# Patient Record
Sex: MALE | Race: WHITE | NOT HISPANIC OR LATINO | Employment: OTHER | RURAL
[De-identification: names, ages, dates, MRNs, and addresses within clinical notes are randomized per-mention and may not be internally consistent; named-entity substitution may affect disease eponyms.]

---

## 2017-01-04 ENCOUNTER — OFFICE VISIT (OUTPATIENT)
Dept: FAMILY MEDICINE CLINIC | Facility: CLINIC | Age: 72
End: 2017-01-04

## 2017-01-04 VITALS
HEART RATE: 70 BPM | TEMPERATURE: 97.9 F | SYSTOLIC BLOOD PRESSURE: 139 MMHG | BODY MASS INDEX: 37.13 KG/M2 | DIASTOLIC BLOOD PRESSURE: 80 MMHG | WEIGHT: 244.2 LBS

## 2017-01-04 DIAGNOSIS — M25.561 PAIN AND SWELLING OF KNEE, RIGHT: Primary | ICD-10-CM

## 2017-01-04 DIAGNOSIS — M25.461 PAIN AND SWELLING OF KNEE, RIGHT: Primary | ICD-10-CM

## 2017-01-04 PROCEDURE — 96372 THER/PROPH/DIAG INJ SC/IM: CPT | Performed by: FAMILY MEDICINE

## 2017-01-04 PROCEDURE — 99213 OFFICE O/P EST LOW 20 MIN: CPT | Performed by: FAMILY MEDICINE

## 2017-01-04 RX ORDER — METHYLPREDNISOLONE ACETATE 40 MG/ML
40 INJECTION, SUSPENSION INTRA-ARTICULAR; INTRALESIONAL; INTRAMUSCULAR; SOFT TISSUE ONCE
Status: COMPLETED | OUTPATIENT
Start: 2017-01-04 | End: 2017-01-04

## 2017-01-04 RX ORDER — PREDNISONE 20 MG/1
TABLET ORAL
Qty: 7 TABLET | Refills: 0 | Status: SHIPPED | OUTPATIENT
Start: 2017-01-04 | End: 2017-05-12

## 2017-01-04 RX ADMIN — METHYLPREDNISOLONE ACETATE 40 MG: 40 INJECTION, SUSPENSION INTRA-ARTICULAR; INTRALESIONAL; INTRAMUSCULAR; SOFT TISSUE at 09:46

## 2017-01-04 NOTE — PROGRESS NOTES
Samy Velazquez    1945    Chief Complaint   Patient presents with   • Leg Pain     right       Vitals:    01/04/17 0907   BP: 139/80   Pulse: 70   Temp: 97.9 °F (36.6 °C)   Weight: 244 lb 3.2 oz (111 kg)       Leg Pain    The incident occurred more than 1 week ago. The incident occurred at home. There was no injury mechanism. The pain is present in the right thigh and right knee. The quality of the pain is described as aching. The pain is mild. The pain has been fluctuating since onset. Associated symptoms include muscle weakness. The symptoms are aggravated by movement. He has tried ice for the symptoms. The treatment provided mild relief.       Review of Systems   Musculoskeletal: Negative for back pain.        Right posterior thigh pain into the upper calf with positive hamstring test       Past Medical History   Diagnosis Date   • Hyperlipidemia          Current Outpatient Prescriptions:   •  allopurinol (ZYLOPRIM) 300 MG tablet, Take 300 mg by mouth Daily., Disp: , Rfl:   •  aspirin 81 MG EC tablet, Take 81 mg by mouth Daily., Disp: , Rfl:   •  atorvastatin (LIPITOR) 10 MG tablet, Take 10 mg by mouth Daily., Disp: , Rfl:   •  cephalexin (KEFLEX) 500 MG capsule, Take 500 mg by mouth 2 (Two) Times a Day., Disp: , Rfl:   •  cholecalciferol (VITAMIN D3) 1000 UNITS tablet, Take 1,000 Units by mouth Daily., Disp: , Rfl:   •  clopidogrel (PLAVIX) 75 MG tablet, Take 75 mg by mouth Daily., Disp: , Rfl:   •  famotidine (PEPCID) 20 MG tablet, Take 20 mg by mouth 2 (Two) Times a Day., Disp: , Rfl:   •  FLUoxetine (PROZAC) 40 MG capsule, Take 40 mg by mouth Daily., Disp: , Rfl:   •  furosemide (LASIX) 20 MG tablet, Take 20 mg by mouth 2 (Two) Times a Day., Disp: , Rfl:   •  isosorbide mononitrate (IMDUR) 60 MG 24 hr tablet, TAKE 1 TABLET EVERY MORNING AND TAKE 1/2 TABLET AT 2PM, Disp: , Rfl: 1  •  mexiletine (MEXITIL) 150 MG capsule, TAKE 2 CAPSULES BY MOUTH 3 TIMES A DAY, Disp: , Rfl: 3  •  predniSONE (DELTASONE) 20 MG  tablet, Starting Thursday 1 each morning for 7 days, Disp: 7 tablet, Rfl: 0  No current facility-administered medications for this visit.     Allergies   Allergen Reactions   • Clindamycin/Lincomycin Diarrhea   • Iodine Hives   • Vancomycin Nausea And Vomiting   • Penicillins Rash       Patient Active Problem List   Diagnosis   • Ischemic heart disease due to coronary artery obstruction   • Osteoarthritis of multiple joints   • Cardiac pacemaker   • Gout of multiple sites   • Nephrolithiasis   • Mixed hyperlipidemia       Social History     Social History   • Marital status:      Spouse name: N/A   • Number of children: N/A   • Years of education: N/A     Social History Main Topics   • Smoking status: Never Smoker   • Smokeless tobacco: None   • Alcohol use No   • Drug use: No   • Sexual activity: Not Asked     Other Topics Concern   • None     Social History Narrative       Past Surgical History   Procedure Laterality Date   • Cardiac surgery     • Total shoulder replacement     • Knee surgery         Physical Exam   Constitutional:   Morbidly obese   Musculoskeletal:   Pain to resistance right posterior thigh no discoloration   Neurological: He is alert.   Psychiatric: He has a normal mood and affect.   Vitals reviewed.      Vitals:    01/04/17 0907   BP: 139/80   Pulse: 70   Temp: 97.9 °F (36.6 °C)   Weight: 244 lb 3.2 oz (111 kg)       Samy was seen today for leg pain.    Diagnoses and all orders for this visit:    Pain and swelling of knee, right  -     methylPREDNISolone acetate (DEPO-medrol) injection 40 mg; Inject 1 mL into the shoulder, thigh, or buttocks 1 (One) Time.    Other orders  -     predniSONE (DELTASONE) 20 MG tablet; Starting Thursday 1 each morning for 7 days        Problem List Items Addressed This Visit     None      Visit Diagnoses     Pain and swelling of knee, right    -  Primary    Relevant Medications    methylPREDNISolone acetate (DEPO-medrol) injection 40 mg (Completed) (Start  on 1/4/2017 10:15 AM)          RAH lumbar radiculopathy versus hamstring tear on right-when it started was up and down tractors a lot                            Garret Salinas MD  1/4/2017

## 2017-01-04 NOTE — MR AVS SNAPSHOT
Samy Velazquez   1/4/2017 9:00 AM   Office Visit    Dept Phone:  [phone]   Encounter #:  [id number]    Provider:  Garret Salinas MD   Department:  [facility]                Your Full Care Plan              Today's Medication Changes          These changes are accurate as of: 1/4/17  9:48 AM.  If you have any questions, ask your nurse or doctor.               New Medication(s)Ordered:     predniSONE 20 MG tablet   Commonly known as:  DELTASONE   Starting Thursday 1 each morning for 7 days   Started by:  Garret Salinas MD            Where to Get Your Medications      These medications were sent to [facility]/pharmacy #[id number] - [place], [place] - [address] [place] [address] - [phone]  - [phone] [address] [address]     Phone:  [phone]     predniSONE 20 MG tablet                  Your Updated Medication List          This list is accurate as of: 1/4/17  9:48 AM.  Always use your most recent med list.                allopurinol 300 MG tablet   Commonly known as:  ZYLOPRIM       aspirin 81 MG EC tablet       atorvastatin 10 MG tablet   Commonly known as:  LIPITOR       cephalexin 500 MG capsule   Commonly known as:  KEFLEX       cholecalciferol 1000 UNITS tablet   Commonly known as:  VITAMIN D3       clopidogrel 75 MG tablet   Commonly known as:  PLAVIX       famotidine 20 MG tablet   Commonly known as:  PEPCID       furosemide 20 MG tablet   Commonly known as:  LASIX       isosorbide mononitrate 60 MG 24 hr tablet   Commonly known as:  IMDUR       mexiletine 150 MG capsule   Commonly known as:  MEXITIL       predniSONE 20 MG tablet   Commonly known as:  DELTASONE   Starting Thursday 1 each morning for 7 days       PROZAC 40 MG capsule   Generic drug:  FLUoxetine               You Were Diagnosed With        Codes Comments    Pain and swelling of knee, right    -  Primary ICD-10-CM: M25.561, M25.461  ICD-9-CM: 719.46          Medications to be Given to You by a Medical Professional     Due       Frequency    (none) methylPREDNISolone acetate (DEPO-medrol) injection 40 mg  Once      Instructions     None    Patient Instructions History      Upcoming Appointments     Visit Type Date Time Department    OFFICE VISIT 1/4/2017  9:00 AM [facility]      [employer] Signup     Our records indicate that you have declined [facility] sign[facility]. If you would like to sign up for [employer], please email [email or web address] or call [phone] to obtain an activation code.             Other Info from Your Visit           Allergies     Clindamycin/lincomycin  Diarrhea    Iodine  Hives    Vancomycin  Nausea And Vomiting    Penicillins  Rash      Reason for Visit     Leg Pain right      Vital Signs     Blood Pressure Pulse Temperature Weight Body Mass Index Smoking Status    139/80 70 97.9 °F (36.6 °C) 244 lb 3.2 oz (111 kg) 37.13 kg/m2 Never Smoker      Problems and Diagnoses Noted     Pain and swelling of knee, right    -  Primary      Medications Administered     methylPREDNISolone acetate (DEPO-medrol) injection 40 mg

## 2017-01-13 ENCOUNTER — OFFICE VISIT (OUTPATIENT)
Dept: FAMILY MEDICINE CLINIC | Facility: CLINIC | Age: 72
End: 2017-01-13

## 2017-01-13 VITALS
WEIGHT: 246.4 LBS | DIASTOLIC BLOOD PRESSURE: 77 MMHG | TEMPERATURE: 98.9 F | OXYGEN SATURATION: 98 % | BODY MASS INDEX: 37.35 KG/M2 | HEIGHT: 68 IN | HEART RATE: 60 BPM | SYSTOLIC BLOOD PRESSURE: 117 MMHG

## 2017-01-13 DIAGNOSIS — R32 INCONTINENCE: ICD-10-CM

## 2017-01-13 DIAGNOSIS — M54.16 LUMBAR RADICULOPATHY: Primary | ICD-10-CM

## 2017-01-13 LAB
BILIRUB BLD-MCNC: NEGATIVE MG/DL
CLARITY, POC: CLEAR
COLOR UR: YELLOW
GLUCOSE UR STRIP-MCNC: NEGATIVE MG/DL
KETONES UR QL: NEGATIVE
LEUKOCYTE EST, POC: ABNORMAL
NITRITE UR-MCNC: NEGATIVE MG/ML
PH UR: 5.5 [PH] (ref 5–8)
PROT UR STRIP-MCNC: NEGATIVE MG/DL
RBC # UR STRIP: ABNORMAL /UL
SP GR UR: 1.02 (ref 1–1.03)
UROBILINOGEN UR QL: NORMAL

## 2017-01-13 PROCEDURE — 81003 URINALYSIS AUTO W/O SCOPE: CPT | Performed by: FAMILY MEDICINE

## 2017-01-13 PROCEDURE — 99213 OFFICE O/P EST LOW 20 MIN: CPT | Performed by: FAMILY MEDICINE

## 2017-01-13 RX ORDER — HYDROCODONE BITARTRATE AND ACETAMINOPHEN 7.5; 325 MG/1; MG/1
1 TABLET ORAL EVERY 6 HOURS PRN
Qty: 90 TABLET | Refills: 0 | Status: SHIPPED | OUTPATIENT
Start: 2017-01-13 | End: 2017-10-17 | Stop reason: ALTCHOICE

## 2017-01-13 NOTE — PROGRESS NOTES
"Samy Velazquez    1945    Chief Complaint   Patient presents with   • Leg Pain     Right leg       Vitals:    01/13/17 1322   BP: 117/77   Pulse: 60   Temp: 98.9 °F (37.2 °C)   SpO2: 98%   Weight: 246 lb 6.4 oz (112 kg)   Height: 68\" (172.7 cm)       Leg Pain    The incident occurred more than 1 week ago. The incident occurred at home. There was no injury mechanism. The pain is present in the right hip, right thigh, right knee and right ankle. The quality of the pain is described as shooting. The pain is at a severity of 7/10. The pain is moderate. The pain has been fluctuating since onset. Associated symptoms include an inability to bear weight and muscle weakness. He reports no foreign bodies present. The symptoms are aggravated by movement. He has tried NSAIDs for the symptoms. The treatment provided no relief.       Review of Systems   Gastrointestinal:        Fecal incontinence since sciatica started   Genitourinary:        Urinary incontinence and sciatica started   Neurological:        Lumbar radiculopathy on the right       Past Medical History   Diagnosis Date   • Coronary artery disease    • Hyperlipidemia    • Kidney stone          Current Outpatient Prescriptions:   •  allopurinol (ZYLOPRIM) 300 MG tablet, Take 300 mg by mouth Daily., Disp: , Rfl:   •  aspirin 81 MG EC tablet, Take 81 mg by mouth Daily., Disp: , Rfl:   •  atorvastatin (LIPITOR) 10 MG tablet, Take 10 mg by mouth Daily., Disp: , Rfl:   •  cephalexin (KEFLEX) 500 MG capsule, Take 500 mg by mouth 2 (Two) Times a Day., Disp: , Rfl:   •  cholecalciferol (VITAMIN D3) 1000 UNITS tablet, Take 1,000 Units by mouth Daily., Disp: , Rfl:   •  clopidogrel (PLAVIX) 75 MG tablet, Take 75 mg by mouth Daily., Disp: , Rfl:   •  famotidine (PEPCID) 20 MG tablet, Take 20 mg by mouth 2 (Two) Times a Day., Disp: , Rfl:   •  FLUoxetine (PROZAC) 40 MG capsule, Take 40 mg by mouth Daily., Disp: , Rfl:   •  furosemide (LASIX) 20 MG tablet, Take 20 mg by mouth 2 " "(Two) Times a Day., Disp: , Rfl:   •  isosorbide mononitrate (IMDUR) 60 MG 24 hr tablet, TAKE 1 TABLET EVERY MORNING AND TAKE 1/2 TABLET AT 2PM, Disp: , Rfl: 1  •  mexiletine (MEXITIL) 150 MG capsule, TAKE 2 CAPSULES BY MOUTH 3 TIMES A DAY, Disp: , Rfl: 3  •  predniSONE (DELTASONE) 20 MG tablet, Starting Thursday 1 each morning for 7 days, Disp: 7 tablet, Rfl: 0  •  HYDROcodone-acetaminophen (NORCO) 7.5-325 MG per tablet, Take 1 tablet by mouth Every 6 (Six) Hours As Needed for moderate pain (4-6)., Disp: 90 tablet, Rfl: 0    Allergies   Allergen Reactions   • Clindamycin/Lincomycin Diarrhea   • Iodine Hives   • Vancomycin Nausea And Vomiting   • Penicillins Rash       Patient Active Problem List   Diagnosis   • Ischemic heart disease due to coronary artery obstruction   • Osteoarthritis of multiple joints   • Cardiac pacemaker   • Gout of multiple sites   • Nephrolithiasis   • Mixed hyperlipidemia       Social History     Social History   • Marital status:      Spouse name: N/A   • Number of children: N/A   • Years of education: N/A     Social History Main Topics   • Smoking status: Never Smoker   • Smokeless tobacco: None   • Alcohol use No   • Drug use: No   • Sexual activity: Not Asked     Other Topics Concern   • None     Social History Narrative       Past Surgical History   Procedure Laterality Date   • Cardiac surgery     • Total shoulder replacement     • Knee surgery         Physical Exam   Constitutional:    obese   Cardiovascular: Normal rate and regular rhythm.    Pulmonary/Chest: Effort normal and breath sounds normal.   Neurological: He is alert.   Psychiatric: He has a normal mood and affect. His behavior is normal.   Vitals reviewed.      Vitals:    01/13/17 1322   BP: 117/77   Pulse: 60   Temp: 98.9 °F (37.2 °C)   SpO2: 98%   Weight: 246 lb 6.4 oz (112 kg)   Height: 68\" (172.7 cm)       Samy was seen today for leg pain.    Diagnoses and all orders for this visit:    Lumbar radiculopathy  -  "    CT Lumbar Spine Without Contrast    Incontinence  -     POC Urinalysis Dipstick, Automated  -     Urine Culture (Clean Catch)    Other orders  -     HYDROcodone-acetaminophen (NORCO) 7.5-325 MG per tablet; Take 1 tablet by mouth Every 6 (Six) Hours As Needed for moderate pain (4-6).        Problem List Items Addressed This Visit     None      Visit Diagnoses     Lumbar radiculopathy    -  Primary    Relevant Orders    CT Lumbar Spine Without Contrast    Incontinence        Relevant Orders    POC Urinalysis Dipstick, Automated (Completed)    Urine Culture (Clean Catch)              Plan CT of spine since patient has a pacemaker.  Probable spinal stenosis and worried about fecal and urinary incontinence with these radiculopathy.-Urgent neurosurgical evaluation-narco 7.5--90 given, warned about fall risk                        Garret Salinas MD  1/13/2017

## 2017-01-13 NOTE — MR AVS SNAPSHOT
Samy Velazquez   1/13/2017 1:30 PM   Office Visit    Dept Phone:  [phone]   Encounter #:  [id number]    Provider:  Garret Salinas MD   Department:  [facility]                Your Full Care Plan              Today's Medication Changes          These changes are accurate as of: 1/13/17  1:51 PM.  If you have any questions, ask your nurse or doctor.               New Medication(s)Ordered:     HYDROcodone-acetaminophen 7.5-325 MG per tablet   Commonly known as:  NORCO   Take 1 tablet by mouth Every 6 (Six) Hours As Needed for moderate pain (4-6).   Started by:  Garret Salinas MD            Where to Get Your Medications      You can get these medications from any pharmacy     Bring a paper prescription for each of these medications     HYDROcodone-acetaminophen 7.5-325 MG per tablet                  Your Updated Medication List          This list is accurate as of: 1/13/17  1:51 PM.  Always use your most recent med list.                allopurinol 300 MG tablet   Commonly known as:  ZYLOPRIM       aspirin 81 MG EC tablet       atorvastatin 10 MG tablet   Commonly known as:  LIPITOR       cephalexin 500 MG capsule   Commonly known as:  KEFLEX       cholecalciferol 1000 UNITS tablet   Commonly known as:  VITAMIN D3       clopidogrel 75 MG tablet   Commonly known as:  PLAVIX       famotidine 20 MG tablet   Commonly known as:  PEPCID       furosemide 20 MG tablet   Commonly known as:  LASIX       HYDROcodone-acetaminophen 7.5-325 MG per tablet   Commonly known as:  NORCO   Take 1 tablet by mouth Every 6 (Six) Hours As Needed for moderate pain (4-6).       isosorbide mononitrate 60 MG 24 hr tablet   Commonly known as:  IMDUR       mexiletine 150 MG capsule   Commonly known as:  MEXITIL       predniSONE 20 MG tablet   Commonly known as:  DELTASONE   Starting Thursday 1 each morning for 7 days       PROZAC 40 MG capsule   Generic drug:  FLUoxetine    "           We Performed the Following     CT Lumbar Spine Without Contrast     POC Urinalysis Dipstick, Automated     Urine Culture (Clean Catch)       You Were Diagnosed With        Codes Comments    Lumbar radiculopathy    -  Primary ICD-10-CM: M54.16  ICD-9-CM: 724.4     Incontinence     ICD-10-CM: R32  ICD-9-CM: 788.30       Instructions     None    Patient Instructions History      Upcoming Appointments     Visit Type Date Time Department    OFFICE VISIT 1/13/2017  1:30 PM [facility] [place]      [employer] Signup     Our records indicate that you have declined [employer] signup. If you would like to sign up for [employer], please email [email or web address] or call [phone] to obtain an activation code.             Other Info from Your Visit           Allergies     Clindamycin/lincomycin  Diarrhea    Iodine  Hives    Vancomycin  Nausea And Vomiting    Penicillins  Rash      Reason for Visit     Leg Pain Right leg      Vital Signs     Blood Pressure Pulse Temperature Height Weight Oxygen Saturation    117/77 60 98.9 °F (37.2 °C) 68\" (172.7 cm) 246 lb 6.4 oz (112 kg) 98%    Body Mass Index Smoking Status                37.46 kg/m2 Never Smoker          Problems and Diagnoses Noted     Lumbar nerve root disorder    -  Primary    Incontinence          Results     POC Urinalysis Dipstick, Automated      Component Value Standard Range & Units    Color Yellow Yellow, Straw, Dark Yellow, Candis    Clarity, UA Clear Clear    Glucose, UA Negative Negative, 1000 mg/dL (3+) mg/dL    Bilirubin Negative Negative    Ketones, UA Negative Negative    Specific Gravity  1.020 1.005 - 1.030    Blood, UA Trace Negative    pH, Urine 5.5 5.0 - 8.0    Protein, POC Negative Negative mg/dL    Urobilinogen, UA Normal Normal    Leukocytes Trace Negative    Nitrite, UA Negative Negative                    "

## 2017-01-15 LAB
BACTERIA UR CULT: NO GROWTH
BACTERIA UR CULT: NORMAL

## 2017-01-18 DIAGNOSIS — M48.061 SPINAL STENOSIS OF LUMBAR REGION: Primary | ICD-10-CM

## 2017-01-30 ENCOUNTER — OFFICE VISIT (OUTPATIENT)
Dept: FAMILY MEDICINE CLINIC | Facility: CLINIC | Age: 72
End: 2017-01-30

## 2017-01-30 VITALS
DIASTOLIC BLOOD PRESSURE: 73 MMHG | HEART RATE: 64 BPM | SYSTOLIC BLOOD PRESSURE: 112 MMHG | TEMPERATURE: 97.9 F | HEIGHT: 68 IN | OXYGEN SATURATION: 98 %

## 2017-01-30 DIAGNOSIS — Z00.00 ANNUAL PHYSICAL EXAM: Primary | ICD-10-CM

## 2017-01-30 DIAGNOSIS — S70.02XA CONTUSION OF LEFT HIP, INITIAL ENCOUNTER: Primary | ICD-10-CM

## 2017-01-30 LAB
EXPIRATION DATE 2: NORMAL
EXPIRATION DATE 3: NORMAL
EXPIRATION DATE: NORMAL
GASTROCULT GAST QL: NEGATIVE
HEMOCCULT SP2 STL QL: NEGATIVE
HEMOCCULT SP3 STL QL: NEGATIVE
Lab: NORMAL

## 2017-01-30 PROCEDURE — 99213 OFFICE O/P EST LOW 20 MIN: CPT | Performed by: FAMILY MEDICINE

## 2017-01-30 PROCEDURE — 82270 OCCULT BLOOD FECES: CPT | Performed by: FAMILY MEDICINE

## 2017-01-30 NOTE — PROGRESS NOTES
"Samy Velazquez    1945    Chief Complaint   Patient presents with   • Fall     Left leg bruised/ hit head/ follow up from ER on 1/27/17       Vitals:    01/30/17 1335   BP: 112/73   Pulse: 64   Temp: 97.9 °F (36.6 °C)   SpO2: 98%   Height: 68\" (172.7 cm)       Fall   The accident occurred 3 to 5 days ago. The fall occurred while standing. He fell from a height of 1 to 2 ft. He landed on carpet. There was no blood loss. The point of impact was the left hip and head. The pain is present in the head and left hip. The pain is mild. The symptoms are aggravated by pressure on injury. He has tried ice for the symptoms. The treatment provided moderate relief.       Review of Systems   Musculoskeletal:        • Subcutaneous contusion left hip-x-rays normal   Neurological:        Left calvarial contusion-CT normal       Past Medical History   Diagnosis Date   • Arthritis    • Coronary artery disease    • Hyperlipidemia    • Kidney stone          Current Outpatient Prescriptions:   •  allopurinol (ZYLOPRIM) 300 MG tablet, Take 300 mg by mouth Daily., Disp: , Rfl:   •  aspirin 81 MG EC tablet, Take 81 mg by mouth Daily., Disp: , Rfl:   •  atorvastatin (LIPITOR) 10 MG tablet, Take 10 mg by mouth Daily., Disp: , Rfl:   •  cephalexin (KEFLEX) 500 MG capsule, Take 500 mg by mouth 2 (Two) Times a Day., Disp: , Rfl:   •  cholecalciferol (VITAMIN D3) 1000 UNITS tablet, Take 1,000 Units by mouth Daily., Disp: , Rfl:   •  clopidogrel (PLAVIX) 75 MG tablet, Take 75 mg by mouth Daily., Disp: , Rfl:   •  famotidine (PEPCID) 20 MG tablet, Take 20 mg by mouth 2 (Two) Times a Day., Disp: , Rfl:   •  FLUoxetine (PROZAC) 40 MG capsule, Take 40 mg by mouth Daily., Disp: , Rfl:   •  furosemide (LASIX) 20 MG tablet, Take 20 mg by mouth 2 (Two) Times a Day., Disp: , Rfl:   •  HYDROcodone-acetaminophen (NORCO) 7.5-325 MG per tablet, Take 1 tablet by mouth Every 6 (Six) Hours As Needed for moderate pain (4-6)., Disp: 90 tablet, Rfl: 0  •  " "isosorbide mononitrate (IMDUR) 60 MG 24 hr tablet, TAKE 1 TABLET EVERY MORNING AND TAKE 1/2 TABLET AT 2PM, Disp: , Rfl: 1  •  mexiletine (MEXITIL) 150 MG capsule, TAKE 2 CAPSULES BY MOUTH 3 TIMES A DAY, Disp: , Rfl: 3  •  predniSONE (DELTASONE) 20 MG tablet, Starting Thursday 1 each morning for 7 days, Disp: 7 tablet, Rfl: 0    Allergies   Allergen Reactions   • Clindamycin/Lincomycin Diarrhea   • Iodine Hives   • Vancomycin Nausea And Vomiting   • Penicillins Rash       Patient Active Problem List   Diagnosis   • Ischemic heart disease due to coronary artery obstruction   • Osteoarthritis of multiple joints   • Cardiac pacemaker   • Gout of multiple sites   • Nephrolithiasis   • Mixed hyperlipidemia       Social History     Social History   • Marital status:      Spouse name: N/A   • Number of children: N/A   • Years of education: N/A     Social History Main Topics   • Smoking status: Never Smoker   • Smokeless tobacco: None   • Alcohol use No   • Drug use: No   • Sexual activity: Not Asked     Other Topics Concern   • None     Social History Narrative       Past Surgical History   Procedure Laterality Date   • Cardiac surgery     • Total shoulder replacement     • Knee surgery         Physical Exam   Constitutional: He is oriented to person, place, and time.   Cardiovascular: Normal rate and regular rhythm.    Pulmonary/Chest: Effort normal and breath sounds normal.   Musculoskeletal:   Large left hip subcutaneous contusion   Neurological: He is alert and oriented to person, place, and time.   Psychiatric: He has a normal mood and affect. His behavior is normal.       Vitals:    01/30/17 1335   BP: 112/73   Pulse: 64   Temp: 97.9 °F (36.6 °C)   SpO2: 98%   Height: 68\" (172.7 cm)       Samy was seen today for fall.    Diagnoses and all orders for this visit:    Contusion of left hip, initial encounter        Problem List Items Addressed This Visit     None      Visit Diagnoses     Contusion of left hip, " initial encounter    -  Primary                Plan epidurals for spine-continue appointment                        Garret Salinas MD  1/30/2017

## 2017-01-30 NOTE — MR AVS SNAPSHOT
Samy Velazquez   1/30/2017 1:45 PM   Office Visit    Dept Phone:  [phone]   Encounter #:  [id number]    Provider:  Garret Salinas MD   Department:  [facility]                Your Full Care Plan              Your Updated Medication List          This list is accurate as of: 1/30/17  1:50 PM.  Always use your most recent med list.                allopurinol 300 MG tablet   Commonly known as:  ZYLOPRIM       aspirin 81 MG EC tablet       atorvastatin 10 MG tablet   Commonly known as:  LIPITOR       cephalexin 500 MG capsule   Commonly known as:  KEFLEX       cholecalciferol 1000 UNITS tablet   Commonly known as:  VITAMIN D3       clopidogrel 75 MG tablet   Commonly known as:  PLAVIX       famotidine 20 MG tablet   Commonly known as:  PEPCID       furosemide 20 MG tablet   Commonly known as:  LASIX       HYDROcodone-acetaminophen 7.5-325 MG per tablet   Commonly known as:  NORCO   Take 1 tablet by mouth Every 6 (Six) Hours As Needed for moderate pain (4-6).       isosorbide mononitrate 60 MG 24 hr tablet   Commonly known as:  IMDUR       mexiletine 150 MG capsule   Commonly known as:  MEXITIL       predniSONE 20 MG tablet   Commonly known as:  DELTASONE   Starting Thursday 1 each morning for 7 days       PROZAC 40 MG capsule   Generic drug:  FLUoxetine               You Were Diagnosed With        Codes Comments    Contusion of left hip, initial encounter    -  Primary ICD-10-CM: S70.02XA  ICD-9-CM: 924.01       Instructions     None    Patient Instructions History      Upcoming Appointments     Visit Type Date Time Department    OFFICE VISIT 1/30/2017  1:45 PM [facility] Signup     Our records indicate that you have declined [facility] [employer] signup. If you would like to sign up for [employer], please email [email or web address] or call [phone] to obtain an activation code.             Other Info from Your Visit         "       Allergies     Clindamycin/lincomycin  Diarrhea    Iodine  Hives    Vancomycin  Nausea And Vomiting    Penicillins  Rash      Reason for Visit     Fall Left leg bruised/ hit head/ follow up from ER on 1/27/17      Vital Signs     Blood Pressure Pulse Temperature Height Oxygen Saturation Smoking Status    112/73 64 97.9 °F (36.6 °C) 68\" (172.7 cm) 98% Never Smoker      Problems and Diagnoses Noted     Contusion of hip    -  Primary        "

## 2017-01-31 ENCOUNTER — TRANSCRIBE ORDERS (OUTPATIENT)
Dept: PHYSICAL THERAPY | Facility: HOSPITAL | Age: 72
End: 2017-01-31

## 2017-01-31 DIAGNOSIS — M47.896 OTHER SPONDYLOSIS, LUMBAR REGION: Primary | ICD-10-CM

## 2017-02-09 ENCOUNTER — HOSPITAL ENCOUNTER (OUTPATIENT)
Dept: PHYSICAL THERAPY | Facility: HOSPITAL | Age: 72
Setting detail: THERAPIES SERIES
Discharge: HOME | End: 2017-02-09

## 2017-02-09 DIAGNOSIS — M47.896 OTHER SPONDYLOSIS, LUMBAR REGION: ICD-10-CM

## 2017-02-09 PROCEDURE — G8979 MOBILITY GOAL STATUS: HCPCS | Performed by: PHYSICAL THERAPIST

## 2017-02-09 PROCEDURE — 97110 THERAPEUTIC EXERCISES: CPT | Performed by: PHYSICAL THERAPIST

## 2017-02-09 PROCEDURE — G8978 MOBILITY CURRENT STATUS: HCPCS | Performed by: PHYSICAL THERAPIST

## 2017-02-09 PROCEDURE — 97162 PT EVAL MOD COMPLEX 30 MIN: CPT | Performed by: PHYSICAL THERAPIST

## 2017-02-10 NOTE — PROGRESS NOTES
Outpatient Physical Therapy Ortho Initial Evaluation  [facility]  Sharon Montesinos, PT  17       Patient Name: Samy Velazquez  : 1945  MRN: [id number]  Today's Date: 2017      Visit Date: 2017     Subjective improvement: N/A    Attendance:         MD follow up: Ask next visit         RC date: 3/2/17           Patient Active Problem List   Diagnosis   • Ischemic heart disease due to coronary artery obstruction   • Osteoarthritis of multiple joints   • Cardiac pacemaker   • Gout of multiple sites   • Nephrolithiasis   • Mixed hyperlipidemia        Past Medical History   Diagnosis Date   • Arthritis    • Coronary artery disease    • Hyperlipidemia    • Kidney stone         Past Surgical History   Procedure Laterality Date   • Cardiac surgery     • Total shoulder replacement     • Knee surgery     • Cardiac defibrillator placement     • Cardiac defibrillator placement N/A      Current Outpatient Prescriptions on File Prior to Encounter   Medication Sig Dispense Refill   • allopurinol (ZYLOPRIM) 300 MG tablet Take 300 mg by mouth Daily.     • aspirin 81 MG EC tablet Take 81 mg by mouth Daily.     • atorvastatin (LIPITOR) 10 MG tablet Take 10 mg by mouth Daily.     • cephalexin (KEFLEX) 500 MG capsule Take 500 mg by mouth 2 (Two) Times a Day.     • cholecalciferol (VITAMIN D3) 1000 UNITS tablet Take 1,000 Units by mouth Daily.     • clopidogrel (PLAVIX) 75 MG tablet Take 75 mg by mouth Daily.     • famotidine (PEPCID) 20 MG tablet Take 20 mg by mouth 2 (Two) Times a Day.     • FLUoxetine (PROZAC) 40 MG capsule Take 40 mg by mouth Daily.     • furosemide (LASIX) 20 MG tablet Take 20 mg by mouth 2 (Two) Times a Day.     • HYDROcodone-acetaminophen (NORCO) 7.5-325 MG per tablet Take 1 tablet by mouth Every 6 (Six) Hours As Needed for moderate pain (4-6). 90 tablet 0   • isosorbide mononitrate (IMDUR) 60 MG 24 hr tablet TAKE 1 TABLET EVERY MORNING AND TAKE 1/2 TABLET AT 2PM  1  "  • mexiletine (MEXITIL) 150 MG capsule TAKE 2 CAPSULES BY MOUTH 3 TIMES A DAY  3   • predniSONE (DELTASONE) 20 MG tablet Starting Thursday 1 each morning for 7 days 7 tablet 0     No current facility-administered medications on file prior to encounter.        Visit Dx:     ICD-10-CM ICD-9-CM   1. Other spondylosis, lumbar region M47.896 721.3             Patient History       02/09/17 1100          History    Chief Complaint Pain;Numbness;Tingling;Difficulty Walking  -KG      Type of Pain Back pain;Lower Extremity / Leg  -KG      Date Current Problem(s) Began --   Chronic  -KG      Brief Description of Current Complaint Pt states he has low back pain and R leg pain. Pt reports that the majority of the time he doesn't have any pain in his back, only in his R leg. Sometimes in the left. States his pain is mostly a shooting pain and numbness. States his R leg sometimes \"goes out\" on him or feels like it is going to when walking. Reports he fell about a week ago due to his leg weakness. Pt reports he uses his RW or walking stick to walk around the house. States he uses his electric scooter or transport w/c outside of the home. Reports he can't walk long distances due to increased leg pain. Reports the numbness is worse in the afternoon/evenings. Pt states that on 2/14/17 he will be having the first of 3 injections in his back.  -KG      Onset Date- PT 2013  -KG      What clinical tests have you had for this problem? X-ray  -KG      Results of Clinical Tests Per patient, arthritis and bone spur in his low back.  -KG      Pain     Pain Location Leg  -KG      Pain at Present 0  -KG      Pain at Best 0  -KG      Pain at Worst 7  -KG      Pain Frequency Intermittent  -KG      Pain Description Aching;Numbness;Shooting;Tingling  -KG      What Performance Factors Make the Current Problem(s) WORSE? Walking short distances  -KG      What Performance Factors Make the Current Problem(s) BETTER? Sitting improves his pain.  -KG      " Tolerance Time- Standing Pt can only stand/walk for very short distances without increased pain. Can walk further with walker vs. walking stick  -KG      Fall Risk Assessment    Any falls in the past year: Yes   Pt fell 2 weeks ago; reports at least 20 falls since 2013.  -KG      Factors that contributed to the fall: Other (comment)   Weakness, pain, R leg instability  -KG      Does patient have a fear of falling Yes (comment)   When ambulating short distances & when leg feels weak  -KG        User Key  (r) = Recorded By, (t) = Taken By, (c) = Cosigned By    Initials Name Provider Type    KG Sharon Montesinos, PT Physical Therapist                PT Ortho       02/09/17 1100    Precautions and Contraindications    Precautions/Limitations fall precautions  -KG    Subjective Pain    Able to rate subjective pain? yes  -KG    Pre-Treatment Pain Level 0  -KG    Post-Treatment Pain Level 0  -KG    Subjective Pain Comment Pt reported increased R leg pain/numbness when ambulating in hallway; improved after sitting.  -KG    Posture/Observations    Posture/Observations Comments Pt presented to clinic in a transport w/c, which he used to travel from waiting area to the treatment room. Pt demonstrates a forward flexed, rounded shoulders posture.  -KG    Sensation    Sensation WNL? WNL  -KG    Light Touch No apparent deficits  -KG    Sensory Screen for Light Touch- Lower Quarter Clearing    L1 (inguinal area) Intact  -KG    L2 (anterior mid thigh) Intact  -KG    L3 (distal anterior thigh) Intact  -KG    L4 (medial lower leg/foot) Intact  -KG    L5 (lateral lower leg/great toe) Intact  -KG    S1 (bottom of foot) Intact  -KG    Myotomal Screen- Lower Quarter Clearing    Hip flexion (L2) WNL  -KG    Knee extension (L3) WNL  -KG    Ankle DF (L4) WNL  -KG    Great toe extension (L5) WNL  -KG    Ankle PF (S1) WNL  -KG    Knee flexion (S2) WNL  -KG    ROM (Range of Motion)    General ROM Detail Lumbar AROM not tested at this time.  -KG     MMT (Manual Muscle Testing)    General MMT Assessment lower extremity strength deficits identified  -KG    Left Hip    Hip Flexion Gross Movement (4+/5) good plus  -KG    Hip ABduction Gross Movement (4+/5) good plus  -KG    Hip ADduction Gross Movement (4+/5) good plus  -KG    Right Hip    Hip Flexion Gross Movement (4-/5) good minus  -KG    Hip ABduction Gross Movement (4/5) good  -KG    Hip ADduction Gross Movement (4/5) good  -KG    Lower Extremity    Lower Ext Manual Muscle Testing right hip strength deficit;right knee strength deficit;left hip strength deficit;left knee strength deficit  -KG    Left Knee    Knee Extension Gross Movement (4+/5) good plus  -KG    Knee Flexion Gross Movement (4+/5) good plus  -KG    Right Knee    Knee Extension Gross Movement (4/5) good  -KG    Knee Flexion Gross Movement (4-/5) good minus  -KG    Lower Extremity Flexibility    Hamstrings Bilateral:;Moderately limited  -KG    Gastrocnemius Bilateral:;Moderately limited  -KG    Gait Assessment/Treatment    Gait, Sobieski Level supervision required  -KG    Gait, Assistive Device rolling walker  -KG    Gait, Distance (Feet) 30  -KG    Gait, Gait Pattern Analysis swing-through gait  -KG    Gait, Gait Deviations sepideh decreased;decreased heel strike;step length decreased;swing-to-stance ratio decreased  -KG    Gait, Impairments pain;strength decreased  -KG    Gait, Comment Pt able to walk 30 feet before having increased pain/numbness/weakness in RLE.  -KG      User Key  (r) = Recorded By, (t) = Taken By, (c) = Cosigned By    Initials Name Provider Type    KG Sharon Montesinos, PT Physical Therapist                            Therapy Education       02/09/17 1100    Therapy Education    Given HEP;Pain management;Symptoms/condition management;Fall prevention and home safety   HEP: SKTC stretch, LTR, seated TR/CR, LAQ  -KG    Program New  -KG    How Provided Verbal;Demonstration;Written  -KG    Provided to Patient  -KG    Level  of Understanding Teach back education performed;Verbalized;Demonstrated  -KG      User Key  (r) = Recorded By, (t) = Taken By, (c) = Cosigned By    Initials Name Provider Type    KG Sharon Montesinos, PT Physical Therapist                PT OP Goals       02/09/17 1100          PT Short Term Goals    STG Date to Achieve 02/23/17  -KG      STG 1 Indep in HEP  -KG      STG 1 Progress New  -KG      STG 2 Tolerate 45 min treatment session without increased pain.  -KG      STG 2 Progress New  -KG      STG 3 Independent with supine <> sit transfers demonstrating proper spine protection.  -KG      STG 3 Progress New  -KG      STG 4 Independent with transverse abs isometrics.  -KG      STG 4 Progress New  -KG      Long Term Goals    LTG Date to Achieve 03/02/17  -KG      LTG 1 Subjectively report greater than 60% improvement.  -KG      LTG 1 Progress New  -KG      LTG 2 Pt will have modified oswestry score less than 30%.  -KG      LTG 2 Progress New  -KG      LTG 3 Demonstrate R hip flex/abd MMT greater than 4/5  -KG      LTG 3 Progress New  -KG      LTG 4 Demonstrate R knee flex/ext strength greater than 4/5  -KG      LTG 4 Progress New  -KG      LTG 5 Ambulate 60 feet with RW with minimal increase in RLE pain.  -KG      LTG 5 Progress New  -KG      Time Calculation    PT Goal Re-Cert Due Date 03/02/17  -KG        User Key  (r) = Recorded By, (t) = Taken By, (c) = Cosigned By    Initials Name Provider Type    KG Sharon Montesinos PT Physical Therapist                PT Assessment/Plan       02/09/17 1100          PT Assessment    Functional Limitations Decreased safety during functional activities;Impaired gait;Limitation in home management;Limitations in functional capacity and performance  -KG      Impairments Balance;Endurance;Gait;Muscle strength;Pain;Posture  -KG      Assessment Comments Pt presents with low back/right lower extremity pain secondary to spondylosis. Pt demonstrates decreased LE strength/stability,  decreased LE flexibility, & impaired functional mobility. Pt will benefit from skilled PT to address these limitations for improved safety with mobility & acheive highest level of function.  -KG      Rehab Potential Good  -KG      Patient/caregiver participated in establishment of treatment plan and goals Yes  -KG      Patient would benefit from skilled therapy intervention Yes  -KG      PT Plan    PT Frequency 2x/week  -KG      Predicted Duration of Therapy Intervention (days/wks) 6 weeks  -KG      Physical Therapy Interventions (Optional Details) balance training;bed mobility training;gait training;home exercise program;lumbar stabilization;modalities;neuromuscular re-education;strengthening;stretching;transfer training  -KG      PT Plan Comments HEP, hamstring/gastroc stretching, hip/knee strengthening & stability, core stabilization, gait training, MHP/ice prn for pain  -KG        User Key  (r) = Recorded By, (t) = Taken By, (c) = Cosigned By    Initials Name Provider Type    ANANT Montesinos PT Physical Therapist                  Exercises       02/09/17 1100          Subjective Pain    Able to rate subjective pain? yes  -KG      Pre-Treatment Pain Level 0  -KG      Post-Treatment Pain Level 0  -KG      Subjective Pain Comment Pt reported increased R leg pain/numbness when ambulating in hallway; improved after sitting.  -KG      Exercise 1    Exercise Name 1 LTR  -KG      Reps 1 15  -KG      Exercise 2    Exercise Name 2 SKTC stretch  -KG      Reps 2 2  -KG      Time (Seconds) 2 30  -KG      Exercise 3    Exercise Name 3 Seated CR/TR  -KG      Sets 3 2  -KG      Reps 3 10  -KG      Exercise 4    Exercise Name 4 LAQ  -KG      Sets 4 2  -KG      Reps 4 10  -KG        User Key  (r) = Recorded By, (t) = Taken By, (c) = Cosigned By    Initials Name Provider Type    ANANT Montesinos PT Physical Therapist                              Outcome Measures       02/09/17 1100          Modified Oswestry    Modified  Oswestry Score/Comments 48  -KG      Functional Assessment    Outcome Measure Options Modifed Owestry  -KG        User Key  (r) = Recorded By, (t) = Taken By, (c) = Cosigned By    Initials Name Provider Type    ANANT Motnesinos PT Physical Therapist            Time Calculation:   Start Time: 1100  Stop Time: 1141  Time Calculation (min): 41 min     Therapy Charges for Today     Code Description Service Date Service Provider Modifiers Qty    96038347456 HC PT MOBILITY CURRENT 2/9/2017 Sharon Montesinos, PT GP, CK 1    72290237484 HC PT MOBILITY PROJECTED 2/9/2017 Sharon Montesinos, PT GP, CJ 1    62625830373 HC PT EVAL MOD COMPLEXITY 1 2/9/2017 Sharon Montesinos, PT GP 1    [id number] HC PT THER PROC EA 15 MIN 2/9/2017 Sharon Montesinos, PT GP 1          PT G-Codes  PT Professional Judgement Used?: Yes  Outcome Measure Options: Modifed Owestry  Score: 48  Functional Limitation: Mobility: Walking and moving around  Mobility: Walking and Moving Around Current Status (): At least 40 percent but less than 60 percent impaired, limited or restricted  Mobility: Walking and Moving Around Goal Status (): At least 20 percent but less than 40 percent impaired, limited or restricted         Sharon Montesinos, PT  2/9/2017

## 2017-02-13 ENCOUNTER — HOSPITAL ENCOUNTER (OUTPATIENT)
Dept: PHYSICAL THERAPY | Facility: HOSPITAL | Age: 72
Setting detail: THERAPIES SERIES
Discharge: HOME | End: 2017-02-13

## 2017-02-13 DIAGNOSIS — M47.896 OTHER SPONDYLOSIS, LUMBAR REGION: Primary | ICD-10-CM

## 2017-02-13 PROCEDURE — 97110 THERAPEUTIC EXERCISES: CPT

## 2017-02-13 NOTE — PROGRESS NOTES
Outpatient Physical Therapy Ortho Treatment Note  [facility], operated [facility]  Jose Arroyo PTA  17  11:00 AM       Patient Name: Samy Velazquez  : 1945  MRN: [id number]  Today's Date: 2017      Visit Date: 2017     Subjective improvement: 0%     Attendance: /        MD follow up: 3/6/17         RC date: 3/2/17           Visit Dx:    ICD-10-CM ICD-9-CM   1. Other spondylosis, lumbar region M47.896 721.3       Patient Active Problem List   Diagnosis   • Ischemic heart disease due to coronary artery obstruction   • Osteoarthritis of multiple joints   • Cardiac pacemaker   • Gout of multiple sites   • Nephrolithiasis   • Mixed hyperlipidemia        Past Medical History   Diagnosis Date   • Arthritis    • Coronary artery disease    • Hyperlipidemia    • Kidney stone         Past Surgical History   Procedure Laterality Date   • Cardiac surgery     • Total shoulder replacement     • Knee surgery     • Cardiac defibrillator placement     • Cardiac defibrillator placement N/A              PT Ortho       17 1000    Subjective Comments    Subjective Comments Pt states that he had a pretty good weekend. Denies pain initially, but does report that it gets worse throughout the day.   -MB    Subjective Pain    Able to rate subjective pain? yes  -MB    Pre-Treatment Pain Level 0  -MB    Post-Treatment Pain Level 0  -MB    Posture/Observations    Posture/Observations Comments Presents in Transport W/C. Forward shoulder rounded posture.  -MB      User Key  (r) = Recorded By, (t) = Taken By, (c) = Cosigned By    Initials Name Provider Type    JUANY Arroyo PTA Physical Therapy Assistant                            PT Assessment/Plan       17 1000 17 1100       PT Assessment    Functional Limitations Decreased safety during functional activities;Impaired gait;Limitation in home management;Limitations in functional capacity and performance  -MB Decreased safety during  functional activities;Impaired gait;Limitation in home management;Limitations in functional capacity and performance  -KG     Impairments Balance;Endurance;Gait;Muscle strength;Pain;Posture  -MB Balance;Endurance;Gait;Muscle strength;Pain;Posture  -KG     Assessment Comments Pt has increase in fatigue/pain with brief standing therex. Pt needs verbal and tactile cues for trans ab iso today and has a poor contraction overall. Pt demos proper supine <-> sit.  -MB Pt presents with low back/right lower extremity pain secondary to spondylosis. Pt demonstrates decreased LE strength/stability, decreased LE flexibility, & impaired functional mobility. Pt will benefit from skilled PT to address these limitations for improved safety with mobility & acheive highest level of function.  -KG     Rehab Potential Good  -MB Good  -KG     Patient/caregiver participated in establishment of treatment plan and goals Yes  -MB Yes  -KG     Patient would benefit from skilled therapy intervention Yes  -MB Yes  -KG     PT Plan    PT Frequency 2x/week  -MB 2x/week  -KG     Predicted Duration of Therapy Intervention (days/wks) 6 weeks  -MB 6 weeks  -KG     Physical Therapy Interventions (Optional Details)  balance training;bed mobility training;gait training;home exercise program;lumbar stabilization;modalities;neuromuscular re-education;strengthening;stretching;transfer training  -KG     PT Plan Comments Standing activities per pt tolerance.  Core stability.   -MB HEP, hamstring/gastroc stretching, hip/knee strengthening & stability, core stabilization, gait training, MHP/ice prn for pain  -KG       User Key  (r) = Recorded By, (t) = Taken By, (c) = Cosigned By    Initials Name Provider Type    MB Jose Arroyo, PTA Physical Therapy Assistant    KG Sharon Montesinos, PT Physical Therapist                Modalities       02/13/17 1000          Ice    Ice Applied Yes  -MB      Location R knee  -MB      Rx Minutes 10 mins  -MB        User Key   (r) = Recorded By, (t) = Taken By, (c) = Cosigned By    Initials Name Provider Type    JUANY Arroyo PTA Physical Therapy Assistant                Exercises       02/13/17 1000          Subjective Comments    Subjective Comments Pt states that he had a pretty good weekend. Denies pain initially, but does report that it gets worse throughout the day.   -MB      Subjective Pain    Able to rate subjective pain? yes  -MB      Pre-Treatment Pain Level 0  -MB      Post-Treatment Pain Level 0  -MB      Aquatics    Aquatics performed? No  -MB      Exercise 1    Exercise Name 1 Seated Hamstring S  -MB      Sets 1 3  -MB      Time (Seconds) 1 30  -MB      Exercise 2    Exercise Name 2 Seated Hip add squeeze  -MB      Sets 2 2  -MB      Reps 2 10  -MB      Exercise 3    Exercise Name 3 Seated HamCurls  -MB      Resistance 3 Red  -MB      Sets 3 2  -MB      Reps 3 10  -MB      Exercise 4    Exercise Name 4 Seated CR/TR  -MB      Sets 4 2  -MB      Reps 4 10  -MB      Exercise 5    Exercise Name 5 Standing March  -MB      Sets 5 2  -MB      Reps 5 5  -MB      Exercise 6    Exercise Name 6 LTR  -MB      Reps 6 15  -MB      Time (Seconds) 6 5  -MB      Exercise 7    Exercise Name 7 SKTC S  -MB      Sets 7 3  -MB      Time (Seconds) 7 30  -MB      Exercise 8    Exercise Name 8 Trans Ab iso  -MB      Cueing 8 Tactile  -MB      Reps 8 10  -MB        User Key  (r) = Recorded By, (t) = Taken By, (c) = Cosigned By    Initials Name Provider Type    JUANY Arroyo PTA Physical Therapy Assistant                               PT OP Goals       02/13/17 1000 02/09/17 1100       PT Short Term Goals    STG Date to Achieve 02/23/17  -MB 02/23/17  -KG     STG 1 Indep in HEP  -MB Indep in HEP  -KG     STG 1 Progress Progressing  -MB New  -KG     STG 2 Tolerate 45 min treatment session without increased pain.  -MB Tolerate 45 min treatment session without increased pain.  -KG     STG 2 Progress Progressing  -MB New  -KG     STG 3  Independent with supine <> sit transfers demonstrating proper spine protection.  -MB Independent with supine <> sit transfers demonstrating proper spine protection.  -KG     STG 3 Progress Met  -MB New  -KG     STG 4 Independent with transverse abs isometrics.  -MB Independent with transverse abs isometrics.  -KG     STG 4 Progress Progressing;Ongoing  -MB New  -KG     Long Term Goals    LTG Date to Achieve 03/02/17  -MB 03/02/17  -KG     LTG 1 Subjectively report greater than 60% improvement.  -MB Subjectively report greater than 60% improvement.  -KG     LTG 1 Progress Progressing  -MB New  -KG     LTG 2 Pt will have modified oswestry score less than 30%.  -MB Pt will have modified oswestry score less than 30%.  -KG     LTG 2 Progress Progressing  -MB New  -KG     LTG 3 Demonstrate R hip flex/abd MMT greater than 4/5  -MB Demonstrate R hip flex/abd MMT greater than 4/5  -KG     LTG 3 Progress Progressing  -MB New  -KG     LTG 4 Demonstrate R knee flex/ext strength greater than 4/5  -MB Demonstrate R knee flex/ext strength greater than 4/5  -KG     LTG 4 Progress Progressing  -MB New  -KG     LTG 5 Ambulate 60 feet with RW with minimal increase in RLE pain.  -MB Ambulate 60 feet with RW with minimal increase in RLE pain.  -KG     LTG 5 Progress Progressing  -MB New  -KG     Time Calculation    PT Goal Re-Cert Due Date 03/02/17   Visit: 2/2, Improvement: 0%, MD date: 3/6/17  -MB 03/02/17  -KG       User Key  (r) = Recorded By, (t) = Taken By, (c) = Cosigned By    Initials Name Provider Type    JUANY Arroyo, PTA Physical Therapy Assistant    KG Sharon Montesinos, PT Physical Therapist                Therapy Education       02/13/17 1000    Therapy Education    Given HEP;Pain management;Symptoms/condition management;Fall prevention and home safety  -MB    Program New   Trans ab iso  -MB    How Provided Verbal;Demonstration;Written  -MB    Provided to Patient  -MB    Level of Understanding Teach back education  performed;Verbalized;Demonstrated  -MB      02/09/17 1100    Therapy Education    Given HEP;Pain management;Symptoms/condition management;Fall prevention and home safety   HEP: SKTC stretch, LTR, seated TR/CR, LAQ  -KG    Program New  -KG    How Provided Verbal;Demonstration;Written  -KG    Provided to Patient  -KG    Level of Understanding Teach back education performed;Verbalized;Demonstrated  -KG      User Key  (r) = Recorded By, (t) = Taken By, (c) = Cosigned By    Initials Name Provider Type    JUANY Arroyo PTA Physical Therapy Assistant    ANANT Montesinos, PT Physical Therapist                Time Calculation:   Start Time: 1015  Stop Time: 1115  Time Calculation (min): 60 min    Therapy Charges for Today     Code Description Service Date Service Provider Modifiers Qty    86564446282  PT THER PROC EA 15 MIN 2/13/2017 Jose Arroyo PTA GP 3    [id number]  PT THER SUPP EA 15 MIN 2/13/2017 Jose Arroyo PTA GP 1                    Jose Arroyo PTA  2/13/2017

## 2017-02-17 ENCOUNTER — HOSPITAL ENCOUNTER (OUTPATIENT)
Dept: PHYSICAL THERAPY | Facility: HOSPITAL | Age: 72
Setting detail: THERAPIES SERIES
Discharge: HOME | End: 2017-02-17

## 2017-02-17 DIAGNOSIS — M47.896 OTHER SPONDYLOSIS, LUMBAR REGION: Primary | ICD-10-CM

## 2017-02-17 PROCEDURE — 97110 THERAPEUTIC EXERCISES: CPT

## 2017-02-17 NOTE — PROGRESS NOTES
Outpatient Physical Therapy Ortho Treatment Note  [facility] [place]  Jose Arroyo PTA  17  9:04 AM       Patient Name: Samy Velazquez  : 1945  MRN: [id number]  Today's Date: 2017      Visit Date: 2017     Subjective improvement: 50%     Attendance: 3/3        MD follow up: 3/6/17         RC date: 3/2/17            Visit Dx:    ICD-10-CM ICD-9-CM   1. Other spondylosis, lumbar region M47.896 721.3       Patient Active Problem List   Diagnosis   • Ischemic heart disease due to coronary artery obstruction   • Osteoarthritis of multiple joints   • Cardiac pacemaker   • Gout of multiple sites   • Nephrolithiasis   • Mixed hyperlipidemia        Past Medical History   Diagnosis Date   • Arthritis    • Coronary artery disease    • Hyperlipidemia    • Kidney stone         Past Surgical History   Procedure Laterality Date   • Cardiac surgery     • Total shoulder replacement     • Knee surgery     • Cardiac defibrillator placement     • Cardiac defibrillator placement N/A              PT Ortho       17 0800    Subjective Comments    Subjective Comments Pt states that he is already a little tired this morning. Denies pain.  -MB    Subjective Pain    Able to rate subjective pain? yes  -MB    Pre-Treatment Pain Level 0  -MB    Post-Treatment Pain Level 0  -MB    Posture/Observations    Posture/Observations Comments Transport W/C  -MB      User Key  (r) = Recorded By, (t) = Taken By, (c) = Cosigned By    Initials Name Provider Type    JUANY Arroyo PTA Physical Therapy Assistant                            PT Assessment/Plan       17 0800 17 1000       PT Assessment    Functional Limitations Decreased safety during functional activities;Impaired gait;Limitation in home management;Limitations in functional capacity and performance  -MB Decreased safety during functional activities;Impaired gait;Limitation in home management;Limitations in functional capacity and  performance  -MB     Impairments Balance;Endurance;Gait;Muscle strength;Pain;Posture  -MB Balance;Endurance;Gait;Muscle strength;Pain;Posture  -MB     Assessment Comments Pt able to complete increase in standing activities today with treatment. Pt give good effort but does require rest breaks inbetween standing therex.   -MB Pt has increase in fatigue/pain with brief standing therex. Pt needs verbal and tactile cues for trans ab iso today and has a poor contraction overall. Pt demos proper supine <-> sit.  -MB     Rehab Potential Good  -MB Good  -MB     Patient/caregiver participated in establishment of treatment plan and goals Yes  -MB Yes  -MB     Patient would benefit from skilled therapy intervention Yes  -MB Yes  -MB     PT Plan    PT Frequency 2x/week  -MB 2x/week  -MB     Predicted Duration of Therapy Intervention (days/wks) 6 weeks  -MB 6 weeks  -MB     PT Plan Comments Standing CR/TR, Hip abd  -MB Standing activities per pt tolerance.  Core stability.   -MB       User Key  (r) = Recorded By, (t) = Taken By, (c) = Cosigned By    Initials Name Provider Type    JUANY Arroyo PTA Physical Therapy Assistant                Modalities       02/17/17 0800          Ice    Ice Applied Yes  -MB      Location B knees  -MB      Rx Minutes Other:   20'  -MB        User Key  (r) = Recorded By, (t) = Taken By, (c) = Cosigned By    Initials Name Provider Type    JUANY Arroyo PTA Physical Therapy Assistant                Exercises       02/17/17 0800          Subjective Comments    Subjective Comments Pt states that he is already a little tired this morning. Denies pain.  -MB      Subjective Pain    Able to rate subjective pain? yes  -MB      Pre-Treatment Pain Level 0  -MB      Post-Treatment Pain Level 0  -MB      Aquatics    Aquatics performed? No  -MB      Exercise 1    Exercise Name 1 Seated Hamstring S  -MB      Sets 1 3  -MB      Time (Seconds) 1 30  -MB      Exercise 2    Exercise Name 2 Seated Hip add  squeeze  -MB      Sets 2 2  -MB      Reps 2 10  -MB      Exercise 3    Exercise Name 3 Seated HamCurls  -MB      Resistance 3 Green  -MB      Sets 3 2  -MB      Reps 3 10  -MB      Exercise 4    Exercise Name 4 Seated CR/TR  -MB      Sets 4 2  -MB      Reps 4 10  -MB      Exercise 5    Exercise Name 5 Standing Toe Taps  -MB      Equipment 5 --   Step stool  -MB      Reps 5 10  -MB      Exercise 6    Exercise Name 6 Lat stepping at handrail  -MB      Reps 6 3   3 laps  -MB      Exercise 7    Exercise Name 7 Sit <-> stand  -MB      Reps 7 10  -MB        User Key  (r) = Recorded By, (t) = Taken By, (c) = Cosigned By    Initials Name Provider Type    JUANY Arroyo, PTA Physical Therapy Assistant                               PT OP Goals       02/17/17 0800 02/13/17 1000 02/09/17 1100    PT Short Term Goals    STG Date to Achieve 02/23/17  -MB 02/23/17  -MB 02/23/17  -KG    STG 1 Indep in HEP  -MB Indep in HEP  -MB Indep in HEP  -KG    STG 1 Progress Progressing  -MB Progressing  -MB New  -KG    STG 2 Tolerate 45 min treatment session without increased pain.  -MB Tolerate 45 min treatment session without increased pain.  -MB Tolerate 45 min treatment session without increased pain.  -KG    STG 2 Progress Progressing  -MB Progressing  -MB New  -KG    STG 3 Independent with supine <> sit transfers demonstrating proper spine protection.  -MB Independent with supine <> sit transfers demonstrating proper spine protection.  -MB Independent with supine <> sit transfers demonstrating proper spine protection.  -KG    STG 3 Progress Met  -MB Met  -MB New  -KG    STG 4 Independent with transverse abs isometrics.  -MB Independent with transverse abs isometrics.  -MB Independent with transverse abs isometrics.  -KG    STG 4 Progress Progressing;Ongoing  -MB Progressing;Ongoing  -MB New  -KG    Long Term Goals    LTG Date to Achieve 03/02/17  -MB 03/02/17  -MB 03/02/17  -KG    LTG 1 Subjectively report greater than 60%  improvement.  -MB Subjectively report greater than 60% improvement.  -MB Subjectively report greater than 60% improvement.  -KG    LTG 1 Progress Progressing  -MB Progressing  -MB New  -KG    LTG 2 Pt will have modified oswestry score less than 30%.  -MB Pt will have modified oswestry score less than 30%.  -MB Pt will have modified oswestry score less than 30%.  -KG    LTG 2 Progress Progressing  -MB Progressing  -MB New  -KG    LTG 3 Demonstrate R hip flex/abd MMT greater than 4/5  -MB Demonstrate R hip flex/abd MMT greater than 4/5  -MB Demonstrate R hip flex/abd MMT greater than 4/5  -KG    LTG 3 Progress Progressing  -MB Progressing  -MB New  -KG    LTG 4 Demonstrate R knee flex/ext strength greater than 4/5  -MB Demonstrate R knee flex/ext strength greater than 4/5  -MB Demonstrate R knee flex/ext strength greater than 4/5  -KG    LTG 4 Progress Progressing  -MB Progressing  -MB New  -KG    LTG 5 Ambulate 60 feet with RW with minimal increase in RLE pain.  -MB Ambulate 60 feet with RW with minimal increase in RLE pain.  -MB Ambulate 60 feet with RW with minimal increase in RLE pain.  -KG    LTG 5 Progress Progressing  -MB Progressing  -MB New  -KG    Time Calculation    PT Goal Re-Cert Due Date 03/02/17   3/3, MD 3/6/17, Improvement 50%  -MB 03/02/17   Visit: 2/2, Improvement: 0%, MD date: 3/6/17  -MB 03/02/17  -KG      User Key  (r) = Recorded By, (t) = Taken By, (c) = Cosigned By    Initials Name Provider Type    JUANY Arroyo, PTA Physical Therapy Assistant    KG Sharon Montesinos, PT Physical Therapist                Therapy Education       02/17/17 0800    Therapy Education    Given HEP;Pain management;Symptoms/condition management;Fall prevention and home safety  -MB    Program New   Trans ab iso  -MB    How Provided Verbal;Demonstration;Written  -MB    Provided to Patient  -MB    Level of Understanding Teach back education performed;Verbalized;Demonstrated  -MB      02/13/17 1000    Therapy  Education    Given HEP;Pain management;Symptoms/condition management;Fall prevention and home safety  -MB    Program New   Trans ab iso  -MB    How Provided Verbal;Demonstration;Written  -MB    Provided to Patient  -MB    Level of Understanding Teach back education performed;Verbalized;Demonstrated  -MB      User Key  (r) = Recorded By, (t) = Taken By, (c) = Cosigned By    Initials Name Provider Type    JUANY Arroyo PTA Physical Therapy Assistant                Time Calculation:   Start Time: 0805  Stop Time: 0905  Time Calculation (min): 60 min    Therapy Charges for Today     Code Description Service Date Service Provider Modifiers Qty    36180918614 HC PT THER PROC EA 15 MIN 2/17/2017 Jose Arroyo PTA GP 3    90444172395 HC PT THER SUPP EA 15 MIN 2/17/2017 Jose Arroyo PTA GP 1                    Jose Arroyo PTA  2/17/2017

## 2017-02-20 ENCOUNTER — APPOINTMENT (OUTPATIENT)
Dept: PHYSICAL THERAPY | Facility: HOSPITAL | Age: 72
End: 2017-02-20

## 2017-02-21 ENCOUNTER — HOSPITAL ENCOUNTER (OUTPATIENT)
Dept: PHYSICAL THERAPY | Facility: HOSPITAL | Age: 72
Setting detail: THERAPIES SERIES
Discharge: HOME | End: 2017-02-21

## 2017-02-21 DIAGNOSIS — M47.896 OTHER SPONDYLOSIS, LUMBAR REGION: Primary | ICD-10-CM

## 2017-02-21 PROCEDURE — 97110 THERAPEUTIC EXERCISES: CPT

## 2017-02-21 NOTE — PROGRESS NOTES
Outpatient Physical Therapy Ortho Treatment Note  [facility]  Jose Arroyo PTA  17  4:01 PM       Patient Name: Samy Velazquez  : 1945  MRN: [id number]  Today's Date: 2017      Visit Date: 2017     Subjective improvement: 50%     Attendance:         MD follow up: 3/6/17         RC date: 3/2/17           Visit Dx:    ICD-10-CM ICD-9-CM   1. Other spondylosis, lumbar region M47.896 721.3       Patient Active Problem List   Diagnosis   • Ischemic heart disease due to coronary artery obstruction   • Osteoarthritis of multiple joints   • Cardiac pacemaker   • Gout of multiple sites   • Nephrolithiasis   • Mixed hyperlipidemia        Past Medical History   Diagnosis Date   • Arthritis    • Coronary artery disease    • Hyperlipidemia    • Kidney stone         Past Surgical History   Procedure Laterality Date   • Cardiac surgery     • Total shoulder replacement     • Knee surgery     • Cardiac defibrillator placement     • Cardiac defibrillator placement N/A              PT Ortho       17 1500    Subjective Comments    Subjective Comments Pt states that he is really tired today. Just get tired in the afternoon's.  -MB    Subjective Pain    Able to rate subjective pain? yes  -MB    Pre-Treatment Pain Level 0  -MB    Post-Treatment Pain Level 0  -MB    Posture/Observations    Posture/Observations Comments Transport W/C  -MB      User Key  (r) = Recorded By, (t) = Taken By, (c) = Cosigned By    Initials Name Provider Type    JUANY Arroyo PTA Physical Therapy Assistant                            PT Assessment/Plan       17 1500 17 0800       PT Assessment    Functional Limitations Decreased safety during functional activities;Impaired gait;Limitation in home management;Limitations in functional capacity and performance  -MB Decreased safety during functional activities;Impaired gait;Limitation in home management;Limitations in functional capacity  and performance  -MB     Impairments Balance;Endurance;Gait;Muscle strength;Pain;Posture  -MB Balance;Endurance;Gait;Muscle strength;Pain;Posture  -MB     Assessment Comments Pt fatigues very quickly this afternoon. Pt does well with new therex this date.   -MB Pt able to complete increase in standing activities today with treatment. Pt give good effort but does require rest breaks inbetween standing therex.   -MB     Rehab Potential Good  -MB Good  -MB     Patient/caregiver participated in establishment of treatment plan and goals Yes  -MB Yes  -MB     Patient would benefit from skilled therapy intervention Yes  -MB Yes  -MB     PT Plan    PT Frequency 2x/week  -MB 2x/week  -MB     Predicted Duration of Therapy Intervention (days/wks) 6 weeks  -MB 6 weeks  -MB     PT Plan Comments Gait training per pt tolerance.  -MB Standing CR/TR, Hip abd  -MB       User Key  (r) = Recorded By, (t) = Taken By, (c) = Cosigned By    Initials Name Provider Type    JUANY Arroyo, ZANA Physical Therapy Assistant                    Exercises       02/21/17 1500          Subjective Comments    Subjective Comments Pt states that he is really tired today. Just get tired in the afternoon's.  -MB      Subjective Pain    Able to rate subjective pain? yes  -MB      Pre-Treatment Pain Level 0  -MB      Post-Treatment Pain Level 0  -MB      Aquatics    Aquatics performed? No  -MB      Exercise 1    Exercise Name 1 PRO II ROM/End  -MB      Resistance 1 --   3.0  -MB      Time (Seconds) 1 4  -MB      Exercise 2    Exercise Name 2 Seated Hip add squeeze  -MB      Sets 2 2  -MB      Reps 2 10  -MB      Exercise 3    Exercise Name 3 Seated Hip abd  -MB      Resistance 3 Green  -MB      Sets 3 2  -MB      Reps 3 10  -MB      Exercise 4    Exercise Name 4 Standing Step taps  -MB      Sets 4 2  -MB      Reps 4 10  -MB      Exercise 5    Exercise Name 5 Lat stepping at HR  -MB      Reps 5 3   3 laps  -MB      Exercise 6    Exercise Name 6 Seated  March  -MB      Equipment 6 Cuff Weight  -MB      Weights/Plates 6 2  -MB      Sets 6 2  -MB      Reps 6 10  -MB        User Key  (r) = Recorded By, (t) = Taken By, (c) = Cosigned By    Initials Name Provider Type    JUANY Arroyo PTA Physical Therapy Assistant                               PT OP Goals       02/21/17 1500 02/17/17 0800 02/13/17 1000    PT Short Term Goals    STG Date to Achieve 02/23/17  -MB 02/23/17  -MB 02/23/17  -MB    STG 1 Indep in HEP  -MB Indep in HEP  -MB Indep in HEP  -MB    STG 1 Progress Progressing  -MB Progressing  -MB Progressing  -MB    STG 2 Tolerate 45 min treatment session without increased pain.  -MB Tolerate 45 min treatment session without increased pain.  -MB Tolerate 45 min treatment session without increased pain.  -MB    STG 2 Progress Met  -MB Progressing  -MB Progressing  -MB    STG 3 Independent with supine <> sit transfers demonstrating proper spine protection.  -MB Independent with supine <> sit transfers demonstrating proper spine protection.  -MB Independent with supine <> sit transfers demonstrating proper spine protection.  -MB    STG 3 Progress Met  -MB Met  -MB Met  -MB    STG 4 Independent with transverse abs isometrics.  -MB Independent with transverse abs isometrics.  -MB Independent with transverse abs isometrics.  -MB    STG 4 Progress Progressing;Ongoing  -MB Progressing;Ongoing  -MB Progressing;Ongoing  -MB    Long Term Goals    LTG Date to Achieve 03/02/17  -MB 03/02/17  -MB 03/02/17  -MB    LTG 1 Subjectively report greater than 60% improvement.  -MB Subjectively report greater than 60% improvement.  -MB Subjectively report greater than 60% improvement.  -MB    LTG 1 Progress Progressing  -MB Progressing  -MB Progressing  -MB    LTG 2 Pt will have modified oswestry score less than 30%.  -MB Pt will have modified oswestry score less than 30%.  -MB Pt will have modified oswestry score less than 30%.  -MB    LTG 2 Progress Progressing  -MB Progressing   -MB Progressing  -MB    LTG 3 Demonstrate R hip flex/abd MMT greater than 4/5  -MB Demonstrate R hip flex/abd MMT greater than 4/5  -MB Demonstrate R hip flex/abd MMT greater than 4/5  -MB    LTG 3 Progress Progressing  -MB Progressing  -MB Progressing  -MB    LTG 4 Demonstrate R knee flex/ext strength greater than 4/5  -MB Demonstrate R knee flex/ext strength greater than 4/5  -MB Demonstrate R knee flex/ext strength greater than 4/5  -MB    LTG 4 Progress Progressing  -MB Progressing  -MB Progressing  -MB    LTG 5 Ambulate 60 feet with RW with minimal increase in RLE pain.  -MB Ambulate 60 feet with RW with minimal increase in RLE pain.  -MB Ambulate 60 feet with RW with minimal increase in RLE pain.  -MB    LTG 5 Progress Progressing  -MB Progressing  -MB Progressing  -MB    Time Calculation    PT Goal Re-Cert Due Date 03/02/17 4/4, MD 3/6/17, Improvement 50%  -MB 03/02/17   3/3, MD 3/6/17, Improvement 50%  -MB 03/02/17   Visit: 2/2, Improvement: 0%, MD date: 3/6/17  -MB      02/09/17 1100          PT Short Term Goals    STG Date to Achieve 02/23/17  -KG      STG 1 Indep in HEP  -KG      STG 1 Progress New  -KG      STG 2 Tolerate 45 min treatment session without increased pain.  -KG      STG 2 Progress New  -KG      STG 3 Independent with supine <> sit transfers demonstrating proper spine protection.  -KG      STG 3 Progress New  -KG      STG 4 Independent with transverse abs isometrics.  -KG      STG 4 Progress New  -KG      Long Term Goals    LTG Date to Achieve 03/02/17  -KG      LTG 1 Subjectively report greater than 60% improvement.  -KG      LTG 1 Progress New  -KG      LTG 2 Pt will have modified oswestry score less than 30%.  -KG      LTG 2 Progress New  -KG      LTG 3 Demonstrate R hip flex/abd MMT greater than 4/5  -KG      LTG 3 Progress New  -KG      LTG 4 Demonstrate R knee flex/ext strength greater than 4/5  -KG      LTG 4 Progress New  -KG      LTG 5 Ambulate 60 feet with RW with minimal  increase in RLE pain.  -KG      LTG 5 Progress New  -KG      Time Calculation    PT Goal Re-Cert Due Date 03/02/17  -KG        User Key  (r) = Recorded By, (t) = Taken By, (c) = Cosigned By    Initials Name Provider Type    JUANY Arroyo PTA Physical Therapy Assistant    KG Sharon Montesinos, PT Physical Therapist                Therapy Education       02/21/17 1500    Therapy Education    Given HEP;Pain management;Symptoms/condition management;Fall prevention and home safety  -MB    Program Reinforced  -MB    How Provided Verbal;Demonstration;Written  -MB    Provided to Patient  -MB    Level of Understanding Teach back education performed;Verbalized;Demonstrated  -MB      02/17/17 0800    Therapy Education    Given HEP;Pain management;Symptoms/condition management;Fall prevention and home safety  -MB    Program New   Trans ab iso  -MB    How Provided Verbal;Demonstration;Written  -MB    Provided to Patient  -MB    Level of Understanding Teach back education performed;Verbalized;Demonstrated  -MB      User Key  (r) = Recorded By, (t) = Taken By, (c) = Cosigned By    Initials Name Provider Type    JUANY Arroyo PTA Physical Therapy Assistant                Time Calculation:   Start Time: 1511  Stop Time: 1550  Time Calculation (min): 39 min    Therapy Charges for Today     Code Description Service Date Service Provider Modifiers Qty    30074691058 HC PT THER PROC EA 15 MIN 2/21/2017 Jose Arroyo PTA GP 3                    Jose Arroyo PTA  2/21/2017

## 2017-02-23 ENCOUNTER — HOSPITAL ENCOUNTER (OUTPATIENT)
Dept: PHYSICAL THERAPY | Facility: HOSPITAL | Age: 72
Setting detail: THERAPIES SERIES
Discharge: HOME | End: 2017-02-23

## 2017-02-23 DIAGNOSIS — M47.896 OTHER SPONDYLOSIS, LUMBAR REGION: Primary | ICD-10-CM

## 2017-02-23 PROCEDURE — 97110 THERAPEUTIC EXERCISES: CPT

## 2017-02-23 NOTE — PROGRESS NOTES
Outpatient Physical Therapy Ortho Treatment Note  [facility]  Jose Arroyo PTA   17  1:53 PM       Patient Name: Samy Velazquez  : 1945  MRN: [id number]  Today's Date: 2017      Visit Date: 2017     Subjective improvement: 50%     Attendance:          MD follow up: 3/6/17         RC date: 3/2/17          Visit Dx:    ICD-10-CM ICD-9-CM   1. Other spondylosis, lumbar region M47.896 721.3       Patient Active Problem List   Diagnosis   • Ischemic heart disease due to coronary artery obstruction   • Osteoarthritis of multiple joints   • Cardiac pacemaker   • Gout of multiple sites   • Nephrolithiasis   • Mixed hyperlipidemia        Past Medical History   Diagnosis Date   • Arthritis    • Coronary artery disease    • Hyperlipidemia    • Kidney stone         Past Surgical History   Procedure Laterality Date   • Cardiac surgery     • Total shoulder replacement     • Knee surgery     • Cardiac defibrillator placement     • Cardiac defibrillator placement N/A              PT Ortho       17 1300    Subjective Comments    Subjective Comments Pt states that he is feeling much better today.  -MB    Subjective Pain    Able to rate subjective pain? yes  -MB    Pre-Treatment Pain Level 0  -MB    Post-Treatment Pain Level 0  -MB    Posture/Observations    Posture/Observations Comments Transport W/C  -MB      17 1500    Subjective Comments    Subjective Comments Pt states that he is really tired today. Just get tired in the afternoon's.  -MB    Subjective Pain    Able to rate subjective pain? yes  -MB    Pre-Treatment Pain Level 0  -MB    Post-Treatment Pain Level 0  -MB    Posture/Observations    Posture/Observations Comments Transport W/C  -MB      User Key  (r) = Recorded By, (t) = Taken By, (c) = Cosigned By    Initials Name Provider Type    JUANY Arroyo PTA Physical Therapy Assistant                            PT Assessment/Plan       17 1300     "   PT Assessment    Functional Limitations Decreased safety during functional activities;Impaired gait;Limitation in home management;Limitations in functional capacity and performance  -MB     Impairments Balance;Endurance;Gait;Muscle strength;Pain;Posture  -MB     Assessment Comments Pt able to complete 2 laps of ambulation ~120ft total with CGA -> close SBA. Pt able to increase standing tolerance today, but does need several rest breaks intermitten.   -MB     Rehab Potential Good  -MB     Patient/caregiver participated in establishment of treatment plan and goals Yes  -MB     Patient would benefit from skilled therapy intervention Yes  -MB     PT Plan    PT Frequency 2x/week  -MB     Predicted Duration of Therapy Intervention (days/wks) 6 weeks  -MB     PT Plan Comments Standing ABD next  -MB       User Key  (r) = Recorded By, (t) = Taken By, (c) = Cosigned By    Initials Name Provider Type    JUANY Arroyo PTA Physical Therapy Assistant                    Exercises       02/23/17 1300          Subjective Comments    Subjective Comments Pt states that he is feeling much better today.  -MB      Subjective Pain    Able to rate subjective pain? yes  -MB      Pre-Treatment Pain Level 0  -MB      Post-Treatment Pain Level 0  -MB      Aquatics    Aquatics performed? No  -MB      Exercise 1    Exercise Name 1 Gait with RW (CGA -> Close SBA)   -MB      Equipment 1 --   Hallway  -MB      Reps 1 2  -MB      Exercise 2    Exercise Name 2 Seated Ham curls  -MB      Resistance 2 Green  -MB      Sets 2 2  -MB      Reps 2 10  -MB      Exercise 3    Exercise Name 3 Seated Hip abd  -MB      Resistance 3 Green  -MB      Sets 3 2  -MB      Reps 3 10  -MB      Exercise 4    Exercise Name 4 Step ups fwd  -MB      Equipment 4 --   4\" step  -MB      Reps 4 10   10x B  -MB      Exercise 5    Exercise Name 5 Lat stepping at HR  -MB      Reps 5 5  -MB      Exercise 6    Exercise Name 6 Seated March  -MB      Equipment 6 Cuff Weight  " -MB      Weights/Plates 6 2  -MB      Sets 6 2  -MB      Reps 6 10  -MB      Exercise 7    Exercise Name 7 Seated LAQs  -MB      Equipment 7 Cuff Weight  -MB      Weights/Plates 7 2  -MB      Sets 7 2  -MB      Reps 7 10  -MB      Exercise 8    Exercise Name 8 Standing CR  -MB      Sets 8 2  -MB      Reps 8 10  -MB        User Key  (r) = Recorded By, (t) = Taken By, (c) = Cosigned By    Initials Name Provider Type    JUANY Arroyo PTA Physical Therapy Assistant                               PT OP Goals       02/23/17 1300       PT Short Term Goals    STG Date to Achieve 02/23/17  -MB     STG 1 Indep in HEP  -MB     STG 1 Progress Progressing  -MB     STG 2 Tolerate 45 min treatment session without increased pain.  -MB     STG 2 Progress Progressing  -MB     STG 3 Independent with supine <> sit transfers demonstrating proper spine protection.  -MB     STG 3 Progress Met  -MB     STG 4 Independent with transverse abs isometrics.  -MB     STG 4 Progress Progressing;Ongoing  -MB     Long Term Goals    LTG Date to Achieve 03/02/17  -MB     LTG 1 Subjectively report greater than 60% improvement.  -MB     LTG 1 Progress Progressing  -MB     LTG 2 Pt will have modified oswestry score less than 30%.  -MB     LTG 2 Progress Progressing  -MB     LTG 3 Demonstrate R hip flex/abd MMT greater than 4/5  -MB     LTG 3 Progress Progressing  -MB     LTG 4 Demonstrate R knee flex/ext strength greater than 4/5  -MB     LTG 4 Progress Progressing  -MB     LTG 5 Ambulate 60 feet with RW with minimal increase in RLE pain.  -MB     LTG 5 Progress Progressing  -MB     Time Calculation    PT Goal Re-Cert Due Date 03/02/17   5/5, MD 3/6/17, Improvement 50%  -MB       User Key  (r) = Recorded By, (t) = Taken By, (c) = Cosigned By    Initials Name Provider Type    JUANY Arroyo PTA Physical Therapy Assistant                Therapy Education       02/23/17 1300          Therapy Education    Given HEP;Pain  management;Symptoms/condition management;Fall prevention and home safety  -MB      Program Reinforced  -MB      How Provided Verbal;Demonstration;Written  -MB      Provided to Patient  -MB      Level of Understanding Teach back education performed;Verbalized;Demonstrated  -MB        User Key  (r) = Recorded By, (t) = Taken By, (c) = Cosigned By    Initials Name Provider Type    JUANY Arroyo PTA Physical Therapy Assistant                Time Calculation:   Start Time: 1300  Stop Time: 1342  Time Calculation (min): 42 min    Therapy Charges for Today     Code Description Service Date Service Provider Modifiers Qty    96543991701 HC PT THER PROC EA 15 MIN 2/23/2017 Jose Arroyo PTA GP 3                    Jose Arroyo PTA  2/23/2017

## 2017-02-28 ENCOUNTER — HOSPITAL ENCOUNTER (OUTPATIENT)
Dept: PHYSICAL THERAPY | Facility: HOSPITAL | Age: 72
Setting detail: THERAPIES SERIES
Discharge: HOME | End: 2017-02-28

## 2017-02-28 DIAGNOSIS — M47.896 OTHER SPONDYLOSIS, LUMBAR REGION: Primary | ICD-10-CM

## 2017-02-28 PROCEDURE — 97110 THERAPEUTIC EXERCISES: CPT

## 2017-03-02 ENCOUNTER — HOSPITAL ENCOUNTER (OUTPATIENT)
Dept: PHYSICAL THERAPY | Facility: HOSPITAL | Age: 72
Setting detail: THERAPIES SERIES
Discharge: HOME | End: 2017-03-02

## 2017-03-02 DIAGNOSIS — M47.896 OTHER SPONDYLOSIS, LUMBAR REGION: Primary | ICD-10-CM

## 2017-03-02 PROCEDURE — 97110 THERAPEUTIC EXERCISES: CPT

## 2017-03-02 NOTE — PROGRESS NOTES
"    Outpatient Physical Therapy Ortho Treatment Note  [facility]  Jose Arroyo, PTA  17  11:58 AM       Patient Name: Samy Velazquez  : 1945  MRN: [id number]  Today's Date: 3/2/2017      Visit Date: 2017     Subjective Improvement: 50%      Attendance:    Approved: Follow Medicare Guidelines           MD follow up: 3/6/17           RC date: 3/2/17           Visit Dx:    ICD-10-CM ICD-9-CM   1. Other spondylosis, lumbar region M47.896 721.3       Patient Active Problem List   Diagnosis   • Ischemic heart disease due to coronary artery obstruction   • Osteoarthritis of multiple joints   • Cardiac pacemaker   • Gout of multiple sites   • Nephrolithiasis   • Mixed hyperlipidemia        Past Medical History   Diagnosis Date   • Arthritis    • Coronary artery disease    • Hyperlipidemia    • Kidney stone         Past Surgical History   Procedure Laterality Date   • Cardiac surgery     • Total shoulder replacement     • Knee surgery     • Cardiac defibrillator placement     • Cardiac defibrillator placement N/A              PT Ortho       17 1100    Subjective Comments    Subjective Comments Pt states that he is doing okay today. Pt does have mild chest pain today, but \"nothing too bad.\" BP check.  -MB    Subjective Pain    Able to rate subjective pain? yes  -MB    Pre-Treatment Pain Level 0  -MB    Post-Treatment Pain Level 0  -MB    Posture/Observations    Posture/Observations Comments /72 initially  -MB      17 1100    Subjective Comments    Subjective Comments Pt reports that he is getting up every hour and walking just a little bit. Pt states he is getting a little stronger.  -MB    Subjective Pain    Able to rate subjective pain? yes  -MB    Pre-Treatment Pain Level 0  -MB    Post-Treatment Pain Level 0  -MB    Posture/Observations    Posture/Observations Comments Transport W/C  -MB      User Key  (r) = Recorded By, (t) = Taken By, (c) = Cosigned By    " "Initials Name Provider Type    JUANY Arroyo PTA Physical Therapy Assistant                            PT Assessment/Plan       03/02/17 1100       PT Assessment    Functional Limitations Decreased safety during functional activities;Impaired gait;Limitation in home management;Limitations in functional capacity and performance  -MB     Impairments Balance;Endurance;Gait;Muscle strength;Pain;Posture  -MB     Assessment Comments Slight decrease in standing activities today secondary to pt not feeling as good. BP within normal limits with c/o chest pain, with no other symptoms noted. Pt needs cues for proper seated posture to achieve and maintain.   -MB     Rehab Potential Good  -MB     Patient/caregiver participated in establishment of treatment plan and goals Yes  -MB     Patient would benefit from skilled therapy intervention Yes  -MB     PT Plan    PT Frequency 2x/week  -MB     Predicted Duration of Therapy Intervention (days/wks) 6 weeks  -MB     PT Plan Comments Resume more standing therex next week.  -MB       User Key  (r) = Recorded By, (t) = Taken By, (c) = Cosigned By    Initials Name Provider Type    JUANY Arroyo PTA Physical Therapy Assistant                    Exercises       03/02/17 1100          Subjective Comments    Subjective Comments Pt states that he is doing okay today. Pt does have mild chest pain today, but \"nothing too bad.\" BP check.  -MB      Subjective Pain    Able to rate subjective pain? yes  -MB      Pre-Treatment Pain Level 0  -MB      Post-Treatment Pain Level 0  -MB      Aquatics    Aquatics performed? No  -MB      Exercise 1    Exercise Name 1 Pro II ROM/End  -MB      Resistance 1 --   3.0  -MB      Time (Minutes) 1 5  -MB      Exercise 2    Exercise Name 2 Seated Ham curls  -MB      Equipment 2 Theraband  -MB      Resistance 2 Blue  -MB      Sets 2 2  -MB      Reps 2 15  -MB      Exercise 3    Exercise Name 3 Seated Hip abd  -MB      Resistance 3 Blue  -MB      Sets 3 " 2  -MB      Reps 3 15  -MB      Exercise 4    Exercise Name 4 Gait training (hallway)  -MB      Reps 4 2   2 laps  -MB      Exercise 5    Exercise Name 5 Seated PN + TA education  -MB      Time (Minutes) 5 2  -MB      Exercise 6    Exercise Name 6 Seated PN + ball lift to 90° (flexion)  -MB      Sets 6 2  -MB      Reps 6 10  -MB      Exercise 7    Exercise Name 7 Seated PN + TA marching/Laqs  -MB      Sets 7 2  -MB      Reps 7 10   each  -MB        User Key  (r) = Recorded By, (t) = Taken By, (c) = Cosigned By    Initials Name Provider Type    JUANY Arroyo PTA Physical Therapy Assistant                               PT OP Goals       03/02/17 1100       PT Short Term Goals    STG Date to Achieve 02/23/17  -MB     STG 1 Indep in HEP  -MB     STG 1 Progress Progressing  -MB     STG 2 Tolerate 45 min treatment session without increased pain.  -MB     STG 2 Progress Met  -MB     STG 3 Independent with supine <> sit transfers demonstrating proper spine protection.  -MB     STG 3 Progress Met  -MB     STG 4 Independent with transverse abs isometrics.  -MB     STG 4 Progress Progressing  -MB     Long Term Goals    LTG Date to Achieve 03/02/17  -MB     LTG 1 Subjectively report greater than 60% improvement.  -MB     LTG 1 Progress Progressing  -MB     LTG 2 Pt will have modified oswestry score less than 30%.  -MB     LTG 2 Progress Progressing  -MB     LTG 3 Demonstrate R hip flex/abd MMT greater than 4/5  -MB     LTG 3 Progress Progressing  -MB     LTG 4 Demonstrate R knee flex/ext strength greater than 4/5  -MB     LTG 4 Progress Progressing  -MB     LTG 5 Ambulate 60 feet with RW with minimal increase in RLE pain.  -MB     LTG 5 Progress Met  -MB     Time Calculation    PT Goal Re-Cert Due Date 03/02/17  -MB       User Key  (r) = Recorded By, (t) = Taken By, (c) = Cosigned By    Initials Name Provider Type    JUANY Arroyo PTA Physical Therapy Assistant                Therapy Education       03/02/17 1100           Therapy Education    Given HEP;Pain management;Symptoms/condition management;Fall prevention and home safety  -MB      Program Reinforced  -MB      How Provided Verbal;Demonstration;Written  -MB      Provided to Patient  -MB      Level of Understanding Teach back education performed;Verbalized;Demonstrated  -MB        User Key  (r) = Recorded By, (t) = Taken By, (c) = Cosigned By    Initials Name Provider Type    JUANY Arroyo PTA Physical Therapy Assistant                Time Calculation:   Start Time: 1102  Stop Time: 1147  Time Calculation (min): 45 min  Total Timed Code Minutes- PT: 45 minute(s)    Therapy Charges for Today     Code Description Service Date Service Provider Modifiers Qty    93664654627 HC PT THER PROC EA 15 MIN 3/2/2017 Jose Arroyo PTA GP 3                    Jose Arroyo PTA  3/2/2017

## 2017-03-07 ENCOUNTER — HOSPITAL ENCOUNTER (OUTPATIENT)
Dept: PHYSICAL THERAPY | Facility: HOSPITAL | Age: 72
Setting detail: THERAPIES SERIES
Discharge: HOME | End: 2017-03-07

## 2017-03-07 DIAGNOSIS — M47.896 OTHER SPONDYLOSIS, LUMBAR REGION: Primary | ICD-10-CM

## 2017-03-07 PROCEDURE — G8978 MOBILITY CURRENT STATUS: HCPCS

## 2017-03-07 PROCEDURE — G8979 MOBILITY GOAL STATUS: HCPCS

## 2017-03-07 PROCEDURE — 97110 THERAPEUTIC EXERCISES: CPT

## 2017-03-07 NOTE — PROGRESS NOTES
Outpatient Physical Therapy Ortho Re-Evaluation  [facility]  Jose SIMSReyes Myles, PT  17  2:47 PM       Patient Name: Samy Velazquez  : 1945  MRN: [id number]  Today's Date: 3/7/2017      Visit Date: 2017     Subjective Improvement: 50%      Attendance:    Approved: Medicare Cap           MD follow up: 2 weeks           RC date: 3/28/17           Patient Active Problem List   Diagnosis   • Ischemic heart disease due to coronary artery obstruction   • Osteoarthritis of multiple joints   • Cardiac pacemaker   • Gout of multiple sites   • Nephrolithiasis   • Mixed hyperlipidemia        Past Medical History   Diagnosis Date   • Arthritis    • Coronary artery disease    • Hyperlipidemia    • Kidney stone         Past Surgical History   Procedure Laterality Date   • Cardiac surgery     • Total shoulder replacement     • Knee surgery     • Cardiac defibrillator placement     • Cardiac defibrillator placement N/A        Visit Dx:     ICD-10-CM ICD-9-CM   1. Other spondylosis, lumbar region M47.896 721.3                 PT Ortho       17 1400    Subjective Comments    Subjective Comments Reports that he had a shot yesterday and feels like it is helping, feels like the injections are helping more than therapy is, but therapy is helping some. Reports he fell about 3-4 weeks ago on L side, whole side was black and blue for a week. Getting last injection in two weeks.   -MD    Precautions and Contraindications    Precautions/Limitations no known precautions/limitations  -MD    Subjective Pain    Able to rate subjective pain? yes  -MD    Pre-Treatment Pain Level 0  -MD    Post-Treatment Pain Level 0  -MD    Posture/Observations    Posture- WNL Posture is WNL  -MD    Posture/Observations Comments Transport WC  -MD    Sensation    Sensation WNL? WNL  -MD    Additional Comments No Numbness or Tingling reported.   -MD    Sensory Screen for Light Touch- Lower Quarter Clearing    L1  (inguinal area) Bilateral:;Intact  -MD    L2 (anterior mid thigh) Bilateral:;Intact  -MD    L3 (distal anterior thigh) Bilateral:;Intact  -MD    L4 (medial lower leg/foot) Bilateral:;Intact  -MD    L5 (lateral lower leg/great toe) Bilateral:;Intact  -MD    S1 (bottom of foot) Bilateral:;Intact  -MD    Myotomal Screen- Lower Quarter Clearing    Hip flexion (L2) WNL;Bilateral:  -MD    Knee extension (L3) Bilateral:;WNL  -MD    Ankle DF (L4) Bilateral:;WNL  -MD    Great toe extension (L5) Bilateral:;WNL  -MD    Ankle PF (S1) Bilateral:;WNL  -MD    Knee flexion (S2) Bilateral:;WNL  -MD    Left Hip    Hip Flexion Gross Movement (4/5) good  -MD    Hip ABduction Gross Movement (4+/5) good plus  -MD    Hip ADduction Gross Movement (4+/5) good plus  -MD    Right Hip    Hip Flexion Gross Movement (4/5) good  -MD    Hip ABduction Gross Movement (4+/5) good plus  -MD    Hip ADduction Gross Movement (4+/5) good plus  -MD    Left Knee    Knee Extension Gross Movement (4+/5) good plus  -MD    Knee Flexion Gross Movement (4+/5) good plus  -MD    Right Knee    Knee Extension Gross Movement (4+/5) good plus  -MD    Knee Flexion Gross Movement (4+/5) good plus  -MD      User Key  (r) = Recorded By, (t) = Taken By, (c) = Cosigned By    Initials Name Provider Type    MD Jose Myles, PT Physical Therapist                            Therapy Education       03/07/17 1400          Therapy Education    Given HEP;Symptoms/condition management;Pain management  -MD      Program Reinforced  -MD      How Provided Verbal;Demonstration  -MD      Provided to Patient  -MD      Level of Understanding Teach back education performed;Verbalized;Demonstrated  -MD        User Key  (r) = Recorded By, (t) = Taken By, (c) = Cosigned By    Initials Name Provider Type    MD Jose Myles, PT Physical Therapist                PT OP Goals       03/07/17 1400       PT Short Term Goals    STG Date to Achieve 02/23/17  -MD     STG 1 Indep in HEP  -MD     STG  1 Progress Progressing  -MD     STG 2 Tolerate 45 min treatment session without increased pain.  -MD     STG 2 Progress Met  -MD     STG 3 Independent with supine <> sit transfers demonstrating proper spine protection.  -MD     STG 3 Progress Met  -MD     STG 4 Independent with transverse abs isometrics.  -MD     STG 4 Progress Progressing  -MD     STG 5 Ambulate 150 ft with RW or greater.   -MD     STG 5 Progress New  -MD     STG 6 Ascend/descend curb step 3x, no increase in pain.   -MD     STG 6 Progress New  -MD     STG 7 Able to stand 5 min, no seated rest break while performing ther ex.   -MD     STG 7 Progress New  -MD     Long Term Goals    LTG Date to Achieve 03/02/17  -MD     LTG 1 Subjectively report greater than 60% improvement.  -MD     LTG 1 Progress Progressing  -MD     LTG 2 Pt will have modified oswestry score less than 30%.  -MD     LTG 2 Progress Progressing  -MD     LTG 3 Demonstrate R hip flex/abd MMT greater than 4/5  -MD     LTG 3 Progress Met  -MD     LTG 4 Demonstrate R knee flex/ext strength greater than 4/5  -MD     LTG 4 Progress Met  -MD     LTG 5 Ambulate 60 feet with RW with minimal increase in RLE pain.  -MD     LTG 5 Progress Met  -MD     Time Calculation    PT Goal Re-Cert Due Date 03/28/17   Visits 8/8, MD 2 weeks, 50%  -MD       User Key  (r) = Recorded By, (t) = Taken By, (c) = Cosigned By    Initials Name Provider Type    MD Jose Myles, PT Physical Therapist                PT Assessment/Plan       03/07/17 1400       PT Assessment    Functional Limitations Impaired gait;Decreased safety during functional activities;Limitation in home management;Limitations in community activities;Limitations in functional capacity and performance;Performance in leisure activities;Performance in self-care ADL  -MD     Impairments Balance;Gait;Endurance;Range of motion;Posture;Pain;Muscle strength  -MD     Assessment Comments Pt improves gt distance today, able tolerate more standing exercise  today, needs frequent rest breaks.   -MD     Rehab Potential Good  -MD     Patient/caregiver participated in establishment of treatment plan and goals Yes  -MD     Patient would benefit from skilled therapy intervention Yes  -MD     PT Plan    PT Frequency 2x/week  -MD     Predicted Duration of Therapy Intervention (days/wks) 3 more weeks  -MD     PT Plan Comments Continue to progress standing activity, continue seated core as well.   -MD       User Key  (r) = Recorded By, (t) = Taken By, (c) = Cosigned By    Initials Name Provider Type    MD Jose Myles, PT Physical Therapist                  Exercises       03/07/17 1400          Subjective Comments    Subjective Comments Reports that he had a shot yesterday and feels like it is helping, feels like the injections are helping more than therapy is, but therapy is helping some. Reports he fell about 3-4 weeks ago on L side, whole side was black and blue for a week. Getting last injection in two weeks.   -MD      Subjective Pain    Able to rate subjective pain? yes  -MD      Pre-Treatment Pain Level 0  -MD      Post-Treatment Pain Level 0  -MD      Aquatics    Aquatics performed? No  -MD      Exercise 1    Exercise Name 1 PRO II for ROM/Endurance  -MD      Resistance 1 --   L3.0  -MD      Time (Minutes) 1 6  -MD      Exercise 2    Exercise Name 2 Gt Training/RW  -MD      Time (Minutes) 2 --   2 laps in hallway, last lap finished at Low Blue Mat Table  -MD      Exercise 3    Exercise Name 3 TA + Rows/Dynadisc  -MD      Resistance 3 Red  -MD      Sets 3 2  -MD      Reps 3 10  -MD      Exercise 4    Exercise Name 4 TA + LAQ/Dynadisc  -MD      Sets 4 2  -MD      Reps 4 10  -MD      Exercise 5    Exercise Name 5 TA + Marching  -MD      Sets 5 2  -MD      Reps 5 10  -MD      Exercise 6    Exercise Name 6 Lateral Stepping  -MD      Reps 6 --   3 laps  -MD      Exercise 7    Exercise Name 7 Standing CR/TR  -MD      Sets 7 2  -MD      Reps 7 15  -MD        User Key  (r) =  Recorded By, (t) = Taken By, (c) = Cosigned By    Initials Name Provider Type    MD Jose Myles, PT Physical Therapist                              Time Calculation:   Start Time: 1400  Stop Time: 1443  Time Calculation (min): 43 min  Total Timed Code Minutes- PT: 43 minute(s)     Therapy Charges for Today     Code Description Service Date Service Provider Modifiers Qty    94517862804 HC PT MOBILITY CURRENT 3/7/2017 Jose Myles, PT GP, CK 1    [id number] HC PT MOBILITY PROJECTED 3/7/2017 Jose Myles, PT GP, CJ 1    [id number]  PT THER PROC EA 15 MIN 3/7/2017 Jose Myles, PT GP 2          PT G-Codes  Functional Limitation: Mobility: Walking and moving around  Mobility: Walking and Moving Around Current Status (): At least 40 percent but less than 60 percent impaired, limited or restricted  Mobility: Walking and Moving Around Goal Status (): At least 20 percent but less than 40 percent impaired, limited or restricted         Jose Myles, PT  3/7/2017

## 2017-03-10 ENCOUNTER — APPOINTMENT (OUTPATIENT)
Dept: PHYSICAL THERAPY | Facility: HOSPITAL | Age: 72
End: 2017-03-10

## 2017-03-14 ENCOUNTER — HOSPITAL ENCOUNTER (OUTPATIENT)
Dept: PHYSICAL THERAPY | Facility: HOSPITAL | Age: 72
Setting detail: THERAPIES SERIES
Discharge: HOME | End: 2017-03-14

## 2017-03-14 DIAGNOSIS — M47.896 OTHER SPONDYLOSIS, LUMBAR REGION: Primary | ICD-10-CM

## 2017-03-14 PROCEDURE — 97110 THERAPEUTIC EXERCISES: CPT

## 2017-03-14 NOTE — PROGRESS NOTES
Outpatient Physical Therapy Ortho Treatment Note  [facility]     Patient Name: Samy Velazquez  : 1945  MRN: [id number]  Today's Date: 3/14/2017    Patient reports 4/10 pain and 50 % improvement since initiating therapy.  Attendance  9/10 appointments scheduled, Medicare guidelines insurance. Next MD follow up is 3/20/2017.  Visit Date: 2017    Visit Dx:    ICD-10-CM ICD-9-CM   1. Other spondylosis, lumbar region M47.896 721.3       Patient Active Problem List   Diagnosis   • Ischemic heart disease due to coronary artery obstruction   • Osteoarthritis of multiple joints   • Cardiac pacemaker   • Gout of multiple sites   • Nephrolithiasis   • Mixed hyperlipidemia        Past Medical History   Diagnosis Date   • Arthritis    • Coronary artery disease    • Hyperlipidemia    • Kidney stone         Past Surgical History   Procedure Laterality Date   • Cardiac surgery     • Total shoulder replacement     • Knee surgery     • Cardiac defibrillator placement     • Cardiac defibrillator placement N/A              Exercises       17 1353          Exercise 1    Exercise Name 1 PRO II for ROM/Endurance  -DD      Resistance 1 --   L3.0  -DD      Time (Minutes) 1 6  -DD      Exercise 2    Exercise Name 2 Gt Training/RW  -DD      Time (Minutes) 2 --   2 laps in hallway, last lap finished at [place] Table  -DD      Exercise 3    Exercise Name 3 TA + Rows/Dynadisc  -DD      Resistance 3 Red  -DD      Sets 3 2  -DD      Reps 3 10  -DD      Exercise 4    Exercise Name 4 TA + LAQ/Dynadisc  -DD      Sets 4 2  -DD      Reps 4 10  -DD      Exercise 5    Exercise Name 5 TA + Marching  -DD      Sets 5 2  -DD      Reps 5 10  -DD      Exercise 6    Exercise Name 6 Lateral Stepping  -DD      Reps 6 --   3 laps  -DD      Exercise 7    Exercise Name 7 Standing CR/TR  -DD      Sets 7 2  -DD      Reps 7 15  -DD        User Key  (r) = Recorded By, (t) = Taken By, (c) = Cosigned By    Initials Name Provider Type     DD Mariana Funez, PT Physical Therapist                PT OP Goals       03/14/17 1425 03/14/17 1358    Time Calculation    PT Goal Re-Cert Due Date 03/28/17  -DD 03/28/17  -DD      03/14/17 1353       PT Short Term Goals    STG Date to Achieve 03/28/17  -DD     STG 2 Tolerate 45 min treatment session without increased pain.  -DD     STG 2 Progress Met  -DD     STG 3 Independent with supine <> sit transfers demonstrating proper spine protection.  -DD     STG 3 Progress Met  -DD     STG 4 Independent with transverse abs isometrics.  -DD     STG 4 Progress Progressing  -DD     STG 5 Ambulate 150 ft with RW or greater.   -DD     STG 5 Progress New  -DD     STG 6 Ascend/descend curb step 3x, no increase in pain.   -DD     STG 6 Progress New  -DD     STG 7 Able to stand 5 min, no seated rest break while performing ther ex.   -DD     STG 7 Progress New  -DD     Long Term Goals    LTG Date to Achieve 03/02/17  -DD     LTG 1 Subjectively report greater than 60% improvement.  -DD     LTG 1 Progress Progressing  -DD     LTG 2 Pt will have modified oswestry score less than 30%.  -DD     LTG 2 Progress Progressing  -DD     LTG 3 Demonstrate R hip flex/abd MMT greater than 4/5  -DD     LTG 3 Progress Met  -DD     LTG 4 Demonstrate R knee flex/ext strength greater than 4/5  -DD     LTG 4 Progress Met  -DD     LTG 5 Ambulate 60 feet with RW with minimal increase in RLE pain.  -DD     LTG 5 Progress Met  -DD     Time Calculation    PT Goal Re-Cert Due Date 03/28/17  -DD       User Key  (r) = Recorded By, (t) = Taken By, (c) = Cosigned By    Initials Name Provider Type    DD Mariana Funez, PT Physical Therapist        Time Calculation:   Start Time: 1353  Stop Time: 1422  Total Timed Code Minutes- PT: 29 minute(s)    Therapy Charges for Today     Code Description Service Date Service Provider Modifiers Qty    95496 OR THERAPEUTIC EXERCISES 3/14/2017 Mariana Funez, PT GP 2        Mariana Fnuez, PT,  JUNITO, ATC  3/14/2017

## 2017-03-16 ENCOUNTER — HOSPITAL ENCOUNTER (OUTPATIENT)
Dept: PHYSICAL THERAPY | Facility: HOSPITAL | Age: 72
Setting detail: THERAPIES SERIES
Discharge: HOME | End: 2017-03-16

## 2017-03-16 DIAGNOSIS — M47.896 OTHER SPONDYLOSIS, LUMBAR REGION: Primary | ICD-10-CM

## 2017-03-16 PROCEDURE — 97110 THERAPEUTIC EXERCISES: CPT | Performed by: PHYSICAL THERAPIST

## 2017-03-16 NOTE — PROGRESS NOTES
Outpatient Physical Therapy Ortho Treatment Note  [facility]  Sharon Montesinos, PT  17       Patient Name: Samy Velazquez  : 1945  MRN: [id number]  Today's Date: 3/16/2017      Visit Date: 2017     Subjective Improvement: 50%       Attendance: 10/11   Approved:  Medicare guidelines MD follow up: 3/20/17           date: 3/28/17          Visit Dx:    ICD-10-CM ICD-9-CM   1. Other spondylosis, lumbar region M47.896 721.3       Patient Active Problem List   Diagnosis   • Ischemic heart disease due to coronary artery obstruction   • Osteoarthritis of multiple joints   • Cardiac pacemaker   • Gout of multiple sites   • Nephrolithiasis   • Mixed hyperlipidemia        Past Medical History   Diagnosis Date   • Arthritis    • Coronary artery disease    • Hyperlipidemia    • Kidney stone         Past Surgical History   Procedure Laterality Date   • Cardiac surgery     • Total shoulder replacement     • Knee surgery     • Cardiac defibrillator placement     • Cardiac defibrillator placement N/A              PT Ortho       17 1400    Precautions and Contraindications    Precautions/Limitations no known precautions/limitations  -KG    Subjective Pain    Able to rate subjective pain? yes  -KG    Pre-Treatment Pain Level 0  -KG    Post-Treatment Pain Level 0  -KG    Posture/Observations    Posture/Observations Comments Transport w/c  -KG      User Key  (r) = Recorded By, (t) = Taken By, (c) = Cosigned By    Initials Name Provider Type    ANANT Montesinos PT Physical Therapist                            PT Assessment/Plan       17 1400       PT Assessment    Functional Limitations Impaired gait;Decreased safety during functional activities;Limitation in home management;Limitations in community activities;Limitations in functional capacity and performance;Performance in leisure activities;Performance in self-care ADL  -KG     Impairments Balance;Gait;Endurance;Range  "of motion;Posture;Pain;Muscle strength  -KG     Assessment Comments Pt did well with standing exercise but continues to need frequent rest breaks. Demonstrated improved gait distance.  -KG     Rehab Potential Good  -KG     Patient/caregiver participated in establishment of treatment plan and goals Yes  -KG     Patient would benefit from skilled therapy intervention Yes  -KG     PT Plan    PT Frequency 2x/week  -KG     Predicted Duration of Therapy Intervention (days/wks) 2-3 weeks  -KG     PT Plan Comments Continue to increase standing activities; core stability  -KG       User Key  (r) = Recorded By, (t) = Taken By, (c) = Cosigned By    Initials Name Provider Type    KG Sharon Montesinos, PT Physical Therapist                    Exercises       03/16/17 1400          Subjective Pain    Able to rate subjective pain? yes  -KG      Pre-Treatment Pain Level 0  -KG      Post-Treatment Pain Level 0  -KG      Aquatics    Aquatics performed? No  -KG      Exercise 1    Exercise Name 1 PRO II for ROM/Endurance  -KG      Resistance 1 --   L3.0  -KG      Time (Minutes) 1 8  -KG      Exercise 2    Exercise Name 2 Gt Training/RW  -KG      Time (Minutes) 2 --   3 laps in hallway  -KG      Exercise 3    Exercise Name 3 Lateral sidestepping  -KG      Sets 3 1  -KG      Reps 3 5  -KG      Exercise 4    Exercise Name 4 Fwd step ups 4\"  -KG      Sets 4 1  -KG      Reps 4 10  -KG      Exercise 5    Exercise Name 5 Standing CR/TR  -KG      Sets 5 2  -KG      Reps 5 15  -KG      Exercise 6    Exercise Name 6 Standing hip abd  -KG      Sets 6 1  -KG      Reps 6 10  -KG      Exercise 7    Exercise Name 7 TA + LAQ/Dynadisc  -KG      Sets 7 2  -KG      Reps 7 10  -KG      Exercise 8    Exercise Name 8 TA + Marching  -KG      Sets 8 2  -KG      Reps 8 10  -KG      Exercise 9    Exercise Name 9 TA + Rows/Dynadisc  -KG      Equipment 9 Theraband  -KG      Resistance 9 Red  -KG      Sets 9 2  -KG      Reps 9 10  -KG        User Key  (r) = " Recorded By, (t) = Taken By, (c) = Cosigned By    Initials Name Provider Type    KG Sharon Montesinos, PT Physical Therapist                               PT OP Goals       03/16/17 1400       PT Short Term Goals    STG Date to Achieve 03/28/17  -KG     STG 2 Tolerate 45 min treatment session without increased pain.  -KG     STG 2 Progress Met  -KG     STG 3 Independent with supine <> sit transfers demonstrating proper spine protection.  -KG     STG 3 Progress Met  -KG     STG 4 Independent with transverse abs isometrics.  -KG     STG 4 Progress Progressing  -KG     STG 5 Ambulate 150 ft with RW or greater.   -KG     STG 5 Progress New  -KG     STG 6 Ascend/descend curb step 3x, no increase in pain.   -KG     STG 6 Progress New  -KG     STG 7 Able to stand 5 min, no seated rest break while performing ther ex.   -KG     STG 7 Progress New  -KG     Long Term Goals    LTG Date to Achieve 03/02/17  -KG     LTG 1 Subjectively report greater than 60% improvement.  -KG     LTG 1 Progress Progressing  -KG     LTG 2 Pt will have modified oswestry score less than 30%.  -KG     LTG 2 Progress Progressing  -KG     LTG 3 Demonstrate R hip flex/abd MMT greater than 4/5  -KG     LTG 3 Progress Met  -KG     LTG 4 Demonstrate R knee flex/ext strength greater than 4/5  -KG     LTG 4 Progress Met  -KG     LTG 5 Ambulate 60 feet with RW with minimal increase in RLE pain.  -KG     LTG 5 Progress Met  -KG     Time Calculation    PT Goal Re-Cert Due Date 03/28/17   Visit 10/11, MD 3/20/17, 50% improvement  -KG       User Key  (r) = Recorded By, (t) = Taken By, (c) = Cosigned By    Initials Name Provider Type    ANANT Montesinos, PT Physical Therapist                Therapy Education       03/16/17 1400          Therapy Education    Given HEP  -KG      Program Reinforced  -KG      How Provided Verbal;Demonstration  -KG      Provided to Patient  -KG      Level of Understanding Teach back education performed;Verbalized;Demonstrated  -KG         User Key  (r) = Recorded By, (t) = Taken By, (c) = Cosigned By    Initials Name Provider Type    KG Sharon Motnesinos, PT Physical Therapist                Time Calculation:   Start Time: 1446  Stop Time: 1525  Time Calculation (min): 39 min  Total Timed Code Minutes- PT: 39 minute(s)    Therapy Charges for Today     Code Description Service Date Service Provider Modifiers Qty    [id number]  PT THER PROC EA 15 MIN 3/16/2017 Sharon Montesinos, PT GP 3                    Sharon Montesinos, PT  3/16/2017

## 2017-03-21 ENCOUNTER — HOSPITAL ENCOUNTER (OUTPATIENT)
Dept: PHYSICAL THERAPY | Facility: HOSPITAL | Age: 72
Setting detail: THERAPIES SERIES
Discharge: HOME | End: 2017-03-21

## 2017-03-21 DIAGNOSIS — M47.896 OTHER SPONDYLOSIS, LUMBAR REGION: Primary | ICD-10-CM

## 2017-03-21 PROCEDURE — 97110 THERAPEUTIC EXERCISES: CPT

## 2017-03-21 NOTE — PROGRESS NOTES
"    Outpatient Physical Therapy Ortho Treatment Note  [facility]  Jose Arroyo PTA  17  3:55 PM       Patient Name: Samy Velazquez  : 1945  MRN: [id number]  Today's Date: 3/21/2017      Visit Date: 2017     Subjective Improvement: 50%       Attendance:    Approved: Medicare MD follow up: PRN           RC date: 3/28/17           Visit Dx:    ICD-10-CM ICD-9-CM   1. Other spondylosis, lumbar region M47.896 721.3       Patient Active Problem List   Diagnosis   • Ischemic heart disease due to coronary artery obstruction   • Osteoarthritis of multiple joints   • Cardiac pacemaker   • Gout of multiple sites   • Nephrolithiasis   • Mixed hyperlipidemia        Past Medical History   Diagnosis Date   • Arthritis    • Coronary artery disease    • Hyperlipidemia    • Kidney stone         Past Surgical History   Procedure Laterality Date   • Cardiac surgery     • Total shoulder replacement     • Knee surgery     • Cardiac defibrillator placement     • Cardiac defibrillator placement N/A              PT Ortho       17 1500    Subjective Comments    Subjective Comments Pt reports that he got a shot yesterday and \"it has really helped.\"  -MB    Precautions and Contraindications    Precautions/Limitations no known precautions/limitations  -MB    Subjective Pain    Able to rate subjective pain? yes  -MB    Pre-Treatment Pain Level 0  -MB    Post-Treatment Pain Level 0  -MB    Posture/Observations    Posture/Observations Comments Transport w/c  -MB      User Key  (r) = Recorded By, (t) = Taken By, (c) = Cosigned By    Initials Name Provider Type    JUANY Arroyo PTA Physical Therapy Assistant                            PT Assessment/Plan       17 1500       PT Assessment    Functional Limitations Impaired gait;Decreased safety during functional activities;Limitation in home management;Limitations in community activities;Limitations in functional capacity and " "performance;Performance in leisure activities;Performance in self-care ADL  -MB     Impairments Balance;Gait;Endurance;Range of motion;Posture;Pain;Muscle strength  -MB     Assessment Comments Pt able to increase overall gait distance today, as well as speed up sepideh.   -MB     Rehab Potential Good  -MB     Patient/caregiver participated in establishment of treatment plan and goals Yes  -MB     Patient would benefit from skilled therapy intervention Yes  -MB     PT Plan    PT Frequency 2x/week  -MB     Predicted Duration of Therapy Intervention (days/wks) 2-3 weeks  -MB     PT Plan Comments Possible D/C next visit. Finalize HEP then.  -MB       User Key  (r) = Recorded By, (t) = Taken By, (c) = Cosigned By    Initials Name Provider Type    JUANY Arroyo, PTA Physical Therapy Assistant                    Exercises       03/21/17 1500          Subjective Comments    Subjective Comments Pt reports that he got a shot yesterday and \"it has really helped.\"  -MB      Subjective Pain    Able to rate subjective pain? yes  -MB      Pre-Treatment Pain Level 0  -MB      Post-Treatment Pain Level 0  -MB      Aquatics    Aquatics performed? No  -MB      Exercise 1    Exercise Name 1 PRO II for ROM/Endurance  -MB      Resistance 1 --   4.0  -MB      Time (Minutes) 1 8  -MB      Exercise 2    Exercise Name 2 Lat stepping at HR  -MB      Reps 2 5  -MB      Exercise 3    Exercise Name 3 Gait training with RW in waiting room ~250ft  -MB      Time (Minutes) 3 3-5  -MB      Exercise 4    Exercise Name 4 Step ups Fwd  -MB      Sets 4 1  -MB      Reps 4 10  -MB      Exercise 5    Exercise Name 5 Lat step up/over   -MB      Sets 5 1  -MB      Reps 5 5  -MB      Exercise 6    Exercise Name 6 Standing CR/TR  -MB      Sets 6 2  -MB      Reps 6 10  -MB      Exercise 7    Exercise Name 7 Sara Disc: LAQs/march  -MB      Sets 7 2  -MB      Reps 7 10  -MB      Exercise 8    Exercise Name 8 Sara Disc: Tband Rows  -MB      Equipment 8 " Theraband  -MB      Resistance 8 Red  -MB      Sets 8 2  -MB      Reps 8 10  -MB      Exercise 9    Exercise Name 9 Sara Disc: Shoulder FF to 90°  -MB      Weights/Plates 9 3  -MB      Sets 9 2  -MB      Reps 9 10  -MB        User Key  (r) = Recorded By, (t) = Taken By, (c) = Cosigned By    Initials Name Provider Type    JUANY Arroyo PTA Physical Therapy Assistant                               PT OP Goals       03/21/17 1500       PT Short Term Goals    STG Date to Achieve 03/28/17  -MB     STG 2 Tolerate 45 min treatment session without increased pain.  -MB     STG 2 Progress Met  -MB     STG 3 Independent with supine <> sit transfers demonstrating proper spine protection.  -MB     STG 3 Progress Met  -MB     STG 4 Independent with transverse abs isometrics.  -MB     STG 4 Progress Progressing  -MB     STG 5 Ambulate 150 ft with RW or greater.   -MB     STG 5 Progress Met  -MB     STG 6 Ascend/descend curb step 3x, no increase in pain.   -MB     STG 6 Progress New  -MB     STG 7 Able to stand 5 min, no seated rest break while performing ther ex.   -MB     STG 7 Progress New  -MB     Long Term Goals    LTG Date to Achieve 03/02/17  -MB     LTG 1 Subjectively report greater than 60% improvement.  -MB     LTG 1 Progress Progressing  -MB     LTG 2 Pt will have modified oswestry score less than 30%.  -MB     LTG 2 Progress Progressing  -MB     LTG 3 Demonstrate R hip flex/abd MMT greater than 4/5  -MB     LTG 3 Progress Met  -MB     LTG 4 Demonstrate R knee flex/ext strength greater than 4/5  -MB     LTG 4 Progress Met  -MB     LTG 5 Ambulate 60 feet with RW with minimal increase in RLE pain.  -MB     LTG 5 Progress Met  -MB     Time Calculation    PT Goal Re-Cert Due Date 03/28/17 11/12, MD PRN, Improvement 50%  -MB       User Key  (r) = Recorded By, (t) = Taken By, (c) = Cosigned By    Initials Name Provider Type    JUANY Arroyo PTA Physical Therapy Assistant                Therapy Education        03/21/17 1500          Therapy Education    Given HEP  -MB      Program Reinforced  -MB      How Provided Verbal;Demonstration  -MB      Provided to Patient  -MB      Level of Understanding Teach back education performed;Verbalized;Demonstrated  -MB        User Key  (r) = Recorded By, (t) = Taken By, (c) = Cosigned By    Initials Name Provider Type    JUANY Arroyo PTA Physical Therapy Assistant                Time Calculation:   Start Time: 1430  Stop Time: 1514  Time Calculation (min): 44 min  Total Timed Code Minutes- PT: 44 minute(s)    Therapy Charges for Today     Code Description Service Date Service Provider Modifiers Qty    19629762850 HC PT THER PROC EA 15 MIN 3/21/2017 Jose Arroyo PTA GP 3                    Jose Arroyo PTA  3/21/2017

## 2017-03-23 ENCOUNTER — APPOINTMENT (OUTPATIENT)
Dept: PHYSICAL THERAPY | Facility: HOSPITAL | Age: 72
End: 2017-03-23

## 2017-04-10 RX ORDER — CLOPIDOGREL BISULFATE 75 MG/1
75 TABLET ORAL DAILY
Qty: 90 TABLET | Refills: 1 | Status: SHIPPED | OUTPATIENT
Start: 2017-04-10 | End: 2017-10-01 | Stop reason: SDUPTHER

## 2017-04-17 RX ORDER — FLUOXETINE HYDROCHLORIDE 40 MG/1
40 CAPSULE ORAL DAILY
Qty: 90 CAPSULE | Refills: 1 | Status: SHIPPED | OUTPATIENT
Start: 2017-04-17 | End: 2017-08-02 | Stop reason: SDUPTHER

## 2017-04-17 RX ORDER — FAMOTIDINE 20 MG/1
20 TABLET, FILM COATED ORAL DAILY
Qty: 90 TABLET | Refills: 1 | Status: SHIPPED | OUTPATIENT
Start: 2017-04-17 | End: 2017-10-31 | Stop reason: SDUPTHER

## 2017-04-18 ENCOUNTER — OFFICE VISIT (OUTPATIENT)
Dept: FAMILY MEDICINE CLINIC | Facility: CLINIC | Age: 72
End: 2017-04-18

## 2017-04-18 VITALS
HEART RATE: 68 BPM | WEIGHT: 236.2 LBS | DIASTOLIC BLOOD PRESSURE: 72 MMHG | HEIGHT: 68 IN | OXYGEN SATURATION: 99 % | SYSTOLIC BLOOD PRESSURE: 122 MMHG | BODY MASS INDEX: 35.8 KG/M2 | TEMPERATURE: 98.1 F

## 2017-04-18 DIAGNOSIS — M54.2 NECK PAIN: Primary | ICD-10-CM

## 2017-04-18 PROCEDURE — 99213 OFFICE O/P EST LOW 20 MIN: CPT | Performed by: FAMILY MEDICINE

## 2017-04-18 NOTE — PROGRESS NOTES
"Samy Velazquez    1945    Chief Complaint   Patient presents with   • Fall       Vitals:    04/18/17 1351   BP: 122/72   BP Location: Right arm   Patient Position: Sitting   Cuff Size: Adult   Pulse: 68   Temp: 98.1 °F (36.7 °C)   TempSrc: Oral   SpO2: 99%   Weight: 236 lb 3.2 oz (107 kg)   Height: 68\" (172.7 cm)       Fall   The accident occurred 3 to 5 days ago. The fall occurred while standing. He fell from a height of 1 to 2 ft. He landed on carpet. There was no blood loss. The point of impact was the head and neck. The pain is present in the neck. The pain is at a severity of 2/10. The pain is mild. The symptoms are aggravated by pressure on injury. He has tried acetaminophen and heat for the symptoms. The treatment provided mild relief.       Review of Systems   HENT:        Right-sided neck pain occipital area after fall from chair   Eyes: Negative.    Respiratory: Negative.    Cardiovascular: Negative.    Neurological:        No radicular component   Psychiatric/Behavioral: Negative.        Past Medical History:   Diagnosis Date   • Arthritis    • Coronary artery disease    • Hyperlipidemia    • Kidney stone          Current Outpatient Prescriptions:   •  allopurinol (ZYLOPRIM) 300 MG tablet, Take 300 mg by mouth Daily., Disp: , Rfl:   •  aspirin 81 MG EC tablet, Take 81 mg by mouth Daily., Disp: , Rfl:   •  atorvastatin (LIPITOR) 10 MG tablet, Take 10 mg by mouth Daily., Disp: , Rfl:   •  cephalexin (KEFLEX) 500 MG capsule, Take 500 mg by mouth 2 (Two) Times a Day., Disp: , Rfl:   •  cholecalciferol (VITAMIN D3) 1000 UNITS tablet, Take 1,000 Units by mouth Daily., Disp: , Rfl:   •  clopidogrel (PLAVIX) 75 MG tablet, Take 1 tablet by mouth Daily., Disp: 90 tablet, Rfl: 1  •  famotidine (PEPCID) 20 MG tablet, Take 1 tablet by mouth Daily., Disp: 90 tablet, Rfl: 1  •  FLUoxetine (PROzac) 40 MG capsule, Take 1 capsule by mouth Daily., Disp: 90 capsule, Rfl: 1  •  furosemide (LASIX) 20 MG tablet, Take 20 mg " by mouth 2 (Two) Times a Day., Disp: , Rfl:   •  HYDROcodone-acetaminophen (NORCO) 7.5-325 MG per tablet, Take 1 tablet by mouth Every 6 (Six) Hours As Needed for moderate pain (4-6)., Disp: 90 tablet, Rfl: 0  •  isosorbide mononitrate (IMDUR) 60 MG 24 hr tablet, TAKE 1 TABLET EVERY MORNING AND TAKE 1/2 TABLET AT 2PM, Disp: , Rfl: 1  •  mexiletine (MEXITIL) 150 MG capsule, TAKE 2 CAPSULES BY MOUTH 3 TIMES A DAY, Disp: , Rfl: 3  •  predniSONE (DELTASONE) 20 MG tablet, Starting Thursday 1 each morning for 7 days, Disp: 7 tablet, Rfl: 0    Allergies   Allergen Reactions   • Clindamycin/Lincomycin Diarrhea   • Iodine Hives   • Vancomycin Nausea And Vomiting   • Penicillins Rash       Patient Active Problem List   Diagnosis   • Ischemic heart disease due to coronary artery obstruction   • Osteoarthritis of multiple joints   • Cardiac pacemaker   • Gout of multiple sites   • Nephrolithiasis   • Mixed hyperlipidemia       Social History     Social History   • Marital status:      Spouse name: N/A   • Number of children: N/A   • Years of education: N/A     Social History Main Topics   • Smoking status: Never Smoker   • Smokeless tobacco: None   • Alcohol use No   • Drug use: No   • Sexual activity: Not Asked     Other Topics Concern   • None     Social History Narrative       Past Surgical History:   Procedure Laterality Date   • CARDIAC DEFIBRILLATOR PLACEMENT     • CARDIAC DEFIBRILLATOR PLACEMENT N/A 2008   • CARDIAC SURGERY     • KNEE SURGERY     • TOTAL SHOULDER REPLACEMENT         Physical Exam   Constitutional: He is oriented to person, place, and time. He appears well-developed and well-nourished.   HENT:   Head: Normocephalic.   Neck:   Point tenderness right occipital area   Cardiovascular: Normal rate and regular rhythm.    Pulmonary/Chest: Effort normal and breath sounds normal.   Musculoskeletal:   Reflexes right arm intact   Neurological: He is alert and oriented to person, place, and time.  "  Psychiatric: He has a normal mood and affect. His behavior is normal. Thought content normal.   Vitals reviewed.      Vitals:    04/18/17 1351   BP: 122/72   BP Location: Right arm   Patient Position: Sitting   Cuff Size: Adult   Pulse: 68   Temp: 98.1 °F (36.7 °C)   TempSrc: Oral   SpO2: 99%   Weight: 236 lb 3.2 oz (107 kg)   Height: 68\" (172.7 cm)       Samy was seen today for fall.    Diagnoses and all orders for this visit:    Neck pain        Problem List Items Addressed This Visit     None      Visit Diagnoses     Neck pain    -  Primary          Plan cervical strain after fall right-sided no radicular component-give it 3 weeks                            Garret Salinas MD  4/18/2017  "

## 2017-05-08 ENCOUNTER — TELEPHONE (OUTPATIENT)
Dept: FAMILY MEDICINE CLINIC | Facility: CLINIC | Age: 72
End: 2017-05-08

## 2017-05-12 ENCOUNTER — OFFICE VISIT (OUTPATIENT)
Dept: FAMILY MEDICINE CLINIC | Facility: CLINIC | Age: 72
End: 2017-05-12

## 2017-05-12 VITALS
WEIGHT: 236 LBS | TEMPERATURE: 97.9 F | HEART RATE: 64 BPM | OXYGEN SATURATION: 97 % | DIASTOLIC BLOOD PRESSURE: 70 MMHG | SYSTOLIC BLOOD PRESSURE: 112 MMHG | BODY MASS INDEX: 35.77 KG/M2 | HEIGHT: 68 IN

## 2017-05-12 DIAGNOSIS — M48.061 SPINAL STENOSIS OF LUMBAR REGION: Primary | ICD-10-CM

## 2017-05-12 PROCEDURE — 99213 OFFICE O/P EST LOW 20 MIN: CPT | Performed by: FAMILY MEDICINE

## 2017-05-18 RX ORDER — MEXILETINE HYDROCHLORIDE 150 MG/1
CAPSULE ORAL
Qty: 540 CAPSULE | Refills: 2 | Status: SHIPPED | OUTPATIENT
Start: 2017-05-18 | End: 2018-02-18 | Stop reason: SDUPTHER

## 2017-05-18 RX ORDER — FUROSEMIDE 20 MG/1
20 TABLET ORAL EVERY MORNING
Qty: 90 TABLET | Refills: 2 | Status: SHIPPED | OUTPATIENT
Start: 2017-05-18 | End: 2018-01-31 | Stop reason: SDUPTHER

## 2017-05-22 RX ORDER — ATORVASTATIN CALCIUM 40 MG/1
40 TABLET, FILM COATED ORAL NIGHTLY
Qty: 30 TABLET | Refills: 5 | Status: SHIPPED | OUTPATIENT
Start: 2017-05-22 | End: 2017-11-09 | Stop reason: SDUPTHER

## 2017-06-05 RX ORDER — ALLOPURINOL 300 MG/1
300 TABLET ORAL DAILY
Qty: 90 TABLET | Refills: 1 | Status: SHIPPED | OUTPATIENT
Start: 2017-06-05 | End: 2017-11-19 | Stop reason: SDUPTHER

## 2017-06-12 ENCOUNTER — DOCUMENTATION (OUTPATIENT)
Dept: PHYSICAL THERAPY | Facility: HOSPITAL | Age: 72
End: 2017-06-12

## 2017-06-12 DIAGNOSIS — M47.896 OTHER SPONDYLOSIS, LUMBAR REGION: Primary | ICD-10-CM

## 2017-06-12 RX ORDER — ISOSORBIDE MONONITRATE 60 MG/1
TABLET, EXTENDED RELEASE ORAL
Qty: 90 TABLET | Refills: 1 | Status: SHIPPED | OUTPATIENT
Start: 2017-06-12 | End: 2017-10-05 | Stop reason: SDUPTHER

## 2017-06-12 NOTE — THERAPY DISCHARGE NOTE
Outpatient Physical Therapy Discharge Summary         Patient Name: Samy Velazquez  : 1945  MRN: [id number]    Today's Date: 2017    Visit Dx:    ICD-10-CM ICD-9-CM   1. Other spondylosis, lumbar region M47.896 721.3             PT OP Goals       17 1000       PT Short Term Goals    STG Date to Achieve 17  -KG     STG 2 Tolerate 45 min treatment session without increased pain.  -KG     STG 2 Progress Met  -KG     STG 3 Independent with supine <> sit transfers demonstrating proper spine protection.  -KG     STG 3 Progress Met  -KG     STG 4 Independent with transverse abs isometrics.  -KG     STG 4 Progress Not Met  -KG     STG 5 Ambulate 150 ft with RW or greater.   -KG     STG 5 Progress Met  -KG     STG 6 Ascend/descend curb step 3x, no increase in pain.   -KG     STG 6 Progress Not Met  -KG     STG 7 Able to stand 5 min, no seated rest break while performing ther ex.   -KG     STG 7 Progress Not Met  -KG     Long Term Goals    LTG Date to Achieve 17  -KG     LTG 1 Subjectively report greater than 60% improvement.  -KG     LTG 1 Progress Not Met  -KG     LTG 2 Pt will have modified oswestry score less than 30%.  -KG     LTG 2 Progress Not Met  -KG     LTG 3 Demonstrate R hip flex/abd MMT greater than 4/5  -KG     LTG 3 Progress Met  -KG     LTG 4 Demonstrate R knee flex/ext strength greater than 4/5  -KG     LTG 4 Progress Met  -KG     LTG 5 Ambulate 60 feet with RW with minimal increase in RLE pain.  -KG     LTG 5 Progress Met  -KG       User Key  (r) = Recorded By, (t) = Taken By, (c) = Cosigned By    Initials Name Provider Type    ANANT Montesinos, PT Physical Therapist          OP PT Discharge Summary  Date of Discharge: 17  Reason for Discharge: other (comment) (Pt did not return to therapy)  Outcomes Achieved: Patient able to partially acheive established goals, Refer to plan of care for updates on goals achieved  Discharge Destination: Home with home  program      Time Calculation:                    Sharon Montesinos, PT  6/12/2017

## 2017-08-02 RX ORDER — FLUOXETINE HYDROCHLORIDE 40 MG/1
40 CAPSULE ORAL DAILY
Qty: 90 CAPSULE | Refills: 1 | Status: SHIPPED | OUTPATIENT
Start: 2017-08-02 | End: 2018-02-09 | Stop reason: SDUPTHER

## 2017-08-24 ENCOUNTER — OFFICE VISIT (OUTPATIENT)
Dept: FAMILY MEDICINE CLINIC | Facility: CLINIC | Age: 72
End: 2017-08-24

## 2017-08-24 VITALS
TEMPERATURE: 98 F | DIASTOLIC BLOOD PRESSURE: 82 MMHG | OXYGEN SATURATION: 98 % | HEART RATE: 72 BPM | SYSTOLIC BLOOD PRESSURE: 140 MMHG

## 2017-08-24 DIAGNOSIS — E55.9 UNSPECIFIED VITAMIN D DEFICIENCY: ICD-10-CM

## 2017-08-24 DIAGNOSIS — R53.83 FATIGUE, UNSPECIFIED TYPE: Primary | ICD-10-CM

## 2017-08-24 DIAGNOSIS — M10.9 GOUT, UNSPECIFIED CAUSE, UNSPECIFIED CHRONICITY, UNSPECIFIED SITE: ICD-10-CM

## 2017-08-24 DIAGNOSIS — N40.1 BENIGN NON-NODULAR PROSTATIC HYPERPLASIA WITH LOWER URINARY TRACT SYMPTOMS: ICD-10-CM

## 2017-08-24 DIAGNOSIS — I10 ESSENTIAL HYPERTENSION: ICD-10-CM

## 2017-08-24 PROCEDURE — 99213 OFFICE O/P EST LOW 20 MIN: CPT | Performed by: FAMILY MEDICINE

## 2017-08-24 NOTE — PROGRESS NOTES
Subjective   Samy Velazquez is a 72 y.o. male.     Hypertension   This is a chronic problem. The current episode started more than 1 year ago. The problem has been gradually worsening since onset. The problem is resistant. Pertinent negatives include no chest pain or shortness of breath. There are no associated agents to hypertension. Risk factors for coronary artery disease include dyslipidemia, obesity, male gender and sedentary lifestyle. Past treatments include diuretics. The current treatment provides mild improvement. There are no compliance problems.  Hypertensive end-organ damage includes CAD/MI.       The following portions of the patient's history were reviewed and updated as appropriate: allergies, current medications, past family history, past medical history, past social history, past surgical history and problem list.    Review of Systems   Respiratory: Negative for shortness of breath.    Cardiovascular: Positive for leg swelling. Negative for chest pain.       Objective   Physical Exam   Constitutional: He is oriented to person, place, and time. He appears well-developed and well-nourished.   Cardiovascular: Normal rate and regular rhythm.    Pulmonary/Chest: Effort normal and breath sounds normal.   Musculoskeletal: He exhibits edema.   Neurological: He is alert and oriented to person, place, and time.   Psychiatric: He has a normal mood and affect. His behavior is normal. Judgment and thought content normal.   Nursing note and vitals reviewed.      Assessment/Plan   Samy was seen today for hypertension.    Diagnoses and all orders for this visit:    Fatigue, unspecified type  -     TSH  -     T4, free  -     CBC & Differential    Gout, unspecified cause, unspecified chronicity, unspecified site  -     Comprehensive Metabolic Panel  -     Uric Acid    Unspecified vitamin D deficiency  -     Vitamin D 25 Hydroxy  -     Uric Acid    Benign non-nodular prostatic hyperplasia with lower urinary tract  symptoms  -     PSA    Essential hypertension       Increase Lasix 20 mg to 2 a day return 2 weeks may need low-dose ACE inhibitor-BMP on return

## 2017-08-25 ENCOUNTER — TELEPHONE (OUTPATIENT)
Dept: FAMILY MEDICINE CLINIC | Facility: CLINIC | Age: 72
End: 2017-08-25

## 2017-08-25 LAB
25(OH)D3+25(OH)D2 SERPL-MCNC: 26.6 NG/ML (ref 30–100)
ALBUMIN SERPL-MCNC: 3.5 G/DL (ref 3.5–5)
ALBUMIN/GLOB SERPL: 1.3 G/DL (ref 1.1–2.5)
ALP SERPL-CCNC: 110 U/L (ref 24–120)
ALT SERPL-CCNC: 35 U/L (ref 0–54)
AST SERPL-CCNC: 22 U/L (ref 7–45)
BASOPHILS # BLD AUTO: 0.01 10*3/MM3 (ref 0–0.2)
BASOPHILS NFR BLD AUTO: 0.1 % (ref 0–2)
BILIRUB SERPL-MCNC: 0.8 MG/DL (ref 0.1–1)
BUN SERPL-MCNC: 20 MG/DL (ref 5–21)
BUN/CREAT SERPL: 15.6 (ref 7–25)
CALCIUM SERPL-MCNC: 10.2 MG/DL (ref 8.4–10.4)
CHLORIDE SERPL-SCNC: 106 MMOL/L (ref 98–110)
CO2 SERPL-SCNC: 27 MMOL/L (ref 24–31)
CREAT SERPL-MCNC: 1.28 MG/DL (ref 0.5–1.4)
EOSINOPHIL # BLD AUTO: 0 10*3/MM3 (ref 0–0.7)
EOSINOPHIL NFR BLD AUTO: 0 % (ref 0–4)
ERYTHROCYTE [DISTWIDTH] IN BLOOD BY AUTOMATED COUNT: 14.6 % (ref 12–15)
GLOBULIN SER CALC-MCNC: 2.7 GM/DL
GLUCOSE SERPL-MCNC: 123 MG/DL (ref 70–100)
HCT VFR BLD AUTO: 49.1 % (ref 40–52)
HGB BLD-MCNC: 15.9 G/DL (ref 14–18)
IMM GRANULOCYTES # BLD: 0.03 10*3/MM3 (ref 0–0.03)
IMM GRANULOCYTES NFR BLD: 0.3 % (ref 0–5)
LYMPHOCYTES # BLD AUTO: 0.75 10*3/MM3 (ref 0.72–4.86)
LYMPHOCYTES NFR BLD AUTO: 7.9 % (ref 15–45)
MCH RBC QN AUTO: 32 PG (ref 28–32)
MCHC RBC AUTO-ENTMCNC: 32.4 G/DL (ref 33–36)
MCV RBC AUTO: 98.8 FL (ref 82–95)
MONOCYTES # BLD AUTO: 0.7 10*3/MM3 (ref 0.19–1.3)
MONOCYTES NFR BLD AUTO: 7.4 % (ref 4–12)
NEUTROPHILS # BLD AUTO: 7.96 10*3/MM3 (ref 1.87–8.4)
NEUTROPHILS NFR BLD AUTO: 84.3 % (ref 39–78)
PLATELET # BLD AUTO: 186 10*3/MM3 (ref 130–400)
POTASSIUM SERPL-SCNC: 4.5 MMOL/L (ref 3.5–5.3)
PROT SERPL-MCNC: 6.2 G/DL (ref 6.3–8.7)
PSA SERPL-MCNC: 1.84 NG/ML (ref 0–4)
RBC # BLD AUTO: 4.97 10*6/MM3 (ref 4.8–5.9)
SODIUM SERPL-SCNC: 141 MMOL/L (ref 135–145)
T4 FREE SERPL-MCNC: 1.08 NG/DL (ref 0.78–2.19)
TSH SERPL DL<=0.005 MIU/L-ACNC: 1.02 MIU/ML (ref 0.47–4.68)
URATE SERPL-MCNC: 3.5 MG/DL (ref 3.5–8.5)
WBC # BLD AUTO: 9.45 10*3/MM3 (ref 4.8–10.8)

## 2017-08-25 RX ORDER — ACETAMINOPHEN 160 MG
2000 TABLET,DISINTEGRATING ORAL DAILY
COMMUNITY

## 2017-08-25 NOTE — TELEPHONE ENCOUNTER
----- Message from Garret Salinas MD sent at 8/25/2017  7:14 AM CDT -----  Lab test good but needs to start vitamin D at thousand units a day indefinitely

## 2017-08-29 ENCOUNTER — TELEPHONE (OUTPATIENT)
Dept: FAMILY MEDICINE CLINIC | Facility: CLINIC | Age: 72
End: 2017-08-29

## 2017-08-29 RX ORDER — LISINOPRIL 5 MG/1
5 TABLET ORAL NIGHTLY
Qty: 90 TABLET | Refills: 1 | Status: SHIPPED | OUTPATIENT
Start: 2017-08-29 | End: 2017-10-17 | Stop reason: ALTCHOICE

## 2017-08-29 NOTE — TELEPHONE ENCOUNTER
PT'S WIFE STATES BP IS STILL RUNNING HIGH BUT HE FEELS OK-NO PAIN.    BP READINGS    157/113 LAST PM  131/103 THIS AM      PLEASE ADVISE WHAT TO DO.    [facility]  Add lisinopril 5 mg daily at bedtime call blood pressures in 2 weeks

## 2017-08-29 NOTE — TELEPHONE ENCOUNTER
PT WAS ADVISED ADDING LISINOPRIL-RX SENT TO [facility]. RECORD BP AM & PM-BRING RESULTS 2 WKS. PT VOICED UNDERSTANDING.

## 2017-09-05 ENCOUNTER — OFFICE VISIT (OUTPATIENT)
Dept: FAMILY MEDICINE CLINIC | Facility: CLINIC | Age: 72
End: 2017-09-05

## 2017-09-05 VITALS
TEMPERATURE: 98 F | SYSTOLIC BLOOD PRESSURE: 118 MMHG | OXYGEN SATURATION: 98 % | DIASTOLIC BLOOD PRESSURE: 74 MMHG | HEART RATE: 85 BPM

## 2017-09-05 DIAGNOSIS — L08.9 WOUND INFECTION: Primary | ICD-10-CM

## 2017-09-05 DIAGNOSIS — T14.8XXA WOUND INFECTION: Primary | ICD-10-CM

## 2017-09-05 PROCEDURE — 99213 OFFICE O/P EST LOW 20 MIN: CPT | Performed by: FAMILY MEDICINE

## 2017-09-05 RX ORDER — SULFAMETHOXAZOLE AND TRIMETHOPRIM 800; 160 MG/1; MG/1
1 TABLET ORAL 2 TIMES DAILY
Qty: 20 TABLET | Refills: 0 | Status: SHIPPED | OUTPATIENT
Start: 2017-09-05 | End: 2017-09-15

## 2017-09-05 NOTE — PROGRESS NOTES
Subjective   Samy Velazquez is a 72 y.o. male.     Wound Infection   This is a new problem. The current episode started in the past 7 days. The problem occurs constantly. The problem has been unchanged. Associated symptoms comments: Draining wound left elbow-probably olecranon bursa. The symptoms are aggravated by bending. He has tried nothing for the symptoms. The treatment provided no relief.       The following portions of the patient's history were reviewed and updated as appropriate: allergies, current medications, past family history, past medical history, past social history, past surgical history and problem list.    Review of Systems   Genitourinary:        Renal function normal   Skin:        History of MRSA       Objective   Physical Exam   Constitutional: He is oriented to person, place, and time.   Neurological: He is alert and oriented to person, place, and time.   Skin:   Draining fluid left elbow probable communication with bursa plan   Psychiatric: He has a normal mood and affect. His behavior is normal. Judgment and thought content normal.   Nursing note and vitals reviewed.      Assessment/Plan   There are no diagnoses linked to this encounter.          plan since history of MRSA Bactrim for 10 days return

## 2017-09-11 ENCOUNTER — TELEPHONE (OUTPATIENT)
Dept: FAMILY MEDICINE CLINIC | Facility: CLINIC | Age: 72
End: 2017-09-11

## 2017-09-11 ENCOUNTER — OFFICE VISIT (OUTPATIENT)
Dept: FAMILY MEDICINE CLINIC | Facility: CLINIC | Age: 72
End: 2017-09-11

## 2017-09-11 VITALS
DIASTOLIC BLOOD PRESSURE: 62 MMHG | SYSTOLIC BLOOD PRESSURE: 102 MMHG | TEMPERATURE: 99 F | OXYGEN SATURATION: 95 % | HEART RATE: 92 BPM

## 2017-09-11 DIAGNOSIS — L03.114 CELLULITIS OF LEFT ELBOW: Primary | ICD-10-CM

## 2017-09-11 DIAGNOSIS — Z23 NEED FOR INFLUENZA VACCINATION: ICD-10-CM

## 2017-09-11 PROCEDURE — 90471 IMMUNIZATION ADMIN: CPT | Performed by: FAMILY MEDICINE

## 2017-09-11 PROCEDURE — 90662 IIV NO PRSV INCREASED AG IM: CPT | Performed by: FAMILY MEDICINE

## 2017-09-11 PROCEDURE — 99213 OFFICE O/P EST LOW 20 MIN: CPT | Performed by: FAMILY MEDICINE

## 2017-09-11 NOTE — TELEPHONE ENCOUNTER
Infectious disease doctor from [place] has contacted patient and wants to see him 9/12/17 @ 3:00.  Beth is contacting Dr. Hardin's office to cancel/reschedule that appointment.OK

## 2017-09-11 NOTE — PROGRESS NOTES
Subjective   Samy Velazquez is a 72 y.o. male.     Arm Pain    The incident occurred more than 1 week ago. The incident occurred at home. The injury mechanism was a direct blow. The pain is present in the left elbow. The quality of the pain is described as aching. The pain does not radiate. The pain is at a severity of 2/10. The pain is mild. The pain has been constant since the incident. Associated symptoms comments: History of MRSA and draining left olecranon bursa. Treatments tried: On Bactrim. The treatment provided mild relief.       The following portions of the patient's history were reviewed and updated as appropriate: allergies, current medications, past family history, past medical history, past social history, past surgical history and problem list.    Review of Systems   Musculoskeletal:        Draining left elbow-olecranon bursa       Objective   Physical Exam   Constitutional: He is oriented to person, place, and time.   Musculoskeletal:   Draining left olecranon bursa   Neurological: He is alert and oriented to person, place, and time.   Skin: Rash noted. There is erythema.   Psychiatric: He has a normal mood and affect. His behavior is normal. Thought content normal.   Nursing note and vitals reviewed.      Assessment/Plan   Samy was seen today for follow-up.    Diagnoses and all orders for this visit:    Cellulitis of left elbow  -     Ambulatory Referral to General Surgery         Plan Gen. surgery consult for possible more aggressive drainage procedure

## 2017-09-22 ENCOUNTER — TELEPHONE (OUTPATIENT)
Dept: FAMILY MEDICINE CLINIC | Facility: CLINIC | Age: 72
End: 2017-09-22

## 2017-10-02 RX ORDER — CLOPIDOGREL BISULFATE 75 MG/1
TABLET ORAL
Qty: 90 TABLET | Refills: 3 | Status: SHIPPED | OUTPATIENT
Start: 2017-10-02 | End: 2018-11-20 | Stop reason: SDUPTHER

## 2017-10-05 RX ORDER — ISOSORBIDE MONONITRATE 60 MG/1
TABLET, EXTENDED RELEASE ORAL
Qty: 90 TABLET | Refills: 1 | Status: SHIPPED | OUTPATIENT
Start: 2017-10-05 | End: 2017-12-21 | Stop reason: SDUPTHER

## 2017-10-09 ENCOUNTER — TELEPHONE (OUTPATIENT)
Dept: FAMILY MEDICINE CLINIC | Facility: CLINIC | Age: 72
End: 2017-10-09

## 2017-10-09 NOTE — TELEPHONE ENCOUNTER
Beth called.  Samy fell.  She has taken him to the ER.  She said you wanted to know what his vitals were the next time he fell.  BP85/69 pulse 59           blood pressure was too low-need to call cardiologist to see if they can reduce any of his meds

## 2017-10-17 ENCOUNTER — OFFICE VISIT (OUTPATIENT)
Dept: FAMILY MEDICINE CLINIC | Facility: CLINIC | Age: 72
End: 2017-10-17

## 2017-10-17 VITALS
HEART RATE: 61 BPM | OXYGEN SATURATION: 95 % | TEMPERATURE: 97.7 F | DIASTOLIC BLOOD PRESSURE: 68 MMHG | SYSTOLIC BLOOD PRESSURE: 120 MMHG

## 2017-10-17 DIAGNOSIS — R07.81 RIB PAIN ON RIGHT SIDE: Primary | ICD-10-CM

## 2017-10-17 PROCEDURE — 99213 OFFICE O/P EST LOW 20 MIN: CPT | Performed by: FAMILY MEDICINE

## 2017-10-17 NOTE — PROGRESS NOTES
Subjective   Samy Velazquez is a 72 y.o. male.     Fall   The accident occurred 5 to 7 days ago. The fall occurred while walking. He fell from a height of 3 to 5 ft. He landed on hard floor. There was no blood loss. The point of impact was the head (Chest on right). The pain is at a severity of 3/10. He has tried nothing for the symptoms. The treatment provided mild relief.       The following portions of the patient's history were reviewed and updated as appropriate: allergies, current medications, past family history, past medical history, past social history, past surgical history and problem list.    Review of Systems   Respiratory:        His right rib cage at the costal chondral junction   Neurological:        No loss of consciousness       Objective   Physical Exam   Constitutional: He is oriented to person, place, and time.   Cardiovascular: Normal rate and regular rhythm.    Pulmonary/Chest: Effort normal and breath sounds normal.   Point tenderness right rib cage-costochondral junction   Neurological: He is alert and oriented to person, place, and time.   Skin: Skin is warm and dry.   Psychiatric: He has a normal mood and affect. His behavior is normal. Thought content normal.   Nursing note and vitals reviewed.      Assessment/Plan   Samy was seen today for fall.    Diagnoses and all orders for this visit:    Rib pain on right side       Get rib belt-rib separation

## 2017-10-31 RX ORDER — FAMOTIDINE 20 MG/1
TABLET, FILM COATED ORAL
Qty: 90 TABLET | Refills: 3 | Status: SHIPPED | OUTPATIENT
Start: 2017-10-31 | End: 2018-02-15

## 2017-11-09 RX ORDER — ATORVASTATIN CALCIUM 40 MG/1
TABLET, FILM COATED ORAL
Qty: 30 TABLET | Refills: 5 | Status: SHIPPED | OUTPATIENT
Start: 2017-11-09 | End: 2018-05-06 | Stop reason: SDUPTHER

## 2017-11-20 RX ORDER — ALLOPURINOL 300 MG/1
TABLET ORAL
Qty: 90 TABLET | Refills: 1 | Status: SHIPPED | OUTPATIENT
Start: 2017-11-20 | End: 2018-05-10 | Stop reason: SDUPTHER

## 2017-11-22 PROCEDURE — G0180 MD CERTIFICATION HHA PATIENT: HCPCS | Performed by: FAMILY MEDICINE

## 2017-11-27 ENCOUNTER — OFFICE VISIT (OUTPATIENT)
Dept: FAMILY MEDICINE CLINIC | Facility: CLINIC | Age: 72
End: 2017-11-27

## 2017-11-27 ENCOUNTER — OUTSIDE FACILITY SERVICE (OUTPATIENT)
Dept: FAMILY MEDICINE CLINIC | Facility: CLINIC | Age: 72
End: 2017-11-27

## 2017-11-27 VITALS
TEMPERATURE: 97.6 F | DIASTOLIC BLOOD PRESSURE: 62 MMHG | SYSTOLIC BLOOD PRESSURE: 110 MMHG | HEART RATE: 65 BPM | OXYGEN SATURATION: 97 %

## 2017-11-27 DIAGNOSIS — R52 PAIN: Primary | ICD-10-CM

## 2017-11-27 DIAGNOSIS — M54.9 BACK PAIN, UNSPECIFIED BACK LOCATION, UNSPECIFIED BACK PAIN LATERALITY, UNSPECIFIED CHRONICITY: ICD-10-CM

## 2017-11-27 PROCEDURE — 99213 OFFICE O/P EST LOW 20 MIN: CPT | Performed by: FAMILY MEDICINE

## 2017-11-27 PROCEDURE — 81003 URINALYSIS AUTO W/O SCOPE: CPT | Performed by: FAMILY MEDICINE

## 2017-11-27 RX ORDER — OXYCODONE AND ACETAMINOPHEN 7.5; 325 MG/1; MG/1
TABLET ORAL
Refills: 0 | COMMUNITY
Start: 2017-10-17 | End: 2018-02-14 | Stop reason: DRUGHIGH

## 2017-11-27 RX ORDER — DOXYCYCLINE HYCLATE 100 MG/1
CAPSULE ORAL
Refills: 0 | COMMUNITY
Start: 2017-11-21 | End: 2017-12-15

## 2017-11-27 NOTE — PROGRESS NOTES
Subjective   Samy Velazquez is a 72 y.o. male.     Back Pain   This is a recurrent problem. The current episode started yesterday. The problem occurs constantly. The problem is unchanged. The pain is present in the lumbar spine. The quality of the pain is described as stabbing. The pain does not radiate. The pain is moderate. The symptoms are aggravated by position. Stiffness is present all day. Associated symptoms include bladder incontinence and bowel incontinence. Risk factors include obesity, lack of exercise, poor posture and sedentary lifestyle.       The following portions of the patient's history were reviewed and updated as appropriate: allergies, current medications, past family history, past medical history, past social history, past surgical history and problem list.    Review of Systems   Gastrointestinal: Positive for bowel incontinence.   Genitourinary: Positive for bladder incontinence and flank pain.        History of kidney stones   Musculoskeletal: Positive for back pain.       Objective   Physical Exam   Constitutional: He is oriented to person, place, and time.   Cardiovascular: Normal rate and regular rhythm.    Pulmonary/Chest: Effort normal and breath sounds normal.   Abdominal: Soft.   Musculoskeletal:   CVA tenderness on the left   Neurological: He is alert and oriented to person, place, and time.   Psychiatric: He has a normal mood and affect. His behavior is normal.   Nursing note and vitals reviewed.      Assessment/Plan   Samy was seen today for back pain and leg pain.    Diagnoses and all orders for this visit:    Pain       Plan could not void for UA culture and sensitivity-per year in the morning-take analgesics

## 2017-11-28 LAB
BILIRUB BLD-MCNC: NEGATIVE MG/DL
CLARITY, POC: CLEAR
COLOR UR: YELLOW
GLUCOSE UR STRIP-MCNC: NEGATIVE MG/DL
KETONES UR QL: NEGATIVE
LEUKOCYTE EST, POC: NEGATIVE
NITRITE UR-MCNC: NEGATIVE MG/ML
PH UR: 5.5 [PH] (ref 5–8)
PROT UR STRIP-MCNC: NEGATIVE MG/DL
RBC # UR STRIP: NEGATIVE /UL
SP GR UR: 1.01 (ref 1–1.03)
UROBILINOGEN UR QL: NORMAL

## 2017-12-15 ENCOUNTER — OFFICE VISIT (OUTPATIENT)
Dept: FAMILY MEDICINE CLINIC | Facility: CLINIC | Age: 72
End: 2017-12-15

## 2017-12-15 VITALS
WEIGHT: 223 LBS | OXYGEN SATURATION: 98 % | HEART RATE: 72 BPM | DIASTOLIC BLOOD PRESSURE: 80 MMHG | TEMPERATURE: 97.8 F | BODY MASS INDEX: 33.91 KG/M2 | SYSTOLIC BLOOD PRESSURE: 120 MMHG

## 2017-12-15 DIAGNOSIS — R73.9 HYPERGLYCEMIA: ICD-10-CM

## 2017-12-15 DIAGNOSIS — R53.83 FATIGUE, UNSPECIFIED TYPE: ICD-10-CM

## 2017-12-15 DIAGNOSIS — R10.13 EPIGASTRIC PAIN: Primary | ICD-10-CM

## 2017-12-15 PROCEDURE — 99213 OFFICE O/P EST LOW 20 MIN: CPT | Performed by: FAMILY MEDICINE

## 2017-12-15 RX ORDER — CEPHALEXIN 500 MG/1
500 CAPSULE ORAL 2 TIMES DAILY
COMMUNITY
End: 2019-09-23 | Stop reason: SDUPTHER

## 2017-12-15 NOTE — PROGRESS NOTES
Subjective   Samy Velazquez is a 72 y.o. male.     Abdominal Pain   This is a new problem. The current episode started in the past 7 days. The onset quality is gradual. The problem occurs daily. The pain is located in the epigastric region. The quality of the pain is burning. The abdominal pain does not radiate. Associated symptoms include anorexia, nausea, vomiting and weight loss. The pain is aggravated by eating. The pain is relieved by nothing. He has tried nothing for the symptoms.       The following portions of the patient's history were reviewed and updated as appropriate: allergies, current medications, past family history, past medical history, past social history, past surgical history and problem list.    Review of Systems   Constitutional: Positive for weight loss.   Gastrointestinal: Positive for abdominal pain, anorexia, nausea and vomiting.       Objective   Physical Exam   Constitutional: He is oriented to person, place, and time.   Morbidly obese   Cardiovascular: Normal rate and regular rhythm.    Pulmonary/Chest: Effort normal and breath sounds normal.   Abdominal: Soft. Bowel sounds are normal. There is no tenderness.   Musculoskeletal: He exhibits no edema.   Neurological: He is alert and oriented to person, place, and time.   Psychiatric: He has a normal mood and affect. His behavior is normal.   Nursing note and vitals reviewed.      Assessment/Plan   Samy was seen today for back pain.    Diagnoses and all orders for this visit:    Epigastric pain  -     Cancel: CBC & Differential  -     Cancel: Comprehensive Metabolic Panel  -     Cancel: Cortisol  -     Cancel: Amylase  -     Cancel: Lipase    Fatigue, unspecified type  -     Cancel: CBC & Differential  -     Cancel: TSH  -     Cancel: T4, free    Hyperglycemia  -     Cancel: Hemoglobin A1c       Plan-above-local ER for further evaluation

## 2017-12-21 RX ORDER — ISOSORBIDE MONONITRATE 60 MG/1
TABLET, EXTENDED RELEASE ORAL
Qty: 90 TABLET | Refills: 1 | Status: SHIPPED | OUTPATIENT
Start: 2017-12-21 | End: 2018-09-06 | Stop reason: SDUPTHER

## 2018-01-31 RX ORDER — FUROSEMIDE 20 MG/1
TABLET ORAL
Qty: 90 TABLET | Refills: 2 | Status: SHIPPED | OUTPATIENT
Start: 2018-01-31 | End: 2018-04-16 | Stop reason: SDUPTHER

## 2018-02-12 RX ORDER — FLUOXETINE HYDROCHLORIDE 40 MG/1
CAPSULE ORAL
Qty: 90 CAPSULE | Refills: 1 | Status: SHIPPED | OUTPATIENT
Start: 2018-02-12 | End: 2019-07-16 | Stop reason: SDUPTHER

## 2018-02-14 ENCOUNTER — OFFICE VISIT (OUTPATIENT)
Dept: FAMILY MEDICINE CLINIC | Facility: CLINIC | Age: 73
End: 2018-02-14

## 2018-02-14 VITALS
HEART RATE: 66 BPM | TEMPERATURE: 98.1 F | SYSTOLIC BLOOD PRESSURE: 110 MMHG | OXYGEN SATURATION: 97 % | DIASTOLIC BLOOD PRESSURE: 64 MMHG

## 2018-02-14 DIAGNOSIS — R10.13 EPIGASTRIC PAIN: Primary | ICD-10-CM

## 2018-02-14 PROCEDURE — 99213 OFFICE O/P EST LOW 20 MIN: CPT | Performed by: FAMILY MEDICINE

## 2018-02-14 RX ORDER — OXYCODONE AND ACETAMINOPHEN 10; 325 MG/1; MG/1
1 TABLET ORAL EVERY 6 HOURS PRN
COMMUNITY
End: 2022-01-17

## 2018-02-14 NOTE — PROGRESS NOTES
Subjective   Samy Velazquez is a 72 y.o. male.     Vomiting    This is a recurrent problem. The current episode started more than 1 month ago. The problem occurs 2 to 4 times per day. The problem has been unchanged. The emesis has an appearance of stomach contents. There has been no fever. Associated symptoms include abdominal pain. Pertinent negatives include no chest pain or diarrhea. He has tried nothing for the symptoms.       The following portions of the patient's history were reviewed and updated as appropriate: allergies, current medications, past family history, past medical history, past social history, past surgical history and problem list.    Review of Systems   Cardiovascular: Negative for chest pain.   Gastrointestinal: Positive for abdominal pain and vomiting. Negative for diarrhea.   Musculoskeletal:        On narcotics for intractable back pain       Objective   Physical Exam   Constitutional: He is oriented to person, place, and time.   Cardiovascular: Normal rate and regular rhythm.    Pulmonary/Chest: Effort normal and breath sounds normal.   Abdominal: Soft. He exhibits distension. He exhibits no mass. There is no tenderness.   Musculoskeletal: He exhibits no edema.   Neurological: He is alert and oriented to person, place, and time.   Psychiatric: He has a normal mood and affect. His behavior is normal. Thought content normal.   Nursing note and vitals reviewed.      Assessment/Plan   Samy was seen today for vomiting.    Diagnoses and all orders for this visit:    Epigastric pain  -     CBC & Differential  -     Comprehensive Metabolic Panel  -     Amylase  -     Lipase         Plan CT chest lab upper GI-?  Side effect from narcotics

## 2018-02-15 DIAGNOSIS — R10.13 EPIGASTRIC PAIN: ICD-10-CM

## 2018-02-15 LAB
ALBUMIN SERPL-MCNC: 3.4 G/DL (ref 3.5–5)
ALBUMIN/GLOB SERPL: 1.1 G/DL (ref 1.1–2.5)
ALP SERPL-CCNC: 129 U/L (ref 24–120)
ALT SERPL-CCNC: 41 U/L (ref 0–54)
AMYLASE SERPL-CCNC: 96 U/L (ref 30–110)
AST SERPL-CCNC: 29 U/L (ref 7–45)
BASOPHILS # BLD AUTO: 0.04 10*3/MM3 (ref 0–0.2)
BASOPHILS NFR BLD AUTO: 0.4 % (ref 0–2)
BILIRUB SERPL-MCNC: 1.1 MG/DL (ref 0.1–1)
BUN SERPL-MCNC: 23 MG/DL (ref 5–21)
BUN/CREAT SERPL: 17.7 (ref 7–25)
CALCIUM SERPL-MCNC: 10.4 MG/DL (ref 8.4–10.4)
CHLORIDE SERPL-SCNC: 103 MMOL/L (ref 98–110)
CO2 SERPL-SCNC: 30 MMOL/L (ref 24–31)
CREAT SERPL-MCNC: 1.3 MG/DL (ref 0.5–1.4)
EOSINOPHIL # BLD AUTO: 0.2 10*3/MM3 (ref 0–0.7)
EOSINOPHIL NFR BLD AUTO: 2.2 % (ref 0–4)
ERYTHROCYTE [DISTWIDTH] IN BLOOD BY AUTOMATED COUNT: 14.1 % (ref 12–15)
GFR SERPLBLD CREATININE-BSD FMLA CKD-EPI: 55 ML/MIN/1.73
GFR SERPLBLD CREATININE-BSD FMLA CKD-EPI: 66 ML/MIN/1.73
GLOBULIN SER CALC-MCNC: 3 GM/DL
GLUCOSE SERPL-MCNC: 108 MG/DL (ref 70–100)
HCT VFR BLD AUTO: 50.8 % (ref 40–52)
HGB BLD-MCNC: 16.7 G/DL (ref 14–18)
IMM GRANULOCYTES # BLD: 0.03 10*3/MM3 (ref 0–0.03)
IMM GRANULOCYTES NFR BLD: 0.3 % (ref 0–5)
LIPASE SERPL-CCNC: 222 U/L (ref 23–203)
LYMPHOCYTES # BLD AUTO: 1.64 10*3/MM3 (ref 0.72–4.86)
LYMPHOCYTES NFR BLD AUTO: 18.3 % (ref 15–45)
MCH RBC QN AUTO: 30.8 PG (ref 28–32)
MCHC RBC AUTO-ENTMCNC: 32.9 G/DL (ref 33–36)
MCV RBC AUTO: 93.7 FL (ref 82–95)
MONOCYTES # BLD AUTO: 0.95 10*3/MM3 (ref 0.19–1.3)
MONOCYTES NFR BLD AUTO: 10.6 % (ref 4–12)
NEUTROPHILS # BLD AUTO: 6.12 10*3/MM3 (ref 1.87–8.4)
NEUTROPHILS NFR BLD AUTO: 68.2 % (ref 39–78)
NRBC BLD AUTO-RTO: 0 /100 WBC (ref 0–0)
PLATELET # BLD AUTO: 240 10*3/MM3 (ref 130–400)
POTASSIUM SERPL-SCNC: 4.4 MMOL/L (ref 3.5–5.3)
PROT SERPL-MCNC: 6.4 G/DL (ref 6.3–8.7)
RBC # BLD AUTO: 5.42 10*6/MM3 (ref 4.8–5.9)
SODIUM SERPL-SCNC: 145 MMOL/L (ref 135–145)
WBC # BLD AUTO: 8.98 10*3/MM3 (ref 4.8–10.8)

## 2018-02-15 RX ORDER — PANTOPRAZOLE SODIUM 40 MG/1
40 TABLET, DELAYED RELEASE ORAL DAILY
Qty: 90 TABLET | Refills: 1 | Status: SHIPPED | OUTPATIENT
Start: 2018-02-15 | End: 2018-03-06 | Stop reason: SDUPTHER

## 2018-02-19 RX ORDER — MEXILETINE HYDROCHLORIDE 150 MG/1
CAPSULE ORAL
Qty: 540 CAPSULE | Refills: 2 | Status: SHIPPED | OUTPATIENT
Start: 2018-02-19 | End: 2018-11-12 | Stop reason: SDUPTHER

## 2018-03-06 ENCOUNTER — OFFICE VISIT (OUTPATIENT)
Dept: FAMILY MEDICINE CLINIC | Facility: CLINIC | Age: 73
End: 2018-03-06

## 2018-03-06 VITALS
SYSTOLIC BLOOD PRESSURE: 110 MMHG | OXYGEN SATURATION: 97 % | DIASTOLIC BLOOD PRESSURE: 60 MMHG | TEMPERATURE: 98 F | HEART RATE: 57 BPM

## 2018-03-06 DIAGNOSIS — K21.9 GASTROESOPHAGEAL REFLUX DISEASE WITHOUT ESOPHAGITIS: Primary | ICD-10-CM

## 2018-03-06 PROCEDURE — 99213 OFFICE O/P EST LOW 20 MIN: CPT | Performed by: FAMILY MEDICINE

## 2018-03-06 RX ORDER — PANTOPRAZOLE SODIUM 40 MG/1
40 TABLET, DELAYED RELEASE ORAL DAILY
Qty: 90 TABLET | Refills: 1 | Status: SHIPPED | OUTPATIENT
Start: 2018-03-06 | End: 2018-08-30 | Stop reason: SDUPTHER

## 2018-03-06 NOTE — PROGRESS NOTES
Subjective   Samy Velazquez is a 72 y.o. male.     Heartburn   He complains of abdominal pain, belching, dysphagia, heartburn and nausea. This is a chronic problem. The current episode started more than 1 year ago. The problem occurs frequently. The problem has been gradually improving. The heartburn is located in the substernum. The heartburn is of moderate intensity. The heartburn does not wake him from sleep. The heartburn does not limit his activity. The heartburn changes with position. The symptoms are aggravated by bending and certain foods. Risk factors include obesity. He has tried a PPI for the symptoms. The treatment provided mild relief. CT and upper GI.       The following portions of the patient's history were reviewed and updated as appropriate: allergies, current medications, past family history, past medical history, past social history, past surgical history and problem list.    Review of Systems   Gastrointestinal: Positive for abdominal pain, dysphagia, heartburn, nausea and vomiting.   Musculoskeletal:        Kyphotic spine and morbid obesity       Objective   Physical Exam   Constitutional: He is oriented to person, place, and time.   Kyphotic spine and morbid obesity   Cardiovascular: Normal rate and regular rhythm.    Pulmonary/Chest: Effort normal and breath sounds normal.   Abdominal: Soft.   Neurological: He is alert and oriented to person, place, and time.   Psychiatric: He has a normal mood and affect. His behavior is normal. Thought content normal.   Vitals reviewed.      Assessment/Plan   Samy was seen today for follow-up.    Diagnoses and all orders for this visit:    Gastroesophageal reflux disease without esophagitis    Other orders  -     pantoprazole (PROTONIX) 40 MG EC tablet; Take 1 tablet by mouth Daily.     Much improved since the patient is eating upright- straight-backed chair stronger proton pump inhibitor etc.

## 2018-04-12 ENCOUNTER — OFFICE VISIT (OUTPATIENT)
Dept: FAMILY MEDICINE CLINIC | Facility: CLINIC | Age: 73
End: 2018-04-12

## 2018-04-12 VITALS
TEMPERATURE: 97.7 F | DIASTOLIC BLOOD PRESSURE: 70 MMHG | OXYGEN SATURATION: 98 % | SYSTOLIC BLOOD PRESSURE: 118 MMHG | HEART RATE: 62 BPM

## 2018-04-12 DIAGNOSIS — M1A.9XX1 CHRONIC GOUT WITH TOPHUS, UNSPECIFIED CAUSE, UNSPECIFIED SITE: Primary | ICD-10-CM

## 2018-04-12 LAB
ALBUMIN SERPL-MCNC: 3.3 G/DL (ref 3.5–5)
ALBUMIN/GLOB SERPL: 1.2 G/DL (ref 1.1–2.5)
ALP SERPL-CCNC: 118 U/L (ref 24–120)
ALT SERPL-CCNC: 40 U/L (ref 0–54)
AST SERPL-CCNC: 41 U/L (ref 7–45)
BASOPHILS # BLD AUTO: 0.07 10*3/MM3 (ref 0–0.2)
BASOPHILS NFR BLD AUTO: 1.1 % (ref 0–2)
BILIRUB SERPL-MCNC: 0.6 MG/DL (ref 0.1–1)
BUN SERPL-MCNC: 17 MG/DL (ref 5–21)
BUN/CREAT SERPL: 14.3 (ref 7–25)
CALCIUM SERPL-MCNC: 10.2 MG/DL (ref 8.4–10.4)
CHLORIDE SERPL-SCNC: 102 MMOL/L (ref 98–110)
CO2 SERPL-SCNC: 30 MMOL/L (ref 24–31)
CREAT SERPL-MCNC: 1.19 MG/DL (ref 0.5–1.4)
EOSINOPHIL # BLD AUTO: 0.47 10*3/MM3 (ref 0–0.7)
EOSINOPHIL NFR BLD AUTO: 7.6 % (ref 0–4)
ERYTHROCYTE [DISTWIDTH] IN BLOOD BY AUTOMATED COUNT: 15.4 % (ref 12–15)
GFR SERPLBLD CREATININE-BSD FMLA CKD-EPI: 60 ML/MIN/1.73
GFR SERPLBLD CREATININE-BSD FMLA CKD-EPI: 73 ML/MIN/1.73
GLOBULIN SER CALC-MCNC: 2.8 GM/DL
GLUCOSE SERPL-MCNC: 116 MG/DL (ref 70–100)
HCT VFR BLD AUTO: 49.5 % (ref 40–52)
HGB BLD-MCNC: 15.8 G/DL (ref 14–18)
IMM GRANULOCYTES # BLD: 0.04 10*3/MM3 (ref 0–0.03)
IMM GRANULOCYTES NFR BLD: 0.7 % (ref 0–5)
LYMPHOCYTES # BLD AUTO: 1.96 10*3/MM3 (ref 0.72–4.86)
LYMPHOCYTES NFR BLD AUTO: 31.9 % (ref 15–45)
MCH RBC QN AUTO: 30.9 PG (ref 28–32)
MCHC RBC AUTO-ENTMCNC: 31.9 G/DL (ref 33–36)
MCV RBC AUTO: 96.9 FL (ref 82–95)
MONOCYTES # BLD AUTO: 0.67 10*3/MM3 (ref 0.19–1.3)
MONOCYTES NFR BLD AUTO: 10.9 % (ref 4–12)
NEUTROPHILS # BLD AUTO: 2.94 10*3/MM3 (ref 1.87–8.4)
NEUTROPHILS NFR BLD AUTO: 47.8 % (ref 39–78)
NRBC BLD AUTO-RTO: 0 /100 WBC (ref 0–0)
PLATELET # BLD AUTO: 248 10*3/MM3 (ref 130–400)
POTASSIUM SERPL-SCNC: 4.4 MMOL/L (ref 3.5–5.3)
PROT SERPL-MCNC: 6.1 G/DL (ref 6.3–8.7)
RBC # BLD AUTO: 5.11 10*6/MM3 (ref 4.8–5.9)
SODIUM SERPL-SCNC: 142 MMOL/L (ref 135–145)
URATE SERPL-MCNC: 4.3 MG/DL (ref 3.5–8.5)
WBC # BLD AUTO: 6.15 10*3/MM3 (ref 4.8–10.8)

## 2018-04-12 PROCEDURE — 99213 OFFICE O/P EST LOW 20 MIN: CPT | Performed by: FAMILY MEDICINE

## 2018-04-12 RX ORDER — SOTALOL HYDROCHLORIDE 80 MG/1
80 TABLET ORAL 2 TIMES DAILY
Refills: 3 | COMMUNITY
Start: 2018-03-24 | End: 2019-11-12 | Stop reason: SDUPTHER

## 2018-04-12 NOTE — PROGRESS NOTES
Subjective   Samy Velazquez is a 72 y.o. male.     Foot Problem   This is a chronic problem. The current episode started more than 1 year ago. The problem occurs daily. The problem has been gradually worsening. Associated symptoms include joint swelling. The symptoms are aggravated by walking (Gout). Treatments tried: Allopurinol. The treatment provided mild relief.       The following portions of the patient's history were reviewed and updated as appropriate: allergies, current medications, past family history, past medical history, past social history, past surgical history and problem list.    Review of Systems   Musculoskeletal: Positive for joint swelling.        Right foot first MTP PE joint valgus deformity-looks like is getting ready to start spitting crystals       Objective   Physical Exam   Constitutional: He is oriented to person, place, and time.   Obesity   Cardiovascular: Normal rate and regular rhythm.    Pulmonary/Chest: Effort normal and breath sounds normal.   Musculoskeletal:   Right foot first MTP joint skin intact but suspicious for early crystals   Neurological: He is alert and oriented to person, place, and time.   Skin: Skin is warm. There is erythema.   Psychiatric: He has a normal mood and affect. His behavior is normal. Judgment and thought content normal.   Nursing note and vitals reviewed.      Assessment/Plan   Samy was seen today for foot problem.    Diagnoses and all orders for this visit:    Chronic gout with tophus, unspecified cause, unspecified site  -     CBC & Differential  -     Comprehensive Metabolic Panel  -     Uric acid         Plan-above plus continue allopurinol 300 mg

## 2018-04-16 RX ORDER — FUROSEMIDE 20 MG/1
20 TABLET ORAL 2 TIMES DAILY
Qty: 180 TABLET | Refills: 2 | Status: SHIPPED | OUTPATIENT
Start: 2018-04-16 | End: 2018-10-17 | Stop reason: SDUPTHER

## 2018-05-07 ENCOUNTER — RESULTS ENCOUNTER (OUTPATIENT)
Dept: FAMILY MEDICINE CLINIC | Facility: CLINIC | Age: 73
End: 2018-05-07

## 2018-05-07 ENCOUNTER — OFFICE VISIT (OUTPATIENT)
Dept: FAMILY MEDICINE CLINIC | Facility: CLINIC | Age: 73
End: 2018-05-07

## 2018-05-07 VITALS
OXYGEN SATURATION: 98 % | DIASTOLIC BLOOD PRESSURE: 68 MMHG | HEART RATE: 56 BPM | TEMPERATURE: 97.8 F | SYSTOLIC BLOOD PRESSURE: 114 MMHG

## 2018-05-07 DIAGNOSIS — M25.512 LEFT SHOULDER PAIN, UNSPECIFIED CHRONICITY: ICD-10-CM

## 2018-05-07 DIAGNOSIS — R07.9 CHEST PAIN, UNSPECIFIED TYPE: Primary | ICD-10-CM

## 2018-05-07 DIAGNOSIS — R07.9 CHEST PAIN, UNSPECIFIED TYPE: ICD-10-CM

## 2018-05-07 PROCEDURE — 93000 ELECTROCARDIOGRAM COMPLETE: CPT | Performed by: FAMILY MEDICINE

## 2018-05-07 PROCEDURE — 99214 OFFICE O/P EST MOD 30 MIN: CPT | Performed by: FAMILY MEDICINE

## 2018-05-07 RX ORDER — ATORVASTATIN CALCIUM 40 MG/1
TABLET, FILM COATED ORAL
Qty: 30 TABLET | Refills: 5 | Status: SHIPPED | OUTPATIENT
Start: 2018-05-07 | End: 2018-08-29 | Stop reason: SDUPTHER

## 2018-05-07 NOTE — PROGRESS NOTES
Subjective   Samy Velazquez is a 72 y.o. male.     Fall   The accident occurred 3 to 5 days ago. The fall occurred while walking. He fell from a height of 3 to 5 ft. He landed on grass. There was no blood loss. The point of impact was the left shoulder. The pain is present in the left shoulder. The pain is moderate. The symptoms are aggravated by movement. He has tried nothing for the symptoms.       The following portions of the patient's history were reviewed and updated as appropriate: allergies, current medications, past family history, past medical history, past social history, past surgical history and problem list.    Review of Systems   Cardiovascular:        Mowing-walked probable syncope complaints of chest with shoulder pain-happened May 3   Musculoskeletal: Positive for arthralgias and back pain.       Objective   Physical Exam   Constitutional: He is oriented to person, place, and time.   Cardiovascular: Normal rate and regular rhythm.    Pulmonary/Chest: Effort normal and breath sounds normal.   Musculoskeletal: He exhibits tenderness.   Tenderness left shoulder   Neurological: He is alert and oriented to person, place, and time.   Skin: Skin is warm and dry.   Psychiatric: He has a normal mood and affect. His behavior is normal. Judgment and thought content normal.   Nursing note and vitals reviewed.      Assessment/Plan   Samy was seen today for fall.    Diagnoses and all orders for this visit:    Chest pain, unspecified type  -     CBC & Differential  -     Basic Metabolic Panel  -     ECG 12 Lead  -     Troponin I           Plan-EKG chest x-ray left shoulder x-ray and lab was canceled because of the abnormality of the EKG-will get immediate lab

## 2018-05-08 DIAGNOSIS — R07.9 CHEST PAIN, UNSPECIFIED TYPE: ICD-10-CM

## 2018-05-08 DIAGNOSIS — M25.512 LEFT SHOULDER PAIN, UNSPECIFIED CHRONICITY: ICD-10-CM

## 2018-05-10 RX ORDER — ALLOPURINOL 300 MG/1
TABLET ORAL
Qty: 90 TABLET | Refills: 1 | Status: SHIPPED | OUTPATIENT
Start: 2018-05-10 | End: 2018-09-16 | Stop reason: SDUPTHER

## 2018-07-02 ENCOUNTER — OFFICE VISIT (OUTPATIENT)
Dept: FAMILY MEDICINE CLINIC | Facility: CLINIC | Age: 73
End: 2018-07-02

## 2018-07-02 VITALS
OXYGEN SATURATION: 98 % | SYSTOLIC BLOOD PRESSURE: 108 MMHG | HEART RATE: 62 BPM | TEMPERATURE: 97.8 F | DIASTOLIC BLOOD PRESSURE: 64 MMHG

## 2018-07-02 DIAGNOSIS — T82.898D ARTERIOVENOUS FISTULA OCCLUSION, SUBSEQUENT ENCOUNTER: ICD-10-CM

## 2018-07-02 DIAGNOSIS — M79.602 LEFT ARM PAIN: ICD-10-CM

## 2018-07-02 DIAGNOSIS — M79.602 PAIN OF LEFT UPPER EXTREMITY: ICD-10-CM

## 2018-07-02 DIAGNOSIS — S55.912A: Primary | ICD-10-CM

## 2018-07-02 DIAGNOSIS — S41.112A LACERATION OF LEFT UPPER EXTREMITY, INITIAL ENCOUNTER: ICD-10-CM

## 2018-07-02 DIAGNOSIS — L98.8 FISTULA: ICD-10-CM

## 2018-07-02 DIAGNOSIS — S40.812A ABRASION OF LEFT UPPER EXTREMITY, INITIAL ENCOUNTER: ICD-10-CM

## 2018-07-02 PROCEDURE — 12032 INTMD RPR S/A/T/EXT 2.6-7.5: CPT | Performed by: FAMILY MEDICINE

## 2018-07-02 RX ORDER — NITROGLYCERIN 40 MG/1
1 PATCH TRANSDERMAL DAILY
COMMUNITY
End: 2021-11-04 | Stop reason: ALTCHOICE

## 2018-07-02 RX ORDER — NITROGLYCERIN 120 MG/1
1 PATCH TRANSDERMAL DAILY
COMMUNITY
End: 2018-07-02

## 2018-07-02 NOTE — PROGRESS NOTES
Procedure   Laceration Repair  Date/Time: 7/2/2018 3:49 PM  Performed by: REKHA DOS SANTOS  Authorized by: REKHA DOS SANTOS   Body area: upper extremity  Location details: left lower arm  Laceration length: 4 cm  Foreign bodies: no foreign bodies  Tendon involvement: none  Nerve involvement: none  Vascular damage: no  Anesthesia method: none required.    Anesthesia:  Anesthetic total: 0 mL  Preparation: Patient was prepped and draped in the usual sterile fashion.  Amount of cleaning: standard  Debridement: minimal  Dressing: antibiotic ointment  Patient tolerance: Patient tolerated the procedure well with no immediate complications  Comments: The patient had 3-4 cm skin flap abrasion of his left forearm.  Minimal debridement was required and a sterile dressing was applied after it was washed with soap and water.-Neosporin was impregnated into the sterile dressing

## 2018-07-03 ENCOUNTER — TELEPHONE (OUTPATIENT)
Dept: FAMILY MEDICINE CLINIC | Facility: CLINIC | Age: 73
End: 2018-07-03

## 2018-07-03 DIAGNOSIS — S55.912A: ICD-10-CM

## 2018-07-03 NOTE — TELEPHONE ENCOUNTER
MD OUT OF OFFICE THIS DATE-WAS INFORMED OF XRAY RESULTS FROM ANTECUBITAL FOSSA ULTRASOUND       PER MD ORDERS-CONTACT PT & ADVISE FISTULA IS WORKING FINE & NO CLOT FOUND.

## 2018-07-05 ENCOUNTER — OFFICE VISIT (OUTPATIENT)
Dept: FAMILY MEDICINE CLINIC | Facility: CLINIC | Age: 73
End: 2018-07-05

## 2018-07-05 VITALS
OXYGEN SATURATION: 96 % | TEMPERATURE: 97.9 F | SYSTOLIC BLOOD PRESSURE: 110 MMHG | HEART RATE: 65 BPM | DIASTOLIC BLOOD PRESSURE: 62 MMHG

## 2018-07-05 DIAGNOSIS — S41.112D LACERATION OF LEFT UPPER EXTREMITY, SUBSEQUENT ENCOUNTER: Primary | ICD-10-CM

## 2018-07-05 PROCEDURE — 99024 POSTOP FOLLOW-UP VISIT: CPT | Performed by: FAMILY MEDICINE

## 2018-07-05 NOTE — PROGRESS NOTES
Patient came to office today to have a wound check and bandage change.  MD had previously discussed how he wanted the wound cleaned and dressed.  MA followed MD's instructions.  MA cleaned and re-dressed wound and advised patient of care instructions.

## 2018-08-29 RX ORDER — ATORVASTATIN CALCIUM 40 MG/1
TABLET, FILM COATED ORAL
Qty: 30 TABLET | Refills: 0 | Status: SHIPPED | OUTPATIENT
Start: 2018-08-29 | End: 2018-10-01 | Stop reason: SDUPTHER

## 2018-08-30 RX ORDER — PANTOPRAZOLE SODIUM 40 MG/1
40 TABLET, DELAYED RELEASE ORAL DAILY
Qty: 90 TABLET | Refills: 0 | Status: SHIPPED | OUTPATIENT
Start: 2018-08-30 | End: 2018-11-20

## 2018-09-06 RX ORDER — ISOSORBIDE MONONITRATE 60 MG/1
TABLET, EXTENDED RELEASE ORAL
Qty: 90 TABLET | Refills: 1 | Status: SHIPPED | OUTPATIENT
Start: 2018-09-06 | End: 2018-12-03 | Stop reason: SDUPTHER

## 2018-09-17 RX ORDER — ALLOPURINOL 300 MG/1
TABLET ORAL
Qty: 90 TABLET | Refills: 0 | Status: SHIPPED | OUTPATIENT
Start: 2018-09-17 | End: 2018-10-30 | Stop reason: SDUPTHER

## 2018-10-01 RX ORDER — ATORVASTATIN CALCIUM 40 MG/1
40 TABLET, FILM COATED ORAL NIGHTLY
Qty: 30 TABLET | Refills: 0 | Status: SHIPPED | OUTPATIENT
Start: 2018-10-01 | End: 2018-10-11 | Stop reason: SDUPTHER

## 2018-10-03 ENCOUNTER — CLINICAL SUPPORT (OUTPATIENT)
Dept: FAMILY MEDICINE CLINIC | Facility: CLINIC | Age: 73
End: 2018-10-03

## 2018-10-03 DIAGNOSIS — Z12.5 SCREENING PSA (PROSTATE SPECIFIC ANTIGEN): ICD-10-CM

## 2018-10-03 DIAGNOSIS — Z72.89 OTHER PROBLEMS RELATED TO LIFESTYLE: ICD-10-CM

## 2018-10-03 DIAGNOSIS — E55.9 VITAMIN D DEFICIENCY: ICD-10-CM

## 2018-10-03 DIAGNOSIS — E78.2 MIXED HYPERLIPIDEMIA: Primary | ICD-10-CM

## 2018-10-03 DIAGNOSIS — M10.9 GOUT OF MULTIPLE SITES, UNSPECIFIED CAUSE, UNSPECIFIED CHRONICITY: ICD-10-CM

## 2018-10-03 DIAGNOSIS — R53.83 FATIGUE, UNSPECIFIED TYPE: ICD-10-CM

## 2018-10-03 DIAGNOSIS — R73.9 ELEVATED BLOOD SUGAR: ICD-10-CM

## 2018-10-03 PROCEDURE — 90662 IIV NO PRSV INCREASED AG IM: CPT | Performed by: FAMILY MEDICINE

## 2018-10-03 PROCEDURE — G0008 ADMIN INFLUENZA VIRUS VAC: HCPCS | Performed by: FAMILY MEDICINE

## 2018-10-04 LAB
25(OH)D3+25(OH)D2 SERPL-MCNC: 43.8 NG/ML (ref 30–100)
ALBUMIN SERPL-MCNC: 3.8 G/DL (ref 3.5–4.8)
ALBUMIN/GLOB SERPL: 1.3 {RATIO} (ref 1.2–2.2)
ALP SERPL-CCNC: 141 IU/L (ref 39–117)
ALT SERPL-CCNC: 27 IU/L (ref 0–44)
AST SERPL-CCNC: 28 IU/L (ref 0–40)
BASOPHILS # BLD AUTO: 0 X10E3/UL (ref 0–0.2)
BASOPHILS NFR BLD AUTO: 0 %
BILIRUB SERPL-MCNC: 0.6 MG/DL (ref 0–1.2)
BUN SERPL-MCNC: 30 MG/DL (ref 8–27)
BUN/CREAT SERPL: 21 (ref 10–24)
CALCIUM SERPL-MCNC: 10.4 MG/DL (ref 8.6–10.2)
CHLORIDE SERPL-SCNC: 102 MMOL/L (ref 96–106)
CHOLEST SERPL-MCNC: 135 MG/DL (ref 100–199)
CO2 SERPL-SCNC: 18 MMOL/L (ref 20–29)
CREAT SERPL-MCNC: 1.42 MG/DL (ref 0.76–1.27)
EOSINOPHIL # BLD AUTO: 0 X10E3/UL (ref 0–0.4)
EOSINOPHIL NFR BLD AUTO: 0 %
ERYTHROCYTE [DISTWIDTH] IN BLOOD BY AUTOMATED COUNT: 15.8 % (ref 12.3–15.4)
GLOBULIN SER CALC-MCNC: 3 G/DL (ref 1.5–4.5)
GLUCOSE SERPL-MCNC: 231 MG/DL (ref 65–99)
HBA1C MFR BLD: 6.4 % (ref 4.8–5.6)
HCT VFR BLD AUTO: 47.4 % (ref 37.5–51)
HCV AB S/CO SERPL IA: <0.1 S/CO RATIO (ref 0–0.9)
HDLC SERPL-MCNC: 44 MG/DL
HGB BLD-MCNC: 16.1 G/DL (ref 13–17.7)
IMM GRANULOCYTES # BLD: 0 X10E3/UL (ref 0–0.1)
IMM GRANULOCYTES NFR BLD: 0 %
LDLC SERPL CALC-MCNC: 76 MG/DL (ref 0–99)
LYMPHOCYTES # BLD AUTO: 0.7 X10E3/UL (ref 0.7–3.1)
LYMPHOCYTES NFR BLD AUTO: 5 %
MCH RBC QN AUTO: 31 PG (ref 26.6–33)
MCHC RBC AUTO-ENTMCNC: 34 G/DL (ref 31.5–35.7)
MCV RBC AUTO: 91 FL (ref 79–97)
MONOCYTES # BLD AUTO: 0.1 X10E3/UL (ref 0.1–0.9)
MONOCYTES NFR BLD AUTO: 1 %
NEUTROPHILS # BLD AUTO: 14.2 X10E3/UL (ref 1.4–7)
NEUTROPHILS NFR BLD AUTO: 94 %
PLATELET # BLD AUTO: 282 X10E3/UL (ref 150–379)
POTASSIUM SERPL-SCNC: 4.5 MMOL/L (ref 3.5–5.2)
PROT SERPL-MCNC: 6.8 G/DL (ref 6–8.5)
PSA SERPL-MCNC: 1.5 NG/ML (ref 0–4)
RBC # BLD AUTO: 5.19 X10E6/UL (ref 4.14–5.8)
SODIUM SERPL-SCNC: 141 MMOL/L (ref 134–144)
T4 FREE SERPL-MCNC: 1.08 NG/DL (ref 0.82–1.77)
TRIGL SERPL-MCNC: 74 MG/DL (ref 0–149)
TSH SERPL DL<=0.005 MIU/L-ACNC: 1.27 UIU/ML (ref 0.45–4.5)
URATE SERPL-MCNC: 4.6 MG/DL (ref 3.7–8.6)
VLDLC SERPL CALC-MCNC: 15 MG/DL (ref 5–40)
WBC # BLD AUTO: 15.1 X10E3/UL (ref 3.4–10.8)

## 2018-10-08 ENCOUNTER — OFFICE VISIT (OUTPATIENT)
Dept: FAMILY MEDICINE CLINIC | Facility: CLINIC | Age: 73
End: 2018-10-08

## 2018-10-08 VITALS
WEIGHT: 208.8 LBS | OXYGEN SATURATION: 97 % | DIASTOLIC BLOOD PRESSURE: 75 MMHG | SYSTOLIC BLOOD PRESSURE: 123 MMHG | BODY MASS INDEX: 31.75 KG/M2 | TEMPERATURE: 97.7 F | HEART RATE: 54 BPM

## 2018-10-08 DIAGNOSIS — Z23 NEED FOR SHINGLES VACCINE: ICD-10-CM

## 2018-10-08 DIAGNOSIS — Z23 NEED FOR PROPHYLACTIC VACCINATION AGAINST STREPTOCOCCUS PNEUMONIAE (PNEUMOCOCCUS): ICD-10-CM

## 2018-10-08 DIAGNOSIS — I25.9 ISCHEMIC HEART DISEASE: ICD-10-CM

## 2018-10-08 DIAGNOSIS — Z12.12 SCREENING FOR COLORECTAL CANCER: ICD-10-CM

## 2018-10-08 DIAGNOSIS — Z00.00 ANNUAL PHYSICAL EXAM: Primary | ICD-10-CM

## 2018-10-08 DIAGNOSIS — Z12.11 SCREENING FOR COLORECTAL CANCER: ICD-10-CM

## 2018-10-08 DIAGNOSIS — Z12.5 SPECIAL SCREENING FOR MALIGNANT NEOPLASM OF PROSTATE: ICD-10-CM

## 2018-10-08 LAB
BILIRUB BLD-MCNC: NEGATIVE MG/DL
CLARITY, POC: CLEAR
COLOR UR: YELLOW
GLUCOSE UR STRIP-MCNC: NEGATIVE MG/DL
KETONES UR QL: NEGATIVE
LEUKOCYTE EST, POC: NEGATIVE
NITRITE UR-MCNC: NEGATIVE MG/ML
PH UR: 5.5 [PH] (ref 5–8)
PROT UR STRIP-MCNC: NEGATIVE MG/DL
RBC # UR STRIP: NEGATIVE /UL
SP GR UR: 1.02 (ref 1–1.03)
UROBILINOGEN UR QL: NORMAL

## 2018-10-08 PROCEDURE — G0102 PROSTATE CA SCREENING; DRE: HCPCS | Performed by: FAMILY MEDICINE

## 2018-10-08 PROCEDURE — G0439 PPPS, SUBSEQ VISIT: HCPCS | Performed by: FAMILY MEDICINE

## 2018-10-08 PROCEDURE — 90670 PCV13 VACCINE IM: CPT | Performed by: FAMILY MEDICINE

## 2018-10-08 PROCEDURE — 81003 URINALYSIS AUTO W/O SCOPE: CPT | Performed by: FAMILY MEDICINE

## 2018-10-08 PROCEDURE — 90471 IMMUNIZATION ADMIN: CPT | Performed by: FAMILY MEDICINE

## 2018-10-08 NOTE — PROGRESS NOTES
QUICK REFERENCE INFORMATION:  The ABCs of the Annual Wellness Visit    Subsequent Medicare Wellness Visit    HEALTH RISK ASSESSMENT    1945    Recent Hospitalizations:  No hospitalization(s) within the last year..        Current Medical Providers:  Patient Care Team:  Garret Salinas MD as PCP - General  Garret Salinas MD as PCP - Claims Attributed        Smoking Status:  History   Smoking Status   • Never Smoker   Smokeless Tobacco   • Never Used       Alcohol Consumption:  History   Alcohol Use No       Depression Screen:   PHQ-2/PHQ-9 Depression Screening 10/8/2018   Little interest or pleasure in doing things 0   Feeling down, depressed, or hopeless 0   Trouble falling or staying asleep, or sleeping too much -   Feeling tired or having little energy -   Poor appetite or overeating -   Feeling bad about yourself - or that you are a failure or have let yourself or your family down -   Trouble concentrating on things, such as reading the newspaper or watching television -   Moving or speaking so slowly that other people could have noticed. Or the opposite - being so fidgety or restless that you have been moving around a lot more than usual -   Thoughts that you would be better off dead, or of hurting yourself in some way -   Total Score 0   If you checked off any problems, how difficult have these problems made it for you to do your work, take care of things at home, or get along with other people? -       Health Habits and Functional and Cognitive Screening:  Functional & Cognitive Status 10/8/2018   Do you have difficulty preparing food and eating? No   Do you have difficulty bathing yourself, getting dressed or grooming yourself? No   Do you have difficulty using the toilet? No   Do you have difficulty moving around from place to place? Yes   Do you have trouble with steps or getting out of a bed or a chair? Yes   In the past year have you fallen or experienced a near fall? Yes   Current  Diet Well Balanced Diet   Dental Exam Up to date   Eye Exam Up to date   Exercise (times per week) 0 times per week   Current Exercise Activities Include None   Do you need help using the phone?  No   Are you deaf or do you have serious difficulty hearing?  No   Do you need help with transportation? Yes   Do you need help shopping? Yes   Do you need help preparing meals?  Yes   Do you need help with housework?  Yes   Do you need help with laundry? Yes   Do you need help taking your medications? No   Do you need help managing money? No   Do you ever drive or ride in a car without wearing a seat belt? No   Have you felt unusual stress, anger or loneliness in the last month? No   Who do you live with? Spouse   If you need help, do you have trouble finding someone available to you? No   Have you been bothered in the last four weeks by sexual problems? No   Do you have difficulty concentrating, remembering or making decisions? No           Does the patient have evidence of cognitive impairment? Yes    Aspirin use counseling: Taking ASA appropriately as indicated      Recent Lab Results:  CMP:  Lab Results   Component Value Date     (H) 10/03/2018    BUN 30 (H) 10/03/2018    CREATININE 1.42 (H) 10/03/2018    EGFRIFNONA 49 (L) 10/03/2018    EGFRIFAFRI 57 (L) 10/03/2018    BCR 21 10/03/2018     10/03/2018    K 4.5 10/03/2018    CO2 18 (L) 10/03/2018    CALCIUM 10.4 (H) 10/03/2018    PROTENTOTREF 6.8 10/03/2018    ALBUMIN 3.8 10/03/2018    LABGLOBREF 3.0 10/03/2018    LABIL2 1.3 10/03/2018    BILITOT 0.6 10/03/2018    ALKPHOS 141 (H) 10/03/2018    AST 28 10/03/2018    ALT 27 10/03/2018     Lipid Panel:  Lab Results   Component Value Date    TRIG 74 10/03/2018    HDL 44 10/03/2018    VLDL 15 10/03/2018     HbA1c:  Lab Results   Component Value Date    HGBA1C 6.4 (H) 10/03/2018       Visual Acuity:  No exam data present    Age-appropriate Screening Schedule:  Refer to the list below for future screening  recommendations based on patient's age, sex and/or medical conditions. Orders for these recommended tests are listed in the plan section. The patient has been provided with a written plan.    Health Maintenance   Topic Date Due   • PNEUMOCOCCAL VACCINES (65+ LOW/MEDIUM RISK) (2 of 2 - PCV13) 07/03/2014   • ZOSTER VACCINE (1 of 2) 04/01/2019 (Originally 5/22/1997)   • LIPID PANEL  10/03/2019   • INFLUENZA VACCINE  Completed   • COLONOSCOPY  Excluded   • TDAP/TD VACCINES  Excluded        Subjective   History of Present Illness    Samy Velazquez is a 73 y.o. male who presents for an Subsequent Wellness Visit.    The following portions of the patient's history were reviewed and updated as appropriate: allergies, current medications, past family history, past medical history, past social history, past surgical history and problem list.    Outpatient Medications Prior to Visit   Medication Sig Dispense Refill   • allopurinol (ZYLOPRIM) 300 MG tablet TAKE 1 TABLET BY MOUTH DAILY. 90 tablet 0   • aspirin 81 MG EC tablet Take 81 mg by mouth Daily.     • atorvastatin (LIPITOR) 40 MG tablet Take 1 tablet by mouth Every Night. 30 tablet 0   • cephalexin (KEFLEX) 500 MG capsule Take 500 mg by mouth 2 (Two) Times a Day.     • Cholecalciferol (VITAMIN D3) 2000 units capsule Take 2,000 Units by mouth Daily.     • clopidogrel (PLAVIX) 75 MG tablet TAKE 1 TABLET BY MOUTH DAILY. 90 tablet 3   • FLUoxetine (PROzac) 40 MG capsule TAKE 1 CAPSULE BY MOUTH DAILY. 90 capsule 1   • furosemide (LASIX) 20 MG tablet Take 1 tablet by mouth 2 (Two) Times a Day. 180 tablet 2   • isosorbide mononitrate (IMDUR) 60 MG 24 hr tablet TAKE 1 TABLET EVERY MORNING AND TAKE 1/2 TABLET AT 2PM 90 tablet 1   • mexiletine (MEXITIL) 150 MG capsule TAKE 2 CAPSULES BY MOUTH 3 TIMES A  capsule 2   • nitroglycerin (NITRODUR) 0.2 MG/HR patch Place 1 patch on the skin Daily. Patch is 0.24 twelve hour patch     • oxyCODONE-acetaminophen (PERCOCET)  MG per  tablet Take 1 tablet by mouth Every 6 (Six) Hours As Needed for Moderate Pain .     • pantoprazole (PROTONIX) 40 MG EC tablet TAKE 1 TABLET BY MOUTH DAILY. 90 tablet 0   • sotalol (BETAPACE) 80 MG tablet Take 80 mg by mouth 2 (Two) Times a Day.  3     No facility-administered medications prior to visit.        Patient Active Problem List   Diagnosis   • Ischemic heart disease due to coronary artery obstruction (CMS/HCC)   • Osteoarthritis of multiple joints   • Cardiac pacemaker   • Gout of multiple sites   • Nephrolithiasis   • Mixed hyperlipidemia       Advance Care Planning:  has NO advance directive - information provided to the patient today    Identification of Risk Factors:  Risk factors include: weight , cardiovascular risk, inactivity and increased fall risk.    Review of Systems   Cardiovascular: Negative for chest pain and leg swelling.   Gastrointestinal: Negative for abdominal distention.   Musculoskeletal: Positive for arthralgias and back pain.   All other systems reviewed and are negative.      Compared to one year ago, the patient feels his physical health is the same.  Compared to one year ago, the patient feels his mental health is the same.    Objective     Physical Exam   Constitutional: He is oriented to person, place, and time.   Overweight   HENT:   Right Ear: External ear normal.   Left Ear: External ear normal.   Mouth/Throat: Oropharynx is clear and moist.   Eyes: Pupils are equal, round, and reactive to light. EOM are normal.   Neck: No thyromegaly present.   Carotid pulses present   Cardiovascular: Normal rate and regular rhythm.    Pulmonary/Chest: Effort normal and breath sounds normal.   Abdominal: Soft. Bowel sounds are normal.   Genitourinary: Rectum normal, prostate normal and penis normal. Rectal exam shows guaiac negative stool.   Genitourinary Comments: Testicles normal-no inguinal hernias or adenopathy-prostate normal sized raphae maintained   Musculoskeletal: He exhibits no  edema.   Lymphadenopathy:     He has no cervical adenopathy.   Neurological: He is alert and oriented to person, place, and time.   Skin: Skin is warm and dry. Capillary refill takes less than 2 seconds.   Psychiatric: He has a normal mood and affect. His behavior is normal. Judgment and thought content normal.   Nursing note and vitals reviewed.      Vitals:    10/08/18 1302   BP: 123/75   BP Location: Right arm   Patient Position: Sitting   Cuff Size: Adult   Pulse: 54   Temp: 97.7 °F (36.5 °C)   TempSrc: Oral   SpO2: 97%   Weight: 94.7 kg (208 lb 12.8 oz)   Height: Comment: Patient in wheel chair   PainSc: 0-No pain       Patient's Body mass index is 31.75 kg/m². BMI is above normal parameters. Recommendations include: educational material and exercise counseling.      Assessment/Plan   Patient Self-Management and Personalized Health Advice  The patient has been provided with information about: diet, exercise, weight management and fall prevention and preventive services including:   · Advance directive, Colorectal cancer screening, cologuard test ordered, Fall Risk plan of care done, Pneumococcal vaccine , Prostate cancer screening discussed.    Visit Diagnoses:    ICD-10-CM ICD-9-CM   1. Annual physical exam Z00.00 V70.0   2. Screening for colorectal cancer Z12.11 V76.51    Z12.12    3. Need for prophylactic vaccination against Streptococcus pneumoniae (pneumococcus) Z23 V03.82   4. Need for shingles vaccine Z23 V04.89   5. Special screening for malignant neoplasm of prostate Z12.5 V76.44       Orders Placed This Encounter   Procedures   • Pneumococcal Conjugate Vaccine 13-Valent All   • Cologuard - Stool, Per Rectum     Standing Status:   Future     Standing Expiration Date:   10/8/2019   • POCT urinalysis dipstick, automated       Outpatient Encounter Prescriptions as of 10/8/2018   Medication Sig Dispense Refill   • allopurinol (ZYLOPRIM) 300 MG tablet TAKE 1 TABLET BY MOUTH DAILY. 90 tablet 0   • aspirin 81  MG EC tablet Take 81 mg by mouth Daily.     • atorvastatin (LIPITOR) 40 MG tablet Take 1 tablet by mouth Every Night. 30 tablet 0   • cephalexin (KEFLEX) 500 MG capsule Take 500 mg by mouth 2 (Two) Times a Day.     • Cholecalciferol (VITAMIN D3) 2000 units capsule Take 2,000 Units by mouth Daily.     • clopidogrel (PLAVIX) 75 MG tablet TAKE 1 TABLET BY MOUTH DAILY. 90 tablet 3   • FLUoxetine (PROzac) 40 MG capsule TAKE 1 CAPSULE BY MOUTH DAILY. 90 capsule 1   • furosemide (LASIX) 20 MG tablet Take 1 tablet by mouth 2 (Two) Times a Day. 180 tablet 2   • isosorbide mononitrate (IMDUR) 60 MG 24 hr tablet TAKE 1 TABLET EVERY MORNING AND TAKE 1/2 TABLET AT 2PM 90 tablet 1   • mexiletine (MEXITIL) 150 MG capsule TAKE 2 CAPSULES BY MOUTH 3 TIMES A  capsule 2   • nitroglycerin (NITRODUR) 0.2 MG/HR patch Place 1 patch on the skin Daily. Patch is 0.24 twelve hour patch     • oxyCODONE-acetaminophen (PERCOCET)  MG per tablet Take 1 tablet by mouth Every 6 (Six) Hours As Needed for Moderate Pain .     • pantoprazole (PROTONIX) 40 MG EC tablet TAKE 1 TABLET BY MOUTH DAILY. 90 tablet 0   • sotalol (BETAPACE) 80 MG tablet Take 80 mg by mouth 2 (Two) Times a Day.  3   • Zoster Vac Recomb Adjuvanted 50 MCG reconstituted suspension Inject 1 each into the appropriate muscle as directed by prescriber 1 (One) Time for 1 dose. 1 each 1     No facility-administered encounter medications on file as of 10/8/2018.        Reviewed use of high risk medication in the elderly: yes  Reviewed for potential of harmful drug interactions in the elderly: yes    Follow Up:  Return in 6 months (on 4/8/2019).     An After Visit Summary and PPPS with all of these plans were given to the patient.       plan-above exercise more

## 2018-10-08 NOTE — PATIENT INSTRUCTIONS
Fall Prevention in the Home  Falls can cause injuries and can affect people from all age groups. There are many simple things that you can do to make your home safe and to help prevent falls.  What can I do on the outside of my home?  · Regularly repair the edges of walkways and driveways and fix any cracks.  · Remove high doorway thresholds.  · Trim any shrubbery on the main path into your home.  · Use bright outdoor lighting.  · Clear walkways of debris and clutter, including tools and rocks.  · Regularly check that handrails are securely fastened and in good repair. Both sides of any steps should have handrails.  · Install guardrails along the edges of any raised decks or porches.  · Have leaves, snow, and ice cleared regularly.  · Use sand or salt on walkways during winter months.  · In the garage, clean up any spills right away, including grease or oil spills.  What can I do in the bathroom?  · Use night lights.  · Install grab bars by the toilet and in the tub and shower. Do not use towel bars as grab bars.  · Use non-skid mats or decals on the floor of the tub or shower.  · If you need to sit down while you are in the shower, use a plastic, non-slip stool.  · Keep the floor dry. Immediately clean up any water that spills on the floor.  · Remove soap buildup in the tub or shower on a regular basis.  · Attach bath mats securely with double-sided non-slip rug tape.  · Remove throw rugs and other tripping hazards from the floor.  What can I do in the bedroom?  · Use night lights.  · Make sure that a bedside light is easy to reach.  · Do not use oversized bedding that drapes onto the floor.  · Have a firm chair that has side arms to use for getting dressed.  · Remove throw rugs and other tripping hazards from the floor.  What can I do in the kitchen?  · Clean up any spills right away.  · Avoid walking on wet floors.  · Place frequently used items in easy-to-reach places.  · If you need to reach for something  above you, use a sturdy step stool that has a grab bar.  · Keep electrical cables out of the way.  · Do not use floor polish or wax that makes floors slippery. If you have to use wax, make sure that it is non-skid floor wax.  · Remove throw rugs and other tripping hazards from the floor.  What can I do in the stairways?  · Do not leave any items on the stairs.  · Make sure that there are handrails on both sides of the stairs. Fix handrails that are broken or loose. Make sure that handrails are as long as the stairways.  · Check any carpeting to make sure that it is firmly attached to the stairs. Fix any carpet that is loose or worn.  · Avoid having throw rugs at the top or bottom of stairways, or secure the rugs with carpet tape to prevent them from moving.  · Make sure that you have a light switch at the top of the stairs and the bottom of the stairs. If you do not have them, have them installed.  What are some other fall prevention tips?  · Wear closed-toe shoes that fit well and support your feet. Wear shoes that have rubber soles or low heels.  · When you use a stepladder, make sure that it is completely opened and that the sides are firmly locked. Have someone hold the ladder while you are using it. Do not climb a closed stepladder.  · Add color or contrast paint or tape to grab bars and handrails in your home. Place contrasting color strips on the first and last steps.  · Use mobility aids as needed, such as canes, walkers, scooters, and crutches.  · Turn on lights if it is dark. Replace any light bulbs that burn out.  · Set up furniture so that there are clear paths. Keep the furniture in the same spot.  · Fix any uneven floor surfaces.  · Choose a carpet design that does not hide the edge of steps of a stairway.  · Be aware of any and all pets.  · Review your medicines with your healthcare provider. Some medicines can cause dizziness or changes in blood pressure, which increase your risk of falling.  Talk  with your health care provider about other ways that you can decrease your risk of falls. This may include working with a physical therapist or  to improve your strength, balance, and endurance.  This information is not intended to replace advice given to you by your health care provider. Make sure you discuss any questions you have with your health care provider.  Document Released: 2003 Document Revised: 2017 Document Reviewed: 2016  [employer] Interactive Patient Education © 2018 [employer]    Medicare Wellness  Personal Prevention Plan of Service     Date of Office Visit:  10/08/2018  Encounter Provider:  Garret Salinas MD  Place of Service:  [facility]  Patient Name: Samy Velazquez  :  1945    As part of the Medicare Wellness portion of your visit today, we are providing you with this personalized preventive plan of services (PPPS). This plan is based upon recommendations of the [place] Preventive Services Task Force (USPSTF) and the Advisory Committee on Immunization Practices (ACIP).    This lists the preventive care services that should be considered, and provides dates of when you are due. Items listed as completed are up-to-date and do not require any further intervention.    Health Maintenance   Topic Date Due   • TDAP/TD VACCINES (1 - Tdap) 1966   • ZOSTER VACCINE (1 of 2) 1997   • PNEUMOCOCCAL VACCINES (65+ LOW/MEDIUM RISK) (1 of 2 - PCV13) 2012   • COLONOSCOPY  10/26/2016   • LIPID PANEL  10/03/2019   • MEDICARE ANNUAL WELLNESS  10/08/2019   • HEPATITIS C SCREENING  Completed   • INFLUENZA VACCINE  Completed       No orders of the defined types were placed in this encounter.      Return in 6 months (on 2019).        Fat and Cholesterol Restricted Diet  Getting too much fat and cholesterol in your diet may cause health problems. Following this diet helps keep your fat and cholesterol at normal levels.  "This can keep you from getting sick.  What types of fat should I choose?  · Choose monosaturated and polyunsaturated fats. These are found in foods such as olive oil, canola oil, flaxseeds, walnuts, almonds, and seeds.  · Eat more omega-3 fats. Good choices include salmon, mackerel, sardines, tuna, flaxseed oil, and ground flaxseeds.  · Limit saturated fats. These are in animal products such as meats, butter, and cream. They can also be in plant products such as palm oil, palm kernel oil, and coconut oil.  · Avoid foods with partially hydrogenated oils in them. These contain trans fats. Examples of foods that have trans fats are stick margarine, some tub margarines, cookies, crackers, and other baked goods.  What general guidelines do I need to follow?  · Check food labels. Look for the words \"trans fat\" and \"saturated fat.\"  · When preparing a meal:  ? Fill half of your plate with vegetables and green salads.  ? Fill one fourth of your plate with whole grains. Look for the word \"whole\" as the first word in the ingredient list.  ? Fill one fourth of your plate with lean protein foods.  · Eat more foods that have fiber, like apples, carrots, beans, peas, and barley.  · Eat more home-cooked foods. Eat less at restaurants and buffets.  · Limit or avoid alcohol.  · Limit foods high in starch and sugar.  · Limit fried foods.  · Cook foods without frying them. Baking, boiling, grilling, and broiling are all great options.  · Lose weight if you are overweight. Losing even a small amount of weight can help your overall health. It can also help prevent diseases such as diabetes and heart disease.  What foods can I eat?  Grains  Whole grains, such as whole wheat or whole grain breads, crackers, cereals, and pasta. Unsweetened oatmeal, bulgur, barley, quinoa, or brown rice. Corn or whole wheat flour tortillas.  Vegetables  Fresh or frozen vegetables (raw, steamed, roasted, or grilled). Green salads.  Fruits  All fresh, canned " (in natural juice), or frozen fruits.  Meat and Other Protein Products  Ground beef (85% or leaner), grass-fed beef, or beef trimmed of fat. Skinless chicken or turkey. Ground chicken or turkey. Pork trimmed of fat. All fish and seafood. Eggs. Dried beans, peas, or lentils. Unsalted nuts or seeds. Unsalted canned or dry beans.  Dairy  Low-fat dairy products, such as skim or 1% milk, 2% or reduced-fat cheeses, low-fat ricotta or cottage cheese, or plain low-fat yogurt.  Fats and Oils  Tub margarines without trans fats. Light or reduced-fat mayonnaise and salad dressings. Avocado. Olive, canola, sesame, or safflower oils. Natural peanut or almond butter (choose ones without added sugar and oil).  The items listed above may not be a complete list of recommended foods or beverages. Contact your dietitian for more options.  What foods are not recommended?  Grains  White bread. White pasta. White rice. Cornbread. Bagels, pastries, and croissants. Crackers that contain trans fat.  Vegetables  White potatoes. Corn. Creamed or fried vegetables. Vegetables in a cheese sauce.  Fruits  Dried fruits. Canned fruit in light or heavy syrup. Fruit juice.  Meat and Other Protein Products  Fatty cuts of meat. Ribs, chicken wings, lombardo, sausage, bologna, salami, chitterlings, fatback, hot dogs, bratwurst, and packaged luncheon meats. Liver and organ meats.  Dairy  Whole or 2% milk, cream, half-and-half, and cream cheese. Whole milk cheeses. Whole-fat or sweetened yogurt. Full-fat cheeses. Nondairy creamers and whipped toppings. Processed cheese, cheese spreads, or cheese curds.  Sweets and Desserts  Corn syrup, sugars, honey, and molasses. Candy. Jam and jelly. Syrup. Sweetened cereals. Cookies, pies, cakes, donuts, muffins, and ice cream.  Fats and Oils  Butter, stick margarine, lard, shortening, ghee, or lombardo fat. Coconut, palm kernel, or palm oils.  Beverages  Alcohol. Sweetened drinks (such as sodas, lemonade, and fruit drinks or  punches).  The items listed above may not be a complete list of foods and beverages to avoid. Contact your dietitian for more information.  This information is not intended to replace advice given to you by your health care provider. Make sure you discuss any questions you have with your health care provider.  Document Released: 06/18/2013 Document Revised: 08/24/2017 Document Reviewed: 03/19/2015  [employer] Interactive Patient Education © 2018 [employer]

## 2018-10-11 RX ORDER — ATORVASTATIN CALCIUM 40 MG/1
40 TABLET, FILM COATED ORAL NIGHTLY
Qty: 90 TABLET | Refills: 1 | Status: SHIPPED | OUTPATIENT
Start: 2018-10-11 | End: 2019-04-08 | Stop reason: SDUPTHER

## 2018-10-17 RX ORDER — FUROSEMIDE 20 MG/1
20 TABLET ORAL 2 TIMES DAILY
Qty: 180 TABLET | Refills: 3 | Status: SHIPPED | OUTPATIENT
Start: 2018-10-17 | End: 2020-01-06

## 2018-10-26 DIAGNOSIS — Z12.11 SCREENING FOR COLORECTAL CANCER: ICD-10-CM

## 2018-10-26 DIAGNOSIS — Z12.12 SCREENING FOR COLORECTAL CANCER: ICD-10-CM

## 2018-10-30 RX ORDER — ALLOPURINOL 300 MG/1
300 TABLET ORAL DAILY
Qty: 90 TABLET | Refills: 3 | Status: SHIPPED | OUTPATIENT
Start: 2018-10-30 | End: 2019-12-15 | Stop reason: SDUPTHER

## 2018-11-12 RX ORDER — MEXILETINE HYDROCHLORIDE 150 MG/1
300 CAPSULE ORAL 3 TIMES DAILY
Qty: 540 CAPSULE | Refills: 3 | Status: SHIPPED | OUTPATIENT
Start: 2018-11-12 | End: 2019-10-31 | Stop reason: SDUPTHER

## 2018-11-19 ENCOUNTER — TELEPHONE (OUTPATIENT)
Dept: FAMILY MEDICINE CLINIC | Facility: CLINIC | Age: 73
End: 2018-11-19

## 2018-11-19 NOTE — TELEPHONE ENCOUNTER
SPOKE WITH PT-HE WILL CONTACT INSURANCE TO SEE WHICH MEDICATION IS COVERED & WILL CONTACT OFFICE BACK.

## 2018-11-19 NOTE — TELEPHONE ENCOUNTER
PT'S SPOUSE STATES PT IS ON PANTOPRAZOLE 40MG (1) QD-MEDICATION IS COSTING $1500 EVERY 90 DAYS. WANTS SOMETHING CHEAPER-INSURANCE IS NOT COVERING. SHE STATES HE IS STILL BELCHING A LOT.     PLEASE ADVISE.      [facility]            Nexium generic significance on his insurance for a milligrams or Prevacid 30 mg    MED CLARIFICATION PER MD-NEXIUM 40MG    (1) DAILY

## 2018-11-20 RX ORDER — CLOPIDOGREL BISULFATE 75 MG/1
75 TABLET ORAL DAILY
Qty: 90 TABLET | Refills: 3 | Status: SHIPPED | OUTPATIENT
Start: 2018-11-20 | End: 2019-11-01 | Stop reason: SDUPTHER

## 2018-11-20 RX ORDER — ESOMEPRAZOLE MAGNESIUM 40 MG/1
40 CAPSULE, DELAYED RELEASE ORAL
Qty: 90 CAPSULE | Refills: 3 | Status: SHIPPED | OUTPATIENT
Start: 2018-11-20 | End: 2019-11-29 | Stop reason: SDUPTHER

## 2018-11-20 NOTE — TELEPHONE ENCOUNTER
Beth spoke with insurance.  Generic nexium is covered with insurance         40 mg daily-90-3 refills

## 2018-12-03 RX ORDER — ISOSORBIDE MONONITRATE 60 MG/1
TABLET, EXTENDED RELEASE ORAL
Qty: 90 TABLET | Refills: 3 | Status: SHIPPED | OUTPATIENT
Start: 2018-12-03 | End: 2019-01-09 | Stop reason: SDUPTHER

## 2019-01-10 RX ORDER — ISOSORBIDE MONONITRATE 60 MG/1
60 TABLET, EXTENDED RELEASE ORAL 2 TIMES DAILY
Qty: 270 TABLET | Refills: 2 | Status: SHIPPED | OUTPATIENT
Start: 2019-01-10 | End: 2020-01-31

## 2019-02-22 ENCOUNTER — TELEPHONE (OUTPATIENT)
Dept: FAMILY MEDICINE CLINIC | Facility: CLINIC | Age: 74
End: 2019-02-22

## 2019-02-22 NOTE — TELEPHONE ENCOUNTER
PT STATES HAD A SHORT SUPPLY OF METHOCARBAMOL 500MG (1) BID FROM FORMER PAIN MGMT MD-AWAITING SCHEDULED APPT IN April WITH NEW PROVIDER.  INSURANCE WILL NOT COVER MEDICATION-IS REQUESTING SOMETHING EQUIVALENT TO ABOVE MEDICATION.  PLEASE ADVISE        That is a very weak muscle relaxant- probably did not help that much- use heat and pain patches --solanpos          [facility]

## 2019-04-09 RX ORDER — ATORVASTATIN CALCIUM 40 MG/1
TABLET, FILM COATED ORAL
Qty: 90 TABLET | Refills: 1 | Status: SHIPPED | OUTPATIENT
Start: 2019-04-09 | End: 2020-01-03

## 2019-05-16 ENCOUNTER — TELEPHONE (OUTPATIENT)
Dept: FAMILY MEDICINE CLINIC | Facility: CLINIC | Age: 74
End: 2019-05-16

## 2019-07-16 RX ORDER — FLUOXETINE HYDROCHLORIDE 40 MG/1
40 CAPSULE ORAL DAILY
Qty: 90 CAPSULE | Refills: 1 | Status: SHIPPED | OUTPATIENT
Start: 2019-07-16 | End: 2020-01-06

## 2019-09-23 ENCOUNTER — OFFICE VISIT (OUTPATIENT)
Dept: FAMILY MEDICINE CLINIC | Facility: CLINIC | Age: 74
End: 2019-09-23

## 2019-09-23 VITALS
OXYGEN SATURATION: 98 % | HEART RATE: 61 BPM | SYSTOLIC BLOOD PRESSURE: 101 MMHG | BODY MASS INDEX: 30.77 KG/M2 | DIASTOLIC BLOOD PRESSURE: 69 MMHG | WEIGHT: 202.4 LBS | TEMPERATURE: 97.7 F

## 2019-09-23 DIAGNOSIS — J06.9 UPPER RESPIRATORY TRACT INFECTION, UNSPECIFIED TYPE: Primary | ICD-10-CM

## 2019-09-23 PROCEDURE — 96372 THER/PROPH/DIAG INJ SC/IM: CPT | Performed by: FAMILY MEDICINE

## 2019-09-23 PROCEDURE — 99213 OFFICE O/P EST LOW 20 MIN: CPT | Performed by: FAMILY MEDICINE

## 2019-09-23 RX ORDER — METHYLPREDNISOLONE ACETATE 40 MG/ML
40 INJECTION, SUSPENSION INTRA-ARTICULAR; INTRALESIONAL; INTRAMUSCULAR; SOFT TISSUE ONCE
Status: COMPLETED | OUTPATIENT
Start: 2019-09-23 | End: 2019-09-23

## 2019-09-23 RX ORDER — CEPHALEXIN 500 MG/1
500 CAPSULE ORAL 2 TIMES DAILY
Qty: 14 CAPSULE | Refills: 0 | Status: SHIPPED | OUTPATIENT
Start: 2019-09-23 | End: 2019-10-22 | Stop reason: SDUPTHER

## 2019-09-23 RX ADMIN — METHYLPREDNISOLONE ACETATE 40 MG: 40 INJECTION, SUSPENSION INTRA-ARTICULAR; INTRALESIONAL; INTRAMUSCULAR; SOFT TISSUE at 14:18

## 2019-09-23 NOTE — PROGRESS NOTES
Subjective   Samy Velazquez is a 74 y.o. male.     74-year-old male with ischemic heart disease with sinusitis      URI    This is a new problem. The current episode started in the past 7 days. The problem has been unchanged. There has been no fever. Associated symptoms include congestion, rhinorrhea and sinus pain. Pertinent negatives include no coughing. He has tried nothing for the symptoms.       The following portions of the patient's history were reviewed and updated as appropriate: allergies, current medications, past family history, past medical history, past social history, past surgical history and problem list.    Review of Systems   HENT: Positive for congestion, rhinorrhea and sinus pain.    Respiratory: Negative for cough and shortness of breath.        Objective   Physical Exam   Constitutional: He is oriented to person, place, and time.   Mildly obese   HENT:   Right Ear: External ear normal.   Left Ear: External ear normal.   Mouth/Throat: Oropharyngeal exudate present.   Cardiovascular: Normal rate and regular rhythm.   Pulmonary/Chest: Effort normal and breath sounds normal.   Musculoskeletal: He exhibits no edema.   Neurological: He is alert and oriented to person, place, and time.   Psychiatric: He has a normal mood and affect. His behavior is normal. Thought content normal.   Nursing note and vitals reviewed.      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    Diagnoses and all orders for this visit:    Upper respiratory tract infection, unspecified type  -     methylPREDNISolone acetate (DEPO-medrol) injection 40 mg    Other orders  -     cephalexin (KEFLEX) 500 MG capsule; Take 1 capsule by mouth 2 (Two) Times a Day.       Return if symptoms  worsen or fail to improve, for Next scheduled follow up.         Plan-above plus Benadryl at night    Electronically signed by Garret Salinas MD 09/23/2019

## 2019-09-23 NOTE — PROGRESS NOTES
Administered 40 mg of Methylprednisolone into left dorsogluteal. Patient tolerated well, observed no reactions.     Exp 11/20

## 2019-10-14 RX ORDER — AZITHROMYCIN 250 MG/1
TABLET, FILM COATED ORAL
Qty: 6 TABLET | Refills: 0 | Status: SHIPPED | OUTPATIENT
Start: 2019-10-14 | End: 2019-11-14

## 2019-10-22 RX ORDER — CEPHALEXIN 500 MG/1
500 CAPSULE ORAL 2 TIMES DAILY
Qty: 14 CAPSULE | Refills: 0 | Status: SHIPPED | OUTPATIENT
Start: 2019-10-22 | End: 2020-05-15

## 2019-10-22 RX ORDER — GUAIFENESIN 600 MG/1
1200 TABLET, EXTENDED RELEASE ORAL 2 TIMES DAILY
Qty: 60 TABLET | Refills: 1 | Status: SHIPPED | OUTPATIENT
Start: 2019-10-22 | End: 2019-11-14

## 2019-10-22 NOTE — TELEPHONE ENCOUNTER
[facility]OKE WITH [facility]

## 2019-10-22 NOTE — TELEPHONE ENCOUNTER
Repeat Keflex 500 twice daily for 7 days, Mucinex 600 plain twice daily-Benadryl at bedtime as needed runny nose

## 2019-11-01 RX ORDER — CLOPIDOGREL BISULFATE 75 MG/1
TABLET ORAL
Qty: 90 TABLET | Refills: 0 | Status: SHIPPED | OUTPATIENT
Start: 2019-11-01 | End: 2019-11-25 | Stop reason: SDUPTHER

## 2019-11-01 RX ORDER — MEXILETINE HYDROCHLORIDE 150 MG/1
300 CAPSULE ORAL 3 TIMES DAILY
Qty: 540 CAPSULE | Refills: 0 | Status: SHIPPED | OUTPATIENT
Start: 2019-11-01 | End: 2020-01-28

## 2019-11-05 ENCOUNTER — FLU SHOT (OUTPATIENT)
Dept: FAMILY MEDICINE CLINIC | Facility: CLINIC | Age: 74
End: 2019-11-05

## 2019-11-05 DIAGNOSIS — Z23 NEED FOR INFLUENZA VACCINATION: Primary | ICD-10-CM

## 2019-11-05 PROCEDURE — 90653 IIV ADJUVANT VACCINE IM: CPT | Performed by: FAMILY MEDICINE

## 2019-11-05 PROCEDURE — G0008 ADMIN INFLUENZA VIRUS VAC: HCPCS | Performed by: FAMILY MEDICINE

## 2019-11-12 RX ORDER — SOTALOL HYDROCHLORIDE 80 MG/1
TABLET ORAL
Qty: 90 TABLET | Refills: 3 | Status: SHIPPED | OUTPATIENT
Start: 2019-11-12 | End: 2020-08-12

## 2019-11-14 ENCOUNTER — OFFICE VISIT (OUTPATIENT)
Dept: FAMILY MEDICINE CLINIC | Facility: CLINIC | Age: 74
End: 2019-11-14

## 2019-11-14 VITALS
BODY MASS INDEX: 30.84 KG/M2 | WEIGHT: 202.8 LBS | OXYGEN SATURATION: 97 % | SYSTOLIC BLOOD PRESSURE: 110 MMHG | DIASTOLIC BLOOD PRESSURE: 76 MMHG | HEART RATE: 69 BPM | TEMPERATURE: 97.7 F

## 2019-11-14 DIAGNOSIS — R53.83 FATIGUE, UNSPECIFIED TYPE: ICD-10-CM

## 2019-11-14 DIAGNOSIS — E55.9 VITAMIN D DEFICIENCY: Primary | ICD-10-CM

## 2019-11-14 DIAGNOSIS — Z23 NEED FOR SHINGLES VACCINE: ICD-10-CM

## 2019-11-14 DIAGNOSIS — R73.9 HYPERGLYCEMIA: ICD-10-CM

## 2019-11-14 DIAGNOSIS — E78.2 MIXED HYPERLIPIDEMIA: ICD-10-CM

## 2019-11-14 DIAGNOSIS — Z12.5 SPECIAL SCREENING FOR MALIGNANT NEOPLASM OF PROSTATE: ICD-10-CM

## 2019-11-14 DIAGNOSIS — M1A.9XX1 CHRONIC GOUT WITH TOPHUS, UNSPECIFIED CAUSE, UNSPECIFIED SITE: ICD-10-CM

## 2019-11-14 DIAGNOSIS — Z00.00 ANNUAL PHYSICAL EXAM: ICD-10-CM

## 2019-11-14 LAB
BILIRUB BLD-MCNC: ABNORMAL MG/DL
CLARITY, POC: ABNORMAL
COLOR UR: ABNORMAL
GLUCOSE UR STRIP-MCNC: NEGATIVE MG/DL
KETONES UR QL: NEGATIVE
LEUKOCYTE EST, POC: NEGATIVE
NITRITE UR-MCNC: NEGATIVE MG/ML
PH UR: 5.5 [PH] (ref 5–8)
PROT UR STRIP-MCNC: NEGATIVE MG/DL
RBC # UR STRIP: ABNORMAL /UL
SP GR UR: 1.03 (ref 1–1.03)
UROBILINOGEN UR QL: NORMAL

## 2019-11-14 PROCEDURE — G0439 PPPS, SUBSEQ VISIT: HCPCS | Performed by: FAMILY MEDICINE

## 2019-11-14 PROCEDURE — 81003 URINALYSIS AUTO W/O SCOPE: CPT | Performed by: FAMILY MEDICINE

## 2019-11-14 RX ORDER — ZOSTER VACCINE RECOMBINANT, ADJUVANTED 50 MCG/0.5
0.5 KIT INTRAMUSCULAR ONCE
Qty: 1 EACH | Refills: 1 | Status: SHIPPED | OUTPATIENT
Start: 2019-11-14 | End: 2019-11-14

## 2019-11-14 NOTE — PROGRESS NOTES
The ABCs of the Annual Wellness Visit  Subsequent Medicare Wellness Visit    Chief Complaint   Patient presents with   • Medicare Wellness-subsequent     fasting       Subjective   History of Present Illness:  Samy Velazquez is a 74 y.o. male who presents for a Subsequent Medicare Wellness Visit.    HEALTH RISK ASSESSMENT    Recent Hospitalizations:  No hospitalization(s) within the last year.    Current Medical Providers:  Patient Care Team:  Garret Salinas MD as PCP - General  Garret Salinas MD as PCP - Claims Attributed    Smoking Status:  Social History     Tobacco Use   Smoking Status Never Smoker   Smokeless Tobacco Never Used       Alcohol Consumption:  Social History     Substance and Sexual Activity   Alcohol Use No       Depression Screen:   PHQ-2/PHQ-9 Depression Screening 11/14/2019   Little interest or pleasure in doing things 0   Feeling down, depressed, or hopeless 0   Trouble falling or staying asleep, or sleeping too much -   Feeling tired or having little energy -   Poor appetite or overeating -   Feeling bad about yourself - or that you are a failure or have let yourself or your family down -   Trouble concentrating on things, such as reading the newspaper or watching television -   Moving or speaking so slowly that other people could have noticed. Or the opposite - being so fidgety or restless that you have been moving around a lot more than usual -   Thoughts that you would be better off dead, or of hurting yourself in some way -   Total Score 0   If you checked off any problems, how difficult have these problems made it for you to do your work, take care of things at home, or get along with other people? -       Fall Risk Screen:  STEADI Fall Risk Assessment was completed, and patient is at HIGH risk for falls. Assessment completed on:11/14/2019    Health Habits and Functional and Cognitive Screening:  Functional & Cognitive Status 11/14/2019   Do you have difficulty preparing  food and eating? No   Do you have difficulty bathing yourself, getting dressed or grooming yourself? No   Do you have difficulty using the toilet? No   Do you have difficulty moving around from place to place? Yes   Do you have trouble with steps or getting out of a bed or a chair? No   Current Diet Well Balanced Diet   Dental Exam Up to date   Eye Exam Up to date   Exercise (times per week) 0 times per week   Current Exercise Activities Include None   Do you need help using the phone?  No   Are you deaf or do you have serious difficulty hearing?  No   Do you need help with transportation? No   Do you need help shopping? No   Do you need help preparing meals?  No   Do you need help with housework?  No   Do you need help with laundry? No   Do you need help taking your medications? No   Do you need help managing money? No   Do you ever drive or ride in a car without wearing a seat belt? No   Have you felt unusual stress, anger or loneliness in the last month? No   Who do you live with? Spouse   If you need help, do you have trouble finding someone available to you? No   Have you been bothered in the last four weeks by sexual problems? No   Do you have difficulty concentrating, remembering or making decisions? Yes         Does the patient have evidence of cognitive impairment? Yes    Asprin use counseling:Taking ASA appropriately as indicated    Age-appropriate Screening Schedule:  Refer to the list below for future screening recommendations based on patient's age, sex and/or medical conditions. Orders for these recommended tests are listed in the plan section. The patient has been provided with a written plan.    Health Maintenance   Topic Date Due   • ZOSTER VACCINE (1 of 2) 11/14/2020 (Originally 5/22/1997)   • LIPID PANEL  11/14/2020   • INFLUENZA VACCINE  Completed   • PNEUMOCOCCAL VACCINES (65+ LOW/MEDIUM RISK)  Completed   • COLONOSCOPY  Discontinued   • TDAP/TD VACCINES  Discontinued          The following  portions of the patient's history were reviewed and updated as appropriate: allergies, current medications, past family history, past medical history, past social history, past surgical history and problem list.    Outpatient Medications Prior to Visit   Medication Sig Dispense Refill   • allopurinol (ZYLOPRIM) 300 MG tablet Take 1 tablet by mouth Daily. 90 tablet 3   • aspirin 81 MG EC tablet Take 81 mg by mouth Daily.     • atorvastatin (LIPITOR) 40 MG tablet TAKE 1 TABLET BY MOUTH EVERY DAY AT NIGHT 90 tablet 1   • cephalexin (KEFLEX) 500 MG capsule Take 1 capsule by mouth 2 (Two) Times a Day. 14 capsule 0   • Cholecalciferol (VITAMIN D3) 2000 units capsule Take 2,000 Units by mouth Daily.     • clopidogrel (PLAVIX) 75 MG tablet TAKE 1 TABLET BY MOUTH EVERY DAY 90 tablet 0   • esomeprazole (NEXIUM) 40 MG capsule Take 1 capsule by mouth Every Morning Before Breakfast. 90 capsule 3   • FLUoxetine (PROzac) 40 MG capsule Take 1 capsule by mouth Daily. 90 capsule 1   • furosemide (LASIX) 20 MG tablet Take 1 tablet by mouth 2 (Two) Times a Day. 180 tablet 3   • isosorbide mononitrate (IMDUR) 60 MG 24 hr tablet Take 1 tablet by mouth 2 (Two) Times a Day. 270 tablet 2   • mexiletine (MEXITIL) 150 MG capsule TAKE 2 CAPSULES BY MOUTH 3 (THREE) TIMES A DAY. 540 capsule 0   • nitroglycerin (NITRODUR) 0.2 MG/HR patch Place 1 patch on the skin Daily. Patch is 0.24 twelve hour patch     • oxyCODONE-acetaminophen (PERCOCET)  MG per tablet Take 1 tablet by mouth Every 6 (Six) Hours As Needed for Moderate Pain .     • sotalol (BETAPACE) 80 MG tablet TAKE 1/2 TABLET BY MOUTH TWICE A DAY 90 tablet 3   • azithromycin (ZITHROMAX Z-ANA LAURA) 250 MG tablet Take 2 tablets the first day, then 1 tablet daily for 4 days. 6 tablet 0   • guaiFENesin (MUCINEX) 600 MG 12 hr tablet Take 2 tablets by mouth 2 (Two) Times a Day. 60 tablet 1     No facility-administered medications prior to visit.        Patient Active Problem List   Diagnosis   •  Ischemic heart disease due to coronary artery obstruction (CMS/HCC)   • Osteoarthritis of multiple joints   • Cardiac pacemaker   • Gout of multiple sites   • Nephrolithiasis   • Mixed hyperlipidemia       Advanced Care Planning:  Patient does not have an advance directive - information provided to the patient today    Review of Systems   Respiratory: Negative for shortness of breath.    Cardiovascular: Negative for chest pain and leg swelling.        Sees cardiologist regularly   Gastrointestinal:        Cologuard -2018   Musculoskeletal: Positive for arthralgias and back pain.        Increasing pain left knee-severe DJD-puts at risk for falls-we will talk to cardiologist about replacement       Compared to one year ago, the patient feels his physical health is the same.  Compared to one year ago, the patient feels his mental health is the same.    Reviewed chart for potential of high risk medication in the elderly: yes  Reviewed chart for potential of harmful drug interactions in the elderly:yes    Objective         Vitals:    11/14/19 0853   BP: 110/76   BP Location: Right arm   Patient Position: Sitting   Cuff Size: Large Adult   Pulse: 69   Temp: 97.7 °F (36.5 °C)   TempSrc: Temporal   SpO2: 97%   Weight: 92 kg (202 lb 12.8 oz)   PainSc: 0-No pain       Body mass index is 30.84 kg/m².  Discussed the patient's BMI with him. The BMI is above average; no BMI management plan is appropriate..    Physical Exam   Constitutional: He is oriented to person, place, and time.   Mildly obese   HENT:   Right Ear: External ear normal.   Left Ear: External ear normal.   Mouth/Throat: Oropharynx is clear and moist.   Eyes: EOM are normal. Pupils are equal, round, and reactive to light.   Neck: No thyromegaly present.   Good carotid pulses no bruits   Cardiovascular: Normal rate and regular rhythm.   Pulmonary/Chest: Effort normal and breath sounds normal.   Abdominal: Soft. Bowel sounds are normal. He exhibits no mass.    Genitourinary:   Genitourinary Comments: Refuses   Musculoskeletal: He exhibits no edema.   Lymphadenopathy:     He has no cervical adenopathy.   Neurological: He is alert and oriented to person, place, and time.   Skin: Skin is warm and dry. Capillary refill takes less than 2 seconds.   Psychiatric: He has a normal mood and affect. His behavior is normal. Judgment and thought content normal.   Nursing note and vitals reviewed.            Assessment/Plan   Medicare Risks and Personalized Health Plan  CMS Preventative Services Quick Reference  Fall Risk  Prostate Cancer Screening     The above risks/problems have been discussed with the patient.  Pertinent information has been shared with the patient in the After Visit Summary.  Follow up plans and orders are seen below in the Assessment/Plan Section.    Diagnoses and all orders for this visit:    1. Vitamin D deficiency (Primary)  -     Vitamin D 25 Hydroxy    2. Mixed hyperlipidemia  -     Comprehensive Metabolic Panel  -     Lipid Panel    3. Fatigue, unspecified type  -     TSH  -     T4, free  -     CBC & Differential    4. Chronic gout with tophus, unspecified cause, unspecified site  -     Uric Acid    5. Hyperglycemia  -     Comprehensive Metabolic Panel  -     Hemoglobin A1c  -     POC Urinalysis Dipstick, Multipro    6. Special screening for malignant neoplasm of prostate  -     PSA Screen    7. Need for shingles vaccine  -     SHINGRIX 50 MCG/0.5ML reconstituted suspension; Inject 0.5 mL into the appropriate muscle as directed by prescriber 1 (One) Time for 1 dose.  Dispense: 1 each; Refill: 1    8. Annual physical exam      Follow Up:  Return in 6 months (on 5/14/2020).     An After Visit Summary and PPPS were given to the patient.         Plan above follow-up with cardiologist

## 2019-11-14 NOTE — PATIENT INSTRUCTIONS
Fall Prevention in the Home, Adult  Falls can cause injuries and can affect people from all age groups. There are many simple things that you can do to make your home safe and to help prevent falls. Ask for help when making these changes, if needed.  What actions can I take to prevent falls?  General instructions  · Use good lighting in all rooms. Replace any light bulbs that burn out.  · Turn on lights if it is dark. Use night-lights.  · Place frequently used items in easy-to-reach places. Lower the shelves around your home if necessary.  · Set up furniture so that there are clear paths around it. Avoid moving your furniture around.  · Remove throw rugs and other tripping hazards from the floor.  · Avoid walking on wet floors.  · Fix any uneven floor surfaces.  · Add color or contrast paint or tape to grab bars and handrails in your home. Place contrasting color strips on the first and last steps of stairways.  · When you use a stepladder, make sure that it is completely opened and that the sides are firmly locked. Have someone hold the ladder while you are using it. Do not climb a closed stepladder.  · Be aware of any and all pets.  What can I do in the bathroom?         · Keep the floor dry. Immediately clean up any water that spills onto the floor.  · Remove soap buildup in the tub or shower on a regular basis.  · Use non-skid mats or decals on the floor of the tub or shower.  · Attach bath mats securely with double-sided, non-slip rug tape.  · If you need to sit down while you are in the shower, use a plastic, non-slip stool.  · Install grab bars by the toilet and in the tub and shower. Do not use towel bars as grab bars.  What can I do in the bedroom?  · Make sure that a bedside light is easy to reach.  · Do not use oversized bedding that drapes onto the floor.  · Have a firm chair that has side arms to use for getting dressed.  What can I do in the kitchen?  · Clean up any spills right away.  · If you  need to reach for something above you, use a sturdy step stool that has a grab bar.  · Keep electrical cables out of the way.  · Do not use floor polish or wax that makes floors slippery. If you must use wax, make sure that it is non-skid floor wax.  What can I do in the stairways?  · Do not leave any items on the stairs.  · Make sure that you have a light switch at the top of the stairs and the bottom of the stairs. Have them installed if you do not have them.  · Make sure that there are handrails on both sides of the stairs. Fix handrails that are broken or loose. Make sure that handrails are as long as the stairways.  · Install non-slip stair treads on all stairs in your home.  · Avoid having throw rugs at the top or bottom of stairways, or secure the rugs with carpet tape to prevent them from moving.  · Choose a carpet design that does not hide the edge of steps on the stairway.  · Check any carpeting to make sure that it is firmly attached to the stairs. Fix any carpet that is loose or worn.  What can I do on the outside of my home?  · Use bright outdoor lighting.  · Regularly repair the edges of walkways and driveways and fix any cracks.  · Remove high doorway thresholds.  · Trim any shrubbery on the main path into your home.  · Regularly check that handrails are securely fastened and in good repair. Both sides of any steps should have handrails.  · Install guardrails along the edges of any raised decks or porches.  · Clear walkways of debris and clutter, including tools and rocks.  · Have leaves, snow, and ice cleared regularly.  · Use sand or salt on walkways during winter months.  · In the garage, clean up any spills right away, including grease or oil spills.  What other actions can I take?  · Wear closed-toe shoes that fit well and support your feet. Wear shoes that have rubber soles or low heels.  · Use mobility aids as needed, such as canes, walkers, scooters, and crutches.  · Review your medicines with  your health care provider. Some medicines can cause dizziness or changes in blood pressure, which increase your risk of falling.  Talk with your health care provider about other ways that you can decrease your risk of falls. This may include working with a physical therapist or  to improve your strength, balance, and endurance.  Where to find more information  · Centers for Disease Control and Prevention, STEADI: https://www.cdc.gov  · [place] on Aging: https://mt0bajl.wes.nih.gov  Contact a health care provider if:  · You are afraid of falling at home.  · You feel weak, drowsy, or dizzy at home.  · You fall at home.  Summary  · There are many simple things that you can do to make your home safe and to help prevent falls.  · Ways to make your home safe include removing tripping hazards and installing grab bars in the bathroom.  · Ask for help when making these changes in your home.  This information is not intended to replace advice given to you by your health care provider. Make sure you discuss any questions you have with your health care provider.  Document Released: 2003 Document Revised: 2018 Document Reviewed: 2018  [employer] Interactive Patient Education © 2019 [employer]    Medicare Wellness  Personal Prevention Plan of Service     Date of Office Visit:  2019  Encounter Provider:  Garret Salinas MD  Place of Service:  [facility]  Patient Name: Samy Velazquez  :  1945    As part of the Medicare Wellness portion of your visit today, we are providing you with this personalized preventive plan of services (PPPS). This plan is based upon recommendations of the [place] Preventive Services Task Force (USPSTF) and the Advisory Committee on Immunization Practices (ACIP).    This lists the preventive care services that should be considered, and provides dates of when you are due. Items listed as completed are up-to-date  and do not require any further intervention.    Health Maintenance   Topic Date Due   • ZOSTER VACCINE (1 of 2) 11/14/2020 (Originally 5/22/1997)   • MEDICARE ANNUAL WELLNESS  11/14/2020   • LIPID PANEL  11/14/2020   • HEPATITIS C SCREENING  Completed   • INFLUENZA VACCINE  Completed   • PNEUMOCOCCAL VACCINES (65+ LOW/MEDIUM RISK)  Completed   • COLONOSCOPY  Discontinued   • TDAP/TD VACCINES  Discontinued       No orders of the defined types were placed in this encounter.      Return in 6 months (on 5/14/2020).

## 2019-11-15 DIAGNOSIS — E03.9 ACQUIRED HYPOTHYROIDISM: Primary | ICD-10-CM

## 2019-11-15 LAB
25(OH)D3+25(OH)D2 SERPL-MCNC: 41.9 NG/ML (ref 30–100)
ALBUMIN SERPL-MCNC: 3.8 G/DL (ref 3.5–5.2)
ALBUMIN/GLOB SERPL: 1.2 G/DL
ALP SERPL-CCNC: 139 U/L (ref 39–117)
ALT SERPL-CCNC: 16 U/L (ref 1–41)
AST SERPL-CCNC: 20 U/L (ref 1–40)
BASOPHILS # BLD AUTO: 0.09 10*3/MM3 (ref 0–0.2)
BASOPHILS NFR BLD AUTO: 1.2 % (ref 0–1.5)
BILIRUB SERPL-MCNC: 0.8 MG/DL (ref 0.2–1.2)
BUN SERPL-MCNC: 19 MG/DL (ref 8–23)
BUN/CREAT SERPL: 12.7 (ref 7–25)
CALCIUM SERPL-MCNC: 10.1 MG/DL (ref 8.6–10.5)
CHLORIDE SERPL-SCNC: 102 MMOL/L (ref 98–107)
CHOLEST SERPL-MCNC: 128 MG/DL (ref 0–200)
CO2 SERPL-SCNC: 27.9 MMOL/L (ref 22–29)
CREAT SERPL-MCNC: 1.5 MG/DL (ref 0.76–1.27)
EOSINOPHIL # BLD AUTO: 0.54 10*3/MM3 (ref 0–0.4)
EOSINOPHIL NFR BLD AUTO: 7 % (ref 0.3–6.2)
ERYTHROCYTE [DISTWIDTH] IN BLOOD BY AUTOMATED COUNT: 13.8 % (ref 12.3–15.4)
GLOBULIN SER CALC-MCNC: 3.1 GM/DL
GLUCOSE SERPL-MCNC: 114 MG/DL (ref 65–99)
HBA1C MFR BLD: 6.3 % (ref 4.8–5.6)
HCT VFR BLD AUTO: 49.1 % (ref 37.5–51)
HDLC SERPL-MCNC: 35 MG/DL (ref 40–60)
HGB BLD-MCNC: 16.8 G/DL (ref 13–17.7)
IMM GRANULOCYTES # BLD AUTO: 0.03 10*3/MM3 (ref 0–0.05)
IMM GRANULOCYTES NFR BLD AUTO: 0.4 % (ref 0–0.5)
LDLC SERPL CALC-MCNC: 54 MG/DL (ref 0–100)
LYMPHOCYTES # BLD AUTO: 2.18 10*3/MM3 (ref 0.7–3.1)
LYMPHOCYTES NFR BLD AUTO: 28.1 % (ref 19.6–45.3)
MCH RBC QN AUTO: 32.1 PG (ref 26.6–33)
MCHC RBC AUTO-ENTMCNC: 34.2 G/DL (ref 31.5–35.7)
MCV RBC AUTO: 93.7 FL (ref 79–97)
MONOCYTES # BLD AUTO: 0.91 10*3/MM3 (ref 0.1–0.9)
MONOCYTES NFR BLD AUTO: 11.7 % (ref 5–12)
NEUTROPHILS # BLD AUTO: 4.01 10*3/MM3 (ref 1.7–7)
NEUTROPHILS NFR BLD AUTO: 51.6 % (ref 42.7–76)
NRBC BLD AUTO-RTO: 0.1 /100 WBC (ref 0–0.2)
PLATELET # BLD AUTO: 195 10*3/MM3 (ref 140–450)
POTASSIUM SERPL-SCNC: 4.8 MMOL/L (ref 3.5–5.2)
PROT SERPL-MCNC: 6.9 G/DL (ref 6–8.5)
PSA SERPL-MCNC: 1.86 NG/ML (ref 0–4)
RBC # BLD AUTO: 5.24 10*6/MM3 (ref 4.14–5.8)
SODIUM SERPL-SCNC: 142 MMOL/L (ref 136–145)
T4 FREE SERPL-MCNC: 0.92 NG/DL (ref 0.93–1.7)
TRIGL SERPL-MCNC: 196 MG/DL (ref 0–150)
TSH SERPL DL<=0.005 MIU/L-ACNC: 5.11 UIU/ML (ref 0.27–4.2)
URATE SERPL-MCNC: 4.3 MG/DL (ref 3.4–7)
VLDLC SERPL CALC-MCNC: 39.2 MG/DL
WBC # BLD AUTO: 7.76 10*3/MM3 (ref 3.4–10.8)

## 2019-11-15 RX ORDER — LEVOTHYROXINE SODIUM 0.05 MG/1
50 TABLET ORAL DAILY
Qty: 90 TABLET | Refills: 0 | Status: SHIPPED | OUTPATIENT
Start: 2019-11-15 | End: 2020-02-13

## 2019-11-20 ENCOUNTER — RESULTS ENCOUNTER (OUTPATIENT)
Dept: FAMILY MEDICINE CLINIC | Facility: CLINIC | Age: 74
End: 2019-11-20

## 2019-11-20 DIAGNOSIS — E03.9 ACQUIRED HYPOTHYROIDISM: ICD-10-CM

## 2019-11-25 RX ORDER — CLOPIDOGREL BISULFATE 75 MG/1
75 TABLET ORAL DAILY
Qty: 90 TABLET | Refills: 3 | Status: SHIPPED | OUTPATIENT
Start: 2019-11-25 | End: 2021-03-11

## 2019-12-02 RX ORDER — ESOMEPRAZOLE MAGNESIUM 40 MG/1
CAPSULE, DELAYED RELEASE ORAL
Qty: 90 CAPSULE | Refills: 3 | Status: SHIPPED | OUTPATIENT
Start: 2019-12-02 | End: 2020-05-15

## 2019-12-16 RX ORDER — ALLOPURINOL 300 MG/1
TABLET ORAL
Qty: 90 TABLET | Refills: 3 | Status: SHIPPED | OUTPATIENT
Start: 2019-12-16 | End: 2020-12-17 | Stop reason: SDUPTHER

## 2020-01-03 RX ORDER — NITROGLYCERIN 0.4 MG/1
0.4 TABLET SUBLINGUAL
Qty: 30 TABLET | Refills: 0 | Status: SHIPPED | OUTPATIENT
Start: 2020-01-03 | End: 2020-01-21

## 2020-01-03 RX ORDER — ATORVASTATIN CALCIUM 40 MG/1
TABLET, FILM COATED ORAL
Qty: 90 TABLET | Refills: 1 | Status: SHIPPED | OUTPATIENT
Start: 2020-01-03 | End: 2020-06-26

## 2020-01-06 RX ORDER — FLUOXETINE HYDROCHLORIDE 40 MG/1
CAPSULE ORAL
Qty: 90 CAPSULE | Refills: 3 | Status: SHIPPED | OUTPATIENT
Start: 2020-01-06 | End: 2020-08-18 | Stop reason: DRUGHIGH

## 2020-01-06 RX ORDER — FUROSEMIDE 20 MG/1
TABLET ORAL
Qty: 180 TABLET | Refills: 3 | Status: SHIPPED | OUTPATIENT
Start: 2020-01-06 | End: 2020-12-17

## 2020-01-15 LAB
T3FREE SERPL-MCNC: 3.5 PG/ML (ref 2–4.4)
T4 FREE SERPL-MCNC: 1.24 NG/DL (ref 0.93–1.7)
TSH SERPL DL<=0.005 MIU/L-ACNC: 3.87 UIU/ML (ref 0.27–4.2)

## 2020-01-21 RX ORDER — NITROGLYCERIN 0.4 MG/1
TABLET SUBLINGUAL
Qty: 25 TABLET | Refills: 2 | Status: ON HOLD | OUTPATIENT
Start: 2020-01-21 | End: 2022-07-19

## 2020-01-28 RX ORDER — MEXILETINE HYDROCHLORIDE 150 MG/1
300 CAPSULE ORAL 3 TIMES DAILY
Qty: 540 CAPSULE | Refills: 2 | Status: SHIPPED | OUTPATIENT
Start: 2020-01-28 | End: 2020-08-12

## 2020-01-31 RX ORDER — ISOSORBIDE MONONITRATE 60 MG/1
TABLET, EXTENDED RELEASE ORAL
Qty: 180 TABLET | Refills: 3 | Status: SHIPPED | OUTPATIENT
Start: 2020-01-31 | End: 2021-04-20

## 2020-02-13 DIAGNOSIS — E03.9 ACQUIRED HYPOTHYROIDISM: ICD-10-CM

## 2020-02-13 RX ORDER — LEVOTHYROXINE SODIUM 0.05 MG/1
TABLET ORAL
Qty: 90 TABLET | Refills: 3 | Status: SHIPPED | OUTPATIENT
Start: 2020-02-13 | End: 2021-02-08

## 2020-05-15 ENCOUNTER — OFFICE VISIT (OUTPATIENT)
Dept: FAMILY MEDICINE CLINIC | Facility: CLINIC | Age: 75
End: 2020-05-15

## 2020-05-15 VITALS — DIASTOLIC BLOOD PRESSURE: 90 MMHG | TEMPERATURE: 97.3 F | SYSTOLIC BLOOD PRESSURE: 130 MMHG

## 2020-05-15 DIAGNOSIS — R52 PAIN: Primary | ICD-10-CM

## 2020-05-15 PROCEDURE — 99213 OFFICE O/P EST LOW 20 MIN: CPT | Performed by: FAMILY MEDICINE

## 2020-05-15 RX ORDER — OMEPRAZOLE 40 MG/1
40 CAPSULE, DELAYED RELEASE ORAL DAILY
Qty: 90 CAPSULE | Refills: 1 | Status: SHIPPED | OUTPATIENT
Start: 2020-05-15 | End: 2020-11-19 | Stop reason: ALTCHOICE

## 2020-05-15 NOTE — TELEPHONE ENCOUNTER
[facility] CALLING FOR MEDICATION CHANGE D/T [facility].    CURRENTLY NEXIUM 40MG...OK TO CHANGE OMEPRAZOLE?

## 2020-05-15 NOTE — PROGRESS NOTES
Subjective   Samy Velazquez is a 74 y.o. male.     Patient fell 3 days ago and has hematoma right lateral thigh    Pain   This is a new problem. The current episode started in the past 7 days. The problem occurs constantly. The problem has been waxing and waning. Associated symptoms include myalgias. Pertinent negatives include no chest pain. Associated symptoms comments: Swelling hematoma right lateral thigh after fall. The symptoms are aggravated by walking. He has tried ice for the symptoms. The treatment provided mild relief.       The following portions of the patient's history were reviewed and updated as appropriate: allergies, current medications, past family history, past medical history, past social history, past surgical history and problem list.    Review of Systems   Cardiovascular: Negative for chest pain.   Musculoskeletal: Positive for gait problem and myalgias.       Objective   Physical Exam   Constitutional: He is oriented to person, place, and time.   Cognitively intact   Neurological: He is alert and oriented to person, place, and time.   Psychiatric: He has a normal mood and affect. His behavior is normal. Judgment and thought content normal.       Assessment/Plan   Samy was seen today for hip pain and knee pain.    Diagnoses and all orders for this visit:    Pain       Plan-x-rays to rule out fracture but sounds like lateral thigh hematoma-continue ice therapy--This visit has been rescheduled as a phone visit to comply with patient safety concerns in accordance with CDC recommendations. Total time of discussion was 10 minutes.

## 2020-06-25 ENCOUNTER — TRANSCRIBE ORDERS (OUTPATIENT)
Dept: ADMINISTRATIVE | Facility: HOSPITAL | Age: 75
End: 2020-06-25

## 2020-06-25 DIAGNOSIS — Z01.818 PREOP TESTING: Primary | ICD-10-CM

## 2020-06-26 RX ORDER — ATORVASTATIN CALCIUM 40 MG/1
TABLET, FILM COATED ORAL
Qty: 90 TABLET | Refills: 1 | Status: SHIPPED | OUTPATIENT
Start: 2020-06-26 | End: 2020-10-26

## 2020-06-27 ENCOUNTER — LAB (OUTPATIENT)
Dept: LAB | Facility: HOSPITAL | Age: 75
End: 2020-06-27

## 2020-06-27 PROCEDURE — C9803 HOPD COVID-19 SPEC COLLECT: HCPCS | Performed by: PAIN MEDICINE

## 2020-06-27 PROCEDURE — U0003 INFECTIOUS AGENT DETECTION BY NUCLEIC ACID (DNA OR RNA); SEVERE ACUTE RESPIRATORY SYNDROME CORONAVIRUS 2 (SARS-COV-2) (CORONAVIRUS DISEASE [COVID-19]), AMPLIFIED PROBE TECHNIQUE, MAKING USE OF HIGH THROUGHPUT TECHNOLOGIES AS DESCRIBED BY CMS-2020-01-R: HCPCS | Performed by: PAIN MEDICINE

## 2020-06-28 LAB
COVID LABCORP PRIORITY: NORMAL
SARS-COV-2 RNA RESP QL NAA+PROBE: NOT DETECTED

## 2020-07-31 RX ORDER — ONDANSETRON 4 MG/1
4 TABLET, FILM COATED ORAL EVERY 6 HOURS PRN
Qty: 10 TABLET | Refills: 0 | Status: SHIPPED | OUTPATIENT
Start: 2020-07-31 | End: 2021-09-08 | Stop reason: SDUPTHER

## 2020-07-31 NOTE — TELEPHONE ENCOUNTER
N/V/D STARTED TUES LATE AFTERNOON--HAS LOST 8 LBS SINCE Tuesday.    COULD NOT KEEP ANYTHING DOWN UNTIL TODAY--GRITS AND CUP OF COFFEE THIS AM-HAS KEPT DOWN THUS FAR.    C/O WEAKNESS TODAY      VITAL SIGNS ARE OK-NO FEVER, BP GOOD.       [facility]

## 2020-08-12 RX ORDER — MEXILETINE HYDROCHLORIDE 150 MG/1
300 CAPSULE ORAL 3 TIMES DAILY
Qty: 540 CAPSULE | Refills: 0 | Status: SHIPPED | OUTPATIENT
Start: 2020-08-12 | End: 2020-12-22

## 2020-08-12 RX ORDER — SOTALOL HYDROCHLORIDE 80 MG/1
TABLET ORAL
Qty: 90 TABLET | Refills: 0 | Status: SHIPPED | OUTPATIENT
Start: 2020-08-12 | End: 2021-01-18

## 2020-08-18 ENCOUNTER — OFFICE VISIT (OUTPATIENT)
Dept: FAMILY MEDICINE CLINIC | Facility: CLINIC | Age: 75
End: 2020-08-18

## 2020-08-18 ENCOUNTER — TELEPHONE (OUTPATIENT)
Dept: FAMILY MEDICINE CLINIC | Facility: CLINIC | Age: 75
End: 2020-08-18

## 2020-08-18 VITALS
OXYGEN SATURATION: 94 % | HEIGHT: 65 IN | HEART RATE: 64 BPM | WEIGHT: 214 LBS | SYSTOLIC BLOOD PRESSURE: 116 MMHG | TEMPERATURE: 97.4 F | RESPIRATION RATE: 18 BRPM | BODY MASS INDEX: 35.65 KG/M2 | DIASTOLIC BLOOD PRESSURE: 62 MMHG

## 2020-08-18 DIAGNOSIS — E78.2 MIXED HYPERLIPIDEMIA: ICD-10-CM

## 2020-08-18 DIAGNOSIS — R53.81 PHYSICAL DECONDITIONING: ICD-10-CM

## 2020-08-18 DIAGNOSIS — R27.0 ATAXIA: ICD-10-CM

## 2020-08-18 DIAGNOSIS — R53.83 FATIGUE, UNSPECIFIED TYPE: Primary | ICD-10-CM

## 2020-08-18 DIAGNOSIS — E11.43 TYPE 2 DIABETES MELLITUS WITH DIABETIC AUTONOMIC NEUROPATHY, WITHOUT LONG-TERM CURRENT USE OF INSULIN (HCC): ICD-10-CM

## 2020-08-18 DIAGNOSIS — M10.9 GOUT OF MULTIPLE SITES, UNSPECIFIED CAUSE, UNSPECIFIED CHRONICITY: ICD-10-CM

## 2020-08-18 PROCEDURE — 99214 OFFICE O/P EST MOD 30 MIN: CPT | Performed by: FAMILY MEDICINE

## 2020-08-18 RX ORDER — FLUOXETINE HYDROCHLORIDE 20 MG/1
20 CAPSULE ORAL DAILY
Qty: 90 CAPSULE | Refills: 3 | Status: SHIPPED | OUTPATIENT
Start: 2020-08-18 | End: 2021-08-31 | Stop reason: DRUGHIGH

## 2020-08-18 RX ORDER — DONEPEZIL HYDROCHLORIDE 5 MG/1
5 TABLET, FILM COATED ORAL NIGHTLY
Qty: 30 TABLET | Refills: 0 | Status: SHIPPED | OUTPATIENT
Start: 2020-08-18 | End: 2020-09-09

## 2020-08-18 NOTE — PROGRESS NOTES
Subjective   Samy Velazquez is a 75 y.o. male.     75-year-old male with fatigue ataxia decreased memory    Fatigue   This is a recurrent problem. The current episode started more than 1 month ago. The problem occurs daily. The problem has been unchanged. Associated symptoms include fatigue. Pertinent negatives include no abdominal pain, arthralgias, chest pain or headaches. The symptoms are aggravated by walking. He has tried nothing for the symptoms.     Vitals:    08/18/20 0929   BP: 116/62   Pulse: 64   Resp: 18   Temp: 97.4 °F (36.3 °C)   SpO2: 94%       The following portions of the patient's history were reviewed and updated as appropriate: allergies, current medications, past family history, past medical history, past social history, past surgical history and problem list.    Review of Systems   Constitutional: Positive for fatigue.   Respiratory: Negative for shortness of breath.    Cardiovascular: Negative for chest pain and leg swelling.   Gastrointestinal: Negative for abdominal pain.   Genitourinary: Negative for difficulty urinating.   Musculoskeletal: Positive for gait problem. Negative for arthralgias and back pain.   Neurological: Negative for headaches.        Abnormal dreams, memory loss, ataxia       Objective   Physical Exam   Constitutional: He is oriented to person, place, and time.   Obesity   Eyes: Pupils are equal, round, and reactive to light. EOM are normal.   Cardiovascular: Normal rate.   Pulmonary/Chest: Effort normal and breath sounds normal.   Abdominal: Soft.   Musculoskeletal: He exhibits no edema.   Neurological: He is alert and oriented to person, place, and time.   No neurologic focality   Skin: Skin is warm and dry.   Psychiatric: He has a normal mood and affect. His behavior is normal. Judgment and thought content normal.   Nursing note and vitals reviewed.      Patient's Body mass index is 35.61 kg/m². BMI is above normal parameters. Recommendations include: none (medical  contraindication).      Assessment/Plan   Patient Active Problem List   Diagnosis   • Ischemic heart disease due to coronary artery obstruction (CMS/HCC)   • Osteoarthritis of multiple joints   • Cardiac pacemaker   • Gout of multiple sites   • Nephrolithiasis   • Mixed hyperlipidemia     Samy was seen today for follow-up.    Diagnoses and all orders for this visit:    Fatigue, unspecified type  -     TSH  -     T4, free  -     CBC & Differential    Mixed hyperlipidemia  -     Comprehensive Metabolic Panel  -     Lipid Panel    Type 2 diabetes mellitus with diabetic autonomic neuropathy, without long-term current use of insulin (CMS/[facility])  -     Vitamin B12  -     Folate  -     Hemoglobin A1c    Gout of multiple sites, unspecified cause, unspecified chronicity  -     Uric Acid    Ataxia  -     CT Head Without Contrast; Future    Physical deconditioning  -     Ambulatory Referral to Physical Therapy    Other orders  -     donepezil (ARICEPT) 5 MG tablet; Take 1 tablet by mouth Every Night.       Return in about 4 weeks (around 9/15/2020).       Plan above plus PT CT of head reduce Prozac-may need Aricept      Electronically signed by Garret Salinas MD 08/18/2020

## 2020-08-18 NOTE — TELEPHONE ENCOUNTER
Beth called.  Patient has not been taking Prozac.  She is not sure when or why patient stopped taking medication.    Spoke with [facility].  Last filled on 6.29.2020 #90

## 2020-08-19 LAB
ALBUMIN SERPL-MCNC: 3.7 G/DL (ref 3.5–5.2)
ALBUMIN/GLOB SERPL: 1.6 G/DL
ALP SERPL-CCNC: 116 U/L (ref 39–117)
ALT SERPL-CCNC: 18 U/L (ref 1–41)
AST SERPL-CCNC: 20 U/L (ref 1–40)
BASOPHILS # BLD AUTO: 0.07 10*3/MM3 (ref 0–0.2)
BASOPHILS NFR BLD AUTO: 1.1 % (ref 0–1.5)
BILIRUB SERPL-MCNC: 0.4 MG/DL (ref 0–1.2)
BUN SERPL-MCNC: 16 MG/DL (ref 8–23)
BUN/CREAT SERPL: 11.3 (ref 7–25)
CALCIUM SERPL-MCNC: 9.4 MG/DL (ref 8.6–10.5)
CHLORIDE SERPL-SCNC: 105 MMOL/L (ref 98–107)
CHOLEST SERPL-MCNC: 131 MG/DL (ref 0–200)
CO2 SERPL-SCNC: 27.5 MMOL/L (ref 22–29)
CREAT SERPL-MCNC: 1.41 MG/DL (ref 0.76–1.27)
EOSINOPHIL # BLD AUTO: 0.6 10*3/MM3 (ref 0–0.4)
EOSINOPHIL NFR BLD AUTO: 9.6 % (ref 0.3–6.2)
ERYTHROCYTE [DISTWIDTH] IN BLOOD BY AUTOMATED COUNT: 13.8 % (ref 12.3–15.4)
FOLATE SERPL-MCNC: 10.8 NG/ML (ref 4.78–24.2)
GLOBULIN SER CALC-MCNC: 2.3 GM/DL
GLUCOSE SERPL-MCNC: 118 MG/DL (ref 65–99)
HBA1C MFR BLD: 6.4 % (ref 4.8–5.6)
HCT VFR BLD AUTO: 43.6 % (ref 37.5–51)
HDLC SERPL-MCNC: 32 MG/DL (ref 40–60)
HGB BLD-MCNC: 14.3 G/DL (ref 13–17.7)
IMM GRANULOCYTES # BLD AUTO: 0.01 10*3/MM3 (ref 0–0.05)
IMM GRANULOCYTES NFR BLD AUTO: 0.2 % (ref 0–0.5)
LDLC SERPL CALC-MCNC: 67 MG/DL (ref 0–100)
LYMPHOCYTES # BLD AUTO: 1.74 10*3/MM3 (ref 0.7–3.1)
LYMPHOCYTES NFR BLD AUTO: 27.9 % (ref 19.6–45.3)
MCH RBC QN AUTO: 29.2 PG (ref 26.6–33)
MCHC RBC AUTO-ENTMCNC: 32.8 G/DL (ref 31.5–35.7)
MCV RBC AUTO: 89 FL (ref 79–97)
MONOCYTES # BLD AUTO: 0.65 10*3/MM3 (ref 0.1–0.9)
MONOCYTES NFR BLD AUTO: 10.4 % (ref 5–12)
NEUTROPHILS # BLD AUTO: 3.17 10*3/MM3 (ref 1.7–7)
NEUTROPHILS NFR BLD AUTO: 50.8 % (ref 42.7–76)
NRBC BLD AUTO-RTO: 0 /100 WBC (ref 0–0.2)
PLATELET # BLD AUTO: 221 10*3/MM3 (ref 140–450)
POTASSIUM SERPL-SCNC: 4.6 MMOL/L (ref 3.5–5.2)
PROT SERPL-MCNC: 6 G/DL (ref 6–8.5)
RBC # BLD AUTO: 4.9 10*6/MM3 (ref 4.14–5.8)
SODIUM SERPL-SCNC: 141 MMOL/L (ref 136–145)
T4 FREE SERPL-MCNC: 1.2 NG/DL (ref 0.93–1.7)
TRIGL SERPL-MCNC: 161 MG/DL (ref 0–150)
TSH SERPL DL<=0.005 MIU/L-ACNC: 1.79 UIU/ML (ref 0.27–4.2)
URATE SERPL-MCNC: 4 MG/DL (ref 3.4–7)
VIT B12 SERPL-MCNC: 507 PG/ML (ref 211–946)
VLDLC SERPL CALC-MCNC: 32.2 MG/DL
WBC # BLD AUTO: 6.24 10*3/MM3 (ref 3.4–10.8)

## 2020-08-24 ENCOUNTER — TREATMENT (OUTPATIENT)
Dept: PHYSICAL THERAPY | Facility: CLINIC | Age: 75
End: 2020-08-24

## 2020-08-24 DIAGNOSIS — R26.89 BALANCE PROBLEM: ICD-10-CM

## 2020-08-24 DIAGNOSIS — R53.81 PHYSICAL DECONDITIONING: Primary | ICD-10-CM

## 2020-08-24 PROCEDURE — 97161 PT EVAL LOW COMPLEX 20 MIN: CPT | Performed by: PHYSICAL THERAPIST

## 2020-08-24 PROCEDURE — 97110 THERAPEUTIC EXERCISES: CPT | Performed by: PHYSICAL THERAPIST

## 2020-08-24 NOTE — PROGRESS NOTES
Physical Therapy Initial Evaluation and Plan of Care      Patient: Samy Velazquez   : 1945  Diagnosis/ICD-10 Code:  Physical deconditioning [R53.81]  Referring practitioner: Garret Salinas, *  Date of Initial Visit: 2020  Today's Date: 2020  Patient seen for 1 sessions    Recheck due: 20  MD appt: 20  Auth: Medicare guidelines         Functional Outcome Measure: TU seconds with RW      Subjective Evaluation    History of Present Illness  Onset date: Chronic.  Mechanism of injury: Pt presents with BLE weakness & balance deficits. 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 Needs L knee replaced. Reports he can be walking and his L knee will give out without warning. Very bad about keeping his balance, especially with turning L>R; feels like his feet get tangled up. Walks with a RW when he does ambulate but around the house doesn't use one much. Has various railings set up in the house and cruises furniture. Has had several falls this year. Knows his fallen in May, , & July. Hasn't fallen this month. Spends time outside in his shop, has fallen out there several times. Has one small step to enter back room of house; no other steps present. Has chronic low back pain which limits his mobility as well. Walking for longer distances does cause his back to hurt. Sleeps in recliner at times, дмитрий if his back is hurting - its a heating/massage chair      Patient Occupation: Pt is retired. 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  Current pain rating: 3  At best pain rating: 3  At worst pain ratin  Quality: dull ache  Relieving factors: change in position, rest and heat  Aggravating factors: ambulation, repetitive movement, standing, stairs, sleeping and squatting    Social Support  Lives in: one-story house  Lives with: spouse    Diagnostic Tests  No diagnostic tests performed    Patient Goals  Patient goals for therapy: increased strength, decreased pain, improved balance, independence  "with ADLs/IADLs and return to sport/leisure activities  Patient goal: \"I don't want to fall as much; want better balance\"           Objective          Static Posture     Comments  Decreased overall postural awareness. Slumped seated posture. Slightly forward flexed at hips static stance. Notable weakness in core and postural muscles.    Active Range of Motion     Additional Active Range of Motion Details  - Bilateral hip AROM WFL  - Bilateral knee flex/ext WFL; more limited into L knee flexion  - Bilateral ankle AROM WFL    Limited shoulder ROM bilaterally L>R with shoulder elevation.    Strength/Myotome Testing     Left Hip   Planes of Motion   Flexion: 4-  Abduction: 3+  Adduction: 4-    Right Hip   Planes of Motion   Flexion: 4-  Abduction: 3+  Adduction: 4-    Additional Strength Details  - R knee flex/ext MMT 4/5  - L knee flex 4/5, L knee ext 4-/5  - R ankle DF/PF 5/5  - L ankle DF/PF 4+/5      Ambulation     Comments   Presents in transport wheelchair. Ambulates intermittently throughout eval with RW. Slow sepideh, narrow SUSIE, decreased step height & length. Needs cues to keep RW in front and get closer to chair before sitting from standing.    Functional Assessment     Comments  Static balance:  Level ground: FA/EO 30\" with mild sway; FT/EO 25\" with increased lateral sway bilateral  Airex pad: FA/EO 14\" with moderate lateral sway - decreased overall LE control L>R; FT/EO 6\"          Assessment & Plan     Assessment  Impairments: abnormal gait, activity intolerance, impaired balance, impaired physical strength, lacks appropriate home exercise program and pain with function  Assessment details: Pt is a 75 y.o. Male presenting with deficits in dynamic balance, BLE functional strength, and core/postural stability which is limiting performance of functional mobility/gait and daily activities. Pt with history of frequent falls over the past year. Pt also has chronic low back and L knee pain which could also be a " "contributing factor to his limited functional activity tolerance/performance. Pt ambulates with very narrow SUSIE with slow sepideh with use of RW. Challenged more so with turning and tends to be more unsteady. Difficult to perform sit to stand from transport chair and required min assist to do so. Pt fatigues easily with initial seated therex activities for HEP administration. Pt will benefit from skilled PT services to address these deficits for improvements in overall functional BLE strength/endurance, dynamic balance, safety/performance with gait/transfers, core/postural stability, and functional activity tolerance.  Prognosis: good  Functional Limitations: walking, moving in bed, standing and unable to perform repetitive tasks  Goals  Plan Goals: STG's by 9/14/20  1) Demonstrate independence/compliance in performance of HEP  2) Improve TUG score to 45-50\" with use of RW to demonstrate improved safety/performance with functional mobility  3) Demonstrate improved bilateral hip flex MMT to 4/5  4) Ambulate 50 ft safely with RW demonstrating improved step length/height with minimal cuing needed  5) Perform consecutive 1x5 repetitive sit to stands from airex inch, BUE as needed    LTG's by 10/5/20  1) Subjectively report 50% overall improvement or greater  2) Improve TUG score to 35\" or less with use of RW to demonstrate improved safety/performance with functional mobility  3) Demonstrate improved bilateral hip abd MMT to 4/5  4) Demonstrate improved bilateral knee flex/ext MMT 4+/5 or greater  5) Perform static stance on airex with feet apart for 30\" with LOB  6) Perform low level obstacle course safely in parallel bars consisting of compliant surface, jhon, & cone negotiation; SBA/CGA as needed     Plan  Therapy options: will be seen for skilled physical therapy services  Planned modality interventions: thermotherapy (hydrocollator packs)  Planned therapy interventions: abdominal trunk stabilization, " balance/weight-bearing training, body mechanics training, flexibility, gait training, home exercise program, neuromuscular re-education, postural training, strengthening, stretching and therapeutic activities  Duration in visits: 16  Treatment plan discussed with: patient  Plan details: Focus on overall BLE functional strengthening/endurance, core/postural stability. Work on static/dynamic balance activities, gait activities. Continue seated therex and progress towards CKC strengthening activities. Transfer safety.         Timed:  Manual Therapy:         mins  78282;  Therapeutic Exercise:    14     mins  36680;     Neuromuscular Rupert:        mins  12583;    Therapeutic Activity:          mins  40807;     Gait Training:           mins  30949;     Ultrasound:          mins  61297;    Electrical Stimulation:         mins  25888 ( );    Untimed:  Electrical Stimulation:         mins  69735 ( );  Mechanical Traction:         mins  21891;     Timed Treatment:   14   mins   Total Treatment:     43   mins    PT SIGNATURE: Sharon Montesinos, YENIFER   DATE TREATMENT INITIATED: 8/24/2020    Initial Certification  Certification Period: 11/22/2020  I certify that the therapy services are furnished while this patient is under my care.  The services outlined above are required by this patient, and will be reviewed every 90 days.     PHYSICIAN: Garret Salinas MD      DATE:     Please sign and return via fax to  .. Thank you, [facility].

## 2020-08-26 ENCOUNTER — TREATMENT (OUTPATIENT)
Dept: PHYSICAL THERAPY | Facility: CLINIC | Age: 75
End: 2020-08-26

## 2020-08-26 DIAGNOSIS — R53.81 PHYSICAL DECONDITIONING: Primary | ICD-10-CM

## 2020-08-26 DIAGNOSIS — R26.89 BALANCE PROBLEM: ICD-10-CM

## 2020-08-26 PROCEDURE — 97110 THERAPEUTIC EXERCISES: CPT | Performed by: PHYSICAL THERAPIST

## 2020-08-26 NOTE — PROGRESS NOTES
"   Physical Therapy Daily Progress Note      Patient: Samy Velazquez   : 1945  Referring practitioner: Garret Salinas, *  Date of Initial Visit: Type: THERAPY  Noted: 2020  Today's Date: 2020  Patient seen for 2 sessions    Recheck due: 20  MD appt: 20  Auth: Medicare guidelines       Samy Velazquez reports: no change yet        Subjective Evaluation    History of Present Illness    Subjective comment: pt states that he hasnt done a lot today. States that he has been working on his exercsies at home.Pain  Current pain ratin           Objective          Ambulation     Comments   Presents in transport chair. Does well with activities at HR with BUE assist      See Exercise, Manual, and Modality Logs for complete treatment.       Assessment & Plan     Assessment  Assessment details: Pt did well with treatment- did require cues for form and posture with seated therex. Pt did well with activities at HR- able to preform gait activities with unilateral HR assist and SBA from PTA. Does require rest breaks after standing activity    Goals  Plan Goals: STG's by 20  1) Demonstrate independence/compliance in performance of HEP  2) Improve TUG score to 45-50\" with use of RW to demonstrate improved safety/performance with functional mobility  3) Demonstrate improved bilateral hip flex MMT to 4/5  4) Ambulate 50 ft safely with RW demonstrating improved step length/height with minimal cuing needed  5) Perform consecutive 1x5 repetitive sit to stands from airex inch, BUE as needed    LTG's by 10/5/20  1) Subjectively report 50% overall improvement or greater  2) Improve TUG score to 35\" or less with use of RW to demonstrate improved safety/performance with functional mobility  3) Demonstrate improved bilateral hip abd MMT to 4/5  4) Demonstrate improved bilateral knee flex/ext MMT 4+/5 or greater  5) Perform static stance on airex with feet apart for 30\" with LOB  6) Perform low level obstacle " course safely in parallel bars consisting of compliant surface, jhon, & cone negotiation; SBA/CGA as needed         Plan  Duration in visits: 16  Plan details: Try adding balance activites in // bars or at HR next visit. Try FA/EO, FT/EO to start with         Visit Diagnoses:    ICD-10-CM ICD-9-CM   1. Physical deconditioning R53.81 799.3   2. Balance problem R26.89 781.99       Progress per Plan of Care and Progress strengthening /stabilization /functional activity           Timed:  Manual Therapy:         mins  81557;  Therapeutic Exercise:    45     mins  23762;     Neuromuscular Rupert:        mins  68262;    Therapeutic Activity:          mins  31008;     Gait Training:           mins  35804;     Ultrasound:          mins  85943;    Electrical Stimulation:         mins  52395 ( );    Untimed:  Electrical Stimulation:         mins  55959 ( );  Mechanical Traction:         mins  15299;     Timed Treatment:   45   mins   Total Treatment:     45   mins  Haydee Barriga, ZANA  Physical Therapist

## 2020-08-31 ENCOUNTER — TREATMENT (OUTPATIENT)
Dept: PHYSICAL THERAPY | Facility: CLINIC | Age: 75
End: 2020-08-31

## 2020-08-31 DIAGNOSIS — R53.81 PHYSICAL DECONDITIONING: Primary | ICD-10-CM

## 2020-08-31 DIAGNOSIS — R26.89 BALANCE PROBLEM: ICD-10-CM

## 2020-08-31 PROCEDURE — 97110 THERAPEUTIC EXERCISES: CPT | Performed by: PHYSICAL THERAPIST

## 2020-08-31 PROCEDURE — 97530 THERAPEUTIC ACTIVITIES: CPT | Performed by: PHYSICAL THERAPIST

## 2020-08-31 NOTE — PROGRESS NOTES
"   Physical Therapy Daily Progress Note      Patient: Samy Velazquez   : 1945  Referring practitioner: Garret Salinas, *  Date of Initial Visit: Type: THERAPY  Noted: 2020  Today's Date: 2020  Patient seen for 3 sessions    Recheck due: 20  MD appt: 20  Auth: Medicare guidelines     Samy Velazquez reports: no change  Subjective Evaluation    History of Present Illness    Subjective comment: pt states he doesn't feel good today; just lethargyPain  Current pain ratin           Objective          Static Posture     Comments  Poor posture, seated.    Standing: poor stabilization/hold against very gentle perturbations      See Exercise, Manual, and Modality Logs for complete treatment.       Assessment & Plan     Assessment  Assessment details: Pt needed cues to improve exc form and control; very quickly fatigued; poor hold in standing perturbations. Did kenan adding gentle resistance.    Goals  Plan Goals: STG's by 20  1) Demonstrate independence/compliance in performance of HEP  2) Improve TUG score to 45-50\" with use of RW to demonstrate improved safety/performance with functional mobility  3) Demonstrate improved bilateral hip flex MMT to 4/5  4) Ambulate 50 ft safely with RW demonstrating improved step length/height with minimal cuing needed  5) Perform consecutive 1x5 repetitive sit to stands from airex inch, BUE as needed    LTG's by 10/5/20  1) Subjectively report 50% overall improvement or greater  2) Improve TUG score to 35\" or less with use of RW to demonstrate improved safety/performance with functional mobility  3) Demonstrate improved bilateral hip abd MMT to 4/5  4) Demonstrate improved bilateral knee flex/ext MMT 4+/5 or greater  5) Perform static stance on airex with feet apart for 30\" with LOB  6) Perform low level obstacle course safely in parallel bars consisting of compliant surface, jhon, & cone negotiation; SBA/CGA as needed         Plan  Duration in visits: " 16  Plan details: Possible alt standing hip abd, hip ext        Visit Diagnoses:    ICD-10-CM ICD-9-CM   1. Physical deconditioning R53.81 799.3   2. Balance problem R26.89 781.99       Progress per Plan of Care           Timed:  Manual Therapy:         mins  51714;  Therapeutic Exercise:    30     mins  33848;     Neuromuscular Rupert:        mins  97869;    Therapeutic Activity:     15     mins  25632;     Gait Training:           mins  72085;     Ultrasound:          mins  83987;    Electrical Stimulation:         mins  08429 ( );    Untimed:  Electrical Stimulation:         mins  62840 ( );  Mechanical Traction:         mins  53166;  Paraffin                               mins  11764;     Timed Treatment:   45   mins   Total Treatment:     45   mins  Kristin Dillard PTA  Physical Therapist

## 2020-09-02 ENCOUNTER — TREATMENT (OUTPATIENT)
Dept: PHYSICAL THERAPY | Facility: CLINIC | Age: 75
End: 2020-09-02

## 2020-09-02 DIAGNOSIS — R26.89 BALANCE PROBLEM: ICD-10-CM

## 2020-09-02 DIAGNOSIS — R53.81 PHYSICAL DECONDITIONING: Primary | ICD-10-CM

## 2020-09-02 PROCEDURE — 97530 THERAPEUTIC ACTIVITIES: CPT | Performed by: PHYSICAL THERAPIST

## 2020-09-02 PROCEDURE — 97110 THERAPEUTIC EXERCISES: CPT | Performed by: PHYSICAL THERAPIST

## 2020-09-02 NOTE — PROGRESS NOTES
"   Physical Therapy Daily Progress Note      Patient: Samy Velazquez   : 1945  Referring practitioner: Garret Salinas, *  Date of Initial Visit: Type: THERAPY  Noted: 2020  Today's Date: 2020  Patient seen for 4 sessions    Recheck due: 20  MD appt: 20  Auth: Medicare guidelines     Samy Velazquez reports: \"about the same\"      Subjective     Objective          Static Posture     Comments  Standing on airex + mild sway      See Exercise, Manual, and Modality Logs for complete treatment.       Assessment & Plan     Assessment  Assessment details: Pt was kenan standing therex reasonably well for him until he stood on airex pad. Pt then c/o's of \"shanae dizzy, just not feeling right\". Cont to fatigue easily but kenan more exc. Did well with RW and standing stability with scap excs-better than perturbations.    Goals  Plan Goals: STG's by 20  1) Demonstrate independence/compliance in performance of HEP  2) Improve TUG score to 45-50\" with use of RW to demonstrate improved safety/performance with functional mobility  3) Demonstrate improved bilateral hip flex MMT to 4/5  4) Ambulate 50 ft safely with RW demonstrating improved step length/height with minimal cuing needed  5) Perform consecutive 1x5 repetitive sit to stands from airex inch, BUE as needed    LTG's by 10/5/20  1) Subjectively report 50% overall improvement or greater  2) Improve TUG score to 35\" or less with use of RW to demonstrate improved safety/performance with functional mobility  3) Demonstrate improved bilateral hip abd MMT to 4/5  4) Demonstrate improved bilateral knee flex/ext MMT 4+/5 or greater  5) Perform static stance on airex with feet apart for 30\" with LOB  6) Perform low level obstacle course safely in parallel bars consisting of compliant surface, jhon, & cone negotiation; SBA/CGA as needed         Plan  Duration in visits: 16  Plan details: Progress as kenan; possible hold airex; // bars f/b gait        Visit " Diagnoses:    ICD-10-CM ICD-9-CM   1. Physical deconditioning R53.81 799.3   2. Balance problem R26.89 781.99       Progress per Plan of Care           Timed:  Manual Therapy:         mins  59048;  Therapeutic Exercise:    25     mins  26845;     Neuromuscular Rupert:        mins  96261;    Therapeutic Activity:     15     mins  17021;     Gait Training:           mins  53638;     Ultrasound:          mins  04288;    Electrical Stimulation:         mins  83631 ( );    Untimed:  Electrical Stimulation:         mins  12969 ( );  Mechanical Traction:         mins  74817;  Paraffin                               mins  90197;     Timed Treatment:   40   mins   Total Treatment:     40   mins  Kristin Dillard PTA  Physical Therapist

## 2020-09-08 ENCOUNTER — TREATMENT (OUTPATIENT)
Dept: PHYSICAL THERAPY | Facility: CLINIC | Age: 75
End: 2020-09-08

## 2020-09-08 DIAGNOSIS — R26.89 BALANCE PROBLEM: ICD-10-CM

## 2020-09-08 DIAGNOSIS — R53.81 PHYSICAL DECONDITIONING: Primary | ICD-10-CM

## 2020-09-08 PROCEDURE — 97110 THERAPEUTIC EXERCISES: CPT | Performed by: PHYSICAL THERAPIST

## 2020-09-08 PROCEDURE — 97530 THERAPEUTIC ACTIVITIES: CPT | Performed by: PHYSICAL THERAPIST

## 2020-09-08 NOTE — PROGRESS NOTES
"   Physical Therapy Daily Progress Note      Patient: Samy Vealzquez   : 1945  Referring practitioner: Garret Salinas, *  Date of Initial Visit: Type: THERAPY  Noted: 2020  Today's Date: 2020  Patient seen for 5 sessions    Recheck due: 20  MD appt: 20  Auth: Medicare guidelines     Samy Velazquez reports: \"a little better since PT started\"      Subjective     Objective   See Exercise, Manual, and Modality Logs for complete treatment.       Assessment & Plan     Assessment  Assessment details: Pt kenan gentle progressions to standing fxn stability and strength as well as dynamic balance. Good effort and particpation. Static balance with head turns still challenged but did have pt with feet closer together. // bars needed for advancing dynamic balance safely.    Goals  Plan Goals: STG's by 20  1) Demonstrate independence/compliance in performance of HEP  2) Improve TUG score to 45-50\" with use of RW to demonstrate improved safety/performance with functional mobility  3) Demonstrate improved bilateral hip flex MMT to 4/5  4) Ambulate 50 ft safely with RW demonstrating improved step length/height with minimal cuing needed  5) Perform consecutive 1x5 repetitive sit to stands from airex inch, BUE as needed    LTG's by 10/5/20  1) Subjectively report 50% overall improvement or greater  2) Improve TUG score to 35\" or less with use of RW to demonstrate improved safety/performance with functional mobility  3) Demonstrate improved bilateral hip abd MMT to 4/5  4) Demonstrate improved bilateral knee flex/ext MMT 4+/5 or greater  5) Perform static stance on airex with feet apart for 30\" with LOB  6) Perform low level obstacle course safely in parallel bars consisting of compliant surface, jhon, & cone negotiation; SBA/CGA as needed         Plan  Duration in visits: 16  Plan details: Cont // bars dynamic balance, possibly airex or bench step ups in // bars; RC nxt wk        Visit Diagnoses:    " ICD-10-CM ICD-9-CM   1. Physical deconditioning R53.81 799.3   2. Balance problem R26.89 781.99       Progress per Plan of Care and Progress strengthening /stabilization /functional activity    PT telma nxt wk       Timed:  Manual Therapy:         mins  66761;  Therapeutic Exercise:    15     mins  02133;     Neuromuscular Rupert:        mins  97981;    Therapeutic Activity:     25     mins  24974;     Gait Training:           mins  23754;     Ultrasound:          mins  01424;    Electrical Stimulation:         mins  25128 ( );    Untimed:  Electrical Stimulation:         mins  94080 ( );  Mechanical Traction:         mins  94064;  Paraffin                               mins  27629;     Timed Treatment:   40   mins   Total Treatment:     40   mins  Kristin Dillard PTA  Physical Therapist

## 2020-09-09 RX ORDER — DONEPEZIL HYDROCHLORIDE 10 MG/1
10 TABLET, FILM COATED ORAL
Qty: 30 TABLET | Refills: 3 | Status: SHIPPED | OUTPATIENT
Start: 2020-09-09 | End: 2020-12-17

## 2020-09-10 ENCOUNTER — TREATMENT (OUTPATIENT)
Dept: PHYSICAL THERAPY | Facility: CLINIC | Age: 75
End: 2020-09-10

## 2020-09-10 DIAGNOSIS — R53.81 PHYSICAL DECONDITIONING: Primary | ICD-10-CM

## 2020-09-10 DIAGNOSIS — R26.89 BALANCE PROBLEM: ICD-10-CM

## 2020-09-10 PROCEDURE — 97110 THERAPEUTIC EXERCISES: CPT | Performed by: PHYSICAL THERAPIST

## 2020-09-10 PROCEDURE — 97530 THERAPEUTIC ACTIVITIES: CPT | Performed by: PHYSICAL THERAPIST

## 2020-09-10 NOTE — PROGRESS NOTES
"   Physical Therapy Daily Progress Note      Patient: Samy Velazquez   : 1945  Referring practitioner: Garret Salinas, *  Date of Initial Visit: Type: THERAPY  Noted: 2020  Today's Date: 9/10/2020  Patient seen for 6 sessions    Recheck due: 20  MD appt: 20  Auth: Medicare guidelines     Samy Velazquez reports: \"a little better\"      Subjective Evaluation    History of Present Illness    Subjective comment: \"OK I guess; no problem after PT\" Pt c/o's incr LBP with sidestep and TbandPain  Current pain rating: 3           Objective          Ambulation     Comments   // bars: tends to scissor some and toe out. Knees(R>L) today buckled some but patient able to correct.      See Exercise, Manual, and Modality Logs for complete treatment.       Assessment & Plan     Assessment  Assessment details: Pt able to gradually progress but has to be monitored closely because his LE's get weak quickly; works hard and appears compliant to HEP. He was more steady with head movements in regular stance. Tends to clip jhon in // bar obstacle course.    Goals  Plan Goals: STG's by 20  1) Demonstrate independence/compliance in performance of HEP  2) Improve TUG score to 45-50\" with use of RW to demonstrate improved safety/performance with functional mobility  3) Demonstrate improved bilateral hip flex MMT to 4/5  4) Ambulate 50 ft safely with RW demonstrating improved step length/height with minimal cuing needed  5) Perform consecutive 1x5 repetitive sit to stands from airex inch, BUE as needed    LTG's by 10/5/20  1) Subjectively report 50% overall improvement or greater  2) Improve TUG score to 35\" or less with use of RW to demonstrate improved safety/performance with functional mobility  3) Demonstrate improved bilateral hip abd MMT to 4/5  4) Demonstrate improved bilateral knee flex/ext MMT 4+/5 or greater  5) Perform static stance on airex with feet apart for 30\" with LOB  6) Perform low level obstacle " course safely in parallel bars consisting of compliant surface, jhon, & cone negotiation; SBA/CGA as needed         Plan  Duration in visits: 16  Plan details:  RC nxt wk/progress as advised. ? Pro II for conditioning and endurance        Visit Diagnoses:    ICD-10-CM ICD-9-CM   1. Physical deconditioning R53.81 799.3   2. Balance problem R26.89 781.99       Progress per Plan of Care, Progress strengthening /stabilization /functional activity and Other    RC nxt wk       Timed:  Manual Therapy:         mins  94920;  Therapeutic Exercise:    25     mins  19894;     Neuromuscular Rupert:        mins  54239;    Therapeutic Activity:     20     mins  66109;     Gait Training:           mins  09562;     Ultrasound:          mins  18347;    Electrical Stimulation:         mins  15678 ( );    Untimed:  Electrical Stimulation:         mins  39738 ( );  Mechanical Traction:         mins  74810;  Paraffin                               mins  71870;     Timed Treatment:   45   mins   Total Treatment:     45   mins  Kristin Dillard PTA  Physical Therapist

## 2020-09-15 ENCOUNTER — TREATMENT (OUTPATIENT)
Dept: PHYSICAL THERAPY | Facility: CLINIC | Age: 75
End: 2020-09-15

## 2020-09-15 DIAGNOSIS — R53.81 PHYSICAL DECONDITIONING: Primary | ICD-10-CM

## 2020-09-15 DIAGNOSIS — R26.89 BALANCE PROBLEM: ICD-10-CM

## 2020-09-15 PROCEDURE — 97110 THERAPEUTIC EXERCISES: CPT | Performed by: PHYSICAL THERAPIST

## 2020-09-15 PROCEDURE — 97530 THERAPEUTIC ACTIVITIES: CPT | Performed by: PHYSICAL THERAPIST

## 2020-09-15 NOTE — PROGRESS NOTES
"   Physical Therapy Daily Progress Note      Patient: Samy Velazquez   : 1945  Referring practitioner: Garret Salinas, *  Date of Initial Visit: Type: THERAPY  Noted: 2020  Today's Date: 9/15/2020  Patient seen for 7 sessions    Recheck due: 20  MD appt: 20  Auth: Medicare guidelines     Samy Velazquez reports: PT helping  Subjective Evaluation    History of Present Illness    Subjective comment: pt states that his back was aggravated after last visit; not sure whyPain  Current pain rating: 3           Objective          Static Posture     Comments  Frequent standing leans over on rail to rest his back, otherwise, stands tall. Seated tends to slump.      See Exercise, Manual, and Modality Logs for complete treatment.       Assessment & Plan     Assessment  Assessment details: Pt again begins to have LBP with standing activities but rests briefly then resumes. He could not amina lateral step up with R knee. Amina Airex beam fairly well today.    Goals  Plan Goals: STG's by 20  1) Demonstrate independence/compliance in performance of HEP  2) Improve TUG score to 45-50\" with use of RW to demonstrate improved safety/performance with functional mobility  3) Demonstrate improved bilateral hip flex MMT to 4/5  4) Ambulate 50 ft safely with RW demonstrating improved step length/height with minimal cuing needed  5) Perform consecutive 1x5 repetitive sit to stands from airex inch, BUE as needed    LTG's by 10/5/20  1) Subjectively report 50% overall improvement or greater  2) Improve TUG score to 35\" or less with use of RW to demonstrate improved safety/performance with functional mobility  3) Demonstrate improved bilateral hip abd MMT to 4/5  4) Demonstrate improved bilateral knee flex/ext MMT 4+/5 or greater  5) Perform static stance on airex with feet apart for 30\" with LOB  6) Perform low level obstacle course safely in parallel bars consisting of compliant surface, jhon, & cone negotiation; " SBA/CGA as needed         Plan  Duration in visits: 16  Plan details:  RC nxt visit/progress as advised. Possible Airex march, CR/TR, MS(gentle)        Visit Diagnoses:    ICD-10-CM ICD-9-CM   1. Physical deconditioning  R53.81 799.3   2. Balance problem  R26.89 781.99       Progress per Plan of Care and Progress strengthening /stabilization /functional activity           Timed:  Manual Therapy:         mins  77588;  Therapeutic Exercise:    35     mins  68860;     Neuromuscular Rupert:        mins  67180;    Therapeutic Activity:     10     mins  92860;     Gait Training:           mins  89104;     Ultrasound:          mins  67695;    Electrical Stimulation:         mins  36011 ( );    Untimed:  Electrical Stimulation:         mins  66273 ( );  Mechanical Traction:         mins  83473;  Paraffin                               mins  50630;     Timed Treatment:   45   mins   Total Treatment:     45   mins  Kristin Dillard PTA  Physical Therapist

## 2020-09-17 ENCOUNTER — TREATMENT (OUTPATIENT)
Dept: PHYSICAL THERAPY | Facility: CLINIC | Age: 75
End: 2020-09-17

## 2020-09-17 DIAGNOSIS — R53.81 PHYSICAL DECONDITIONING: Primary | ICD-10-CM

## 2020-09-17 DIAGNOSIS — R26.89 BALANCE PROBLEM: ICD-10-CM

## 2020-09-17 PROCEDURE — 97110 THERAPEUTIC EXERCISES: CPT | Performed by: PHYSICAL THERAPIST

## 2020-09-17 PROCEDURE — 97530 THERAPEUTIC ACTIVITIES: CPT | Performed by: PHYSICAL THERAPIST

## 2020-09-17 NOTE — PROGRESS NOTES
Re-Assessment / Re-Certification      Patient: Samy Velazquez   : 1945  Diagnosis/ICD-10 Code:  Physical deconditioning [R53.81]  Referring practitioner: Garret Salinas, *  Date of Initial Visit: Type: THERAPY  Noted: 2020  Today's Date: 2020  Patient seen for 8 sessions    Recheck due: 10/8/20  MD appt: 20  Auth: Medicare guidelines    Subjective:   Samy Velazquez reports: 30% improvement  Clinical Progress: improved  Home Program Compliance: Yes  Treatment has included: therapeutic exercise, neuromuscular re-education, therapeutic activity and gait training    Subjective Evaluation    History of Present Illness    Subjective comment: Pt states he can tell therapy has been helping. States his walking is getting better as his strength and balance is improving. Not walking more than what he was but the quality has improved. Hasn't had any falls.Pain  Current pain rating: 3         Objective          Static Posture     Comments  Decreased overall postural awareness. Continues with slumped seated posture but tends to be more upright with standing posture.     Active Range of Motion     Additional Active Range of Motion Details  - Bilateral hip AROM WFL  - Bilateral knee flex/ext WFL  - Bilateral ankle AROM WFL    Limited shoulder ROM bilaterally L>R with shoulder elevation/flexion    Strength/Myotome Testing     Left Hip   Planes of Motion   Flexion: 4  Abduction: 4-  Adduction: 4-    Right Hip   Planes of Motion   Flexion: 4  Abduction: 4-  Adduction: 4-    Left Knee   Flexion: 4+  Extension: 4+    Right Knee   Flexion: 4  Extension: 4    Left Ankle/Foot   Dorsiflexion: 4+  Plantar flexion: 4+    Right Ankle/Foot   Dorsiflexion: 4+  Plantar flexion: 4+    Additional Strength Details  - R knee flex/ext MMT 4/5  - L knee flex 4/5, L knee ext 4-/5  - R ankle DF/PF 5/5  - L ankle DF/PF 4+/5      Ambulation     Comments   Presents in transport wheelchair. Continues to have difficulty with sit to  "stand from his transport chair. Sepiedh with gait seems to have improved as evidenced by performance during TUG test (see functional assessment for details). Does fairly well with gait in parallel bars at a decreased pace.    Functional Assessment     Comments  - TUG test with RW: 53\" (pt take more time with 1st turn and when preparing to sit; sepideh overall improved)  - Fatigues frequently in low back/BLE's with standing activities; дмитрий with gait and low level obstacle course negotiation in parallel bars  - Dynamic/static balance much more challenged on compliant surfaces      Assessment & Plan     Assessment  Impairments: abnormal gait, activity intolerance, impaired balance, impaired physical strength and pain with function  Assessment details: Pt has progressed fairly well overall with PT as evidenced by improvements in BLE functional strength, activity tolerance/endurance, gait performance, and dynamic balance. Pt doing well with standing strength/stability and balance activities. Able to tolerate increased intensity of activities but does need to break frequently due to low back/BLE fatigue. Most gait activities must be performed in parallel bars for safety. Doing better with stepping over small bolsters and hurdles, less tendency to clip hurdles this date. Did have to modify step ups at RLE this date and pt had increased R knee pain when stepping on to 4\" bench step; used airex pad instead and pt tolerated better. No issues stepping up with LLE other than overall fatigue. Did have mild improvement in TUG score as gait sepideh is improving. Spends more time with turning and preparing to sit vs linear walking. Pt does continue to demonstrate deficits in overall BLE functional strength/stability, endurance/activity tolerance, safety with gait, and dynamic balance and will benefit from continued skilled PT services.  Prognosis: good  Functional Limitations: walking, uncomfortable because of pain, standing and " "unable to perform repetitive tasks  Goals  Plan Goals: STG's by 10/1/20  1) Demonstrate independence/compliance in performance of HEP (met)  2) Improve TUG score to 50\" with use of RW to demonstrate improved safety/performance with functional mobility (partially met at 53\")  3) Demonstrate improved bilateral hip flex MMT to 4/5 (met)  4) Ambulate 50 ft safely with RW demonstrating improved step length/height with minimal cuing needed (progressing)  5) Perform consecutive 1x5 repetitive sit to stands from airex, BUE as needed (progressing)    LTG's by 10/15/20  1) Subjectively report 50% overall improvement or greater (progressing)  2) Improve TUG score to 40\" or less with use of RW to demonstrate improved safety/performance with functional mobility (progressing)  3) Demonstrate improved bilateral hip abd MMT to 4/5 (progressing)  4) Demonstrate improved bilateral knee flex/ext MMT 4+/5 or greater (progressing)  5) Perform static stance on airex with feet apart for 30\" with LOB (progressing)  6) Perform low level obstacle course safely in parallel bars consisting of compliant surface, jhon, & cone negotiation; SBA/CGA as needed (progressing)        Plan  Therapy options: will be seen for skilled physical therapy services  Planned therapy interventions: abdominal trunk stabilization, balance/weight-bearing training, body mechanics training, gait training, home exercise program, neuromuscular re-education, postural training, strengthening, stretching and therapeutic activities  Duration in visits: 7  Treatment plan discussed with: patient  Plan details: Will continue for ~ 3 more weeks to continue working on overall postural/core stability, BLE functional strength/endurance, and dynamic balance. Continue progressing // bar activities in regards to low level obstacle negotiation. Resume standing tband rows & shoulder ext next visit. Continue step ups as tolerated. May try gentle airex mini squats next.        Visit " Diagnoses:    ICD-10-CM ICD-9-CM   1. Physical deconditioning  R53.81 799.3   2. Balance problem  R26.89 781.99       Progress toward previous goals: Partially Met    Recommendations: Continue as planned  Timeframe: 2-3 more weeks  Prognosis to achieve goals: good    PT Signature: Sharon Montesinos PT      Based upon review of the patient's progress and continued therapy plan, it is my medical opinion that Samy Velazquez should continue physical therapy treatment at [facility]  [address] [address] [phone].    Signature: __________________________________  Garret Salinas MD    Timed:  Manual Therapy:         mins  90166;  Therapeutic Exercise:    32     mins  00037;     Neuromuscular Rupert:        mins  24411;    Therapeutic Activity:     15     mins  67761;     Gait Training:           mins  25343;     Ultrasound:          mins  28285;    Electrical Stimulation:         mins  39007 ( );    Untimed:  Electrical Stimulation:         mins  25981 ( );  Mechanical Traction:         mins  44114;     Timed Treatment:   47   mins   Total Treatment:     47   mins

## 2020-09-18 ENCOUNTER — OFFICE VISIT (OUTPATIENT)
Dept: FAMILY MEDICINE CLINIC | Facility: CLINIC | Age: 75
End: 2020-09-18

## 2020-09-18 VITALS
BODY MASS INDEX: 34.99 KG/M2 | SYSTOLIC BLOOD PRESSURE: 124 MMHG | WEIGHT: 210 LBS | HEIGHT: 65 IN | HEART RATE: 65 BPM | DIASTOLIC BLOOD PRESSURE: 80 MMHG | OXYGEN SATURATION: 99 % | TEMPERATURE: 97.9 F | RESPIRATION RATE: 18 BRPM

## 2020-09-18 DIAGNOSIS — E66.01 MORBIDLY OBESE (HCC): ICD-10-CM

## 2020-09-18 DIAGNOSIS — R53.83 FATIGUE, UNSPECIFIED TYPE: ICD-10-CM

## 2020-09-18 DIAGNOSIS — Z23 NEED FOR INFLUENZA VACCINATION: Primary | ICD-10-CM

## 2020-09-18 PROCEDURE — G0008 ADMIN INFLUENZA VIRUS VAC: HCPCS | Performed by: FAMILY MEDICINE

## 2020-09-18 PROCEDURE — 90694 VACC AIIV4 NO PRSRV 0.5ML IM: CPT | Performed by: FAMILY MEDICINE

## 2020-09-18 PROCEDURE — 99214 OFFICE O/P EST MOD 30 MIN: CPT | Performed by: FAMILY MEDICINE

## 2020-09-18 RX ORDER — CEPHALEXIN 250 MG/1
CAPSULE ORAL
COMMUNITY
Start: 2020-08-12 | End: 2021-06-02

## 2020-09-18 NOTE — PROGRESS NOTES
Subjective   Samy Velazquez is a 75 y.o. male.     75-year-old male with morbid obesity fatigue and mood disorder    Fatigue  This is a chronic problem. The current episode started more than 1 month ago. The problem occurs daily. The problem has been gradually improving. Associated symptoms include arthralgias and fatigue. Pertinent negatives include no chest pain. Associated symptoms comments: Fatigue muscle strength mood disorder. Nothing aggravates the symptoms. Treatments tried: Physical therapy helping, Prozac-mood improving. The treatment provided moderate relief.       The following portions of the patient's history were reviewed and updated as appropriate: allergies, current medications, past family history, past medical history, past social history, past surgical history and problem list.    Review of Systems   Constitutional: Positive for fatigue.   Cardiovascular: Negative for chest pain and leg swelling.   Gastrointestinal:        GERD   Musculoskeletal: Positive for arthralgias.        DJD       Objective   Physical Exam  Vitals signs and nursing note reviewed.   Constitutional:       Appearance: He is obese.   Eyes:      Extraocular Movements: Extraocular movements intact.      Pupils: Pupils are equal, round, and reactive to light.   Cardiovascular:      Rate and Rhythm: Normal rate and regular rhythm.      Pulses: Normal pulses.      Heart sounds: Normal heart sounds.   Pulmonary:      Effort: Pulmonary effort is normal.      Breath sounds: Normal breath sounds.   Musculoskeletal:      Right lower leg: No edema.      Left lower leg: No edema.   Neurological:      General: No focal deficit present.      Mental Status: He is alert and oriented to person, place, and time.   Psychiatric:         Mood and Affect: Mood normal.         Behavior: Behavior normal.         Thought Content: Thought content normal.         Judgment: Judgment normal.         Assessment/Plan   Samy was seen today for  follow-up.    Diagnoses and all orders for this visit:    Need for influenza vaccination  -     Fluad Quad >65 years    Morbidly obese (CMS/HCC)    Fatigue, unspecified type       Plan above-COVID-19 safety- continue physical therapy Gaviscon after meals and omeprazole-continue Prozac check with cardiologist about pacemaker issues and Prozac-benefit outweighs risk in my opinion

## 2020-09-22 ENCOUNTER — TREATMENT (OUTPATIENT)
Dept: PHYSICAL THERAPY | Facility: CLINIC | Age: 75
End: 2020-09-22

## 2020-09-22 DIAGNOSIS — R26.89 BALANCE PROBLEM: ICD-10-CM

## 2020-09-22 DIAGNOSIS — R53.81 PHYSICAL DECONDITIONING: Primary | ICD-10-CM

## 2020-09-22 PROCEDURE — 97110 THERAPEUTIC EXERCISES: CPT | Performed by: PHYSICAL THERAPIST

## 2020-09-22 PROCEDURE — 97530 THERAPEUTIC ACTIVITIES: CPT | Performed by: PHYSICAL THERAPIST

## 2020-09-22 PROCEDURE — 97112 NEUROMUSCULAR REEDUCATION: CPT | Performed by: PHYSICAL THERAPIST

## 2020-09-22 NOTE — PROGRESS NOTES
"   Physical Therapy Daily Progress Note      Patient: Samy Velazquez   : 1945  Referring practitioner: Garret Salinas, *  Date of Initial Visit: Type: THERAPY  Noted: 2020  Today's Date: 2020  Patient seen for 9 sessions    Recheck due: 10/8/20  MD appt: 20  Auth: Medicare guidelines     Samy Velazquez reports: 30%   Subjective Evaluation    History of Present Illness    Subjective comment: pt states he had a bad day yesterday and was just out of sorts balance wise, not dizzy really; didn't fall but it came close a few times.Pain  Current pain rating: 3           Objective          Ambulation     Comments   Stepping over with B HHA tends to turn leg out R>L      See Exercise, Manual, and Modality Logs for complete treatment.       Assessment & Plan     Assessment  Impairments: abnormal gait, activity intolerance, impaired balance, impaired physical strength and pain with function  Assessment details: Pt able to do more NMRE/balance activities with less c/o's. Great effort and participation.  Prognosis: good  Functional Limitations: walking, uncomfortable because of pain, standing and unable to perform repetitive tasks  Goals  Plan Goals: STG's by 10/1/20  1) Demonstrate independence/compliance in performance of HEP (met)  2) Improve TUG score to 50\" with use of RW to demonstrate improved safety/performance with functional mobility (partially met at 53\")  3) Demonstrate improved bilateral hip flex MMT to 4/5 (met)  4) Ambulate 50 ft safely with RW demonstrating improved step length/height with minimal cuing needed (progressing)  5) Perform consecutive 1x5 repetitive sit to stands from airex, BUE as needed (progressing)    LTG's by 10/15/20  1) Subjectively report 50% overall improvement or greater (progressing)  2) Improve TUG score to 40\" or less with use of RW to demonstrate improved safety/performance with functional mobility (progressing)  3) Demonstrate improved bilateral hip abd MMT to " "4/5 (progressing)  4) Demonstrate improved bilateral knee flex/ext MMT 4+/5 or greater (progressing)  5) Perform static stance on airex with feet apart for 30\" with LOB (progressing)  6) Perform low level obstacle course safely in parallel bars consisting of compliant surface, jhon, & cone negotiation; SBA/CGA as needed (progressing)        Plan  Therapy options: will be seen for skilled physical therapy services  Planned therapy interventions: abdominal trunk stabilization, balance/weight-bearing training, body mechanics training, gait training, home exercise program, neuromuscular re-education, postural training, strengthening, stretching and therapeutic activities  Duration in visits: 7  Treatment plan discussed with: patient  Plan details: Will continue for ~ 3 more weeks to continue working on overall postural/core stability, BLE functional strength/endurance, and dynamic balance. Continue progressing // bar activities in regards to low level obstacle negotiation. Resume standing tband rows & shoulder ext next visit. Continue step ups as tolerated. Monitor post Rx issues.        Visit Diagnoses:    ICD-10-CM ICD-9-CM   1. Physical deconditioning  R53.81 799.3   2. Balance problem  R26.89 781.99       Progress per Plan of Care and Progress strengthening /stabilization /functional activity           Timed:  Manual Therapy:         mins  23960;  Therapeutic Exercise:    10     mins  43589;     Neuromuscular Rupert:    15    mins  11886;    Therapeutic Activity:     20     mins  38384;     Gait Training:           mins  47225;     Ultrasound:          mins  31894;    Electrical Stimulation:         mins  00619 ( );    Untimed:  Electrical Stimulation:         mins  25841 ( );  Mechanical Traction:         mins  69195;  Paraffin                               mins  61664;     Timed Treatment:   45   mins   Total Treatment:     45   mins  Kristin Dillard PTA  Physical Therapist                  "

## 2020-09-23 ENCOUNTER — TRANSCRIBE ORDERS (OUTPATIENT)
Dept: LAB | Facility: HOSPITAL | Age: 75
End: 2020-09-23

## 2020-09-23 DIAGNOSIS — Z01.818 PRE-OP TESTING: Primary | ICD-10-CM

## 2020-09-24 ENCOUNTER — TREATMENT (OUTPATIENT)
Dept: PHYSICAL THERAPY | Facility: CLINIC | Age: 75
End: 2020-09-24

## 2020-09-24 DIAGNOSIS — R26.89 BALANCE PROBLEM: ICD-10-CM

## 2020-09-24 DIAGNOSIS — R53.81 PHYSICAL DECONDITIONING: Primary | ICD-10-CM

## 2020-09-24 PROCEDURE — 97110 THERAPEUTIC EXERCISES: CPT | Performed by: PHYSICAL THERAPIST

## 2020-09-24 PROCEDURE — 97530 THERAPEUTIC ACTIVITIES: CPT | Performed by: PHYSICAL THERAPIST

## 2020-09-24 PROCEDURE — 97112 NEUROMUSCULAR REEDUCATION: CPT | Performed by: PHYSICAL THERAPIST

## 2020-09-24 NOTE — PROGRESS NOTES
"   Physical Therapy Daily Progress Note      Patient: Samy Velazquez   : 1945  Referring practitioner: Garret Salinas, *  Date of Initial Visit: Type: THERAPY  Noted: 2020  Today's Date: 2020  Patient seen for 10 sessions    Recheck due: 10/8/20  MD appt: 20  Auth: Medicare guidelines     Samy Velazquez reports: ~30%    Subjective Evaluation    History of Present Illness    Subjective comment: pt states doesn't feel good today, mostly to weatherPain  Current pain ratin           Objective   See Exercise, Manual, and Modality Logs for complete treatment.       Assessment & Plan     Assessment  Impairments: abnormal gait, activity intolerance, impaired balance, impaired physical strength and pain with function  Assessment details: Pt performed very well today even though reports of not feeling well and incr back pain today. All // bars are performed with B HHA and need to try to reduce to single rail as able. Also based on reports of using cane more in the home need to assess/work on this as well.  Prognosis: good  Functional Limitations: walking, uncomfortable because of pain, standing and unable to perform repetitive tasks  Goals  Plan Goals: STG's by 10/1/20  1) Demonstrate independence/compliance in performance of HEP (met)  2) Improve TUG score to 50\" with use of RW to demonstrate improved safety/performance with functional mobility (partially met at 53\")  3) Demonstrate improved bilateral hip flex MMT to 4/5 (met)  4) Ambulate 50 ft safely with RW demonstrating improved step length/height with minimal cuing needed (progressing)  5) Perform consecutive 1x5 repetitive sit to stands from airex, BUE as needed (progressing)    LTG's by 10/15/20  1) Subjectively report 50% overall improvement or greater (progressing)  2) Improve TUG score to 40\" or less with use of RW to demonstrate improved safety/performance with functional mobility (progressing)  3) Demonstrate improved bilateral hip abd " "MMT to 4/5 (progressing)  4) Demonstrate improved bilateral knee flex/ext MMT 4+/5 or greater (progressing)  5) Perform static stance on airex with feet apart for 30\" with LOB (progressing)  6) Perform low level obstacle course safely in parallel bars consisting of compliant surface, jhon, & cone negotiation; SBA/CGA as needed (progressing)        Plan  Therapy options: will be seen for skilled physical therapy services  Planned therapy interventions: abdominal trunk stabilization, balance/weight-bearing training, body mechanics training, gait training, home exercise program, neuromuscular re-education, postural training, strengthening, stretching and therapeutic activities  Duration in visits: 7  Treatment plan discussed with: patient  Plan details: Will continue for ~ 3 more weeks to continue working on overall postural/core stability, BLE functional strength/endurance, and dynamic balance. Continue progressing // bar activities in regards to low level obstacle negotiation. - attempt one rail HHA in // bars as able; gait with SC        Visit Diagnoses:    ICD-10-CM ICD-9-CM   1. Physical deconditioning  R53.81 799.3   2. Balance problem  R26.89 781.99       Progress per Plan of Care and Progress strengthening /stabilization /functional activity           Timed:  Manual Therapy:         mins  64453;  Therapeutic Exercise:    15     mins  30273;     Neuromuscular Rupert:    15    mins  21585;    Therapeutic Activity:     15     mins  84478;     Gait Training:           mins  51353;     Ultrasound:          mins  51251;    Electrical Stimulation:         mins  83324 ( );    Untimed:  Electrical Stimulation:         mins  65997 ( );  Mechanical Traction:         mins  94277;  Paraffin                               mins  75546;     Timed Treatment:   45   mins   Total Treatment:     45   mins  Kristin Dillard PTA  Physical Therapist                  "

## 2020-09-26 ENCOUNTER — LAB (OUTPATIENT)
Dept: LAB | Facility: HOSPITAL | Age: 75
End: 2020-09-26

## 2020-09-26 DIAGNOSIS — Z01.818 PRE-OP TESTING: ICD-10-CM

## 2020-09-26 PROCEDURE — U0003 INFECTIOUS AGENT DETECTION BY NUCLEIC ACID (DNA OR RNA); SEVERE ACUTE RESPIRATORY SYNDROME CORONAVIRUS 2 (SARS-COV-2) (CORONAVIRUS DISEASE [COVID-19]), AMPLIFIED PROBE TECHNIQUE, MAKING USE OF HIGH THROUGHPUT TECHNOLOGIES AS DESCRIBED BY CMS-2020-01-R: HCPCS

## 2020-09-26 PROCEDURE — C9803 HOPD COVID-19 SPEC COLLECT: HCPCS

## 2020-09-27 LAB
COVID LABCORP PRIORITY: NORMAL
SARS-COV-2 RNA RESP QL NAA+PROBE: NOT DETECTED

## 2020-09-29 ENCOUNTER — TREATMENT (OUTPATIENT)
Dept: PHYSICAL THERAPY | Facility: CLINIC | Age: 75
End: 2020-09-29

## 2020-09-29 ENCOUNTER — TRANSCRIBE ORDERS (OUTPATIENT)
Dept: ADMINISTRATIVE | Facility: HOSPITAL | Age: 75
End: 2020-09-29

## 2020-09-29 DIAGNOSIS — R26.89 BALANCE PROBLEM: ICD-10-CM

## 2020-09-29 DIAGNOSIS — R53.81 PHYSICAL DECONDITIONING: Primary | ICD-10-CM

## 2020-09-29 DIAGNOSIS — Z01.818 PREOP TESTING: Primary | ICD-10-CM

## 2020-09-29 PROCEDURE — 97530 THERAPEUTIC ACTIVITIES: CPT | Performed by: PHYSICAL THERAPIST

## 2020-09-29 PROCEDURE — 97110 THERAPEUTIC EXERCISES: CPT | Performed by: PHYSICAL THERAPIST

## 2020-09-29 NOTE — PROGRESS NOTES
"   Physical Therapy Daily Progress Note      Patient: Samy Velazquez   : 1945  Referring practitioner: Garret Salinas, *  Date of Initial Visit: Type: THERAPY  Noted: 2020  Today's Date: 2020  Patient seen for 11 sessions    Recheck due: 10/8/20  MD appt: 20  Auth: Medicare guidelines     Samy Velazquez reports: ~30% impr      Subjective Evaluation    History of Present Illness    Subjective comment: Bad day as regards pain, likely due to weather. Notes during Tx beginning to feel better(pain med and exc)Pain  Current pain ratin           Objective          Ambulation     Comments   Gait w/SC: one slight L foot drag, self corrected; tends to want to touch objects, ie desk or rail      See Exercise, Manual, and Modality Logs for complete treatment.       Assessment & Plan     Assessment  Impairments: abnormal gait, activity intolerance, impaired balance, impaired physical strength and pain with function  Assessment details: Pt did well with cane and handrail, fairly well with gait and cane alone but certainly for short distances only and safer when he touches rail or desk(he uses chair rail at home). No c/o's knee pain with step ups today.   Prognosis: good  Functional Limitations: walking, uncomfortable because of pain, standing and unable to perform repetitive tasks  Goals  Plan Goals: STG's by 10/1/20  1) Demonstrate independence/compliance in performance of HEP (met)  2) Improve TUG score to 50\" with use of RW to demonstrate improved safety/performance with functional mobility (partially met at 53\")  3) Demonstrate improved bilateral hip flex MMT to 4/5 (met)  4) Ambulate 50 ft safely with RW demonstrating improved step length/height with minimal cuing needed (progressing)  5) Perform consecutive 1x5 repetitive sit to stands from airex, BUE as needed (progressing)    LTG's by 10/15/20  1) Subjectively report 50% overall improvement or greater (progressing)  2) Improve TUG score to 40\" " "or less with use of RW to demonstrate improved safety/performance with functional mobility (progressing)  3) Demonstrate improved bilateral hip abd MMT to 4/5 (progressing)  4) Demonstrate improved bilateral knee flex/ext MMT 4+/5 or greater (progressing)  5) Perform static stance on airex with feet apart for 30\" with LOB (progressing)  6) Perform low level obstacle course safely in parallel bars consisting of compliant surface, jhon, & cone negotiation; SBA/CGA as needed (progressing)        Plan  Therapy options: will be seen for skilled physical therapy services  Planned therapy interventions: abdominal trunk stabilization, balance/weight-bearing training, body mechanics training, gait training, home exercise program, neuromuscular re-education, postural training, strengthening, stretching and therapeutic activities  Duration in visits: 7  Treatment plan discussed with: patient  Plan details: Cont progressions to dynamic balance/gait as indicated; resume jhon step overs as able;  nxt wk        Visit Diagnoses:    ICD-10-CM ICD-9-CM   1. Physical deconditioning  R53.81 799.3   2. Balance problem  R26.89 781.99       Progress per Plan of Care, Progress strengthening /stabilization /functional activity and Other PT  nxt wk           Timed:  Manual Therapy:         mins  59934;  Therapeutic Exercise:    15     mins  47570;     Neuromuscular Rupert:        mins  13196;    Therapeutic Activity:     30     mins  58860;     Gait Training:           mins  59646;     Ultrasound:          mins  72076;    Electrical Stimulation:         mins  53833 ( );    Untimed:  Electrical Stimulation:         mins  49850 ( );  Mechanical Traction:         mins  74963;  Paraffin                               mins  92488;     Timed Treatment:   45   mins   Total Treatment:     45   mins  Kristin Dillard PTA  Physical Therapist                  "

## 2020-10-01 ENCOUNTER — TREATMENT (OUTPATIENT)
Dept: PHYSICAL THERAPY | Facility: CLINIC | Age: 75
End: 2020-10-01

## 2020-10-01 DIAGNOSIS — R53.81 PHYSICAL DECONDITIONING: Primary | ICD-10-CM

## 2020-10-01 DIAGNOSIS — R26.89 BALANCE PROBLEM: ICD-10-CM

## 2020-10-01 PROCEDURE — 97112 NEUROMUSCULAR REEDUCATION: CPT | Performed by: PHYSICAL THERAPIST

## 2020-10-01 PROCEDURE — 97110 THERAPEUTIC EXERCISES: CPT | Performed by: PHYSICAL THERAPIST

## 2020-10-01 NOTE — PROGRESS NOTES
"   Physical Therapy Daily Progress Note      Patient: Samy Velazquez   : 1945  Referring practitioner: Garret Salinas, *  Date of Initial Visit: Type: THERAPY  Noted: 2020  Today's Date: 10/1/2020  Patient seen for 12 sessions    Recheck due: 10/8/20  MD appt: 20  Auth: Medicare guidelines     Samy Velazquez reports:   Subjective Evaluation    History of Present Illness    Subjective comment: pt states that he feels very tired and lethargic today; has had pain meds.Pain  Current pain ratin           Objective          Ambulation     Comments   Gait w/RW upright recip gait      See Exercise, Manual, and Modality Logs for complete treatment.       Assessment & Plan     Assessment  Impairments: abnormal gait, activity intolerance, impaired balance, impaired physical strength and pain with function  Assessment details: Pt is definitely safer with RW as he does not have to \"cruise\" or reach/touch wall, handrail, furniture etc. Advised pt to use RW for the most part, and he agrees except in \"certain areas of his home\". Able to advance PRE for SLR; did not do addit sit<>stds today as they tend to incr LBP and fatigue.  Prognosis: good  Functional Limitations: walking, uncomfortable because of pain, standing and unable to perform repetitive tasks  Goals  Plan Goals: STG's by 10/1/20  1) Demonstrate independence/compliance in performance of HEP (met)  2) Improve TUG score to 50\" with use of RW to demonstrate improved safety/performance with functional mobility (partially met at 53\")  3) Demonstrate improved bilateral hip flex MMT to 4/5 (met)  4) Ambulate 50 ft safely with RW demonstrating improved step length/height with minimal cuing needed (progressing)  5) Perform consecutive 1x5 repetitive sit to stands from airex, BUE as needed (progressing)    LTG's by 10/15/20  1) Subjectively report 50% overall improvement or greater (progressing)  2) Improve TUG score to 40\" or less with use of RW to " "demonstrate improved safety/performance with functional mobility (progressing)  3) Demonstrate improved bilateral hip abd MMT to 4/5 (progressing)  4) Demonstrate improved bilateral knee flex/ext MMT 4+/5 or greater (progressing)  5) Perform static stance on airex with feet apart for 30\" with LOB (progressing)  6) Perform low level obstacle course safely in parallel bars consisting of compliant surface, jhon, & cone negotiation; SBA/CGA as needed (progressing)        Plan  Therapy options: will be seen for skilled physical therapy services  Planned therapy interventions: abdominal trunk stabilization, balance/weight-bearing training, body mechanics training, gait training, home exercise program, neuromuscular re-education, postural training, strengthening, stretching and therapeutic activities  Duration in visits: 7  Treatment plan discussed with: patient  Plan details: Cont progressions to dynamic balance/gait as indicated; possible Cybex LP; SALIMA nxt wk        Visit Diagnoses:    ICD-10-CM ICD-9-CM   1. Physical deconditioning  R53.81 799.3   2. Balance problem  R26.89 781.99       Progress per Plan of Care and Progress strengthening /stabilization /functional activity  PT SALIMA nxt wk         Timed:  Manual Therapy:         mins  93285;  Therapeutic Exercise:    25     mins  05596;     Neuromuscular Rupert:    20    mins  42533;    Therapeutic Activity:          mins  26485;     Gait Training:           mins  30640;     Ultrasound:          mins  31963;    Electrical Stimulation:         mins  13138 ( );    Untimed:  Electrical Stimulation:         mins  24199 ( );  Mechanical Traction:         mins  63173;  Paraffin                               mins  44218;     Timed Treatment:   45   mins   Total Treatment:     45   mins  Kristin Dillard PTA  Physical Therapist                  "

## 2020-10-06 ENCOUNTER — LAB (OUTPATIENT)
Dept: LAB | Facility: HOSPITAL | Age: 75
End: 2020-10-06

## 2020-10-06 DIAGNOSIS — Z01.818 PRE-OP TESTING: Primary | ICD-10-CM

## 2020-10-06 PROCEDURE — C9803 HOPD COVID-19 SPEC COLLECT: HCPCS

## 2020-10-06 PROCEDURE — 87635 SARS-COV-2 COVID-19 AMP PRB: CPT

## 2020-10-07 ENCOUNTER — OFFICE VISIT (OUTPATIENT)
Dept: FAMILY MEDICINE CLINIC | Facility: CLINIC | Age: 75
End: 2020-10-07

## 2020-10-07 VITALS
HEART RATE: 63 BPM | OXYGEN SATURATION: 96 % | TEMPERATURE: 98.4 F | HEIGHT: 65 IN | SYSTOLIC BLOOD PRESSURE: 132 MMHG | DIASTOLIC BLOOD PRESSURE: 80 MMHG | BODY MASS INDEX: 34.95 KG/M2

## 2020-10-07 DIAGNOSIS — I20.0 UNSTABLE ANGINA PECTORIS (HCC): Primary | ICD-10-CM

## 2020-10-07 LAB — SARS-COV-2 RNA RESP QL NAA+PROBE: NOT DETECTED

## 2020-10-07 PROCEDURE — 93005 ELECTROCARDIOGRAM TRACING: CPT | Performed by: FAMILY MEDICINE

## 2020-10-07 PROCEDURE — 99214 OFFICE O/P EST MOD 30 MIN: CPT | Performed by: FAMILY MEDICINE

## 2020-10-07 NOTE — PROGRESS NOTES
Subjective   Samy Velazquez is a 75 y.o. male.     75-year-old male with known ischemic heart disease and increasing chest discomfort over the last 3 to 4 days    Chest Pain   This is a recurrent problem. The current episode started in the past 7 days. The onset quality is gradual. The problem occurs daily. The problem has been gradually worsening. The pain is present in the substernal region. The pain is at a severity of 2/10. The pain is mild. The quality of the pain is described as heavy and dull. The pain radiates to the precordial region. Associated symptoms include nausea and vomiting. Pertinent negatives include no palpitations. The pain is aggravated by nothing. Treatments tried: Nitroglycerin helped. The treatment provided mild relief. Risk factors include being elderly, lack of exercise, male gender, obesity and sedentary lifestyle.   His past medical history is significant for CAD. Prior workup: Referred to cardiologist.     Vitals:    10/07/20 1351   BP: 132/80   Pulse: 63   Temp: 98.4 °F (36.9 °C)   SpO2: 96%       The following portions of the patient's history were reviewed and updated as appropriate: allergies, current medications, past family history, past medical history, past social history, past surgical history and problem list.    Review of Systems   Cardiovascular: Positive for leg swelling. Negative for chest pain and palpitations.   Gastrointestinal: Positive for nausea and vomiting.       Objective   Physical Exam  Vitals signs and nursing note reviewed.   Constitutional:       Appearance: Normal appearance. He is obese.   Cardiovascular:      Rate and Rhythm: Normal rate and regular rhythm.      Pulses: Normal pulses.      Heart sounds: Normal heart sounds.   Pulmonary:      Effort: Pulmonary effort is normal.      Breath sounds: Normal breath sounds.   Abdominal:      Palpations: Abdomen is soft.   Musculoskeletal:      Right lower leg: No edema.      Left lower leg: No edema.   Skin:      General: Skin is warm and dry.   Neurological:      General: No focal deficit present.      Mental Status: He is alert and oriented to person, place, and time.   Psychiatric:         Mood and Affect: Mood normal.         Behavior: Behavior normal.         Thought Content: Thought content normal.         Judgment: Judgment normal.         Patient's Body mass index is 34.95 kg/m². BMI is above normal parameters. Recommendations include: none (medical contraindication).      Assessment/Plan   Patient Active Problem List   Diagnosis   • Ischemic heart disease due to coronary artery obstruction (CMS/HCC)   • Osteoarthritis of multiple joints   • Cardiac pacemaker   • Gout of multiple sites   • Nephrolithiasis   • Mixed hyperlipidemia   • Morbidly obese (CMS/HCC)     Samy was seen today for chest pain and fatigue.    Diagnoses and all orders for this visit:    Unstable angina pectoris (CMS/HCC)  -     ECG 12 Lead       Return if symptoms worsen or fail to improve.    Plan-ER, and cardiac referral will probably need an arteriogram         Electronically signed by Garret Salinas MD 10/07/2020

## 2020-10-26 RX ORDER — ATORVASTATIN CALCIUM 40 MG/1
TABLET, FILM COATED ORAL
Qty: 30 TABLET | Refills: 0 | Status: SHIPPED | OUTPATIENT
Start: 2020-10-26 | End: 2021-01-14

## 2020-10-27 ENCOUNTER — TELEPHONE (OUTPATIENT)
Dept: FAMILY MEDICINE CLINIC | Facility: CLINIC | Age: 75
End: 2020-10-27

## 2020-10-27 ENCOUNTER — OUTSIDE FACILITY SERVICE (OUTPATIENT)
Dept: FAMILY MEDICINE CLINIC | Facility: CLINIC | Age: 75
End: 2020-10-27

## 2020-10-27 DIAGNOSIS — K22.2 ESOPHAGEAL STRICTURE: Primary | ICD-10-CM

## 2020-10-27 PROCEDURE — G0180 MD CERTIFICATION HHA PATIENT: HCPCS | Performed by: FAMILY MEDICINE

## 2020-10-27 RX ORDER — PANTOPRAZOLE SODIUM 40 MG/1
40 TABLET, DELAYED RELEASE ORAL
COMMUNITY
Start: 2020-10-28 | End: 2020-11-19

## 2020-10-27 NOTE — TELEPHONE ENCOUNTER
GAVISCON IS NO LONGER AVAILABLE PER PHARMACY-IS THERE SOMETHING TO REPLACE?    DO YOU HAVE A PREFERENCE TO WHO WE REFER TO?

## 2020-10-27 NOTE — TELEPHONE ENCOUNTER
ARMANDO WAS ADVISED URGENT GI REFERRAL AND NEW MEDICATION PROTONIX  W/ DOSING DIRECTIONS-SHE WILL CALL MED INTO PHARMACY AND WILL ADVISE PT OF REFERRAL.

## 2020-10-27 NOTE — TELEPHONE ENCOUNTER
Premeal Protonix 40 mg 1 every morning-put an urgent referral to [place]  [facility] lab first available-needs EGD and history of such

## 2020-10-27 NOTE — TELEPHONE ENCOUNTER
IS THERE SOMETHING HE CAN TAKE TO REPLACE GAVISCON?      UNABLE TO LOCATE GASTRO INFO IN [employer] OR [employer]REACHED OUT TO PATIENT-HE DOES NOT REMEMBER AND STATES ITS BEEN A LONG TIME AGO. STATES WAS DONE @ [facility].

## 2020-10-27 NOTE — TELEPHONE ENCOUNTER
ARMANDO CALLED STATES PT  NO SOLID FOODS BUT OK WITH LIQUIDS.     WAS TAKING GAVISCON 30 MIN PRIOR TO EATING-NO LONGER AVAILABLE PER PHARMACY. REQUESTING SOMETHING TO REPLACE  PT ALSO  FEELS LIKE HE NEEDS ESOPHAGUS STRETCHED AGAIN-HAS HAD DONE TWICE.         PLEASE ADVISE ARMANDO FOR BOTH

## 2020-10-30 ENCOUNTER — ANESTHESIA (OUTPATIENT)
Dept: PERIOP | Facility: HOSPITAL | Age: 75
End: 2020-10-30

## 2020-10-30 ENCOUNTER — APPOINTMENT (OUTPATIENT)
Dept: GENERAL RADIOLOGY | Facility: HOSPITAL | Age: 75
End: 2020-10-30

## 2020-10-30 ENCOUNTER — ANESTHESIA EVENT (OUTPATIENT)
Dept: PERIOP | Facility: HOSPITAL | Age: 75
End: 2020-10-30

## 2020-10-30 ENCOUNTER — TELEPHONE (OUTPATIENT)
Dept: FAMILY MEDICINE CLINIC | Facility: CLINIC | Age: 75
End: 2020-10-30

## 2020-10-30 ENCOUNTER — HOSPITAL ENCOUNTER (EMERGENCY)
Facility: HOSPITAL | Age: 75
Discharge: HOME | End: 2020-10-30
Attending: EMERGENCY MEDICINE | Admitting: INTERNAL MEDICINE

## 2020-10-30 VITALS
DIASTOLIC BLOOD PRESSURE: 71 MMHG | SYSTOLIC BLOOD PRESSURE: 116 MMHG | HEART RATE: 59 BPM | TEMPERATURE: 98 F | WEIGHT: 190.4 LBS | HEIGHT: 68 IN | RESPIRATION RATE: 20 BRPM | OXYGEN SATURATION: 98 % | BODY MASS INDEX: 28.85 KG/M2

## 2020-10-30 DIAGNOSIS — K22.2 ESOPHAGEAL OBSTRUCTION: Primary | ICD-10-CM

## 2020-10-30 LAB
ALBUMIN SERPL-MCNC: 3.7 G/DL (ref 3.5–5.2)
ALBUMIN/GLOB SERPL: 1.4 G/DL
ALP SERPL-CCNC: 127 U/L (ref 39–117)
ALT SERPL W P-5'-P-CCNC: 21 U/L (ref 1–41)
ANION GAP SERPL CALCULATED.3IONS-SCNC: 5 MMOL/L (ref 5–15)
AST SERPL-CCNC: 23 U/L (ref 1–40)
BASOPHILS # BLD AUTO: 0.05 10*3/MM3 (ref 0–0.2)
BASOPHILS NFR BLD AUTO: 0.7 % (ref 0–1.5)
BILIRUB SERPL-MCNC: 0.6 MG/DL (ref 0–1.2)
BUN SERPL-MCNC: 22 MG/DL (ref 8–23)
BUN/CREAT SERPL: 15.4 (ref 7–25)
CALCIUM SPEC-SCNC: 10 MG/DL (ref 8.6–10.5)
CHLORIDE SERPL-SCNC: 106 MMOL/L (ref 98–107)
CO2 SERPL-SCNC: 28 MMOL/L (ref 22–29)
CREAT SERPL-MCNC: 1.43 MG/DL (ref 0.76–1.27)
DEPRECATED RDW RBC AUTO: 55.1 FL (ref 37–54)
EOSINOPHIL # BLD AUTO: 0.45 10*3/MM3 (ref 0–0.4)
EOSINOPHIL NFR BLD AUTO: 6.7 % (ref 0.3–6.2)
ERYTHROCYTE [DISTWIDTH] IN BLOOD BY AUTOMATED COUNT: 16.5 % (ref 12.3–15.4)
GFR SERPL CREATININE-BSD FRML MDRD: 48 ML/MIN/1.73
GLOBULIN UR ELPH-MCNC: 2.7 GM/DL
GLUCOSE SERPL-MCNC: 124 MG/DL (ref 65–99)
HCT VFR BLD AUTO: 45.9 % (ref 37.5–51)
HGB BLD-MCNC: 14.9 G/DL (ref 13–17.7)
IMM GRANULOCYTES # BLD AUTO: 0.02 10*3/MM3 (ref 0–0.05)
IMM GRANULOCYTES NFR BLD AUTO: 0.3 % (ref 0–0.5)
LYMPHOCYTES # BLD AUTO: 1.86 10*3/MM3 (ref 0.7–3.1)
LYMPHOCYTES NFR BLD AUTO: 27.6 % (ref 19.6–45.3)
MCH RBC QN AUTO: 29.4 PG (ref 26.6–33)
MCHC RBC AUTO-ENTMCNC: 32.5 G/DL (ref 31.5–35.7)
MCV RBC AUTO: 90.7 FL (ref 79–97)
MONOCYTES # BLD AUTO: 0.86 10*3/MM3 (ref 0.1–0.9)
MONOCYTES NFR BLD AUTO: 12.7 % (ref 5–12)
NEUTROPHILS NFR BLD AUTO: 3.51 10*3/MM3 (ref 1.7–7)
NEUTROPHILS NFR BLD AUTO: 52 % (ref 42.7–76)
NRBC BLD AUTO-RTO: 0 /100 WBC (ref 0–0.2)
PLATELET # BLD AUTO: 205 10*3/MM3 (ref 140–450)
PMV BLD AUTO: 10.5 FL (ref 6–12)
POTASSIUM SERPL-SCNC: 4.9 MMOL/L (ref 3.5–5.2)
PROT SERPL-MCNC: 6.4 G/DL (ref 6–8.5)
RBC # BLD AUTO: 5.06 10*6/MM3 (ref 4.14–5.8)
SARS-COV-2 RDRP RESP QL NAA+PROBE: NOT DETECTED
SODIUM SERPL-SCNC: 139 MMOL/L (ref 136–145)
WBC # BLD AUTO: 6.75 10*3/MM3 (ref 3.4–10.8)

## 2020-10-30 PROCEDURE — 80053 COMPREHEN METABOLIC PANEL: CPT | Performed by: EMERGENCY MEDICINE

## 2020-10-30 PROCEDURE — 96360 HYDRATION IV INFUSION INIT: CPT

## 2020-10-30 PROCEDURE — C9803 HOPD COVID-19 SPEC COLLECT: HCPCS

## 2020-10-30 PROCEDURE — S0260 H&P FOR SURGERY: HCPCS | Performed by: INTERNAL MEDICINE

## 2020-10-30 PROCEDURE — 99283 EMERGENCY DEPT VISIT LOW MDM: CPT

## 2020-10-30 PROCEDURE — 25010000002 PHENYLEPHRINE HCL 0.8 MG/10ML SOLUTION PREFILLED SYRINGE: Performed by: NURSE ANESTHETIST, CERTIFIED REGISTERED

## 2020-10-30 PROCEDURE — 25010000002 PROPOFOL 10 MG/ML EMULSION: Performed by: NURSE ANESTHETIST, CERTIFIED REGISTERED

## 2020-10-30 PROCEDURE — 25010000002 ONDANSETRON PER 1 MG: Performed by: NURSE ANESTHETIST, CERTIFIED REGISTERED

## 2020-10-30 PROCEDURE — 25010000002 SUCCINYLCHOLINE PER 20 MG: Performed by: NURSE ANESTHETIST, CERTIFIED REGISTERED

## 2020-10-30 PROCEDURE — 85025 COMPLETE CBC W/AUTO DIFF WBC: CPT | Performed by: EMERGENCY MEDICINE

## 2020-10-30 PROCEDURE — 43235 EGD DIAGNOSTIC BRUSH WASH: CPT | Performed by: INTERNAL MEDICINE

## 2020-10-30 PROCEDURE — 87635 SARS-COV-2 COVID-19 AMP PRB: CPT | Performed by: EMERGENCY MEDICINE

## 2020-10-30 RX ORDER — LABETALOL HYDROCHLORIDE 5 MG/ML
5 INJECTION, SOLUTION INTRAVENOUS
Status: DISCONTINUED | OUTPATIENT
Start: 2020-10-30 | End: 2020-10-30 | Stop reason: HOSPADM

## 2020-10-30 RX ORDER — NALOXONE HCL 0.4 MG/ML
0.4 VIAL (ML) INJECTION AS NEEDED
Status: DISCONTINUED | OUTPATIENT
Start: 2020-10-30 | End: 2020-10-30 | Stop reason: HOSPADM

## 2020-10-30 RX ORDER — EPHEDRINE SULFATE 50 MG/ML
INJECTION, SOLUTION INTRAVENOUS AS NEEDED
Status: DISCONTINUED | OUTPATIENT
Start: 2020-10-30 | End: 2020-10-30 | Stop reason: SURG

## 2020-10-30 RX ORDER — SODIUM CHLORIDE 0.9 % (FLUSH) 0.9 %
10 SYRINGE (ML) INJECTION AS NEEDED
Status: DISCONTINUED | OUTPATIENT
Start: 2020-10-30 | End: 2020-10-30 | Stop reason: HOSPADM

## 2020-10-30 RX ORDER — ROCURONIUM BROMIDE 10 MG/ML
INJECTION, SOLUTION INTRAVENOUS AS NEEDED
Status: DISCONTINUED | OUTPATIENT
Start: 2020-10-30 | End: 2020-10-30 | Stop reason: SURG

## 2020-10-30 RX ORDER — PROPOFOL 10 MG/ML
VIAL (ML) INTRAVENOUS AS NEEDED
Status: DISCONTINUED | OUTPATIENT
Start: 2020-10-30 | End: 2020-10-30 | Stop reason: SURG

## 2020-10-30 RX ORDER — ONDANSETRON 2 MG/ML
4 INJECTION INTRAMUSCULAR; INTRAVENOUS ONCE AS NEEDED
Status: DISCONTINUED | OUTPATIENT
Start: 2020-10-30 | End: 2020-10-30 | Stop reason: HOSPADM

## 2020-10-30 RX ORDER — PHENYLEPHRINE HCL IN 0.9% NACL 0.8MG/10ML
SYRINGE (ML) INTRAVENOUS AS NEEDED
Status: DISCONTINUED | OUTPATIENT
Start: 2020-10-30 | End: 2020-10-30 | Stop reason: SURG

## 2020-10-30 RX ORDER — FLUMAZENIL 0.1 MG/ML
0.2 INJECTION INTRAVENOUS AS NEEDED
Status: DISCONTINUED | OUTPATIENT
Start: 2020-10-30 | End: 2020-10-30 | Stop reason: HOSPADM

## 2020-10-30 RX ORDER — SUCCINYLCHOLINE CHLORIDE 20 MG/ML
INJECTION INTRAMUSCULAR; INTRAVENOUS AS NEEDED
Status: DISCONTINUED | OUTPATIENT
Start: 2020-10-30 | End: 2020-10-30 | Stop reason: SURG

## 2020-10-30 RX ORDER — ONDANSETRON 2 MG/ML
INJECTION INTRAMUSCULAR; INTRAVENOUS AS NEEDED
Status: DISCONTINUED | OUTPATIENT
Start: 2020-10-30 | End: 2020-10-30 | Stop reason: SURG

## 2020-10-30 RX ORDER — SODIUM CHLORIDE 0.9 % (FLUSH) 0.9 %
3-10 SYRINGE (ML) INJECTION AS NEEDED
Status: DISCONTINUED | OUTPATIENT
Start: 2020-10-30 | End: 2020-10-30 | Stop reason: HOSPADM

## 2020-10-30 RX ORDER — LIDOCAINE HYDROCHLORIDE 10 MG/ML
0.5 INJECTION, SOLUTION EPIDURAL; INFILTRATION; INTRACAUDAL; PERINEURAL ONCE AS NEEDED
Status: DISCONTINUED | OUTPATIENT
Start: 2020-10-30 | End: 2020-10-30 | Stop reason: HOSPADM

## 2020-10-30 RX ORDER — SODIUM CHLORIDE, SODIUM LACTATE, POTASSIUM CHLORIDE, CALCIUM CHLORIDE 600; 310; 30; 20 MG/100ML; MG/100ML; MG/100ML; MG/100ML
100 INJECTION, SOLUTION INTRAVENOUS CONTINUOUS
Status: DISCONTINUED | OUTPATIENT
Start: 2020-10-30 | End: 2020-10-30 | Stop reason: HOSPADM

## 2020-10-30 RX ORDER — SODIUM CHLORIDE 0.9 % (FLUSH) 0.9 %
3 SYRINGE (ML) INJECTION EVERY 12 HOURS SCHEDULED
Status: DISCONTINUED | OUTPATIENT
Start: 2020-10-30 | End: 2020-10-30 | Stop reason: HOSPADM

## 2020-10-30 RX ORDER — SODIUM CHLORIDE 9 MG/ML
125 INJECTION, SOLUTION INTRAVENOUS CONTINUOUS
Status: DISCONTINUED | OUTPATIENT
Start: 2020-10-30 | End: 2020-10-30 | Stop reason: HOSPADM

## 2020-10-30 RX ADMIN — LIDOCAINE HYDROCHLORIDE 100 MG: 20 INJECTION, SOLUTION INTRAVENOUS at 13:40

## 2020-10-30 RX ADMIN — ONDANSETRON HYDROCHLORIDE 4 MG: 2 SOLUTION INTRAMUSCULAR; INTRAVENOUS at 13:56

## 2020-10-30 RX ADMIN — SUCCINYLCHOLINE CHLORIDE 200 MG: 20 INJECTION, SOLUTION INTRAMUSCULAR; INTRAVENOUS at 13:40

## 2020-10-30 RX ADMIN — PROPOFOL 150 MG: 10 INJECTION, EMULSION INTRAVENOUS at 13:40

## 2020-10-30 RX ADMIN — EPHEDRINE SULFATE 20 MG: 50 INJECTION INTRAVENOUS at 13:43

## 2020-10-30 RX ADMIN — ROCURONIUM BROMIDE 10 MG: 10 INJECTION INTRAVENOUS at 13:40

## 2020-10-30 RX ADMIN — Medication 160 MCG: at 13:45

## 2020-10-30 RX ADMIN — SODIUM CHLORIDE 125 ML/HR: 9 INJECTION, SOLUTION INTRAVENOUS at 12:34

## 2020-10-30 NOTE — TELEPHONE ENCOUNTER
PT SEEN IN CONSULT FOR FB IN THROAT.  PT WOULD NEED TO RETURN FOR ENDOSCOPY.  INQUIRING IF PT CAN HOLD PLAVIX FOR 5 DAYS

## 2020-10-30 NOTE — ANESTHESIA PROCEDURE NOTES
Airway  Urgency: elective    Date/Time: 10/30/2020 1:41 PM  Airway not difficult    General Information and Staff    Patient location during procedure: OR  CRNA: Geena Coley CRNA    Indications and Patient Condition  Indications for airway management: airway protection    Preoxygenated: yes  Mask difficulty assessment: 1 - vent by mask    Final Airway Details  Final airway type: endotracheal airway      Successful airway: ETT  Cuffed: yes   Successful intubation technique: direct laryngoscopy  Facilitating devices/methods: intubating stylet  Endotracheal tube insertion site: oral  Blade: Garcia  Blade size: 2  ETT size (mm): 7.5  Cormack-Lehane Classification: grade I - full view of glottis  Placement verified by: chest auscultation and capnometry   Cuff volume (mL): 10  Measured from: lips  ETT/EBT  to lips (cm): 22  Number of attempts at approach: 1  Assessment: lips, teeth, and gum same as pre-op and atraumatic intubation

## 2020-10-30 NOTE — TELEPHONE ENCOUNTER
SPOKE WITH RENAN-SHE INFORMED PT HAD EMERGENCY ENDOSCOPY TODAY AND LABS.  DR CARRINGTON'S WILL F/U WITH PATIENT NEXT WEEK FOR ANOTHER PROCEUDRE.     SHE WAS ADVISED OK TO HOLD PLAVIX FOR  5 DAYS. VERBALIZED UNDERSTANDING.

## 2020-10-30 NOTE — ANESTHESIA POSTPROCEDURE EVALUATION
"Patient: Samy Velazquez    Procedure Summary     Date: 10/30/20 Room / Location:  [facility] OR 09 /  [facility] OR    Anesthesia Start: 1338 Anesthesia Stop: 1405    Procedure: ESOPHAGOGASTRODUODENOSCOPY WITH ANESTHESIA (N/A ) Diagnosis:     Surgeon: Brent Stanley MD Provider: Geena Coley CRNA    Anesthesia Type: general ASA Status: 3          Anesthesia Type: general    Vitals  Vitals Value Taken Time   /73 10/30/20 1445   Temp 98 °F (36.7 °C) 10/30/20 1430   Pulse 56 10/30/20 1448   Resp 16 10/30/20 1445   SpO2 100 % 10/30/20 1448   Vitals shown include unvalidated device data.        Post Anesthesia Care and Evaluation    Patient location during evaluation: PACU  Patient participation: complete - patient participated  Level of consciousness: awake and alert  Pain management: adequate  Airway patency: patent  Anesthetic complications: No anesthetic complications  PONV Status: none  Cardiovascular status: acceptable and hemodynamically stable  Respiratory status: acceptable  Hydration status: acceptable    Comments: Blood pressure 116/71, pulse 59, temperature 98 °F (36.7 °C), resp. rate 20, height 172.7 cm (68\"), weight 86.4 kg (190 lb 6.4 oz), SpO2 98 %.    Patient discharged from PACU based upon Gricelda score. Please see RN notes for further details      "

## 2020-11-02 ENCOUNTER — LAB (OUTPATIENT)
Dept: LAB | Facility: HOSPITAL | Age: 75
End: 2020-11-02

## 2020-11-02 ENCOUNTER — TRANSCRIBE ORDERS (OUTPATIENT)
Dept: ADMINISTRATIVE | Facility: HOSPITAL | Age: 75
End: 2020-11-02

## 2020-11-02 DIAGNOSIS — Z01.818 PRE-OP TESTING: Primary | ICD-10-CM

## 2020-11-02 PROCEDURE — 87635 SARS-COV-2 COVID-19 AMP PRB: CPT | Performed by: INTERNAL MEDICINE

## 2020-11-02 PROCEDURE — C9803 HOPD COVID-19 SPEC COLLECT: HCPCS | Performed by: INTERNAL MEDICINE

## 2020-11-03 LAB — SARS-COV-2 N GENE RESP QL NAA+PROBE: NOT DETECTED

## 2020-11-04 ENCOUNTER — TRANSCRIBE ORDERS (OUTPATIENT)
Dept: ADMINISTRATIVE | Facility: HOSPITAL | Age: 75
End: 2020-11-04

## 2020-11-04 ENCOUNTER — HOSPITAL ENCOUNTER (OUTPATIENT)
Dept: GENERAL RADIOLOGY | Facility: HOSPITAL | Age: 75
Discharge: HOME | End: 2020-11-04
Admitting: INTERNAL MEDICINE

## 2020-11-04 ENCOUNTER — TELEPHONE (OUTPATIENT)
Dept: FAMILY MEDICINE CLINIC | Facility: CLINIC | Age: 75
End: 2020-11-04

## 2020-11-04 DIAGNOSIS — Z01.818 PRE-OP TESTING: Primary | ICD-10-CM

## 2020-11-04 DIAGNOSIS — K22.2 ESOPHAGEAL OBSTRUCTION: ICD-10-CM

## 2020-11-04 PROCEDURE — 63710000001 BARIUM SULFATE 60 % SUSPENSION: Performed by: INTERNAL MEDICINE

## 2020-11-04 PROCEDURE — A9270 NON-COVERED ITEM OR SERVICE: HCPCS | Performed by: INTERNAL MEDICINE

## 2020-11-04 PROCEDURE — 74246 X-RAY XM UPR GI TRC 2CNTRST: CPT

## 2020-11-04 RX ADMIN — BARIUM SULFATE 150 ML: 0.6 SUSPENSION ORAL at 10:00

## 2020-11-04 NOTE — TELEPHONE ENCOUNTER
Patient left v/m regarding test he had done today.  Requesting you to call him so he can discuss with you results and what he has been advised      [phone]

## 2020-11-05 ENCOUNTER — TELEPHONE (OUTPATIENT)
Dept: GASTROENTEROLOGY | Facility: CLINIC | Age: 75
End: 2020-11-05

## 2020-11-05 NOTE — TELEPHONE ENCOUNTER
Would refer him to general surgery at [facility]   Dr mckay group   I dont think this is esophageal

## 2020-11-05 NOTE — TELEPHONE ENCOUNTER
Received a call from the patients spouse stating that they would like to cancel his esophagram on Monday. She stated Dr. Salinas told them to cancel esophagram and that more than likely they would be going to [place]. I explained that we were in the middle of working him up and possibly referring him to [place] as well (as I explained to the pt yesterday). She stated that Dr. Salinas was going to be taking care of this.

## 2020-11-05 NOTE — TELEPHONE ENCOUNTER
Yue called Dr. Salinas, he was unaware of all findings. He is going to contact the patient and let us know how they would like to proceed moving forward.

## 2020-11-06 ENCOUNTER — TELEPHONE (OUTPATIENT)
Dept: FAMILY MEDICINE CLINIC | Facility: CLINIC | Age: 75
End: 2020-11-06

## 2020-11-06 NOTE — TELEPHONE ENCOUNTER
Spoke with Nayana, Dr. Liu' nurse, and made her aware of the situation with patient.  She stated that they will be happy to assist however is needed.

## 2020-11-06 NOTE — TELEPHONE ENCOUNTER
Call placed to Dr. Liu' office ([facility]) to give update on patient condition and upcoming testing.  Left message for his nurse to return call.

## 2020-11-09 ENCOUNTER — HOSPITAL ENCOUNTER (OUTPATIENT)
Dept: GENERAL RADIOLOGY | Facility: HOSPITAL | Age: 75
Discharge: HOME | End: 2020-11-09
Admitting: INTERNAL MEDICINE

## 2020-11-09 DIAGNOSIS — K22.2 ESOPHAGEAL OBSTRUCTION: ICD-10-CM

## 2020-11-09 PROCEDURE — A9270 NON-COVERED ITEM OR SERVICE: HCPCS | Performed by: INTERNAL MEDICINE

## 2020-11-09 PROCEDURE — 63710000001 BARIUM SULFATE 700 MG TABLET: Performed by: INTERNAL MEDICINE

## 2020-11-09 PROCEDURE — 74220 X-RAY XM ESOPHAGUS 1CNTRST: CPT

## 2020-11-09 PROCEDURE — 63710000001 BARIUM SULFATE 96 % RECONSTITUTED SUSPENSION: Performed by: INTERNAL MEDICINE

## 2020-11-09 PROCEDURE — 63710000001 BARIUM SULFATE 98 % RECONSTITUTED SUSPENSION: Performed by: INTERNAL MEDICINE

## 2020-11-09 RX ADMIN — BARIUM SULFATE 120 ML: 980 POWDER, FOR SUSPENSION ORAL at 10:40

## 2020-11-09 RX ADMIN — BARIUM SULFATE 700 MG: 700 TABLET ORAL at 10:40

## 2020-11-09 RX ADMIN — BARIUM SULFATE 120 ML: 960 POWDER, FOR SUSPENSION ORAL at 10:40

## 2020-11-11 ENCOUNTER — TELEPHONE (OUTPATIENT)
Dept: FAMILY MEDICINE CLINIC | Facility: CLINIC | Age: 75
End: 2020-11-11

## 2020-11-11 NOTE — TELEPHONE ENCOUNTER
Sheri from [facility] called asking about appt with Dr. Zavala.  States patient had tests earlier this week.  Patient is still unable to eat anything.

## 2020-11-11 NOTE — TELEPHONE ENCOUNTER
Call Dr. Liu office-we primed them to suggest who should Samy  see for evaluation of inability to swallow solid food-- going on 2 weeks plus be sure and send esophagram upper GI etc. from [place]

## 2020-11-13 RX ORDER — ONDANSETRON 4 MG/1
4 TABLET, ORALLY DISINTEGRATING ORAL EVERY 8 HOURS PRN
Qty: 10 TABLET | Refills: 2 | Status: SHIPPED | OUTPATIENT
Start: 2020-11-13 | End: 2021-09-08 | Stop reason: SDUPTHER

## 2020-11-17 ENCOUNTER — TELEPHONE (OUTPATIENT)
Dept: FAMILY MEDICINE CLINIC | Facility: CLINIC | Age: 75
End: 2020-11-17

## 2020-11-17 NOTE — TELEPHONE ENCOUNTER
DR SANTANA RETURNED PHONE CALL-DID NOT RECEIVE MESSAGE UNTIL AFTER OUR OFFICE HAD CLOSED.  DR DOS SANTOS SPOKE WITH HIM-STATES HE WILL SEE PT ASAP.        PT WAS CONTACTED BY DR DOS SANTOS AND HE IS AWARE AND I REACHED OUT TO ARMANDO MITCHELL @ [facility] AND SHE IS AWARE.

## 2020-11-19 RX ORDER — PANTOPRAZOLE SODIUM 40 MG/1
TABLET, DELAYED RELEASE ORAL
Qty: 30 TABLET | Refills: 2 | Status: SHIPPED | OUTPATIENT
Start: 2020-11-19 | End: 2021-02-12 | Stop reason: SDUPTHER

## 2020-11-20 ENCOUNTER — TELEPHONE (OUTPATIENT)
Dept: FAMILY MEDICINE CLINIC | Facility: CLINIC | Age: 75
End: 2020-11-20

## 2020-11-20 NOTE — TELEPHONE ENCOUNTER
[facility]      APPT WITH DR SANTANA ON 12.02.20 @ 930AM.        RUNNY NOSE 10-14 DAYS-CANNOT TAKE MUCINEX D/T PILL SIZE-WANTING SOMETHING SMALLER.       [facility]

## 2020-11-20 NOTE — TELEPHONE ENCOUNTER
PT WAS ADVISED-VERBALIZED UNDERSTANDING.     REACHED OUT TO ARMANDO @ [facility]-INFORMED DR. SANTANA APPT DATE/TIME.

## 2020-11-24 NOTE — TELEPHONE ENCOUNTER
Called to follow up on referral to general surgery. Spoke with Floridalma who stated they had received a referral from Dr. Salinas as well but to Dr. Zavala. Therefore they thought our referral was no longer needed. I explained that we were referring to general surgery due to pts history of bariatric surgery and EGD, upper GI and esophagram not showing anything that would explain the patients symptoms. She voiced understanding and said she would have the surgeons review the case. When I called today the patient had been scheduled with Dr. Zavala on 12/2/20 if he still needs to see surgery they can set that up.

## 2020-11-25 ENCOUNTER — CLINICAL SUPPORT (OUTPATIENT)
Dept: FAMILY MEDICINE CLINIC | Facility: CLINIC | Age: 75
End: 2020-11-25

## 2020-11-25 ENCOUNTER — OFFICE VISIT (OUTPATIENT)
Dept: FAMILY MEDICINE CLINIC | Facility: CLINIC | Age: 75
End: 2020-11-25

## 2020-11-25 VITALS — WEIGHT: 190 LBS | TEMPERATURE: 98.6 F | BODY MASS INDEX: 28.89 KG/M2

## 2020-11-25 DIAGNOSIS — J01.00 SUBACUTE MAXILLARY SINUSITIS: Primary | ICD-10-CM

## 2020-11-25 DIAGNOSIS — R05.9 COUGH: ICD-10-CM

## 2020-11-25 DIAGNOSIS — R09.81 NASAL CONGESTION: ICD-10-CM

## 2020-11-25 PROCEDURE — G2025 DIS SITE TELE SVCS RHC/FQHC: HCPCS | Performed by: FAMILY MEDICINE

## 2020-11-25 RX ORDER — CEFUROXIME AXETIL 500 MG/1
500 TABLET ORAL 2 TIMES DAILY
Qty: 14 TABLET | Refills: 0 | Status: SHIPPED | OUTPATIENT
Start: 2020-11-25 | End: 2020-12-02

## 2020-11-25 NOTE — PROGRESS NOTES
Subjective   Samy Velazquez is a 75 y.o. male.     75-year-old male with persistent congestion but no fever    Sinusitis  This is a new problem. The current episode started in the past 7 days. The problem has been waxing and waning since onset. There has been no fever. Associated symptoms include coughing, a hoarse voice and sinus pressure. Pertinent negatives include no shortness of breath. Past treatments include nothing.       The following portions of the patient's history were reviewed and updated as appropriate: allergies, current medications, past family history, past medical history, past social history, past surgical history and problem list.    Review of Systems   Constitutional: Negative for fever.   HENT: Positive for hoarse voice and sinus pressure.    Respiratory: Positive for cough. Negative for shortness of breath.    Cardiovascular: Negative for chest pain.       Objective   Physical Exam  HENT:      Mouth/Throat:      Comments: Hoarseness with phonation  Pulmonary:      Comments: No audible wheezing  Neurological:      Mental Status: He is alert and oriented to person, place, and time.   Psychiatric:         Mood and Affect: Mood normal.         Behavior: Behavior normal.         Thought Content: Thought content normal.         Assessment/Plan   Diagnoses and all orders for this visit:    1. Subacute maxillary sinusitis (Primary)    Other orders  -     cefuroxime (Ceftin) 500 MG tablet; Take 1 tablet by mouth 2 (Two) Times a Day for 7 days.  Dispense: 14 tablet; Refill: 0         Plan above plus COVID-19 testing Claritin and Mucinex  This visit has been rescheduled as a phone visit to comply with patient safety concerns in accordance with CDC recommendations. Total time of discussion was 10 minutes.

## 2020-11-27 LAB — SARS-COV-2 RNA RESP QL NAA+PROBE: NOT DETECTED

## 2020-12-07 RX ORDER — OMEPRAZOLE 40 MG/1
CAPSULE, DELAYED RELEASE ORAL
Qty: 30 CAPSULE | Refills: 0 | Status: SHIPPED | OUTPATIENT
Start: 2020-12-07 | End: 2021-03-29 | Stop reason: ALTCHOICE

## 2020-12-17 RX ORDER — ALLOPURINOL 300 MG/1
300 TABLET ORAL DAILY
Qty: 90 TABLET | Refills: 3 | Status: SHIPPED | OUTPATIENT
Start: 2020-12-17 | End: 2022-02-01

## 2020-12-17 RX ORDER — FLUOXETINE HYDROCHLORIDE 40 MG/1
CAPSULE ORAL
Qty: 90 CAPSULE | Refills: 3 | OUTPATIENT
Start: 2020-12-17

## 2020-12-17 RX ORDER — DONEPEZIL HYDROCHLORIDE 10 MG/1
TABLET, FILM COATED ORAL
Qty: 90 TABLET | Refills: 0 | Status: SHIPPED | OUTPATIENT
Start: 2020-12-17 | End: 2021-03-09

## 2020-12-17 RX ORDER — FUROSEMIDE 20 MG/1
TABLET ORAL
Qty: 180 TABLET | Refills: 0 | Status: SHIPPED | OUTPATIENT
Start: 2020-12-17 | End: 2021-03-10

## 2020-12-22 RX ORDER — MEXILETINE HYDROCHLORIDE 150 MG/1
300 CAPSULE ORAL 3 TIMES DAILY
Qty: 540 CAPSULE | Refills: 0 | Status: SHIPPED | OUTPATIENT
Start: 2020-12-22 | End: 2021-03-26

## 2020-12-23 ENCOUNTER — OUTSIDE FACILITY SERVICE (OUTPATIENT)
Dept: FAMILY MEDICINE CLINIC | Facility: CLINIC | Age: 75
End: 2020-12-23

## 2020-12-23 PROCEDURE — G0179 MD RECERTIFICATION HHA PT: HCPCS | Performed by: FAMILY MEDICINE

## 2021-01-14 NOTE — TELEPHONE ENCOUNTER
Patient past due for wellness visit.  I offered to make him an appt but he wants me to check with Dr. Salinas first.

## 2021-01-15 RX ORDER — ATORVASTATIN CALCIUM 40 MG/1
TABLET, FILM COATED ORAL
Qty: 30 TABLET | Refills: 0 | Status: SHIPPED | OUTPATIENT
Start: 2021-01-15 | End: 2021-02-10

## 2021-01-18 RX ORDER — SOTALOL HYDROCHLORIDE 80 MG/1
TABLET ORAL
Qty: 90 TABLET | Refills: 0 | Status: SHIPPED | OUTPATIENT
Start: 2021-01-18 | End: 2021-04-12

## 2021-01-20 ENCOUNTER — OFFICE VISIT (OUTPATIENT)
Dept: FAMILY MEDICINE CLINIC | Facility: CLINIC | Age: 76
End: 2021-01-20

## 2021-01-20 VITALS
DIASTOLIC BLOOD PRESSURE: 76 MMHG | WEIGHT: 196 LBS | HEIGHT: 68 IN | RESPIRATION RATE: 16 BRPM | TEMPERATURE: 98 F | SYSTOLIC BLOOD PRESSURE: 120 MMHG | BODY MASS INDEX: 29.7 KG/M2

## 2021-01-20 DIAGNOSIS — Z12.5 SPECIAL SCREENING FOR MALIGNANT NEOPLASM OF PROSTATE: ICD-10-CM

## 2021-01-20 DIAGNOSIS — M1A.9XX1 CHRONIC GOUT WITH TOPHUS, UNSPECIFIED CAUSE, UNSPECIFIED SITE: ICD-10-CM

## 2021-01-20 DIAGNOSIS — E78.2 MIXED HYPERLIPIDEMIA: ICD-10-CM

## 2021-01-20 DIAGNOSIS — R53.83 FATIGUE, UNSPECIFIED TYPE: ICD-10-CM

## 2021-01-20 DIAGNOSIS — Z00.00 ANNUAL PHYSICAL EXAM: ICD-10-CM

## 2021-01-20 DIAGNOSIS — E11.43 TYPE 2 DIABETES MELLITUS WITH DIABETIC AUTONOMIC NEUROPATHY, WITHOUT LONG-TERM CURRENT USE OF INSULIN (HCC): Primary | ICD-10-CM

## 2021-01-20 PROCEDURE — G0439 PPPS, SUBSEQ VISIT: HCPCS | Performed by: FAMILY MEDICINE

## 2021-01-20 PROCEDURE — 81003 URINALYSIS AUTO W/O SCOPE: CPT | Performed by: FAMILY MEDICINE

## 2021-01-20 RX ORDER — METOPROLOL SUCCINATE 25 MG/1
12.5 TABLET, EXTENDED RELEASE ORAL DAILY
COMMUNITY
End: 2023-02-20 | Stop reason: SDUPTHER

## 2021-01-20 NOTE — PATIENT INSTRUCTIONS
Fall Prevention in the Home, Adult  Falls can cause injuries and can affect people from all age groups. There are many simple things that you can do to make your home safe and to help prevent falls. Ask for help when making these changes, if needed.  What actions can I take to prevent falls?  General instructions  · Use good lighting in all rooms. Replace any light bulbs that burn out.  · Turn on lights if it is dark. Use night-lights.  · Place frequently used items in easy-to-reach places. Lower the shelves around your home if necessary.  · Set up furniture so that there are clear paths around it. Avoid moving your furniture around.  · Remove throw rugs and other tripping hazards from the floor.  · Avoid walking on wet floors.  · Fix any uneven floor surfaces.  · Add color or contrast paint or tape to grab bars and handrails in your home. Place contrasting color strips on the first and last steps of stairways.  · When you use a stepladder, make sure that it is completely opened and that the sides are firmly locked. Have someone hold the ladder while you are using it. Do not climb a closed stepladder.  · Be aware of any and all pets.  What can I do in the bathroom?         · Keep the floor dry. Immediately clean up any water that spills onto the floor.  · Remove soap buildup in the tub or shower on a regular basis.  · Use non-skid mats or decals on the floor of the tub or shower.  · Attach bath mats securely with double-sided, non-slip rug tape.  · If you need to sit down while you are in the shower, use a plastic, non-slip stool.  · Install grab bars by the toilet and in the tub and shower. Do not use towel bars as grab bars.  What can I do in the bedroom?  · Make sure that a bedside light is easy to reach.  · Do not use oversized bedding that drapes onto the floor.  · Have a firm chair that has side arms to use for getting dressed.  What can I do in the kitchen?  · Clean up any spills right away.  · If you  need to reach for something above you, use a sturdy step stool that has a grab bar.  · Keep electrical cables out of the way.  · Do not use floor polish or wax that makes floors slippery. If you must use wax, make sure that it is non-skid floor wax.  What can I do in the stairways?  · Do not leave any items on the stairs.  · Make sure that you have a light switch at the top of the stairs and the bottom of the stairs. Have them installed if you do not have them.  · Make sure that there are handrails on both sides of the stairs. Fix handrails that are broken or loose. Make sure that handrails are as long as the stairways.  · Install non-slip stair treads on all stairs in your home.  · Avoid having throw rugs at the top or bottom of stairways, or secure the rugs with carpet tape to prevent them from moving.  · Choose a carpet design that does not hide the edge of steps on the stairway.  · Check any carpeting to make sure that it is firmly attached to the stairs. Fix any carpet that is loose or worn.  What can I do on the outside of my home?  · Use bright outdoor lighting.  · Regularly repair the edges of walkways and driveways and fix any cracks.  · Remove high doorway thresholds.  · Trim any shrubbery on the main path into your home.  · Regularly check that handrails are securely fastened and in good repair. Both sides of any steps should have handrails.  · Install guardrails along the edges of any raised decks or porches.  · Clear walkways of debris and clutter, including tools and rocks.  · Have leaves, snow, and ice cleared regularly.  · Use sand or salt on walkways during winter months.  · In the garage, clean up any spills right away, including grease or oil spills.  What other actions can I take?  · Wear closed-toe shoes that fit well and support your feet. Wear shoes that have rubber soles or low heels.  · Use mobility aids as needed, such as canes, walkers, scooters, and crutches.  · Review your medicines with  your health care provider. Some medicines can cause dizziness or changes in blood pressure, which increase your risk of falling.  Talk with your health care provider about other ways that you can decrease your risk of falls. This may include working with a physical therapist or  to improve your strength, balance, and endurance.  Where to find more information  · Centers for Disease Control and Prevention, STEADI: https://www.cdc.gov  · National Stephan on Aging: https://jj1surg.wes.nih.gov  Contact a health care provider if:  · You are afraid of falling at home.  · You feel weak, drowsy, or dizzy at home.  · You fall at home.  Summary  · There are many simple things that you can do to make your home safe and to help prevent falls.  · Ways to make your home safe include removing tripping hazards and installing grab bars in the bathroom.  · Ask for help when making these changes in your home.  This information is not intended to replace advice given to you by your health care provider. Make sure you discuss any questions you have with your health care provider.  Document Revised: 2018 Document Reviewed: 2018  [employer] Patient Education ©  [employer]    Medicare Wellness  Personal Prevention Plan of Service     Date of Office Visit:  2021  Encounter Provider:  Garret Salinas MD  Place of Service:  [facility]  Patient Name: Samy Velazquez  :  1945    As part of the Medicare Wellness portion of your visit today, we are providing you with this personalized preventive plan of services (PPPS). This plan is based upon recommendations of the [place] Preventive Services Task Force (USPSTF) and the Advisory Committee on Immunization Practices (ACIP).    This lists the preventive care services that should be considered, and provides dates of when you are due. Items listed as completed are up-to-date and do not require any further  intervention.    Health Maintenance   Topic Date Due   • URINE MICROALBUMIN  1945   • DIABETIC FOOT EXAM  10/26/2016   • ANNUAL WELLNESS VISIT  11/14/2020   • DIABETIC EYE EXAM  03/09/2021 (Originally 6/1/2018)   • ZOSTER VACCINE (1 of 2) 01/19/2022 (Originally 5/22/1997)   • HEMOGLOBIN A1C  02/18/2021   • LIPID PANEL  08/18/2021   • HEPATITIS C SCREENING  Completed   • INFLUENZA VACCINE  Completed   • Pneumococcal Vaccine 65+  Completed   • MENINGOCOCCAL VACCINE  Aged Out   • COLONOSCOPY  Discontinued   • TDAP/TD VACCINES  Discontinued       No orders of the defined types were placed in this encounter.      Return in 6 months (on 7/20/2021).

## 2021-01-20 NOTE — PROGRESS NOTES
The ABCs of the Annual Wellness Visit  Subsequent Medicare Wellness Visit    Chief Complaint   Patient presents with   • Medicare Wellness-subsequent     Labs done with Home Health last week       Subjective   History of Present Illness:  Samy Velazquez is a 75 y.o. male who presents for a Subsequent Medicare Wellness Visit.    HEALTH RISK ASSESSMENT    Recent Hospitalizations:  No hospitalization(s) within the last year.    Current Medical Providers:  Patient Care Team:  Garret Salinas MD as PCP - General    Smoking Status:  Social History     Tobacco Use   Smoking Status Never Smoker   Smokeless Tobacco Never Used       Alcohol Consumption:  Social History     Substance and Sexual Activity   Alcohol Use No       Depression Screen:   PHQ-2/PHQ-9 Depression Screening 1/20/2021   Little interest or pleasure in doing things 0   Feeling down, depressed, or hopeless 0   Trouble falling or staying asleep, or sleeping too much -   Feeling tired or having little energy -   Poor appetite or overeating -   Feeling bad about yourself - or that you are a failure or have let yourself or your family down -   Trouble concentrating on things, such as reading the newspaper or watching television -   Moving or speaking so slowly that other people could have noticed. Or the opposite - being so fidgety or restless that you have been moving around a lot more than usual -   Thoughts that you would be better off dead, or of hurting yourself in some way -   Total Score 0   If you checked off any problems, how difficult have these problems made it for you to do your work, take care of things at home, or get along with other people? -       Fall Risk Screen:  JEWELSADI Fall Risk Assessment was completed, and patient is at HIGH risk for falls. Assessment completed on:1/20/2021    Health Habits and Functional and Cognitive Screening:  Functional & Cognitive Status 1/20/2021   Do you have difficulty preparing food and eating? No   Do you  have difficulty bathing yourself, getting dressed or grooming yourself? No   Do you have difficulty using the toilet? No   Do you have difficulty moving around from place to place? Yes   Do you have trouble with steps or getting out of a bed or a chair? Yes   Current Diet Well Balanced Diet   Dental Exam Up to date   Eye Exam Up to date   Exercise (times per week) 0 times per week   Current Exercise Activities Include No Regular Exercise   Do you need help using the phone?  No   Are you deaf or do you have serious difficulty hearing?  No   Do you need help with transportation? No   Do you need help shopping? No   Do you need help preparing meals?  No   Do you need help with housework?  No   Do you need help with laundry? No   Do you need help taking your medications? No   Do you need help managing money? No   Do you ever drive or ride in a car without wearing a seat belt? No   Have you felt unusual stress, anger or loneliness in the last month? No   Who do you live with? Spouse   If you need help, do you have trouble finding someone available to you? No   Have you been bothered in the last four weeks by sexual problems? No   Do you have difficulty concentrating, remembering or making decisions? Yes         Does the patient have evidence of cognitive impairment? Yes    Asprin use counseling:Taking ASA appropriately as indicated    Age-appropriate Screening Schedule:  Refer to the list below for future screening recommendations based on patient's age, sex and/or medical conditions. Orders for these recommended tests are listed in the plan section. The patient has been provided with a written plan.    Health Maintenance   Topic Date Due   • DIABETIC EYE EXAM  03/09/2021 (Originally 6/1/2018)   • ZOSTER VACCINE (1 of 2) 01/19/2022 (Originally 5/22/1997)   • HEMOGLOBIN A1C  02/18/2021   • LIPID PANEL  08/18/2021   • DIABETIC FOOT EXAM  01/20/2022   • URINE MICROALBUMIN  01/20/2022   • INFLUENZA VACCINE  Completed   •  COLONOSCOPY  Discontinued   • TDAP/TD VACCINES  Discontinued          The following portions of the patient's history were reviewed and updated as appropriate: allergies, current medications, past family history, past medical history, past social history, past surgical history and problem list.    Outpatient Medications Prior to Visit   Medication Sig Dispense Refill   • allopurinol (ZYLOPRIM) 300 MG tablet Take 1 tablet by mouth Daily. 90 tablet 3   • aspirin 81 MG EC tablet Take 81 mg by mouth Daily.     • atorvastatin (LIPITOR) 40 MG tablet TAKE 1 TABLET BY MOUTH EVERY DAY EVERY NIGHT 30 tablet 0   • cephalexin (KEFLEX) 250 MG capsule TAKE 1 CAPSULE (250 MG TOTAL) BY MOUTH EVERY 12 HOURS.     • Cholecalciferol (VITAMIN D3) 2000 units capsule Take 2,000 Units by mouth Daily.     • clopidogrel (PLAVIX) 75 MG tablet Take 1 tablet by mouth Daily. 90 tablet 3   • donepezil (ARICEPT) 10 MG tablet TAKE 1 TABLET BY MOUTH EVERYDAY AT BEDTIME 90 tablet 0   • FLUoxetine (PROzac) 20 MG capsule Take 1 capsule by mouth Daily. 90 capsule 3   • furosemide (LASIX) 20 MG tablet TAKE 1 TABLET BY MOUTH TWICE A  tablet 0   • isosorbide mononitrate (IMDUR) 60 MG 24 hr tablet TAKE 1 TABLET BY MOUTH TWICE A  tablet 3   • levothyroxine (SYNTHROID, LEVOTHROID) 50 MCG tablet TAKE 1 TABLET BY MOUTH EVERY DAY 90 tablet 3   • metoprolol succinate XL (TOPROL-XL) 25 MG 24 hr tablet Take 25 mg by mouth Daily.     • mexiletine (MEXITIL) 150 MG capsule TAKE 2 CAPSULES BY MOUTH 3 (THREE) TIMES A DAY. (Patient taking differently: Take 300 mg by mouth. 1 tablet in the morning, 1 tablet at noon, and two tablets at bedtime.) 540 capsule 0   • nitroglycerin (NITRODUR) 0.2 MG/HR patch Place 1 patch on the skin Daily. Patch is 0.24 twelve hour patch     • nitroglycerin (NITROSTAT) 0.4 MG SL tablet PLEASE SEE ATTACHED FOR DETAILED DIRECTIONS 25 tablet 2   • omeprazole (priLOSEC) 40 MG capsule TAKE 1 CAPSULE BY MOUTH EVERY DAY 30 capsule 0   •  "ondansetron (Zofran) 4 MG tablet Take 1 tablet by mouth Every 6 (Six) Hours As Needed for Nausea or Vomiting. 10 tablet 0   • ondansetron ODT (ZOFRAN-ODT) 4 MG disintegrating tablet Place 1 tablet on the tongue Every 8 (Eight) Hours As Needed for Nausea or Vomiting. 10 tablet 2   • oxyCODONE-acetaminophen (PERCOCET)  MG per tablet Take 1 tablet by mouth Every 6 (Six) Hours As Needed for Moderate Pain .     • pantoprazole (PROTONIX) 40 MG EC tablet TAKE 1 TABLET BY MOUTH EVERY MORNING BEFORE BREAKFAST 30 tablet 2   • sotalol (BETAPACE) 80 MG tablet TAKE 1/2 TABLET BY MOUTH TWICE A DAY 90 tablet 0     No facility-administered medications prior to visit.        Patient Active Problem List   Diagnosis   • Ischemic heart disease due to coronary artery obstruction (CMS/HCC)   • Osteoarthritis of multiple joints   • Cardiac pacemaker   • Gout of multiple sites   • Nephrolithiasis   • Mixed hyperlipidemia   • Morbidly obese (CMS/HCC)       Advanced Care Planning:  ACP discussion was declined by the patient. Patient has an advance directive in EMR which is still valid.     Review of Systems   Cardiovascular: Negative for chest pain and leg swelling.   Gastrointestinal: Negative for abdominal pain.        Cologuard up-to-date   Genitourinary: Negative for difficulty urinating.   All other systems reviewed and are negative.      Compared to one year ago, the patient feels his physical health is the same.  Compared to one year ago, the patient feels his mental health is the same.    Reviewed chart for potential of high risk medication in the elderly: yes  Reviewed chart for potential of harmful drug interactions in the elderly:yes    Objective         Vitals:    01/20/21 1310   BP: 120/76   BP Location: Right arm   Patient Position: Sitting   Cuff Size: Adult   Resp: 16   Temp: 98 °F (36.7 °C)   TempSrc: Temporal   Weight: 88.9 kg (196 lb)   Height: 172.7 cm (68\")   PainSc: 0-No pain       Body mass index is 29.8 " kg/m².  Discussed the patient's BMI with him. The BMI is above average; no BMI management plan is appropriate..    Physical Exam  Vitals signs and nursing note reviewed.   Constitutional:       Appearance: Normal appearance.   Eyes:      Extraocular Movements: Extraocular movements intact.      Pupils: Pupils are equal, round, and reactive to light.   Neck:      Vascular: No carotid bruit.   Cardiovascular:      Rate and Rhythm: Normal rate and regular rhythm.      Pulses:           Dorsalis pedis pulses are 1+ on the right side and 1+ on the left side.        Posterior tibial pulses are 1+ on the right side and 1+ on the left side.      Heart sounds: Normal heart sounds.   Pulmonary:      Effort: Pulmonary effort is normal.      Breath sounds: Normal breath sounds.   Abdominal:      General: There is no distension.      Palpations: Abdomen is soft. There is no mass.   Genitourinary:     Comments: Deferred because of age  Musculoskeletal:      Right lower leg: No edema.      Left lower leg: No edema.      Right foot: Bunion present.      Left foot: Bunion present.   Feet:      Right foot:      Skin integrity: Skin integrity normal.      Toenail Condition: Fungal disease present.     Left foot:      Skin integrity: Skin integrity normal.      Toenail Condition: Fungal disease present.  Lymphadenopathy:      Cervical: No cervical adenopathy.   Skin:     General: Skin is warm and dry.      Capillary Refill: Capillary refill takes less than 2 seconds.   Neurological:      General: No focal deficit present.      Mental Status: He is alert. Mental status is at baseline.   Psychiatric:         Mood and Affect: Mood normal.         Behavior: Behavior normal.         Thought Content: Thought content normal.         Judgment: Judgment normal.               Assessment/Plan   Medicare Risks and Personalized Health Plan  CMS Preventative Services Quick Reference  Fall Risk    The above risks/problems have been discussed with the  patient.  Pertinent information has been shared with the patient in the After Visit Summary.  Follow up plans and orders are seen below in the Assessment/Plan Section.    Diagnoses and all orders for this visit:    1. Type 2 diabetes mellitus with diabetic autonomic neuropathy, without long-term current use of insulin (CMS/[facility]) (Primary)  -     MicroAlbumin, Urine, Random - Urine, Clean Catch  -     Hemoglobin A1c  -     POC Urinalysis Dipstick, Multipro    2. Fatigue, unspecified type  -     TSH  -     T4, free  -     CBC & Differential    3. Mixed hyperlipidemia  -     Comprehensive Metabolic Panel  -     Lipid Panel    4. Special screening for malignant neoplasm of prostate  -     PSA Screen    5. Chronic gout with tophus, unspecified cause, unspecified site  -     Uric Acid    6. Annual physical exam      Follow Up:  Return in 6 months (on 7/20/2021).     An After Visit Summary and PPPS were given to the patient.       Plan above-COVID-19 safety-had 1 shot

## 2021-01-21 LAB
ALBUMIN SERPL-MCNC: 3.6 G/DL (ref 3.5–5.2)
ALBUMIN/GLOB SERPL: 1.3 G/DL
ALP SERPL-CCNC: 154 U/L (ref 39–117)
ALT SERPL-CCNC: 21 U/L (ref 1–41)
AST SERPL-CCNC: 28 U/L (ref 1–40)
BASOPHILS # BLD AUTO: 0.12 10*3/MM3 (ref 0–0.2)
BASOPHILS NFR BLD AUTO: 1.5 % (ref 0–1.5)
BILIRUB SERPL-MCNC: 0.8 MG/DL (ref 0–1.2)
BUN SERPL-MCNC: 14 MG/DL (ref 8–23)
BUN/CREAT SERPL: 12.2 (ref 7–25)
CALCIUM SERPL-MCNC: 9.4 MG/DL (ref 8.6–10.5)
CHLORIDE SERPL-SCNC: 103 MMOL/L (ref 98–107)
CHOLEST SERPL-MCNC: 118 MG/DL (ref 0–200)
CO2 SERPL-SCNC: 30.5 MMOL/L (ref 22–29)
CREAT SERPL-MCNC: 1.15 MG/DL (ref 0.76–1.27)
EOSINOPHIL # BLD AUTO: 1.43 10*3/MM3 (ref 0–0.4)
EOSINOPHIL NFR BLD AUTO: 18.2 % (ref 0.3–6.2)
ERYTHROCYTE [DISTWIDTH] IN BLOOD BY AUTOMATED COUNT: 14.7 % (ref 12.3–15.4)
GLOBULIN SER CALC-MCNC: 2.7 GM/DL
GLUCOSE SERPL-MCNC: 131 MG/DL (ref 65–99)
HBA1C MFR BLD: 5.9 % (ref 4.8–5.6)
HCT VFR BLD AUTO: 45.7 % (ref 37.5–51)
HDLC SERPL-MCNC: 42 MG/DL (ref 40–60)
HGB BLD-MCNC: 15.1 G/DL (ref 13–17.7)
IMM GRANULOCYTES # BLD AUTO: 0.02 10*3/MM3 (ref 0–0.05)
IMM GRANULOCYTES NFR BLD AUTO: 0.3 % (ref 0–0.5)
LDLC SERPL CALC-MCNC: 59 MG/DL (ref 0–100)
LYMPHOCYTES # BLD AUTO: 2.01 10*3/MM3 (ref 0.7–3.1)
LYMPHOCYTES NFR BLD AUTO: 25.6 % (ref 19.6–45.3)
MCH RBC QN AUTO: 30.3 PG (ref 26.6–33)
MCHC RBC AUTO-ENTMCNC: 33 G/DL (ref 31.5–35.7)
MCV RBC AUTO: 91.8 FL (ref 79–97)
MICROALBUMIN UR-MCNC: 14.9 UG/ML
MONOCYTES # BLD AUTO: 0.63 10*3/MM3 (ref 0.1–0.9)
MONOCYTES NFR BLD AUTO: 8 % (ref 5–12)
NEUTROPHILS # BLD AUTO: 3.63 10*3/MM3 (ref 1.7–7)
NEUTROPHILS NFR BLD AUTO: 46.4 % (ref 42.7–76)
NRBC BLD AUTO-RTO: 0.1 /100 WBC (ref 0–0.2)
PLATELET # BLD AUTO: 222 10*3/MM3 (ref 140–450)
POTASSIUM SERPL-SCNC: 3.9 MMOL/L (ref 3.5–5.2)
PROT SERPL-MCNC: 6.3 G/DL (ref 6–8.5)
PSA SERPL-MCNC: 1.67 NG/ML (ref 0–4)
RBC # BLD AUTO: 4.98 10*6/MM3 (ref 4.14–5.8)
SODIUM SERPL-SCNC: 141 MMOL/L (ref 136–145)
T4 FREE SERPL-MCNC: 1.33 NG/DL (ref 0.93–1.7)
TRIGL SERPL-MCNC: 85 MG/DL (ref 0–150)
TSH SERPL DL<=0.005 MIU/L-ACNC: 1.51 UIU/ML (ref 0.27–4.2)
URATE SERPL-MCNC: 3.9 MG/DL (ref 3.4–7)
VLDLC SERPL CALC-MCNC: 17 MG/DL (ref 5–40)
WBC # BLD AUTO: 7.84 10*3/MM3 (ref 3.4–10.8)

## 2021-02-07 DIAGNOSIS — E03.9 ACQUIRED HYPOTHYROIDISM: ICD-10-CM

## 2021-02-08 RX ORDER — LEVOTHYROXINE SODIUM 0.05 MG/1
TABLET ORAL
Qty: 90 TABLET | Refills: 3 | Status: SHIPPED | OUTPATIENT
Start: 2021-02-08 | End: 2022-03-22

## 2021-02-10 RX ORDER — ATORVASTATIN CALCIUM 40 MG/1
TABLET, FILM COATED ORAL
Qty: 90 TABLET | Refills: 3 | Status: SHIPPED | OUTPATIENT
Start: 2021-02-10 | End: 2022-04-04

## 2021-02-12 RX ORDER — PANTOPRAZOLE SODIUM 40 MG/1
TABLET, DELAYED RELEASE ORAL
Qty: 90 TABLET | Refills: 3 | Status: SHIPPED | OUTPATIENT
Start: 2021-02-12 | End: 2021-03-29 | Stop reason: SDUPTHER

## 2021-03-09 RX ORDER — DONEPEZIL HYDROCHLORIDE 10 MG/1
TABLET, FILM COATED ORAL
Qty: 90 TABLET | Refills: 2 | Status: SHIPPED | OUTPATIENT
Start: 2021-03-09 | End: 2021-11-04 | Stop reason: ALTCHOICE

## 2021-03-10 RX ORDER — FUROSEMIDE 20 MG/1
TABLET ORAL
Qty: 180 TABLET | Refills: 2 | Status: SHIPPED | OUTPATIENT
Start: 2021-03-10 | End: 2021-07-09 | Stop reason: SDUPTHER

## 2021-03-11 RX ORDER — CLOPIDOGREL BISULFATE 75 MG/1
TABLET ORAL
Qty: 90 TABLET | Refills: 3 | Status: SHIPPED | OUTPATIENT
Start: 2021-03-11 | End: 2022-03-07

## 2021-03-11 NOTE — TELEPHONE ENCOUNTER
Caller: Samy Velazquez    Relationship: Self    Best call back number: [phone]    Medication needed:   Requested Prescriptions     Pending Prescriptions Disp Refills   • clopidogrel (PLAVIX) 75 MG tablet [Pharmacy Med Name: CLOPIDOGREL 75 MG TABLET] 90 tablet 3     Sig: TAKE 1 TABLET BY MOUTH EVERY DAY       When do you need the refill by: ASAP    What details did the patient provide when requesting the medication: PATIENT STATE HE IS COMPLETELY OUT OF HIS MEDICATION    Does the patient have less than a 3 day supply:  [x] Yes  [] No    What is the patient's preferred pharmacy: [facility]Y #[id number] - [address] [phone] [facility] [phone]

## 2021-03-26 RX ORDER — MEXILETINE HYDROCHLORIDE 150 MG/1
CAPSULE ORAL
Qty: 360 CAPSULE | Refills: 3 | Status: SHIPPED | OUTPATIENT
Start: 2021-03-26 | End: 2022-04-12

## 2021-03-29 ENCOUNTER — OFFICE VISIT (OUTPATIENT)
Dept: FAMILY MEDICINE CLINIC | Facility: CLINIC | Age: 76
End: 2021-03-29

## 2021-03-29 VITALS
DIASTOLIC BLOOD PRESSURE: 81 MMHG | TEMPERATURE: 96.8 F | BODY MASS INDEX: 29.4 KG/M2 | HEART RATE: 50 BPM | SYSTOLIC BLOOD PRESSURE: 123 MMHG | HEIGHT: 68 IN | WEIGHT: 194 LBS

## 2021-03-29 DIAGNOSIS — F33.1 MAJOR DEPRESSIVE DISORDER, RECURRENT, MODERATE (HCC): ICD-10-CM

## 2021-03-29 DIAGNOSIS — K21.00 GASTROESOPHAGEAL REFLUX DISEASE WITH ESOPHAGITIS WITHOUT HEMORRHAGE: Primary | ICD-10-CM

## 2021-03-29 DIAGNOSIS — I47.20 VENTRICULAR TACHYCARDIA (HCC): ICD-10-CM

## 2021-03-29 DIAGNOSIS — I11.0 HYPERTENSIVE HEART DISEASE WITH HEART FAILURE (HCC): ICD-10-CM

## 2021-03-29 DIAGNOSIS — N18.4 CHRONIC KIDNEY DISEASE, STAGE 4 (SEVERE) (HCC): ICD-10-CM

## 2021-03-29 DIAGNOSIS — E66.01 MORBIDLY OBESE (HCC): ICD-10-CM

## 2021-03-29 PROCEDURE — G2025 DIS SITE TELE SVCS RHC/FQHC: HCPCS | Performed by: FAMILY MEDICINE

## 2021-03-29 RX ORDER — LISINOPRIL 2.5 MG/1
2.5 TABLET ORAL 2 TIMES DAILY
COMMUNITY
Start: 2021-01-04 | End: 2021-11-04 | Stop reason: ALTCHOICE

## 2021-03-29 RX ORDER — PANTOPRAZOLE SODIUM 40 MG/1
TABLET, DELAYED RELEASE ORAL
Qty: 90 TABLET | Refills: 3 | Status: SHIPPED | OUTPATIENT
Start: 2021-03-29 | End: 2021-11-16 | Stop reason: ALTCHOICE

## 2021-03-29 NOTE — PROGRESS NOTES
Subjective   Samy Velazquez is a 75 y.o. male.     Patient complains of acid reflux-has been on Protonix but the medicine has not been renewed      The following portions of the patient's history were reviewed and updated as appropriate: allergies, current medications, past family history, past medical history, past social history, past surgical history and problem list.    Review of Systems   Cardiovascular: Negative for chest pain.   Gastrointestinal: Positive for nausea and vomiting.        History of GERD and dilatation       Objective   Physical Exam  Nursing note reviewed.   HENT:      Mouth/Throat:      Comments: Voice is raspy  Pulmonary:      Comments: Breathing effort sounds normal  Abdominal:      Comments: Denies abdominal pain or stricture symptoms   Neurological:      Mental Status: He is alert and oriented to person, place, and time.   Psychiatric:         Mood and Affect: Mood normal.         Behavior: Behavior normal.         Thought Content: Thought content normal.         Judgment: Judgment normal.         Assessment/Plan   Diagnoses and all orders for this visit:    1. Gastroesophageal reflux disease with esophagitis without hemorrhage (Primary)    2. Hypertensive heart disease with heart failure (CMS/HCC)    3. Major depressive disorder, recurrent, moderate (CMS/HCC)    4. Chronic kidney disease, stage 4 (severe) (CMS/HCC)    5. Ventricular tachycardia (CMS/HCC)    6. Morbidly obese (CMS/HCC)    Other orders  -     pantoprazole (PROTONIX) 40 MG EC tablet; Take 1 now and nightly for acid reflux  Dispense: 90 tablet; Refill: 3         This visit has been rescheduled as a phone visit to comply with patient safety concerns in accordance with CDC recommendations. Total time of discussion was 10 minutes.  Plan above refill Protonix

## 2021-04-01 ENCOUNTER — OFFICE VISIT (OUTPATIENT)
Dept: FAMILY MEDICINE CLINIC | Facility: CLINIC | Age: 76
End: 2021-04-01

## 2021-04-01 VITALS
DIASTOLIC BLOOD PRESSURE: 64 MMHG | HEIGHT: 67 IN | OXYGEN SATURATION: 96 % | BODY MASS INDEX: 31.39 KG/M2 | TEMPERATURE: 97.5 F | SYSTOLIC BLOOD PRESSURE: 112 MMHG | WEIGHT: 200 LBS | HEART RATE: 53 BPM | RESPIRATION RATE: 18 BRPM

## 2021-04-01 DIAGNOSIS — S81.011D LACERATION OF RIGHT KNEE, SUBSEQUENT ENCOUNTER: ICD-10-CM

## 2021-04-01 DIAGNOSIS — E11.43 TYPE 2 DIABETES MELLITUS WITH DIABETIC AUTONOMIC NEUROPATHY, WITHOUT LONG-TERM CURRENT USE OF INSULIN (HCC): ICD-10-CM

## 2021-04-01 DIAGNOSIS — R53.83 FATIGUE, UNSPECIFIED TYPE: Primary | ICD-10-CM

## 2021-04-01 PROBLEM — E03.9 HYPOTHYROIDISM: Status: ACTIVE | Noted: 2021-04-01

## 2021-04-01 PROBLEM — I10 ESSENTIAL HYPERTENSION: Status: ACTIVE | Noted: 2021-04-01

## 2021-04-01 PROBLEM — I25.10 CORONARY ATHEROSCLEROSIS: Status: ACTIVE | Noted: 2021-04-01

## 2021-04-01 PROBLEM — I50.22 CHRONIC SYSTOLIC HEART FAILURE: Status: ACTIVE | Noted: 2021-04-01

## 2021-04-01 PROBLEM — I48.0 PAROXYSMAL ATRIAL FIBRILLATION: Status: ACTIVE | Noted: 2021-04-01

## 2021-04-01 PROCEDURE — 99212 OFFICE O/P EST SF 10 MIN: CPT | Performed by: FAMILY MEDICINE

## 2021-04-01 RX ORDER — MEMANTINE HYDROCHLORIDE 10 MG/1
10 TABLET ORAL 2 TIMES DAILY
Qty: 180 TABLET | Refills: 3 | Status: SHIPPED | OUTPATIENT
Start: 2021-04-01 | End: 2021-11-04 | Stop reason: ALTCHOICE

## 2021-04-01 NOTE — PROGRESS NOTES
Procedure   Suture Removal    Date/Time: 4/1/2021 11:15 AM  Performed by: Garret Salinas MD  Authorized by: Garret Salinas MD   Body area: lower extremity  Location details: right knee  Wound Appearance: clean  Sutures Removed: 4  Post-removal: dressing applied  Patient tolerance: patient tolerated the procedure well with no immediate complications  Comments: One half of right knee sutures were removed from laceration repaired at the [facility]-the patient is return Tuesday for the removal of the wrist

## 2021-04-02 LAB
BASOPHILS # BLD AUTO: 0.11 10*3/MM3 (ref 0–0.2)
BASOPHILS NFR BLD AUTO: 1.6 % (ref 0–1.5)
BUN SERPL-MCNC: 15 MG/DL (ref 8–23)
BUN/CREAT SERPL: 12.9 (ref 7–25)
CALCIUM SERPL-MCNC: 9.1 MG/DL (ref 8.6–10.5)
CHLORIDE SERPL-SCNC: 105 MMOL/L (ref 98–107)
CO2 SERPL-SCNC: 29.5 MMOL/L (ref 22–29)
CREAT SERPL-MCNC: 1.16 MG/DL (ref 0.76–1.27)
EOSINOPHIL # BLD AUTO: 0.46 10*3/MM3 (ref 0–0.4)
EOSINOPHIL NFR BLD AUTO: 6.7 % (ref 0.3–6.2)
ERYTHROCYTE [DISTWIDTH] IN BLOOD BY AUTOMATED COUNT: 14.1 % (ref 12.3–15.4)
GLUCOSE SERPL-MCNC: 87 MG/DL (ref 65–99)
HBA1C MFR BLD: 5.1 % (ref 4.8–5.6)
HCT VFR BLD AUTO: 38.9 % (ref 37.5–51)
HGB BLD-MCNC: 12.9 G/DL (ref 13–17.7)
IMM GRANULOCYTES # BLD AUTO: 0.02 10*3/MM3 (ref 0–0.05)
IMM GRANULOCYTES NFR BLD AUTO: 0.3 % (ref 0–0.5)
LYMPHOCYTES # BLD AUTO: 1.69 10*3/MM3 (ref 0.7–3.1)
LYMPHOCYTES NFR BLD AUTO: 24.7 % (ref 19.6–45.3)
MCH RBC QN AUTO: 32.1 PG (ref 26.6–33)
MCHC RBC AUTO-ENTMCNC: 33.2 G/DL (ref 31.5–35.7)
MCV RBC AUTO: 96.8 FL (ref 79–97)
MONOCYTES # BLD AUTO: 0.72 10*3/MM3 (ref 0.1–0.9)
MONOCYTES NFR BLD AUTO: 10.5 % (ref 5–12)
NEUTROPHILS # BLD AUTO: 3.84 10*3/MM3 (ref 1.7–7)
NEUTROPHILS NFR BLD AUTO: 56.2 % (ref 42.7–76)
NRBC BLD AUTO-RTO: 0.1 /100 WBC (ref 0–0.2)
PLATELET # BLD AUTO: 270 10*3/MM3 (ref 140–450)
POTASSIUM SERPL-SCNC: 4 MMOL/L (ref 3.5–5.2)
RBC # BLD AUTO: 4.02 10*6/MM3 (ref 4.14–5.8)
SODIUM SERPL-SCNC: 143 MMOL/L (ref 136–145)
WBC # BLD AUTO: 6.84 10*3/MM3 (ref 3.4–10.8)

## 2021-04-06 ENCOUNTER — PROCEDURE VISIT (OUTPATIENT)
Dept: FAMILY MEDICINE CLINIC | Facility: CLINIC | Age: 76
End: 2021-04-06

## 2021-04-06 DIAGNOSIS — S81.011D LACERATION OF RIGHT KNEE, SUBSEQUENT ENCOUNTER: Primary | ICD-10-CM

## 2021-04-06 PROCEDURE — 99212 OFFICE O/P EST SF 10 MIN: CPT | Performed by: FAMILY MEDICINE

## 2021-04-06 NOTE — PROGRESS NOTES
Patient had partial stitches removed from her right knee wound.  He returns to have the remaining 4- 5 stitches removed-wound appears adequate but the superior part is not quite healed

## 2021-04-12 RX ORDER — SOTALOL HYDROCHLORIDE 80 MG/1
TABLET ORAL
Qty: 90 TABLET | Refills: 2 | Status: SHIPPED | OUTPATIENT
Start: 2021-04-12 | End: 2021-05-21

## 2021-04-20 RX ORDER — ISOSORBIDE MONONITRATE 60 MG/1
TABLET, EXTENDED RELEASE ORAL
Qty: 180 TABLET | Refills: 3 | Status: ON HOLD | OUTPATIENT
Start: 2021-04-20 | End: 2022-07-19

## 2021-04-30 ENCOUNTER — TELEPHONE (OUTPATIENT)
Dept: FAMILY MEDICINE CLINIC | Facility: CLINIC | Age: 76
End: 2021-04-30

## 2021-04-30 NOTE — TELEPHONE ENCOUNTER
Caller: Beth Velazquez    Relationship to patient: Emergency Contact    Best call back number:  [phone]    Chief complaint: Staple Removal    Type of visit: Procedure    Requested date: 5/10

## 2021-05-10 ENCOUNTER — TELEPHONE (OUTPATIENT)
Dept: FAMILY MEDICINE CLINIC | Facility: CLINIC | Age: 76
End: 2021-05-10

## 2021-05-10 ENCOUNTER — PROCEDURE VISIT (OUTPATIENT)
Dept: FAMILY MEDICINE CLINIC | Facility: CLINIC | Age: 76
End: 2021-05-10

## 2021-05-10 VITALS
WEIGHT: 195 LBS | HEART RATE: 63 BPM | HEIGHT: 67 IN | TEMPERATURE: 97.8 F | RESPIRATION RATE: 16 BRPM | DIASTOLIC BLOOD PRESSURE: 60 MMHG | BODY MASS INDEX: 30.61 KG/M2 | SYSTOLIC BLOOD PRESSURE: 112 MMHG | OXYGEN SATURATION: 94 %

## 2021-05-10 DIAGNOSIS — S81.001D OPEN WOUND OF RIGHT KNEE, SUBSEQUENT ENCOUNTER: Primary | ICD-10-CM

## 2021-05-10 DIAGNOSIS — R29.6 REPEATED FALLS: ICD-10-CM

## 2021-05-10 PROCEDURE — 99213 OFFICE O/P EST LOW 20 MIN: CPT | Performed by: FAMILY MEDICINE

## 2021-05-10 NOTE — TELEPHONE ENCOUNTER
Faxed Referral, Last office note, Demographics and Insurance cards to [facility] for Wound care and PT.

## 2021-05-10 NOTE — PROGRESS NOTES
Subjective   Samy Velazquez is a 75 y.o. male.     75-year-old man with repeated falls and recent laceration of right knee-injury was April 28-- he has multiple staples with wound dehiscence      The following portions of the patient's history were reviewed and updated as appropriate: allergies, current medications, past family history, past medical history, past social history, past surgical history and problem list.    Review of Systems   Musculoskeletal: Positive for arthralgias and back pain.        Wound dehiscence right knee       Objective   Physical Exam  Vitals and nursing note reviewed.   Constitutional:       Appearance: He is obese.   Musculoskeletal:         General: Deformity present.      Comments: Multiple DJD deformities knees and back etc.   Skin:     Comments: 4/28/2021 staples placed right knee for 4 cm lesion near patellar tendon and joint-superior dehiscence no pus present at this time-staples removed i.e. half of them over stable wound-wound cleaned dressed with Neosporin Adaptic gauze dressing turned over to wound care and home health.  Would leave the rest of the staples till Friday continue wound care.  Also patient needs physical therapy for quadricep strengthening and fall prevention   Neurological:      General: No focal deficit present.      Mental Status: He is alert and oriented to person, place, and time.   Psychiatric:         Mood and Affect: Mood normal.         Behavior: Behavior normal.         Thought Content: Thought content normal.         Judgment: Judgment normal.         Assessment/Plan   Diagnoses and all orders for this visit:    1. Open wound of right knee, subsequent encounter (Primary)  -     Ambulatory Referral to Home Health    2. Repeated falls  -     Ambulatory Referral to Home Health

## 2021-05-11 ENCOUNTER — OUTSIDE FACILITY SERVICE (OUTPATIENT)
Dept: FAMILY MEDICINE CLINIC | Facility: CLINIC | Age: 76
End: 2021-05-11

## 2021-05-11 PROCEDURE — G0179 MD RECERTIFICATION HHA PT: HCPCS | Performed by: FAMILY MEDICINE

## 2021-05-21 ENCOUNTER — OFFICE VISIT (OUTPATIENT)
Dept: FAMILY MEDICINE CLINIC | Facility: CLINIC | Age: 76
End: 2021-05-21

## 2021-05-21 VITALS
DIASTOLIC BLOOD PRESSURE: 69 MMHG | SYSTOLIC BLOOD PRESSURE: 108 MMHG | TEMPERATURE: 98.6 F | BODY MASS INDEX: 30.13 KG/M2 | RESPIRATION RATE: 16 BRPM | HEIGHT: 67 IN | WEIGHT: 192 LBS

## 2021-05-21 DIAGNOSIS — E11.43 TYPE 2 DIABETES MELLITUS WITH DIABETIC AUTONOMIC NEUROPATHY, WITHOUT LONG-TERM CURRENT USE OF INSULIN (HCC): Primary | ICD-10-CM

## 2021-05-21 DIAGNOSIS — R53.83 FATIGUE, UNSPECIFIED TYPE: ICD-10-CM

## 2021-05-21 DIAGNOSIS — R06.02 SHORTNESS OF BREATH: ICD-10-CM

## 2021-05-21 PROCEDURE — 99213 OFFICE O/P EST LOW 20 MIN: CPT | Performed by: FAMILY MEDICINE

## 2021-05-21 NOTE — PROGRESS NOTES
Subjective   Samy Velazquez is a 75 y.o. male.     75-year-old male with ischemic heart disease and fatigue.  The patient's been hypotensive over the last 2 weeks and clinically it is presumed that his fatigability is related to his low blood pressure-- at times this around 90 systolically      The following portions of the patient's history were reviewed and updated as appropriate: allergies, current medications, past family history, past medical history, past social history, past surgical history and problem list.    Review of Systems   Constitutional: Positive for fatigue. Negative for fever.   Respiratory: Negative for shortness of breath.    Cardiovascular: Negative for chest pain and leg swelling.   Neurological: Positive for light-headedness.       Objective   Physical Exam  Vitals and nursing note reviewed.   Constitutional:       Appearance: Normal appearance. He is obese.   Cardiovascular:      Rate and Rhythm: Normal rate and regular rhythm.      Pulses: Normal pulses.      Heart sounds: Normal heart sounds.   Pulmonary:      Effort: Pulmonary effort is normal.      Breath sounds: Normal breath sounds.   Musculoskeletal:      Right lower leg: No edema.      Left lower leg: No edema.   Skin:     General: Skin is warm and dry.   Neurological:      General: No focal deficit present.      Mental Status: He is alert and oriented to person, place, and time.   Psychiatric:         Mood and Affect: Mood normal.         Behavior: Behavior normal.         Thought Content: Thought content normal.         Judgment: Judgment normal.         Assessment/Plan   Diagnoses and all orders for this visit:    1. Type 2 diabetes mellitus with diabetic autonomic neuropathy, without long-term current use of insulin (CMS/[facility]) (Primary)  -     Comprehensive Metabolic Panel  -     Hemoglobin A1c    2. Fatigue, unspecified type  -     CBC & Differential  -     Comprehensive Metabolic Panel  -     TSH    3. Shortness of breath  -      Troponin I  -     BNP       Plan-above-reduce Toprol-XL 25-1/2 daily stop lisinopril return Wednesday

## 2021-05-22 LAB
ALBUMIN SERPL-MCNC: 3.8 G/DL (ref 3.5–5.2)
ALBUMIN/GLOB SERPL: 1.7 G/DL
ALP SERPL-CCNC: 132 U/L (ref 39–117)
ALT SERPL-CCNC: 22 U/L (ref 1–41)
AST SERPL-CCNC: 21 U/L (ref 1–40)
BASOPHILS # BLD AUTO: 0.1 10*3/MM3 (ref 0–0.2)
BASOPHILS NFR BLD AUTO: 1 % (ref 0–1.5)
BILIRUB SERPL-MCNC: 0.5 MG/DL (ref 0–1.2)
BNP SERPL-MCNC: 76.6 PG/ML (ref 0–100)
BUN SERPL-MCNC: 21 MG/DL (ref 8–23)
BUN/CREAT SERPL: 14.7 (ref 7–25)
CALCIUM SERPL-MCNC: 9.8 MG/DL (ref 8.6–10.5)
CHLORIDE SERPL-SCNC: 102 MMOL/L (ref 98–107)
CO2 SERPL-SCNC: 28.2 MMOL/L (ref 22–29)
CREAT SERPL-MCNC: 1.43 MG/DL (ref 0.76–1.27)
EOSINOPHIL # BLD AUTO: 0.55 10*3/MM3 (ref 0–0.4)
EOSINOPHIL NFR BLD AUTO: 5.6 % (ref 0.3–6.2)
ERYTHROCYTE [DISTWIDTH] IN BLOOD BY AUTOMATED COUNT: 13.3 % (ref 12.3–15.4)
GLOBULIN SER CALC-MCNC: 2.3 GM/DL
GLUCOSE SERPL-MCNC: 143 MG/DL (ref 65–99)
HBA1C MFR BLD: 5.5 % (ref 4.8–5.6)
HCT VFR BLD AUTO: 42.6 % (ref 37.5–51)
HGB BLD-MCNC: 14.1 G/DL (ref 13–17.7)
IMM GRANULOCYTES # BLD AUTO: 0.02 10*3/MM3 (ref 0–0.05)
IMM GRANULOCYTES NFR BLD AUTO: 0.2 % (ref 0–0.5)
LYMPHOCYTES # BLD AUTO: 1.55 10*3/MM3 (ref 0.7–3.1)
LYMPHOCYTES NFR BLD AUTO: 15.9 % (ref 19.6–45.3)
MCH RBC QN AUTO: 31.5 PG (ref 26.6–33)
MCHC RBC AUTO-ENTMCNC: 33.1 G/DL (ref 31.5–35.7)
MCV RBC AUTO: 95.3 FL (ref 79–97)
MONOCYTES # BLD AUTO: 0.74 10*3/MM3 (ref 0.1–0.9)
MONOCYTES NFR BLD AUTO: 7.6 % (ref 5–12)
NEUTROPHILS # BLD AUTO: 6.8 10*3/MM3 (ref 1.7–7)
NEUTROPHILS NFR BLD AUTO: 69.7 % (ref 42.7–76)
NRBC BLD AUTO-RTO: 0 /100 WBC (ref 0–0.2)
PLATELET # BLD AUTO: 224 10*3/MM3 (ref 140–450)
POTASSIUM SERPL-SCNC: 4.1 MMOL/L (ref 3.5–5.2)
PROT SERPL-MCNC: 6.1 G/DL (ref 6–8.5)
RBC # BLD AUTO: 4.47 10*6/MM3 (ref 4.14–5.8)
SODIUM SERPL-SCNC: 140 MMOL/L (ref 136–145)
TROPONIN I SERPL-MCNC: <0.01 NG/ML (ref 0–0.04)
TSH SERPL DL<=0.005 MIU/L-ACNC: 1.52 UIU/ML (ref 0.27–4.2)
WBC # BLD AUTO: 9.76 10*3/MM3 (ref 3.4–10.8)

## 2021-05-25 ENCOUNTER — TELEPHONE (OUTPATIENT)
Dept: FAMILY MEDICINE CLINIC | Facility: CLINIC | Age: 76
End: 2021-05-25

## 2021-05-26 RX ORDER — SULFAMETHOXAZOLE AND TRIMETHOPRIM 800; 160 MG/1; MG/1
1 TABLET ORAL 2 TIMES DAILY
Qty: 14 TABLET | Refills: 0 | Status: SHIPPED | OUTPATIENT
Start: 2021-05-26 | End: 2021-06-02

## 2021-06-01 ENCOUNTER — TELEPHONE (OUTPATIENT)
Dept: FAMILY MEDICINE CLINIC | Facility: CLINIC | Age: 76
End: 2021-06-01

## 2021-06-02 ENCOUNTER — OFFICE VISIT (OUTPATIENT)
Dept: FAMILY MEDICINE CLINIC | Facility: CLINIC | Age: 76
End: 2021-06-02

## 2021-06-02 VITALS
HEIGHT: 67 IN | RESPIRATION RATE: 16 BRPM | BODY MASS INDEX: 30.13 KG/M2 | SYSTOLIC BLOOD PRESSURE: 124 MMHG | WEIGHT: 192 LBS | DIASTOLIC BLOOD PRESSURE: 70 MMHG | OXYGEN SATURATION: 98 % | HEART RATE: 84 BPM | TEMPERATURE: 97.7 F

## 2021-06-02 DIAGNOSIS — R53.83 FATIGUE, UNSPECIFIED TYPE: Primary | ICD-10-CM

## 2021-06-02 PROCEDURE — 99213 OFFICE O/P EST LOW 20 MIN: CPT | Performed by: FAMILY MEDICINE

## 2021-06-02 RX ORDER — CEPHALEXIN 250 MG/1
250 CAPSULE ORAL 2 TIMES DAILY
COMMUNITY
End: 2021-09-30

## 2021-06-02 RX ORDER — SULFAMETHOXAZOLE AND TRIMETHOPRIM 800; 160 MG/1; MG/1
TABLET ORAL
Qty: 15 TABLET | Refills: 0 | Status: SHIPPED | OUTPATIENT
Start: 2021-06-02 | End: 2021-07-09

## 2021-06-02 RX ORDER — SOTALOL HYDROCHLORIDE 80 MG/1
40 TABLET ORAL 2 TIMES DAILY
COMMUNITY
End: 2021-11-04 | Stop reason: ALTCHOICE

## 2021-06-02 RX ORDER — DIPHENHYDRAMINE HCL 25 MG
25 CAPSULE ORAL 2 TIMES DAILY
COMMUNITY
End: 2021-07-09

## 2021-06-02 NOTE — PROGRESS NOTES
Subjective   Samy Velazquez is a 76 y.o. male.     76-year-old male with recent urinary tract infection-probable prostatitis and early dementia      The following portions of the patient's history were reviewed and updated as appropriate: allergies, current medications, past family history, past medical history, past social history, past surgical history and problem list.    Review of Systems   Respiratory: Negative for shortness of breath.    Cardiovascular: Negative for chest pain and leg swelling.   Genitourinary: Positive for difficulty urinating.        Urination improved since treated with Bactrim   Psychiatric/Behavioral: Positive for agitation and confusion.        Major issues with wife telling him what to do       Objective   Physical Exam  Vitals and nursing note reviewed.   Constitutional:       Appearance: Normal appearance.   Cardiovascular:      Rate and Rhythm: Normal rate and regular rhythm.      Pulses: Normal pulses.      Heart sounds: Normal heart sounds.   Pulmonary:      Effort: Pulmonary effort is normal.      Breath sounds: Normal breath sounds.   Musculoskeletal:      Right lower leg: No edema.      Left lower leg: No edema.   Neurological:      General: No focal deficit present.      Mental Status: He is alert and oriented to person, place, and time.   Psychiatric:         Mood and Affect: Mood normal.      Comments: Agitated with wife thought processes not good-ordering stuff that they do not need any etc. etc.         Assessment/Plan   There are no diagnoses linked to this encounter.       Plan above-Bactrim DS one half daily 30 days return-never did stop Namenda twice a day

## 2021-06-10 ENCOUNTER — TELEPHONE (OUTPATIENT)
Dept: FAMILY MEDICINE CLINIC | Facility: CLINIC | Age: 76
End: 2021-06-10

## 2021-06-10 NOTE — TELEPHONE ENCOUNTER
Caller: Beth Velazquez    Relationship: Emergency Contact    Best call back number: [phone]    Caller requesting test results: Yes    What test was performed: Labwork    When was the test performed: 6/09/2021    Where was the test performed: Home health

## 2021-06-10 NOTE — TELEPHONE ENCOUNTER
Called patient to let her know Dr. Salinas was out of the office today but she said Home Health called her and told her his labs were normal.

## 2021-06-14 ENCOUNTER — HOSPITAL ENCOUNTER (EMERGENCY)
Facility: HOSPITAL | Age: 76
Discharge: HOME | End: 2021-06-14
Attending: EMERGENCY MEDICINE | Admitting: EMERGENCY MEDICINE

## 2021-06-14 ENCOUNTER — APPOINTMENT (OUTPATIENT)
Dept: GENERAL RADIOLOGY | Facility: HOSPITAL | Age: 76
End: 2021-06-14

## 2021-06-14 VITALS
RESPIRATION RATE: 19 BRPM | DIASTOLIC BLOOD PRESSURE: 67 MMHG | BODY MASS INDEX: 28.56 KG/M2 | HEIGHT: 67 IN | TEMPERATURE: 99 F | SYSTOLIC BLOOD PRESSURE: 121 MMHG | HEART RATE: 69 BPM | OXYGEN SATURATION: 99 % | WEIGHT: 182 LBS

## 2021-06-14 DIAGNOSIS — K20.90 ESOPHAGITIS: Primary | ICD-10-CM

## 2021-06-14 LAB
ALBUMIN SERPL-MCNC: 3.5 G/DL (ref 3.5–5.2)
ALBUMIN/GLOB SERPL: 1.1 G/DL
ALP SERPL-CCNC: 150 U/L (ref 39–117)
ALT SERPL W P-5'-P-CCNC: 26 U/L (ref 1–41)
ANION GAP SERPL CALCULATED.3IONS-SCNC: 8 MMOL/L (ref 5–15)
AST SERPL-CCNC: 32 U/L (ref 1–40)
BASOPHILS # BLD AUTO: 0.07 10*3/MM3 (ref 0–0.2)
BASOPHILS NFR BLD AUTO: 0.9 % (ref 0–1.5)
BILIRUB SERPL-MCNC: 0.7 MG/DL (ref 0–1.2)
BUN SERPL-MCNC: 21 MG/DL (ref 8–23)
BUN/CREAT SERPL: 14.4 (ref 7–25)
CALCIUM SPEC-SCNC: 10.4 MG/DL (ref 8.6–10.5)
CHLORIDE SERPL-SCNC: 104 MMOL/L (ref 98–107)
CO2 SERPL-SCNC: 26 MMOL/L (ref 22–29)
CREAT SERPL-MCNC: 1.46 MG/DL (ref 0.76–1.27)
D DIMER PPP FEU-MCNC: 0.6 MG/L (FEU) (ref 0–0.5)
DEPRECATED RDW RBC AUTO: 49.8 FL (ref 37–54)
EOSINOPHIL # BLD AUTO: 0.45 10*3/MM3 (ref 0–0.4)
EOSINOPHIL NFR BLD AUTO: 5.6 % (ref 0.3–6.2)
ERYTHROCYTE [DISTWIDTH] IN BLOOD BY AUTOMATED COUNT: 14.6 % (ref 12.3–15.4)
GFR SERPL CREATININE-BSD FRML MDRD: 47 ML/MIN/1.73
GLOBULIN UR ELPH-MCNC: 3.3 GM/DL
GLUCOSE SERPL-MCNC: 116 MG/DL (ref 65–99)
HCT VFR BLD AUTO: 42.7 % (ref 37.5–51)
HGB BLD-MCNC: 14.4 G/DL (ref 13–17.7)
HOLD SPECIMEN: NORMAL
HOLD SPECIMEN: NORMAL
IMM GRANULOCYTES # BLD AUTO: 0.02 10*3/MM3 (ref 0–0.05)
IMM GRANULOCYTES NFR BLD AUTO: 0.2 % (ref 0–0.5)
LIPASE SERPL-CCNC: 38 U/L (ref 13–60)
LYMPHOCYTES # BLD AUTO: 2.08 10*3/MM3 (ref 0.7–3.1)
LYMPHOCYTES NFR BLD AUTO: 26 % (ref 19.6–45.3)
MAGNESIUM SERPL-MCNC: 1.5 MG/DL (ref 1.6–2.4)
MCH RBC QN AUTO: 31.9 PG (ref 26.6–33)
MCHC RBC AUTO-ENTMCNC: 33.7 G/DL (ref 31.5–35.7)
MCV RBC AUTO: 94.7 FL (ref 79–97)
MONOCYTES # BLD AUTO: 0.8 10*3/MM3 (ref 0.1–0.9)
MONOCYTES NFR BLD AUTO: 10 % (ref 5–12)
NEUTROPHILS NFR BLD AUTO: 4.59 10*3/MM3 (ref 1.7–7)
NEUTROPHILS NFR BLD AUTO: 57.3 % (ref 42.7–76)
NRBC BLD AUTO-RTO: 0 /100 WBC (ref 0–0.2)
NT-PROBNP SERPL-MCNC: 736.9 PG/ML (ref 0–900)
PLATELET # BLD AUTO: 217 10*3/MM3 (ref 140–450)
PMV BLD AUTO: 9.8 FL (ref 6–12)
POTASSIUM SERPL-SCNC: 4.6 MMOL/L (ref 3.5–5.2)
PROT SERPL-MCNC: 6.8 G/DL (ref 6–8.5)
RBC # BLD AUTO: 4.51 10*6/MM3 (ref 4.14–5.8)
SODIUM SERPL-SCNC: 138 MMOL/L (ref 136–145)
TROPONIN T SERPL-MCNC: <0.01 NG/ML (ref 0–0.03)
WBC # BLD AUTO: 8.01 10*3/MM3 (ref 3.4–10.8)
WHOLE BLOOD HOLD SPECIMEN: NORMAL

## 2021-06-14 PROCEDURE — 71045 X-RAY EXAM CHEST 1 VIEW: CPT

## 2021-06-14 PROCEDURE — 80053 COMPREHEN METABOLIC PANEL: CPT | Performed by: EMERGENCY MEDICINE

## 2021-06-14 PROCEDURE — 96375 TX/PRO/DX INJ NEW DRUG ADDON: CPT

## 2021-06-14 PROCEDURE — 96374 THER/PROPH/DIAG INJ IV PUSH: CPT

## 2021-06-14 PROCEDURE — 83690 ASSAY OF LIPASE: CPT | Performed by: EMERGENCY MEDICINE

## 2021-06-14 PROCEDURE — 99283 EMERGENCY DEPT VISIT LOW MDM: CPT

## 2021-06-14 PROCEDURE — 93010 ELECTROCARDIOGRAM REPORT: CPT | Performed by: EMERGENCY MEDICINE

## 2021-06-14 PROCEDURE — 85025 COMPLETE CBC W/AUTO DIFF WBC: CPT | Performed by: EMERGENCY MEDICINE

## 2021-06-14 PROCEDURE — 84484 ASSAY OF TROPONIN QUANT: CPT | Performed by: EMERGENCY MEDICINE

## 2021-06-14 PROCEDURE — 83735 ASSAY OF MAGNESIUM: CPT | Performed by: EMERGENCY MEDICINE

## 2021-06-14 PROCEDURE — 93005 ELECTROCARDIOGRAM TRACING: CPT | Performed by: EMERGENCY MEDICINE

## 2021-06-14 PROCEDURE — 36415 COLL VENOUS BLD VENIPUNCTURE: CPT

## 2021-06-14 PROCEDURE — 85379 FIBRIN DEGRADATION QUANT: CPT | Performed by: EMERGENCY MEDICINE

## 2021-06-14 PROCEDURE — 83880 ASSAY OF NATRIURETIC PEPTIDE: CPT | Performed by: EMERGENCY MEDICINE

## 2021-06-14 PROCEDURE — 25010000002 ONDANSETRON PER 1 MG: Performed by: EMERGENCY MEDICINE

## 2021-06-14 RX ORDER — SUCRALFATE 1 G/1
1 TABLET ORAL 4 TIMES DAILY
Qty: 42 TABLET | Refills: 0 | Status: SHIPPED | OUTPATIENT
Start: 2021-06-14 | End: 2021-11-04 | Stop reason: ALTCHOICE

## 2021-06-14 RX ORDER — ONDANSETRON 4 MG/1
4 TABLET, ORALLY DISINTEGRATING ORAL EVERY 4 HOURS PRN
Qty: 10 TABLET | Refills: 0 | Status: SHIPPED | OUTPATIENT
Start: 2021-06-14 | End: 2022-03-08 | Stop reason: SDUPTHER

## 2021-06-14 RX ORDER — FAMOTIDINE 10 MG/ML
20 INJECTION, SOLUTION INTRAVENOUS ONCE
Status: COMPLETED | OUTPATIENT
Start: 2021-06-14 | End: 2021-06-14

## 2021-06-14 RX ORDER — SODIUM CHLORIDE 0.9 % (FLUSH) 0.9 %
10 SYRINGE (ML) INJECTION AS NEEDED
Status: DISCONTINUED | OUTPATIENT
Start: 2021-06-14 | End: 2021-06-14 | Stop reason: HOSPADM

## 2021-06-14 RX ORDER — ONDANSETRON 2 MG/ML
4 INJECTION INTRAMUSCULAR; INTRAVENOUS ONCE
Status: COMPLETED | OUTPATIENT
Start: 2021-06-14 | End: 2021-06-14

## 2021-06-14 RX ADMIN — ONDANSETRON 4 MG: 2 INJECTION INTRAMUSCULAR; INTRAVENOUS at 20:34

## 2021-06-14 RX ADMIN — FAMOTIDINE 20 MG: 10 INJECTION INTRAVENOUS at 21:36

## 2021-06-15 LAB
QT INTERVAL: 402 MS
QTC INTERVAL: 421 MS

## 2021-06-15 NOTE — ED PROVIDER NOTES
Subjective   Patient is brought to emergency room by helicopter with a complaint of chest and epigastric pain for the past several days.  He tells me that on Saturday morning he started and pain in the center of his chest that he did not think was his heart but thought was more likely the esophagus.  However since that time it is not gone away.  It waxes and wanes been there constantly.  With this he has a lot of belching and that seems to make it a little better but whenever he tries anything it makes it worse.  Quite often he throws things up when he tries to eat.  He denies any radiation of pain or diaphoresis.  He denies any shortness of breath.  He does have history of heart disease in the past but also has esophageal problems and had to have his esophagus stretched about 3 to 4 months ago in [place].      History provided by:  Patient   used: No    Chest Pain  Pain location:  Substernal area  Pain quality: aching    Pain radiates to:  Does not radiate  Pain severity:  Moderate  Onset quality:  Gradual  Duration:  3 days  Timing:  Constant  Progression:  Waxing and waning  Chronicity:  New  Context: eating    Context: not breathing, not drug use, not intercourse, not lifting, not movement, not raising an arm, not at rest, not stress and not trauma    Relieved by: belching.  Exacerbated by: eating.  Ineffective treatments:  Nitroglycerin  Associated symptoms: dysphagia and nausea    Associated symptoms: no abdominal pain, no AICD problem, no altered mental status, no anorexia, no anxiety, no back pain, no claudication, no cough, no diaphoresis, no dizziness, no fatigue, no fever, no headache, no heartburn, no lower extremity edema, no near-syncope, no numbness, no orthopnea, no palpitations, no PND, no shortness of breath, no syncope, no vomiting and no weakness    Risk factors: coronary artery disease, high cholesterol, hypertension and male sex    Risk factors: no aortic disease, no birth  control, no diabetes mellitus, no Brad-Danlos syndrome, no immobilization, no Marfan's syndrome, not obese, not pregnant, no prior DVT/PE, no smoking and no surgery        Review of Systems   Constitutional: Negative.  Negative for diaphoresis, fatigue and fever.   HENT: Positive for trouble swallowing.    Respiratory: Negative.  Negative for cough and shortness of breath.    Cardiovascular: Positive for chest pain. Negative for palpitations, orthopnea, claudication, syncope, PND and near-syncope.   Gastrointestinal: Positive for nausea. Negative for abdominal pain, anorexia, heartburn and vomiting.   Genitourinary: Negative.    Musculoskeletal: Negative.  Negative for back pain.   Skin: Negative.    Neurological: Negative.  Negative for dizziness, weakness, numbness and headaches.   Psychiatric/Behavioral: Negative.    All other systems reviewed and are negative.      Past Medical History:   Diagnosis Date   • Arthritis    • Coronary artery disease    • Hyperlipidemia    • Hypertensive heart disease with heart failure (CMS/[facility]) 3/29/2021   • Hypothyroidism 4/1/2021   • Kidney stone    • Major depressive disorder, recurrent, moderate (CMS/[facility]) 3/29/2021   • Morbidly obese (CMS/[facility]) 9/18/2020       Allergies   Allergen Reactions   • Iodine Hives   • Clindamycin/Lincomycin Diarrhea   • Penicillins Rash   • Vancomycin Nausea And Vomiting       Past Surgical History:   Procedure Laterality Date   • CARDIAC DEFIBRILLATOR PLACEMENT     • CARDIAC DEFIBRILLATOR PLACEMENT N/A 2008   • CARDIAC SURGERY     • ENDOSCOPY N/A 10/30/2020    Procedure: ESOPHAGOGASTRODUODENOSCOPY WITH ANESTHESIA;  Surgeon: Brent Stanley MD;  Location: [facility];  Service: Gastroenterology;  Laterality: N/A;  pre dysphagia  post post op gastric surgery  dr jose manuel smith   • ESOPHAGUS SURGERY     • KNEE SURGERY     • TOTAL SHOULDER REPLACEMENT         Family History   Problem Relation Age of Onset   • Hypertension Mother    • Stroke Mother    • Cancer  Father    • Hypertension Father    • Diabetes Sister    • Hypertension Sister    • Crohn's disease Daughter    • No Known Problems Son    • No Known Problems Maternal Grandmother    • No Known Problems Maternal Grandfather    • No Known Problems Paternal Grandmother    • No Known Problems Paternal Grandfather    • Hypertension Sister    • Hypertension Sister    • Hypertension Sister    • No Known Problems Son        Social History     Socioeconomic History   • Marital status:      Spouse name: Not on file   • Number of children: Not on file   • Years of education: Not on file   • Highest education level: Not on file   Tobacco Use   • Smoking status: Never Smoker   • Smokeless tobacco: Never Used   Substance and Sexual Activity   • Alcohol use: No   • Drug use: No   • Sexual activity: Defer       Prior to Admission medications    Medication Sig Start Date End Date Taking? Authorizing Provider   allopurinol (ZYLOPRIM) 300 MG tablet Take 1 tablet by mouth Daily. 12/17/20   Garret Salinas MD   Alum Hydroxide-Mag Carbonate (GAVISCON PO) Take  by mouth Every Morning.    Walter Call MD   aspirin 81 MG EC tablet Take 81 mg by mouth Daily.    Walter Call MD   atorvastatin (LIPITOR) 40 MG tablet TAKE 1 TABLET BY MOUTH EVERY DAY EVERY NIGHT 2/10/21   Garret Salinas MD   cephalexin (KEFLEX) 250 MG capsule Take 250 mg by mouth 2 (Two) Times a Day.    Walter Call MD   Cholecalciferol (VITAMIN D3) 2000 units capsule Take 2,000 Units by mouth Daily.    Walter Call MD   clopidogrel (PLAVIX) 75 MG tablet TAKE 1 TABLET BY MOUTH EVERY DAY 3/11/21   Xochitl Doshi APRN   diphenhydrAMINE (Benadryl Allergy) 25 mg capsule Take 25 mg by mouth 2 (two) times a day.    Walter Call MD   donepezil (ARICEPT) 10 MG tablet TAKE 1 TABLET BY MOUTH EVERYDAY AT BEDTIME 3/9/21   Garret Salinas MD   FLUoxetine (PROzac) 20 MG capsule Take 1 capsule by mouth Daily.  8/18/20   Garret Salinas MD   furosemide (LASIX) 20 MG tablet TAKE 1 TABLET BY MOUTH TWICE A DAY 3/10/21   Garret Salinas MD   isosorbide mononitrate (IMDUR) 60 MG 24 hr tablet TAKE 1 TABLET BY MOUTH TWICE A DAY 4/20/21   Garret Salinas MD   levothyroxine (SYNTHROID, LEVOTHROID) 50 MCG tablet TAKE 1 TABLET BY MOUTH EVERY DAY 2/8/21   Xochitl Doshi APRN   lisinopril (PRINIVIL,ZESTRIL) 2.5 MG tablet Take 2.5 mg by mouth 2 (Two) Times a Day. 1/4/21   Walter Call MD   memantine (NAMENDA) 10 MG tablet Take 1 tablet by mouth 2 (Two) Times a Day. 4/1/21   Garret Salinas MD   metoprolol succinate XL (TOPROL-XL) 25 MG 24 hr tablet Take 12.5 mg by mouth Daily.    Walter Call MD   mexiletine (MEXITIL) 150 MG capsule 1 tablet in the morning, 1 tablet at noon, and two tablets at bedtime. 3/26/21   Garret Salinas MD   nitroglycerin (NITRODUR) 0.2 MG/HR patch Place 1 patch on the skin Daily. Patch is 0.24 twelve hour patch    Walter Call MD   nitroglycerin (NITROSTAT) 0.4 MG SL tablet PLEASE SEE ATTACHED FOR DETAILED DIRECTIONS 1/21/20   Garret Salinas MD   ondansetron (Zofran) 4 MG tablet Take 1 tablet by mouth Every 6 (Six) Hours As Needed for Nausea or Vomiting. 7/31/20   Garret Salinas MD   ondansetron ODT (ZOFRAN-ODT) 4 MG disintegrating tablet Place 1 tablet on the tongue Every 8 (Eight) Hours As Needed for Nausea or Vomiting. 11/13/20   Garret Salinas MD   oxyCODONE-acetaminophen (PERCOCET)  MG per tablet Take 1 tablet by mouth Every 6 (Six) Hours As Needed for Moderate Pain .    Walter Call MD   pantoprazole (PROTONIX) 40 MG EC tablet Take 1 now and nightly for acid reflux 3/29/21   Garret Salinas MD   sotalol (BETAPACE) 80 MG tablet Take 40 mg by mouth 2 (Two) Times a Day.    Provider, MD Walter   sulfamethoxazole-trimethoprim (Bactrim DS) 800-160 MG per tablet 1/2 tablet every day till  medicine gone 6/2/21   Garret Salinas MD       Medications   ondansetron (ZOFRAN) injection 4 mg (4 mg Intravenous Given 6/14/21 2034)   famotidine (PEPCID) injection 20 mg (20 mg Intravenous Given 6/14/21 2136)       Vitals:    06/14/21 2142   BP: 121/67   Pulse: 69   Resp: 19   Temp: 99 °F (37.2 °C)   SpO2: 99%         Objective   Physical Exam  Vitals and nursing note reviewed.   Constitutional:       Appearance: He is well-developed.   HENT:      Head: Normocephalic and atraumatic.   Cardiovascular:      Rate and Rhythm: Normal rate and regular rhythm.   Pulmonary:      Effort: Pulmonary effort is normal.      Breath sounds: Normal breath sounds.   Abdominal:      General: Bowel sounds are normal.      Palpations: Abdomen is soft.      Tenderness: There is no abdominal tenderness.   Musculoskeletal:      Cervical back: Normal range of motion and neck supple.   Skin:     General: Skin is warm and dry.   Neurological:      General: No focal deficit present.      Mental Status: He is alert and oriented to person, place, and time.   Psychiatric:         Mood and Affect: Mood normal.         Behavior: Behavior normal.         Procedures         Lab Results (last 24 hours)     Procedure Component Value Units Date/Time    CBC & Differential [[id number]]  (Abnormal) Collected: 06/14/21 1909    Specimen: Blood Updated: 06/14/21 1923    Narrative:      The following orders were created for panel order CBC & Differential.  Procedure                               Abnormality         Status                     ---------                               -----------         ------                     CBC Auto Differential[[id number]]        Abnormal            Final result                 Please view results for these tests on the individual orders.    Comprehensive Metabolic Panel [[id number]]  (Abnormal) Collected: 06/14/21 1909    Specimen: Blood Updated: 06/14/21 1943     Glucose 116 mg/dL      BUN 21 mg/dL       Creatinine 1.46 mg/dL      Sodium 138 mmol/L      Potassium 4.6 mmol/L      Chloride 104 mmol/L      CO2 26.0 mmol/L      Calcium 10.4 mg/dL      Total Protein 6.8 g/dL      Albumin 3.50 g/dL      ALT (SGPT) 26 U/L      AST (SGOT) 32 U/L      Alkaline Phosphatase 150 U/L      Total Bilirubin 0.7 mg/dL      eGFR Non African Amer 47 mL/min/1.73      Globulin 3.3 gm/dL      A/G Ratio 1.1 g/dL      BUN/Creatinine Ratio 14.4     Anion Gap 8.0 mmol/L     Narrative:      GFR Normal >60  Chronic Kidney Disease <60  Kidney Failure <15      Lipase [[id number]]  (Normal) Collected: 06/14/21 1909    Specimen: Blood Updated: 06/14/21 1938     Lipase 38 U/L     Troponin [[id number]]  (Normal) Collected: 06/14/21 1909    Specimen: Blood Updated: 06/14/21 1941     Troponin T <0.010 ng/mL     Narrative:      Troponin T Reference Range:  <= 0.03 ng/mL-   Negative for AMI  >0.03 ng/mL-     Abnormal for myocardial necrosis.  Clinicians would have to utilize clinical acumen, EKG, Troponin and serial changes to determine if it is an Acute Myocardial Infarction or myocardial injury due to an underlying chronic condition.       Results may be falsely decreased if patient taking Biotin.      BNP [[id number]]  (Normal) Collected: 06/14/21 1909    Specimen: Blood Updated: 06/14/21 1940     proBNP 736.9 pg/mL     Narrative:      Among patients with dyspnea, NT-proBNP is highly sensitive for the detection of acute congestive heart failure. In addition NT-proBNP of <300 pg/ml effectively rules out acute congestive heart failure with 99% negative predictive value.    Results may be falsely decreased if patient taking Biotin.      Magnesium [[id number]]  (Abnormal) Collected: 06/14/21 1909    Specimen: Blood Updated: 06/14/21 1938     Magnesium 1.5 mg/dL     CBC Auto Differential [[id number]]  (Abnormal) Collected: 06/14/21 1909    Specimen: Blood Updated: 06/14/21 1923     WBC 8.01 10*3/mm3      RBC 4.51 10*6/mm3      Hemoglobin 14.4 g/dL       Hematocrit 42.7 %      MCV 94.7 fL      MCH 31.9 pg      MCHC 33.7 g/dL      RDW 14.6 %      RDW-SD 49.8 fl      MPV 9.8 fL      Platelets 217 10*3/mm3      Neutrophil % 57.3 %      Lymphocyte % 26.0 %      Monocyte % 10.0 %      Eosinophil % 5.6 %      Basophil % 0.9 %      Immature Grans % 0.2 %      Neutrophils, Absolute 4.59 10*3/mm3      Lymphocytes, Absolute 2.08 10*3/mm3      Monocytes, Absolute 0.80 10*3/mm3      Eosinophils, Absolute 0.45 10*3/mm3      Basophils, Absolute 0.07 10*3/mm3      Immature Grans, Absolute 0.02 10*3/mm3      nRBC 0.0 /100 WBC     D-dimer, Quantitative [[id number]]  (Abnormal) Collected: 06/14/21 1916    Specimen: Blood Updated: 06/14/21 1941     D-Dimer, Quantitative 0.60 mg/L (FEU)     Narrative:      Reference Range is 0-0.50 mg/L FEU. However, results <0.50 mg/L FEU tends to rule out DVT or PE. Results >0.50 mg/L FEU are not useful in predicting absence or presence of DVT or PE.            XR Chest 1 View   Final Result   1. No radiographic evidence of acute cardiopulmonary process.   2. Prior coronary bypass.   3. Severely degenerated left glenohumeral joint.           This report was finalized on 06/14/2021 20:06 by Dr Evans Boo, .          ED Course  ED Course as of Godfrey 15 0052   Tue Godfrey 15, 2021   0050 I told the patient there is no signs of heart attack and his lab work looked basically okay.  I think this is an esophageal problem and probably will need a scope to further investigated and may be to open him up again.  However since he has been here he has vomited more and states he now feels much much better and thinks he is cleared things out of his esophagus.  I told him we could try some outpatient medication if he can keep it and he says he thinks he will be able to and was to go home.  I did worry about the fact that he was having so much vomiting and I could not be sure he would not get better but he says he will return if he gets worse again.  He is discharged in  stable condition.    [TR]      ED Course User Index  [TR] Aguilar Wu Jr., MD          MDM  Number of Diagnoses or Management Options  Esophagitis: new and requires workup     Amount and/or Complexity of Data Reviewed  Clinical lab tests: ordered and reviewed  Tests in the radiology section of CPT®: ordered and reviewed  Tests in the medicine section of CPT®: reviewed and ordered  Decide to obtain previous medical records or to obtain history from someone other than the patient: yes    Risk of Complications, Morbidity, and/or Mortality  Presenting problems: moderate  Diagnostic procedures: moderate  Management options: moderate    Patient Progress  Patient progress: stable      Final diagnoses:   Esophagitis          Aguilar Wu Jr., MD  06/15/21 0052

## 2021-07-09 ENCOUNTER — OFFICE VISIT (OUTPATIENT)
Dept: FAMILY MEDICINE CLINIC | Facility: CLINIC | Age: 76
End: 2021-07-09

## 2021-07-09 VITALS
TEMPERATURE: 97.7 F | RESPIRATION RATE: 16 BRPM | HEART RATE: 66 BPM | WEIGHT: 196 LBS | SYSTOLIC BLOOD PRESSURE: 110 MMHG | OXYGEN SATURATION: 96 % | DIASTOLIC BLOOD PRESSURE: 64 MMHG | HEIGHT: 66 IN | BODY MASS INDEX: 31.5 KG/M2

## 2021-07-09 DIAGNOSIS — E78.2 MIXED HYPERLIPIDEMIA: ICD-10-CM

## 2021-07-09 DIAGNOSIS — R73.9 HYPERGLYCEMIA: ICD-10-CM

## 2021-07-09 DIAGNOSIS — R53.83 FATIGUE, UNSPECIFIED TYPE: ICD-10-CM

## 2021-07-09 DIAGNOSIS — M54.50 LUMBAR BACK PAIN: ICD-10-CM

## 2021-07-09 DIAGNOSIS — I11.0 HYPERTENSIVE HEART DISEASE WITH HEART FAILURE (HCC): Primary | ICD-10-CM
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
## 2021-07-09 PROCEDURE — 99214 OFFICE O/P EST MOD 30 MIN: CPT | Performed by: FAMILY MEDICINE

## 2021-07-09 RX ORDER — FUROSEMIDE 20 MG/1
TABLET ORAL
Qty: 180 TABLET | Refills: 2 | Status: ON HOLD | OUTPATIENT
Start: 2021-07-09 | End: 2022-07-19

## 2021-07-09 NOTE — TELEPHONE ENCOUNTER
Caller: Samy Velazquez    Relationship: Self    Best call back number: [phone]    Medication needed:   Requested Prescriptions     Pending Prescriptions Disp Refills   • furosemide (LASIX) 20 MG tablet 180 tablet 2     Sig: Take 1 tablet by mouth 2 (Two) Times a Day.       What is the patient's preferred pharmacy: [facility] #[id number] - [address] [phone] [facility] [phone]

## 2021-07-09 NOTE — PROGRESS NOTES
Subjective   Samy Velazquez is a 76 y.o. male.      76-year-old male with repeated falls complaining of low back pain left flank pain      The following portions of the patient's history were reviewed and updated as appropriate: allergies, current medications, past family history, past medical history, past social history, past surgical history and problem list.    Review of Systems   Cardiovascular: Negative for chest pain and leg swelling.   Musculoskeletal: Positive for arthralgias and back pain.   Neurological: Positive for dizziness and light-headedness. Negative for syncope.        Patient continues to work on days he takes extra diuretics for weight gain-warned again against doing such       Objective   Physical Exam  Vitals and nursing note reviewed.   Constitutional:       Appearance: He is obese.   HENT:      Head:      Comments: 4 staples removed from scalp-wound well-healed  Cardiovascular:      Rate and Rhythm: Normal rate and regular rhythm.      Pulses: Normal pulses.      Heart sounds: Normal heart sounds.   Pulmonary:      Effort: Pulmonary effort is normal.      Breath sounds: Normal breath sounds.      Comments: Pacemaker left anterior chest  Abdominal:      Tenderness: There is left CVA tenderness. There is no right CVA tenderness.   Musculoskeletal:         General: Deformity present.      Right lower leg: No edema.      Left lower leg: No edema.      Comments: Right knee DJD and back-kyphosis   Skin:     General: Skin is warm and dry.   Neurological:      General: No focal deficit present.      Mental Status: He is alert and oriented to person, place, and time.   Psychiatric:         Mood and Affect: Mood normal.         Behavior: Behavior normal.         Thought Content: Thought content normal.         Judgment: Judgment normal.         Assessment/Plan   Diagnoses and all orders for this visit:    1. Hypertensive heart disease with heart failure (CMS/HCC) (Primary)  -     Comprehensive Metabolic  Panel  -     Magnesium    2. Hyperglycemia  -     Comprehensive Metabolic Panel  -     POC Urinalysis Dipstick, Multipro    3. Mixed hyperlipidemia  -     Lipid Panel    4. Fatigue, unspecified type  -     CBC & Differential    5. Lumbar back pain  -     CT Lumbar Spine Without Contrast; Future    6. Fall, initial encounter  -     CT Lumbar Spine Without Contrast; Future       Plan above-warned about agricultural safety etc. does not listen to wife

## 2021-07-10 LAB
ALBUMIN SERPL-MCNC: 3.6 G/DL (ref 3.5–5.2)
ALBUMIN/GLOB SERPL: 1.3 G/DL
ALP SERPL-CCNC: 167 U/L (ref 39–117)
ALT SERPL-CCNC: 18 U/L (ref 1–41)
AST SERPL-CCNC: 22 U/L (ref 1–40)
BASOPHILS # BLD AUTO: 0.11 10*3/MM3 (ref 0–0.2)
BASOPHILS NFR BLD AUTO: 1.7 % (ref 0–1.5)
BILIRUB SERPL-MCNC: 0.6 MG/DL (ref 0–1.2)
BUN SERPL-MCNC: 16 MG/DL (ref 8–23)
BUN/CREAT SERPL: 10.5 (ref 7–25)
CALCIUM SERPL-MCNC: 9.7 MG/DL (ref 8.6–10.5)
CHLORIDE SERPL-SCNC: 103 MMOL/L (ref 98–107)
CHOLEST SERPL-MCNC: 138 MG/DL (ref 0–200)
CO2 SERPL-SCNC: 26.7 MMOL/L (ref 22–29)
CREAT SERPL-MCNC: 1.52 MG/DL (ref 0.76–1.27)
EOSINOPHIL # BLD AUTO: 0.63 10*3/MM3 (ref 0–0.4)
EOSINOPHIL NFR BLD AUTO: 9.8 % (ref 0.3–6.2)
ERYTHROCYTE [DISTWIDTH] IN BLOOD BY AUTOMATED COUNT: 13.3 % (ref 12.3–15.4)
GLOBULIN SER CALC-MCNC: 2.7 GM/DL
GLUCOSE SERPL-MCNC: 114 MG/DL (ref 65–99)
HCT VFR BLD AUTO: 43.5 % (ref 37.5–51)
HDLC SERPL-MCNC: 37 MG/DL (ref 40–60)
HGB BLD-MCNC: 14.3 G/DL (ref 13–17.7)
IMM GRANULOCYTES # BLD AUTO: 0.02 10*3/MM3 (ref 0–0.05)
IMM GRANULOCYTES NFR BLD AUTO: 0.3 % (ref 0–0.5)
LDLC SERPL CALC-MCNC: 72 MG/DL (ref 0–100)
LYMPHOCYTES # BLD AUTO: 1.85 10*3/MM3 (ref 0.7–3.1)
LYMPHOCYTES NFR BLD AUTO: 28.9 % (ref 19.6–45.3)
MAGNESIUM SERPL-MCNC: 1.4 MG/DL (ref 1.6–2.4)
MCH RBC QN AUTO: 31.6 PG (ref 26.6–33)
MCHC RBC AUTO-ENTMCNC: 32.9 G/DL (ref 31.5–35.7)
MCV RBC AUTO: 96.2 FL (ref 79–97)
MONOCYTES # BLD AUTO: 0.76 10*3/MM3 (ref 0.1–0.9)
MONOCYTES NFR BLD AUTO: 11.9 % (ref 5–12)
NEUTROPHILS # BLD AUTO: 3.03 10*3/MM3 (ref 1.7–7)
NEUTROPHILS NFR BLD AUTO: 47.4 % (ref 42.7–76)
NRBC BLD AUTO-RTO: 0 /100 WBC (ref 0–0.2)
PLATELET # BLD AUTO: 236 10*3/MM3 (ref 140–450)
POTASSIUM SERPL-SCNC: 4.9 MMOL/L (ref 3.5–5.2)
PROT SERPL-MCNC: 6.3 G/DL (ref 6–8.5)
RBC # BLD AUTO: 4.52 10*6/MM3 (ref 4.14–5.8)
SODIUM SERPL-SCNC: 143 MMOL/L (ref 136–145)
TRIGL SERPL-MCNC: 169 MG/DL (ref 0–150)
VLDLC SERPL CALC-MCNC: 29 MG/DL (ref 5–40)
WBC # BLD AUTO: 6.4 10*3/MM3 (ref 3.4–10.8)

## 2021-07-12 ENCOUNTER — OUTSIDE FACILITY SERVICE (OUTPATIENT)
Dept: FAMILY MEDICINE CLINIC | Facility: CLINIC | Age: 76
End: 2021-07-12

## 2021-07-12 DIAGNOSIS — R79.0 LOW MAGNESIUM LEVEL: Primary | ICD-10-CM

## 2021-07-12 PROCEDURE — G0179 MD RECERTIFICATION HHA PT: HCPCS | Performed by: FAMILY MEDICINE

## 2021-07-12 RX ORDER — MAGNESIUM OXIDE 400 MG/1
400 TABLET ORAL DAILY
Qty: 30 TABLET | Refills: 0 | Status: SHIPPED | OUTPATIENT
Start: 2021-07-12 | End: 2021-08-03 | Stop reason: SDUPTHER

## 2021-07-12 RX ORDER — MAGNESIUM OXIDE 400 MG/1
400 TABLET ORAL DAILY
Qty: 30 TABLET | Refills: 0 | Status: CANCELLED | OUTPATIENT
Start: 2021-07-12

## 2021-07-13 DIAGNOSIS — R53.83 FATIGUE, UNSPECIFIED TYPE: Primary | ICD-10-CM

## 2021-07-13 LAB
BILIRUB BLD-MCNC: NEGATIVE MG/DL
CLARITY, POC: CLEAR
COLOR UR: YELLOW
GLUCOSE UR STRIP-MCNC: NEGATIVE MG/DL
KETONES UR QL: NEGATIVE
LEUKOCYTE EST, POC: NEGATIVE
NITRITE UR-MCNC: NEGATIVE MG/ML
PH UR: 5 [PH] (ref 5–8)
PROT UR STRIP-MCNC: NEGATIVE MG/DL
RBC # UR STRIP: NEGATIVE /UL
SP GR UR: 1.03 (ref 1–1.03)
UROBILINOGEN UR QL: NORMAL

## 2021-07-13 PROCEDURE — 81003 URINALYSIS AUTO W/O SCOPE: CPT | Performed by: FAMILY MEDICINE

## 2021-08-02 ENCOUNTER — OFFICE VISIT (OUTPATIENT)
Dept: FAMILY MEDICINE CLINIC | Facility: CLINIC | Age: 76
End: 2021-08-02

## 2021-08-02 VITALS
DIASTOLIC BLOOD PRESSURE: 60 MMHG | OXYGEN SATURATION: 95 % | HEIGHT: 66 IN | HEART RATE: 70 BPM | WEIGHT: 195 LBS | SYSTOLIC BLOOD PRESSURE: 116 MMHG | TEMPERATURE: 96.9 F | RESPIRATION RATE: 16 BRPM | BODY MASS INDEX: 31.34 KG/M2

## 2021-08-02 DIAGNOSIS — R79.0 LOW MAGNESIUM LEVEL: ICD-10-CM

## 2021-08-02 DIAGNOSIS — R73.9 HYPERGLYCEMIA: Primary | ICD-10-CM

## 2021-08-02 DIAGNOSIS — R53.83 FATIGUE, UNSPECIFIED TYPE: ICD-10-CM

## 2021-08-02 PROCEDURE — 99214 OFFICE O/P EST MOD 30 MIN: CPT | Performed by: FAMILY MEDICINE

## 2021-08-02 RX ORDER — METOLAZONE 2.5 MG/1
2.5 TABLET ORAL
COMMUNITY
End: 2021-10-05

## 2021-08-02 RX ORDER — METOCLOPRAMIDE 10 MG/1
10 TABLET ORAL 2 TIMES DAILY
COMMUNITY
Start: 2021-07-26 | End: 2021-08-17

## 2021-08-02 NOTE — PROGRESS NOTES
Subjective   Samy Velazquez is a 76 y.o. male.     76-year-old male with history of ischemic heart disease severe DJD morbid obesity and fatigue      The following portions of the patient's history were reviewed and updated as appropriate: allergies, current medications, past family history, past medical history, past social history, past surgical history and problem list.    Review of Systems   Respiratory: Negative for cough and shortness of breath.    Cardiovascular: Negative for chest pain and leg swelling.   Gastrointestinal: Positive for diarrhea.        Severe GERD   Musculoskeletal: Positive for arthralgias and back pain.   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  He uses a walker and a wheelchair most of the time and needs a hospital bed with a trapeze bar for further mobilization and safety in his home       Objective   Physical Exam  Vitals and nursing note reviewed.   Constitutional:       Appearance: He is obese.   Cardiovascular:      Rate and Rhythm: Normal rate and regular rhythm.      Pulses: Normal pulses.      Heart sounds: Normal heart sounds.   Pulmonary:      Effort: Pulmonary effort is normal.      Breath sounds: Normal breath sounds.   Abdominal:      Palpations: Abdomen is soft.      Tenderness: There is no abdominal tenderness.   Musculoskeletal:      Right lower leg: No edema.      Left lower leg: No edema.   Skin:     General: Skin is warm and dry.   Neurological:      General: No focal deficit present.      Mental Status: He is alert and oriented to person, place, and time.   Psychiatric:         Mood and Affect: Mood normal.         Behavior: Behavior normal.         Assessment/Plan   Diagnoses and all orders for this visit:    1. Hyperglycemia (Primary)  -     Comprehensive Metabolic Panel    2. Low magnesium level  -     Magnesium    3. Fatigue, unspecified type  -     CBC &  Differential  -     TSH       Plan-reduce blood pressure medicine take Lasix as instructed if gains weight-reduce Reglan 10-5 twice a day or 10 in the morning and 5 at night to help control the diarrhea--patient advised not to be mowing

## 2021-08-02 NOTE — PATIENT INSTRUCTIONS
Mediterranean Diet  A Mediterranean diet refers to food and lifestyle choices that are based on the traditions of countries located on the [place]. This way of eating has been shown to help prevent certain conditions and improve outcomes for people who have chronic diseases, like kidney disease and heart disease.  What are tips for following this plan?  Lifestyle  · Cook and eat meals together with your family, when possible.  · Drink enough fluid to keep your urine clear or pale yellow.  · Be physically active every day. This includes:  ? Aerobic exercise like running or swimming.  ? Leisure activities like gardening, walking, or housework.  · Get 7-8 hours of sleep each night.  · If recommended by your health care provider, drink red wine in moderation. This means 1 glass a day for nonpregnant women and 2 glasses a day for men. A glass of wine equals 5 oz (150 mL).  Reading food labels    · Check the serving size of packaged foods. For foods such as rice and pasta, the serving size refers to the amount of cooked product, not dry.  · Check the total fat in packaged foods. Avoid foods that have saturated fat or trans fats.  · Check the ingredients list for added sugars, such as corn syrup.  Shopping  · At the grocery store, buy most of your food from the areas near the walls of the store. This includes:  ? Fresh fruits and vegetables (produce).  ? Grains, beans, nuts, and seeds. Some of these may be available in unpackaged forms or large amounts (in bulk).  ? Fresh seafood.  ? Poultry and eggs.  ? Low-fat dairy products.  · Buy whole ingredients instead of prepackaged foods.  · Buy fresh fruits and vegetables in-season from local farmers markets.  · Buy frozen fruits and vegetables in resealable bags.  · If you do not have access to quality fresh seafood, buy precooked frozen shrimp or canned fish, such as tuna, salmon, or sardines.  · Buy small amounts of raw or cooked vegetables, salads, or olives from  the deli or salad bar at your store.  · Stock your pantry so you always have certain foods on hand, such as olive oil, canned tuna, canned tomatoes, rice, pasta, and beans.  Cooking  · Cook foods with extra-virgin olive oil instead of using butter or other vegetable oils.  · Have meat as a side dish, and have vegetables or grains as your main dish. This means having meat in small portions or adding small amounts of meat to foods like pasta or stew.  · Use beans or vegetables instead of meat in common dishes like chili or lasagna.  · [place] with different cooking methods. Try roasting or broiling vegetables instead of steaming or sautéeing them.  · Add frozen vegetables to soups, stews, pasta, or rice.  · Add nuts or seeds for added healthy fat at each meal. You can add these to yogurt, salads, or vegetable dishes.  · Marinate fish or vegetables using olive oil, lemon juice, garlic, and fresh herbs.  Meal planning    · Plan to eat 1 vegetarian meal one day each week. Try to work up to 2 vegetarian meals, if possible.  · Eat seafood 2 or more times a week.  · Have healthy snacks readily available, such as:  ? Vegetable sticks with hummus.  ? Greek yogurt.  ? Fruit and nut trail mix.  · Eat balanced meals throughout the week. This includes:  ? Fruit: 2-3 servings a day  ? Vegetables: 4-5 servings a day  ? Low-fat dairy: 2 servings a day  ? Fish, poultry, or lean meat: 1 serving a day  ? Beans and legumes: 2 or more servings a week  ? Nuts and seeds: 1-2 servings a day  ? Whole grains: 6-8 servings a day  ? Extra-virgin olive oil: 3-4 servings a day  · Limit red meat and sweets to only a few servings a month  What are my food choices?  · Mediterranean diet  ? Recommended  § Grains: Whole-grain pasta. Brown rice. Bulgar wheat. Polenta. Couscous. Whole-wheat bread. Oatmeal. Quinoa.  § Vegetables: Artichokes. Beets. Broccoli. Cabbage. Carrots. Eggplant. Green beans. Chard. Kale. Spinach. Onions. Leeks. Peas. Squash.  Tomatoes. Peppers. Radishes.  § Fruits: Apples. Apricots. Avocado. Berries. Bananas. Cherries. Dates. Figs. Grapes. [place]. Melon. Oranges. Peaches. Plums. Pomegranate.  § Meats and other protein foods: Beans. Almonds. Sunflower seeds. Pine nuts. Peanuts. Cod. [place]. Scallops. Shrimp. Tuna. Tilapia. Clams. Oysters. Eggs.  § Dairy: Low-fat milk. Cheese. Greek yogurt.  § Beverages: Water. Red wine. Herbal tea.  § Fats and oils: Extra virgin olive oil. Avocado oil. Grape seed oil.  § Sweets and desserts: Greek yogurt with honey. Baked apples. Poached pears. Trail mix.  § Seasoning and other foods: Basil. Cilantro. Coriander. Cumin. Mint. Parsley. [place]. Rosemary. Tarragon. Garlic. Oregano. Thyme. Pepper. Balsalmic vinegar. Tahini. Hummus. Tomato sauce. Olives. Mushrooms.  ? Limit these  § Grains: Prepackaged pasta or rice dishes. Prepackaged cereal with added sugar.  § Vegetables: Deep fried potatoes (french fries).  § Fruits: Fruit canned in syrup.  § Meats and other protein foods: Beef. Pork. Lamb. Poultry with skin. Hot dogs. Lou.  § Dairy: Ice cream. Sour cream. Whole milk.  § Beverages: Juice. Sugar-sweetened soft drinks. Beer. Liquor and spirits.  § Fats and oils: Butter. Canola oil. Vegetable oil. Beef fat (tallow). Lard.  § Sweets and desserts: Cookies. Cakes. Pies. Candy.  § Seasoning and other foods: Mayonnaise. Premade sauces and marinades.  The items listed may not be a complete list. Talk with your dietitian about what dietary choices are right for you.  Summary  · The Mediterranean diet includes both food and lifestyle choices.  · Eat a variety of fresh fruits and vegetables, beans, nuts, seeds, and whole grains.  · Limit the amount of red meat and sweets that you eat.  · Talk with your health care provider about whether it is safe for you to drink red wine in moderation. This means 1 glass a day for nonpregnant women and 2 glasses a day for men. A glass of wine equals 5 oz (150 mL).  This information  is not intended to replace advice given to you by your health care provider. Make sure you discuss any questions you have with your health care provider.  Document Revised: 08/17/2017 Document Reviewed: 08/10/2017  Elsevier Patient Education © 2020 [employer]

## 2021-08-03 LAB
ALBUMIN SERPL-MCNC: 3.5 G/DL (ref 3.5–5.2)
ALBUMIN/GLOB SERPL: 1.2 G/DL
ALP SERPL-CCNC: 151 U/L (ref 39–117)
ALT SERPL-CCNC: 20 U/L (ref 1–41)
AST SERPL-CCNC: 31 U/L (ref 1–40)
BASOPHILS # BLD AUTO: 0.12 10*3/MM3 (ref 0–0.2)
BASOPHILS NFR BLD AUTO: 1.3 % (ref 0–1.5)
BILIRUB SERPL-MCNC: 0.5 MG/DL (ref 0–1.2)
BUN SERPL-MCNC: 20 MG/DL (ref 8–23)
BUN/CREAT SERPL: 11.7 (ref 7–25)
CALCIUM SERPL-MCNC: 10 MG/DL (ref 8.6–10.5)
CHLORIDE SERPL-SCNC: 98 MMOL/L (ref 98–107)
CO2 SERPL-SCNC: 31.9 MMOL/L (ref 22–29)
CREAT SERPL-MCNC: 1.71 MG/DL (ref 0.76–1.27)
EOSINOPHIL # BLD AUTO: 0.97 10*3/MM3 (ref 0–0.4)
EOSINOPHIL NFR BLD AUTO: 10.5 % (ref 0.3–6.2)
ERYTHROCYTE [DISTWIDTH] IN BLOOD BY AUTOMATED COUNT: 13.2 % (ref 12.3–15.4)
GLOBULIN SER CALC-MCNC: 2.9 GM/DL
GLUCOSE SERPL-MCNC: 156 MG/DL (ref 65–99)
HCT VFR BLD AUTO: 44.4 % (ref 37.5–51)
HGB BLD-MCNC: 14.6 G/DL (ref 13–17.7)
IMM GRANULOCYTES # BLD AUTO: 0.04 10*3/MM3 (ref 0–0.05)
IMM GRANULOCYTES NFR BLD AUTO: 0.4 % (ref 0–0.5)
LYMPHOCYTES # BLD AUTO: 1.87 10*3/MM3 (ref 0.7–3.1)
LYMPHOCYTES NFR BLD AUTO: 20.3 % (ref 19.6–45.3)
MAGNESIUM SERPL-MCNC: 1.4 MG/DL (ref 1.6–2.4)
MCH RBC QN AUTO: 30.9 PG (ref 26.6–33)
MCHC RBC AUTO-ENTMCNC: 32.9 G/DL (ref 31.5–35.7)
MCV RBC AUTO: 93.9 FL (ref 79–97)
MONOCYTES # BLD AUTO: 0.94 10*3/MM3 (ref 0.1–0.9)
MONOCYTES NFR BLD AUTO: 10.2 % (ref 5–12)
NEUTROPHILS # BLD AUTO: 5.28 10*3/MM3 (ref 1.7–7)
NEUTROPHILS NFR BLD AUTO: 57.3 % (ref 42.7–76)
NRBC BLD AUTO-RTO: 0.1 /100 WBC (ref 0–0.2)
PLATELET # BLD AUTO: 203 10*3/MM3 (ref 140–450)
POTASSIUM SERPL-SCNC: 3.7 MMOL/L (ref 3.5–5.2)
PROT SERPL-MCNC: 6.4 G/DL (ref 6–8.5)
RBC # BLD AUTO: 4.73 10*6/MM3 (ref 4.14–5.8)
SODIUM SERPL-SCNC: 140 MMOL/L (ref 136–145)
TSH SERPL DL<=0.005 MIU/L-ACNC: 3.08 UIU/ML (ref 0.27–4.2)
WBC # BLD AUTO: 9.22 10*3/MM3 (ref 3.4–10.8)

## 2021-08-03 RX ORDER — MAGNESIUM OXIDE 400 MG/1
400 TABLET ORAL 2 TIMES DAILY
Qty: 60 TABLET | Refills: 2 | Status: SHIPPED | OUTPATIENT
Start: 2021-08-03 | End: 2021-09-27 | Stop reason: SDUPTHER

## 2021-08-03 RX ORDER — MAGNESIUM OXIDE 400 MG/1
400 TABLET ORAL DAILY
Qty: 30 TABLET | Refills: 2 | Status: SHIPPED | OUTPATIENT
Start: 2021-08-03 | End: 2021-08-03 | Stop reason: SDUPTHER

## 2021-08-03 NOTE — TELEPHONE ENCOUNTER
Rx Refill Note  Requested Prescriptions     Pending Prescriptions Disp Refills   • magnesium oxide (MAG-OX) 400 MG tablet 30 tablet 2     Sig: Take 1 tablet by mouth Daily.      Last office visit with prescribing clinician: 8/2/2021      Next office visit with prescribing clinician: Visit date not found          soren Dougherty MA  08/03/21, 09:43 CDT

## 2021-08-03 NOTE — TELEPHONE ENCOUNTER
See lab note.  Increased to twice daily.    Rx Refill Note  Requested Prescriptions     Pending Prescriptions Disp Refills   • magnesium oxide (MAG-OX) 400 MG tablet 60 tablet 2     Sig: Take 1 tablet by mouth 2 (Two) Times a Day.      Last office visit with prescribing clinician: 8/2/2021      Next office visit with prescribing clinician: Visit date not found            Yue Dougherty MA  08/03/21, 12:57 CDT

## 2021-08-31 RX ORDER — FLUOXETINE HYDROCHLORIDE 40 MG/1
40 CAPSULE ORAL DAILY
Qty: 90 CAPSULE | Refills: 3 | Status: SHIPPED | OUTPATIENT
Start: 2021-08-31 | End: 2022-03-24

## 2021-08-31 RX ORDER — METOCLOPRAMIDE 10 MG/1
TABLET ORAL
Qty: 30 TABLET | Refills: 1 | Status: SHIPPED | OUTPATIENT
Start: 2021-08-31 | End: 2021-11-16

## 2021-08-31 NOTE — TELEPHONE ENCOUNTER
Rx Refill Note  Requested Prescriptions     Pending Prescriptions Disp Refills   • FLUoxetine (PROzac) 40 MG capsule 90 capsule 3     Sig: Take 1 capsule by mouth Daily.      Last office visit with prescribing clinician: 8/2/2021      Next office visit with prescribing clinician: Visit date not found            Carolynn Galeano Rep  08/31/21, 12:12 CDT

## 2021-08-31 NOTE — TELEPHONE ENCOUNTER
Rx Refill Note  Requested Prescriptions     Pending Prescriptions Disp Refills   • metoclopramide (REGLAN) 10 MG tablet [Pharmacy Med Name: METOCLOPRAMIDE 10 MG TABLET] 30 tablet 1     Sig: TAKE 1 TABLET BY MOUTH TWICE A DAY      Last office visit with prescribing clinician: 8/2/2021      Next office visit with prescribing clinician: Visit date not found            Yue Dougherty MA  08/31/21, 10:54 CDT

## 2021-09-03 ENCOUNTER — OFFICE VISIT (OUTPATIENT)
Dept: FAMILY MEDICINE CLINIC | Facility: CLINIC | Age: 76
End: 2021-09-03

## 2021-09-03 VITALS
OXYGEN SATURATION: 97 % | SYSTOLIC BLOOD PRESSURE: 102 MMHG | TEMPERATURE: 98 F | HEIGHT: 66 IN | BODY MASS INDEX: 30.53 KG/M2 | DIASTOLIC BLOOD PRESSURE: 69 MMHG | HEART RATE: 69 BPM | WEIGHT: 190 LBS

## 2021-09-03 DIAGNOSIS — M25.562 CHRONIC PAIN OF LEFT KNEE: Primary | ICD-10-CM

## 2021-09-03 DIAGNOSIS — G89.29 CHRONIC PAIN OF LEFT KNEE: Primary | ICD-10-CM

## 2021-09-03 PROCEDURE — 96372 THER/PROPH/DIAG INJ SC/IM: CPT | Performed by: NURSE PRACTITIONER

## 2021-09-03 PROCEDURE — 99213 OFFICE O/P EST LOW 20 MIN: CPT | Performed by: NURSE PRACTITIONER

## 2021-09-03 RX ORDER — METHYLPREDNISOLONE ACETATE 80 MG/ML
40 INJECTION, SUSPENSION INTRA-ARTICULAR; INTRALESIONAL; INTRAMUSCULAR; SOFT TISSUE ONCE
Status: COMPLETED | OUTPATIENT
Start: 2021-09-03 | End: 2021-09-03

## 2021-09-03 RX ADMIN — METHYLPREDNISOLONE ACETATE 40 MG: 80 INJECTION, SUSPENSION INTRA-ARTICULAR; INTRALESIONAL; INTRAMUSCULAR; SOFT TISSUE at 12:57

## 2021-09-03 NOTE — PROGRESS NOTES
CC: left knee pain    History:  Samy Velazquez is a 76 y.o. male who presents today for evaluation of the above problems.      Patient reports chronic left knee pain that has gotten worse since he fell on the knee this past Tuesday.  States that it wasn't a hard fall, however, knee did experience a great deal of edema that has gone down some since then.     HPI  ROS:  Review of Systems   Musculoskeletal:        Left knee pain and swelling       Allergies   Allergen Reactions   • Iodine Hives   • Clindamycin/Lincomycin Diarrhea   • Penicillins Rash   • Vancomycin Nausea And Vomiting     Past Medical History:   Diagnosis Date   • Arthritis    • Coronary artery disease    • Hyperlipidemia    • Hypertensive heart disease with heart failure (CMS/[facility], located [facility]) 3/29/2021   • Hypothyroidism 4/1/2021   • Kidney stone    • Major depressive disorder, recurrent, moderate (CMS/[facility], located within [facility]) 3/29/2021   • Morbidly obese (CMS/[facility], located within [facility]) 9/18/2020     Past Surgical History:   Procedure Laterality Date   • CARDIAC DEFIBRILLATOR PLACEMENT     • CARDIAC DEFIBRILLATOR PLACEMENT N/A 2008   • CARDIAC SURGERY     • ENDOSCOPY N/A 10/30/2020    Procedure: ESOPHAGOGASTRODUODENOSCOPY WITH ANESTHESIA;  Surgeon: Brent Stanley MD;  Location: [facility];  Service: Gastroenterology;  Laterality: N/A;  pre dysphagia  post post op gastric surgery  dr jose manuel smith   • ESOPHAGUS SURGERY     • KNEE SURGERY     • TOTAL SHOULDER REPLACEMENT       Family History   Problem Relation Age of Onset   • Hypertension Mother    • Stroke Mother    • Cancer Father    • Hypertension Father    • Diabetes Sister    • Hypertension Sister    • Crohn's disease Daughter    • No Known Problems Son    • No Known Problems Maternal Grandmother    • No Known Problems Maternal Grandfather    • No Known Problems Paternal Grandmother    • No Known Problems Paternal Grandfather    • Hypertension Sister    • Hypertension Sister    • Hypertension Sister    • No Known Problems Son       reports that he has  never smoked. He has never used smokeless tobacco. He reports that he does not drink alcohol and does not use drugs.      Current Outpatient Medications:   •  allopurinol (ZYLOPRIM) 300 MG tablet, Take 1 tablet by mouth Daily., Disp: 90 tablet, Rfl: 3  •  Alum Hydroxide-Mag Carbonate (GAVISCON PO), Take  by mouth Every Morning., Disp: , Rfl:   •  aspirin 81 MG EC tablet, Take 81 mg by mouth Daily., Disp: , Rfl:   •  atorvastatin (LIPITOR) 40 MG tablet, TAKE 1 TABLET BY MOUTH EVERY DAY EVERY NIGHT, Disp: 90 tablet, Rfl: 3  •  cephalexin (KEFLEX) 250 MG capsule, Take 250 mg by mouth 2 (Two) Times a Day., Disp: , Rfl:   •  Cholecalciferol (VITAMIN D3) 2000 units capsule, Take 2,000 Units by mouth Daily., Disp: , Rfl:   •  clopidogrel (PLAVIX) 75 MG tablet, TAKE 1 TABLET BY MOUTH EVERY DAY, Disp: 90 tablet, Rfl: 3  •  donepezil (ARICEPT) 10 MG tablet, TAKE 1 TABLET BY MOUTH EVERYDAY AT BEDTIME, Disp: 90 tablet, Rfl: 2  •  FLUoxetine (PROzac) 40 MG capsule, Take 1 capsule by mouth Daily., Disp: 90 capsule, Rfl: 3  •  furosemide (LASIX) 20 MG tablet, Take 1 tablet with 2 lbs weight gain.  Take 2 tablets with over 2 lbs weight gain, Disp: 180 tablet, Rfl: 2  •  isosorbide mononitrate (IMDUR) 60 MG 24 hr tablet, TAKE 1 TABLET BY MOUTH TWICE A DAY, Disp: 180 tablet, Rfl: 3  •  levothyroxine (SYNTHROID, LEVOTHROID) 50 MCG tablet, TAKE 1 TABLET BY MOUTH EVERY DAY, Disp: 90 tablet, Rfl: 3  •  lisinopril (PRINIVIL,ZESTRIL) 2.5 MG tablet, Take 2.5 mg by mouth 2 (Two) Times a Day., Disp: , Rfl:   •  magnesium oxide (MAG-OX) 400 MG tablet, Take 1 tablet by mouth 2 (Two) Times a Day., Disp: 60 tablet, Rfl: 2  •  memantine (NAMENDA) 10 MG tablet, Take 1 tablet by mouth 2 (Two) Times a Day., Disp: 180 tablet, Rfl: 3  •  metoclopramide (REGLAN) 10 MG tablet, TAKE 1 TABLET BY MOUTH TWICE A DAY, Disp: 30 tablet, Rfl: 1  •  metOLazone (ZAROXOLYN) 2.5 MG tablet, Take 2.5 mg by mouth Daily With Lunch., Disp: , Rfl:   •  metoprolol succinate  "XL (TOPROL-XL) 25 MG 24 hr tablet, Take 12.5 mg by mouth Daily., Disp: , Rfl:   •  mexiletine (MEXITIL) 150 MG capsule, 1 tablet in the morning, 1 tablet at noon, and two tablets at bedtime., Disp: 360 capsule, Rfl: 3  •  nitroglycerin (NITRODUR) 0.2 MG/HR patch, Place 1 patch on the skin Daily. Patch is 0.24 twelve hour patch, Disp: , Rfl:   •  nitroglycerin (NITROSTAT) 0.4 MG SL tablet, PLEASE SEE ATTACHED FOR DETAILED DIRECTIONS, Disp: 25 tablet, Rfl: 2  •  ondansetron ODT (ZOFRAN-ODT) 4 MG disintegrating tablet, Place 1 tablet on the tongue Every 8 (Eight) Hours As Needed for Nausea or Vomiting., Disp: 10 tablet, Rfl: 2  •  oxyCODONE-acetaminophen (PERCOCET)  MG per tablet, Take 1 tablet by mouth Every 6 (Six) Hours As Needed for Moderate Pain ., Disp: , Rfl:   •  pantoprazole (PROTONIX) 40 MG EC tablet, Take 1 now and nightly for acid reflux, Disp: 90 tablet, Rfl: 3  •  sotalol (BETAPACE) 80 MG tablet, Take 40 mg by mouth 2 (Two) Times a Day., Disp: , Rfl:   •  sucralfate (CARAFATE) 1 g tablet, Take 1 tablet by mouth 4 (Four) Times a Day., Disp: 42 tablet, Rfl: 0  •  ondansetron (Zofran) 4 MG tablet, Take 1 tablet by mouth Every 6 (Six) Hours As Needed for Nausea or Vomiting., Disp: 10 tablet, Rfl: 0  •  ondansetron ODT (ZOFRAN-ODT) 4 MG disintegrating tablet, Place 1 tablet on the tongue Every 4 (Four) Hours As Needed for Nausea or Vomiting., Disp: 10 tablet, Rfl: 0    Current Facility-Administered Medications:   •  methylPREDNISolone acetate (DEPO-medrol) injection 40 mg, 40 mg, Intramuscular, Once, Xochtil Doshi, APRN    OBJECTIVE:  /69 (BP Location: Right arm, Patient Position: Sitting, Cuff Size: Large Adult)   Pulse 69   Temp 98 °F (36.7 °C)   Ht 167.6 cm (66\") Comment: pt reported  Wt 86.2 kg (190 lb) Comment: unable to weigh today due to pain - pt reported  SpO2 97%   BMI 30.67 kg/m²    Physical Exam  Vitals reviewed.   Constitutional:       Appearance: He is well-developed. "   Cardiovascular:      Rate and Rhythm: Normal rate.   Pulmonary:      Effort: Pulmonary effort is normal.   Musculoskeletal:      Left knee: Swelling present.      Comments: Entire knee is swollen, worse on medial aspect.    Neurological:      Mental Status: He is alert and oriented to person, place, and time.   Psychiatric:         Behavior: Behavior normal.         Assessment/Plan    Diagnoses and all orders for this visit:    1. Chronic pain of left knee (Primary)  -     methylPREDNISolone acetate (DEPO-medrol) injection 40 mg    Patella does not seem ballotable. This may be more soft tissue swelling vs. Joint effusion.     An After Visit Summary was printed and given to the patient at discharge.  Return in about 4 days (around 9/7/2021) for Recheck.       GEN Villagran 9/3/21    Electronically signed.

## 2021-09-08 ENCOUNTER — OUTSIDE FACILITY SERVICE (OUTPATIENT)
Dept: FAMILY MEDICINE CLINIC | Facility: CLINIC | Age: 76
End: 2021-09-08

## 2021-09-08 ENCOUNTER — TELEPHONE (OUTPATIENT)
Dept: FAMILY MEDICINE CLINIC | Facility: CLINIC | Age: 76
End: 2021-09-08

## 2021-09-08 ENCOUNTER — OFFICE VISIT (OUTPATIENT)
Dept: FAMILY MEDICINE CLINIC | Facility: CLINIC | Age: 76
End: 2021-09-08

## 2021-09-08 ENCOUNTER — CLINICAL SUPPORT (OUTPATIENT)
Dept: FAMILY MEDICINE CLINIC | Facility: CLINIC | Age: 76
End: 2021-09-08

## 2021-09-08 VITALS — WEIGHT: 188 LBS | BODY MASS INDEX: 30.22 KG/M2 | HEIGHT: 66 IN

## 2021-09-08 DIAGNOSIS — R09.81 NASAL CONGESTION: Primary | ICD-10-CM

## 2021-09-08 PROCEDURE — 99211 OFF/OP EST MAY X REQ PHY/QHP: CPT | Performed by: FAMILY MEDICINE

## 2021-09-08 PROCEDURE — G2025 DIS SITE TELE SVCS RHC/FQHC: HCPCS | Performed by: FAMILY MEDICINE

## 2021-09-08 RX ORDER — FLUOXETINE HYDROCHLORIDE 20 MG/1
CAPSULE ORAL
Qty: 90 CAPSULE | Refills: 3 | OUTPATIENT
Start: 2021-09-08

## 2021-09-08 NOTE — TELEPHONE ENCOUNTER
Wife was advised--unable to do video visit & ok for a telephone visit-advised insurance may not cover for visit d/t phone call-verbalized understanding.  She also states it has been 6 days since he was around granddaughter working with computers and her mother is on way to get a test as well.

## 2021-09-08 NOTE — TELEPHONE ENCOUNTER
WIFE CALLS AND STATES HAS BEEN HOARSE FOR THE PAST 2 DAYS.  NIGHT BEFORE LAST-SEEMED TO BE SWEATING A LOT-THINKS IT MAY THE HOSPITAL BED--SHE DID NOT TAKE TEMP-CANNOT FIND THERMOMETER AT HOME.  NO COVID SYMPTOMS BUT WAS AROUND GRANDDAUGHTER AT LEAST A WEEK AGO-SHE TESTED POSITIVE YESTERDAY AFTERNOON. WIFE JUST CONCERNED-DID NOT WANT TO BRING HIM INTO OFFICE BUT WILL IF YOU THINK HE NEEDS TO BE SEEN.      NO PROBLEMS SWALLOWING-NO COUGH.     [facility]

## 2021-09-08 NOTE — TELEPHONE ENCOUNTER
MAR reviewed video set up with Xochitl then I can bring the and do an outpatient Covid test patient needs a Covid test

## 2021-09-08 NOTE — PROGRESS NOTES
Patient had telehealth visit and provider ordered COVID test.  Patient called office upon arrival in parking lot.  Patient remained in vehicle to be tested.  I wore N95, face shield, personal glasses, gown, and gloves.

## 2021-09-08 NOTE — PROGRESS NOTES
Subjective   Samy Velazquez is a 76 y.o. male.     76-year-old with multiple comorbidities and viral exposure to granddaughter i.e. Covid less than a week ago-complains of sore throat      The following portions of the patient's history were reviewed and updated as appropriate: allergies, current medications, past family history, past medical history, past social history, past surgical history and problem list.    Review of Systems   Constitutional: Negative for fever.   HENT: Positive for sinus pressure and sore throat.    Respiratory: Positive for cough. Negative for shortness of breath.    Cardiovascular: Negative for chest pain.       Objective   Physical Exam  HENT:      Mouth/Throat:      Comments: Raspy voice  Neurological:      Mental Status: He is alert and oriented to person, place, and time.   Psychiatric:         Mood and Affect: Mood normal.         Behavior: Behavior normal.         Thought Content: Thought content normal.         Judgment: Judgment normal.         Assessment/Plan   Diagnoses and all orders for this visit:    1. Nasal congestion (Primary)  -     COVID-19,[facility] ROUTINE, NP/OP SWAB IN TRANSPORT MEDIA OR ESWAB 72 HR TAT - Swab, Oropharynx         Covid 19 swab advised because of exposure to granddaughter and comorbidities-patient understands that this was a phone visit because they could not do  video because of technology

## 2021-09-10 LAB
LABCORP SARS-COV-2, NAA 2 DAY TAT: NORMAL
SARS-COV-2 RNA RESP QL NAA+PROBE: NOT DETECTED

## 2021-09-22 PROCEDURE — G0179 MD RECERTIFICATION HHA PT: HCPCS | Performed by: FAMILY MEDICINE

## 2021-09-27 RX ORDER — MAGNESIUM OXIDE 400 MG/1
400 TABLET ORAL 2 TIMES DAILY
Qty: 180 TABLET | Refills: 1 | Status: SHIPPED | OUTPATIENT
Start: 2021-09-27 | End: 2022-02-01

## 2021-09-27 NOTE — TELEPHONE ENCOUNTER
Rx Refill Note  Requested Prescriptions     Pending Prescriptions Disp Refills   • magnesium oxide (MAG-OX) 400 MG tablet 180 tablet 1     Sig: Take 1 tablet by mouth 2 (Two) Times a Day.      Last office visit with prescribing clinician: 9/8/2021      Next office visit with prescribing clinician: Visit date not found            Carolynn Galeano Rep  09/27/21, 10:15 CDT    [facility]

## 2021-09-30 ENCOUNTER — OFFICE VISIT (OUTPATIENT)
Dept: FAMILY MEDICINE CLINIC | Facility: CLINIC | Age: 76
End: 2021-09-30

## 2021-09-30 VITALS
BODY MASS INDEX: 32.12 KG/M2 | WEIGHT: 192.8 LBS | HEART RATE: 64 BPM | TEMPERATURE: 97.1 F | HEIGHT: 65 IN | SYSTOLIC BLOOD PRESSURE: 114 MMHG | DIASTOLIC BLOOD PRESSURE: 70 MMHG | OXYGEN SATURATION: 97 % | RESPIRATION RATE: 18 BRPM

## 2021-09-30 DIAGNOSIS — R73.9 HYPERGLYCEMIA: ICD-10-CM

## 2021-09-30 DIAGNOSIS — R53.83 FATIGUE, UNSPECIFIED TYPE: ICD-10-CM

## 2021-09-30 DIAGNOSIS — R79.0 LOW MAGNESIUM LEVEL: Primary | ICD-10-CM

## 2021-09-30 DIAGNOSIS — Z23 NEEDS FLU SHOT: ICD-10-CM

## 2021-09-30 DIAGNOSIS — R26.89 BALANCE DISORDER: ICD-10-CM

## 2021-09-30 PROCEDURE — 99213 OFFICE O/P EST LOW 20 MIN: CPT | Performed by: FAMILY MEDICINE

## 2021-09-30 PROCEDURE — G0008 ADMIN INFLUENZA VIRUS VAC: HCPCS | Performed by: FAMILY MEDICINE

## 2021-09-30 PROCEDURE — 90662 IIV NO PRSV INCREASED AG IM: CPT | Performed by: FAMILY MEDICINE

## 2021-09-30 NOTE — PROGRESS NOTES
Subjective   Saym Velazquez is a 76 y.o. male.     36-year-old male with history of heart failure ischemic heart disease morbid obesity and is loss of voice-severe GERD requiring dilatation in the past      The following portions of the patient's history were reviewed and updated as appropriate: allergies, current medications, past family history, past medical history, past social history, past surgical history and problem list.    Review of Systems   HENT:        Severe laryngitis-probably related to his GERD   Respiratory: Negative for shortness of breath.    Cardiovascular: Negative for chest pain and leg swelling.        Heart failure has been stable   Gastrointestinal:        GERD   Musculoskeletal: Positive for arthralgias and back pain.        High risk for falls because of DJD-needs to bedside commode and wheelchair       Objective   Physical Exam  Vitals and nursing note reviewed.   Constitutional:       Appearance: He is obese.   Cardiovascular:      Rate and Rhythm: Normal rate and regular rhythm.      Pulses: Normal pulses.      Heart sounds: Normal heart sounds.   Pulmonary:      Effort: Pulmonary effort is normal.      Breath sounds: Normal breath sounds.   Musculoskeletal:      Right lower leg: No edema.      Left lower leg: No edema.   Skin:     General: Skin is warm and dry.   Neurological:      General: No focal deficit present.      Mental Status: He is alert and oriented to person, place, and time.   Psychiatric:         Mood and Affect: Mood normal.         Behavior: Behavior normal.         Thought Content: Thought content normal.         Assessment/Plan   Diagnoses and all orders for this visit:    1. Low magnesium level (Primary)  -     Magnesium    2. Fatigue, unspecified type  -     CBC & Differential    3. Hyperglycemia  -     Basic Metabolic Panel    4. Needs flu shot  -     Fluzone High-Dose 65+yrs       Plan above-talk to Dr. Diaz schedule an EGD and get a good look at the larynx on the  way down-potty chair wheelchair etc. necessary for fall prevention and ataxia

## 2021-10-01 LAB
BASOPHILS # BLD AUTO: 0.09 10*3/MM3 (ref 0–0.2)
BASOPHILS NFR BLD AUTO: 1 % (ref 0–1.5)
BUN SERPL-MCNC: 40 MG/DL (ref 8–23)
BUN/CREAT SERPL: 20.8 (ref 7–25)
CALCIUM SERPL-MCNC: 10.9 MG/DL (ref 8.6–10.5)
CHLORIDE SERPL-SCNC: 96 MMOL/L (ref 98–107)
CO2 SERPL-SCNC: 32.3 MMOL/L (ref 22–29)
CREAT SERPL-MCNC: 1.92 MG/DL (ref 0.76–1.27)
EOSINOPHIL # BLD AUTO: 0.67 10*3/MM3 (ref 0–0.4)
EOSINOPHIL NFR BLD AUTO: 7.5 % (ref 0.3–6.2)
ERYTHROCYTE [DISTWIDTH] IN BLOOD BY AUTOMATED COUNT: 13.9 % (ref 12.3–15.4)
GLUCOSE SERPL-MCNC: 111 MG/DL (ref 65–99)
HCT VFR BLD AUTO: 48.3 % (ref 37.5–51)
HGB BLD-MCNC: 15.3 G/DL (ref 13–17.7)
IMM GRANULOCYTES # BLD AUTO: 0.04 10*3/MM3 (ref 0–0.05)
IMM GRANULOCYTES NFR BLD AUTO: 0.4 % (ref 0–0.5)
LYMPHOCYTES # BLD AUTO: 2.22 10*3/MM3 (ref 0.7–3.1)
LYMPHOCYTES NFR BLD AUTO: 24.9 % (ref 19.6–45.3)
MAGNESIUM SERPL-MCNC: 1.7 MG/DL (ref 1.6–2.4)
MCH RBC QN AUTO: 30.8 PG (ref 26.6–33)
MCHC RBC AUTO-ENTMCNC: 31.7 G/DL (ref 31.5–35.7)
MCV RBC AUTO: 97.4 FL (ref 79–97)
MONOCYTES # BLD AUTO: 0.91 10*3/MM3 (ref 0.1–0.9)
MONOCYTES NFR BLD AUTO: 10.2 % (ref 5–12)
NEUTROPHILS # BLD AUTO: 5 10*3/MM3 (ref 1.7–7)
NEUTROPHILS NFR BLD AUTO: 56 % (ref 42.7–76)
NRBC BLD AUTO-RTO: 0.1 /100 WBC (ref 0–0.2)
PLATELET # BLD AUTO: 218 10*3/MM3 (ref 140–450)
POTASSIUM SERPL-SCNC: 4.1 MMOL/L (ref 3.5–5.2)
RBC # BLD AUTO: 4.96 10*6/MM3 (ref 4.14–5.8)
SODIUM SERPL-SCNC: 142 MMOL/L (ref 136–145)
WBC # BLD AUTO: 8.93 10*3/MM3 (ref 3.4–10.8)

## 2021-10-05 RX ORDER — METOLAZONE 2.5 MG/1
TABLET ORAL
Qty: 30 TABLET | Refills: 2 | Status: SHIPPED | OUTPATIENT
Start: 2021-10-05 | End: 2022-01-07

## 2021-10-05 NOTE — TELEPHONE ENCOUNTER
Rx Refill Note  Requested Prescriptions     Pending Prescriptions Disp Refills   • metOLazone (ZAROXOLYN) 2.5 MG tablet [Pharmacy Med Name: METOLAZONE 2.5 MG TABLET] 30 tablet 2     Sig: TAKE 1 TABLET EVERY DAY AT NOON      Last office visit with prescribing clinician: 9/30/2021      Next office visit with prescribing clinician: 12/30/2021            Yue Dougherty MA  10/05/21, 07:28 CDT

## 2021-10-25 DIAGNOSIS — T14.90XA TRAUMA: Primary | ICD-10-CM

## 2021-10-29 ENCOUNTER — TELEPHONE (OUTPATIENT)
Dept: FAMILY MEDICINE CLINIC | Facility: CLINIC | Age: 76
End: 2021-10-29

## 2021-10-29 DIAGNOSIS — R53.1 WEAKNESS: Primary | ICD-10-CM

## 2021-10-29 NOTE — TELEPHONE ENCOUNTER
Wife has called and states he is at max for therapy--unsure if needs new order.  Had a lil spell on Monday and he is weak and walking has deteriorated. Pt is still receiving nursing care so far, but needs to continue physical therapy.  Receiving [facility].

## 2021-10-29 NOTE — TELEPHONE ENCOUNTER
CALLED AND SPOKE WITH [id number] HAS REACHED [facility]UM WITH THERAPY-SHE WILL CALL WIFE AND TALK WITH HER.  ORDER WAS NOT NEEDED.  DID NOT ELABORATE ON SPELL--OP CT SCAN WAS ORDERED

## 2021-11-02 ENCOUNTER — TELEPHONE (OUTPATIENT)
Dept: FAMILY MEDICINE CLINIC | Facility: CLINIC | Age: 76
End: 2021-11-02

## 2021-11-02 NOTE — TELEPHONE ENCOUNTER
NURSE NIKKI AT PT'S RESIDENCE-SPOKE WITH MD THIS MORNING.    PER DR DOS SANTOS-ORDER MRI (HEAD) BRAIN TRYING TO RULE OUT STROKE D/T LEFT ARM WEAKNESS AND RIGHT LEFT LEG WEAKNESS.      [place] [facility]. CALL NIKKI JORDAN CALLED BACK AND CALLED TRANSFERRED TO  ED--PT HAS A PACEMAKER.  UNSURE WHAT WAS DISCUSSED.

## 2021-11-03 NOTE — TELEPHONE ENCOUNTER
Millie advised.  She said she stopped one of his medications.  Diarrhea not constant and only at night.  She states that you discussed patient needing CT head with contrast.  Please advise.

## 2021-11-03 NOTE — TELEPHONE ENCOUNTER
Call [facility] [phone] we need to get a stool collected for PCR stool evaluation

## 2021-11-04 ENCOUNTER — OFFICE VISIT (OUTPATIENT)
Dept: OTOLARYNGOLOGY | Facility: CLINIC | Age: 76
End: 2021-11-04

## 2021-11-04 VITALS — WEIGHT: 192 LBS | OXYGEN SATURATION: 97 % | HEART RATE: 64 BPM | BODY MASS INDEX: 31.95 KG/M2

## 2021-11-04 DIAGNOSIS — F44.4 FUNCTIONAL DYSPHONIA: Primary | ICD-10-CM

## 2021-11-04 DIAGNOSIS — J37.0 CHRONIC LARYNGITIS: ICD-10-CM

## 2021-11-04 PROCEDURE — 99203 OFFICE O/P NEW LOW 30 MIN: CPT | Performed by: OTOLARYNGOLOGY

## 2021-11-04 PROCEDURE — 31575 DIAGNOSTIC LARYNGOSCOPY: CPT | Performed by: OTOLARYNGOLOGY

## 2021-11-04 RX ORDER — CEPHALEXIN 250 MG/1
250 CAPSULE ORAL 2 TIMES DAILY
COMMUNITY
End: 2022-06-27

## 2021-11-04 RX ORDER — POTASSIUM CHLORIDE 750 MG/1
10 CAPSULE, EXTENDED RELEASE ORAL 3 TIMES DAILY
COMMUNITY
End: 2022-03-01

## 2021-11-04 NOTE — PROGRESS NOTES
"Saurabh Velazquez is a 76 y.o. male.       History of Present Illness   Patient is here for evaluation of dysphonia.  Patient whispers most of the time when giving the history.  Family member gives additional history that he has had EGD and with dilatation.  She says his voice has been whispered like this for a few weeks.  She mentions that there is suspicion that he has had some strokes and that he is also been \"pocketing\" his medications (keeping them in his mouth rather than swallowing them).      The following portions of the patient's history were reviewed and updated as appropriate: allergies, current medications, past family history, past medical history, past social history, past surgical history and problem list.     reports that he has never smoked. He has never used smokeless tobacco. He reports that he does not drink alcohol and does not use drugs.   Patient is not a tobacco user and has not been counseled for use of tobacco products      Review of Systems        Objective   Physical Exam  General: Male in no acute distress.  During the initial portion of the exam he exhibited a whispered voice  Ears no discharge  Nares: No discharge  Oral cavity: No masses or lesions  Pharynx: No erythema or exudate  Neck: No adenopathy or mass    Procedure Note    Pre-operative Diagnosis: Patient presents with:  Hoarse  Difficulty Swallowing      Post-operative Diagnosis: Functional dysphonia; chronic laryngitis    Anesthesia: Topical with Xylocaine and Mehdi-Synephrine    Endoscopy Type:  Flexible Laryngoscopy    Procedure Details:    The patient was placed in the sitting position.  After topical anesthesia and decongestion, the 4 mm laryngoscope was passed.  The nasal cavities, nasopharynx, oropharynx, hypopharynx, and larynx were all examined.  Vocal cords were examined during respiration and phonation.  The following findings were noted:    Findings: Previously noted nasal findings were confirmed. Nasopharynx " without mass, hypopharynx and larynx without evidence of neoplasm. Vocal cord mobility intact. There is chronic appearing edema and erythema of the laryngeal structures consistent with chronic laryngitis.  Palpation of the epiglottis and arytenoid produced an appropriate cough and gag reflex.  When asked to phonate during the exam he was able to produce a much stronger voice.  After completion of the exam I ask him if he can phonate strongly and he did so.    Condition:  Stable.  Patient tolerated procedure well.    Complications:  None      Assessment/Plan   Diagnoses and all orders for this visit:    1. Functional dysphonia (Primary)    2. Chronic laryngitis      Plan: Whether conscious or not this is functional dysphonia.  When he makes an effort to do so he can speak with a strong voice he just chooses to whisper at times.  Reassured family member there did not appear to be anything structural or neurological that was wrong with the larynx.  Encourage the patient to make an effort to phonate more loudly and for the family member to remind him that he can talk loudly if he chooses to do so.  No further treatment needed from my standpoint.

## 2021-11-11 ENCOUNTER — TELEPHONE (OUTPATIENT)
Dept: FAMILY MEDICINE CLINIC | Facility: CLINIC | Age: 76
End: 2021-11-11

## 2021-11-11 NOTE — TELEPHONE ENCOUNTER
Has he been seen somewhere for this?    I don't have any background info. Who ordered the stool test?  It honestly depends on what is causing the diarrhea what he needs to eat....   For some things it clear liquids only, but for just viral illness he could do bananas, rice, applesauce, toast....

## 2021-11-11 NOTE — TELEPHONE ENCOUNTER
WIFE CALLING ABOUT STOOL RESULTS-CONTACTED [facility] AND ORDER WAS FOR PCR PROFILE AND SENT TO [facility]-RESULTS UNAVAILABLE FOR COUPLE DAYS.  WIFE WAS ADVISED.    STATES PT IS UNABLE TO KEEP IN FOODS-EVERYTHING RUNS STRAIGHT THROUGH.  SOUPS/ENSURE/VEGETABLES.  NO DAIRY AS OF YESTERDAY OR TOMATO BASED PRODUCTS. HAS BEEN GIVING PEANUT BUTTER AND JELLO AND DRY CHEERIOS.  DO YOU HAVE ANY SUGGESTION FOR WHAT DIET?    PLEASE ADVISE

## 2021-11-11 NOTE — TELEPHONE ENCOUNTER
Wife states Millie from [facility] spoke with Dr Hsieh yesterday and he asked for a stool sample from his diaper.  When she called, I informed her he had already left for the day but said I could put a message back to see what other options could use for diet.  From conversation earlier, it sounded as if had been going on since last week.

## 2021-11-16 ENCOUNTER — OFFICE VISIT (OUTPATIENT)
Dept: FAMILY MEDICINE CLINIC | Facility: CLINIC | Age: 76
End: 2021-11-16

## 2021-11-16 VITALS
HEART RATE: 64 BPM | OXYGEN SATURATION: 96 % | BODY MASS INDEX: 29.98 KG/M2 | SYSTOLIC BLOOD PRESSURE: 109 MMHG | TEMPERATURE: 98.4 F | DIASTOLIC BLOOD PRESSURE: 74 MMHG | WEIGHT: 191 LBS | HEIGHT: 67 IN

## 2021-11-16 DIAGNOSIS — R63.8 OTHER SYMPTOMS AND SIGNS CONCERNING FOOD AND FLUID INTAKE: ICD-10-CM

## 2021-11-16 DIAGNOSIS — R19.7 DIARRHEA, UNSPECIFIED TYPE: Primary | ICD-10-CM

## 2021-11-16 DIAGNOSIS — Z98.84 BARIATRIC SURGERY STATUS: ICD-10-CM

## 2021-11-16 PROCEDURE — 99214 OFFICE O/P EST MOD 30 MIN: CPT | Performed by: FAMILY MEDICINE

## 2021-11-16 RX ORDER — FAMOTIDINE 20 MG/1
20 TABLET, FILM COATED ORAL 2 TIMES DAILY
Qty: 60 TABLET | Refills: 2 | Status: SHIPPED | OUTPATIENT
Start: 2021-11-16 | End: 2022-03-03

## 2021-11-16 RX ORDER — CHOLESTYRAMINE LIGHT 4 G/5.7G
POWDER, FOR SUSPENSION ORAL
Qty: 30 PACKET | Refills: 2 | Status: SHIPPED | OUTPATIENT
Start: 2021-11-16 | End: 2022-02-01

## 2021-11-16 NOTE — PATIENT INSTRUCTIONS
Low-Fiber Eating Plan  Fiber is found in fruits, vegetables, whole grains, and beans. Eating a diet low in fiber helps to reduce how often you have bowel movements and the amount of stool you produce. A low-fiber eating plan may help your digestive system heal if you:  · Have certain conditions, such as Crohn's disease, diverticulitis, or irritable bowel syndrome (IBS), and are having a flare-up.  · Recently had radiation therapy on your pelvis or bowel.  · Recently had intestinal surgery.  · Have a new surgical opening in your abdomen (colostomy or ileostomy).  · Have an intestine that has narrowed (stricture).  Your health care provider will tell you how long to stay on this diet and may recommend that you work with a dietitian.  What are tips for following this plan?  Reading food labels    · Check the nutrition facts label on food products for the amount of dietary fiber.  · Choose foods that have less than 2 grams (g) of fiber per serving.    Cooking  · Use white flour for baking and cooking.  · Cook meat using methods that keep it tender, such as braising or poaching.  · Cook eggs until the yolk is completely solid.  · Cook with healthy oils, such as olive oil or canola oil.  Meal planning  · Eat 5-6 small meals throughout the day instead of 3 large meals.  · If you are lactose intolerant:  ? Choose low-lactose dairy foods.  ? Do not eat dairy foods if told by your health care provider or dietitian.  · Limit fats and oils to less than 8 teaspoons (39 mL) a day.  · Eat small portions of desserts.  · Limit acidic, spicy, or fried foods to reduce gas, bloating, and discomfort.  General information  · Follow instructions from your health care provider or dietitian about how much fiber you should have each day.  · Most people on a low-fiber eating plan should eat less than 10 g of fiber a day. Your daily fiber goal is _________________ g.  · Take vitamin and mineral supplements as told by your health care provider  or dietitian. Chewable or liquid forms are best when on this eating plan. A gummy vitamin is not recommended.  What foods should I eat?  Fruits  Soft-cooked or canned fruits without skin and seeds. Ripe banana. Applesauce. Fruit juice without pulp.  Vegetables  Well-cooked or canned vegetables without skin, seeds, or stems. Cooked potatoes without skins. Vegetable juice.  Grains  All bread and crackers made with white flour. Waffles, pancakes, and [place]is[place] toast. Bagels. Pretzels. Rosario toast, zwieback, and matzoh. Cooked and dried cereals that do not have whole grains, added fiber, seeds, or dried fruit. [place]. Hot and cold cereals made with refined corn, rice, or oats. Plain pasta and noodles. White rice.  Meats and other proteins  Ground meat. Tender cuts of meat or poultry. Eggs. Fish, seafood, and shellfish. Smooth nut butters. Tofu.  Dairy  All milk products and drinks. Lactose-free milk, including rice, soy, and almond milk. Yogurt without fruit, nuts, chocolate, or granola mixed in. Sour cream. Cottage cheese. Cheese.  Fats and oils  Olive oil, canola oil, sunflower oil, flaxseed oil, avocado oil, and grapeseed oil. Mayonnaise. Cream cheese. Margarine. Butter.  Beverages  Decaf coffee. Fruit and vegetable juices. Smoothies (in small amounts, with no pulp or skins, and with fruits from the recommended list). Sports drinks. Herbal tea. Water.  Sweets and desserts  Plain cakes. Cookies. Cream pies and pies made with recommended fruits. Pudding. Custard. Fruit gelatin. Sherbet. Ice pops. Ice cream without nuts. Hard candy. Honey. Jelly. Molasses. Syrups. Chocolate. Marshmallows. Gumdrops.  Seasonings and condiments  Ketchup. Mild mustard. Mild salad dressings. Plain gravies. Vinegar. Spices in moderation. Salt. Sugar.  Other foods  Bouillon. Broth. Cream and strained soups made from recommended foods. Casseroles made with recommended foods.  The items listed above may not be a complete list of foods and beverages  you can eat. Contact a dietitian for more information.  What foods should I avoid?  Fruits  Raw or dried fruit. Berries. Fruit juice with pulp. Prune juice.  Vegetables  Potato skins. Raw or undercooked vegetables. All beans and bean sprouts. Cooked greens. Corn. Peas. Cabbage. Beets. Broccoli. [place] sprouts. Cauliflower. Mushrooms. Onions. Peppers. Parsnips. Okra. Sauerkraut.  Grains  Whole-wheat, whole-grain, or multigrain breads, cereals, or crackers. Rye bread. Cereals with nuts, raisins, or coconut. Bran. Granola. High-fiber cereals. Cornmeal or corn bread. Whole-grain pasta. Wild or brown rice. Quinoa. Popcorn. Buckwheat. Wheat germ.  Meats and other proteins  Tough, fibrous meats with gristle. Fatty meat. Poultry with skin. Fried meat, poultry, or fish. Precooked or cured meat, such as sausages or meat loaves. Lou. Hot dogs. Nuts and chunky nut butter. Dried peas, beans, and lentils. Hummus.  Dairy  Yogurt with fruit, nuts, chocolate, or granola mixed in. Full-fat dairy such as whole milk, ice cream, or sour cream.  Beverages  Caffeinated coffee and teas.  Fats and oils  Avocado. Coconut. Butter.  Sweets and desserts  Desserts, cookies, or candies that contain nuts or coconut. Dried fruit. Jams and preserves with seeds. Marmalade. Any dessert made with fruits or grains that are not recommended.  Seasonings and condiments  Relish. Horseradish. Pickles. Olives.  Other foods  Corn tortilla chips. Soups made with vegetables or grains that are not recommended.  The items listed above may not be a complete list of foods and beverages you should avoid. Contact a dietitian for more information.  Summary  · Most people on a low-fiber eating plan should eat less than 10 grams of fiber a day. Follow recommendations from your health care provider or dietitian about how much fiber you should have each day.  · Always check nutrition facts labels to see the dietary fiber amount in packaged foods. A low-fiber food will  have less than 2 grams of fiber per serving.  · Try to avoid whole grains, raw fruits and vegetables, dried fruit, tough cuts of meat, nuts, and seeds.  · Take a vitamin and mineral supplement as told by your health care provider or dietitian.  This information is not intended to replace advice given to you by your health care provider. Make sure you discuss any questions you have with your health care provider.  Document Revised: 04/22/2021 Document Reviewed: 04/22/2021  Elsevier Patient Education © 2021 Else[employer] Inc[employer]

## 2021-11-16 NOTE — PROGRESS NOTES
Suarabh Velazquez is a 76 y.o. male.     76-year-old male with a diarrhea and negative stool profile for pathogens      The following portions of the patient's history were reviewed and updated as appropriate: allergies, current medications, past family history, past medical history, past social history, past surgical history and problem list.    Review of Systems   Cardiovascular: Negative for chest pain and leg swelling.   Gastrointestinal: Positive for diarrhea. Negative for abdominal distention and abdominal pain.   Musculoskeletal: Positive for arthralgias, back pain and neck pain.       Objective   Physical Exam  Vitals and nursing note reviewed.   Constitutional:       Appearance: Normal appearance.   Cardiovascular:      Rate and Rhythm: Normal rate and regular rhythm.      Pulses: Normal pulses.      Heart sounds: Normal heart sounds.   Pulmonary:      Effort: Pulmonary effort is normal.      Breath sounds: Normal breath sounds.   Abdominal:      General: Abdomen is flat.      Palpations: Abdomen is soft.      Tenderness: There is no abdominal tenderness. There is no guarding.   Musculoskeletal:      Right lower leg: No edema.      Left lower leg: No edema.   Skin:     General: Skin is warm and dry.   Neurological:      General: No focal deficit present.      Mental Status: He is alert and oriented to person, place, and time.   Psychiatric:         Mood and Affect: Mood normal.         Behavior: Behavior normal.         Thought Content: Thought content normal.         Judgment: Judgment normal.         Assessment/Plan   Diagnoses and all orders for this visit:    1. Diarrhea, unspecified type (Primary)  -     CBC & Differential  -     Comprehensive Metabolic Panel  -     TSH    2. Bariatric surgery status  -     Folate  -     Iron  -     Vitamin A  -     Vitamin E  -     Vitamin B12  -     Vitamin B1, Whole Blood  -     TSH    3. Other symptoms and signs concerning food and fluid intake   -      Iron    Other orders  -     cholestyramine light 4 g packet; 1 at supper then every 8-12 hours as needed diarrhea  Dispense: 30 packet; Refill: 2  -     famotidine (PEPCID) 20 MG tablet; Take 1 tablet by mouth 2 (Two) Times a Day.  Dispense: 60 tablet; Refill: 2       Plan above-stopping above medicines

## 2021-11-22 LAB
A-TOCOPHEROL VIT E SERPL-MCNC: 9.9 MG/L (ref 9–29)
ALBUMIN SERPL-MCNC: 3.6 G/DL (ref 3.7–4.7)
ALBUMIN/GLOB SERPL: 1.1 {RATIO} (ref 1.2–2.2)
ALP SERPL-CCNC: 145 IU/L (ref 44–121)
ALT SERPL-CCNC: 60 IU/L (ref 0–44)
AST SERPL-CCNC: 67 IU/L (ref 0–40)
BASOPHILS # BLD AUTO: 0.1 X10E3/UL (ref 0–0.2)
BASOPHILS NFR BLD AUTO: 1 %
BILIRUB SERPL-MCNC: 0.6 MG/DL (ref 0–1.2)
BUN SERPL-MCNC: 21 MG/DL (ref 8–27)
BUN/CREAT SERPL: 12 (ref 10–24)
CALCIUM SERPL-MCNC: 10.3 MG/DL (ref 8.6–10.2)
CHLORIDE SERPL-SCNC: 102 MMOL/L (ref 96–106)
CO2 SERPL-SCNC: 26 MMOL/L (ref 20–29)
CREAT SERPL-MCNC: 1.76 MG/DL (ref 0.76–1.27)
EOSINOPHIL # BLD AUTO: 0.7 X10E3/UL (ref 0–0.4)
EOSINOPHIL NFR BLD AUTO: 8 %
ERYTHROCYTE [DISTWIDTH] IN BLOOD BY AUTOMATED COUNT: 14.4 % (ref 11.6–15.4)
FOLATE SERPL-MCNC: 10.7 NG/ML
GAMMA TOCOPHEROL SERPL-MCNC: 1.3 MG/L (ref 0.5–4.9)
GLOBULIN SER CALC-MCNC: 3.2 G/DL (ref 1.5–4.5)
GLUCOSE SERPL-MCNC: 105 MG/DL (ref 65–99)
HCT VFR BLD AUTO: 47.9 % (ref 37.5–51)
HGB BLD-MCNC: 15.6 G/DL (ref 13–17.7)
IMM GRANULOCYTES # BLD AUTO: 0 X10E3/UL (ref 0–0.1)
IMM GRANULOCYTES NFR BLD AUTO: 0 %
IRON SERPL-MCNC: 80 UG/DL (ref 38–169)
LYMPHOCYTES # BLD AUTO: 1.8 X10E3/UL (ref 0.7–3.1)
LYMPHOCYTES NFR BLD AUTO: 20 %
MCH RBC QN AUTO: 30.7 PG (ref 26.6–33)
MCHC RBC AUTO-ENTMCNC: 32.6 G/DL (ref 31.5–35.7)
MCV RBC AUTO: 94 FL (ref 79–97)
MONOCYTES # BLD AUTO: 0.8 X10E3/UL (ref 0.1–0.9)
MONOCYTES NFR BLD AUTO: 9 %
NEUTROPHILS # BLD AUTO: 5.5 X10E3/UL (ref 1.4–7)
NEUTROPHILS NFR BLD AUTO: 62 %
PLATELET # BLD AUTO: 247 X10E3/UL (ref 150–450)
POTASSIUM SERPL-SCNC: 4.9 MMOL/L (ref 3.5–5.2)
PROT SERPL-MCNC: 6.8 G/DL (ref 6–8.5)
RBC # BLD AUTO: 5.08 X10E6/UL (ref 4.14–5.8)
SODIUM SERPL-SCNC: 142 MMOL/L (ref 134–144)
TSH SERPL DL<=0.005 MIU/L-ACNC: 2.05 UIU/ML (ref 0.45–4.5)
VIT A SERPL-MCNC: 48.8 UG/DL (ref 22–69.5)
VIT B1 BLD-SCNC: 217.8 NMOL/L (ref 66.5–200)
VIT B12 SERPL-MCNC: 621 PG/ML (ref 232–1245)
WBC # BLD AUTO: 9 X10E3/UL (ref 3.4–10.8)

## 2021-11-23 ENCOUNTER — TELEPHONE (OUTPATIENT)
Dept: FAMILY MEDICINE CLINIC | Facility: CLINIC | Age: 76
End: 2021-11-23

## 2021-11-23 NOTE — TELEPHONE ENCOUNTER
Caller: [facility]    Relationship: [facility]    Best call back number: [phone]    What medication are you requesting: UNKNOWN    What are your current symptoms: PATIENT HAS NOT HAD A BOWEL MOVEMENT - TODAY IS DAY #3    How long have you been experiencing symptoms: 3RD DAY      If a prescription is needed, what is your preferred pharmacy and phone number:    PLEASE CALL AND GIVE A VERBAL ORDER!    Additional notes: PATIENTS WIFE COULD NOT REMEMBER WHAT MEDICATION SHE GIVES     PATIENT IS INPATIENT AT HOSPITAL

## 2021-12-15 ENCOUNTER — OFFICE VISIT (OUTPATIENT)
Dept: FAMILY MEDICINE CLINIC | Facility: CLINIC | Age: 76
End: 2021-12-15

## 2021-12-15 VITALS
DIASTOLIC BLOOD PRESSURE: 64 MMHG | OXYGEN SATURATION: 97 % | BODY MASS INDEX: 29.91 KG/M2 | RESPIRATION RATE: 14 BRPM | HEART RATE: 53 BPM | HEIGHT: 67 IN | TEMPERATURE: 98.4 F | SYSTOLIC BLOOD PRESSURE: 118 MMHG

## 2021-12-15 DIAGNOSIS — R53.83 FATIGUE, UNSPECIFIED TYPE: Primary | ICD-10-CM

## 2021-12-15 PROCEDURE — 99213 OFFICE O/P EST LOW 20 MIN: CPT | Performed by: FAMILY MEDICINE

## 2021-12-15 RX ORDER — CETIRIZINE HYDROCHLORIDE 10 MG/1
10 TABLET ORAL DAILY
COMMUNITY
End: 2022-02-01

## 2021-12-15 RX ORDER — MEMANTINE HYDROCHLORIDE 10 MG/1
10 TABLET ORAL DAILY
COMMUNITY
End: 2022-04-04

## 2021-12-15 NOTE — PROGRESS NOTES
Subjective   Samy Velazquez is a 76 y.o. male.     76-year-old male with progressive weakness fatigue cognitive decline      The following portions of the patient's history were reviewed and updated as appropriate: allergies, current medications, past family history, past medical history, past social history, past surgical history and problem list.    Review of Systems   Respiratory: Negative for shortness of breath.    Cardiovascular: Negative for chest pain and leg swelling.        Ischemic heart disease chronic heart failure pacemaker present history of A. fib   Gastrointestinal:        Gastric bypass surgery   Musculoskeletal: Positive for arthralgias and back pain.   Psychiatric/Behavioral: Positive for agitation. The patient is nervous/anxious.         Cognitive decline       Objective   Physical Exam  Vitals and nursing note reviewed.   Eyes:      Extraocular Movements: Extraocular movements intact.      Pupils: Pupils are equal, round, and reactive to light.   Cardiovascular:      Rate and Rhythm: Normal rate. Rhythm irregular.      Pulses: Normal pulses.      Heart sounds: Normal heart sounds.   Pulmonary:      Effort: Pulmonary effort is normal.      Breath sounds: Normal breath sounds.   Musculoskeletal:      Right lower leg: No edema.      Left lower leg: No edema.   Neurological:      Mental Status: He is alert and oriented to person, place, and time.      Comments: Slow neurologic responses but mostly accurate   Psychiatric:         Behavior: Behavior normal.         Assessment/Plan   Diagnoses and all orders for this visit:    1. Fatigue, unspecified type (Primary)       -In the emergency room yesterday with normal CT lab work for age-patient and family advised rehab at [facility] with a cognitive unit 3 months minimal

## 2021-12-30 ENCOUNTER — TELEPHONE (OUTPATIENT)
Dept: FAMILY MEDICINE CLINIC | Facility: CLINIC | Age: 76
End: 2021-12-30

## 2022-01-07 RX ORDER — METOLAZONE 2.5 MG/1
TABLET ORAL
Qty: 30 TABLET | Refills: 2 | Status: SHIPPED | OUTPATIENT
Start: 2022-01-07 | End: 2022-02-01

## 2022-01-07 NOTE — TELEPHONE ENCOUNTER
Rx Refill Note  Requested Prescriptions     Pending Prescriptions Disp Refills   • metOLazone (ZAROXOLYN) 2.5 MG tablet [Pharmacy Med Name: METOLAZONE 2.5 MG TABLET] 30 tablet 2     Sig: TAKE 1 TABLET EVERY DAY AT NOON      Last office visit with prescribing clinician: 12/15/2021      Next office visit with prescribing clinician: Visit date not found            Yue Dougherty MA  01/07/22, 14:59 CST

## 2022-01-14 ENCOUNTER — TELEPHONE (OUTPATIENT)
Dept: FAMILY MEDICINE CLINIC | Facility: CLINIC | Age: 77
End: 2022-01-14

## 2022-01-14 NOTE — TELEPHONE ENCOUNTER
[facility] nurse called to start patient on House suppliment med pass twice daily, zinc twice daily x14 days, Vit C 500mg twice daily x14 days and Multi vitamin daily for stage 2 on buttocks.

## 2022-01-17 DIAGNOSIS — R52 PAIN: Primary | ICD-10-CM

## 2022-01-17 RX ORDER — HYDROCODONE BITARTRATE AND ACETAMINOPHEN 5; 325 MG/1; MG/1
1 TABLET ORAL 2 TIMES DAILY
Qty: 60 TABLET | Refills: 0 | Status: SHIPPED | OUTPATIENT
Start: 2022-01-17 | End: 2022-02-01

## 2022-01-17 RX ORDER — OXYCODONE AND ACETAMINOPHEN 10; 325 MG/1; MG/1
1 TABLET ORAL EVERY 6 HOURS PRN
Refills: 0 | Status: CANCELLED | OUTPATIENT
Start: 2022-01-17

## 2022-01-17 NOTE — TELEPHONE ENCOUNTER
Rx Refill Note  Requested Prescriptions     Pending Prescriptions Disp Refills   • oxyCODONE-acetaminophen (PERCOCET)  MG per tablet  0     Sig: Take 1 tablet by mouth Every 6 (Six) Hours As Needed for Moderate Pain .      Last office visit with prescribing clinician: 12/15/2021      Next office visit with prescribing clinician: 1/17/2022            Yue Dougherty MA  01/17/22, 11:13 CST

## 2022-01-17 NOTE — TELEPHONE ENCOUNTER
CALLED AND SPOKE WITH GABINO @ [facility]-STATES HE HAD BEEN TAKING PERCOCET BECAUSE HE WAS OUT OF [facility]. STATES SHE JUST RECEIVED NURSING HOME FAX INDICATING WHERE HE IS NOT SUPPOSED TO BE TAKING ANYMORE.  I INFORMED WOULD CANCEL PERCOCET REQUEST SINCE [facility] WAS SENT IN PREVIOUSLY THIS MORNING. VERBALIZED UNDERSTANDING.

## 2022-01-17 NOTE — TELEPHONE ENCOUNTER
Rx Refill Note  Requested Prescriptions     Pending Prescriptions Disp Refills   • HYDROcodone-acetaminophen (Norco) 5-325 MG per tablet 60 tablet 2     Sig: Take 1 tablet by mouth 2 (Two) Times a Day.      Last office visit with prescribing clinician: 12/15/2021      Next office visit with prescribing clinician: Visit date not found            Carolynn Galeano Rep  01/17/22, 10:22 CST

## 2022-01-17 NOTE — TELEPHONE ENCOUNTER
Caller: [facility]    Relationship:     Best call back number: [phone]    Requested Prescriptions:   Requested Prescriptions     Pending Prescriptions Disp Refills   • oxyCODONE-acetaminophen (PERCOCET)  MG per tablet       Sig: Take 1 tablet by mouth Every 6 (Six) Hours As Needed for Moderate Pain .        Pharmacy where request should be sent:      [facility]    FAX: [phone]    Additional details provided by patient:      Completely out.     Does the patient have less than a 3 day supply:  [x] Yes  [] No    Sameer Akins MA   01/17/22 09:39 CST

## 2022-01-24 ENCOUNTER — TELEMEDICINE (OUTPATIENT)
Dept: FAMILY MEDICINE CLINIC | Facility: CLINIC | Age: 77
End: 2022-01-24

## 2022-01-24 DIAGNOSIS — R11.10 NON-INTRACTABLE VOMITING, PRESENCE OF NAUSEA NOT SPECIFIED, UNSPECIFIED VOMITING TYPE: ICD-10-CM

## 2022-01-24 DIAGNOSIS — R13.10 DYSPHAGIA, UNSPECIFIED TYPE: ICD-10-CM

## 2022-01-24 DIAGNOSIS — J06.9 UPPER RESPIRATORY TRACT INFECTION, UNSPECIFIED TYPE: Primary | ICD-10-CM

## 2022-01-24 PROCEDURE — 99213 OFFICE O/P EST LOW 20 MIN: CPT | Performed by: NURSE PRACTITIONER

## 2022-01-24 RX ORDER — AZITHROMYCIN 250 MG/1
TABLET, FILM COATED ORAL
Qty: 6 TABLET | Refills: 0 | Status: SHIPPED | OUTPATIENT
Start: 2022-01-24 | End: 2022-03-01

## 2022-01-24 RX ORDER — PREDNISONE 20 MG/1
TABLET ORAL
Qty: 9 TABLET | Refills: 0 | Status: SHIPPED | OUTPATIENT
Start: 2022-01-24 | End: 2022-03-01

## 2022-01-24 NOTE — PROGRESS NOTES
CC: URI and trouble swallowing    History:  Samy Velazquez is a 76 y.o. male who presents today for evaluation of the above problems.      Patient is at [facility]. Reports that he started feeling sick with a headache last week, mid-week. Developed a cough and congestion on Saturday. O2 sat was 94% today.  He denies any shortness of breath. Rapid Covid test was negative, and send off has been sent to [facility].     Has been experiencing difficulty with emesis immediately after swallowing.  States that he is able to swallow, but everything comes right back up.        HPI  ROS:  Review of Systems   HENT: Positive for congestion.    Respiratory: Positive for cough. Negative for shortness of breath.    Neurological: Positive for headaches.       Allergies   Allergen Reactions   • Iodine Hives   • Clindamycin/Lincomycin Diarrhea   • Penicillins Rash   • Vancomycin Nausea And Vomiting     Past Medical History:   Diagnosis Date   • Arthritis    • Coronary artery disease    • Hyperlipidemia    • Hypertensive heart disease with heart failure (HCC) 3/29/2021   • Hypothyroidism 4/1/2021   • Kidney stone    • Major depressive disorder, recurrent, moderate (Formerly [facility]) 3/29/2021   • Morbidly obese (Formerly [facility]) 9/18/2020     Past Surgical History:   Procedure Laterality Date   • CARDIAC DEFIBRILLATOR PLACEMENT     • CARDIAC DEFIBRILLATOR PLACEMENT N/A 2008   • CARDIAC SURGERY     • ENDOSCOPY N/A 10/30/2020    Procedure: ESOPHAGOGASTRODUODENOSCOPY WITH ANESTHESIA;  Surgeon: Brent Stanley MD;  Location: [facility];  Service: Gastroenterology;  Laterality: N/A;  pre dysphagia  post post op gastric surgery  dr jose manuel smith   • ESOPHAGUS SURGERY     • KNEE SURGERY     • TOTAL SHOULDER REPLACEMENT       Family History   Problem Relation Age of Onset   • Hypertension Mother    • Stroke Mother    • Cancer Father    • Hypertension Father    • Diabetes Sister    • Hypertension Sister    • Crohn's disease Daughter    • No Known Problems Son    • No Known  Problems Maternal Grandmother    • No Known Problems Maternal Grandfather    • No Known Problems Paternal Grandmother    • No Known Problems Paternal Grandfather    • Hypertension Sister    • Hypertension Sister    • Hypertension Sister    • No Known Problems Son       reports that he has never smoked. He has never used smokeless tobacco. He reports that he does not drink alcohol and does not use drugs.      Current Outpatient Medications:   •  allopurinol (ZYLOPRIM) 300 MG tablet, Take 1 tablet by mouth Daily., Disp: 90 tablet, Rfl: 3  •  ascorbic acid (VITAMIN C) 500 MG tablet, Take 500 mg by mouth Daily., Disp: , Rfl:   •  aspirin 81 MG EC tablet, Take 81 mg by mouth Daily., Disp: , Rfl:   •  atorvastatin (LIPITOR) 40 MG tablet, TAKE 1 TABLET BY MOUTH EVERY DAY EVERY NIGHT, Disp: 90 tablet, Rfl: 3  •  azithromycin (Zithromax) 250 MG tablet, Take 2 tablets the first day, then 1 tablet daily for 4 days., Disp: 6 tablet, Rfl: 0  •  cephalexin (KEFLEX) 250 MG capsule, Take 250 mg by mouth 2 (Two) Times a Day., Disp: , Rfl:   •  cetirizine (zyrTEC) 10 MG tablet, Take 10 mg by mouth Daily., Disp: , Rfl:   •  Cholecalciferol (VITAMIN D3) 2000 units capsule, Take 2,000 Units by mouth Daily., Disp: , Rfl:   •  cholestyramine light 4 g packet, 1 at supper then every 8-12 hours as needed diarrhea, Disp: 30 packet, Rfl: 2  •  clopidogrel (PLAVIX) 75 MG tablet, TAKE 1 TABLET BY MOUTH EVERY DAY, Disp: 90 tablet, Rfl: 3  •  famotidine (PEPCID) 20 MG tablet, Take 1 tablet by mouth 2 (Two) Times a Day., Disp: 60 tablet, Rfl: 2  •  FLUoxetine (PROzac) 40 MG capsule, Take 1 capsule by mouth Daily. (Patient taking differently: Take 40 mg by mouth Daily. 2 tablets), Disp: 90 capsule, Rfl: 3  •  furosemide (LASIX) 20 MG tablet, Take 1 tablet with 2 lbs weight gain.  Take 2 tablets with over 2 lbs weight gain, Disp: 180 tablet, Rfl: 2  •  guaiFENesin 300 MG/15ML solution, Take 20 mL by mouth Every 4 (Four) Hours As Needed (cough) for  up to 7 days., Disp: 840 mL, Rfl: 0  •  HYDROcodone-acetaminophen (Norco) 5-325 MG per tablet, Take 1 tablet by mouth 2 (Two) Times a Day., Disp: 60 tablet, Rfl: 0  •  isosorbide mononitrate (IMDUR) 60 MG 24 hr tablet, TAKE 1 TABLET BY MOUTH TWICE A DAY, Disp: 180 tablet, Rfl: 3  •  levothyroxine (SYNTHROID, LEVOTHROID) 50 MCG tablet, TAKE 1 TABLET BY MOUTH EVERY DAY, Disp: 90 tablet, Rfl: 3  •  Loperamide HCl (IMODIUM PO), Take  by mouth., Disp: , Rfl:   •  magnesium oxide (MAG-OX) 400 MG tablet, Take 1 tablet by mouth 2 (Two) Times a Day., Disp: 180 tablet, Rfl: 1  •  memantine (NAMENDA) 10 MG tablet, Take 10 mg by mouth Daily., Disp: , Rfl:   •  metOLazone (ZAROXOLYN) 2.5 MG tablet, TAKE 1 TABLET EVERY DAY AT NOON, Disp: 30 tablet, Rfl: 2  •  metoprolol succinate XL (TOPROL-XL) 25 MG 24 hr tablet, Take 12.5 mg by mouth Daily., Disp: , Rfl:   •  mexiletine (MEXITIL) 150 MG capsule, 1 tablet in the morning, 1 tablet at noon, and two tablets at bedtime., Disp: 360 capsule, Rfl: 3  •  multivitamin (MULTIVITAMIN PO), Take  by mouth Daily., Disp: , Rfl:   •  nitroglycerin (NITROSTAT) 0.4 MG SL tablet, PLEASE SEE ATTACHED FOR DETAILED DIRECTIONS, Disp: 25 tablet, Rfl: 2  •  ondansetron ODT (ZOFRAN-ODT) 4 MG disintegrating tablet, Place 1 tablet on the tongue Every 4 (Four) Hours As Needed for Nausea or Vomiting., Disp: 10 tablet, Rfl: 0  •  potassium chloride (MICRO-K) 10 MEQ CR capsule, Take 10 mEq by mouth 3 (Three) Times a Day., Disp: , Rfl:   •  predniSONE (DELTASONE) 20 MG tablet, Take two tablets daily for 3 days and one tablet daily until gone., Disp: 9 tablet, Rfl: 0  •  zinc sulfate (ZINCATE) 220 (50 Zn) MG capsule, Take 220 mg by mouth 2 (Two) Times a Day., Disp: , Rfl:     OBJECTIVE:  There were no vitals taken for this visit.   Physical Exam  Constitutional:       Appearance: He is well-developed.   Pulmonary:      Effort: Pulmonary effort is normal.   Neurological:      Mental Status: He is alert and  oriented to person, place, and time.   Psychiatric:         Behavior: Behavior normal.         Assessment/Plan    Diagnoses and all orders for this visit:    1. Upper respiratory tract infection, unspecified type (Primary)  -     azithromycin (Zithromax) 250 MG tablet; Take 2 tablets the first day, then 1 tablet daily for 4 days.  Dispense: 6 tablet; Refill: 0  -     predniSONE (DELTASONE) 20 MG tablet; Take two tablets daily for 3 days and one tablet daily until gone.  Dispense: 9 tablet; Refill: 0  -     guaiFENesin 300 MG/15ML solution; Take 20 mL by mouth Every 4 (Four) Hours As Needed (cough) for up to 7 days.  Dispense: 840 mL; Refill: 0    2. Non-intractable vomiting, presence of nausea not specified, unspecified vomiting type  -     XR Chest PA & Lateral; Future  -     FL Upper GI Single Contrast Without KUB; Future    3. Dysphagia, unspecified type  -     FL Upper GI Single Contrast Without KUB; Future      This visit was completed via secure Zoom connection.     An After Visit Summary was printed and given to the patient at discharge.  Return for Next scheduled follow up.       Xochitl Doshi, GEN 1/24/22    Electronically signed.

## 2022-01-27 ENCOUNTER — HOSPITAL ENCOUNTER (EMERGENCY)
Facility: HOSPITAL | Age: 77
Discharge: NURSING, REHAB OR LONG-TERM CARE | End: 2022-01-27
Attending: EMERGENCY MEDICINE | Admitting: EMERGENCY MEDICINE

## 2022-01-27 ENCOUNTER — APPOINTMENT (OUTPATIENT)
Dept: GENERAL RADIOLOGY | Facility: HOSPITAL | Age: 77
End: 2022-01-27

## 2022-01-27 VITALS
DIASTOLIC BLOOD PRESSURE: 76 MMHG | HEART RATE: 58 BPM | OXYGEN SATURATION: 96 % | HEIGHT: 67 IN | BODY MASS INDEX: 29.35 KG/M2 | TEMPERATURE: 97.8 F | SYSTOLIC BLOOD PRESSURE: 104 MMHG | WEIGHT: 187 LBS | RESPIRATION RATE: 18 BRPM

## 2022-01-27 DIAGNOSIS — U07.1 COVID-19: Primary | ICD-10-CM

## 2022-01-27 LAB
ALBUMIN SERPL-MCNC: 2.8 G/DL (ref 3.5–5.2)
ALBUMIN/GLOB SERPL: 0.9 G/DL
ALP SERPL-CCNC: 120 U/L (ref 39–117)
ALT SERPL W P-5'-P-CCNC: 16 U/L (ref 1–41)
ANION GAP SERPL CALCULATED.3IONS-SCNC: 8 MMOL/L (ref 5–15)
ARTERIAL PATENCY WRIST A: POSITIVE
AST SERPL-CCNC: 31 U/L (ref 1–40)
ATMOSPHERIC PRESS: 756 MMHG
BASE EXCESS BLDA CALC-SCNC: 4.9 MMOL/L (ref 0–2)
BASOPHILS # BLD AUTO: 0.03 10*3/MM3 (ref 0–0.2)
BASOPHILS NFR BLD AUTO: 0.4 % (ref 0–1.5)
BDY SITE: ABNORMAL
BILIRUB SERPL-MCNC: 0.6 MG/DL (ref 0–1.2)
BODY TEMPERATURE: 37 C
BUN SERPL-MCNC: 30 MG/DL (ref 8–23)
BUN/CREAT SERPL: 15.7 (ref 7–25)
CALCIUM SPEC-SCNC: 9.1 MG/DL (ref 8.6–10.5)
CHLORIDE SERPL-SCNC: 103 MMOL/L (ref 98–107)
CO2 SERPL-SCNC: 28 MMOL/L (ref 22–29)
CREAT SERPL-MCNC: 1.91 MG/DL (ref 0.76–1.27)
CRP SERPL-MCNC: 1.03 MG/DL (ref 0–0.5)
D-LACTATE SERPL-SCNC: 1.9 MMOL/L (ref 0.5–2)
DEPRECATED RDW RBC AUTO: 50.2 FL (ref 37–54)
EOSINOPHIL # BLD AUTO: 0.26 10*3/MM3 (ref 0–0.4)
EOSINOPHIL NFR BLD AUTO: 3.3 % (ref 0.3–6.2)
ERYTHROCYTE [DISTWIDTH] IN BLOOD BY AUTOMATED COUNT: 14.6 % (ref 12.3–15.4)
GFR SERPL CREATININE-BSD FRML MDRD: 35 ML/MIN/1.73
GLOBULIN UR ELPH-MCNC: 3 GM/DL
GLUCOSE SERPL-MCNC: 108 MG/DL (ref 65–99)
HCO3 BLDA-SCNC: 28.2 MMOL/L (ref 20–26)
HCT VFR BLD AUTO: 46.9 % (ref 37.5–51)
HGB BLD-MCNC: 15.1 G/DL (ref 13–17.7)
HOLD SPECIMEN: NORMAL
IMM GRANULOCYTES # BLD AUTO: 0.04 10*3/MM3 (ref 0–0.05)
IMM GRANULOCYTES NFR BLD AUTO: 0.5 % (ref 0–0.5)
LYMPHOCYTES # BLD AUTO: 2.18 10*3/MM3 (ref 0.7–3.1)
LYMPHOCYTES NFR BLD AUTO: 27.3 % (ref 19.6–45.3)
Lab: ABNORMAL
MCH RBC QN AUTO: 30.2 PG (ref 26.6–33)
MCHC RBC AUTO-ENTMCNC: 32.2 G/DL (ref 31.5–35.7)
MCV RBC AUTO: 93.8 FL (ref 79–97)
MODALITY: ABNORMAL
MONOCYTES # BLD AUTO: 0.81 10*3/MM3 (ref 0.1–0.9)
MONOCYTES NFR BLD AUTO: 10.1 % (ref 5–12)
NEUTROPHILS NFR BLD AUTO: 4.67 10*3/MM3 (ref 1.7–7)
NEUTROPHILS NFR BLD AUTO: 58.4 % (ref 42.7–76)
NRBC BLD AUTO-RTO: 0 /100 WBC (ref 0–0.2)
PCO2 BLDA: 36.5 MM HG (ref 35–45)
PCO2 TEMP ADJ BLD: 36.5 MM HG (ref 35–45)
PH BLDA: 7.5 PH UNITS (ref 7.35–7.45)
PH, TEMP CORRECTED: 7.5 PH UNITS (ref 7.35–7.45)
PLATELET # BLD AUTO: 199 10*3/MM3 (ref 140–450)
PMV BLD AUTO: 9.9 FL (ref 6–12)
PO2 BLDA: 69.6 MM HG (ref 83–108)
PO2 TEMP ADJ BLD: 69.6 MM HG (ref 83–108)
POTASSIUM SERPL-SCNC: 3.8 MMOL/L (ref 3.5–5.2)
PROCALCITONIN SERPL-MCNC: 0.14 NG/ML (ref 0–0.25)
PROT SERPL-MCNC: 5.8 G/DL (ref 6–8.5)
RBC # BLD AUTO: 5 10*6/MM3 (ref 4.14–5.8)
SAO2 % BLDCOA: 95.5 % (ref 94–99)
SARS-COV-2 RNA PNL SPEC NAA+PROBE: DETECTED
SODIUM SERPL-SCNC: 139 MMOL/L (ref 136–145)
VENTILATOR MODE: ABNORMAL
WBC NRBC COR # BLD: 7.99 10*3/MM3 (ref 3.4–10.8)
WHOLE BLOOD HOLD SPECIMEN: NORMAL
WHOLE BLOOD HOLD SPECIMEN: NORMAL

## 2022-01-27 PROCEDURE — 85025 COMPLETE CBC W/AUTO DIFF WBC: CPT | Performed by: EMERGENCY MEDICINE

## 2022-01-27 PROCEDURE — 87040 BLOOD CULTURE FOR BACTERIA: CPT | Performed by: EMERGENCY MEDICINE

## 2022-01-27 PROCEDURE — 86140 C-REACTIVE PROTEIN: CPT | Performed by: EMERGENCY MEDICINE

## 2022-01-27 PROCEDURE — 87150 DNA/RNA AMPLIFIED PROBE: CPT | Performed by: EMERGENCY MEDICINE

## 2022-01-27 PROCEDURE — 93005 ELECTROCARDIOGRAM TRACING: CPT | Performed by: EMERGENCY MEDICINE

## 2022-01-27 PROCEDURE — 87635 SARS-COV-2 COVID-19 AMP PRB: CPT | Performed by: EMERGENCY MEDICINE

## 2022-01-27 PROCEDURE — 84145 PROCALCITONIN (PCT): CPT | Performed by: EMERGENCY MEDICINE

## 2022-01-27 PROCEDURE — 83605 ASSAY OF LACTIC ACID: CPT | Performed by: EMERGENCY MEDICINE

## 2022-01-27 PROCEDURE — 82803 BLOOD GASES ANY COMBINATION: CPT

## 2022-01-27 PROCEDURE — 80053 COMPREHEN METABOLIC PANEL: CPT | Performed by: EMERGENCY MEDICINE

## 2022-01-27 PROCEDURE — 87147 CULTURE TYPE IMMUNOLOGIC: CPT | Performed by: EMERGENCY MEDICINE

## 2022-01-27 PROCEDURE — 99284 EMERGENCY DEPT VISIT MOD MDM: CPT

## 2022-01-27 PROCEDURE — 71045 X-RAY EXAM CHEST 1 VIEW: CPT

## 2022-01-27 PROCEDURE — 36600 WITHDRAWAL OF ARTERIAL BLOOD: CPT

## 2022-01-27 PROCEDURE — 93010 ELECTROCARDIOGRAM REPORT: CPT | Performed by: INTERNAL MEDICINE

## 2022-01-27 RX ORDER — SODIUM CHLORIDE 0.9 % (FLUSH) 0.9 %
10 SYRINGE (ML) INJECTION AS NEEDED
Status: DISCONTINUED | OUTPATIENT
Start: 2022-01-27 | End: 2022-01-27 | Stop reason: HOSPADM

## 2022-01-27 RX ADMIN — SODIUM CHLORIDE, POTASSIUM CHLORIDE, SODIUM LACTATE AND CALCIUM CHLORIDE 500 ML: 600; 310; 30; 20 INJECTION, SOLUTION INTRAVENOUS at 16:39

## 2022-01-28 LAB
BACTERIA BLD CULT: ABNORMAL
BOTTLE TYPE: ABNORMAL
QT INTERVAL: 468 MS
QTC INTERVAL: 526 MS

## 2022-01-29 LAB
BACTERIA SPEC AEROBE CULT: ABNORMAL
GRAM STN SPEC: ABNORMAL
ISOLATED FROM: ABNORMAL

## 2022-02-01 LAB — BACTERIA SPEC AEROBE CULT: NORMAL

## 2022-02-08 ENCOUNTER — TELEPHONE (OUTPATIENT)
Dept: FAMILY MEDICINE CLINIC | Facility: CLINIC | Age: 77
End: 2022-02-08

## 2022-02-08 NOTE — TELEPHONE ENCOUNTER
Patients wife called and stated that she would like to speak to PCP regarding patient.     Did not provide reason, please advise.    Callback [phone]

## 2022-02-10 DIAGNOSIS — M19.90 OSTEOARTHRITIS, UNSPECIFIED OSTEOARTHRITIS TYPE, UNSPECIFIED SITE: Primary | ICD-10-CM

## 2022-02-25 ENCOUNTER — TELEPHONE (OUTPATIENT)
Dept: FAMILY MEDICINE CLINIC | Facility: CLINIC | Age: 77
End: 2022-02-25

## 2022-02-25 RX ORDER — CEFDINIR 300 MG/1
300 CAPSULE ORAL 2 TIMES DAILY
Qty: 14 CAPSULE | Refills: 0 | Status: SHIPPED | OUTPATIENT
Start: 2022-02-25 | End: 2022-03-04

## 2022-02-25 NOTE — TELEPHONE ENCOUNTER
Informed Sheri Schaeffer to get wound care involved per Dr. Salinas and he gave me a verbal to start him on Omnicef 300mg twice daily for 7 days.

## 2022-02-25 NOTE — TELEPHONE ENCOUNTER
Sheri from [facility] called because patient's right foot, 2nd toe is red, inflamed with an ulcer and looks like its trying to get infected.  Asking for a Rx to be sent to [facility].

## 2022-02-25 NOTE — TELEPHONE ENCOUNTER
I don't see an actual order either but when I open the result under imagings and I click the blue link where it says repeat order Req, it has your name on the order dated 02/16/2022.

## 2022-03-01 ENCOUNTER — OFFICE VISIT (OUTPATIENT)
Dept: FAMILY MEDICINE CLINIC | Facility: CLINIC | Age: 77
End: 2022-03-01

## 2022-03-01 ENCOUNTER — IMMUNIZATION (OUTPATIENT)
Dept: FAMILY MEDICINE CLINIC | Facility: CLINIC | Age: 77
End: 2022-03-01

## 2022-03-01 VITALS
SYSTOLIC BLOOD PRESSURE: 104 MMHG | TEMPERATURE: 97.8 F | WEIGHT: 187 LBS | OXYGEN SATURATION: 97 % | BODY MASS INDEX: 29.35 KG/M2 | HEIGHT: 67 IN | RESPIRATION RATE: 18 BRPM | DIASTOLIC BLOOD PRESSURE: 60 MMHG | HEART RATE: 59 BPM

## 2022-03-01 DIAGNOSIS — Z23 NEED FOR COVID-19 VACCINE: Primary | ICD-10-CM

## 2022-03-01 DIAGNOSIS — E87.6 HYPOKALEMIA: ICD-10-CM

## 2022-03-01 DIAGNOSIS — E55.9 VITAMIN D DEFICIENCY: ICD-10-CM

## 2022-03-01 DIAGNOSIS — E78.2 MIXED HYPERLIPIDEMIA: ICD-10-CM

## 2022-03-01 DIAGNOSIS — R53.83 FATIGUE, UNSPECIFIED TYPE: Primary | ICD-10-CM

## 2022-03-01 DIAGNOSIS — Z00.00 MEDICARE ANNUAL WELLNESS VISIT, SUBSEQUENT: ICD-10-CM

## 2022-03-01 DIAGNOSIS — Z12.5 SPECIAL SCREENING FOR MALIGNANT NEOPLASM OF PROSTATE: ICD-10-CM

## 2022-03-01 DIAGNOSIS — E11.43 TYPE 2 DIABETES MELLITUS WITH DIABETIC AUTONOMIC NEUROPATHY, WITHOUT LONG-TERM CURRENT USE OF INSULIN: ICD-10-CM

## 2022-03-01 DIAGNOSIS — R73.9 HYPERGLYCEMIA: ICD-10-CM

## 2022-03-01 PROCEDURE — 0054A COVID-19 (PFIZER) 12+ YRS: CPT | Performed by: FAMILY MEDICINE

## 2022-03-01 PROCEDURE — 1125F AMNT PAIN NOTED PAIN PRSNT: CPT | Performed by: FAMILY MEDICINE

## 2022-03-01 PROCEDURE — 1160F RVW MEDS BY RX/DR IN RCRD: CPT | Performed by: FAMILY MEDICINE

## 2022-03-01 PROCEDURE — G0439 PPPS, SUBSEQ VISIT: HCPCS | Performed by: FAMILY MEDICINE

## 2022-03-01 PROCEDURE — 1170F FXNL STATUS ASSESSED: CPT | Performed by: FAMILY MEDICINE

## 2022-03-01 PROCEDURE — 91305 COVID-19 (PFIZER) 12+ YRS: CPT | Performed by: FAMILY MEDICINE

## 2022-03-01 RX ORDER — BUPROPION HYDROCHLORIDE 150 MG/1
150 TABLET ORAL EVERY MORNING
Qty: 90 TABLET | Refills: 3 | Status: SHIPPED | OUTPATIENT
Start: 2022-03-01 | End: 2022-11-28

## 2022-03-01 RX ORDER — LISINOPRIL 2.5 MG/1
TABLET ORAL
COMMUNITY
Start: 2022-01-01 | End: 2022-03-24

## 2022-03-01 NOTE — PATIENT INSTRUCTIONS
You are due for Shingrix vaccination series ( the newest shingles vaccine).  It is a two shot series spaced 2-6 months apart. Please get this vaccine series started at your earliest convenience at your local pharmacy to help avoid shingles outbreak. It is more effective than the old Zostavax vaccine and is recommended even if you have had the Zostavax vaccine in the past.  Once the Shingrix series is completed, it does not need to be repeated.   For more information, please look at the website below:  [facility] Shingrix Vaccine Information      Medicare Wellness  Personal Prevention Plan of Service     Date of Office Visit:    Encounter Provider:  Garret Salinas MD  Place of Service:  [facility]  Patient Name: Samy Velazquez  :  1945    As part of the Medicare Wellness portion of your visit today, we are providing you with this personalized preventive plan of services (PPPS). This plan is based upon recommendations of the [place] Preventive Services Task Force (USPSTF) and the Advisory Committee on Immunization Practices (ACIP).    This lists the preventive care services that should be considered, and provides dates of when you are due. Items listed as completed are up-to-date and do not require any further intervention.    Health Maintenance   Topic Date Due   • ZOSTER VACCINE (1 of 2) Never done   • COVID-19 Vaccine (3 - Booster for Moderna series) 2021   • HEMOGLOBIN A1C  2021   • ANNUAL WELLNESS VISIT  2022   • DIABETIC FOOT EXAM  2022   • URINE MICROALBUMIN  2022   • LIPID PANEL  2022   • DIABETIC EYE EXAM  2022   • HEPATITIS C SCREENING  Completed   • INFLUENZA VACCINE  Completed   • Pneumococcal Vaccine 65+  Completed   • TDAP/TD VACCINES  Discontinued   • COLORECTAL CANCER SCREENING  Discontinued       No orders of the defined types were placed in this encounter.      No follow-ups on file.        Fall Prevention in  the Home, Adult  Falls can cause injuries. They can happen to people of all ages. There are many things you can do to make your home safe and to help prevent falls. Ask for help when making these changes, if needed.  What actions can I take to prevent falls?  General Instructions  · Use good lighting in all rooms. Replace any light bulbs that burn out.  · Turn on the lights when you go into a dark area. Use night-lights.  · Keep items that you use often in easy-to-reach places. Lower the shelves around your home if necessary.  · Set up your furniture so you have a clear path. Avoid moving your furniture around.  · Do not have throw rugs and other things on the floor that can make you trip.  · Avoid walking on wet floors.  · If any of your floors are uneven, fix them.  · Add color or contrast paint or tape to clearly doretha and help you see:  ? Any grab bars or handrails.  ? First and last steps of stairways.  ? Where the edge of each step is.  · If you use a stepladder:  ? Make sure that it is fully opened. Do not climb a closed stepladder.  ? Make sure that both sides of the stepladder are locked into place.  ? Ask someone to hold the stepladder for you while you use it.  · If there are any pets around you, be aware of where they are.  What can I do in the bathroom?         · Keep the floor dry. Clean up any water that spills onto the floor as soon as it happens.  · Remove soap buildup in the tub or shower regularly.  · Use non-skid mats or decals on the floor of the tub or shower.  · Attach bath mats securely with double-sided, non-slip rug tape.  · If you need to sit down in the shower, use a plastic, non-slip stool.  · Install grab bars by the toilet and in the tub and shower. Do not use towel bars as grab bars.  What can I do in the bedroom?  · Make sure that you have a light by your bed that is easy to reach.  · Do not use any sheets or blankets that are too big for your bed. They should not hang down onto the  floor.  · Have a firm chair that has side arms. You can use this for support while you get dressed.  What can I do in the kitchen?  · Clean up any spills right away.  · If you need to reach something above you, use a strong step stool that has a grab bar.  · Keep electrical cords out of the way.  · Do not use floor polish or wax that makes floors slippery. If you must use wax, use non-skid floor wax.  What can I do with my stairs?  · Do not leave any items on the stairs.  · Make sure that you have a light switch at the top of the stairs and the bottom of the stairs. If you do not have them, ask someone to add them for you.  · Make sure that there are handrails on both sides of the stairs, and use them. Fix handrails that are broken or loose. Make sure that handrails are as long as the stairways.  · Install non-slip stair treads on all stairs in your home.  · Avoid having throw rugs at the top or bottom of the stairs. If you do have throw rugs, attach them to the floor with carpet tape.  · Choose a carpet that does not hide the edge of the steps on the stairway.  · Check any carpeting to make sure that it is firmly attached to the stairs. Fix any carpet that is loose or worn.  What can I do on the outside of my home?  · Use bright outdoor lighting.  · Regularly fix the edges of walkways and driveways and fix any cracks.  · Remove anything that might make you trip as you walk through a door, such as a raised step or threshold.  · Trim any bushes or trees on the path to your home.  · Regularly check to see if handrails are loose or broken. Make sure that both sides of any steps have handrails.  · Install guardrails along the edges of any raised decks and porches.  · Clear walking paths of anything that might make someone trip, such as tools or rocks.  · Have any leaves, snow, or ice cleared regularly.  · Use sand or salt on walking paths during winter.  · Clean up any spills in your garage right away. This includes  grease or oil spills.  What other actions can I take?  · Wear shoes that:  ? Have a low heel. Do not wear high heels.  ? Have rubber bottoms.  ? Are comfortable and fit you well.  ? Are closed at the toe. Do not wear open-toe sandals.  · Use tools that help you move around (mobility aids) if they are needed. These include:  ? Canes.  ? Walkers.  ? Scooters.  ? Crutches.  · Review your medicines with your doctor. Some medicines can make you feel dizzy. This can increase your chance of falling.  Ask your doctor what other things you can do to help prevent falls.  Where to find more information  · Centers for Disease Control and Prevention, STEADI: https://cdc.gov  · [employer]: https://rv0eexx.wes.nih.gov  Contact a doctor if:  · You are afraid of falling at home.  · You feel weak, drowsy, or dizzy at home.  · You fall at home.  Summary  · There are many simple things that you can do to make your home safe and to help prevent falls.  · Ways to make your home safe include removing tripping hazards and installing grab bars in the bathroom.  · Ask for help when making these changes in your home.  This information is not intended to replace advice given to you by your health care provider. Make sure you discuss any questions you have with your health care provider.  Document Revised: 04/09/2020 Document Reviewed: 08/02/2018  Elsevier Patient Education © 2021 [employer]

## 2022-03-01 NOTE — PROGRESS NOTES
The ABCs of the Annual Wellness Visit  Subsequent Medicare Wellness Visit    Chief Complaint   Patient presents with   • Medicare Wellness-subsequent     Not fasting, cereal for breakfast   76-year-old diabetic ischemic heart disease patient with history of debilitation DJD of spine acute left toes  Subjective    History of Present Illness:  Samy Velazquez is a 76 y.o. male who presents for a Subsequent Medicare Wellness Visit.    The following portions of the patient's history were reviewed and   updated as appropriate: allergies, current medications, past family history, past medical history, past social history, past surgical history and problem list.    Compared to one year ago, the patient feels his physical   health is worse.    Compared to one year ago, the patient feels his mental   health is worse.    Recent Hospitalizations:  He was not admitted to the hospital during the last year.       Current Medical Providers:  Patient Care Team:  Garret Salinas MD as PCP - General    Outpatient Medications Prior to Visit   Medication Sig Dispense Refill   • acetaminophen (Tylenol 8 Hour Arthritis Pain) 650 MG 8 hr tablet Take 650 mg by mouth Daily.     • aspirin 81 MG EC tablet Take 81 mg by mouth Daily.     • atorvastatin (LIPITOR) 40 MG tablet TAKE 1 TABLET BY MOUTH EVERY DAY EVERY NIGHT 90 tablet 3   • cefdinir (OMNICEF) 300 MG capsule Take 1 capsule by mouth 2 (Two) Times a Day for 7 days. 14 capsule 0   • cephalexin (KEFLEX) 250 MG capsule Take 250 mg by mouth 2 (Two) Times a Day.     • Cholecalciferol (VITAMIN D3) 2000 units capsule Take 2,000 Units by mouth Daily.     • clopidogrel (PLAVIX) 75 MG tablet TAKE 1 TABLET BY MOUTH EVERY DAY 90 tablet 3   • famotidine (PEPCID) 20 MG tablet Take 1 tablet by mouth 2 (Two) Times a Day. 60 tablet 2   • FLUoxetine (PROzac) 40 MG capsule Take 1 capsule by mouth Daily. (Patient taking differently: Take 20 mg by mouth Daily.) 90 capsule 3   • furosemide (LASIX) 20 MG  tablet Take 1 tablet with 2 lbs weight gain.  Take 2 tablets with over 2 lbs weight gain 180 tablet 2   • isosorbide mononitrate (IMDUR) 60 MG 24 hr tablet TAKE 1 TABLET BY MOUTH TWICE A  tablet 3   • levothyroxine (SYNTHROID, LEVOTHROID) 50 MCG tablet TAKE 1 TABLET BY MOUTH EVERY DAY 90 tablet 3   • lisinopril (PRINIVIL,ZESTRIL) 2.5 MG tablet      • Loperamide HCl (IMODIUM PO) Take  by mouth.     • memantine (NAMENDA) 10 MG tablet Take 10 mg by mouth Daily.     • metoprolol succinate XL (TOPROL-XL) 25 MG 24 hr tablet Take 12.5 mg by mouth Daily.     • mexiletine (MEXITIL) 150 MG capsule 1 tablet in the morning, 1 tablet at noon, and two tablets at bedtime. 360 capsule 3   • nitroglycerin (NITROSTAT) 0.4 MG SL tablet PLEASE SEE ATTACHED FOR DETAILED DIRECTIONS 25 tablet 2   • ondansetron ODT (ZOFRAN-ODT) 4 MG disintegrating tablet Place 1 tablet on the tongue Every 4 (Four) Hours As Needed for Nausea or Vomiting. 10 tablet 0   • pantoprazole (PROTONIX) 40 MG EC tablet Take 40 mg by mouth Every Morning.     • potassium chloride (MICRO-K) 10 MEQ CR capsule Take 10 mEq by mouth 3 (Three) Times a Day.     • azithromycin (Zithromax) 250 MG tablet Take 2 tablets the first day, then 1 tablet daily for 4 days. 6 tablet 0   • predniSONE (DELTASONE) 20 MG tablet Take two tablets daily for 3 days and one tablet daily until gone. 9 tablet 0     No facility-administered medications prior to visit.       No opioid medication identified on active medication list. I have reviewed chart for other potential  high risk medication/s and harmful drug interactions in the elderly.          Aspirin is on active medication list. Aspirin use is indicated based on review of current medical condition/s. Pros and cons of this therapy have been discussed today. Benefits of this medication outweigh potential harm.  Patient has been encouraged to continue taking this medication.  .      Patient Active Problem List   Diagnosis   • Ischemic  "heart disease due to coronary artery obstruction (HCC)   • Osteoarthritis of multiple joints   • Cardiac pacemaker   • Gout of multiple sites   • Nephrolithiasis   • Mixed hyperlipidemia   • Morbidly obese (HCC)   • Hypertensive heart disease with heart failure (HCC)   • Major depressive disorder, recurrent, moderate (HCC)   • Chronic kidney disease, stage 4 (severe) (HCC)   • Ventricular tachycardia (HCC)   • Chronic systolic heart failure (HCC)   • Hypothyroidism   • Paroxysmal atrial fibrillation (HCC)   • Coronary atherosclerosis   • Essential hypertension     Advance Care Planning  Advance Directive is not on file.  ACP discussion was declined by the patient. Patient does not have an advance directive, declines further assistance.    Review of Systems   Respiratory: Negative for shortness of breath.    Cardiovascular: Negative for chest pain and leg swelling.   Gastrointestinal: Positive for constipation.   Genitourinary: Positive for frequency.   Musculoskeletal: Positive for arthralgias and back pain.   Skin:        Decubitus dorsal aspects of the second third toes left   All other systems reviewed and are negative.       Objective    Vitals:    03/01/22 1343   BP: 104/60   BP Location: Left arm   Patient Position: Sitting   Cuff Size: Adult   Pulse: 59   Resp: 18   Temp: 97.8 °F (36.6 °C)   TempSrc: Temporal   SpO2: 97%   Weight: 84.8 kg (187 lb)   Height: 170.2 cm (67\")   PainSc:   3   PainLoc: Generalized     BMI Readings from Last 1 Encounters:   03/01/22 29.29 kg/m²   BMI is above normal parameters. Recommendations include: educational material    Does the patient have evidence of cognitive impairment? Yes    Physical Exam  Vitals and nursing note reviewed.   Constitutional:       Appearance: Normal appearance.   HENT:      Head: Normocephalic.   Eyes:      Extraocular Movements: Extraocular movements intact.      Pupils: Pupils are equal, round, and reactive to light.   Neck:      Vascular: No carotid " bruit.   Cardiovascular:      Rate and Rhythm: Normal rate and regular rhythm.      Pulses: Normal pulses.      Heart sounds: Normal heart sounds.   Pulmonary:      Effort: Pulmonary effort is normal.      Breath sounds: Normal breath sounds.   Abdominal:      General: Abdomen is flat. There is no distension.      Palpations: Abdomen is soft. There is no mass.   Genitourinary:     Comments: Deferred  Musculoskeletal:      Right lower leg: No edema.      Left lower leg: No edema.   Lymphadenopathy:      Cervical: No cervical adenopathy.   Skin:     General: Skin is warm and dry.      Capillary Refill: Capillary refill takes less than 2 seconds.   Neurological:      General: No focal deficit present.      Mental Status: He is alert and oriented to person, place, and time.   Psychiatric:         Mood and Affect: Mood normal.         Behavior: Behavior normal.         Thought Content: Thought content normal.         Judgment: Judgment normal.                 HEALTH RISK ASSESSMENT    Smoking Status:  Social History     Tobacco Use   Smoking Status Never Smoker   Smokeless Tobacco Never Used     Alcohol Consumption:  Social History     Substance and Sexual Activity   Alcohol Use No     Fall Risk Screen:    [facility] Fall Risk Assessment was completed, and patient is at HIGH risk for falls. Assessment completed on:3/1/2022    Depression Screening:  PHQ-2/PHQ-9 Depression Screening 3/1/2022   Little interest or pleasure in doing things 0   Feeling down, depressed, or hopeless 1   Trouble falling or staying asleep, or sleeping too much -   Feeling tired or having little energy -   Poor appetite or overeating -   Feeling bad about yourself - or that you are a failure or have let yourself or your family down -   Trouble concentrating on things, such as reading the newspaper or watching television -   Moving or speaking so slowly that other people could have noticed. Or the opposite - being so fidgety or restless that you have  been moving around a lot more than usual -   Thoughts that you would be better off dead, or of hurting yourself in some way -   Total Score 1   If you checked off any problems, how difficult have these problems made it for you to do your work, take care of things at home, or get along with other people? -       Health Habits and Functional and Cognitive Screening:  Functional & Cognitive Status 3/1/2022   Do you have difficulty preparing food and eating? Yes   Do you have difficulty bathing yourself, getting dressed or grooming yourself? Yes   Do you have difficulty using the toilet? Yes   Do you have difficulty moving around from place to place? Yes   Do you have trouble with steps or getting out of a bed or a chair? Yes   Current Diet Well Balanced Diet   Dental Exam Not up to date   Eye Exam Not up to date   Exercise (times per week) 0 times per week   Current Exercises Include No Regular Exercise   Current Exercise Activities Include -   Do you need help using the phone?  Yes   Are you deaf or do you have serious difficulty hearing?  Yes   Do you need help with transportation? Yes   Do you need help shopping? Yes   Do you need help preparing meals?  Yes   Do you need help with housework?  Yes   Do you need help with laundry? Yes   Do you need help taking your medications? Yes   Do you need help managing money? Yes   Do you ever drive or ride in a car without wearing a seat belt? No   Have you felt unusual stress, anger or loneliness in the last month? Yes   Who do you live with? Spouse   If you need help, do you have trouble finding someone available to you? No   Have you been bothered in the last four weeks by sexual problems? No   Do you have difficulty concentrating, remembering or making decisions? Yes       Age-appropriate Screening Schedule:  Refer to the list below for future screening recommendations based on patient's age, sex and/or medical conditions. Orders for these recommended tests are listed in  the plan section. The patient has been provided with a written plan.    Health Maintenance   Topic Date Due   • HEMOGLOBIN A1C  11/21/2021   • ZOSTER VACCINE (1 of 2) 03/01/2022 (Originally 5/22/1997)   • URINE MICROALBUMIN  03/19/2022 (Originally 1/20/2022)   • LIPID PANEL  07/09/2022   • DIABETIC EYE EXAM  08/04/2022   • DIABETIC FOOT EXAM  03/01/2023   • INFLUENZA VACCINE  Completed   • TDAP/TD VACCINES  Discontinued              Assessment/Plan   CMS Preventative Services Quick Reference  Risk Factors Identified During Encounter  Fall Risk-High or Moderate  The above risks/problems have been discussed with the patient.  Follow up actions/plans if indicated are seen below in the Assessment/Plan Section.  Pertinent information has been shared with the patient in the After Visit Summary.    Diagnoses and all orders for this visit:    1. Fatigue, unspecified type (Primary)  -     TSH  -     T4, free  -     CBC & Differential    2. Hyperglycemia  -     Hemoglobin A1c    3. Mixed hyperlipidemia  -     Lipid Panel    4. Type 2 diabetes mellitus with diabetic autonomic neuropathy, without long-term current use of insulin (HCC)  -     Hemoglobin A1c  -     Cancel: POC Urinalysis Dipstick, Multipro  -     Ambulatory Referral to Wound Clinic    5. Vitamin D deficiency  -     Vitamin D 25 Hydroxy    6. Special screening for malignant neoplasm of prostate  -     PSA Screen    7. Hypokalemia  -     Basic Metabolic Panel    8. Medicare annual wellness visit, subsequent    Other orders  -     buPROPion XL (Wellbutrin XL) 150 MG 24 hr tablet; Take 1 tablet by mouth Every Morning.  Dispense: 90 tablet; Refill: 3        Follow Up:   Return in about 4 weeks (around 3/29/2022).     An After Visit Summary and PPPS were made available to the patient.        I spent 25 minutes caring for Samy on this date of service. This time includes time spent by me in the following activities:preparing for the visit, reviewing tests, obtaining  and/or reviewing a separately obtained history, performing a medically appropriate examination and/or evaluation , counseling and educating the patient/family/caregiver, ordering medications, tests, or procedures and documenting information in the medical record     Plan above

## 2022-03-02 LAB
25(OH)D3+25(OH)D2 SERPL-MCNC: 43.6 NG/ML (ref 30–100)
BASOPHILS # BLD AUTO: 0.11 10*3/MM3 (ref 0–0.2)
BASOPHILS NFR BLD AUTO: 1.3 % (ref 0–1.5)
BUN SERPL-MCNC: 19 MG/DL (ref 8–23)
BUN/CREAT SERPL: 7.6 (ref 7–25)
CALCIUM SERPL-MCNC: 10.2 MG/DL (ref 8.6–10.5)
CHLORIDE SERPL-SCNC: 101 MMOL/L (ref 98–107)
CHOLEST SERPL-MCNC: 119 MG/DL (ref 0–200)
CO2 SERPL-SCNC: 27.9 MMOL/L (ref 22–29)
CREAT SERPL-MCNC: 2.5 MG/DL (ref 0.76–1.27)
EGFR GENE MUT ANL BLD/T: 26.3 ML/MIN/1.73
EOSINOPHIL # BLD AUTO: 0.56 10*3/MM3 (ref 0–0.4)
EOSINOPHIL NFR BLD AUTO: 6.7 % (ref 0.3–6.2)
ERYTHROCYTE [DISTWIDTH] IN BLOOD BY AUTOMATED COUNT: 13.3 % (ref 12.3–15.4)
GLUCOSE SERPL-MCNC: 98 MG/DL (ref 65–99)
HBA1C MFR BLD: 5.8 % (ref 4.8–5.6)
HCT VFR BLD AUTO: 42.9 % (ref 37.5–51)
HDLC SERPL-MCNC: 36 MG/DL (ref 40–60)
HGB BLD-MCNC: 14.3 G/DL (ref 13–17.7)
IMM GRANULOCYTES # BLD AUTO: 0.03 10*3/MM3 (ref 0–0.05)
IMM GRANULOCYTES NFR BLD AUTO: 0.4 % (ref 0–0.5)
LDLC SERPL CALC-MCNC: 59 MG/DL (ref 0–100)
LYMPHOCYTES # BLD AUTO: 1.92 10*3/MM3 (ref 0.7–3.1)
LYMPHOCYTES NFR BLD AUTO: 22.9 % (ref 19.6–45.3)
MCH RBC QN AUTO: 31.3 PG (ref 26.6–33)
MCHC RBC AUTO-ENTMCNC: 33.3 G/DL (ref 31.5–35.7)
MCV RBC AUTO: 93.9 FL (ref 79–97)
MONOCYTES # BLD AUTO: 1.03 10*3/MM3 (ref 0.1–0.9)
MONOCYTES NFR BLD AUTO: 12.3 % (ref 5–12)
NEUTROPHILS # BLD AUTO: 4.75 10*3/MM3 (ref 1.7–7)
NEUTROPHILS NFR BLD AUTO: 56.4 % (ref 42.7–76)
NRBC BLD AUTO-RTO: 0 /100 WBC (ref 0–0.2)
PLATELET # BLD AUTO: 238 10*3/MM3 (ref 140–450)
POTASSIUM SERPL-SCNC: 3.9 MMOL/L (ref 3.5–5.2)
PSA SERPL-MCNC: 1.99 NG/ML (ref 0–4)
RBC # BLD AUTO: 4.57 10*6/MM3 (ref 4.14–5.8)
SODIUM SERPL-SCNC: 139 MMOL/L (ref 136–145)
T4 FREE SERPL-MCNC: 1.31 NG/DL (ref 0.93–1.7)
TRIGL SERPL-MCNC: 139 MG/DL (ref 0–150)
TSH SERPL DL<=0.005 MIU/L-ACNC: 1.39 UIU/ML (ref 0.27–4.2)
VLDLC SERPL CALC-MCNC: 24 MG/DL (ref 5–40)
WBC # BLD AUTO: 8.4 10*3/MM3 (ref 3.4–10.8)

## 2022-03-03 RX ORDER — FAMOTIDINE 20 MG/1
TABLET, FILM COATED ORAL
Qty: 180 TABLET | Refills: 3 | Status: ON HOLD | OUTPATIENT
Start: 2022-03-03 | End: 2022-07-19

## 2022-03-03 NOTE — TELEPHONE ENCOUNTER
Rx Refill Note  Requested Prescriptions     Pending Prescriptions Disp Refills   • famotidine (PEPCID) 20 MG tablet [Pharmacy Med Name: FAMOTIDINE 20 MG TABLET] 180 tablet      Sig: TAKE 1 TABLET BY MOUTH TWICE A DAY      Last office visit with prescribing clinician: 3/1/2022      Next office visit with prescribing clinician: 4/1/2022            Yue Dougherty MA  03/03/22, 10:40 CST

## 2022-03-07 RX ORDER — CLOPIDOGREL BISULFATE 75 MG/1
TABLET ORAL
Qty: 90 TABLET | Refills: 3 | Status: ON HOLD | OUTPATIENT
Start: 2022-03-07 | End: 2022-07-19

## 2022-03-07 NOTE — TELEPHONE ENCOUNTER
Rx Refill Note  Requested Prescriptions     Pending Prescriptions Disp Refills   • clopidogrel (PLAVIX) 75 MG tablet [Pharmacy Med Name: CLOPIDOGREL 75 MG TABLET] 90 tablet 3     Sig: TAKE 1 TABLET BY MOUTH EVERY DAY      Last office visit with prescribing clinician: 3/1/22  Next office visit with prescribing clinician: 4/1/22           Yue Dougherty MA  03/07/22, 08:12 CST

## 2022-03-08 RX ORDER — ONDANSETRON 4 MG/1
4 TABLET, ORALLY DISINTEGRATING ORAL EVERY 4 HOURS PRN
Qty: 10 TABLET | Refills: 0 | Status: ON HOLD | OUTPATIENT
Start: 2022-03-08 | End: 2022-07-19

## 2022-03-08 NOTE — TELEPHONE ENCOUNTER
Caller: Beth Velazquez    Relationship: Emergency Contact    Best call back number: [phone]    Requested Prescriptions:   Requested Prescriptions     Pending Prescriptions Disp Refills   • ondansetron ODT (ZOFRAN-ODT) 4 MG disintegrating tablet 10 tablet 0     Sig: Place 1 tablet on the tongue Every 4 (Four) Hours As Needed for Nausea or Vomiting.        Pharmacy where request should be sent: [facility]ACY #[id number] - [place], [place] - [address] [place] [address] [phone] [facility] [phone] FX         Does the patient have less than a 3 day supply:  [x] Yes  [] No    Sameer Akins MA   03/08/22 09:43 CST

## 2022-03-08 NOTE — TELEPHONE ENCOUNTER
Rx Refill Note  Requested Prescriptions     Pending Prescriptions Disp Refills   • ondansetron ODT (ZOFRAN-ODT) 4 MG disintegrating tablet 10 tablet 0     Sig: Place 1 tablet on the tongue Every 4 (Four) Hours As Needed for Nausea or Vomiting.      Last office visit with prescribing clinician: 3/1/2022      Next office visit with prescribing clinician: 4/1/2022            Yue Dougherty MA  03/08/22, 10:00 CST

## 2022-03-21 ENCOUNTER — TELEPHONE (OUTPATIENT)
Dept: FAMILY MEDICINE CLINIC | Facility: CLINIC | Age: 77
End: 2022-03-21

## 2022-03-21 DIAGNOSIS — M20.40 HAMMER TOE, UNSPECIFIED LATERALITY: Primary | ICD-10-CM

## 2022-03-21 NOTE — TELEPHONE ENCOUNTER
[facility] called and states they will unable to continue wound care until patient has been seen and evaluated by podiatry.  He does improve with antibiotics but will need to roseanne addressed---Redness and pain left 2nd toe start after he finishes medication  He had trouble walking today in their office. He will need an urgent referral to [facility]--they did provide him with 6 dressings until he is seen there.

## 2022-03-21 NOTE — TELEPHONE ENCOUNTER
Caller: Beth Velazquez    Relationship: Emergency Contact    What was the call regarding: WANTS TO SPEAK WITH NURSE REGARDING PATIENTS WOUND CARE VISIT AND PODIATRY REFERALL

## 2022-03-22 DIAGNOSIS — E03.9 ACQUIRED HYPOTHYROIDISM: ICD-10-CM

## 2022-03-22 RX ORDER — LEVOTHYROXINE SODIUM 0.05 MG/1
TABLET ORAL
Qty: 90 TABLET | Refills: 3 | Status: ON HOLD | OUTPATIENT
Start: 2022-03-22 | End: 2022-07-19

## 2022-03-22 NOTE — TELEPHONE ENCOUNTER
Rx Refill Note  Requested Prescriptions     Pending Prescriptions Disp Refills   • levothyroxine (SYNTHROID, LEVOTHROID) 50 MCG tablet [Pharmacy Med Name: LEVOTHYROXINE 50 MCG TABLET] 90 tablet 3     Sig: TAKE 1 TABLET BY MOUTH EVERY DAY      Last office visit with prescribing clinician: 3/1/2022      Next office visit with prescribing clinician: 4/1/2022   CPE done 03/2022    {TIP  Please add Last Relevant Lab Date if appropriate: 03/01/2022  {TIP  Is Refill Pharmacy correct?: yes    Yue Schmidt MA  03/22/22, 11:31 CDT

## 2022-03-23 NOTE — PROGRESS NOTES
[facility] - PODIATRY    Today's Date: 03/24/2022     Patient Name: Samy Velazquez  MRN: [id number]  CSN: [id number]  PCP: Garret Salinas MD  Referring Provider: No ref. provider found    SUBJECTIVE     Chief Complaint   Patient presents with   • Establish Care     Garret Salinas MD 03/01/2022 Hammer toe - PT STATES hurt second toe while in the rest home, very painful, have been soaking in epsom salt about everyday - pt pain 4/10, at night with second toe 9/10 - pt presents sore on second toe, presents possible hammer toe, and blister like sores on top of second toe      HPI: Samy Velazquez, a 76 y.o.male, comes to clinic as a(n) new patient complaining of foot pain. Patient has h/o arthritis, CAD, HLD, HTN, morbid obesity, renal stone, hypothyroidism. Patient presents with complaints of left second toe pain with wound. Had been seeing his PCP who referred to wound care center in [place]. Notes that he has taken several different antibiotics, had debridement of wound, and had x-rays but was recommended to see someone else by the wound care provider. States that toe is extremely sore. Notes occasional drainage from the wound. Has been soaking in epsom salt almost daily. Admits pain at 9/10 level and described as stabbing, aching and sharp. Relates previous treatment(s) including antibiotics, debridement, x-rays. Denies any constitutional symptoms. No other pedal complaints at this time.    Past Medical History:   Diagnosis Date   • Arthritis    • Coronary artery disease    • Hyperlipidemia    • Hypertensive heart disease with heart failure (HCC) 3/29/2021   • Hypothyroidism 4/1/2021   • Kidney stone    • Major depressive disorder, recurrent, moderate (HCC) 3/29/2021   • Morbidly obese (HCC) 9/18/2020     Past Surgical History:   Procedure Laterality Date   • CARDIAC DEFIBRILLATOR PLACEMENT     • CARDIAC DEFIBRILLATOR PLACEMENT N/A 2008   • CARDIAC SURGERY     • ENDOSCOPY N/A  10/30/2020    Procedure: ESOPHAGOGASTRODUODENOSCOPY WITH ANESTHESIA;  Surgeon: Brent Stanley MD;  Location: [facility];  Service: Gastroenterology;  Laterality: N/A;  pre dysphagia  post post op gastric surgery  dr jose manuel smith   • ESOPHAGUS SURGERY     • KNEE SURGERY     • TOTAL SHOULDER REPLACEMENT       Family History   Problem Relation Age of Onset   • Hypertension Mother    • Stroke Mother    • Cancer Father    • Hypertension Father    • Diabetes Sister    • Hypertension Sister    • Crohn's disease Daughter    • No Known Problems Son    • No Known Problems Maternal Grandmother    • No Known Problems Maternal Grandfather    • No Known Problems Paternal Grandmother    • No Known Problems Paternal Grandfather    • Hypertension Sister    • Hypertension Sister    • Hypertension Sister    • No Known Problems Son      Social History     Socioeconomic History   • Marital status:    Tobacco Use   • Smoking status: Never Smoker   • Smokeless tobacco: Never Used   Substance and Sexual Activity   • Alcohol use: No   • Drug use: No   • Sexual activity: Defer     Allergies   Allergen Reactions   • Iodine Hives   • Strawberry Hives   • Clindamycin/Lincomycin Diarrhea   • Penicillins Rash   • Vancomycin Nausea And Vomiting     Current Outpatient Medications   Medication Sig Dispense Refill   • acetaminophen (TYLENOL) 650 MG 8 hr tablet Take 650 mg by mouth Daily.     • aspirin 81 MG EC tablet Take 81 mg by mouth Daily.     • atorvastatin (LIPITOR) 40 MG tablet TAKE 1 TABLET BY MOUTH EVERY DAY EVERY NIGHT 90 tablet 3   • buPROPion XL (Wellbutrin XL) 150 MG 24 hr tablet Take 1 tablet by mouth Every Morning. 90 tablet 3   • cefdinir (OMNICEF) 300 MG capsule Take 300 mg by mouth 2 (Two) Times a Day.     • cephalexin (KEFLEX) 250 MG capsule Take 250 mg by mouth 2 (Two) Times a Day.     • Cholecalciferol (VITAMIN D3) 2000 units capsule Take 2,000 Units by mouth Daily.     • clopidogrel (PLAVIX) 75 MG tablet TAKE 1 TABLET BY  MOUTH EVERY DAY 90 tablet 3   • docusate sodium (COLACE) 100 MG capsule Take 100 mg by mouth 2 (Two) Times a Day.     • donepezil (ARICEPT) 10 MG tablet Take 10 mg by mouth Every Night.     • famotidine (PEPCID) 20 MG tablet TAKE 1 TABLET BY MOUTH TWICE A  tablet 3   • furosemide (LASIX) 20 MG tablet Take 1 tablet with 2 lbs weight gain.  Take 2 tablets with over 2 lbs weight gain 180 tablet 2   • isosorbide mononitrate (IMDUR) 60 MG 24 hr tablet TAKE 1 TABLET BY MOUTH TWICE A  tablet 3   • levoFLOXacin (LEVAQUIN) 500 MG tablet Take 500 mg by mouth Daily.     • levothyroxine (SYNTHROID, LEVOTHROID) 50 MCG tablet TAKE 1 TABLET BY MOUTH EVERY DAY 90 tablet 3   • loperamide (IMODIUM) 1 MG/5ML solution Take 1 mg by mouth 4 (Four) Times a Day As Needed for Diarrhea.     • memantine (NAMENDA) 10 MG tablet Take 10 mg by mouth Daily.     • metOLazone (ZAROXOLYN) 2.5 MG tablet Take 2.5 mg by mouth Daily.     • metoprolol succinate XL (TOPROL-XL) 25 MG 24 hr tablet Take 12.5 mg by mouth Daily.     • mexiletine (MEXITIL) 150 MG capsule 1 tablet in the morning, 1 tablet at noon, and two tablets at bedtime. 360 capsule 3   • nitroglycerin (NITROSTAT) 0.4 MG SL tablet PLEASE SEE ATTACHED FOR DETAILED DIRECTIONS 25 tablet 2   • ondansetron ODT (ZOFRAN-ODT) 4 MG disintegrating tablet Place 1 tablet on the tongue Every 4 (Four) Hours As Needed for Nausea or Vomiting. 10 tablet 0   • oxyCODONE-acetaminophen (PERCOCET)  MG per tablet Take 1 tablet by mouth Every 6 (Six) Hours As Needed for Moderate Pain .     • Propylene Glycol (Systane Balance) 0.6 % solution Apply  to eye(s) as directed by provider.       No current facility-administered medications for this visit.     Review of Systems   Constitutional: Negative for chills and fever.   HENT: Negative for congestion.    Respiratory: Negative for shortness of breath.    Cardiovascular: Negative for chest pain and leg swelling.   Gastrointestinal: Negative for  constipation, diarrhea, nausea and vomiting.   Musculoskeletal: Positive for arthralgias.        Foot pain   Skin: Positive for color change and wound.   Neurological: Negative for numbness.       OBJECTIVE     Vitals:    03/24/22 1049   BP: 120/70   Pulse: 69   SpO2: 97%       PHYSICAL EXAM  GEN:   Accompanied by family.     Foot/Ankle Exam:       General:   Appearance: appears stated age and healthy and elderly    Orientation: AAOx3    Affect: appropriate    Assistance: wheelchair    Shoe Gear:  Casual shoes    VASCULAR      Right Foot Vascularity   Dorsalis pedis:  Doppler (monophasic)  Posterior tibial:  Doppler (monophasic)  Skin Temperature: warm    Edema Grading:  Trace  CFT:  5  Pedal Hair Growth:  Present  Varicosities: mild varicosities       Left Foot Vascularity   Dorsalis pedis:  Doppler (monophasic)  Posterior tibial:  Doppler (monophasic)  Skin Temperature: warm    Edema Grading:  Trace  CFT:  5  Pedal Hair Growth:  Present  Varicosities: mild varicosities        NEUROLOGIC     Right Foot Neurologic   Light touch sensation:  Diminished  Vibratory sensation:  Diminished  Hot/Cold sensation: diminished       Left Foot Neurologic   Light touch sensation:  Diminished  Vibratory sensation:  Diminished  Hot/cold sensation: diminished       MUSCULOSKELETAL      Right Foot Musculoskeletal   Ecchymosis:  None  Tenderness: none    Arch:  Normal     Left Foot Musculoskeletal   Ecchymosis:  None  Tenderness: toe 2    Arch:  Normal  Hammertoe:  Second toe, third toe and fourth toe  Hallux valgus: Yes       MUSCLE STRENGTH     Right Foot Muscle Strength   Foot dorsiflexion:  4  Foot plantar flexion:  4  Foot inversion:  4  Foot eversion:  4     Left Foot Muscle Strength   Foot dorsiflexion:  4  Foot plantar flexion:  4  Foot inversion:  4  Foot eversion:  4     RANGE OF MOTION      Right Foot Range of Motion   Foot and ankle ROM within normal limits       Left Foot Range of Motion   Foot and ankle ROM within normal  limits       DERMATOLOGIC     Right Foot Dermatologic   Skin: skin intact       Left Foot Dermatologic   Skin: skin intact       Image:       RADIOLOGY/NUCLEAR:  No results found.    LABORATORY/CULTURE RESULTS:      PATHOLOGY RESULTS:       ASSESSMENT/PLAN     Diagnoses and all orders for this visit:    1. Chronic ulcer of toe of left foot with fat layer exposed (HCC) (Primary)  -     Ambulatory Referral to Wound Clinic    2. PAD (peripheral artery disease) (HCC)  -     US Ankle / Brachial Indices Extremity Complete; Future    3. Hammer toes, bilateral    4. Antiplatelet or antithrombotic long-term use      Comprehensive lower extremity examination and evaluation was performed.  Discussed findings and treatment plan including risks, benefits, and treatment options with patient in detail. Patient agreed with treatment plan.  Patient has a digital ulcer due to contracture, friction and vasuclar disease.    No debridement performed.  Complete abx as prescribed.  Apply abx ointment and light bandage daily.   Dispensed surgical shoe  Referral to [facility]  Order Yue to assess perfusion.  An After Visit Summary was printed and given to the patient at discharge, including (if requested) any available informative/educational handouts regarding diagnosis, treatment, or medications. All questions were answered to patient/family satisfaction. Should symptoms fail to improve or worsen they agree to call or return to clinic or to go to the Emergency Department. Discussed the importance of following up with any needed screening tests/labs/specialist appointments and any requested follow-up recommended by me today. Importance of maintaining follow-up discussed and patient accepts that missed appointments can delay diagnosis and potentially lead to worsening of conditions.  Return if symptoms worsen or fail to improve., or sooner if acute issues arise.    Lab Frequency Next Occurrence   FL Upper GI Single Contrast Without KUB Once  01/24/2022       This document has been electronically signed by Shadi Marcano DPM on March 24, 2022 11:17 CDT

## 2022-03-24 ENCOUNTER — OFFICE VISIT (OUTPATIENT)
Dept: PODIATRY | Facility: CLINIC | Age: 77
End: 2022-03-24

## 2022-03-24 VITALS
WEIGHT: 187 LBS | DIASTOLIC BLOOD PRESSURE: 70 MMHG | SYSTOLIC BLOOD PRESSURE: 120 MMHG | BODY MASS INDEX: 29.35 KG/M2 | HEIGHT: 67 IN | HEART RATE: 69 BPM | OXYGEN SATURATION: 97 %

## 2022-03-24 DIAGNOSIS — L97.522 CHRONIC ULCER OF TOE OF LEFT FOOT WITH FAT LAYER EXPOSED: Primary | ICD-10-CM

## 2022-03-24 DIAGNOSIS — M20.42 HAMMER TOES, BILATERAL: ICD-10-CM

## 2022-03-24 DIAGNOSIS — I73.9 PAD (PERIPHERAL ARTERY DISEASE): ICD-10-CM

## 2022-03-24 DIAGNOSIS — Z79.02 ANTIPLATELET OR ANTITHROMBOTIC LONG-TERM USE: ICD-10-CM

## 2022-03-24 DIAGNOSIS — M20.41 HAMMER TOES, BILATERAL: ICD-10-CM

## 2022-03-24 PROCEDURE — 99203 OFFICE O/P NEW LOW 30 MIN: CPT | Performed by: PODIATRIST

## 2022-03-24 RX ORDER — CEFDINIR 300 MG/1
300 CAPSULE ORAL 2 TIMES DAILY
COMMUNITY
End: 2022-06-27

## 2022-03-24 RX ORDER — DONEPEZIL HYDROCHLORIDE 10 MG/1
10 TABLET, FILM COATED ORAL NIGHTLY
COMMUNITY
End: 2022-04-25

## 2022-03-24 RX ORDER — DOCUSATE SODIUM 100 MG/1
100 CAPSULE, LIQUID FILLED ORAL DAILY
COMMUNITY

## 2022-03-24 RX ORDER — LEVOFLOXACIN 500 MG/1
500 TABLET, FILM COATED ORAL DAILY
COMMUNITY
End: 2022-06-27

## 2022-03-24 RX ORDER — METOLAZONE 2.5 MG/1
2.5 TABLET ORAL DAILY PRN
COMMUNITY
End: 2022-07-22 | Stop reason: HOSPADM

## 2022-03-24 RX ORDER — PSEUDOEPHEDRINE HYDROCHLORIDE 60 MG/1
TABLET, FILM COATED ORAL
Status: ON HOLD | COMMUNITY
End: 2022-07-19

## 2022-03-24 RX ORDER — OXYCODONE AND ACETAMINOPHEN 10; 325 MG/1; MG/1
1 TABLET ORAL 3 TIMES DAILY PRN
Status: ON HOLD | COMMUNITY
End: 2022-07-19

## 2022-03-28 ENCOUNTER — OFFICE VISIT (OUTPATIENT)
Dept: WOUND CARE | Facility: HOSPITAL | Age: 77
End: 2022-03-28

## 2022-03-28 DIAGNOSIS — L97.522 NON-PRESSURE CHRONIC ULCER OF OTHER PART OF LEFT FOOT WITH FAT LAYER EXPOSED: ICD-10-CM

## 2022-03-28 DIAGNOSIS — M20.42 OTHER HAMMER TOE(S) (ACQUIRED), LEFT FOOT: ICD-10-CM

## 2022-03-28 DIAGNOSIS — Z79.02 LONG TERM (CURRENT) USE OF ANTITHROMBOTICS/ANTIPLATELETS: ICD-10-CM

## 2022-03-28 DIAGNOSIS — I73.9 PERIPHERAL VASCULAR DISEASE, UNSPECIFIED: ICD-10-CM

## 2022-03-28 PROCEDURE — 99213 OFFICE O/P EST LOW 20 MIN: CPT | Performed by: PODIATRIST

## 2022-03-28 PROCEDURE — G0463 HOSPITAL OUTPT CLINIC VISIT: HCPCS

## 2022-03-31 ENCOUNTER — OFFICE VISIT (OUTPATIENT)
Dept: FAMILY MEDICINE CLINIC | Facility: CLINIC | Age: 77
End: 2022-03-31

## 2022-03-31 VITALS
HEART RATE: 71 BPM | BODY MASS INDEX: 29.35 KG/M2 | SYSTOLIC BLOOD PRESSURE: 136 MMHG | HEIGHT: 67 IN | DIASTOLIC BLOOD PRESSURE: 80 MMHG | WEIGHT: 187 LBS | OXYGEN SATURATION: 96 % | TEMPERATURE: 96.2 F

## 2022-03-31 DIAGNOSIS — R79.89 AZOTEMIA: Primary | ICD-10-CM

## 2022-03-31 PROCEDURE — 99213 OFFICE O/P EST LOW 20 MIN: CPT | Performed by: FAMILY MEDICINE

## 2022-03-31 NOTE — PROGRESS NOTES
Subjective   Samy Velazquez is a 76 y.o. male.     76-year-old male with history of borderline diabetes ischemic heart disease hypertension and presently decubitus left foot      The following portions of the patient's history were reviewed and updated as appropriate: allergies, current medications, past family history, past medical history, past social history, past surgical history and problem list.    Review of Systems   Respiratory: Negative for shortness of breath.    Cardiovascular: Negative for chest pain and leg swelling.   Genitourinary:        Recent azotemia   Musculoskeletal: Positive for arthralgias and back pain.   Skin:        Seeing wound care weekly for left foot decubitus       Objective   Physical Exam  Vitals and nursing note reviewed.   Constitutional:       Appearance: Normal appearance.   Cardiovascular:      Rate and Rhythm: Normal rate and regular rhythm.   Pulmonary:      Effort: Pulmonary effort is normal.      Breath sounds: Normal breath sounds.   Musculoskeletal:      Right lower leg: No edema.      Left lower leg: No edema.   Skin:     General: Skin is warm and dry.   Neurological:      General: No focal deficit present.      Mental Status: He is alert and oriented to person, place, and time.   Psychiatric:         Mood and Affect: Mood normal.         Behavior: Behavior normal.         Thought Content: Thought content normal.         Judgment: Judgment normal.         Assessment/Plan   Diagnoses and all orders for this visit:    1. Azotemia (Primary)  -     Basic Metabolic Panel    2 decubitus left foot-wound care #3  Plan above    Ischemic heart disease stable no angina

## 2022-04-01 DIAGNOSIS — R79.89 AZOTEMIA: Primary | ICD-10-CM

## 2022-04-01 LAB
BUN SERPL-MCNC: 23 MG/DL (ref 8–23)
BUN/CREAT SERPL: 11 (ref 7–25)
CALCIUM SERPL-MCNC: 9.8 MG/DL (ref 8.6–10.5)
CHLORIDE SERPL-SCNC: 104 MMOL/L (ref 98–107)
CO2 SERPL-SCNC: 29.2 MMOL/L (ref 22–29)
CREAT SERPL-MCNC: 2.09 MG/DL (ref 0.76–1.27)
EGFRCR SERPLBLD CKD-EPI 2021: 32.6 ML/MIN/1.73
GLUCOSE SERPL-MCNC: 104 MG/DL (ref 65–99)
POTASSIUM SERPL-SCNC: 3.9 MMOL/L (ref 3.5–5.2)
SODIUM SERPL-SCNC: 142 MMOL/L (ref 136–145)

## 2022-04-04 ENCOUNTER — OFFICE VISIT (OUTPATIENT)
Dept: WOUND CARE | Facility: HOSPITAL | Age: 77
End: 2022-04-04

## 2022-04-04 DIAGNOSIS — Z79.02 LONG TERM (CURRENT) USE OF ANTITHROMBOTICS/ANTIPLATELETS: ICD-10-CM

## 2022-04-04 DIAGNOSIS — L97.522 NON-PRESSURE CHRONIC ULCER OF OTHER PART OF LEFT FOOT WITH FAT LAYER EXPOSED: ICD-10-CM

## 2022-04-04 DIAGNOSIS — I73.9 PERIPHERAL VASCULAR DISEASE, UNSPECIFIED: ICD-10-CM

## 2022-04-04 DIAGNOSIS — M20.42 OTHER HAMMER TOE(S) (ACQUIRED), LEFT FOOT: ICD-10-CM

## 2022-04-04 PROCEDURE — 97597 DBRDMT OPN WND 1ST 20 CM/<: CPT | Performed by: PODIATRIST

## 2022-04-04 RX ORDER — MEMANTINE HYDROCHLORIDE 10 MG/1
TABLET ORAL
Qty: 180 TABLET | Refills: 3 | Status: ON HOLD | OUTPATIENT
Start: 2022-04-04 | End: 2022-07-19

## 2022-04-04 RX ORDER — ATORVASTATIN CALCIUM 40 MG/1
TABLET, FILM COATED ORAL
Qty: 90 TABLET | Refills: 3 | Status: ON HOLD | OUTPATIENT
Start: 2022-04-04 | End: 2022-07-19

## 2022-04-04 NOTE — TELEPHONE ENCOUNTER
Rx Refill Note  Requested Prescriptions     Pending Prescriptions Disp Refills   • memantine (NAMENDA) 10 MG tablet [Pharmacy Med Name: MEMANTINE HCL 10 MG TABLET] 180 tablet 3     Sig: TAKE 1 TABLET BY MOUTH TWICE A DAY   • atorvastatin (LIPITOR) 40 MG tablet [Pharmacy Med Name: ATORVASTATIN 40 MG TABLET] 90 tablet 3     Sig: TAKE 1 TABLET BY MOUTH EVERY DAY EVERY NIGHT      Last office visit with prescribing clinician: 3/31/2022      Next office visit with prescribing clinician: Visit date not found       {TIP  Please add Last Relevant Lab 3/31/22    Yue Dougherty MA  04/04/22, 07:26 CDT

## 2022-04-08 ENCOUNTER — HOSPITAL ENCOUNTER (OUTPATIENT)
Dept: ULTRASOUND IMAGING | Facility: HOSPITAL | Age: 77
Discharge: HOME | End: 2022-04-08
Admitting: PODIATRIST

## 2022-04-08 DIAGNOSIS — I73.9 PAD (PERIPHERAL ARTERY DISEASE): ICD-10-CM

## 2022-04-08 PROCEDURE — 93923 UPR/LXTR ART STDY 3+ LVLS: CPT

## 2022-04-08 PROCEDURE — 93923 UPR/LXTR ART STDY 3+ LVLS: CPT | Performed by: SURGERY

## 2022-04-11 ENCOUNTER — OFFICE VISIT (OUTPATIENT)
Dept: WOUND CARE | Facility: HOSPITAL | Age: 77
End: 2022-04-11

## 2022-04-11 ENCOUNTER — LAB REQUISITION (OUTPATIENT)
Dept: LAB | Facility: HOSPITAL | Age: 77
End: 2022-04-11

## 2022-04-11 DIAGNOSIS — M20.42 OTHER HAMMER TOE(S) (ACQUIRED), LEFT FOOT: ICD-10-CM

## 2022-04-11 DIAGNOSIS — Z79.02 LONG TERM (CURRENT) USE OF ANTITHROMBOTICS/ANTIPLATELETS: ICD-10-CM

## 2022-04-11 DIAGNOSIS — L97.522 NON-PRESSURE CHRONIC ULCER OF OTHER PART OF LEFT FOOT WITH FAT LAYER EXPOSED: ICD-10-CM

## 2022-04-11 DIAGNOSIS — I73.9 PERIPHERAL VASCULAR DISEASE, UNSPECIFIED: ICD-10-CM

## 2022-04-11 DIAGNOSIS — Z00.00 ENCOUNTER FOR GENERAL ADULT MEDICAL EXAMINATION WITHOUT ABNORMAL FINDINGS: ICD-10-CM

## 2022-04-11 PROCEDURE — 11042 DBRDMT SUBQ TIS 1ST 20SQCM/<: CPT | Performed by: PODIATRIST

## 2022-04-11 PROCEDURE — 87070 CULTURE OTHR SPECIMN AEROBIC: CPT | Performed by: PODIATRIST

## 2022-04-11 PROCEDURE — 87075 CULTR BACTERIA EXCEPT BLOOD: CPT | Performed by: PODIATRIST

## 2022-04-11 PROCEDURE — 28010 INCISION OF TOE TENDON: CPT | Performed by: PODIATRIST

## 2022-04-11 PROCEDURE — 87176 TISSUE HOMOGENIZATION CULTR: CPT | Performed by: PODIATRIST

## 2022-04-11 PROCEDURE — 87205 SMEAR GRAM STAIN: CPT | Performed by: PODIATRIST

## 2022-04-12 RX ORDER — MEXILETINE HYDROCHLORIDE 150 MG/1
CAPSULE ORAL
Qty: 360 CAPSULE | Refills: 3 | Status: ON HOLD | OUTPATIENT
Start: 2022-04-12 | End: 2022-07-19

## 2022-04-12 NOTE — TELEPHONE ENCOUNTER
Rx Refill Note  Requested Prescriptions     Pending Prescriptions Disp Refills   • mexiletine (MEXITIL) 150 MG capsule [Pharmacy Med Name: MEXILETINE 150 MG CAPSULE] 360 capsule 3     Sig: TAKE 1 CAPSULE BY MOUTH EVERY MORNING THEN 1 CAP AT NOON AND 2 CAP AT BEDTIME      Last office visit with prescribing clinician: 3/31/2022      Next office visit with prescribing clinician: Visit date not found   CPE done 03/01/2022    {TIP  Please add Last Relevant Lab Date if appropriate: 03/01/2022        Yue Schmidt MA  04/12/22, 11:22 CDT

## 2022-04-13 ENCOUNTER — TELEPHONE (OUTPATIENT)
Dept: FAMILY MEDICINE CLINIC | Facility: CLINIC | Age: 77
End: 2022-04-13

## 2022-04-13 NOTE — TELEPHONE ENCOUNTER
Caller: Beth Velazquez    Relationship to patient: Emergency Contact    Best call back number: [phone]    Patient is needing:  Beth called and said that Samy is needing to change allergy meds because zytec is not working any longer. He is still having a lot of nasal drainage (clear) .. she is looking to switch to xyzal to try. She is asking if this is okay to take with his meds and conditions.

## 2022-04-14 ENCOUNTER — OUTSIDE FACILITY SERVICE (OUTPATIENT)
Dept: FAMILY MEDICINE CLINIC | Facility: CLINIC | Age: 77
End: 2022-04-14

## 2022-04-14 LAB
BACTERIA SPEC AEROBE CULT: NORMAL
GRAM STN SPEC: NORMAL

## 2022-04-14 PROCEDURE — OUTSIDEPOS PR OUTSIDE POS PLACEHOLDER: Performed by: FAMILY MEDICINE

## 2022-04-16 LAB — BACTERIA SPEC ANAEROBE CULT: NORMAL

## 2022-04-18 ENCOUNTER — OFFICE VISIT (OUTPATIENT)
Dept: WOUND CARE | Facility: HOSPITAL | Age: 77
End: 2022-04-18

## 2022-04-18 DIAGNOSIS — M20.42 OTHER HAMMER TOE(S) (ACQUIRED), LEFT FOOT: ICD-10-CM

## 2022-04-18 DIAGNOSIS — Z79.02 LONG TERM (CURRENT) USE OF ANTITHROMBOTICS/ANTIPLATELETS: ICD-10-CM

## 2022-04-18 DIAGNOSIS — L97.522 NON-PRESSURE CHRONIC ULCER OF OTHER PART OF LEFT FOOT WITH FAT LAYER EXPOSED: ICD-10-CM

## 2022-04-18 DIAGNOSIS — I73.9 PERIPHERAL VASCULAR DISEASE, UNSPECIFIED: ICD-10-CM

## 2022-04-18 PROCEDURE — 97597 DBRDMT OPN WND 1ST 20 CM/<: CPT | Performed by: PODIATRIST

## 2022-04-22 ENCOUNTER — TELEPHONE (OUTPATIENT)
Dept: FAMILY MEDICINE CLINIC | Facility: CLINIC | Age: 77
End: 2022-04-22

## 2022-04-22 NOTE — TELEPHONE ENCOUNTER
Wife calling--was contacted yesterday for appt with Dr. Barreto--goran for 07.13.22 @ 245pm in [place].  Inquiring if that is ok with you.    Also wanted to schedule 4th COVID booster for pt and herself.  He had 3rd dose on 03.01.22 and she had 3rd on 11.30.21.  Wanted to come same day--when would I need to schedule them?  I was thinking it was 4 mos past previous booster??

## 2022-04-22 NOTE — TELEPHONE ENCOUNTER
Yes okay with  Estevan but I would wait till late summer early fall for the fourth booster so we get have good levels in the winter little too earlynow

## 2022-04-25 ENCOUNTER — OFFICE VISIT (OUTPATIENT)
Dept: WOUND CARE | Facility: HOSPITAL | Age: 77
End: 2022-04-25

## 2022-04-25 DIAGNOSIS — Z79.02 LONG TERM (CURRENT) USE OF ANTITHROMBOTICS/ANTIPLATELETS: ICD-10-CM

## 2022-04-25 DIAGNOSIS — L97.522 NON-PRESSURE CHRONIC ULCER OF OTHER PART OF LEFT FOOT WITH FAT LAYER EXPOSED: ICD-10-CM

## 2022-04-25 DIAGNOSIS — I73.9 PERIPHERAL VASCULAR DISEASE, UNSPECIFIED: ICD-10-CM

## 2022-04-25 DIAGNOSIS — M20.42 OTHER HAMMER TOE(S) (ACQUIRED), LEFT FOOT: ICD-10-CM

## 2022-04-25 PROCEDURE — 11042 DBRDMT SUBQ TIS 1ST 20SQCM/<: CPT | Performed by: PODIATRIST

## 2022-04-25 RX ORDER — DONEPEZIL HYDROCHLORIDE 10 MG/1
TABLET, FILM COATED ORAL
Qty: 90 TABLET | Refills: 3 | Status: ON HOLD | OUTPATIENT
Start: 2022-04-25 | End: 2022-07-19

## 2022-04-25 NOTE — TELEPHONE ENCOUNTER
Rx Refill Note  Requested Prescriptions     Pending Prescriptions Disp Refills   • donepezil (ARICEPT) 10 MG tablet [Pharmacy Med Name: DONEPEZIL HCL 10 MG TABLET] 90 tablet 2     Sig: TAKE 1 TABLET BY MOUTH EVERYDAY AT BEDTIME      Last office visit with prescribing clinician: 3/31/2022      Next office visit with prescribing clinician: Visit date not found            Yue Dougherty MA  04/25/22, 08:16 CDT

## 2022-05-02 ENCOUNTER — OFFICE VISIT (OUTPATIENT)
Dept: WOUND CARE | Facility: HOSPITAL | Age: 77
End: 2022-05-02

## 2022-05-02 DIAGNOSIS — I73.9 PERIPHERAL VASCULAR DISEASE, UNSPECIFIED: ICD-10-CM

## 2022-05-02 DIAGNOSIS — M20.42 OTHER HAMMER TOE(S) (ACQUIRED), LEFT FOOT: ICD-10-CM

## 2022-05-02 DIAGNOSIS — L97.522 NON-PRESSURE CHRONIC ULCER OF OTHER PART OF LEFT FOOT WITH FAT LAYER EXPOSED: ICD-10-CM

## 2022-05-02 DIAGNOSIS — Z79.02 LONG TERM (CURRENT) USE OF ANTITHROMBOTICS/ANTIPLATELETS: ICD-10-CM

## 2022-05-02 PROCEDURE — 11042 DBRDMT SUBQ TIS 1ST 20SQCM/<: CPT | Performed by: PODIATRIST

## 2022-05-02 PROCEDURE — 99024 POSTOP FOLLOW-UP VISIT: CPT | Performed by: PODIATRIST

## 2022-05-05 ENCOUNTER — TRANSCRIBE ORDERS (OUTPATIENT)
Dept: ADMINISTRATIVE | Facility: HOSPITAL | Age: 77
End: 2022-05-05

## 2022-05-05 DIAGNOSIS — M86.9 OSTEOMYELITIS OF ANKLE AND FOOT: Primary | ICD-10-CM

## 2022-05-09 ENCOUNTER — OFFICE VISIT (OUTPATIENT)
Dept: WOUND CARE | Facility: HOSPITAL | Age: 77
End: 2022-05-09

## 2022-05-09 DIAGNOSIS — Z79.02 LONG TERM (CURRENT) USE OF ANTITHROMBOTICS/ANTIPLATELETS: ICD-10-CM

## 2022-05-09 DIAGNOSIS — M20.42 OTHER HAMMER TOE(S) (ACQUIRED), LEFT FOOT: ICD-10-CM

## 2022-05-09 DIAGNOSIS — I73.9 PERIPHERAL VASCULAR DISEASE, UNSPECIFIED: ICD-10-CM

## 2022-05-09 DIAGNOSIS — L97.522 NON-PRESSURE CHRONIC ULCER OF OTHER PART OF LEFT FOOT WITH FAT LAYER EXPOSED: ICD-10-CM

## 2022-05-09 PROCEDURE — 97597 DBRDMT OPN WND 1ST 20 CM/<: CPT | Performed by: NURSE PRACTITIONER

## 2022-05-12 ENCOUNTER — TELEPHONE (OUTPATIENT)
Dept: PODIATRY | Facility: CLINIC | Age: 77
End: 2022-05-12

## 2022-05-12 NOTE — TELEPHONE ENCOUNTER
Caller: Beth Velazquez    Relationship to patient: Emergency Contact    Best call back number: [phone]    Type of visit: MRI OF LEFT FOOT    Requested date: ASAP    Additional notes:PATIENT WIFE STATED THAT SHE WAS TOLD TO CALL BACK REGARDING SETTING UP AN APPOINTMENT FOR AN MRI IF SHE HAD NOT HEARD ANYTHING BY TODAY.

## 2022-05-12 NOTE — TELEPHONE ENCOUNTER
Called pt wife back regarding setting up an appt for MRI, no answer no voice mail. Has an appt with dr varghese on the 16th and this need to get scheduled.

## 2022-05-16 ENCOUNTER — OFFICE VISIT (OUTPATIENT)
Dept: WOUND CARE | Facility: HOSPITAL | Age: 77
End: 2022-05-16

## 2022-05-16 DIAGNOSIS — Z79.02 LONG TERM (CURRENT) USE OF ANTITHROMBOTICS/ANTIPLATELETS: ICD-10-CM

## 2022-05-16 DIAGNOSIS — L97.522 NON-PRESSURE CHRONIC ULCER OF OTHER PART OF LEFT FOOT WITH FAT LAYER EXPOSED: ICD-10-CM

## 2022-05-16 DIAGNOSIS — M20.42 OTHER HAMMER TOE(S) (ACQUIRED), LEFT FOOT: ICD-10-CM

## 2022-05-16 DIAGNOSIS — I73.9 PERIPHERAL VASCULAR DISEASE, UNSPECIFIED: ICD-10-CM

## 2022-05-16 PROCEDURE — 97597 DBRDMT OPN WND 1ST 20 CM/<: CPT | Performed by: PODIATRIST

## 2022-05-17 ENCOUNTER — TRANSCRIBE ORDERS (OUTPATIENT)
Dept: ADMINISTRATIVE | Facility: HOSPITAL | Age: 77
End: 2022-05-17

## 2022-05-17 DIAGNOSIS — M86.9 OSTEOMYELITIS, UNSPECIFIED SITE, UNSPECIFIED TYPE: Primary | ICD-10-CM

## 2022-05-20 ENCOUNTER — HOSPITAL ENCOUNTER (OUTPATIENT)
Dept: NUCLEAR MEDICINE | Facility: HOSPITAL | Age: 77
Discharge: HOME | End: 2022-05-20

## 2022-05-20 DIAGNOSIS — M86.9 OSTEOMYELITIS, UNSPECIFIED SITE, UNSPECIFIED TYPE: ICD-10-CM

## 2022-05-20 PROCEDURE — 78315 BONE IMAGING 3 PHASE: CPT

## 2022-05-20 PROCEDURE — 0 TECHNETIUM OXIDRONATE KIT: Performed by: PODIATRIST

## 2022-05-20 PROCEDURE — A9561 TC99M OXIDRONATE: HCPCS | Performed by: PODIATRIST

## 2022-05-20 RX ADMIN — TECHNETIUM TC 99M OXIDRONATE 1 DOSE: 3.15 INJECTION, POWDER, LYOPHILIZED, FOR SOLUTION INTRAVENOUS at 09:27

## 2022-05-23 ENCOUNTER — OFFICE VISIT (OUTPATIENT)
Dept: WOUND CARE | Facility: HOSPITAL | Age: 77
End: 2022-05-23

## 2022-05-23 DIAGNOSIS — L97.522 NON-PRESSURE CHRONIC ULCER OF OTHER PART OF LEFT FOOT WITH FAT LAYER EXPOSED: ICD-10-CM

## 2022-05-23 DIAGNOSIS — I73.9 PERIPHERAL VASCULAR DISEASE, UNSPECIFIED: ICD-10-CM

## 2022-05-23 DIAGNOSIS — Z79.02 LONG TERM (CURRENT) USE OF ANTITHROMBOTICS/ANTIPLATELETS: ICD-10-CM

## 2022-05-23 DIAGNOSIS — M20.42 OTHER HAMMER TOE(S) (ACQUIRED), LEFT FOOT: ICD-10-CM

## 2022-05-23 PROCEDURE — 97597 DBRDMT OPN WND 1ST 20 CM/<: CPT | Performed by: NURSE PRACTITIONER

## 2022-05-31 ENCOUNTER — OFFICE VISIT (OUTPATIENT)
Dept: WOUND CARE | Facility: HOSPITAL | Age: 77
End: 2022-05-31

## 2022-05-31 DIAGNOSIS — L97.522 NON-PRESSURE CHRONIC ULCER OF OTHER PART OF LEFT FOOT WITH FAT LAYER EXPOSED: ICD-10-CM

## 2022-05-31 DIAGNOSIS — I73.9 PERIPHERAL VASCULAR DISEASE, UNSPECIFIED: ICD-10-CM

## 2022-05-31 DIAGNOSIS — Z79.02 LONG TERM (CURRENT) USE OF ANTITHROMBOTICS/ANTIPLATELETS: ICD-10-CM

## 2022-05-31 DIAGNOSIS — M20.42 OTHER HAMMER TOE(S) (ACQUIRED), LEFT FOOT: ICD-10-CM

## 2022-05-31 PROCEDURE — G0463 HOSPITAL OUTPT CLINIC VISIT: HCPCS

## 2022-05-31 PROCEDURE — 99212 OFFICE O/P EST SF 10 MIN: CPT | Performed by: NURSE PRACTITIONER

## 2022-06-27 ENCOUNTER — OFFICE VISIT (OUTPATIENT)
Dept: FAMILY MEDICINE CLINIC | Facility: CLINIC | Age: 77
End: 2022-06-27

## 2022-06-27 VITALS
RESPIRATION RATE: 14 BRPM | TEMPERATURE: 97.8 F | DIASTOLIC BLOOD PRESSURE: 70 MMHG | BODY MASS INDEX: 31.39 KG/M2 | WEIGHT: 200 LBS | OXYGEN SATURATION: 97 % | HEART RATE: 59 BPM | HEIGHT: 67 IN | SYSTOLIC BLOOD PRESSURE: 110 MMHG

## 2022-06-27 DIAGNOSIS — K22.2 ESOPHAGEAL STRICTURE: Primary | ICD-10-CM

## 2022-06-27 PROCEDURE — 99213 OFFICE O/P EST LOW 20 MIN: CPT | Performed by: FAMILY MEDICINE

## 2022-06-27 RX ORDER — LEVOCETIRIZINE DIHYDROCHLORIDE 5 MG/1
5 TABLET, FILM COATED ORAL EVERY EVENING
COMMUNITY
End: 2022-07-22 | Stop reason: HOSPADM

## 2022-06-27 RX ORDER — CHOLESTYRAMINE LIGHT 4 G/5.7G
4 POWDER, FOR SUSPENSION ORAL DAILY PRN
COMMUNITY
End: 2022-07-22 | Stop reason: HOSPADM

## 2022-06-27 RX ORDER — PANTOPRAZOLE SODIUM 40 MG/1
40 TABLET, DELAYED RELEASE ORAL DAILY
Qty: 90 TABLET | Refills: 1 | Status: SHIPPED | OUTPATIENT
Start: 2022-06-27 | End: 2022-07-22 | Stop reason: HOSPADM

## 2022-06-27 RX ORDER — PANTOPRAZOLE SODIUM 40 MG/1
TABLET, DELAYED RELEASE ORAL
Qty: 90 TABLET | Refills: 3 | OUTPATIENT
Start: 2022-06-27

## 2022-06-27 RX ORDER — MAGNESIUM OXIDE 400 MG/1
400 TABLET ORAL DAILY
COMMUNITY

## 2022-06-27 RX ORDER — IPRATROPIUM BROMIDE 21 UG/1
2 SPRAY, METERED NASAL EVERY 12 HOURS
Qty: 30 ML | Refills: 12 | Status: SHIPPED | OUTPATIENT
Start: 2022-06-27 | End: 2022-07-22 | Stop reason: HOSPADM

## 2022-06-27 NOTE — TELEPHONE ENCOUNTER
The original prescription was discontinued on 11/16/2021 by Garret Salinas MD for the following reason: Discontinued by another clinician. Renewing this prescription may not be appropriate.

## 2022-06-27 NOTE — PROGRESS NOTES
Subjective   Samy Velazquez is a 77 y.o. male.     77-year-old male with recurrent esophageal stricture and DJD      The following portions of the patient's history were reviewed and updated as appropriate: allergies, current medications, past family history, past medical history, past social history, past surgical history and problem list.    Review of Systems   HENT: Positive for rhinorrhea.    Respiratory: Negative for shortness of breath.    Cardiovascular: Negative for chest pain and leg swelling.   Gastrointestinal:        Severe GERD with stricture   Musculoskeletal: Positive for arthralgias and neck pain.       Objective   Physical Exam  Vitals and nursing note reviewed.   Constitutional:       Appearance: He is obese.   Eyes:      Extraocular Movements: Extraocular movements intact.      Pupils: Pupils are equal, round, and reactive to light.   Cardiovascular:      Rate and Rhythm: Normal rate and regular rhythm.   Pulmonary:      Effort: Pulmonary effort is normal.      Breath sounds: Normal breath sounds.   Musculoskeletal:      Right lower leg: No edema.      Left lower leg: No edema.   Skin:     General: Skin is warm and dry.   Neurological:      General: No focal deficit present.      Mental Status: He is alert and oriented to person, place, and time.   Psychiatric:         Mood and Affect: Mood normal.         Behavior: Behavior normal.         Thought Content: Thought content normal.         Judgment: Judgment normal.         Assessment & Plan   Diagnoses and all orders for this visit:    1. Esophageal stricture (Primary)  -     Ambulatory Referral to General Surgery    Other orders  -     pantoprazole (PROTONIX) 40 MG EC tablet; Take 1 tablet by mouth Daily.  Dispense: 90 tablet; Refill: 1  -     ipratropium (ATROVENT) 0.03 % nasal spray; 2 sprays into the nostril(s) as directed by provider Every 12 (Twelve) Hours.  Dispense: 30 mL; Refill: 12         Plan above

## 2022-07-13 ENCOUNTER — TELEPHONE (OUTPATIENT)
Dept: FAMILY MEDICINE CLINIC | Facility: CLINIC | Age: 77
End: 2022-07-13

## 2022-07-13 NOTE — TELEPHONE ENCOUNTER
Caller: Beth Velazquez    Relationship to patient: Emergency Contact    Best call back number: [phone]    Patient is needing a referral to urology for prostate issues.     PH: [phone]

## 2022-07-14 DIAGNOSIS — R33.9 URINARY RETENTION: Primary | ICD-10-CM

## 2022-07-15 ENCOUNTER — TELEPHONE (OUTPATIENT)
Dept: FAMILY MEDICINE CLINIC | Facility: CLINIC | Age: 77
End: 2022-07-15

## 2022-07-15 NOTE — TELEPHONE ENCOUNTER
Pt has gained 5 lbs---weighed 200 on wed and 205 on w.th,fr.  Cath is working-urine output is good but he has not lost the 5 lbs. At least 32 ounces each empyting three times per day.    Gave metolazone on wed and then again today but still has not lost the 5 lbs.  Per kidney md he said to give this med every other day.  Please advise.

## 2022-07-18 ENCOUNTER — APPOINTMENT (OUTPATIENT)
Dept: GENERAL RADIOLOGY | Facility: HOSPITAL | Age: 77
End: 2022-07-18

## 2022-07-18 ENCOUNTER — APPOINTMENT (OUTPATIENT)
Dept: CT IMAGING | Facility: HOSPITAL | Age: 77
End: 2022-07-18

## 2022-07-18 ENCOUNTER — HOSPITAL ENCOUNTER (INPATIENT)
Facility: HOSPITAL | Age: 77
LOS: 3 days | Discharge: NURSING, REHAB OR LONG-TERM CARE | End: 2022-07-22
Attending: FAMILY MEDICINE | Admitting: FAMILY MEDICINE

## 2022-07-18 DIAGNOSIS — N39.0 ACUTE UTI: ICD-10-CM

## 2022-07-18 DIAGNOSIS — R13.10 DYSPHAGIA, UNSPECIFIED TYPE: ICD-10-CM

## 2022-07-18 DIAGNOSIS — R93.0 ABNORMAL CT SCAN OF HEAD: ICD-10-CM

## 2022-07-18 DIAGNOSIS — Z74.09 IMPAIRED MOBILITY: ICD-10-CM

## 2022-07-18 DIAGNOSIS — Z78.9 DECREASED ACTIVITIES OF DAILY LIVING (ADL): ICD-10-CM

## 2022-07-18 DIAGNOSIS — R29.898 WEAKNESS OF RIGHT LOWER EXTREMITY: Primary | ICD-10-CM

## 2022-07-18 LAB
ALBUMIN SERPL-MCNC: 3.7 G/DL (ref 3.5–5.2)
ALBUMIN/GLOB SERPL: 1 G/DL
ALP SERPL-CCNC: 128 U/L (ref 39–117)
ALT SERPL W P-5'-P-CCNC: 18 U/L (ref 1–41)
AMMONIA BLD-SCNC: 21 UMOL/L (ref 16–60)
ANION GAP SERPL CALCULATED.3IONS-SCNC: 9 MMOL/L (ref 5–15)
ARTERIAL PATENCY WRIST A: ABNORMAL
AST SERPL-CCNC: 26 U/L (ref 1–40)
ATMOSPHERIC PRESS: 749 MMHG
BACTERIA UR QL AUTO: ABNORMAL /HPF
BACTERIA UR QL AUTO: ABNORMAL /HPF
BASE EXCESS BLDA CALC-SCNC: 1.8 MMOL/L (ref 0–2)
BASOPHILS # BLD AUTO: 0.08 10*3/MM3 (ref 0–0.2)
BASOPHILS NFR BLD AUTO: 1.1 % (ref 0–1.5)
BDY SITE: ABNORMAL
BILIRUB SERPL-MCNC: 0.4 MG/DL (ref 0–1.2)
BILIRUB UR QL STRIP: NEGATIVE
BILIRUB UR QL STRIP: NEGATIVE
BODY TEMPERATURE: 37 C
BUN SERPL-MCNC: 23 MG/DL (ref 8–23)
BUN/CREAT SERPL: 11.3 (ref 7–25)
CALCIUM SPEC-SCNC: 10.3 MG/DL (ref 8.6–10.5)
CHLORIDE SERPL-SCNC: 100 MMOL/L (ref 98–107)
CLARITY UR: CLEAR
CLARITY UR: CLEAR
CO2 SERPL-SCNC: 28 MMOL/L (ref 22–29)
COLOR UR: YELLOW
COLOR UR: YELLOW
CREAT SERPL-MCNC: 2.04 MG/DL (ref 0.76–1.27)
D-LACTATE SERPL-SCNC: 2 MMOL/L (ref 0.5–2)
DEPRECATED RDW RBC AUTO: 47.2 FL (ref 37–54)
EGFRCR SERPLBLD CKD-EPI 2021: 33.4 ML/MIN/1.73
EOSINOPHIL # BLD AUTO: 0.56 10*3/MM3 (ref 0–0.4)
EOSINOPHIL NFR BLD AUTO: 7.8 % (ref 0.3–6.2)
ERYTHROCYTE [DISTWIDTH] IN BLOOD BY AUTOMATED COUNT: 14.2 % (ref 12.3–15.4)
GAS FLOW AIRWAY: 2 LPM
GLOBULIN UR ELPH-MCNC: 3.7 GM/DL
GLUCOSE SERPL-MCNC: 151 MG/DL (ref 65–99)
GLUCOSE UR STRIP-MCNC: NEGATIVE MG/DL
GLUCOSE UR STRIP-MCNC: NEGATIVE MG/DL
HCO3 BLDA-SCNC: 26.8 MMOL/L (ref 20–26)
HCT VFR BLD AUTO: 48.9 % (ref 37.5–51)
HGB BLD-MCNC: 15.6 G/DL (ref 13–17.7)
HGB UR QL STRIP.AUTO: ABNORMAL
HGB UR QL STRIP.AUTO: ABNORMAL
HYALINE CASTS UR QL AUTO: ABNORMAL /LPF
HYALINE CASTS UR QL AUTO: ABNORMAL /LPF
IMM GRANULOCYTES # BLD AUTO: 0.02 10*3/MM3 (ref 0–0.05)
IMM GRANULOCYTES NFR BLD AUTO: 0.3 % (ref 0–0.5)
KETONES UR QL STRIP: NEGATIVE
KETONES UR QL STRIP: NEGATIVE
LEUKOCYTE ESTERASE UR QL STRIP.AUTO: ABNORMAL
LEUKOCYTE ESTERASE UR QL STRIP.AUTO: ABNORMAL
LYMPHOCYTES # BLD AUTO: 1.78 10*3/MM3 (ref 0.7–3.1)
LYMPHOCYTES NFR BLD AUTO: 24.7 % (ref 19.6–45.3)
Lab: ABNORMAL
MAGNESIUM SERPL-MCNC: 1.9 MG/DL (ref 1.6–2.4)
MCH RBC QN AUTO: 29.3 PG (ref 26.6–33)
MCHC RBC AUTO-ENTMCNC: 31.9 G/DL (ref 31.5–35.7)
MCV RBC AUTO: 91.7 FL (ref 79–97)
MODALITY: ABNORMAL
MONOCYTES # BLD AUTO: 0.81 10*3/MM3 (ref 0.1–0.9)
MONOCYTES NFR BLD AUTO: 11.3 % (ref 5–12)
NEUTROPHILS NFR BLD AUTO: 3.95 10*3/MM3 (ref 1.7–7)
NEUTROPHILS NFR BLD AUTO: 54.8 % (ref 42.7–76)
NITRITE UR QL STRIP: NEGATIVE
NITRITE UR QL STRIP: NEGATIVE
NRBC BLD AUTO-RTO: 0 /100 WBC (ref 0–0.2)
NT-PROBNP SERPL-MCNC: 567.4 PG/ML (ref 0–1800)
PCO2 BLDA: 42.2 MM HG (ref 35–45)
PCO2 TEMP ADJ BLD: 42.2 MM HG (ref 35–45)
PH BLDA: 7.41 PH UNITS (ref 7.35–7.45)
PH UR STRIP.AUTO: 5.5 [PH] (ref 5–8)
PH UR STRIP.AUTO: <=5 [PH] (ref 5–8)
PH, TEMP CORRECTED: 7.41 PH UNITS (ref 7.35–7.45)
PLATELET # BLD AUTO: 220 10*3/MM3 (ref 140–450)
PMV BLD AUTO: 10.3 FL (ref 6–12)
PO2 BLDA: 96.1 MM HG (ref 83–108)
PO2 TEMP ADJ BLD: 96.1 MM HG (ref 83–108)
POTASSIUM SERPL-SCNC: 4.7 MMOL/L (ref 3.5–5.2)
PROCALCITONIN SERPL-MCNC: 0.05 NG/ML (ref 0–0.25)
PROT SERPL-MCNC: 7.4 G/DL (ref 6–8.5)
PROT UR QL STRIP: NEGATIVE
PROT UR QL STRIP: NEGATIVE
RBC # BLD AUTO: 5.33 10*6/MM3 (ref 4.14–5.8)
RBC # UR STRIP: ABNORMAL /HPF
RBC # UR STRIP: ABNORMAL /HPF
REF LAB TEST METHOD: ABNORMAL
REF LAB TEST METHOD: ABNORMAL
SAO2 % BLDCOA: 98.3 % (ref 94–99)
SARS-COV-2 RNA PNL SPEC NAA+PROBE: NOT DETECTED
SODIUM SERPL-SCNC: 137 MMOL/L (ref 136–145)
SP GR UR STRIP: 1.01 (ref 1–1.03)
SP GR UR STRIP: 1.01 (ref 1–1.03)
SQUAMOUS #/AREA URNS HPF: ABNORMAL /HPF
SQUAMOUS #/AREA URNS HPF: ABNORMAL /HPF
T3FREE SERPL-MCNC: 2.08 PG/ML (ref 2–4.4)
T4 FREE SERPL-MCNC: 1.14 NG/DL (ref 0.93–1.7)
TROPONIN T SERPL-MCNC: <0.01 NG/ML (ref 0–0.03)
TSH SERPL DL<=0.05 MIU/L-ACNC: 2.56 UIU/ML (ref 0.27–4.2)
UROBILINOGEN UR QL STRIP: ABNORMAL
UROBILINOGEN UR QL STRIP: ABNORMAL
VENTILATOR MODE: ABNORMAL
WBC # UR STRIP: ABNORMAL /HPF
WBC # UR STRIP: ABNORMAL /HPF
WBC NRBC COR # BLD: 7.2 10*3/MM3 (ref 3.4–10.8)

## 2022-07-18 PROCEDURE — 84145 PROCALCITONIN (PCT): CPT | Performed by: PHYSICIAN ASSISTANT

## 2022-07-18 PROCEDURE — 25010000002 CEFTRIAXONE PER 250 MG: Performed by: EMERGENCY MEDICINE

## 2022-07-18 PROCEDURE — 84484 ASSAY OF TROPONIN QUANT: CPT | Performed by: PHYSICIAN ASSISTANT

## 2022-07-18 PROCEDURE — 85025 COMPLETE CBC W/AUTO DIFF WBC: CPT | Performed by: PHYSICIAN ASSISTANT

## 2022-07-18 PROCEDURE — 83880 ASSAY OF NATRIURETIC PEPTIDE: CPT | Performed by: PHYSICIAN ASSISTANT

## 2022-07-18 PROCEDURE — 82803 BLOOD GASES ANY COMBINATION: CPT

## 2022-07-18 PROCEDURE — 99284 EMERGENCY DEPT VISIT MOD MDM: CPT

## 2022-07-18 PROCEDURE — 83735 ASSAY OF MAGNESIUM: CPT | Performed by: PHYSICIAN ASSISTANT

## 2022-07-18 PROCEDURE — 81001 URINALYSIS AUTO W/SCOPE: CPT | Performed by: FAMILY MEDICINE

## 2022-07-18 PROCEDURE — 87635 SARS-COV-2 COVID-19 AMP PRB: CPT | Performed by: PHYSICIAN ASSISTANT

## 2022-07-18 PROCEDURE — 71045 X-RAY EXAM CHEST 1 VIEW: CPT

## 2022-07-18 PROCEDURE — 84481 FREE ASSAY (FT-3): CPT | Performed by: PHYSICIAN ASSISTANT

## 2022-07-18 PROCEDURE — 83605 ASSAY OF LACTIC ACID: CPT | Performed by: PHYSICIAN ASSISTANT

## 2022-07-18 PROCEDURE — 84443 ASSAY THYROID STIM HORMONE: CPT | Performed by: PHYSICIAN ASSISTANT

## 2022-07-18 PROCEDURE — G0378 HOSPITAL OBSERVATION PER HR: HCPCS

## 2022-07-18 PROCEDURE — 70450 CT HEAD/BRAIN W/O DYE: CPT

## 2022-07-18 PROCEDURE — 36600 WITHDRAWAL OF ARTERIAL BLOOD: CPT

## 2022-07-18 PROCEDURE — 87040 BLOOD CULTURE FOR BACTERIA: CPT | Performed by: PHYSICIAN ASSISTANT

## 2022-07-18 PROCEDURE — 81001 URINALYSIS AUTO W/SCOPE: CPT | Performed by: PHYSICIAN ASSISTANT

## 2022-07-18 PROCEDURE — 93005 ELECTROCARDIOGRAM TRACING: CPT | Performed by: PHYSICIAN ASSISTANT

## 2022-07-18 PROCEDURE — 84439 ASSAY OF FREE THYROXINE: CPT | Performed by: PHYSICIAN ASSISTANT

## 2022-07-18 PROCEDURE — 80053 COMPREHEN METABOLIC PANEL: CPT | Performed by: PHYSICIAN ASSISTANT

## 2022-07-18 PROCEDURE — 82140 ASSAY OF AMMONIA: CPT | Performed by: PHYSICIAN ASSISTANT

## 2022-07-18 RX ORDER — NITROGLYCERIN 0.4 MG/1
0.4 TABLET SUBLINGUAL
Status: DISCONTINUED | OUTPATIENT
Start: 2022-07-18 | End: 2022-07-22 | Stop reason: HOSPADM

## 2022-07-18 RX ORDER — ATORVASTATIN CALCIUM 40 MG/1
40 TABLET, FILM COATED ORAL DAILY
Status: DISCONTINUED | OUTPATIENT
Start: 2022-07-19 | End: 2022-07-22 | Stop reason: HOSPADM

## 2022-07-18 RX ORDER — ONDANSETRON 2 MG/ML
4 INJECTION INTRAMUSCULAR; INTRAVENOUS EVERY 6 HOURS PRN
Status: DISCONTINUED | OUTPATIENT
Start: 2022-07-18 | End: 2022-07-22 | Stop reason: HOSPADM

## 2022-07-18 RX ORDER — SODIUM CHLORIDE 0.9 % (FLUSH) 0.9 %
10 SYRINGE (ML) INJECTION AS NEEDED
Status: DISCONTINUED | OUTPATIENT
Start: 2022-07-18 | End: 2022-07-22 | Stop reason: HOSPADM

## 2022-07-18 RX ORDER — LEVOTHYROXINE SODIUM 0.05 MG/1
50 TABLET ORAL
Status: DISCONTINUED | OUTPATIENT
Start: 2022-07-19 | End: 2022-07-22 | Stop reason: HOSPADM

## 2022-07-18 RX ORDER — SODIUM CHLORIDE 9 MG/ML
75 INJECTION, SOLUTION INTRAVENOUS CONTINUOUS
Status: DISCONTINUED | OUTPATIENT
Start: 2022-07-18 | End: 2022-07-18

## 2022-07-18 RX ORDER — OXYCODONE HYDROCHLORIDE AND ACETAMINOPHEN 5; 325 MG/1; MG/1
1 TABLET ORAL EVERY 4 HOURS PRN
Status: DISCONTINUED | OUTPATIENT
Start: 2022-07-18 | End: 2022-07-22 | Stop reason: HOSPADM

## 2022-07-18 RX ORDER — ISOSORBIDE MONONITRATE 30 MG/1
30 TABLET, EXTENDED RELEASE ORAL
Status: DISCONTINUED | OUTPATIENT
Start: 2022-07-19 | End: 2022-07-22 | Stop reason: HOSPADM

## 2022-07-18 RX ORDER — CLOPIDOGREL BISULFATE 75 MG/1
75 TABLET ORAL DAILY
Status: DISCONTINUED | OUTPATIENT
Start: 2022-07-19 | End: 2022-07-21

## 2022-07-18 RX ORDER — MELATONIN
2000 DAILY
Status: DISCONTINUED | OUTPATIENT
Start: 2022-07-19 | End: 2022-07-22 | Stop reason: HOSPADM

## 2022-07-18 RX ORDER — MEMANTINE HYDROCHLORIDE 5 MG/1
10 TABLET ORAL 2 TIMES DAILY
Status: DISCONTINUED | OUTPATIENT
Start: 2022-07-18 | End: 2022-07-22 | Stop reason: HOSPADM

## 2022-07-18 RX ORDER — ACETAMINOPHEN 500 MG
500 TABLET ORAL EVERY 4 HOURS PRN
Status: DISCONTINUED | OUTPATIENT
Start: 2022-07-18 | End: 2022-07-22 | Stop reason: HOSPADM

## 2022-07-18 RX ORDER — BUPROPION HYDROCHLORIDE 150 MG/1
150 TABLET ORAL DAILY
Status: DISCONTINUED | OUTPATIENT
Start: 2022-07-19 | End: 2022-07-22 | Stop reason: HOSPADM

## 2022-07-18 RX ORDER — ACETAMINOPHEN 500 MG
500 TABLET ORAL EVERY 4 HOURS PRN
Status: DISCONTINUED | OUTPATIENT
Start: 2022-07-18 | End: 2022-07-18

## 2022-07-18 RX ORDER — FAMOTIDINE 20 MG/1
20 TABLET, FILM COATED ORAL 2 TIMES DAILY
Status: DISCONTINUED | OUTPATIENT
Start: 2022-07-18 | End: 2022-07-19

## 2022-07-18 RX ORDER — ASPIRIN 81 MG/1
81 TABLET ORAL 2 TIMES DAILY
Status: DISCONTINUED | OUTPATIENT
Start: 2022-07-19 | End: 2022-07-22 | Stop reason: HOSPADM

## 2022-07-18 RX ORDER — ONDANSETRON 2 MG/ML
4 INJECTION INTRAMUSCULAR; INTRAVENOUS EVERY 6 HOURS PRN
Status: DISCONTINUED | OUTPATIENT
Start: 2022-07-18 | End: 2022-07-18 | Stop reason: SDUPTHER

## 2022-07-18 RX ORDER — MEXILETINE HYDROCHLORIDE 150 MG/1
150 CAPSULE ORAL 2 TIMES DAILY
Status: DISCONTINUED | OUTPATIENT
Start: 2022-07-18 | End: 2022-07-22 | Stop reason: HOSPADM

## 2022-07-18 RX ORDER — METOPROLOL SUCCINATE 25 MG/1
12.5 TABLET, EXTENDED RELEASE ORAL DAILY
Status: DISCONTINUED | OUTPATIENT
Start: 2022-07-19 | End: 2022-07-22 | Stop reason: HOSPADM

## 2022-07-18 RX ORDER — SODIUM CHLORIDE 9 MG/ML
50 INJECTION, SOLUTION INTRAVENOUS CONTINUOUS
Status: DISCONTINUED | OUTPATIENT
Start: 2022-07-18 | End: 2022-07-22 | Stop reason: HOSPADM

## 2022-07-18 RX ORDER — SODIUM CHLORIDE 0.9 % (FLUSH) 0.9 %
10 SYRINGE (ML) INJECTION EVERY 12 HOURS SCHEDULED
Status: DISCONTINUED | OUTPATIENT
Start: 2022-07-18 | End: 2022-07-22 | Stop reason: HOSPADM

## 2022-07-18 RX ORDER — DOCUSATE SODIUM 100 MG/1
100 CAPSULE, LIQUID FILLED ORAL DAILY
Status: DISCONTINUED | OUTPATIENT
Start: 2022-07-18 | End: 2022-07-20

## 2022-07-18 RX ADMIN — SODIUM CHLORIDE 75 ML/HR: 9 INJECTION, SOLUTION INTRAVENOUS at 13:45

## 2022-07-18 RX ADMIN — SODIUM CHLORIDE 50 ML/HR: 9 INJECTION, SOLUTION INTRAVENOUS at 18:16

## 2022-07-18 RX ADMIN — SODIUM CHLORIDE 1 G: 9 INJECTION, SOLUTION INTRAVENOUS at 15:55

## 2022-07-19 ENCOUNTER — APPOINTMENT (OUTPATIENT)
Dept: CARDIOLOGY | Facility: HOSPITAL | Age: 77
End: 2022-07-19

## 2022-07-19 ENCOUNTER — APPOINTMENT (OUTPATIENT)
Dept: ULTRASOUND IMAGING | Facility: HOSPITAL | Age: 77
End: 2022-07-19

## 2022-07-19 ENCOUNTER — APPOINTMENT (OUTPATIENT)
Dept: GENERAL RADIOLOGY | Facility: HOSPITAL | Age: 77
End: 2022-07-19

## 2022-07-19 LAB
ALBUMIN SERPL-MCNC: 3.2 G/DL (ref 3.5–5.2)
ALBUMIN/GLOB SERPL: 1 G/DL
ALP SERPL-CCNC: 113 U/L (ref 39–117)
ALT SERPL W P-5'-P-CCNC: 15 U/L (ref 1–41)
ANION GAP SERPL CALCULATED.3IONS-SCNC: 9 MMOL/L (ref 5–15)
AST SERPL-CCNC: 22 U/L (ref 1–40)
BH CV ECHO MEAS - ACS: 1.5 CM
BH CV ECHO MEAS - AO MAX PG: 6.1 MMHG
BH CV ECHO MEAS - AO MEAN PG: 3 MMHG
BH CV ECHO MEAS - AO ROOT DIAM: 3 CM
BH CV ECHO MEAS - AO V2 MAX: 123 CM/SEC
BH CV ECHO MEAS - AO V2 VTI: 27 CM
BH CV ECHO MEAS - AVA(I,D): 1.03 CM2
BH CV ECHO MEAS - EDV(CUBED): 136.6 ML
BH CV ECHO MEAS - EDV(MOD-SP4): 150 ML
BH CV ECHO MEAS - EF(MOD-SP4): 54.8 %
BH CV ECHO MEAS - ESV(CUBED): 76.8 ML
BH CV ECHO MEAS - ESV(MOD-SP4): 67.8 ML
BH CV ECHO MEAS - FS: 17.5 %
BH CV ECHO MEAS - IVS/LVPW: 0.77 CM
BH CV ECHO MEAS - IVSD: 0.93 CM
BH CV ECHO MEAS - LA DIMENSION: 4.1 CM
BH CV ECHO MEAS - LAT PEAK E' VEL: 10.6 CM/SEC
BH CV ECHO MEAS - LV DIASTOLIC VOL/BSA (35-75): 73.9 CM2
BH CV ECHO MEAS - LV MASS(C)D: 209.6 GRAMS
BH CV ECHO MEAS - LV MAX PG: 1.85 MMHG
BH CV ECHO MEAS - LV MEAN PG: 1 MMHG
BH CV ECHO MEAS - LV SYSTOLIC VOL/BSA (12-30): 33.4 CM2
BH CV ECHO MEAS - LV V1 MAX: 68 CM/SEC
BH CV ECHO MEAS - LV V1 VTI: 12.2 CM
BH CV ECHO MEAS - LVIDD: 5.2 CM
BH CV ECHO MEAS - LVIDS: 4.3 CM
BH CV ECHO MEAS - LVOT AREA: 2.27 CM2
BH CV ECHO MEAS - LVOT DIAM: 1.7 CM
BH CV ECHO MEAS - LVPWD: 1.21 CM
BH CV ECHO MEAS - MED PEAK E' VEL: 3.6 CM/SEC
BH CV ECHO MEAS - MV A MAX VEL: 73.2 CM/SEC
BH CV ECHO MEAS - MV DEC SLOPE: 107.3 CM/SEC2
BH CV ECHO MEAS - MV DEC TIME: 0.41 MSEC
BH CV ECHO MEAS - MV E MAX VEL: 34.9 CM/SEC
BH CV ECHO MEAS - MV E/A: 0.48
BH CV ECHO MEAS - MV MAX PG: 4.4 MMHG
BH CV ECHO MEAS - MV MEAN PG: 1 MMHG
BH CV ECHO MEAS - MV P1/2T: 155.7 MSEC
BH CV ECHO MEAS - MV V2 VTI: 23.5 CM
BH CV ECHO MEAS - MVA(P1/2T): 1.41 CM2
BH CV ECHO MEAS - MVA(VTI): 1.18 CM2
BH CV ECHO MEAS - RAP SYSTOLE: 10 MMHG
BH CV ECHO MEAS - RVDD: 2.21 CM
BH CV ECHO MEAS - RVSP: 21.3 MMHG
BH CV ECHO MEAS - SI(MOD-SP4): 40.5 ML/M2
BH CV ECHO MEAS - SV(LVOT): 27.7 ML
BH CV ECHO MEAS - SV(MOD-SP4): 82.2 ML
BH CV ECHO MEAS - TAPSE (>1.6): 1.38 CM
BH CV ECHO MEAS - TR MAX PG: 11.3 MMHG
BH CV ECHO MEAS - TR MAX VEL: 168 CM/SEC
BH CV ECHO MEASUREMENTS AVERAGE E/E' RATIO: 4.92
BH CV XLRA - TDI S': 9.6 CM/SEC
BILIRUB SERPL-MCNC: 0.5 MG/DL (ref 0–1.2)
BUN SERPL-MCNC: 20 MG/DL (ref 8–23)
BUN/CREAT SERPL: 10.6 (ref 7–25)
CALCIUM SPEC-SCNC: 9.5 MG/DL (ref 8.6–10.5)
CHLORIDE SERPL-SCNC: 104 MMOL/L (ref 98–107)
CHOLEST SERPL-MCNC: 125 MG/DL (ref 0–200)
CO2 SERPL-SCNC: 25 MMOL/L (ref 22–29)
CREAT SERPL-MCNC: 1.88 MG/DL (ref 0.76–1.27)
DEPRECATED RDW RBC AUTO: 47.1 FL (ref 37–54)
EGFRCR SERPLBLD CKD-EPI 2021: 36.8 ML/MIN/1.73
ERYTHROCYTE [DISTWIDTH] IN BLOOD BY AUTOMATED COUNT: 14.3 % (ref 12.3–15.4)
GLOBULIN UR ELPH-MCNC: 3.1 GM/DL
GLUCOSE BLDC GLUCOMTR-MCNC: 72 MG/DL (ref 70–130)
GLUCOSE BLDC GLUCOMTR-MCNC: 88 MG/DL (ref 70–130)
GLUCOSE BLDC GLUCOMTR-MCNC: 89 MG/DL (ref 70–130)
GLUCOSE SERPL-MCNC: 84 MG/DL (ref 65–99)
HBA1C MFR BLD: 6 % (ref 4.8–5.6)
HCT VFR BLD AUTO: 45.2 % (ref 37.5–51)
HDLC SERPL-MCNC: 40 MG/DL (ref 40–60)
HGB BLD-MCNC: 14.5 G/DL (ref 13–17.7)
LDLC SERPL CALC-MCNC: 57 MG/DL (ref 0–100)
LDLC/HDLC SERPL: 1.29 {RATIO}
MAXIMAL PREDICTED HEART RATE: 145 BPM
MCH RBC QN AUTO: 29.3 PG (ref 26.6–33)
MCHC RBC AUTO-ENTMCNC: 32.1 G/DL (ref 31.5–35.7)
MCV RBC AUTO: 91.3 FL (ref 79–97)
PLATELET # BLD AUTO: 199 10*3/MM3 (ref 140–450)
PMV BLD AUTO: 10 FL (ref 6–12)
POTASSIUM SERPL-SCNC: 4.3 MMOL/L (ref 3.5–5.2)
PROT SERPL-MCNC: 6.3 G/DL (ref 6–8.5)
RBC # BLD AUTO: 4.95 10*6/MM3 (ref 4.14–5.8)
SODIUM SERPL-SCNC: 138 MMOL/L (ref 136–145)
STRESS TARGET HR: 123 BPM
TRIGL SERPL-MCNC: 167 MG/DL (ref 0–150)
TSH SERPL DL<=0.05 MIU/L-ACNC: 2.2 UIU/ML (ref 0.27–4.2)
VLDLC SERPL-MCNC: 28 MG/DL (ref 5–40)
WBC NRBC COR # BLD: 7.54 10*3/MM3 (ref 3.4–10.8)

## 2022-07-19 PROCEDURE — 82962 GLUCOSE BLOOD TEST: CPT

## 2022-07-19 PROCEDURE — 80061 LIPID PANEL: CPT | Performed by: FAMILY MEDICINE

## 2022-07-19 PROCEDURE — 93005 ELECTROCARDIOGRAM TRACING: CPT | Performed by: FAMILY MEDICINE

## 2022-07-19 PROCEDURE — 74230 X-RAY XM SWLNG FUNCJ C+: CPT

## 2022-07-19 PROCEDURE — 99223 1ST HOSP IP/OBS HIGH 75: CPT | Performed by: PSYCHIATRY & NEUROLOGY

## 2022-07-19 PROCEDURE — 99221 1ST HOSP IP/OBS SF/LOW 40: CPT | Performed by: UROLOGY

## 2022-07-19 PROCEDURE — 83036 HEMOGLOBIN GLYCOSYLATED A1C: CPT | Performed by: FAMILY MEDICINE

## 2022-07-19 PROCEDURE — 84443 ASSAY THYROID STIM HORMONE: CPT | Performed by: FAMILY MEDICINE

## 2022-07-19 PROCEDURE — 93306 TTE W/DOPPLER COMPLETE: CPT

## 2022-07-19 PROCEDURE — 93010 ELECTROCARDIOGRAM REPORT: CPT | Performed by: INTERNAL MEDICINE

## 2022-07-19 PROCEDURE — 93880 EXTRACRANIAL BILAT STUDY: CPT

## 2022-07-19 PROCEDURE — 97162 PT EVAL MOD COMPLEX 30 MIN: CPT

## 2022-07-19 PROCEDURE — 92610 EVALUATE SWALLOWING FUNCTION: CPT | Performed by: SPEECH-LANGUAGE PATHOLOGIST

## 2022-07-19 PROCEDURE — 80053 COMPREHEN METABOLIC PANEL: CPT | Performed by: FAMILY MEDICINE

## 2022-07-19 PROCEDURE — 85027 COMPLETE CBC AUTOMATED: CPT | Performed by: FAMILY MEDICINE

## 2022-07-19 PROCEDURE — 93306 TTE W/DOPPLER COMPLETE: CPT | Performed by: INTERNAL MEDICINE

## 2022-07-19 PROCEDURE — 97165 OT EVAL LOW COMPLEX 30 MIN: CPT

## 2022-07-19 PROCEDURE — 92611 MOTION FLUOROSCOPY/SWALLOW: CPT

## 2022-07-19 PROCEDURE — 93880 EXTRACRANIAL BILAT STUDY: CPT | Performed by: SURGERY

## 2022-07-19 PROCEDURE — 25010000002 CEFTRIAXONE PER 250 MG: Performed by: FAMILY MEDICINE

## 2022-07-19 PROCEDURE — 25010000002 PERFLUTREN 6.52 MG/ML SUSPENSION: Performed by: FAMILY MEDICINE

## 2022-07-19 RX ORDER — MEMANTINE HYDROCHLORIDE 10 MG/1
10 TABLET ORAL 2 TIMES DAILY
Status: ON HOLD | COMMUNITY
End: 2022-07-22 | Stop reason: SDUPTHER

## 2022-07-19 RX ORDER — LEVOTHYROXINE SODIUM 0.05 MG/1
50 TABLET ORAL DAILY
COMMUNITY
End: 2023-03-24

## 2022-07-19 RX ORDER — DONEPEZIL HYDROCHLORIDE 10 MG/1
10 TABLET, FILM COATED ORAL DAILY
COMMUNITY
End: 2022-07-22 | Stop reason: HOSPADM

## 2022-07-19 RX ORDER — ISOSORBIDE MONONITRATE 60 MG/1
60 TABLET, EXTENDED RELEASE ORAL DAILY
COMMUNITY
End: 2022-07-22 | Stop reason: HOSPADM

## 2022-07-19 RX ORDER — NITROGLYCERIN 0.4 MG/1
0.4 TABLET SUBLINGUAL
COMMUNITY
End: 2022-12-12 | Stop reason: SDUPTHER

## 2022-07-19 RX ORDER — FAMOTIDINE 20 MG/1
20 TABLET, FILM COATED ORAL DAILY
Status: DISCONTINUED | OUTPATIENT
Start: 2022-07-20 | End: 2022-07-22

## 2022-07-19 RX ORDER — TAMSULOSIN HYDROCHLORIDE 0.4 MG/1
0.4 CAPSULE ORAL DAILY
Status: DISCONTINUED | OUTPATIENT
Start: 2022-07-19 | End: 2022-07-22 | Stop reason: HOSPADM

## 2022-07-19 RX ORDER — MEXILETINE HYDROCHLORIDE 150 MG/1
150 CAPSULE ORAL 4 TIMES DAILY
Status: ON HOLD | COMMUNITY
End: 2022-07-22 | Stop reason: SDUPTHER

## 2022-07-19 RX ORDER — CLOPIDOGREL BISULFATE 75 MG/1
75 TABLET ORAL DAILY
COMMUNITY
End: 2022-07-22 | Stop reason: HOSPADM

## 2022-07-19 RX ORDER — FAMOTIDINE 20 MG/1
20 TABLET, FILM COATED ORAL 2 TIMES DAILY
COMMUNITY
End: 2023-03-13

## 2022-07-19 RX ORDER — ATORVASTATIN CALCIUM 40 MG/1
40 TABLET, FILM COATED ORAL NIGHTLY
COMMUNITY

## 2022-07-19 RX ADMIN — SODIUM CHLORIDE 50 ML/HR: 9 INJECTION, SOLUTION INTRAVENOUS at 18:05

## 2022-07-19 RX ADMIN — SODIUM CHLORIDE 1 G: 9 INJECTION, SOLUTION INTRAVENOUS at 15:17

## 2022-07-19 RX ADMIN — MEXILETINE HYDROCHLORIDE 150 MG: 150 CAPSULE ORAL at 21:24

## 2022-07-19 RX ADMIN — PERFLUTREN 2 ML: 6.52 INJECTION, SUSPENSION INTRAVENOUS at 10:01

## 2022-07-19 RX ADMIN — Medication 10 ML: at 21:24

## 2022-07-19 RX ADMIN — ASPIRIN 81 MG: 81 TABLET, COATED ORAL at 21:24

## 2022-07-19 RX ADMIN — MEMANTINE HYDROCHLORIDE 10 MG: 5 TABLET, FILM COATED ORAL at 21:24

## 2022-07-19 RX ADMIN — Medication 10 ML: at 00:30

## 2022-07-20 ENCOUNTER — APPOINTMENT (OUTPATIENT)
Dept: CT IMAGING | Facility: HOSPITAL | Age: 77
End: 2022-07-20

## 2022-07-20 LAB
ANION GAP SERPL CALCULATED.3IONS-SCNC: 8 MMOL/L (ref 5–15)
BUN SERPL-MCNC: 20 MG/DL (ref 8–23)
BUN/CREAT SERPL: 10.5 (ref 7–25)
CALCIUM SPEC-SCNC: 9.9 MG/DL (ref 8.6–10.5)
CHLORIDE SERPL-SCNC: 106 MMOL/L (ref 98–107)
CO2 SERPL-SCNC: 24 MMOL/L (ref 22–29)
CREAT SERPL-MCNC: 1.91 MG/DL (ref 0.76–1.27)
DEPRECATED RDW RBC AUTO: 46.6 FL (ref 37–54)
EGFRCR SERPLBLD CKD-EPI 2021: 36.1 ML/MIN/1.73
ERYTHROCYTE [DISTWIDTH] IN BLOOD BY AUTOMATED COUNT: 13.8 % (ref 12.3–15.4)
GLUCOSE SERPL-MCNC: 87 MG/DL (ref 65–99)
HCT VFR BLD AUTO: 47.2 % (ref 37.5–51)
HCT VFR BLD AUTO: 47.5 % (ref 37.5–51)
HGB BLD-MCNC: 14.6 G/DL (ref 13–17.7)
MCH RBC QN AUTO: 28.2 PG (ref 26.6–33)
MCHC RBC AUTO-ENTMCNC: 30.7 G/DL (ref 31.5–35.7)
MCV RBC AUTO: 91.7 FL (ref 79–97)
PA ADP PRP-ACNC: 167 PRU (ref 194–418)
PLATELET # BLD AUTO: 199 10*3/MM3 (ref 140–450)
PLATELET # BLD AUTO: 209 10*3/MM3 (ref 140–450)
PMV BLD AUTO: 10 FL (ref 6–12)
POTASSIUM SERPL-SCNC: 4.3 MMOL/L (ref 3.5–5.2)
RBC # BLD AUTO: 5.18 10*6/MM3 (ref 4.14–5.8)
SODIUM SERPL-SCNC: 138 MMOL/L (ref 136–145)
WBC NRBC COR # BLD: 7.67 10*3/MM3 (ref 3.4–10.8)

## 2022-07-20 PROCEDURE — 97530 THERAPEUTIC ACTIVITIES: CPT

## 2022-07-20 PROCEDURE — 97535 SELF CARE MNGMENT TRAINING: CPT

## 2022-07-20 PROCEDURE — 97116 GAIT TRAINING THERAPY: CPT

## 2022-07-20 PROCEDURE — 70450 CT HEAD/BRAIN W/O DYE: CPT

## 2022-07-20 PROCEDURE — 97110 THERAPEUTIC EXERCISES: CPT

## 2022-07-20 PROCEDURE — 85576 BLOOD PLATELET AGGREGATION: CPT | Performed by: CLINICAL NURSE SPECIALIST

## 2022-07-20 PROCEDURE — 80048 BASIC METABOLIC PNL TOTAL CA: CPT | Performed by: FAMILY MEDICINE

## 2022-07-20 PROCEDURE — 85027 COMPLETE CBC AUTOMATED: CPT | Performed by: FAMILY MEDICINE

## 2022-07-20 PROCEDURE — 25010000002 CEFTRIAXONE PER 250 MG: Performed by: FAMILY MEDICINE

## 2022-07-20 RX ORDER — AMOXICILLIN 250 MG
1 CAPSULE ORAL DAILY
Status: DISCONTINUED | OUTPATIENT
Start: 2022-07-21 | End: 2022-07-22

## 2022-07-20 RX ORDER — FLUTICASONE PROPIONATE 50 MCG
2 SPRAY, SUSPENSION (ML) NASAL DAILY
Status: DISCONTINUED | OUTPATIENT
Start: 2022-07-20 | End: 2022-07-22 | Stop reason: HOSPADM

## 2022-07-20 RX ORDER — ALLOPURINOL 300 MG/1
TABLET ORAL
Qty: 90 TABLET | Refills: 3 | OUTPATIENT
Start: 2022-07-20 | End: 2022-07-22 | Stop reason: HOSPADM

## 2022-07-20 RX ADMIN — ASPIRIN 81 MG: 81 TABLET, COATED ORAL at 20:17

## 2022-07-20 RX ADMIN — ISOSORBIDE MONONITRATE 30 MG: 30 TABLET, EXTENDED RELEASE ORAL at 09:54

## 2022-07-20 RX ADMIN — ATORVASTATIN CALCIUM 40 MG: 40 TABLET, FILM COATED ORAL at 09:55

## 2022-07-20 RX ADMIN — Medication 10 ML: at 20:17

## 2022-07-20 RX ADMIN — Medication 2000 UNITS: at 09:55

## 2022-07-20 RX ADMIN — FAMOTIDINE 20 MG: 20 TABLET, FILM COATED ORAL at 09:54

## 2022-07-20 RX ADMIN — BUPROPION HYDROCHLORIDE 150 MG: 150 TABLET, FILM COATED, EXTENDED RELEASE ORAL at 09:55

## 2022-07-20 RX ADMIN — Medication 10 ML: at 10:10

## 2022-07-20 RX ADMIN — ASPIRIN 81 MG: 81 TABLET, COATED ORAL at 09:55

## 2022-07-20 RX ADMIN — SODIUM CHLORIDE 1 G: 9 INJECTION, SOLUTION INTRAVENOUS at 15:34

## 2022-07-20 RX ADMIN — MEMANTINE HYDROCHLORIDE 10 MG: 5 TABLET, FILM COATED ORAL at 09:54

## 2022-07-20 RX ADMIN — TAMSULOSIN HYDROCHLORIDE 0.4 MG: 0.4 CAPSULE ORAL at 10:10

## 2022-07-20 RX ADMIN — CLOPIDOGREL 75 MG: 75 TABLET, FILM COATED ORAL at 09:55

## 2022-07-20 RX ADMIN — METOPROLOL SUCCINATE 12.5 MG: 25 TABLET, EXTENDED RELEASE ORAL at 09:54

## 2022-07-20 RX ADMIN — MAGNESIUM GLUCONATE 500 MG ORAL TABLET 400 MG: 500 TABLET ORAL at 09:54

## 2022-07-20 RX ADMIN — LEVOTHYROXINE SODIUM 50 MCG: 50 TABLET ORAL at 05:17

## 2022-07-20 RX ADMIN — MEMANTINE HYDROCHLORIDE 10 MG: 5 TABLET, FILM COATED ORAL at 20:17

## 2022-07-20 RX ADMIN — MEXILETINE HYDROCHLORIDE 150 MG: 150 CAPSULE ORAL at 20:17

## 2022-07-20 RX ADMIN — MEXILETINE HYDROCHLORIDE 150 MG: 150 CAPSULE ORAL at 09:55

## 2022-07-20 RX ADMIN — FLUTICASONE PROPIONATE 2 SPRAY: 50 SPRAY, METERED NASAL at 14:09

## 2022-07-20 NOTE — TELEPHONE ENCOUNTER
The original prescription was discontinued on 2/1/2022 by Yue Schmidt MA for the following reason: *Therapy completed. Renewing this prescription may not be appropriate.

## 2022-07-21 LAB
ANION GAP SERPL CALCULATED.3IONS-SCNC: 10 MMOL/L (ref 5–15)
BUN SERPL-MCNC: 23 MG/DL (ref 8–23)
BUN/CREAT SERPL: 12.5 (ref 7–25)
CALCIUM SPEC-SCNC: 10.2 MG/DL (ref 8.6–10.5)
CHLORIDE SERPL-SCNC: 105 MMOL/L (ref 98–107)
CO2 SERPL-SCNC: 24 MMOL/L (ref 22–29)
CREAT SERPL-MCNC: 1.84 MG/DL (ref 0.76–1.27)
DEPRECATED RDW RBC AUTO: 44.8 FL (ref 37–54)
EGFRCR SERPLBLD CKD-EPI 2021: 37.8 ML/MIN/1.73
ERYTHROCYTE [DISTWIDTH] IN BLOOD BY AUTOMATED COUNT: 13.9 % (ref 12.3–15.4)
GLUCOSE SERPL-MCNC: 96 MG/DL (ref 65–99)
HCT VFR BLD AUTO: 44.1 % (ref 37.5–51)
HGB BLD-MCNC: 14.4 G/DL (ref 13–17.7)
MCH RBC QN AUTO: 29.1 PG (ref 26.6–33)
MCHC RBC AUTO-ENTMCNC: 32.7 G/DL (ref 31.5–35.7)
MCV RBC AUTO: 89.3 FL (ref 79–97)
PLATELET # BLD AUTO: 224 10*3/MM3 (ref 140–450)
PMV BLD AUTO: 9.9 FL (ref 6–12)
POTASSIUM SERPL-SCNC: 4.2 MMOL/L (ref 3.5–5.2)
RBC # BLD AUTO: 4.94 10*6/MM3 (ref 4.14–5.8)
SODIUM SERPL-SCNC: 139 MMOL/L (ref 136–145)
WBC NRBC COR # BLD: 7.92 10*3/MM3 (ref 3.4–10.8)

## 2022-07-21 PROCEDURE — 97116 GAIT TRAINING THERAPY: CPT

## 2022-07-21 PROCEDURE — 92526 ORAL FUNCTION THERAPY: CPT

## 2022-07-21 PROCEDURE — 80048 BASIC METABOLIC PNL TOTAL CA: CPT | Performed by: FAMILY MEDICINE

## 2022-07-21 PROCEDURE — 25010000002 CEFTRIAXONE PER 250 MG: Performed by: FAMILY MEDICINE

## 2022-07-21 PROCEDURE — 97535 SELF CARE MNGMENT TRAINING: CPT

## 2022-07-21 PROCEDURE — 99233 SBSQ HOSP IP/OBS HIGH 50: CPT | Performed by: PSYCHIATRY & NEUROLOGY

## 2022-07-21 PROCEDURE — 85027 COMPLETE CBC AUTOMATED: CPT | Performed by: FAMILY MEDICINE

## 2022-07-21 PROCEDURE — 97110 THERAPEUTIC EXERCISES: CPT

## 2022-07-21 RX ADMIN — SODIUM CHLORIDE 1 G: 9 INJECTION, SOLUTION INTRAVENOUS at 17:03

## 2022-07-21 RX ADMIN — MEMANTINE HYDROCHLORIDE 10 MG: 5 TABLET, FILM COATED ORAL at 09:01

## 2022-07-21 RX ADMIN — FLUTICASONE PROPIONATE 2 SPRAY: 50 SPRAY, METERED NASAL at 09:04

## 2022-07-21 RX ADMIN — FAMOTIDINE 20 MG: 20 TABLET, FILM COATED ORAL at 09:01

## 2022-07-21 RX ADMIN — MEXILETINE HYDROCHLORIDE 150 MG: 150 CAPSULE ORAL at 08:59

## 2022-07-21 RX ADMIN — BUPROPION HYDROCHLORIDE 150 MG: 150 TABLET, FILM COATED, EXTENDED RELEASE ORAL at 09:02

## 2022-07-21 RX ADMIN — Medication 2000 UNITS: at 09:01

## 2022-07-21 RX ADMIN — ASPIRIN 81 MG: 81 TABLET, COATED ORAL at 21:49

## 2022-07-21 RX ADMIN — ISOSORBIDE MONONITRATE 30 MG: 30 TABLET, EXTENDED RELEASE ORAL at 09:01

## 2022-07-21 RX ADMIN — DOCUSATE SODIUM 50 MG AND SENNOSIDES 8.6 MG 1 TABLET: 8.6; 5 TABLET, FILM COATED ORAL at 09:00

## 2022-07-21 RX ADMIN — Medication 10 ML: at 23:37

## 2022-07-21 RX ADMIN — MEXILETINE HYDROCHLORIDE 150 MG: 150 CAPSULE ORAL at 21:46

## 2022-07-21 RX ADMIN — ASPIRIN 81 MG: 81 TABLET, COATED ORAL at 09:02

## 2022-07-21 RX ADMIN — APIXABAN 2.5 MG: 2.5 TABLET, FILM COATED ORAL at 09:18

## 2022-07-21 RX ADMIN — ATORVASTATIN CALCIUM 40 MG: 40 TABLET, FILM COATED ORAL at 09:02

## 2022-07-21 RX ADMIN — APIXABAN 2.5 MG: 2.5 TABLET, FILM COATED ORAL at 21:46

## 2022-07-21 RX ADMIN — METOPROLOL SUCCINATE 12.5 MG: 25 TABLET, EXTENDED RELEASE ORAL at 09:01

## 2022-07-21 RX ADMIN — MEMANTINE HYDROCHLORIDE 10 MG: 5 TABLET, FILM COATED ORAL at 21:46

## 2022-07-21 RX ADMIN — MAGNESIUM GLUCONATE 500 MG ORAL TABLET 400 MG: 500 TABLET ORAL at 09:01

## 2022-07-21 RX ADMIN — ACETAMINOPHEN 500 MG: 500 TABLET, FILM COATED ORAL at 21:45

## 2022-07-21 RX ADMIN — Medication 10 ML: at 09:04

## 2022-07-21 RX ADMIN — TAMSULOSIN HYDROCHLORIDE 0.4 MG: 0.4 CAPSULE ORAL at 09:01

## 2022-07-21 RX ADMIN — LEVOTHYROXINE SODIUM 50 MCG: 50 TABLET ORAL at 06:05

## 2022-07-22 ENCOUNTER — TELEPHONE (OUTPATIENT)
Dept: FAMILY MEDICINE CLINIC | Facility: CLINIC | Age: 77
End: 2022-07-22

## 2022-07-22 VITALS
OXYGEN SATURATION: 100 % | BODY MASS INDEX: 30.3 KG/M2 | WEIGHT: 199.9 LBS | DIASTOLIC BLOOD PRESSURE: 64 MMHG | RESPIRATION RATE: 16 BRPM | HEIGHT: 68 IN | TEMPERATURE: 97.5 F | SYSTOLIC BLOOD PRESSURE: 108 MMHG | HEART RATE: 74 BPM

## 2022-07-22 LAB
ANION GAP SERPL CALCULATED.3IONS-SCNC: 7 MMOL/L (ref 5–15)
BUN SERPL-MCNC: 30 MG/DL (ref 8–23)
BUN/CREAT SERPL: 16.2 (ref 7–25)
CALCIUM SPEC-SCNC: 10.3 MG/DL (ref 8.6–10.5)
CHLORIDE SERPL-SCNC: 105 MMOL/L (ref 98–107)
CO2 SERPL-SCNC: 26 MMOL/L (ref 22–29)
CREAT SERPL-MCNC: 1.85 MG/DL (ref 0.76–1.27)
DEPRECATED RDW RBC AUTO: 47.5 FL (ref 37–54)
EGFRCR SERPLBLD CKD-EPI 2021: 37.5 ML/MIN/1.73
ERYTHROCYTE [DISTWIDTH] IN BLOOD BY AUTOMATED COUNT: 14 % (ref 12.3–15.4)
GLUCOSE SERPL-MCNC: 111 MG/DL (ref 65–99)
HCT VFR BLD AUTO: 47.6 % (ref 37.5–51)
HGB BLD-MCNC: 15 G/DL (ref 13–17.7)
MCH RBC QN AUTO: 29.3 PG (ref 26.6–33)
MCHC RBC AUTO-ENTMCNC: 31.5 G/DL (ref 31.5–35.7)
MCV RBC AUTO: 93 FL (ref 79–97)
PLATELET # BLD AUTO: 169 10*3/MM3 (ref 140–450)
PMV BLD AUTO: 9.6 FL (ref 6–12)
POTASSIUM SERPL-SCNC: 4.3 MMOL/L (ref 3.5–5.2)
RBC # BLD AUTO: 5.12 10*6/MM3 (ref 4.14–5.8)
SARS-COV-2 RNA PNL SPEC NAA+PROBE: NOT DETECTED
SODIUM SERPL-SCNC: 138 MMOL/L (ref 136–145)
WBC NRBC COR # BLD: 7.96 10*3/MM3 (ref 3.4–10.8)

## 2022-07-22 PROCEDURE — 85027 COMPLETE CBC AUTOMATED: CPT | Performed by: FAMILY MEDICINE

## 2022-07-22 PROCEDURE — 97110 THERAPEUTIC EXERCISES: CPT

## 2022-07-22 PROCEDURE — 25010000002 ONDANSETRON PER 1 MG: Performed by: FAMILY MEDICINE

## 2022-07-22 PROCEDURE — 80048 BASIC METABOLIC PNL TOTAL CA: CPT | Performed by: FAMILY MEDICINE

## 2022-07-22 PROCEDURE — 92610 EVALUATE SWALLOWING FUNCTION: CPT

## 2022-07-22 PROCEDURE — 97535 SELF CARE MNGMENT TRAINING: CPT

## 2022-07-22 PROCEDURE — 87635 SARS-COV-2 COVID-19 AMP PRB: CPT | Performed by: FAMILY MEDICINE

## 2022-07-22 PROCEDURE — 97116 GAIT TRAINING THERAPY: CPT

## 2022-07-22 RX ORDER — TAMSULOSIN HYDROCHLORIDE 0.4 MG/1
0.4 CAPSULE ORAL DAILY
Qty: 30 CAPSULE
Start: 2022-07-23 | End: 2022-08-18 | Stop reason: SDUPTHER

## 2022-07-22 RX ORDER — ISOSORBIDE MONONITRATE 30 MG/1
30 TABLET, EXTENDED RELEASE ORAL
Start: 2022-07-23

## 2022-07-22 RX ORDER — ASPIRIN 81 MG/1
81 TABLET ORAL DAILY
Start: 2022-07-22

## 2022-07-22 RX ORDER — ALUMINA, MAGNESIA, AND SIMETHICONE 2400; 2400; 240 MG/30ML; MG/30ML; MG/30ML
15 SUSPENSION ORAL 3 TIMES DAILY
Status: DISCONTINUED | OUTPATIENT
Start: 2022-07-22 | End: 2022-07-22 | Stop reason: HOSPADM

## 2022-07-22 RX ORDER — CEFDINIR 300 MG/1
300 CAPSULE ORAL 2 TIMES DAILY
Qty: 10 CAPSULE | Refills: 0 | Status: SHIPPED | OUTPATIENT
Start: 2022-07-22 | End: 2022-07-27

## 2022-07-22 RX ORDER — FAMOTIDINE 20 MG/1
20 TABLET, FILM COATED ORAL 2 TIMES DAILY
Status: DISCONTINUED | OUTPATIENT
Start: 2022-07-22 | End: 2022-07-22 | Stop reason: HOSPADM

## 2022-07-22 RX ORDER — DOCUSATE SODIUM 100 MG/1
100 CAPSULE, LIQUID FILLED ORAL 2 TIMES DAILY
Status: DISCONTINUED | OUTPATIENT
Start: 2022-07-22 | End: 2022-07-22 | Stop reason: HOSPADM

## 2022-07-22 RX ORDER — ALLOPURINOL 100 MG/1
100 TABLET ORAL DAILY
Start: 2022-07-22 | End: 2022-08-18 | Stop reason: SDUPTHER

## 2022-07-22 RX ORDER — ALUMINA, MAGNESIA, AND SIMETHICONE 2400; 2400; 240 MG/30ML; MG/30ML; MG/30ML
15 SUSPENSION ORAL 3 TIMES DAILY
Start: 2022-07-22

## 2022-07-22 RX ORDER — MEMANTINE HYDROCHLORIDE 10 MG/1
10 TABLET ORAL DAILY
Start: 2022-07-22 | End: 2023-03-13

## 2022-07-22 RX ORDER — FLUTICASONE PROPIONATE 50 MCG
2 SPRAY, SUSPENSION (ML) NASAL DAILY
Start: 2022-07-23

## 2022-07-22 RX ORDER — HYDROCORTISONE ACETATE 25 MG/1
25 SUPPOSITORY RECTAL 2 TIMES DAILY
Status: DISCONTINUED | OUTPATIENT
Start: 2022-07-22 | End: 2022-07-22 | Stop reason: HOSPADM

## 2022-07-22 RX ORDER — MEXILETINE HYDROCHLORIDE 150 MG/1
150 CAPSULE ORAL 2 TIMES DAILY
Start: 2022-07-22

## 2022-07-22 RX ORDER — HYDROCORTISONE ACETATE 25 MG/1
25 SUPPOSITORY RECTAL 2 TIMES DAILY
Start: 2022-07-22 | End: 2022-09-28

## 2022-07-22 RX ADMIN — ONDANSETRON 4 MG: 2 INJECTION INTRAMUSCULAR; INTRAVENOUS at 09:00

## 2022-07-22 RX ADMIN — LEVOTHYROXINE SODIUM 50 MCG: 50 TABLET ORAL at 05:00

## 2022-07-22 NOTE — TELEPHONE ENCOUNTER
JOSE:  Ashley callmerrick to inform patient will be admitting to [facility] later on today for rt side weakness--CVA.  Informed provider out of office today but would follow him.

## 2022-07-23 LAB
BACTERIA SPEC AEROBE CULT: NORMAL
BACTERIA SPEC AEROBE CULT: NORMAL
QT INTERVAL: 418 MS
QTC INTERVAL: 427 MS

## 2022-07-26 DIAGNOSIS — R52 PAIN: Primary | ICD-10-CM

## 2022-07-26 RX ORDER — GABAPENTIN 100 MG/1
100 CAPSULE ORAL 2 TIMES DAILY
Qty: 180 CAPSULE | Refills: 3 | Status: SHIPPED | OUTPATIENT
Start: 2022-07-26 | End: 2022-08-04 | Stop reason: SDUPTHER

## 2022-07-27 ENCOUNTER — OUTSIDE FACILITY SERVICE (OUTPATIENT)
Dept: FAMILY MEDICINE CLINIC | Facility: CLINIC | Age: 77
End: 2022-07-27

## 2022-07-27 PROCEDURE — OUTSIDEPOS PR OUTSIDE POS PLACEHOLDER: Performed by: FAMILY MEDICINE

## 2022-07-27 NOTE — PROGRESS NOTES
I saw Samy Velazquez on July 27, 2022.  He was at the [facility] for rehab.  He is cerebrovascular disease morbid obesity, severe DJD, and indwelling Treviño catheter.  Also he has dysphagia and requires periodic esophageal dilatation    On physical he is alert talkative chest is clear heart rhythm regular abdomen soft indwelling Treviño present    Plan continue rehab-Ed Rita STEWARD

## 2022-08-01 ENCOUNTER — TELEPHONE (OUTPATIENT)
Dept: FAMILY MEDICINE CLINIC | Facility: CLINIC | Age: 77
End: 2022-08-01

## 2022-08-01 NOTE — TELEPHONE ENCOUNTER
Caller: Beth Velazquez    Relationship: Emergency Contact    Best call back number: [phone]      Who are you requesting to speak with (clinical staff, provider,  specific staff member): CLINIC    Do you know the name of the person who called: WIFE    What was the call regarding: PATIENT'S WIFE WOULD LIKE A CALL BACK FROM DR DOS SANTOS    Do you require a callback: YES

## 2022-08-04 DIAGNOSIS — R52 PAIN: ICD-10-CM

## 2022-08-04 RX ORDER — GABAPENTIN 300 MG/1
CAPSULE ORAL
Qty: 90 CAPSULE | Refills: 3 | Status: SHIPPED | OUTPATIENT
Start: 2022-08-04 | End: 2022-08-04 | Stop reason: SDUPTHER

## 2022-08-04 RX ORDER — GABAPENTIN 300 MG/1
CAPSULE ORAL
Qty: 90 CAPSULE | Refills: 3 | Status: SHIPPED | OUTPATIENT
Start: 2022-08-04 | End: 2023-02-03

## 2022-08-10 NOTE — PROGRESS NOTES
Subjective    Mr. Velazquez is 77 y.o. male    Chief Complaint: Hospital follow-up urinary retention    History of Present Illness     77-year-old male established patient recently seen in our emergency room on 7/18/2022 for episode of acute urinary retention.  Patient reports went to the [facility] 1 week prior to the [facility] emergency room visit at which time patient had a Treviño catheter placed at [facility].  Patient reports has had the catheter in place ever since.  Patient denies any prior prostate issues.  He reports a history of acute kidney failure for which required a fistula to be placed although patient never ended up having to undergo dialysis treatments.  Patient has not been followed by a urologist.  At discharge from the hospital patient was started on tamsulosin 0.4 mg daily.  Patient remains on this currently.  No prior imaging to review.  No prior cystoscopy.    The following portions of the patient's history were reviewed and updated as appropriate: allergies, current medications, past family history, past medical history, past social history, past surgical history and problem list.    Review of Systems   Constitutional: Positive for fatigue. Negative for chills and fever.   Gastrointestinal: Negative for nausea and vomiting.   Genitourinary: Positive for decreased urine volume and difficulty urinating. Negative for dysuria, flank pain, frequency, hematuria and urgency.   Neurological: Positive for weakness.        Patient currently in [facility].  Patient presents in a wheelchair at this time.         Current Outpatient Medications:   •  acetaminophen (TYLENOL) 650 MG 8 hr tablet, Take 650 mg by mouth Daily., Disp: , Rfl:   •  allopurinol (Zyloprim) 100 MG tablet, Take 1 tablet by mouth Daily., Disp: , Rfl:   •  aluminum-magnesium hydroxide-simethicone (MAALOX MAX) 400-400-40 MG/5ML suspension, Take 15 mL by mouth 3 (Three) Times a Day., Disp: , Rfl:   •  apixaban  (ELIQUIS) 2.5 MG tablet tablet, Take 1 tablet by mouth Every 12 (Twelve) Hours. Indications: Atrial Fibrillation, Prevention of Unwanted Clot in Veins, Disp: 60 tablet, Rfl:   •  aspirin 81 MG EC tablet, Take 1 tablet by mouth Daily., Disp: , Rfl:   •  atorvastatin (LIPITOR) 40 MG tablet, Take 40 mg by mouth Every Night., Disp: , Rfl:   •  buPROPion XL (Wellbutrin XL) 150 MG 24 hr tablet, Take 1 tablet by mouth Every Morning., Disp: 90 tablet, Rfl: 3  •  Cholecalciferol (VITAMIN D3) 2000 units capsule, Take 2,000 Units by mouth Daily., Disp: , Rfl:   •  docusate sodium (COLACE) 100 MG capsule, Take 100 mg by mouth Daily., Disp: , Rfl:   •  famotidine (PEPCID) 20 MG tablet, Take 20 mg by mouth 2 (Two) Times a Day., Disp: , Rfl:   •  fluticasone (FLONASE) 50 MCG/ACT nasal spray, 2 sprays by Each Nare route Daily., Disp: , Rfl:   •  gabapentin (NEURONTIN) 300 MG capsule, 1 in the afternoon for pain, Disp: 90 capsule, Rfl: 3  •  Glycerin-Hypromellose- (ARTIFICIAL TEARS) 0.2-0.2-1 % solution ophthalmic solution, Administer 2 drops to both eyes Every 2 (Two) Hours As Needed for Dry Eyes., Disp: , Rfl:   •  hydrocortisone (ANUSOL-HC) 25 MG suppository, Insert 1 suppository into the rectum 2 (Two) Times a Day., Disp: , Rfl:   •  ipratropium (ATROVENT) 0.03 % nasal spray, 2 SPRAYS INTO THE NOSTRIL(S) AS DIRECTED BY PROVIDER EVERY 12 (TWELVE) HOURS., Disp: , Rfl:   •  isosorbide mononitrate (IMDUR) 30 MG 24 hr tablet, Take 1 tablet by mouth Daily., Disp: , Rfl:   •  levothyroxine (SYNTHROID, LEVOTHROID) 50 MCG tablet, Take 50 mcg by mouth Daily., Disp: , Rfl:   •  magnesium oxide (MAG-OX) 400 MG tablet, Take 400 mg by mouth Daily., Disp: , Rfl:   •  memantine (NAMENDA) 10 MG tablet, Take 1 tablet by mouth Daily., Disp: , Rfl:   •  metoprolol succinate XL (TOPROL-XL) 25 MG 24 hr tablet, Take 12.5 mg by mouth Daily., Disp: , Rfl:   •  mexiletine (MEXITIL) 150 MG capsule, Take 1 capsule by mouth 2 (Two) Times a Day.,  Disp: , Rfl:   •  nitroglycerin (NITROSTAT) 0.4 MG SL tablet, Place 0.4 mg under the tongue Every 5 (Five) Minutes As Needed for Chest Pain. Take no more than 3 doses in 15 minutes., Disp: , Rfl:   •  polyethyl glycol-propyl glycol (SYSTANE) 0.4-0.3 % solution ophthalmic solution (artificial tears), Administer 1 drop to both eyes Every 1 (One) Hour As Needed., Disp: , Rfl:   •  tamsulosin (FLOMAX) 0.4 MG capsule 24 hr capsule, Take 1 capsule by mouth Daily., Disp: 30 capsule, Rfl:   •  levocetirizine (Xyzal Allergy 24HR) 5 MG tablet, Xyzal 5 mg tablet  Take 1 tablet every day by oral route., Disp: , Rfl:   •  loperamide (Imodium A-D) 1 MG/7.5ML suspension, Imodium A-D 1 mg/7.5 mL oral liquid  Take by oral route., Disp: , Rfl:   •  oxyCODONE-acetaminophen (PERCOCET)  MG per tablet, Every 8 (Eight) Hours., Disp: , Rfl:     Past Medical History:   Diagnosis Date   • Arthritis    • Coronary artery disease    • Hyperlipidemia    • Hypertensive heart disease with heart failure (HCC) 3/29/2021   • Hypothyroidism 4/1/2021   • Kidney stone    • Major depressive disorder, recurrent, moderate (HCC) 3/29/2021   • Morbidly obese (HCC) 9/18/2020       Past Surgical History:   Procedure Laterality Date   • CARDIAC DEFIBRILLATOR PLACEMENT     • CARDIAC DEFIBRILLATOR PLACEMENT N/A 2008   • CARDIAC SURGERY     • ENDOSCOPY N/A 10/30/2020    Procedure: ESOPHAGOGASTRODUODENOSCOPY WITH ANESTHESIA;  Surgeon: Brent Stanley MD;  Location: [facility];  Service: Gastroenterology;  Laterality: N/A;  pre dysphagia  post post op gastric surgery  dr jose manuel smith   • ESOPHAGUS SURGERY     • KNEE SURGERY     • TOTAL SHOULDER REPLACEMENT         Social History     Socioeconomic History   • Marital status:    Tobacco Use   • Smoking status: Never Smoker   • Smokeless tobacco: Never Used   Vaping Use   • Vaping Use: Never used   Substance and Sexual Activity   • Alcohol use: No   • Drug use: No   • Sexual activity: Defer       Family  "History   Problem Relation Age of Onset   • Hypertension Mother    • Stroke Mother    • Cancer Father    • Hypertension Father    • Diabetes Sister    • Hypertension Sister    • Crohn's disease Daughter    • No Known Problems Son    • No Known Problems Maternal Grandmother    • No Known Problems Maternal Grandfather    • No Known Problems Paternal Grandmother    • No Known Problems Paternal Grandfather    • Hypertension Sister    • Hypertension Sister    • Hypertension Sister    • No Known Problems Son        Objective    Temp 97.8 °F (36.6 °C)   Ht 172.7 cm (68\")   Wt 93 kg (205 lb)   BMI 31.17 kg/m²     Physical Exam  Constitutional:       Appearance: Normal appearance.   Abdominal:      Tenderness: There is no abdominal tenderness. There is no right CVA tenderness or left CVA tenderness.   Genitourinary:     Comments: Indwelling Treviño catheter in place to bedside drainage bag  Skin:     General: Skin is warm and dry.   Neurological:      Mental Status: He is alert and oriented to person, place, and time.   Psychiatric:         Mood and Affect: Mood normal.         Behavior: Behavior normal.             Results for orders placed or performed during the hospital encounter of 07/18/22   Blood Culture - Blood, Arm, Left    Specimen: Arm, Left; Blood   Result Value Ref Range    Blood Culture No growth at 5 days    Blood Culture - Blood, Arm, Right    Specimen: Arm, Right; Blood   Result Value Ref Range    Blood Culture No growth at 5 days    COVID-19,[facility] IN-HOUSE,Nasal Swab No Transport Media 3-4 HR TAT - Swab, Nasal Cavity    Specimen: Nasal Cavity; Swab   Result Value Ref Range    COVID19 Not Detected Not Detected - Ref. Range   COVID-19,[facility] IN-HOUSE,Nasal Swab No Transport Media 3-4 HR TAT - Swab, Nasal Cavity    Specimen: Nasal Cavity; Swab   Result Value Ref Range    COVID19 Not Detected Not Detected - Ref. Range   Comprehensive Metabolic Panel    Specimen: Blood   Result Value Ref Range    Glucose " 151 (H) 65 - 99 mg/dL    BUN 23 8 - 23 mg/dL    Creatinine 2.04 (H) 0.76 - 1.27 mg/dL    Sodium 137 136 - 145 mmol/L    Potassium 4.7 3.5 - 5.2 mmol/L    Chloride 100 98 - 107 mmol/L    CO2 28.0 22.0 - 29.0 mmol/L    Calcium 10.3 8.6 - 10.5 mg/dL    Total Protein 7.4 6.0 - 8.5 g/dL    Albumin 3.70 3.50 - 5.20 g/dL    ALT (SGPT) 18 1 - 41 U/L    AST (SGOT) 26 1 - 40 U/L    Alkaline Phosphatase 128 (H) 39 - 117 U/L    Total Bilirubin 0.4 0.0 - 1.2 mg/dL    Globulin 3.7 gm/dL    A/G Ratio 1.0 g/dL    BUN/Creatinine Ratio 11.3 7.0 - 25.0    Anion Gap 9.0 5.0 - 15.0 mmol/L    eGFR 33.4 (L) >60.0 mL/min/1.73   Urinalysis With Culture If Indicated - Urine, Clean Catch    Specimen: Urine, Clean Catch   Result Value Ref Range    Color, UA Yellow Yellow, Straw    Appearance, UA Clear Clear    pH, UA <=5.0 5.0 - 8.0    Specific Gravity, UA 1.015 1.005 - 1.030    Glucose, UA Negative Negative    Ketones, UA Negative Negative    Bilirubin, UA Negative Negative    Blood, UA Moderate (2+) (A) Negative    Protein, UA Negative Negative    Leuk Esterase, UA Trace (A) Negative    Nitrite, UA Negative Negative    Urobilinogen, UA 0.2 E.U./dL 0.2 - 1.0 E.U./dL   Troponin    Specimen: Blood   Result Value Ref Range    Troponin T <0.010 0.000 - 0.030 ng/mL   BNP    Specimen: Blood   Result Value Ref Range    proBNP 567.4 0.0 - 1,800.0 pg/mL   Lactic Acid, Plasma    Specimen: Blood   Result Value Ref Range    Lactate 2.0 0.5 - 2.0 mmol/L   Procalcitonin    Specimen: Blood   Result Value Ref Range    Procalcitonin 0.05 0.00 - 0.25 ng/mL   TSH    Specimen: Blood   Result Value Ref Range    TSH 2.560 0.270 - 4.200 uIU/mL   T4, Free    Specimen: Blood   Result Value Ref Range    Free T4 1.14 0.93 - 1.70 ng/dL   T3, Free    Specimen: Blood   Result Value Ref Range    T3, Free 2.08 2.00 - 4.40 pg/mL   Magnesium    Specimen: Blood   Result Value Ref Range    Magnesium 1.9 1.6 - 2.4 mg/dL   Ammonia    Specimen: Blood   Result Value Ref Range     Ammonia 21 16 - 60 umol/L   CBC Auto Differential    Specimen: Blood   Result Value Ref Range    WBC 7.20 3.40 - 10.80 10*3/mm3    RBC 5.33 4.14 - 5.80 10*6/mm3    Hemoglobin 15.6 13.0 - 17.7 g/dL    Hematocrit 48.9 37.5 - 51.0 %    MCV 91.7 79.0 - 97.0 fL    MCH 29.3 26.6 - 33.0 pg    MCHC 31.9 31.5 - 35.7 g/dL    RDW 14.2 12.3 - 15.4 %    RDW-SD 47.2 37.0 - 54.0 fl    MPV 10.3 6.0 - 12.0 fL    Platelets 220 140 - 450 10*3/mm3    Neutrophil % 54.8 42.7 - 76.0 %    Lymphocyte % 24.7 19.6 - 45.3 %    Monocyte % 11.3 5.0 - 12.0 %    Eosinophil % 7.8 (H) 0.3 - 6.2 %    Basophil % 1.1 0.0 - 1.5 %    Immature Grans % 0.3 0.0 - 0.5 %    Neutrophils, Absolute 3.95 1.70 - 7.00 10*3/mm3    Lymphocytes, Absolute 1.78 0.70 - 3.10 10*3/mm3    Monocytes, Absolute 0.81 0.10 - 0.90 10*3/mm3    Eosinophils, Absolute 0.56 (H) 0.00 - 0.40 10*3/mm3    Basophils, Absolute 0.08 0.00 - 0.20 10*3/mm3    Immature Grans, Absolute 0.02 0.00 - 0.05 10*3/mm3    nRBC 0.0 0.0 - 0.2 /100 WBC   Blood Gas, Arterial -    Specimen: Arterial Blood   Result Value Ref Range    Site Right Radial     Jeffry's Test N/A     pH, Arterial 7.410 7.350 - 7.450 pH units    pCO2, Arterial 42.2 35.0 - 45.0 mm Hg    pO2, Arterial 96.1 83.0 - 108.0 mm Hg    HCO3, Arterial 26.8 (H) 20.0 - 26.0 mmol/L    Base Excess, Arterial 1.8 0.0 - 2.0 mmol/L    O2 Saturation, Arterial 98.3 94.0 - 99.0 %    Temperature 37.0 C    Barometric Pressure for Blood Gas 749 mmHg    Modality Nasal Cannula     Flow Rate 2.0 lpm    Ventilator Mode NA     Collected by [id number]     pCO2, Temperature Corrected 42.2 35 - 45 mm Hg    pH, Temp Corrected 7.410 7.350 - 7.450 pH Units    pO2, Temperature Corrected 96.1 83 - 108 mm Hg   Urinalysis, Microscopic Only - Urine, Clean Catch    Specimen: Urine, Clean Catch   Result Value Ref Range    RBC, UA 13-20 (A) None Seen /HPF    WBC, UA 3-5 (A) None Seen /HPF    Bacteria, UA 1+ (A) None Seen /HPF    Squamous Epithelial Cells, UA 0-2 None Seen, 0-2 /HPF     Hyaline Casts, UA 7-12 None Seen /LPF    Methodology Automated Microscopy    Urinalysis With Culture If Indicated - Urine, Clean Catch    Specimen: Urine, Clean Catch   Result Value Ref Range    Color, UA Yellow Yellow, Straw    Appearance, UA Clear Clear    pH, UA 5.5 5.0 - 8.0    Specific Gravity, UA 1.012 1.005 - 1.030    Glucose, UA Negative Negative    Ketones, UA Negative Negative    Bilirubin, UA Negative Negative    Blood, UA Small (1+) (A) Negative    Protein, UA Negative Negative    Leuk Esterase, UA Trace (A) Negative    Nitrite, UA Negative Negative    Urobilinogen, UA 0.2 E.U./dL 0.2 - 1.0 E.U./dL   Urinalysis, Microscopic Only - Urine, Clean Catch    Specimen: Urine, Clean Catch   Result Value Ref Range    RBC, UA 6-12 (A) None Seen /HPF    WBC, UA 0-2 (A) None Seen /HPF    Bacteria, UA 1+ (A) None Seen /HPF    Squamous Epithelial Cells, UA 0-2 None Seen, 0-2 /HPF    Hyaline Casts, UA 3-6 None Seen /LPF    Methodology Automated Microscopy    Hemoglobin A1c    Specimen: Blood   Result Value Ref Range    Hemoglobin A1C 6.00 (H) 4.80 - 5.60 %   Lipid Panel    Specimen: Blood   Result Value Ref Range    Total Cholesterol 125 0 - 200 mg/dL    Triglycerides 167 (H) 0 - 150 mg/dL    HDL Cholesterol 40 40 - 60 mg/dL    LDL Cholesterol  57 0 - 100 mg/dL    VLDL Cholesterol 28 5 - 40 mg/dL    LDL/HDL Ratio 1.29    CBC (No Diff)    Specimen: Blood   Result Value Ref Range    WBC 7.54 3.40 - 10.80 10*3/mm3    RBC 4.95 4.14 - 5.80 10*6/mm3    Hemoglobin 14.5 13.0 - 17.7 g/dL    Hematocrit 45.2 37.5 - 51.0 %    MCV 91.3 79.0 - 97.0 fL    MCH 29.3 26.6 - 33.0 pg    MCHC 32.1 31.5 - 35.7 g/dL    RDW 14.3 12.3 - 15.4 %    RDW-SD 47.1 37.0 - 54.0 fl    MPV 10.0 6.0 - 12.0 fL    Platelets 199 140 - 450 10*3/mm3   Comprehensive Metabolic Panel    Specimen: Blood   Result Value Ref Range    Glucose 84 65 - 99 mg/dL    BUN 20 8 - 23 mg/dL    Creatinine 1.88 (H) 0.76 - 1.27 mg/dL    Sodium 138 136 - 145 mmol/L     Potassium 4.3 3.5 - 5.2 mmol/L    Chloride 104 98 - 107 mmol/L    CO2 25.0 22.0 - 29.0 mmol/L    Calcium 9.5 8.6 - 10.5 mg/dL    Total Protein 6.3 6.0 - 8.5 g/dL    Albumin 3.20 (L) 3.50 - 5.20 g/dL    ALT (SGPT) 15 1 - 41 U/L    AST (SGOT) 22 1 - 40 U/L    Alkaline Phosphatase 113 39 - 117 U/L    Total Bilirubin 0.5 0.0 - 1.2 mg/dL    Globulin 3.1 gm/dL    A/G Ratio 1.0 g/dL    BUN/Creatinine Ratio 10.6 7.0 - 25.0    Anion Gap 9.0 5.0 - 15.0 mmol/L    eGFR 36.8 (L) >60.0 mL/min/1.73   TSH    Specimen: Blood   Result Value Ref Range    TSH 2.200 0.270 - 4.200 uIU/mL   P2Y12 Platelet Inhibition    Specimen: Blood   Result Value Ref Range    Hematocrit 47.2 37.5 - 51.0 %    Platelets 199 140 - 450 10*3/mm3    P2Y12 Reactivity Unit 167 (L) 194 - 418 PRU   CBC (No Diff)    Specimen: Blood   Result Value Ref Range    WBC 7.67 3.40 - 10.80 10*3/mm3    RBC 5.18 4.14 - 5.80 10*6/mm3    Hemoglobin 14.6 13.0 - 17.7 g/dL    Hematocrit 47.5 37.5 - 51.0 %    MCV 91.7 79.0 - 97.0 fL    MCH 28.2 26.6 - 33.0 pg    MCHC 30.7 (L) 31.5 - 35.7 g/dL    RDW 13.8 12.3 - 15.4 %    RDW-SD 46.6 37.0 - 54.0 fl    MPV 10.0 6.0 - 12.0 fL    Platelets 209 140 - 450 10*3/mm3   Basic Metabolic Panel    Specimen: Blood   Result Value Ref Range    Glucose 87 65 - 99 mg/dL    BUN 20 8 - 23 mg/dL    Creatinine 1.91 (H) 0.76 - 1.27 mg/dL    Sodium 138 136 - 145 mmol/L    Potassium 4.3 3.5 - 5.2 mmol/L    Chloride 106 98 - 107 mmol/L    CO2 24.0 22.0 - 29.0 mmol/L    Calcium 9.9 8.6 - 10.5 mg/dL    BUN/Creatinine Ratio 10.5 7.0 - 25.0    Anion Gap 8.0 5.0 - 15.0 mmol/L    eGFR 36.1 (L) >60.0 mL/min/1.73   CBC (No Diff)    Specimen: Blood   Result Value Ref Range    WBC 7.92 3.40 - 10.80 10*3/mm3    RBC 4.94 4.14 - 5.80 10*6/mm3    Hemoglobin 14.4 13.0 - 17.7 g/dL    Hematocrit 44.1 37.5 - 51.0 %    MCV 89.3 79.0 - 97.0 fL    MCH 29.1 26.6 - 33.0 pg    MCHC 32.7 31.5 - 35.7 g/dL    RDW 13.9 12.3 - 15.4 %    RDW-SD 44.8 37.0 - 54.0 fl    MPV 9.9 6.0 -  12.0 fL    Platelets 224 140 - 450 10*3/mm3   Basic Metabolic Panel    Specimen: Blood   Result Value Ref Range    Glucose 96 65 - 99 mg/dL    BUN 23 8 - 23 mg/dL    Creatinine 1.84 (H) 0.76 - 1.27 mg/dL    Sodium 139 136 - 145 mmol/L    Potassium 4.2 3.5 - 5.2 mmol/L    Chloride 105 98 - 107 mmol/L    CO2 24.0 22.0 - 29.0 mmol/L    Calcium 10.2 8.6 - 10.5 mg/dL    BUN/Creatinine Ratio 12.5 7.0 - 25.0    Anion Gap 10.0 5.0 - 15.0 mmol/L    eGFR 37.8 (L) >60.0 mL/min/1.73   CBC (No Diff)    Specimen: Blood   Result Value Ref Range    WBC 7.96 3.40 - 10.80 10*3/mm3    RBC 5.12 4.14 - 5.80 10*6/mm3    Hemoglobin 15.0 13.0 - 17.7 g/dL    Hematocrit 47.6 37.5 - 51.0 %    MCV 93.0 79.0 - 97.0 fL    MCH 29.3 26.6 - 33.0 pg    MCHC 31.5 31.5 - 35.7 g/dL    RDW 14.0 12.3 - 15.4 %    RDW-SD 47.5 37.0 - 54.0 fl    MPV 9.6 6.0 - 12.0 fL    Platelets 169 140 - 450 10*3/mm3   Basic Metabolic Panel    Specimen: Blood   Result Value Ref Range    Glucose 111 (H) 65 - 99 mg/dL    BUN 30 (H) 8 - 23 mg/dL    Creatinine 1.85 (H) 0.76 - 1.27 mg/dL    Sodium 138 136 - 145 mmol/L    Potassium 4.3 3.5 - 5.2 mmol/L    Chloride 105 98 - 107 mmol/L    CO2 26.0 22.0 - 29.0 mmol/L    Calcium 10.3 8.6 - 10.5 mg/dL    BUN/Creatinine Ratio 16.2 7.0 - 25.0    Anion Gap 7.0 5.0 - 15.0 mmol/L    eGFR 37.5 (L) >60.0 mL/min/1.73   POC Glucose Once    Specimen: Blood   Result Value Ref Range    Glucose 88 70 - 130 mg/dL   POC Glucose Once    Specimen: Blood   Result Value Ref Range    Glucose 89 70 - 130 mg/dL   POC Glucose Once    Specimen: Blood   Result Value Ref Range    Glucose 72 70 - 130 mg/dL   ECG 12 Lead   Result Value Ref Range    QT Interval 418 ms    QTC Interval 427 ms   Adult Transthoracic Echo Complete W/ Cont if Necessary Per Protocol (With Agitated Saline)   Result Value Ref Range    Target HR (85%) 123 bpm    Max. Pred. HR (100%) 145 bpm    RV S' 9.6 cm/sec    Avg E/e' ratio 4.92     ACS 1.50 cm    Ao root diam 3.0 cm    Ao pk isreal  123.0 cm/sec    Ao V2 VTI 27.0 cm    CAREN(I,D) 1.03 cm2    EDV(cubed) 136.6 ml    EDV(MOD-sp4) 150.0 ml    EF(MOD-sp4) 54.8 %    ESV(cubed) 76.8 ml    ESV(MOD-sp4) 67.8 ml    IVS/LVPW 0.77 cm    Lat Peak E' Alexandre 10.6 cm/sec    LV mass(C)d 209.6 grams    LV V1 max PG 1.85 mmHg    LV V1 mean PG 1.00 mmHg    LV V1 max 68.0 cm/sec    LVPWd 1.21 cm    Med Peak E' Alexandre 3.6 cm/sec    MV dec slope 107.3 cm/sec2    MV dec time 0.41 msec    MV P1/2t 155.7 msec    MV V2 VTI 23.5 cm    MVA(VTI) 1.18 cm2    RAP systole 10.0 mmHg    RVIDd 2.21 cm    RVSP(TR) 21.3 mmHg    SI(MOD-sp4) 40.5 ml/m2    SV(LVOT) 27.7 ml    SV(MOD-sp4) 82.2 ml    TR max PG 11.3 mmHg    Ao max PG 6.1 mmHg    Ao mean PG 3.0 mmHg    FS 17.5 %    IVSd 0.93 cm    LA dimension (2D)  4.1 cm    LV V1 VTI 12.2 cm    LVIDd 5.2 cm    LVIDs 4.3 cm    LVOT area 2.27 cm2    LVOT diam 1.70 cm    MV E/A 0.48     MV max PG 4.4 mmHg    MV mean PG 1.00 mmHg    MVA(P1/2t) 1.41 cm2    MV A max alexandre 73.2 cm/sec    MV E max alexandre 34.9 cm/sec    TR max alexandre 168.0 cm/sec    LV Ribera Vol (BSA corrected) 73.9 cm2    LV Sys Vol (BSA corrected) 33.4 cm2    TAPSE (>1.6) 1.38 cm     Assessment and Plan    Diagnoses and all orders for this visit:    1. Urinary retention (Primary)      77-year-old male established patient recently seen in our emergency room on 7/18/2022 for episode of acute urinary retention.  Patient reports went to the [facility] 1 week prior to the [facility] emergency room visit at which time patient had a Treviño catheter placed at [facility].  Patient reports has had the catheter in place ever since.  Patient denies any prior prostate issues.  He reports a history of acute kidney failure for which required a fistula to be placed although patient never ended up having to undergo dialysis treatments.  Patient has not been followed by a urologist.  At discharge from the hospital patient was started on tamsulosin 0.4 mg daily.  Patient remains on this currently.  No  prior imaging to review.  No prior cystoscopy.     Will have medical assistant pull patient's Treviño catheter at this time.  Patient encouraged to increase fluid intake throughout the day.  If patient unable to urinate within the next 6 hours will have facility BladderScan patient and if patient holding greater than 500 mL will have Treviño catheter reinserted.  If patient remains unable to urinate will need to have patient scheduled for a cystoscopy for further evaluation.    As of now will schedule patient to follow-up in 3 months.  Patient to continue daily tamsulosin 0.4 mg.

## 2022-08-11 ENCOUNTER — PATIENT ROUNDING (BHMG ONLY) (OUTPATIENT)
Dept: UROLOGY | Facility: CLINIC | Age: 77
End: 2022-08-11

## 2022-08-11 ENCOUNTER — OFFICE VISIT (OUTPATIENT)
Dept: UROLOGY | Facility: CLINIC | Age: 77
End: 2022-08-11

## 2022-08-11 VITALS — WEIGHT: 205 LBS | TEMPERATURE: 97.8 F | HEIGHT: 68 IN | BODY MASS INDEX: 31.07 KG/M2

## 2022-08-11 DIAGNOSIS — R33.9 URINARY RETENTION: Primary | ICD-10-CM

## 2022-08-11 PROCEDURE — 99213 OFFICE O/P EST LOW 20 MIN: CPT

## 2022-08-11 RX ORDER — LOPERAMIDE HCL 1 MG/7.5ML
SOLUTION ORAL
COMMUNITY

## 2022-08-11 RX ORDER — LEVOCETIRIZINE DIHYDROCHLORIDE 5 MG/1
TABLET, FILM COATED ORAL
COMMUNITY
End: 2022-09-28

## 2022-08-11 RX ORDER — OXYCODONE AND ACETAMINOPHEN 10; 325 MG/1; MG/1
TABLET ORAL EVERY 8 HOURS SCHEDULED
COMMUNITY
End: 2022-12-12 | Stop reason: ALTCHOICE

## 2022-08-11 RX ORDER — IPRATROPIUM BROMIDE 21 UG/1
1 SPRAY, METERED NASAL EVERY 12 HOURS
COMMUNITY
Start: 2022-07-23

## 2022-08-11 NOTE — PROGRESS NOTES
Samy Velazquez is a 77 y.o. male who is here today for catheter removal. Patient of [facility] Drummond. 10cc syringe used to deflate the balloon and the catheter was removed without difficulty.  The patient tolerated this well and will return in 3 months to see  in the [facility]mond office for follow up. DASHAWN Drummond  was in the office at the time of procedure.     Patient was advised to drink clear fluids for the next couple hours and urinate. The patient was also advised he may experience some blood in the urine and burning with urination for the next couple days. If the patient is unable to urinate or develops fever, chills, N&V or suprapubic pain he will call to return for an appt at clinic or seek medical treatment at [facility] ER, PCP or Urgent Care after hours. Patient verbalized understanding and all questions were answered.

## 2022-08-11 NOTE — PROGRESS NOTES
August 11, 2022    Hello, may I speak with Samy Velazquez?    My name is Catherine      I am  with [facility]  [address], [address]  [place] [address] [phone].    Before we get started may I verify your date of birth? 1945    I am calling to officially welcome you to our practice and ask about your recent visit. Is this a good time to talk? yes    Tell me about your visit with us. What things went well?  It was a very good visit.  Everything went well, no complaints.       We're always looking for ways to make our patients' experiences even better. Do you have recommendations on ways we may improve?  no    Overall were you satisfied with your first visit to our practice? yes       I appreciate you taking the time to speak with me today. Is there anything else I can do for you? no      Thank you, and have a great day.

## 2022-08-16 ENCOUNTER — OFFICE VISIT (OUTPATIENT)
Dept: FAMILY MEDICINE CLINIC | Facility: CLINIC | Age: 77
End: 2022-08-16

## 2022-08-16 VITALS
WEIGHT: 215 LBS | HEART RATE: 73 BPM | HEIGHT: 66 IN | RESPIRATION RATE: 18 BRPM | OXYGEN SATURATION: 96 % | TEMPERATURE: 97.8 F | DIASTOLIC BLOOD PRESSURE: 70 MMHG | SYSTOLIC BLOOD PRESSURE: 110 MMHG | BODY MASS INDEX: 34.55 KG/M2

## 2022-08-16 DIAGNOSIS — M54.2 CERVICAL PAIN (NECK): ICD-10-CM

## 2022-08-16 DIAGNOSIS — R52 PAIN: Primary | ICD-10-CM

## 2022-08-16 PROCEDURE — 96372 THER/PROPH/DIAG INJ SC/IM: CPT | Performed by: FAMILY MEDICINE

## 2022-08-16 PROCEDURE — 99213 OFFICE O/P EST LOW 20 MIN: CPT | Performed by: FAMILY MEDICINE

## 2022-08-16 RX ORDER — GABAPENTIN 300 MG/1
CAPSULE ORAL
Qty: 90 CAPSULE | Refills: 1 | Status: SHIPPED | OUTPATIENT
Start: 2022-08-16 | End: 2022-11-10 | Stop reason: SDUPTHER

## 2022-08-16 RX ORDER — HYDROCODONE BITARTRATE AND ACETAMINOPHEN 10; 325 MG/1; MG/1
1 TABLET ORAL EVERY 4 HOURS PRN
COMMUNITY
End: 2022-09-12 | Stop reason: SDUPTHER

## 2022-08-16 RX ORDER — LIDOCAINE 4 G/G
PATCH TOPICAL
COMMUNITY
End: 2022-09-28

## 2022-08-16 RX ORDER — GABAPENTIN 100 MG/1
CAPSULE ORAL
Qty: 90 CAPSULE | Refills: 1 | Status: SHIPPED | OUTPATIENT
Start: 2022-08-16 | End: 2022-11-10 | Stop reason: SDUPTHER

## 2022-08-16 RX ORDER — GABAPENTIN 100 MG/1
100 CAPSULE ORAL DAILY
COMMUNITY
End: 2023-02-03

## 2022-08-16 RX ORDER — HYDROCODONE BITARTRATE AND ACETAMINOPHEN 5; 325 MG/1; MG/1
1 TABLET ORAL EVERY 8 HOURS PRN
Qty: 60 TABLET | Refills: 0 | Status: SHIPPED | OUTPATIENT
Start: 2022-08-16 | End: 2022-09-28

## 2022-08-16 RX ORDER — PREDNISONE 10 MG/1
TABLET ORAL
Qty: 21 TABLET | Refills: 0 | Status: SHIPPED | OUTPATIENT
Start: 2022-08-16 | End: 2022-09-28

## 2022-08-16 RX ORDER — TRIAMCINOLONE ACETONIDE 40 MG/ML
40 INJECTION, SUSPENSION INTRA-ARTICULAR; INTRAMUSCULAR ONCE
Status: COMPLETED | OUTPATIENT
Start: 2022-08-16 | End: 2022-08-16

## 2022-08-16 RX ADMIN — TRIAMCINOLONE ACETONIDE 40 MG: 40 INJECTION, SUSPENSION INTRA-ARTICULAR; INTRAMUSCULAR at 15:48

## 2022-08-16 NOTE — PROGRESS NOTES
Subjective   Samy Velazquez is a 77 y.o. male.     77-year-old male with diffuse arthritis-neck and back      The following portions of the patient's history were reviewed and updated as appropriate: allergies, current medications, past family history, past medical history, past social history, past surgical history and problem list.    Review of Systems   Respiratory: Negative for shortness of breath.    Cardiovascular: Negative for chest pain and leg swelling.   Gastrointestinal:        GERD   Musculoskeletal: Positive for arthralgias, back pain and neck pain.       Objective   Physical Exam  Vitals and nursing note reviewed.   Constitutional:       Appearance: He is obese.   Cardiovascular:      Rate and Rhythm: Normal rate and regular rhythm.   Pulmonary:      Effort: Pulmonary effort is normal.      Breath sounds: Normal breath sounds.   Musculoskeletal:      Right lower leg: No edema.      Left lower leg: No edema.   Skin:     General: Skin is warm and dry.   Neurological:      General: No focal deficit present.      Mental Status: He is alert and oriented to person, place, and time.   Psychiatric:         Mood and Affect: Mood normal.         Behavior: Behavior normal.         Thought Content: Thought content normal.         Judgment: Judgment normal.         Assessment & Plan   Diagnoses and all orders for this visit:    1. Pain (Primary)  -     triamcinolone acetonide (KENALOG-40) injection 40 mg  -     gabapentin (NEURONTIN) 100 MG capsule; 1 each a.m. for nerve pain  Dispense: 90 capsule; Refill: 1  -     gabapentin (NEURONTIN) 300 MG capsule; 1 at bedtime for nerve pain  Dispense: 90 capsule; Refill: 1  -     HYDROcodone-acetaminophen (NORCO) 5-325 MG per tablet; Take 1 tablet by mouth Every 8 (Eight) Hours As Needed for Moderate Pain .  Dispense: 60 tablet; Refill: 0    Other orders  -     predniSONE (DELTASONE) 10 MG (21) dose pack; Use as directed on package--start Wednesday morning  Dispense: 21  tablet; Refill: 0       Plan above-if steroids do not help will need CT of neck  We will discharge from [facility] on Wednesday 17th

## 2022-08-18 ENCOUNTER — OUTSIDE FACILITY SERVICE (OUTPATIENT)
Dept: FAMILY MEDICINE CLINIC | Facility: CLINIC | Age: 77
End: 2022-08-18

## 2022-08-18 ENCOUNTER — TRANSITIONAL CARE MANAGEMENT TELEPHONE ENCOUNTER (OUTPATIENT)
Dept: CALL CENTER | Facility: HOSPITAL | Age: 77
End: 2022-08-18

## 2022-08-18 ENCOUNTER — READMISSION MANAGEMENT (OUTPATIENT)
Dept: CALL CENTER | Facility: HOSPITAL | Age: 77
End: 2022-08-18

## 2022-08-18 PROCEDURE — OUTSIDEPOS PR OUTSIDE POS PLACEHOLDER: Performed by: FAMILY MEDICINE

## 2022-08-18 RX ORDER — ALLOPURINOL 100 MG/1
100 TABLET ORAL DAILY
Start: 2022-08-18 | End: 2022-08-19 | Stop reason: SDUPTHER

## 2022-08-18 RX ORDER — TAMSULOSIN HYDROCHLORIDE 0.4 MG/1
0.4 CAPSULE ORAL DAILY
Qty: 30 CAPSULE
Start: 2022-08-18 | End: 2022-08-19 | Stop reason: SDUPTHER

## 2022-08-18 NOTE — TELEPHONE ENCOUNTER
Caller: RAMILA- [facility] NURSE    Relationship: [facility]  Best call back number [phone]    Requested Prescriptions:   Requested Prescriptions     Pending Prescriptions Disp Refills   • allopurinol (Zyloprim) 100 MG tablet       Sig: Take 1 tablet by mouth Daily.   • tamsulosin (FLOMAX) 0.4 MG capsule 24 hr capsule 30 capsule      Sig: Take 1 capsule by mouth Daily.   • apixaban (ELIQUIS) 2.5 MG tablet tablet 60 tablet      Sig: Take 1 tablet by mouth Every 12 (Twelve) Hours. Indications: Atrial Fibrillation, Prevention of Unwanted Clot in Veins        Pharmacy where request should be sent: [facility]/PHARMACY #[id number] - [place], [place] - [address] [place] [address] [phone] [facility] [phone] FX     Additional details provided by patient:  RAMILA THE [facility] NURSE STATES THAT THE PATIENT IS TAKING  MAALOX REGULAR SUSPENSION 30 ML THREE TIMES DAILY AND WOULD LIKE TO SEE IF IT CAN BE CALLED IN TOO.    Does the patient have less than a 3 day supply:  [x] Yes  [] No    Carolynn GUILLEN Rep   08/18/22 15:23 CDT

## 2022-08-18 NOTE — OUTREACH NOTE
Prep Survey    Flowsheet Row Responses   [facility]? [facility]   Is LACE score < 7 ? Non-BH Discharge   Emergency Room discharge w/ pulse ox? No   Eligibility [facility]   Date of Discharge 08/17/22   Discharge diagnosis Unavailable   Does the patient have one of the following disease processes/diagnoses(primary or secondary)? Other   Prep survey completed? Yes          LAURA ARECHIGA - Registered Nurse

## 2022-08-18 NOTE — OUTREACH NOTE
Call Center TCM Note    Flowsheet Row Responses   [facility] patient discharged from? Non-   Does the patient have one of the following disease processes/diagnoses(primary or secondary)? Other   TCM attempt successful? Yes   Call start time 1430   Call end time 1432   Discharge diagnosis Unavailable   Is patient permission given to speak with other caregiver? Yes   Person spoke with today (if not patient) and relationship wife Beth Swartz reviewed with patient/caregiver? Yes   Is the patient having any side effects they believe may be caused by any medication additions or changes? No   Does the patient have all medications ordered at discharge? Yes   Is the patient taking all medications as directed (includes completed medication regime)? Yes   Does the patient have an appointment with their PCP within 7 days of discharge? No   Comments regarding PCP Declines a PCP hospital followup.    What is preventing the patient from scheduling follow up appointments within 7 days of discharge? Haven't had time   Nursing Interventions Educated patient on importance of making appointment, Advised patient to make appointment   Has the patient kept scheduled appointments due by today? N/A   What is the Home health agency?  [facility]   Has home health visited the patient within 72 hours of discharge? Yes   Psychosocial issues? No   Did the patient receive a copy of their discharge instructions? Yes   Nursing interventions Reviewed instructions with patient   What is the patient's perception of their health status since discharge? Improving   Is the patient/caregiver able to teach back signs and symptoms related to disease process for when to call PCP? Yes   Is the patient/caregiver able to teach back signs and symptoms related to disease process for when to call 911? Yes   Is the patient/caregiver able to teach back the hierarchy of who to call/visit for symptoms/problems? PCP, Specialist, Home health nurse, Urgent Care, ED, 911  Yes   If the patient is a current smoker, are they able to teach back resources for cessation? Not a smoker   TCM call completed? Yes   Wrap up additional comments No questions or concerns. Declines a Hospital followup visit.           Filiberto Perkins RN    8/18/2022, 14:32 CDT

## 2022-08-18 NOTE — TELEPHONE ENCOUNTER
Rx Refill Note  Requested Prescriptions     Pending Prescriptions Disp Refills   • allopurinol (Zyloprim) 100 MG tablet       Sig: Take 1 tablet by mouth Daily.   • tamsulosin (FLOMAX) 0.4 MG capsule 24 hr capsule 30 capsule      Sig: Take 1 capsule by mouth Daily.   • apixaban (ELIQUIS) 2.5 MG tablet tablet 60 tablet      Sig: Take 1 tablet by mouth Every 12 (Twelve) Hours. Indications: Atrial Fibrillation, Prevention of Unwanted Clot in Veins      Last office visit with prescribing clinician: 8/16/2022      Next office visit with prescribing clinician: 11/16/2022   CPE done 03/01/2023    {TIP  Please add Last Relevant Lab Date if appropriate: 07/21/2022  {TIP  Is Refill Pharmacy correct?: yes    Yue Schmidt MA  08/18/22, 16:05 CDT

## 2022-08-19 DIAGNOSIS — M54.2 CERVICAL PAIN (NECK): Primary | ICD-10-CM

## 2022-08-19 RX ORDER — TAMSULOSIN HYDROCHLORIDE 0.4 MG/1
0.4 CAPSULE ORAL DAILY
Qty: 30 CAPSULE | Refills: 3 | Status: SHIPPED | OUTPATIENT
Start: 2022-08-19 | End: 2022-09-28 | Stop reason: ALTCHOICE

## 2022-08-19 RX ORDER — ALLOPURINOL 100 MG/1
100 TABLET ORAL DAILY
Qty: 30 TABLET | Refills: 3 | Status: SHIPPED | OUTPATIENT
Start: 2022-08-19 | End: 2022-11-28

## 2022-08-19 NOTE — TELEPHONE ENCOUNTER
Nurse calling--pt has not received all his medicines yet--only available to  was maalox.  Informed they were entered and set to print but were not sent electronically.        Rx Refill Note  Requested Prescriptions     Signed Prescriptions Disp Refills   • allopurinol (Zyloprim) 100 MG tablet       Sig: Take 1 tablet by mouth Daily.     Authorizing Provider: LUIS KATHLEEN   • tamsulosin (FLOMAX) 0.4 MG capsule 24 hr capsule 30 capsule      Sig: Take 1 capsule by mouth Daily.     Authorizing Provider: LUIS KATHLEEN   • apixaban (ELIQUIS) 2.5 MG tablet tablet 60 tablet      Sig: Take 1 tablet by mouth Every 12 (Twelve) Hours. Indications: Atrial Fibrillation, Prevention of Unwanted Clot in Veins     Authorizing Provider: LUIS KATHLEEN   • aluminum-magnesium hydroxide 200-200 MG/5ML suspension 2700 mL 3     Sig: Take 25 mL by mouth 3 (Three) Times a Day.     Authorizing Provider: LUIS KATHLEEN      Last office visit with prescribing clinician: 8/16/2022      Next office visit with prescribing clinician: 11/16/2022            Carolynn Galeano Rep  08/19/22, 14:06 CDT

## 2022-08-22 ENCOUNTER — TELEPHONE (OUTPATIENT)
Dept: UROLOGY | Facility: CLINIC | Age: 77
End: 2022-08-22

## 2022-08-22 NOTE — TELEPHONE ENCOUNTER
Phylicia from Dr Moser office called stating that Michela stopped his Reglan d/t it not being good for his kidney per pt wife. They said that the dr has stretched his throat and he needs to continue this medication d/t having issues. It was ordered Reglan 10mg tid, is it ok for the pt to continue this medication. Or atleast once or bid please advise. I spoke with phylicia and told her the message from Michela and she will let the pt know this.

## 2022-09-12 DIAGNOSIS — R52 PAIN: Primary | ICD-10-CM

## 2022-09-12 RX ORDER — HYDROCODONE BITARTRATE AND ACETAMINOPHEN 10; 325 MG/1; MG/1
1 TABLET ORAL EVERY 4 HOURS PRN
Qty: 180 TABLET | Refills: 0 | Status: SHIPPED | OUTPATIENT
Start: 2022-09-12 | End: 2022-09-28 | Stop reason: ALTCHOICE

## 2022-09-12 NOTE — TELEPHONE ENCOUNTER
Rx Refill Note  Requested Prescriptions     Pending Prescriptions Disp Refills   • HYDROcodone-acetaminophen (NORCO)  MG per tablet       Sig: Take 1 tablet by mouth Every 4 (Four) Hours As Needed for Moderate Pain.      Last office visit with prescribing clinician: 8/16/2022      Next office visit with prescribing clinician: 11/16/2022   CPE done 03/01/2022    {TIP  Please add Last Relevant Lab Date if appropriate: 07/21/2022  {TIP  Is Refill Pharmacy correct?: yes    Yue Schmidt MA  09/12/22, 10:00 CDT

## 2022-09-12 NOTE — TELEPHONE ENCOUNTER
Caller: Beth Velazquez    Relationship: Emergency Contact    Best call back number: [phone]    Requested Prescriptions:   Requested Prescriptions     Pending Prescriptions Disp Refills   • HYDROcodone-acetaminophen (NORCO)  MG per tablet       Sig: Take 1 tablet by mouth Every 4 (Four) Hours As Needed for Moderate Pain.        Pharmacy where request should be sent: [facility]/PHARMACY #[id number] - [address] [phone] [facility] [phone] FX       Does the patient have less than a 3 day supply:  [x] Yes  [] No    Carolynn Kohli Rep   09/12/22 09:49 CDT

## 2022-09-28 ENCOUNTER — OFFICE VISIT (OUTPATIENT)
Dept: NEUROLOGY | Facility: CLINIC | Age: 77
End: 2022-09-28

## 2022-09-28 VITALS
BODY MASS INDEX: 34.37 KG/M2 | SYSTOLIC BLOOD PRESSURE: 130 MMHG | DIASTOLIC BLOOD PRESSURE: 80 MMHG | HEART RATE: 78 BPM | WEIGHT: 219 LBS | HEIGHT: 67 IN | RESPIRATION RATE: 18 BRPM

## 2022-09-28 DIAGNOSIS — G47.33 OSA (OBSTRUCTIVE SLEEP APNEA): ICD-10-CM

## 2022-09-28 DIAGNOSIS — I69.30 CHRONIC ARTERIAL ISCHEMIC STROKE: Primary | ICD-10-CM

## 2022-09-28 DIAGNOSIS — E78.2 MIXED HYPERLIPIDEMIA: ICD-10-CM

## 2022-09-28 DIAGNOSIS — I48.0 PAROXYSMAL ATRIAL FIBRILLATION: ICD-10-CM

## 2022-09-28 PROCEDURE — 99214 OFFICE O/P EST MOD 30 MIN: CPT | Performed by: CLINICAL NURSE SPECIALIST

## 2022-09-28 RX ORDER — METOLAZONE 2.5 MG/1
2.5 TABLET ORAL DAILY PRN
COMMUNITY

## 2022-09-28 NOTE — PROGRESS NOTES
Subjective     Chief Complaint   Patient presents with   • Difficulty Walking   • Extremity Weakness       Samy Velazquez is a 77 y.o. male right handed retiree. Patient is here today for hospital follow up for left basal ganglia stroke. He is accompanied by his wife. He is in a wheelchair but uses a walker at home. He has a hospital bed and BSC at home as well. PMH includes JEWEL (no longer using CPAP), CKD and had fistual placed but kidney function improved and did not require dialysis, HTN, CHF, AICD, PAF, esophageal strictures. At time of stroke, patient was taking Plavix. He had taken Warfarin in the past and had nose bleeds. After discussion with his PCP and decreased kidney function, elected to place patient on ASA 81 mg with Eliquis 2.5 mg BID. Patient is tolerating and denies bleeding. He denies new symptoms of unilateral weakness, numbness, facial weakness, altered speech. Patient chronically has left lower facial weakness that was noted on exam in July at time of stroke. He is gaining strength of right upper and lower extremities. Left BG stroke seen on repeat CT head as patient cannot have MRI secondary to AICD. Patient was discharged to SNF and is now home getting home OT/PT.    Stroke work up showed LDL 57  A1C 6.0  Carotid duplex scan shows bilateral ICA stenosis less than 50% with bilateral antegrade vertebral flow.       Stroke  This is a chronic (left basal ganglia) problem. Episode onset: july 2022. Associated symptoms include neck pain. Pertinent negatives include no arthralgias or fatigue. Associated symptoms comments: Right arm and leg weakness, chronic left lower facial weakness. . Exacerbated by: HTN, AFIB, HLD, untreated JEWEL. CKD. Treatments tried: ASA 81 mg and Eliquis 2.5 mg BID. The treatment provided moderate relief.        Current Outpatient Medications   Medication Sig Dispense Refill   • acetaminophen (TYLENOL) 650 MG 8 hr tablet Take 650 mg by mouth 2 (Two) Times a Day.     • allopurinol  (Zyloprim) 100 MG tablet Take 1 tablet by mouth Daily. 30 tablet 3   • aluminum-magnesium hydroxide 200-200 MG/5ML suspension Take 25 mL by mouth 3 (Three) Times a Day. 2700 mL 3   • aluminum-magnesium hydroxide-simethicone (MAALOX MAX) 400-400-40 MG/5ML suspension Take 15 mL by mouth 3 (Three) Times a Day.     • apixaban (ELIQUIS) 2.5 MG tablet tablet Take 1 tablet by mouth Every 12 (Twelve) Hours. Indications: Atrial Fibrillation, Prevention of Unwanted Clot in Veins 60 tablet 3   • aspirin 81 MG EC tablet Take 1 tablet by mouth Daily.     • atorvastatin (LIPITOR) 40 MG tablet Take 40 mg by mouth Every Night.     • buPROPion XL (Wellbutrin XL) 150 MG 24 hr tablet Take 1 tablet by mouth Every Morning. 90 tablet 3   • Cholecalciferol (VITAMIN D3) 2000 units capsule Take 2,000 Units by mouth Daily.     • docusate sodium (COLACE) 100 MG capsule Take 100 mg by mouth Daily.     • famotidine (PEPCID) 20 MG tablet Take 20 mg by mouth 2 (Two) Times a Day.     • fluticasone (FLONASE) 50 MCG/ACT nasal spray 2 sprays by Each Nare route Daily.     • gabapentin (NEURONTIN) 100 MG capsule 1 each a.m. for nerve pain 90 capsule 1   • gabapentin (NEURONTIN) 100 MG capsule Take 100 mg by mouth Daily.     • gabapentin (NEURONTIN) 300 MG capsule 1 in the afternoon for pain (Patient taking differently: Take 300 mg by mouth Every Night. 1 in the afternoon for pain) 90 capsule 3   • gabapentin (NEURONTIN) 300 MG capsule 1 at bedtime for nerve pain 90 capsule 1   • isosorbide mononitrate (IMDUR) 30 MG 24 hr tablet Take 1 tablet by mouth Daily. (Patient taking differently: Take 60 mg by mouth Daily.)     • levothyroxine (SYNTHROID, LEVOTHROID) 50 MCG tablet Take 50 mcg by mouth Daily.     • loperamide (IMODIUM A-D) 1 MG/7.5ML suspension Imodium A-D 1 mg/7.5 mL oral liquid   Take by oral route.     • magnesium oxide (MAG-OX) 400 MG tablet Take 400 mg by mouth Daily.     • memantine (NAMENDA) 10 MG tablet Take 1 tablet by mouth Daily.     •  metOLazone (ZAROXOLYN) 2.5 MG tablet Take 2.5 mg by mouth Daily As Needed.     • metoprolol succinate XL (TOPROL-XL) 25 MG 24 hr tablet Take 12.5 mg by mouth Daily.     • mexiletine (MEXITIL) 150 MG capsule Take 1 capsule by mouth 2 (Two) Times a Day.     • nitroglycerin (NITROSTAT) 0.4 MG SL tablet Place 0.4 mg under the tongue Every 5 (Five) Minutes As Needed for Chest Pain. Take no more than 3 doses in 15 minutes.     • oxyCODONE-acetaminophen (PERCOCET)  MG per tablet Every 8 (Eight) Hours.     • polyethyl glycol-propyl glycol (SYSTANE) 0.4-0.3 % solution ophthalmic solution (artificial tears) Administer 1 drop to both eyes Every 1 (One) Hour As Needed.     • ipratropium (ATROVENT) 0.03 % nasal spray 2 SPRAYS INTO THE NOSTRIL(S) AS DIRECTED BY PROVIDER EVERY 12 (TWELVE) HOURS.       No current facility-administered medications for this visit.       Past Medical History:   Diagnosis Date   • Arthritis    • Coronary artery disease    • Hyperlipidemia    • Hypertensive heart disease with heart failure (HCC) 3/29/2021   • Hypothyroidism 4/1/2021   • Kidney stone    • Major depressive disorder, recurrent, moderate (HCC) 3/29/2021   • Morbidly obese (HCC) 9/18/2020       Past Surgical History:   Procedure Laterality Date   • CARDIAC DEFIBRILLATOR PLACEMENT     • CARDIAC DEFIBRILLATOR PLACEMENT N/A 2008   • CARDIAC SURGERY     • ENDOSCOPY N/A 10/30/2020    Procedure: ESOPHAGOGASTRODUODENOSCOPY WITH ANESTHESIA;  Surgeon: Brent Stanley MD;  Location: [facility];  Service: Gastroenterology;  Laterality: N/A;  pre dysphagia  post post op gastric surgery  dr jose manuel smith   • ESOPHAGUS SURGERY     • KNEE SURGERY     • TOTAL SHOULDER REPLACEMENT         family history includes Cancer in his father; Crohn's disease in his daughter; Diabetes in his sister; Hypertension in his father, mother, sister, sister, sister, and sister; No Known Problems in his maternal grandfather, maternal grandmother, paternal grandfather,  "paternal grandmother, son, and son; Stroke in his mother.    Social History     Tobacco Use   • Smoking status: Never Smoker   • Smokeless tobacco: Never Used   Vaping Use   • Vaping Use: Never used   Substance Use Topics   • Alcohol use: No   • Drug use: No       Review of Systems   Constitutional: Negative.  Negative for fatigue.   HENT: Positive for trouble swallowing (hx esophageal stricture with dilatation).    Eyes: Negative.    Respiratory: Negative.  Negative for shortness of breath.    Cardiovascular: Negative.  Negative for leg swelling.   Gastrointestinal: Negative.  Negative for blood in stool and diarrhea.   Endocrine: Negative.    Genitourinary: Positive for difficulty urinating.   Musculoskeletal: Positive for neck pain. Negative for arthralgias.   Skin: Negative.    Allergic/Immunologic: Negative.    Neurological: Negative.  Negative for dizziness.   Hematological: Negative.  Negative for adenopathy.   Psychiatric/Behavioral: Negative.    All other systems reviewed and are negative.      Objective     /80 (BP Location: Right arm, Patient Position: Sitting)   Pulse 78   Resp 18   Ht 170.2 cm (67\")   Wt 99.3 kg (219 lb)   BMI 34.30 kg/m² , Body mass index is 34.3 kg/m².    Physical Exam  Vitals and nursing note reviewed.   HENT:      Head: Normocephalic and atraumatic.   Eyes:      General: Lids are normal. No visual field deficit.     Extraocular Movements:      Right eye: Normal extraocular motion and no nystagmus.      Left eye: Normal extraocular motion and no nystagmus.      Pupils: Pupils are equal, round, and reactive to light.   Neck:      Vascular: No carotid bruit.   Cardiovascular:      Rate and Rhythm: Normal rate and regular rhythm.      Heart sounds: Normal heart sounds. No murmur heard.  Pulmonary:      Effort: Pulmonary effort is normal.      Breath sounds: Normal breath sounds.   Abdominal:      General: Bowel sounds are normal.      Palpations: Abdomen is soft. "   Musculoskeletal:         General: Normal range of motion.      Cervical back: Neck supple.   Skin:     General: Skin is warm and dry.   Neurological:      Mental Status: He is alert and oriented to person, place, and time.      GCS: GCS eye subscore is 4. GCS verbal subscore is 5. GCS motor subscore is 6.      Cranial Nerves: Facial asymmetry (left lower facial lag) present. No dysarthria.      Motor: No abnormal muscle tone.      Coordination: Coordination normal. Finger-Nose-Finger Test abnormal. Heel to Yo Test normal.      Gait: Gait normal.      Deep Tendon Reflexes: Reflexes are normal and symmetric.      Reflex Scores:       Tricep reflexes are 2+ on the right side and 2+ on the left side.       Bicep reflexes are 2+ on the right side and 2+ on the left side.       Brachioradialis reflexes are 2+ on the right side and 2+ on the left side.       Patellar reflexes are 2+ on the right side and 2+ on the left side.       Achilles reflexes are 2+ on the right side and 2+ on the left side.     Comments: Neurologic Exam:    Mental Status:    -Alert, Oriented X 3  -No word-finding difficulties  -No aphasia  -No dysarthria  -Follows simple and complex commands    CN II:  Visual fields full.  Pupils equally reactive to light  CN III, IV, VI:  Extraocular Muscles full with no signs of nystagmus  CN V:  Facial sensory is symmetric with no asymmetries.  CN VII:  Facial motor asymmetric with left lower facial lag  CN VIII:  Gross hearing intact bilaterally  CN IX:  Palate elevates symmetrically  CN X:  Palate elevates symmetrically  CN XI:  Shoulder shrug symmetric  CN XII:  Tongue protrudes to midline  Motor: (strength out of 5:  1= minimal movement, 2 = movement in plane of gravity, 3 = movement against gravity, 4 = movement against some resistance, 5 = full strength)    -Right Upper Ext: Proximal: 5- Distal: 5-  -Left Upper Ext: Proximal: 5 Distal: 5    -Right Lower Ext: Proximal: 5- Distal: 5-  -Left Lower Ext:  Proximal: 5 Distal: 5    DTR:  -Right   Bicep: 2+ Tricep: 2+ Brachoradialis: 2+   Patella: 2+ Ankle: 2+ Neg Babinski  -Left   Bicep: 2+ Tricep: 2+ Brachoradialis: 2+   Patella: 2+ Ankle: 2+ Neg Babinski    Sensory:  -Intact to light touch, pinprick, temperature, pain, and proprioception    Coordination:  -Finger to nose intact  -Heel to shin intact      Gait  -up with assist   Psychiatric:         Speech: Speech normal.         Behavior: Behavior normal. Behavior is cooperative.         Results for orders placed or performed during the hospital encounter of 07/18/22   Blood Culture - Blood, Arm, Left    Specimen: Arm, Left; Blood   Result Value Ref Range    Blood Culture No growth at 5 days    Blood Culture - Blood, Arm, Right    Specimen: Arm, Right; Blood   Result Value Ref Range    Blood Culture No growth at 5 days    COVID-19,[facility] IN-HOUSE,Nasal Swab No Transport Media 3-4 HR TAT - Swab, Nasal Cavity    Specimen: Nasal Cavity; Swab   Result Value Ref Range    COVID19 Not Detected Not Detected - Ref. Range   COVID-19,[facility] IN-HOUSE,Nasal Swab No Transport Media 3-4 HR TAT - Swab, Nasal Cavity    Specimen: Nasal Cavity; Swab   Result Value Ref Range    COVID19 Not Detected Not Detected - Ref. Range   Comprehensive Metabolic Panel    Specimen: Blood   Result Value Ref Range    Glucose 151 (H) 65 - 99 mg/dL    BUN 23 8 - 23 mg/dL    Creatinine 2.04 (H) 0.76 - 1.27 mg/dL    Sodium 137 136 - 145 mmol/L    Potassium 4.7 3.5 - 5.2 mmol/L    Chloride 100 98 - 107 mmol/L    CO2 28.0 22.0 - 29.0 mmol/L    Calcium 10.3 8.6 - 10.5 mg/dL    Total Protein 7.4 6.0 - 8.5 g/dL    Albumin 3.70 3.50 - 5.20 g/dL    ALT (SGPT) 18 1 - 41 U/L    AST (SGOT) 26 1 - 40 U/L    Alkaline Phosphatase 128 (H) 39 - 117 U/L    Total Bilirubin 0.4 0.0 - 1.2 mg/dL    Globulin 3.7 gm/dL    A/G Ratio 1.0 g/dL    BUN/Creatinine Ratio 11.3 7.0 - 25.0    Anion Gap 9.0 5.0 - 15.0 mmol/L    eGFR 33.4 (L) >60.0 mL/min/1.73   Urinalysis With Culture  If Indicated - Urine, Clean Catch    Specimen: Urine, Clean Catch   Result Value Ref Range    Color, UA Yellow Yellow, Straw    Appearance, UA Clear Clear    pH, UA <=5.0 5.0 - 8.0    Specific Gravity, UA 1.015 1.005 - 1.030    Glucose, UA Negative Negative    Ketones, UA Negative Negative    Bilirubin, UA Negative Negative    Blood, UA Moderate (2+) (A) Negative    Protein, UA Negative Negative    Leuk Esterase, UA Trace (A) Negative    Nitrite, UA Negative Negative    Urobilinogen, UA 0.2 E.U./dL 0.2 - 1.0 E.U./dL   Troponin    Specimen: Blood   Result Value Ref Range    Troponin T <0.010 0.000 - 0.030 ng/mL   BNP    Specimen: Blood   Result Value Ref Range    proBNP 567.4 0.0 - 1,800.0 pg/mL   Lactic Acid, Plasma    Specimen: Blood   Result Value Ref Range    Lactate 2.0 0.5 - 2.0 mmol/L   Procalcitonin    Specimen: Blood   Result Value Ref Range    Procalcitonin 0.05 0.00 - 0.25 ng/mL   TSH    Specimen: Blood   Result Value Ref Range    TSH 2.560 0.270 - 4.200 uIU/mL   T4, Free    Specimen: Blood   Result Value Ref Range    Free T4 1.14 0.93 - 1.70 ng/dL   T3, Free    Specimen: Blood   Result Value Ref Range    T3, Free 2.08 2.00 - 4.40 pg/mL   Magnesium    Specimen: Blood   Result Value Ref Range    Magnesium 1.9 1.6 - 2.4 mg/dL   Ammonia    Specimen: Blood   Result Value Ref Range    Ammonia 21 16 - 60 umol/L   CBC Auto Differential    Specimen: Blood   Result Value Ref Range    WBC 7.20 3.40 - 10.80 10*3/mm3    RBC 5.33 4.14 - 5.80 10*6/mm3    Hemoglobin 15.6 13.0 - 17.7 g/dL    Hematocrit 48.9 37.5 - 51.0 %    MCV 91.7 79.0 - 97.0 fL    MCH 29.3 26.6 - 33.0 pg    MCHC 31.9 31.5 - 35.7 g/dL    RDW 14.2 12.3 - 15.4 %    RDW-SD 47.2 37.0 - 54.0 fl    MPV 10.3 6.0 - 12.0 fL    Platelets 220 140 - 450 10*3/mm3    Neutrophil % 54.8 42.7 - 76.0 %    Lymphocyte % 24.7 19.6 - 45.3 %    Monocyte % 11.3 5.0 - 12.0 %    Eosinophil % 7.8 (H) 0.3 - 6.2 %    Basophil % 1.1 0.0 - 1.5 %    Immature Grans % 0.3 0.0 - 0.5 %     Neutrophils, Absolute 3.95 1.70 - 7.00 10*3/mm3    Lymphocytes, Absolute 1.78 0.70 - 3.10 10*3/mm3    Monocytes, Absolute 0.81 0.10 - 0.90 10*3/mm3    Eosinophils, Absolute 0.56 (H) 0.00 - 0.40 10*3/mm3    Basophils, Absolute 0.08 0.00 - 0.20 10*3/mm3    Immature Grans, Absolute 0.02 0.00 - 0.05 10*3/mm3    nRBC 0.0 0.0 - 0.2 /100 WBC   Blood Gas, Arterial -    Specimen: Arterial Blood   Result Value Ref Range    Site Right Radial     Jeffry's Test N/A     pH, Arterial 7.410 7.350 - 7.450 pH units    pCO2, Arterial 42.2 35.0 - 45.0 mm Hg    pO2, Arterial 96.1 83.0 - 108.0 mm Hg    HCO3, Arterial 26.8 (H) 20.0 - 26.0 mmol/L    Base Excess, Arterial 1.8 0.0 - 2.0 mmol/L    O2 Saturation, Arterial 98.3 94.0 - 99.0 %    Temperature 37.0 C    Barometric Pressure for Blood Gas 749 mmHg    Modality Nasal Cannula     Flow Rate 2.0 lpm    Ventilator Mode NA     Collected by [id number]     pCO2, Temperature Corrected 42.2 35 - 45 mm Hg    pH, Temp Corrected 7.410 7.350 - 7.450 pH Units    pO2, Temperature Corrected 96.1 83 - 108 mm Hg   Urinalysis, Microscopic Only - Urine, Clean Catch    Specimen: Urine, Clean Catch   Result Value Ref Range    RBC, UA 13-20 (A) None Seen /HPF    WBC, UA 3-5 (A) None Seen /HPF    Bacteria, UA 1+ (A) None Seen /HPF    Squamous Epithelial Cells, UA 0-2 None Seen, 0-2 /HPF    Hyaline Casts, UA 7-12 None Seen /LPF    Methodology Automated Microscopy    Urinalysis With Culture If Indicated - Urine, Clean Catch    Specimen: Urine, Clean Catch   Result Value Ref Range    Color, UA Yellow Yellow, Straw    Appearance, UA Clear Clear    pH, UA 5.5 5.0 - 8.0    Specific Gravity, UA 1.012 1.005 - 1.030    Glucose, UA Negative Negative    Ketones, UA Negative Negative    Bilirubin, UA Negative Negative    Blood, UA Small (1+) (A) Negative    Protein, UA Negative Negative    Leuk Esterase, UA Trace (A) Negative    Nitrite, UA Negative Negative    Urobilinogen, UA 0.2 E.U./dL 0.2 - 1.0 E.U./dL   Urinalysis,  Microscopic Only - Urine, Clean Catch    Specimen: Urine, Clean Catch   Result Value Ref Range    RBC, UA 6-12 (A) None Seen /HPF    WBC, UA 0-2 (A) None Seen /HPF    Bacteria, UA 1+ (A) None Seen /HPF    Squamous Epithelial Cells, UA 0-2 None Seen, 0-2 /HPF    Hyaline Casts, UA 3-6 None Seen /LPF    Methodology Automated Microscopy    Hemoglobin A1c    Specimen: Blood   Result Value Ref Range    Hemoglobin A1C 6.00 (H) 4.80 - 5.60 %   Lipid Panel    Specimen: Blood   Result Value Ref Range    Total Cholesterol 125 0 - 200 mg/dL    Triglycerides 167 (H) 0 - 150 mg/dL    HDL Cholesterol 40 40 - 60 mg/dL    LDL Cholesterol  57 0 - 100 mg/dL    VLDL Cholesterol 28 5 - 40 mg/dL    LDL/HDL Ratio 1.29    CBC (No Diff)    Specimen: Blood   Result Value Ref Range    WBC 7.54 3.40 - 10.80 10*3/mm3    RBC 4.95 4.14 - 5.80 10*6/mm3    Hemoglobin 14.5 13.0 - 17.7 g/dL    Hematocrit 45.2 37.5 - 51.0 %    MCV 91.3 79.0 - 97.0 fL    MCH 29.3 26.6 - 33.0 pg    MCHC 32.1 31.5 - 35.7 g/dL    RDW 14.3 12.3 - 15.4 %    RDW-SD 47.1 37.0 - 54.0 fl    MPV 10.0 6.0 - 12.0 fL    Platelets 199 140 - 450 10*3/mm3   Comprehensive Metabolic Panel    Specimen: Blood   Result Value Ref Range    Glucose 84 65 - 99 mg/dL    BUN 20 8 - 23 mg/dL    Creatinine 1.88 (H) 0.76 - 1.27 mg/dL    Sodium 138 136 - 145 mmol/L    Potassium 4.3 3.5 - 5.2 mmol/L    Chloride 104 98 - 107 mmol/L    CO2 25.0 22.0 - 29.0 mmol/L    Calcium 9.5 8.6 - 10.5 mg/dL    Total Protein 6.3 6.0 - 8.5 g/dL    Albumin 3.20 (L) 3.50 - 5.20 g/dL    ALT (SGPT) 15 1 - 41 U/L    AST (SGOT) 22 1 - 40 U/L    Alkaline Phosphatase 113 39 - 117 U/L    Total Bilirubin 0.5 0.0 - 1.2 mg/dL    Globulin 3.1 gm/dL    A/G Ratio 1.0 g/dL    BUN/Creatinine Ratio 10.6 7.0 - 25.0    Anion Gap 9.0 5.0 - 15.0 mmol/L    eGFR 36.8 (L) >60.0 mL/min/1.73   TSH    Specimen: Blood   Result Value Ref Range    TSH 2.200 0.270 - 4.200 uIU/mL   P2Y12 Platelet Inhibition    Specimen: Blood   Result Value Ref  Range    Hematocrit 47.2 37.5 - 51.0 %    Platelets 199 140 - 450 10*3/mm3    P2Y12 Reactivity Unit 167 (L) 194 - 418 PRU   CBC (No Diff)    Specimen: Blood   Result Value Ref Range    WBC 7.67 3.40 - 10.80 10*3/mm3    RBC 5.18 4.14 - 5.80 10*6/mm3    Hemoglobin 14.6 13.0 - 17.7 g/dL    Hematocrit 47.5 37.5 - 51.0 %    MCV 91.7 79.0 - 97.0 fL    MCH 28.2 26.6 - 33.0 pg    MCHC 30.7 (L) 31.5 - 35.7 g/dL    RDW 13.8 12.3 - 15.4 %    RDW-SD 46.6 37.0 - 54.0 fl    MPV 10.0 6.0 - 12.0 fL    Platelets 209 140 - 450 10*3/mm3   Basic Metabolic Panel    Specimen: Blood   Result Value Ref Range    Glucose 87 65 - 99 mg/dL    BUN 20 8 - 23 mg/dL    Creatinine 1.91 (H) 0.76 - 1.27 mg/dL    Sodium 138 136 - 145 mmol/L    Potassium 4.3 3.5 - 5.2 mmol/L    Chloride 106 98 - 107 mmol/L    CO2 24.0 22.0 - 29.0 mmol/L    Calcium 9.9 8.6 - 10.5 mg/dL    BUN/Creatinine Ratio 10.5 7.0 - 25.0    Anion Gap 8.0 5.0 - 15.0 mmol/L    eGFR 36.1 (L) >60.0 mL/min/1.73   CBC (No Diff)    Specimen: Blood   Result Value Ref Range    WBC 7.92 3.40 - 10.80 10*3/mm3    RBC 4.94 4.14 - 5.80 10*6/mm3    Hemoglobin 14.4 13.0 - 17.7 g/dL    Hematocrit 44.1 37.5 - 51.0 %    MCV 89.3 79.0 - 97.0 fL    MCH 29.1 26.6 - 33.0 pg    MCHC 32.7 31.5 - 35.7 g/dL    RDW 13.9 12.3 - 15.4 %    RDW-SD 44.8 37.0 - 54.0 fl    MPV 9.9 6.0 - 12.0 fL    Platelets 224 140 - 450 10*3/mm3   Basic Metabolic Panel    Specimen: Blood   Result Value Ref Range    Glucose 96 65 - 99 mg/dL    BUN 23 8 - 23 mg/dL    Creatinine 1.84 (H) 0.76 - 1.27 mg/dL    Sodium 139 136 - 145 mmol/L    Potassium 4.2 3.5 - 5.2 mmol/L    Chloride 105 98 - 107 mmol/L    CO2 24.0 22.0 - 29.0 mmol/L    Calcium 10.2 8.6 - 10.5 mg/dL    BUN/Creatinine Ratio 12.5 7.0 - 25.0    Anion Gap 10.0 5.0 - 15.0 mmol/L    eGFR 37.8 (L) >60.0 mL/min/1.73   CBC (No Diff)    Specimen: Blood   Result Value Ref Range    WBC 7.96 3.40 - 10.80 10*3/mm3    RBC 5.12 4.14 - 5.80 10*6/mm3    Hemoglobin 15.0 13.0 - 17.7 g/dL     Hematocrit 47.6 37.5 - 51.0 %    MCV 93.0 79.0 - 97.0 fL    MCH 29.3 26.6 - 33.0 pg    MCHC 31.5 31.5 - 35.7 g/dL    RDW 14.0 12.3 - 15.4 %    RDW-SD 47.5 37.0 - 54.0 fl    MPV 9.6 6.0 - 12.0 fL    Platelets 169 140 - 450 10*3/mm3   Basic Metabolic Panel    Specimen: Blood   Result Value Ref Range    Glucose 111 (H) 65 - 99 mg/dL    BUN 30 (H) 8 - 23 mg/dL    Creatinine 1.85 (H) 0.76 - 1.27 mg/dL    Sodium 138 136 - 145 mmol/L    Potassium 4.3 3.5 - 5.2 mmol/L    Chloride 105 98 - 107 mmol/L    CO2 26.0 22.0 - 29.0 mmol/L    Calcium 10.3 8.6 - 10.5 mg/dL    BUN/Creatinine Ratio 16.2 7.0 - 25.0    Anion Gap 7.0 5.0 - 15.0 mmol/L    eGFR 37.5 (L) >60.0 mL/min/1.73   POC Glucose Once    Specimen: Blood   Result Value Ref Range    Glucose 88 70 - 130 mg/dL   POC Glucose Once    Specimen: Blood   Result Value Ref Range    Glucose 89 70 - 130 mg/dL   POC Glucose Once    Specimen: Blood   Result Value Ref Range    Glucose 72 70 - 130 mg/dL   ECG 12 Lead   Result Value Ref Range    QT Interval 418 ms    QTC Interval 427 ms   Adult Transthoracic Echo Complete W/ Cont if Necessary Per Protocol (With Agitated Saline)   Result Value Ref Range    Target HR (85%) 123 bpm    Max. Pred. HR (100%) 145 bpm    RV S' 9.6 cm/sec    Avg E/e' ratio 4.92     ACS 1.50 cm    Ao root diam 3.0 cm    Ao pk alexandre 123.0 cm/sec    Ao V2 VTI 27.0 cm    CAREN(I,D) 1.03 cm2    EDV(cubed) 136.6 ml    EDV(MOD-sp4) 150.0 ml    EF(MOD-sp4) 54.8 %    ESV(cubed) 76.8 ml    ESV(MOD-sp4) 67.8 ml    IVS/LVPW 0.77 cm    Lat Peak E' Alexandre 10.6 cm/sec    LV mass(C)d 209.6 grams    LV V1 max PG 1.85 mmHg    LV V1 mean PG 1.00 mmHg    LV V1 max 68.0 cm/sec    LVPWd 1.21 cm    Med Peak E' Alexandre 3.6 cm/sec    MV dec slope 107.3 cm/sec2    MV dec time 0.41 msec    MV P1/2t 155.7 msec    MV V2 VTI 23.5 cm    MVA(VTI) 1.18 cm2    RAP systole 10.0 mmHg    RVIDd 2.21 cm    RVSP(TR) 21.3 mmHg    SI(MOD-sp4) 40.5 ml/m2    SV(LVOT) 27.7 ml    SV(MOD-sp4) 82.2 ml    TR max PG 11.3  mmHg    Ao max PG 6.1 mmHg    Ao mean PG 3.0 mmHg    FS 17.5 %    IVSd 0.93 cm    LA dimension (2D)  4.1 cm    LV V1 VTI 12.2 cm    LVIDd 5.2 cm    LVIDs 4.3 cm    LVOT area 2.27 cm2    LVOT diam 1.70 cm    MV E/A 0.48     MV max PG 4.4 mmHg    MV mean PG 1.00 mmHg    MVA(P1/2t) 1.41 cm2    MV A max isreal 73.2 cm/sec    MV E max isreal 34.9 cm/sec    TR max isreal 168.0 cm/sec    LV Ribera Vol (BSA corrected) 73.9 cm2    LV Sys Vol (BSA corrected) 33.4 cm2    TAPSE (>1.6) 1.38 cm         CT head personally reviewed by me showing hypodensity left basal ganglia suggestive acute acute stroke.    Adult Transthoracic Echo Complete W/ Cont if Necessary Per Protocol (With Agitated Saline) (07/19/2022 10:00)   CT Head Without Contrast (07/18/2022 13:59)   US Carotid Bilateral (07/19/2022 09:36)   CT Head Without Contrast (07/20/2022 10:35)     ASSESSMENT/PLAN    Diagnoses and all orders for this visit:    Chronic arterial ischemic stroke    JEWEL (obstructive sleep apnea)  -     Polysomnography 4 or More Parameters; Future    Paroxysmal atrial fibrillation (HCC)    Mixed hyperlipidemia    Other orders  -     metOLazone (ZAROXOLYN) 2.5 MG tablet; Take 2.5 mg by mouth Daily As Needed.          ICD-10-CM ICD-9-CM   1. Chronic arterial ischemic stroke  I69.30 438.9   2. JEWEL (obstructive sleep apnea)  G47.33 327.23   3. Paroxysmal atrial fibrillation (HCC)  I48.0 427.31   4. Mixed hyperlipidemia  E78.2 272.2      PLAN:  1. Continue ASA 81 mg daily and Eliquis 2.5 mg BID. Low dose secondary to CKD.  2. Continue Lipitor 40 mg daily.  3. Will refer for polysomnogram. Patient willing to try CPAP again. V[place] = 16, Stop bang = high. PATIENT WOULD LIKE POLYSOMNOGRAM DONE AT [facility].   4. Continue OT/PT  5. F/u in 3 months.        allergies and all known medications/prescriptions have been reviewed using resources available on this encounter.    Return in about 3 months (around 12/28/2022).        Ella Peña, APRN

## 2022-09-29 ENCOUNTER — FLU SHOT (OUTPATIENT)
Dept: FAMILY MEDICINE CLINIC | Facility: CLINIC | Age: 77
End: 2022-09-29

## 2022-09-29 ENCOUNTER — IMMUNIZATION (OUTPATIENT)
Dept: FAMILY MEDICINE CLINIC | Facility: CLINIC | Age: 77
End: 2022-09-29

## 2022-09-29 DIAGNOSIS — Z23 NEED FOR VACCINATION: Primary | ICD-10-CM

## 2022-09-29 DIAGNOSIS — Z23 NEED FOR INFLUENZA VACCINATION: Primary | ICD-10-CM

## 2022-09-29 PROCEDURE — 91312 COVID-19 (PFIZER) BIVALENT BOOSTER 12+YRS: CPT | Performed by: NURSE PRACTITIONER

## 2022-09-29 PROCEDURE — G0008 ADMIN INFLUENZA VIRUS VAC: HCPCS | Performed by: NURSE PRACTITIONER

## 2022-09-29 PROCEDURE — 90662 IIV NO PRSV INCREASED AG IM: CPT | Performed by: NURSE PRACTITIONER

## 2022-09-29 PROCEDURE — 0124A COVID-19 (PFIZER) BIVALENT BOOSTER 12+YRS: CPT | Performed by: NURSE PRACTITIONER

## 2022-10-17 ENCOUNTER — HOSPITAL ENCOUNTER (EMERGENCY)
Facility: HOSPITAL | Age: 77
Discharge: HOME | End: 2022-10-17
Attending: EMERGENCY MEDICINE | Admitting: EMERGENCY MEDICINE

## 2022-10-17 ENCOUNTER — TELEPHONE (OUTPATIENT)
Dept: FAMILY MEDICINE CLINIC | Facility: CLINIC | Age: 77
End: 2022-10-17

## 2022-10-17 VITALS
WEIGHT: 218 LBS | DIASTOLIC BLOOD PRESSURE: 78 MMHG | SYSTOLIC BLOOD PRESSURE: 123 MMHG | TEMPERATURE: 98 F | BODY MASS INDEX: 33.04 KG/M2 | HEIGHT: 68 IN | RESPIRATION RATE: 20 BRPM | OXYGEN SATURATION: 99 % | HEART RATE: 78 BPM

## 2022-10-17 DIAGNOSIS — N39.0 ACUTE UTI: ICD-10-CM

## 2022-10-17 DIAGNOSIS — R07.9 CHEST PAIN, UNSPECIFIED TYPE: Primary | ICD-10-CM

## 2022-10-17 LAB — TROPONIN T SERPL-MCNC: <0.01 NG/ML (ref 0–0.03)

## 2022-10-17 PROCEDURE — 96374 THER/PROPH/DIAG INJ IV PUSH: CPT

## 2022-10-17 PROCEDURE — 84484 ASSAY OF TROPONIN QUANT: CPT | Performed by: EMERGENCY MEDICINE

## 2022-10-17 PROCEDURE — 25010000002 CEFTRIAXONE PER 250 MG: Performed by: EMERGENCY MEDICINE

## 2022-10-17 PROCEDURE — 93010 ELECTROCARDIOGRAM REPORT: CPT | Performed by: HOSPITALIST

## 2022-10-17 PROCEDURE — 93005 ELECTROCARDIOGRAM TRACING: CPT | Performed by: EMERGENCY MEDICINE

## 2022-10-17 PROCEDURE — 36415 COLL VENOUS BLD VENIPUNCTURE: CPT

## 2022-10-17 PROCEDURE — 99283 EMERGENCY DEPT VISIT LOW MDM: CPT

## 2022-10-17 PROCEDURE — 93005 ELECTROCARDIOGRAM TRACING: CPT

## 2022-10-17 RX ORDER — CEFDINIR 300 MG/1
300 CAPSULE ORAL 2 TIMES DAILY
Qty: 20 CAPSULE | Refills: 0 | Status: SHIPPED | OUTPATIENT
Start: 2022-10-17 | End: 2022-11-10

## 2022-10-17 RX ADMIN — SODIUM CHLORIDE 1 G: 9 INJECTION, SOLUTION INTRAVENOUS at 19:41

## 2022-10-17 NOTE — ED PROVIDER NOTES
Subjective   History of Present Illness  [facility].  Patient apparently developed some chest pain this morning at home.  He took 2 nitroglycerin and the pain did not clear though it was better.  Family called the Jose and had him taken to the local hospital.  He says when he got there there was still some pain but they gave him some aspirin and so he has been pain-free now for several hours.  However he does have a cardiac history so they transferred him here for further evaluation.  He is pain-free at the present time.    History provided by:  Patient   used: No    Chest Pain  Pain location:  Substernal area  Pain quality: aching    Pain radiates to:  Does not radiate  Pain severity:  Moderate  Onset quality:  Gradual  Duration:  1 hour  Timing:  Constant  Progression:  Resolved  Chronicity:  New  Context: not breathing, not drug use, not intercourse, not movement, not raising an arm, not stress and not trauma    Relieved by:  Nothing  Worsened by:  Nothing  Ineffective treatments:  None tried  Associated symptoms: no abdominal pain, no altered mental status, no anxiety, no claudication, no cough, no dizziness, no dysphagia, no headache, no nausea, no orthopnea, no palpitations, no PND, no syncope and no vomiting    Risk factors: coronary artery disease and male sex        Review of Systems   Constitutional: Negative.    HENT: Negative.  Negative for trouble swallowing.    Respiratory: Negative.  Negative for cough.    Cardiovascular: Positive for chest pain. Negative for palpitations, orthopnea, claudication, syncope and PND.   Gastrointestinal: Negative.  Negative for abdominal pain, nausea and vomiting.   Genitourinary: Negative.    Musculoskeletal: Negative.    Skin: Negative.    Neurological: Negative.  Negative for dizziness and headaches.   Psychiatric/Behavioral: Negative.    All other systems reviewed and are  negative.      Past Medical History:   Diagnosis Date   • Arthritis    • Coronary artery disease    • Hyperlipidemia    • Hypertensive heart disease with heart failure ([facility]) 3/29/2021   • Hypothyroidism 4/1/2021   • Kidney stone    • Major depressive disorder, recurrent, moderate ([facility]) 3/29/2021   • Morbidly obese ([facility]) 9/18/2020       Allergies   Allergen Reactions   • Iodine Hives   • Strawberry Hives   • Clindamycin/Lincomycin Diarrhea   • Penicillins Rash   • Vancomycin Nausea And Vomiting       Past Surgical History:   Procedure Laterality Date   • CARDIAC DEFIBRILLATOR PLACEMENT     • CARDIAC DEFIBRILLATOR PLACEMENT N/A 2008   • CARDIAC SURGERY     • ENDOSCOPY N/A 10/30/2020    Procedure: ESOPHAGOGASTRODUODENOSCOPY WITH ANESTHESIA;  Surgeon: Brent Stanley MD;  Location: [facility];  Service: Gastroenterology;  Laterality: N/A;  pre dysphagia  post post op gastric surgery  dr jose manuel smith   • ESOPHAGUS SURGERY     • KNEE SURGERY     • TOTAL SHOULDER REPLACEMENT         Family History   Problem Relation Age of Onset   • Hypertension Mother    • Stroke Mother    • Cancer Father    • Hypertension Father    • Diabetes Sister    • Hypertension Sister    • Crohn's disease Daughter    • No Known Problems Son    • No Known Problems Maternal Grandmother    • No Known Problems Maternal Grandfather    • No Known Problems Paternal Grandmother    • No Known Problems Paternal Grandfather    • Hypertension Sister    • Hypertension Sister    • Hypertension Sister    • No Known Problems Son        Social History     Socioeconomic History   • Marital status:    Tobacco Use   • Smoking status: Never   • Smokeless tobacco: Never   Vaping Use   • Vaping Use: Never used   Substance and Sexual Activity   • Alcohol use: No   • Drug use: No   • Sexual activity: Defer       Prior to Admission medications    Medication Sig Start Date End Date Taking? Authorizing Provider   acetaminophen (TYLENOL) 650 MG 8 hr tablet Take 650 mg by  mouth 2 (Two) Times a Day.    Walter Call MD   allopurinol (Zyloprim) 100 MG tablet Take 1 tablet by mouth Daily. 8/19/22   Garret Salinas MD   aluminum-magnesium hydroxide 200-200 MG/5ML suspension Take 25 mL by mouth 3 (Three) Times a Day. 8/18/22   Xochitl Doshi APRN   aluminum-magnesium hydroxide-simethicone (MAALOX MAX) 400-400-40 MG/5ML suspension Take 15 mL by mouth 3 (Three) Times a Day. 7/22/22   Sean Rosales MD   apixaban (ELIQUIS) 2.5 MG tablet tablet Take 1 tablet by mouth Every 12 (Twelve) Hours. Indications: Atrial Fibrillation, Prevention of Unwanted Clot in Veins 8/19/22   Garret Salinas MD   aspirin 81 MG EC tablet Take 1 tablet by mouth Daily. 7/22/22   Sean Rosales MD   atorvastatin (LIPITOR) 40 MG tablet Take 40 mg by mouth Every Night.    Walter Call MD   buPROPion XL (Wellbutrin XL) 150 MG 24 hr tablet Take 1 tablet by mouth Every Morning. 3/1/22   Garret Salinas MD   Cholecalciferol (VITAMIN D3) 2000 units capsule Take 2,000 Units by mouth Daily.    Walter Call MD   docusate sodium (COLACE) 100 MG capsule Take 100 mg by mouth Daily.    Walter Call MD   famotidine (PEPCID) 20 MG tablet Take 20 mg by mouth 2 (Two) Times a Day.    Walter Call MD   fluticasone (FLONASE) 50 MCG/ACT nasal spray 2 sprays by Each Nare route Daily. 7/23/22   Sean Rosales MD   gabapentin (NEURONTIN) 100 MG capsule 1 each a.m. for nerve pain 8/16/22   Garret Salinas MD   gabapentin (NEURONTIN) 100 MG capsule Take 100 mg by mouth Daily.    Walter Call MD   gabapentin (NEURONTIN) 300 MG capsule 1 in the afternoon for pain  Patient taking differently: Take 300 mg by mouth Every Night. 1 in the afternoon for pain 8/4/22   Garret Salinas MD   gabapentin (NEURONTIN) 300 MG capsule 1 at bedtime for nerve pain 8/16/22   Garret Salinas MD   ipratropium (ATROVENT) 0.03 % nasal spray 2 SPRAYS INTO THE  NOSTRIL(S) AS DIRECTED BY PROVIDER EVERY 12 (TWELVE) HOURS. 7/23/22   Walter Call MD   isosorbide mononitrate (IMDUR) 30 MG 24 hr tablet Take 1 tablet by mouth Daily.  Patient taking differently: Take 60 mg by mouth Daily. 7/23/22   Sean Rosales MD   levothyroxine (SYNTHROID, LEVOTHROID) 50 MCG tablet Take 50 mcg by mouth Daily.    Walter Call MD   loperamide (IMODIUM A-D) 1 MG/7.5ML suspension Imodium A-D 1 mg/7.5 mL oral liquid   Take by oral route.    Walter Call MD   magnesium oxide (MAG-OX) 400 MG tablet Take 400 mg by mouth Daily.    Walter Call MD   memantine (NAMENDA) 10 MG tablet Take 1 tablet by mouth Daily. 7/22/22   Sean Rosales MD   metOLazone (ZAROXOLYN) 2.5 MG tablet Take 2.5 mg by mouth Daily As Needed.    Walter Call MD   metoprolol succinate XL (TOPROL-XL) 25 MG 24 hr tablet Take 12.5 mg by mouth Daily.    Walter Call MD   mexiletine (MEXITIL) 150 MG capsule Take 1 capsule by mouth 2 (Two) Times a Day. 7/22/22   Sean Rosales MD   nitroglycerin (NITROSTAT) 0.4 MG SL tablet Place 0.4 mg under the tongue Every 5 (Five) Minutes As Needed for Chest Pain. Take no more than 3 doses in 15 minutes.    Walter Call MD   oxyCODONE-acetaminophen (PERCOCET)  MG per tablet Every 8 (Eight) Hours.    Walter Call MD   polyethyl glycol-propyl glycol (SYSTANE) 0.4-0.3 % solution ophthalmic solution (artificial tears) Administer 1 drop to both eyes Every 1 (One) Hour As Needed.    Walter Call MD       Medications   cefTRIAXone (ROCEPHIN) 1 g in sodium chloride 0.9 % 100 mL IVPB-VTB (has no administration in time range)       Vitals:    10/17/22 1846   BP: 138/81   Pulse: 66   Resp:    Temp:    SpO2: 98%         Objective   Physical Exam  Vitals and nursing note reviewed.   Constitutional:       Appearance: He is well-developed.   HENT:      Head: Normocephalic and atraumatic.   Cardiovascular:      Rate and Rhythm:  Normal rate and regular rhythm.   Pulmonary:      Effort: Pulmonary effort is normal.      Breath sounds: Normal breath sounds.   Abdominal:      General: Bowel sounds are normal.      Palpations: Abdomen is soft.   Musculoskeletal:         General: Normal range of motion.      Cervical back: Normal range of motion and neck supple.   Skin:     General: Skin is warm and dry.   Neurological:      General: No focal deficit present.      Mental Status: He is alert and oriented to person, place, and time.      Comments: Patient answers correctly but slowly.   Psychiatric:         Mood and Affect: Mood normal.         Behavior: Behavior normal.         Procedures         Lab Results (last 24 hours)     Procedure Component Value Units Date/Time    Troponin [[id number]]  (Normal) Collected: 10/17/22 1833    Specimen: Blood Updated: 10/17/22 1901     Troponin T <0.010 ng/mL     Narrative:      Troponin T Reference Range:  <= 0.03 ng/mL-   Negative for AMI  >0.03 ng/mL-     Abnormal for myocardial necrosis.  Clinicians would have to utilize clinical acumen, EKG, Troponin and serial changes to determine if it is an Acute Myocardial Infarction or myocardial injury due to an underlying chronic condition.       Results may be falsely decreased if patient taking Biotin.            No orders to display       ED Course  ED Course as of 10/17/22 1919   Mon Oct 17, 2022   1918 I told the patient his enzymes here are still negative and his EKG is nonspecific.  Between the 2 sets of enzymes I can give him 100% assurance that he had not had a heart attack but I could not tell him for sure what caused the pain.  It is still possible it was cardiac pain and he resolved with nitroglycerin or could have been something else.  I offered to put him in the hospital to get a stress test in the morning but he preferred to go home and I think that is reasonable and actually probably the better choice.  He can follow-up with his family doctor if they  want any further testing done.  His lab work from [facility] did show some urinary tract infection and his family member who is with him says that sometimes he does get confused when he has urinary tract infection as he was this morning so we will treat that.  He is discharged in stable condition. [TR]      ED Course User Index  [TR] Aguilar Wu Jr., MD         HEART Score (for prediction of 6-week risk of major adverse cardiac event) reviewed and/or performed as part of the patient evaluation and treatment planning process.  The result associated with this review/performance is: 6      MDM  Number of Diagnoses or Management Options  Acute UTI: new and requires workup  Chest pain, unspecified type: new and requires workup     Amount and/or Complexity of Data Reviewed  Clinical lab tests: ordered and reviewed  Decide to obtain previous medical records or to obtain history from someone other than the patient: yes    Risk of Complications, Morbidity, and/or Mortality  Presenting problems: moderate  Diagnostic procedures: moderate  Management options: moderate        Final diagnoses:   Chest pain, unspecified type   Acute UTI          Aguilar Wu Jr., MD  10/17/22 0613

## 2022-10-17 NOTE — TELEPHONE ENCOUNTER
Caller: Nathalie Velazquez    Relationship: Emergency Contact    Best call back number: [phone]    What is the best time to reach you:   ANYTIME    Who are you requesting to speak with (clinical staff, provider,  specific staff member):   CLINICAL STAFF    Do you know the name of the person who called:   NATHALIE VERA    What was the call regarding:   PATIENT SPOUSE CALLED AND ADVISED THAT PATIENT IS NOT GOING TO [place], PATIENT WILL BE SEEN IN [place].     Do you require a callback: YES

## 2022-10-19 LAB
QT INTERVAL: 412 MS
QTC INTERVAL: 435 MS

## 2022-10-20 RX ORDER — CHOLESTYRAMINE 4 G/9G
1 POWDER, FOR SUSPENSION ORAL
Qty: 15 PACKET | Refills: 0 | Status: SHIPPED | OUTPATIENT
Start: 2022-10-20

## 2022-10-21 ENCOUNTER — TELEPHONE (OUTPATIENT)
Dept: FAMILY MEDICINE CLINIC | Facility: CLINIC | Age: 77
End: 2022-10-21

## 2022-10-21 NOTE — TELEPHONE ENCOUNTER
Patient having diarrhea after starting cefdner anbx.  Dr. Salinas said to stop the anbx.  Soups and liquids for 24 hrs.  Can take immodium.  Do Cdiff stool test and start the questran packets 1 three times daily

## 2022-11-04 NOTE — PROGRESS NOTES
Subjective    Mr. Velazquez is 77 y.o. male    Chief Complaint: Follow-up urinary retention    History of Present Illness     77-year-old male established patient in for 3-month follow-up following an episode of acute urinary retention during previous hospitalization back in July of this year.  Patient reports since having the indwelling Treviño catheter removed the day of the last office visit patient has not required intermittent catheterization or an indwelling Treviño catheter since.  Patient reports he has been urinating without difficulty denies any feelings of incomplete emptying.  Patient had previously been in a rehab facility however is now back home and appears to be doing well.  Postvoid residual today 95 mL.     Patient spouse reports over the last 3 months patient has been treated twice for a urinary tract infection 1 being the middle of August patient was treated with cefdinir and was again treated approximately 1 week ago with nitrofurantoin.  Urinalysis today is clear no signs of infection.  Patient is asymptomatic.    The following portions of the patient's history were reviewed and updated as appropriate: allergies, current medications, past family history, past medical history, past social history, past surgical history and problem list.    Review of Systems   Constitutional: Negative for chills and fever.   Gastrointestinal: Negative for abdominal pain, anal bleeding, blood in stool, nausea and vomiting.   Genitourinary: Positive for decreased urine volume and difficulty urinating. Negative for dysuria, flank pain, frequency, hematuria and urgency.         Current Outpatient Medications:   •  acetaminophen (TYLENOL) 650 MG 8 hr tablet, Take 650 mg by mouth 2 (Two) Times a Day., Disp: , Rfl:   •  allopurinol (Zyloprim) 100 MG tablet, Take 1 tablet by mouth Daily., Disp: 30 tablet, Rfl: 3  •  aluminum-magnesium hydroxide 200-200 MG/5ML suspension, Take 25 mL by mouth 3 (Three) Times a Day., Disp: 2700 mL,  Rfl: 3  •  aluminum-magnesium hydroxide-simethicone (MAALOX MAX) 400-400-40 MG/5ML suspension, Take 15 mL by mouth 3 (Three) Times a Day., Disp: , Rfl:   •  apixaban (ELIQUIS) 2.5 MG tablet tablet, Take 1 tablet by mouth Every 12 (Twelve) Hours. Indications: Atrial Fibrillation, Prevention of Unwanted Clot in Veins, Disp: 60 tablet, Rfl: 3  •  aspirin 81 MG EC tablet, Take 1 tablet by mouth Daily., Disp: , Rfl:   •  atorvastatin (LIPITOR) 40 MG tablet, Take 40 mg by mouth Every Night., Disp: , Rfl:   •  buPROPion XL (Wellbutrin XL) 150 MG 24 hr tablet, Take 1 tablet by mouth Every Morning., Disp: 90 tablet, Rfl: 3  •  Cholecalciferol (VITAMIN D3) 2000 units capsule, Take 2,000 Units by mouth Daily., Disp: , Rfl:   •  cholestyramine (Questran) 4 g packet, Take 1 packet by mouth 3 (Three) Times a Day With Meals., Disp: 15 packet, Rfl: 0  •  diphenhydrAMINE 12.5 MG/5ML elixir 20 mL, aluminum-magnesium hydroxide-simethicone 400-400-40 MG/5ML suspension 20 mL, Lidocaine Viscous HCl 2 % solution 20 mL, Swish and spit Every 4 (Four) Hours As Needed., Disp: , Rfl:   •  docusate sodium (COLACE) 100 MG capsule, Take 100 mg by mouth Daily., Disp: , Rfl:   •  doxycycline (ADOXA) 100 MG tablet, Take 1 tablet by mouth 2 (Two) Times a Day., Disp: 14 tablet, Rfl: 0  •  doxycycline (VIBRAMYICN) 100 MG tablet, Take 1 tablet by mouth 2 (Two) Times a Day., Disp: , Rfl:   •  famotidine (PEPCID) 20 MG tablet, Take 20 mg by mouth 2 (Two) Times a Day., Disp: , Rfl:   •  fluticasone (FLONASE) 50 MCG/ACT nasal spray, 2 sprays by Each Nare route Daily., Disp: , Rfl:   •  gabapentin (NEURONTIN) 100 MG capsule, Take 100 mg by mouth Daily., Disp: , Rfl:   •  gabapentin (NEURONTIN) 300 MG capsule, 1 in the afternoon for pain (Patient taking differently: Take 1 capsule by mouth Every Night. 1 in the afternoon for pain), Disp: 90 capsule, Rfl: 3  •  HYDROcodone-acetaminophen (NORCO) 5-325 MG per tablet, Take 1 tablet by mouth Every 6 (Six) Hours As  Needed., Disp: , Rfl:   •  ipratropium (ATROVENT) 0.03 % nasal spray, 2 SPRAYS INTO THE NOSTRIL(S) AS DIRECTED BY PROVIDER EVERY 12 (TWELVE) HOURS., Disp: , Rfl:   •  isosorbide mononitrate (IMDUR) 30 MG 24 hr tablet, Take 1 tablet by mouth Daily. (Patient taking differently: Take 2 tablets by mouth Daily.), Disp: , Rfl:   •  levothyroxine (SYNTHROID, LEVOTHROID) 50 MCG tablet, Take 50 mcg by mouth Daily., Disp: , Rfl:   •  magnesium oxide (MAG-OX) 400 MG tablet, Take 400 mg by mouth Daily., Disp: , Rfl:   •  Magnesium Oxide 400 (240 Mg) MG tablet, Take  by mouth Daily., Disp: , Rfl:   •  memantine (NAMENDA) 10 MG tablet, Take 1 tablet by mouth Daily., Disp: , Rfl:   •  metoclopramide (REGLAN) 10 MG tablet, Take  by mouth 4 (Four) Times a Day Before Meals & at Bedtime., Disp: , Rfl:   •  metOLazone (ZAROXOLYN) 2.5 MG tablet, Take 2.5 mg by mouth Daily As Needed., Disp: , Rfl:   •  metoprolol succinate XL (TOPROL-XL) 25 MG 24 hr tablet, Take 12.5 mg by mouth Daily., Disp: , Rfl:   •  mexiletine (MEXITIL) 150 MG capsule, Take 1 capsule by mouth 2 (Two) Times a Day., Disp: , Rfl:   •  nitrofurantoin (MACRODANTIN) 100 MG capsule, Take 1 capsule by mouth Every Night., Disp: , Rfl:   •  nitrofurantoin, macrocrystal-monohydrate, (MACROBID) 100 MG capsule, Take 1 capsule by mouth 2 (Two) Times a Day., Disp: , Rfl:   •  nitroglycerin (NITROSTAT) 0.4 MG SL tablet, Place 0.4 mg under the tongue Every 5 (Five) Minutes As Needed for Chest Pain. Take no more than 3 doses in 15 minutes., Disp: , Rfl:   •  oxyCODONE-acetaminophen (PERCOCET)  MG per tablet, Every 8 (Eight) Hours., Disp: , Rfl:   •  polyethyl glycol-propyl glycol (SYSTANE) 0.4-0.3 % solution ophthalmic solution (artificial tears), Administer 1 drop to both eyes Every 1 (One) Hour As Needed., Disp: , Rfl:   •  tamsulosin (FLOMAX) 0.4 MG capsule 24 hr capsule, Take 1 capsule by mouth Daily., Disp: , Rfl:   •  traMADol (ULTRAM) 50 MG tablet, Take 1 tablet by mouth  "Every 8 (Eight) Hours As Needed for Moderate Pain., Disp: , Rfl:   •  loperamide (IMODIUM A-D) 1 MG/7.5ML suspension, Imodium A-D 1 mg/7.5 mL oral liquid  Take by oral route., Disp: , Rfl:   No current facility-administered medications for this visit.    Past Medical History:   Diagnosis Date   • Arthritis    • Coronary artery disease    • Hyperlipidemia    • Hypertensive heart disease with heart failure (HCC) 3/29/2021   • Hypothyroidism 4/1/2021   • Kidney stone    • Major depressive disorder, recurrent, moderate (HCC) 3/29/2021   • Morbidly obese (HCC) 9/18/2020       Past Surgical History:   Procedure Laterality Date   • CARDIAC DEFIBRILLATOR PLACEMENT     • CARDIAC DEFIBRILLATOR PLACEMENT N/A 2008   • CARDIAC SURGERY     • ENDOSCOPY N/A 10/30/2020    Procedure: ESOPHAGOGASTRODUODENOSCOPY WITH ANESTHESIA;  Surgeon: Brent Stanley MD;  Location: [facility];  Service: Gastroenterology;  Laterality: N/A;  pre dysphagia  post post op gastric surgery  dr jose manuel smith   • ESOPHAGUS SURGERY     • KNEE SURGERY     • TOTAL SHOULDER REPLACEMENT         Social History     Socioeconomic History   • Marital status:    Tobacco Use   • Smoking status: Never   • Smokeless tobacco: Never   Vaping Use   • Vaping Use: Never used   Substance and Sexual Activity   • Alcohol use: No   • Drug use: No   • Sexual activity: Defer       Family History   Problem Relation Age of Onset   • Hypertension Mother    • Stroke Mother    • Cancer Father    • Hypertension Father    • Diabetes Sister    • Hypertension Sister    • Crohn's disease Daughter    • No Known Problems Son    • No Known Problems Maternal Grandmother    • No Known Problems Maternal Grandfather    • No Known Problems Paternal Grandmother    • No Known Problems Paternal Grandfather    • Hypertension Sister    • Hypertension Sister    • Hypertension Sister    • No Known Problems Son        Objective    Temp 96.2 °F (35.7 °C)   Ht 167.6 cm (66\")   Wt 101 kg (222 lb 9.6 oz)  "  BMI 35.93 kg/m²     Physical Exam  Constitutional:       Appearance: Normal appearance.   Abdominal:      Tenderness: There is no abdominal tenderness. There is no right CVA tenderness or left CVA tenderness.   Skin:     General: Skin is warm and dry.   Neurological:      Mental Status: He is alert and oriented to person, place, and time.   Psychiatric:         Mood and Affect: Mood normal.         Behavior: Behavior normal.             Results for orders placed or performed in visit on 11/11/22   POC Urinalysis Dipstick, Multipro    Specimen: Urine   Result Value Ref Range    Color Yellow Yellow, Straw, Dark Yellow, Candis    Clarity, UA Clear Clear    Glucose, UA Negative Negative mg/dL    Bilirubin Negative Negative    Ketones, UA Negative Negative    Specific Gravity  1.015 1.005 - 1.030    Blood, UA Negative Negative    pH, Urine 5.5 5.0 - 8.0    Protein, POC Negative Negative mg/dL    Urobilinogen, UA 0.2 E.U./dL Normal, 0.2 E.U./dL    Nitrite, UA Negative Negative    Leukocytes Negative Negative     Estimation of residual urine via abdominal ultrasound  Residual Urine: 95 ml  Indication: Retention  Position: Supine  Examination: Incremental scanning of the suprapubic area using 3 MHz transducer using copious amounts of acoustic gel.   Findings: An anechoic area was demonstrated which represented the bladder, with measurement of residual urine as noted. I inspected this myself. In that the residual urine was stable or insignificant, no treatment will be necessary at this time.            Assessment and Plan    Diagnoses and all orders for this visit:    1. Urinary retention (Primary)  -     POC Urinalysis Dipstick, Multipro    77-year-old male established patient in for 3-month follow-up following an episode of acute urinary retention during previous hospitalization back in July of this year.  Patient reports since having the indwelling Treviño catheter removed the day of the last office visit patient has not  required intermittent catheterization or an indwelling Treviño catheter since.  Patient reports he has been urinating without difficulty denies any feelings of incomplete emptying.     PVR today 95 mL    Recently treated for urinary tract infection x2.  First with cefdinir followed by nitrofurantoin approximately 1 month later.    Currently patient is asymptomatic.  Urinalysis is clear no signs of infection.    Continue tamsulosin 0.4 mg daily, patient appears to be retaining a small amount of urine however not enough to require CIC or indwelling Treviño catheter placement.    Follow-up in 6 months for reevaluation with a PVR and UA

## 2022-11-09 ENCOUNTER — HOSPITAL ENCOUNTER (OUTPATIENT)
Dept: SLEEP MEDICINE | Facility: HOSPITAL | Age: 77
End: 2022-11-09

## 2022-11-10 ENCOUNTER — OFFICE VISIT (OUTPATIENT)
Dept: FAMILY MEDICINE CLINIC | Facility: CLINIC | Age: 77
End: 2022-11-10

## 2022-11-10 VITALS
OXYGEN SATURATION: 94 % | TEMPERATURE: 98.6 F | HEART RATE: 71 BPM | DIASTOLIC BLOOD PRESSURE: 78 MMHG | HEIGHT: 68 IN | WEIGHT: 218 LBS | RESPIRATION RATE: 16 BRPM | BODY MASS INDEX: 33.04 KG/M2 | SYSTOLIC BLOOD PRESSURE: 116 MMHG

## 2022-11-10 DIAGNOSIS — R09.81 NASAL CONGESTION: Primary | ICD-10-CM

## 2022-11-10 DIAGNOSIS — J06.9 UPPER RESPIRATORY TRACT INFECTION, UNSPECIFIED TYPE: ICD-10-CM

## 2022-11-10 LAB
EXPIRATION DATE: NORMAL
FLUAV AG UPPER RESP QL IA.RAPID: NOT DETECTED
FLUBV AG UPPER RESP QL IA.RAPID: NOT DETECTED
INTERNAL CONTROL: NORMAL
Lab: NORMAL
SARS-COV-2 AG UPPER RESP QL IA.RAPID: NOT DETECTED

## 2022-11-10 PROCEDURE — 99213 OFFICE O/P EST LOW 20 MIN: CPT | Performed by: NURSE PRACTITIONER

## 2022-11-10 PROCEDURE — 96372 THER/PROPH/DIAG INJ SC/IM: CPT | Performed by: NURSE PRACTITIONER

## 2022-11-10 PROCEDURE — 87428 SARSCOV & INF VIR A&B AG IA: CPT | Performed by: NURSE PRACTITIONER

## 2022-11-10 RX ORDER — DOXYCYCLINE 100 MG/1
100 TABLET ORAL 2 TIMES DAILY
Qty: 14 TABLET | Refills: 0 | Status: SHIPPED | OUTPATIENT
Start: 2022-11-10 | End: 2023-01-09

## 2022-11-10 RX ORDER — NITROFURANTOIN 25; 75 MG/1; MG/1
100 CAPSULE ORAL 2 TIMES DAILY
COMMUNITY
Start: 2022-11-04 | End: 2022-12-12 | Stop reason: ALTCHOICE

## 2022-11-10 RX ORDER — TRIAMCINOLONE ACETONIDE 40 MG/ML
40 INJECTION, SUSPENSION INTRA-ARTICULAR; INTRAMUSCULAR ONCE
Status: COMPLETED | OUTPATIENT
Start: 2022-11-10 | End: 2022-11-10

## 2022-11-10 RX ADMIN — TRIAMCINOLONE ACETONIDE 40 MG: 40 INJECTION, SUSPENSION INTRA-ARTICULAR; INTRAMUSCULAR at 11:12

## 2022-11-10 NOTE — PROGRESS NOTES
CC: sneezing, congestion, chills    History:  Samy Velazquez is a 77 y.o. male who presents today for evaluation of the above problems.      Patient has been feeling bad for two days. Complains of sneezing, congestion, chills, and just generally feeling bad.   Denies sore throat or shortness of breath. O2 initially read 93-94.  I rechecked while I was in the room and it was 96.        HPI  ROS:  Review of Systems   Constitutional: Positive for chills. Negative for fever.   HENT: Positive for congestion and sneezing.    Respiratory: Negative for cough.        Allergies   Allergen Reactions   • Iodine Hives   • Strawberry Hives   • Clindamycin/Lincomycin Diarrhea   • Penicillins Rash   • Vancomycin Nausea And Vomiting     Past Medical History:   Diagnosis Date   • Arthritis    • Coronary artery disease    • Hyperlipidemia    • Hypertensive heart disease with heart failure (HCC) 3/29/2021   • Hypothyroidism 4/1/2021   • Kidney stone    • Major depressive disorder, recurrent, moderate (HCC) 3/29/2021   • Morbidly obese (HCC) 9/18/2020     Past Surgical History:   Procedure Laterality Date   • CARDIAC DEFIBRILLATOR PLACEMENT     • CARDIAC DEFIBRILLATOR PLACEMENT N/A 2008   • CARDIAC SURGERY     • ENDOSCOPY N/A 10/30/2020    Procedure: ESOPHAGOGASTRODUODENOSCOPY WITH ANESTHESIA;  Surgeon: Brent Stanley MD;  Location: [facility];  Service: Gastroenterology;  Laterality: N/A;  pre dysphagia  post post op gastric surgery  dr jose manuel smith   • ESOPHAGUS SURGERY     • KNEE SURGERY     • TOTAL SHOULDER REPLACEMENT       Family History   Problem Relation Age of Onset   • Hypertension Mother    • Stroke Mother    • Cancer Father    • Hypertension Father    • Diabetes Sister    • Hypertension Sister    • Crohn's disease Daughter    • No Known Problems Son    • No Known Problems Maternal Grandmother    • No Known Problems Maternal Grandfather    • No Known Problems Paternal Grandmother    • No Known Problems Paternal Grandfather    •  Hypertension Sister    • Hypertension Sister    • Hypertension Sister    • No Known Problems Son       reports that he has never smoked. He has never used smokeless tobacco. He reports that he does not drink alcohol and does not use drugs.      Current Outpatient Medications:   •  acetaminophen (TYLENOL) 650 MG 8 hr tablet, Take 650 mg by mouth 2 (Two) Times a Day., Disp: , Rfl:   •  allopurinol (Zyloprim) 100 MG tablet, Take 1 tablet by mouth Daily., Disp: 30 tablet, Rfl: 3  •  aluminum-magnesium hydroxide 200-200 MG/5ML suspension, Take 25 mL by mouth 3 (Three) Times a Day., Disp: 2700 mL, Rfl: 3  •  aluminum-magnesium hydroxide-simethicone (MAALOX MAX) 400-400-40 MG/5ML suspension, Take 15 mL by mouth 3 (Three) Times a Day., Disp: , Rfl:   •  apixaban (ELIQUIS) 2.5 MG tablet tablet, Take 1 tablet by mouth Every 12 (Twelve) Hours. Indications: Atrial Fibrillation, Prevention of Unwanted Clot in Veins, Disp: 60 tablet, Rfl: 3  •  aspirin 81 MG EC tablet, Take 1 tablet by mouth Daily., Disp: , Rfl:   •  atorvastatin (LIPITOR) 40 MG tablet, Take 40 mg by mouth Every Night., Disp: , Rfl:   •  buPROPion XL (Wellbutrin XL) 150 MG 24 hr tablet, Take 1 tablet by mouth Every Morning., Disp: 90 tablet, Rfl: 3  •  Cholecalciferol (VITAMIN D3) 2000 units capsule, Take 2,000 Units by mouth Daily., Disp: , Rfl:   •  cholestyramine (Questran) 4 g packet, Take 1 packet by mouth 3 (Three) Times a Day With Meals., Disp: 15 packet, Rfl: 0  •  docusate sodium (COLACE) 100 MG capsule, Take 100 mg by mouth Daily., Disp: , Rfl:   •  famotidine (PEPCID) 20 MG tablet, Take 20 mg by mouth 2 (Two) Times a Day., Disp: , Rfl:   •  fluticasone (FLONASE) 50 MCG/ACT nasal spray, 2 sprays by Each Nare route Daily., Disp: , Rfl:   •  gabapentin (NEURONTIN) 100 MG capsule, Take 100 mg by mouth Daily., Disp: , Rfl:   •  gabapentin (NEURONTIN) 300 MG capsule, 1 in the afternoon for pain (Patient taking differently: Take 1 capsule by mouth Every  Night. 1 in the afternoon for pain), Disp: 90 capsule, Rfl: 3  •  ipratropium (ATROVENT) 0.03 % nasal spray, 2 SPRAYS INTO THE NOSTRIL(S) AS DIRECTED BY PROVIDER EVERY 12 (TWELVE) HOURS., Disp: , Rfl:   •  isosorbide mononitrate (IMDUR) 30 MG 24 hr tablet, Take 1 tablet by mouth Daily. (Patient taking differently: Take 2 tablets by mouth Daily.), Disp: , Rfl:   •  levothyroxine (SYNTHROID, LEVOTHROID) 50 MCG tablet, Take 50 mcg by mouth Daily., Disp: , Rfl:   •  loperamide (IMODIUM A-D) 1 MG/7.5ML suspension, Imodium A-D 1 mg/7.5 mL oral liquid  Take by oral route., Disp: , Rfl:   •  magnesium oxide (MAG-OX) 400 MG tablet, Take 400 mg by mouth Daily., Disp: , Rfl:   •  memantine (NAMENDA) 10 MG tablet, Take 1 tablet by mouth Daily., Disp: , Rfl:   •  metOLazone (ZAROXOLYN) 2.5 MG tablet, Take 2.5 mg by mouth Daily As Needed., Disp: , Rfl:   •  metoprolol succinate XL (TOPROL-XL) 25 MG 24 hr tablet, Take 12.5 mg by mouth Daily., Disp: , Rfl:   •  mexiletine (MEXITIL) 150 MG capsule, Take 1 capsule by mouth 2 (Two) Times a Day., Disp: , Rfl:   •  nitrofurantoin, macrocrystal-monohydrate, (MACROBID) 100 MG capsule, Take 1 capsule by mouth 2 (Two) Times a Day., Disp: , Rfl:   •  nitroglycerin (NITROSTAT) 0.4 MG SL tablet, Place 0.4 mg under the tongue Every 5 (Five) Minutes As Needed for Chest Pain. Take no more than 3 doses in 15 minutes., Disp: , Rfl:   •  oxyCODONE-acetaminophen (PERCOCET)  MG per tablet, Every 8 (Eight) Hours., Disp: , Rfl:   •  polyethyl glycol-propyl glycol (SYSTANE) 0.4-0.3 % solution ophthalmic solution (artificial tears), Administer 1 drop to both eyes Every 1 (One) Hour As Needed., Disp: , Rfl:   •  doxycycline (ADOXA) 100 MG tablet, Take 1 tablet by mouth 2 (Two) Times a Day., Disp: 14 tablet, Rfl: 0  No current facility-administered medications for this visit.    OBJECTIVE:  /78 (BP Location: Left arm, Patient Position: Sitting, Cuff Size: Adult)   Pulse 71   Temp 98.6 °F (37  "°C) (Temporal)   Resp 16   Ht 172.7 cm (68\")   Wt 98.9 kg (218 lb)   SpO2 94%   BMI 33.15 kg/m²    Physical Exam  Vitals reviewed.   Constitutional:       Appearance: He is well-developed.   HENT:      Right Ear: Tympanic membrane, ear canal and external ear normal.      Left Ear: Tympanic membrane, ear canal and external ear normal.   Cardiovascular:      Rate and Rhythm: Normal rate and regular rhythm.      Heart sounds: Normal heart sounds.   Pulmonary:      Effort: Pulmonary effort is normal.      Breath sounds: Normal breath sounds.   Neurological:      Mental Status: He is alert and oriented to person, place, and time.   Psychiatric:         Behavior: Behavior normal.         Assessment/Plan    Diagnoses and all orders for this visit:    1. Nasal congestion (Primary)  -     POCT SARS-CoV-2 Antigen KENNETH    2. Upper respiratory tract infection, unspecified type  -     doxycycline (ADOXA) 100 MG tablet; Take 1 tablet by mouth 2 (Two) Times a Day.  Dispense: 14 tablet; Refill: 0  -     triamcinolone acetonide (KENALOG-40) injection 40 mg        An After Visit Summary was printed and given to the patient at discharge.  Return if symptoms worsen or fail to improve, for Next scheduled follow up.       GEN Villagran 11/10/22    Electronically signed.  "

## 2022-11-11 ENCOUNTER — OFFICE VISIT (OUTPATIENT)
Dept: UROLOGY | Facility: CLINIC | Age: 77
End: 2022-11-11

## 2022-11-11 VITALS — WEIGHT: 222.6 LBS | TEMPERATURE: 96.2 F | BODY MASS INDEX: 35.77 KG/M2 | HEIGHT: 66 IN

## 2022-11-11 DIAGNOSIS — R33.9 URINARY RETENTION: Primary | ICD-10-CM

## 2022-11-11 PROCEDURE — 99213 OFFICE O/P EST LOW 20 MIN: CPT

## 2022-11-11 PROCEDURE — 51798 US URINE CAPACITY MEASURE: CPT

## 2022-11-11 PROCEDURE — 81001 URINALYSIS AUTO W/SCOPE: CPT

## 2022-11-11 RX ORDER — TRAMADOL HYDROCHLORIDE 50 MG/1
50 TABLET ORAL EVERY 8 HOURS PRN
COMMUNITY
End: 2022-12-12

## 2022-11-11 RX ORDER — DOXYCYCLINE HYCLATE 100 MG
100 TABLET ORAL 2 TIMES DAILY
COMMUNITY
End: 2023-01-09

## 2022-11-11 RX ORDER — METOCLOPRAMIDE 10 MG/1
TABLET ORAL
COMMUNITY

## 2022-11-11 RX ORDER — LANOLIN ALCOHOL/MO/W.PET/CERES
CREAM (GRAM) TOPICAL DAILY
COMMUNITY
End: 2023-01-20 | Stop reason: SDUPTHER

## 2022-11-11 RX ORDER — NITROFURANTOIN MACROCRYSTALS 100 MG/1
100 CAPSULE ORAL NIGHTLY
COMMUNITY
End: 2022-12-12 | Stop reason: ALTCHOICE

## 2022-11-11 RX ORDER — TAMSULOSIN HYDROCHLORIDE 0.4 MG/1
1 CAPSULE ORAL DAILY
COMMUNITY
End: 2022-11-28

## 2022-11-11 RX ORDER — HYDROCODONE BITARTRATE AND ACETAMINOPHEN 5; 325 MG/1; MG/1
1 TABLET ORAL EVERY 6 HOURS PRN
COMMUNITY
End: 2022-12-12 | Stop reason: SDUPTHER

## 2022-11-16 ENCOUNTER — OFFICE VISIT (OUTPATIENT)
Dept: FAMILY MEDICINE CLINIC | Facility: CLINIC | Age: 77
End: 2022-11-16

## 2022-11-16 VITALS
OXYGEN SATURATION: 97 % | DIASTOLIC BLOOD PRESSURE: 79 MMHG | BODY MASS INDEX: 33.75 KG/M2 | SYSTOLIC BLOOD PRESSURE: 122 MMHG | TEMPERATURE: 96.8 F | HEART RATE: 61 BPM | RESPIRATION RATE: 16 BRPM | HEIGHT: 66 IN | WEIGHT: 210 LBS

## 2022-11-16 DIAGNOSIS — R53.83 FATIGUE, UNSPECIFIED TYPE: Primary | ICD-10-CM

## 2022-11-16 DIAGNOSIS — R73.9 HYPERGLYCEMIA: ICD-10-CM

## 2022-11-16 DIAGNOSIS — R06.02 SHORTNESS OF BREATH: ICD-10-CM

## 2022-11-16 PROCEDURE — 99213 OFFICE O/P EST LOW 20 MIN: CPT | Performed by: FAMILY MEDICINE

## 2022-11-16 NOTE — PROGRESS NOTES
"Samy Velazquez    1945    Chief Complaint   Patient presents with   • Follow-up     Pt here for three month check up, just states he is still sneezing        Vitals:    11/16/22 1233   BP: 122/79   BP Location: Right arm   Patient Position: Sitting   Cuff Size: Adult   Pulse: 61   Resp: 16   Temp: 96.8 °F (36 °C)   SpO2: 97%   Weight: 95.3 kg (210 lb)   Height: 167.6 cm (66\")       77-year-old male deconditioned with chronic heart disease needs physical therapy to maintain independence-gait balance strengthening program      Review of Systems   Respiratory: Negative for shortness of breath.    Cardiovascular: Negative for chest pain and leg swelling.   Musculoskeletal: Positive for arthralgias, back pain and neck pain.   All other systems reviewed and are negative.      Past Medical History:   Diagnosis Date   • Arthritis    • Coronary artery disease    • Hyperlipidemia    • Hypertensive heart disease with heart failure (HCC) 3/29/2021   • Hypothyroidism 4/1/2021   • Kidney stone    • Major depressive disorder, recurrent, moderate (HCC) 3/29/2021   • Morbidly obese (HCC) 9/18/2020         Current Outpatient Medications:   •  allopurinol (Zyloprim) 100 MG tablet, Take 1 tablet by mouth Daily., Disp: 30 tablet, Rfl: 3  •  apixaban (ELIQUIS) 2.5 MG tablet tablet, Take 1 tablet by mouth Every 12 (Twelve) Hours. Indications: Atrial Fibrillation, Prevention of Unwanted Clot in Veins, Disp: 60 tablet, Rfl: 3  •  aspirin 81 MG EC tablet, Take 1 tablet by mouth Daily., Disp: , Rfl:   •  atorvastatin (LIPITOR) 40 MG tablet, Take 40 mg by mouth Every Night., Disp: , Rfl:   •  buPROPion XL (Wellbutrin XL) 150 MG 24 hr tablet, Take 1 tablet by mouth Every Morning., Disp: 90 tablet, Rfl: 3  •  Cholecalciferol (VITAMIN D3) 2000 units capsule, Take 2,000 Units by mouth Daily., Disp: , Rfl:   •  docusate sodium (COLACE) 100 MG capsule, Take 100 mg by mouth Daily., Disp: , Rfl:   •  doxycycline (VIBRAMYICN) 100 MG tablet, Take 1 " tablet by mouth 2 (Two) Times a Day., Disp: , Rfl:   •  gabapentin (NEURONTIN) 100 MG capsule, Take 100 mg by mouth Daily., Disp: , Rfl:   •  gabapentin (NEURONTIN) 300 MG capsule, 1 in the afternoon for pain (Patient taking differently: Take 300 mg by mouth Every Night. 1 in the afternoon for pain), Disp: 90 capsule, Rfl: 3  •  levothyroxine (SYNTHROID, LEVOTHROID) 50 MCG tablet, Take 50 mcg by mouth Daily., Disp: , Rfl:   •  magnesium oxide (MAG-OX) 400 MG tablet, Take 400 mg by mouth Daily., Disp: , Rfl:   •  memantine (NAMENDA) 10 MG tablet, Take 1 tablet by mouth Daily., Disp: , Rfl:   •  metoclopramide (REGLAN) 10 MG tablet, Take  by mouth 4 (Four) Times a Day Before Meals & at Bedtime., Disp: , Rfl:   •  metOLazone (ZAROXOLYN) 2.5 MG tablet, Take 2.5 mg by mouth Daily As Needed., Disp: , Rfl:   •  metoprolol succinate XL (TOPROL-XL) 25 MG 24 hr tablet, Take 12.5 mg by mouth Daily., Disp: , Rfl:   •  mexiletine (MEXITIL) 150 MG capsule, Take 1 capsule by mouth 2 (Two) Times a Day., Disp: , Rfl:   •  nitroglycerin (NITROSTAT) 0.4 MG SL tablet, Place 0.4 mg under the tongue Every 5 (Five) Minutes As Needed for Chest Pain. Take no more than 3 doses in 15 minutes., Disp: , Rfl:   •  oxyCODONE-acetaminophen (PERCOCET)  MG per tablet, Every 8 (Eight) Hours., Disp: , Rfl:   •  tamsulosin (FLOMAX) 0.4 MG capsule 24 hr capsule, Take 1 capsule by mouth Daily., Disp: , Rfl:   •  traMADol (ULTRAM) 50 MG tablet, Take 1 tablet by mouth Every 8 (Eight) Hours As Needed for Moderate Pain., Disp: , Rfl:   •  acetaminophen (TYLENOL) 650 MG 8 hr tablet, Take 650 mg by mouth 2 (Two) Times a Day., Disp: , Rfl:   •  aluminum-magnesium hydroxide 200-200 MG/5ML suspension, Take 25 mL by mouth 3 (Three) Times a Day., Disp: 2700 mL, Rfl: 3  •  aluminum-magnesium hydroxide-simethicone (MAALOX MAX) 400-400-40 MG/5ML suspension, Take 15 mL by mouth 3 (Three) Times a Day., Disp: , Rfl:   •  cholestyramine (Questran) 4 g packet, Take 1  packet by mouth 3 (Three) Times a Day With Meals., Disp: 15 packet, Rfl: 0  •  diphenhydrAMINE 12.5 MG/5ML elixir 20 mL, aluminum-magnesium hydroxide-simethicone 400-400-40 MG/5ML suspension 20 mL, Lidocaine Viscous HCl 2 % solution 20 mL, Swish and spit Every 4 (Four) Hours As Needed., Disp: , Rfl:   •  doxycycline (ADOXA) 100 MG tablet, Take 1 tablet by mouth 2 (Two) Times a Day., Disp: 14 tablet, Rfl: 0  •  famotidine (PEPCID) 20 MG tablet, Take 20 mg by mouth 2 (Two) Times a Day., Disp: , Rfl:   •  fluticasone (FLONASE) 50 MCG/ACT nasal spray, 2 sprays by Each Nare route Daily., Disp: , Rfl:   •  HYDROcodone-acetaminophen (NORCO) 5-325 MG per tablet, Take 1 tablet by mouth Every 6 (Six) Hours As Needed., Disp: , Rfl:   •  ipratropium (ATROVENT) 0.03 % nasal spray, 2 SPRAYS INTO THE NOSTRIL(S) AS DIRECTED BY PROVIDER EVERY 12 (TWELVE) HOURS., Disp: , Rfl:   •  isosorbide mononitrate (IMDUR) 30 MG 24 hr tablet, Take 1 tablet by mouth Daily. (Patient taking differently: Take 2 tablets by mouth Daily.), Disp: , Rfl:   •  loperamide (IMODIUM A-D) 1 MG/7.5ML suspension, Imodium A-D 1 mg/7.5 mL oral liquid  Take by oral route., Disp: , Rfl:   •  Magnesium Oxide 400 (240 Mg) MG tablet, Take  by mouth Daily., Disp: , Rfl:   •  nitrofurantoin (MACRODANTIN) 100 MG capsule, Take 1 capsule by mouth Every Night., Disp: , Rfl:   •  nitrofurantoin, macrocrystal-monohydrate, (MACROBID) 100 MG capsule, Take 1 capsule by mouth 2 (Two) Times a Day., Disp: , Rfl:   •  polyethyl glycol-propyl glycol (SYSTANE) 0.4-0.3 % solution ophthalmic solution (artificial tears), Administer 1 drop to both eyes Every 1 (One) Hour As Needed., Disp: , Rfl:     Allergies   Allergen Reactions   • Iodine Hives   • Strawberry Hives   • Clindamycin/Lincomycin Diarrhea   • Penicillins Rash   • Vancomycin Nausea And Vomiting       Patient Active Problem List   Diagnosis   • Ischemic heart disease due to coronary artery obstruction (HCC)   • Osteoarthritis  of multiple joints   • Cardiac pacemaker   • Gout of multiple sites   • Nephrolithiasis   • Mixed hyperlipidemia   • Morbidly obese (HCC)   • Hypertensive heart disease with heart failure (HCC)   • Major depressive disorder, recurrent, moderate (HCC)   • Chronic kidney disease, stage 4 (severe) (HCC)   • Ventricular tachycardia   • Chronic systolic heart failure (HCC)   • Hypothyroidism   • Paroxysmal atrial fibrillation (HCC)   • Coronary atherosclerosis   • Essential hypertension   • Weakness of right lower extremity       Social History     Socioeconomic History   • Marital status:    Tobacco Use   • Smoking status: Never   • Smokeless tobacco: Never   Vaping Use   • Vaping Use: Never used   Substance and Sexual Activity   • Alcohol use: No   • Drug use: No   • Sexual activity: Defer       Past Surgical History:   Procedure Laterality Date   • CARDIAC DEFIBRILLATOR PLACEMENT     • CARDIAC DEFIBRILLATOR PLACEMENT N/A 2008   • CARDIAC SURGERY     • ENDOSCOPY N/A 10/30/2020    Procedure: ESOPHAGOGASTRODUODENOSCOPY WITH ANESTHESIA;  Surgeon: Brent Stanley MD;  Location: [facility];  Service: Gastroenterology;  Laterality: N/A;  pre dysphagia  post post op gastric surgery  dr jose manuel smith   • ESOPHAGUS SURGERY     • KNEE SURGERY     • TOTAL SHOULDER REPLACEMENT         Physical Exam  Vitals and nursing note reviewed.   Constitutional:       Appearance: He is obese.   Cardiovascular:      Rate and Rhythm: Normal rate and regular rhythm.   Pulmonary:      Effort: Pulmonary effort is normal.      Breath sounds: Normal breath sounds.   Abdominal:      Palpations: Abdomen is soft.   Musculoskeletal:      Right lower leg: No edema.      Left lower leg: No edema.   Skin:     General: Skin is warm and dry.   Neurological:      General: No focal deficit present.      Mental Status: He is alert and oriented to person, place, and time.   Psychiatric:         Mood and Affect: Mood normal.         Behavior: Behavior normal.  "        Vitals:    11/16/22 1233   BP: 122/79   BP Location: Right arm   Patient Position: Sitting   Cuff Size: Adult   Pulse: 61   Resp: 16   Temp: 96.8 °F (36 °C)   SpO2: 97%   Weight: 95.3 kg (210 lb)   Height: 167.6 cm (66\")     BMI is >= 30 and <35. (Class 1 Obesity). The following options were offered after discussion;: exercise counseling/recommendations      Diagnoses and all orders for this visit:    1. Fatigue, unspecified type (Primary)  -     CBC & Differential  -     Ambulatory Referral to Physical Therapy Evaluate and treat    2. Hyperglycemia  -     Basic Metabolic Panel  -     Hemoglobin A1c    3. Shortness of breath  -     BNP        Problems Addressed this Visit    None  Visit Diagnoses     Fatigue, unspecified type    -  Primary    Relevant Orders    CBC & Differential    Ambulatory Referral to Physical Therapy Evaluate and treat    Hyperglycemia        Relevant Orders    Basic Metabolic Panel    Hemoglobin A1c    Shortness of breath        Relevant Orders    BNP      Diagnoses       Codes Comments    Fatigue, unspecified type    -  Primary ICD-10-CM: R53.83  ICD-9-CM: 780.79     Hyperglycemia     ICD-10-CM: R73.9  ICD-9-CM: 790.29     Shortness of breath     ICD-10-CM: R06.02  ICD-9-CM: 786.05           Health Maintenance:  Immunization History   Administered Date(s) Administered   • COVID-19 (MODERNA) 1st, 2nd, 3rd Dose Only 01/05/2021, 02/02/2021   • COVID-19 (PFIZER) BIVALENT BOOSTER 12+YRS 09/29/2022   • Covid-19 (Pfizer) Gray Cap 03/01/2022   • DTaP / HiB / IPV 09/30/2021   • FLUAD TRI 65YR+ 11/05/2019   • Fluad Quad 65+ 11/05/2019, 09/18/2020   • Fluzone High Dose =>65 Years ([employer] ONLY) 10/26/2016, 09/11/2017, 10/03/2018   • Fluzone High-Dose 65+yrs 09/30/2021, 09/29/2022   • Hepatitis A 11/20/2018, 05/20/2019   • Pneumococcal Conjugate 13-Valent (PCV13) 10/08/2018   • Pneumococcal Polysaccharide (PPSV23) 07/03/2013, 11/01/2019   • Tdap 06/10/2000              Plan above-immunizations " up-to-date, outpatient physical therapy for maintenance of independence i.e. walking balance and strength                    Electronically signed by Garret Salinas MD 11/16/2022

## 2022-11-17 ENCOUNTER — TELEPHONE (OUTPATIENT)
Dept: FAMILY MEDICINE CLINIC | Facility: CLINIC | Age: 77
End: 2022-11-17

## 2022-11-17 ENCOUNTER — HOSPITAL ENCOUNTER (OUTPATIENT)
Dept: PHYSICAL THERAPY | Facility: HOSPITAL | Age: 77
Setting detail: THERAPIES SERIES
Discharge: HOME | End: 2022-11-17

## 2022-11-17 DIAGNOSIS — R53.83 FATIGUE, UNSPECIFIED TYPE: Primary | ICD-10-CM

## 2022-11-17 PROCEDURE — 97110 THERAPEUTIC EXERCISES: CPT | Performed by: PHYSICAL THERAPIST

## 2022-11-17 PROCEDURE — 97162 PT EVAL MOD COMPLEX 30 MIN: CPT | Performed by: PHYSICAL THERAPIST

## 2022-11-17 NOTE — TELEPHONE ENCOUNTER
Caller: Beth Velazquez    Relationship: Emergency Contact    Best call back number: [phone]    What medication are you requesting: SOMETHING FOR SNEEZING    What are your current symptoms: SNEEZING AND RUNNY NOSE AND EYES ARE WATERING    How long have you been experiencing symptoms: 2 OR 3 DAYS. EVEN WHILE ON ANTIBIOTIC    Have you had these symptoms before:    [x] Yes  [] No    Have you been treated for these symptoms before:   [x] Yes  [] No    If a prescription is needed, what is your preferred pharmacy and phone number:     [facility]/pharmacy #[id number] - [address] - [phone] [facility] [phone]   [phone]     Additional notes: STATES PATIENT WAS SEEN YESTERDAY AND YESTERDAY WAS LAST ANTIBIOTIC. PATIENT STILL HAS SNEEZING AND RUNNY NOSE AND WATERY EYES

## 2022-11-18 LAB
BASOPHILS # BLD AUTO: 0.1 X10E3/UL (ref 0–0.2)
BASOPHILS NFR BLD AUTO: 2 %
BNP SERPL-MCNC: 57.6 PG/ML (ref 0–100)
BUN SERPL-MCNC: 30 MG/DL (ref 8–27)
BUN/CREAT SERPL: 17 (ref 10–24)
CALCIUM SERPL-MCNC: 10.3 MG/DL (ref 8.6–10.2)
CHLORIDE SERPL-SCNC: 107 MMOL/L (ref 96–106)
CO2 SERPL-SCNC: 22 MMOL/L (ref 20–29)
CREAT SERPL-MCNC: 1.77 MG/DL (ref 0.76–1.27)
EGFRCR SERPLBLD CKD-EPI 2021: 40 ML/MIN/1.73
EOSINOPHIL # BLD AUTO: 0.5 X10E3/UL (ref 0–0.4)
EOSINOPHIL NFR BLD AUTO: 6 %
ERYTHROCYTE [DISTWIDTH] IN BLOOD BY AUTOMATED COUNT: 14.3 % (ref 11.6–15.4)
GLUCOSE SERPL-MCNC: 107 MG/DL (ref 70–99)
HBA1C MFR BLD: 5.9 % (ref 4.8–5.6)
HCT VFR BLD AUTO: 46.5 % (ref 37.5–51)
HGB BLD-MCNC: 15 G/DL (ref 13–17.7)
IMM GRANULOCYTES # BLD AUTO: 0 X10E3/UL (ref 0–0.1)
IMM GRANULOCYTES NFR BLD AUTO: 0 %
LYMPHOCYTES # BLD AUTO: 1.9 X10E3/UL (ref 0.7–3.1)
LYMPHOCYTES NFR BLD AUTO: 21 %
MCH RBC QN AUTO: 28.5 PG (ref 26.6–33)
MCHC RBC AUTO-ENTMCNC: 32.3 G/DL (ref 31.5–35.7)
MCV RBC AUTO: 88 FL (ref 79–97)
MONOCYTES # BLD AUTO: 1.1 X10E3/UL (ref 0.1–0.9)
MONOCYTES NFR BLD AUTO: 12 %
NEUTROPHILS # BLD AUTO: 5.3 X10E3/UL (ref 1.4–7)
NEUTROPHILS NFR BLD AUTO: 59 %
PLATELET # BLD AUTO: 206 X10E3/UL (ref 150–450)
POTASSIUM SERPL-SCNC: 4.5 MMOL/L (ref 3.5–5.2)
RBC # BLD AUTO: 5.26 X10E6/UL (ref 4.14–5.8)
SODIUM SERPL-SCNC: 142 MMOL/L (ref 134–144)
WBC # BLD AUTO: 8.9 X10E3/UL (ref 3.4–10.8)

## 2022-11-18 NOTE — THERAPY EVALUATION
Outpatient Physical Therapy Ortho Initial Evaluation  [facility]     Patient Name: Smay Velazquez  : 1945  MRN: [id number]  Today's Date: 2022      Visit Date: 2022     Attendance: 1 visits  Subjective improvement: N/A  MD appt: PRN  Recheck due: 2022      Patient Active Problem List   Diagnosis   • Ischemic heart disease due to coronary artery obstruction (HCC)   • Osteoarthritis of multiple joints   • Cardiac pacemaker   • Gout of multiple sites   • Nephrolithiasis   • Mixed hyperlipidemia   • Morbidly obese (HCC)   • Hypertensive heart disease with heart failure (HCC)   • Major depressive disorder, recurrent, moderate (HCC)   • Chronic kidney disease, stage 4 (severe) (HCC)   • Ventricular tachycardia   • Chronic systolic heart failure (HCC)   • Hypothyroidism   • Paroxysmal atrial fibrillation (HCC)   • Coronary atherosclerosis   • Essential hypertension   • Weakness of right lower extremity        Past Medical History:   Diagnosis Date   • Arthritis    • Coronary artery disease    • Hyperlipidemia    • Hypertensive heart disease with heart failure (HCC) 3/29/2021   • Hypothyroidism 2021   • Kidney stone    • Major depressive disorder, recurrent, moderate (HCC) 3/29/2021   • Morbidly obese (HCC) 2020        Past Surgical History:   Procedure Laterality Date   • CARDIAC DEFIBRILLATOR PLACEMENT     • CARDIAC DEFIBRILLATOR PLACEMENT N/A    • CARDIAC SURGERY     • ENDOSCOPY N/A 10/30/2020    Procedure: ESOPHAGOGASTRODUODENOSCOPY WITH ANESTHESIA;  Surgeon: Brent Stanley MD;  Location: [facility];  Service: Gastroenterology;  Laterality: N/A;  pre dysphagia  post post op gastric surgery  dr jose manuel smith   • ESOPHAGUS SURGERY     • KNEE SURGERY     • TOTAL SHOULDER REPLACEMENT         Visit Dx:     ICD-10-CM ICD-9-CM   1. Fatigue, unspecified type  R53.83 780.79          Patient History     Row Name 22 1300             History    Chief Complaint Balance  Problems;Difficulty Walking;Difficulty with daily activities;Fatigue/poor endurance;Muscle weakness;Pain  -KG      Type of Pain Back pain;Knee pain  -KG      Date Current Problem(s) Began --  July 2022; CVA  -KG      Brief Description of Current Complaint Pt states he's not been able to walk well. Does walk short distances in the home, uses RW. Had a stroke in June, was in [facility] for about 6 weeks after being discharged from the hospital. States he has some R sided weakness from the stroke. Difficulty getting in/out of car, difficulty with ADL's, states needs help with all of them right now. Lives wtih wife in single level home. No steps to enter. Walk in shower with shower chair. Assist with dressing. Sits in recliner during the day. States he walks very short distances in the home and now doesn't do that very much. Gets really weak. Uses RW. States he still has L knee pain and weaknes/instability in his L knee. Also has chronic back pain.  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 Reports he has arthritis all over. Also has some L knee pain due to OA  -KG      Is your sleep disturbed? Yes  Intermittently; not always due to pain  -KG      Difficulties with ADL's? Wife assists with all ADL's at this time.  -KG         Fall Risk Assessment    Any falls in the past year: No  -KG          Services    Are you currently receiving Home Health services No  -KG      Do you plan to receive Home Health services in the near future No  -KG            User Key  (r) = Recorded By, (t) = Taken By, (c) = Cosigned By    Initials Name Provider Type    Sharon Berg, PT Physical Therapist                 PT Ortho     Row Name 11/17/22 1300       Subjective Comments    Subjective Comments Refer to therapy pt history for details  -KG       Precautions and Contraindications    Precautions CVA with R sided weakness, heart disease, arthritis  -KG    Contraindications Pacemaker/defibulator  -KG       Subjective Pain    Able to rate subjective pain? yes  -KG       Posture/Observations    Posture/Observations Comments Presents in transport chair. Poor overall postural awareness. Slumped in chair. Pt at times very soft spoken but intermittently was able to speak louder. Had a few instances where he went from interacting to PT to a more flat affect and seemed not understand. Does reprot he was very tired as he did not sleep any last night.  -KG       General ROM    GENERAL ROM COMMENTS Bilateral shoulder flex/abd limited to ~90 deg. Elbow/wrist AROM WFL. Bilateral hip, knee, ankle AROM WFL but weakness present.  -KG       MMT (Manual Muscle Testing)    Rt Upper Ext Rt Shoulder Flexion;Rt Shoulder ABduction;Rt Elbow Flexion;Rt Elbow Extension  -KG    Lt Upper Ext Lt Shoulder Flexion;Lt Shoulder ABduction;Lt Elbow Extension;Lt Elbow Flexion  -KG    Rt Lower Ext Rt Hip Flexion;Rt Hip ABduction;Rt Hip ADduction;Rt Knee Extension;Rt Knee Flexion;Rt Ankle Plantarflexion;Rt Ankle Dorsiflexion  -KG    Lt Lower Ext Lt Hip Flexion;Lt Hip ABduction;Lt Hip ADduction;Lt Knee Flexion;Lt Knee Extension;Lt Ankle Plantarflexion;Lt Ankle Dorsiflexion  -KG       MMT Right Upper Ext    Rt Shoulder Flexion MMT, Gross Movement (3/5) fair  -KG    Rt Shoulder ABduction MMT, Gross Movement (3/5) fair  -KG    Rt Elbow Flexion MMT, Gross  Movement: (4-/5) good minus  -KG    Rt Elbow Extension MMT, Gross Movement: (3+/5) fair plus  -KG       MMT Left Upper Ext    Lt Shoulder Flexion MMT, Gross Movement (3/5) fair  -KG    Lt Shoulder ABduction MMT, Gross Movement (3/5) fair  -KG    Lt Elbow Flexion MMT, Gross Movement (4-/5) good minus  -KG    Lt Elbow Extension MMT, Gross Movement (3+/5) fair plus  -KG       MMT Right Lower Ext    Rt Hip Flexion MMT, Gross Movement (3+/5) fair plus  -KG    Rt Hip ABduction MMT, Gross Movement (3+/5) fair plus  -KG    Rt Hip ADduction MMT, Gross Movement (4-/5) good minus  -KG    Rt Knee Extension MMT, Gross Movement (4-/5) good minus  -KG    Rt Knee Flexion MMT, Gross Movement (4/5) good  -KG    Rt Ankle Plantarflexion MMT, Gross Movement (4+/5) good plus  -KG    Rt Ankle Dorsiflexion MMT, Gross Movement (4+/5) good plus  -KG    Rt Lower Extremity Comments  MMT performed seated  -KG       MMT Left Lower Ext    Lt Hip Flexion MMT, Gross Movement (3+/5) fair plus  -KG    Lt Hip ABduction MMT, Gross Movement (3+/5) fair plus  -KG    Lt Hip ADduction MMT, Gross Movement (4-/5) good minus  -KG    Lt Knee Extension MMT, Gross Movement (4/5) good  -KG    Lt Knee Flexion MMT, Gross Movement (4/5) good  -KG    Lt Ankle Plantarflexion MMT, Gross Movement (4+/5) good plus  -KG    Lt Ankle Dorsiflexion MMT, Gross Movement (4+/5) good plus  -KG    Lt Lower Extremity Comments  MMT performed seated  -KG       Sensation    Sensation WNL? WFL  -KG       Flexibility    Flexibility Tested? Lower Extremity  -KG       Lower Extremity Flexibility    Hamstrings Bilateral:;Moderately limited  -KG    Gastrocnemius Bilateral:;Moderately limited  -KG       Balance Skills Training    Balance Comments Requires HHA for static standing balance with FA. Decreased LE strength and tends to lean forward.  -KG       Transfers    Comment, (Transfers) Pt required min-mod assist x1 for sit>stand from transport chair.  -KG       Gait/Stairs (Locomotion)     "Comment, (Gait/Stairs) Pt ambulated 40 ft with RW. CGA x1 and assist x1 to push wheelchair behind. Pt did get fatigued at end of gait and required more assist to maintain balance prior to sitting in chair. Forward flexed at hips with gait. Decreased step length & height, Decreased sepideh.  -KG          User Key  (r) = Recorded By, (t) = Taken By, (c) = Cosigned By    Initials Name Provider Type    KG Sharon Montesinos, PT Physical Therapist                            Therapy Education  Education Details: POC education. HEP: see exercise flowhseet for details  Given: HEP, Pain management, Posture/body mechanics, Fall prevention and home safety, Mobility training  Program: New  How Provided: Verbal, Demonstration, Written  Provided to: Patient (wife present during eval)  Level of Understanding: Teach back education performed, Verbalized, Demonstrated      PT OP Goals     Row Name 11/17/22 1300          PT Short Term Goals    STG Date to Achieve 12/08/22  -KG     STG 1 Demonstrate independence/compliance in performance of initial HEP  -KG     STG 2 Ambulate 75 ft with RW with CGA/SBA demonstrating improved LE strength/stability and improved step lenght/height  -KG     STG 3 Perform static balance FA in parallel bars for 20\"-30\" with improved posture, no LOB, and improved LE control  -KG     STG 4 Demonstrate improved bilateral hip flex/abd MMT to 4-/5  -KG     STG 5 Perform sit<>stand transfer from transport chair with CGA demonstrating improved BLE/BUE functional strength  -KG     STG 5 Progress Progressing  -KG        Long Term Goals    LTG Date to Achieve 12/29/22  -KG     LTG 1 Subjectively report 50% overall improvement or greater in gait/functional mobility & daily activity performance  -KG     LTG 2 Perform 1x10 sit<>stand tranfers from plinth or airex in chair with BUE assist demonstrate improved BLE functional strength & endurance  -KG     LTG 3 Ambulate 150 ft with RW safely demonstrate improved " endurance/activity tolerance and gait kinematics  -KG     LTG 4 Demonstrate improved bilateral knee flex/ext MMT to 4+/5 or greater  -KG     LTG 5 Demonstrate imporved crystal hip flex/abd MMT to 4/5 or greater  -KG     LTG 6 Perform small obstacle course in // bars conisisting of bench step, sm hurldes, bench step, & compliant surface demonstrate improved dynamic balance & LE stability  -KG        Time Calculation    PT Goal Re-Cert Due Date 12/08/22  -KG           User Key  (r) = Recorded By, (t) = Taken By, (c) = Cosigned By    Initials Name Provider Type    KG Sharon Montesinos, PT Physical Therapist                     PT Assessment/Plan     Row Name 11/17/22 1300          PT Assessment    Functional Limitations Decreased safety during functional activities;Impaired gait;Impaired locomotion;Limitation in home management;Limitations in community activities;Limitations in functional capacity and performance;Performance in leisure activities;Performance in self-care ADL  -KG     Impairments Balance;Gait;Impaired flexibility;Impaired muscle endurance;Locomotion;Muscle strength;Pain;Poor body mechanics;Posture;Range of motion  -KG     Assessment Comments Pt is a 77 y.o. male presenting with strength, balance, and gait deficits that are limiting performance of functional mobility & daily activities. Pt with recent CVA in July 2022 and spent time in SNF for rehab. Does have some R>L sided weakness. Pt reports his mobility has declined since his stroke. Pt fatigues easily with seated BLE strengthening exercises for HEP adminstration. Requires min assist for sit>stand transfer from transport chair. Gait was unsteady with RW and fatigue/weakness noted in BLE's. Pt at times during eval seemed less alert and did not always respond to questions ask. This happened a few times. Pt reports he was very tired as he did not sleep any last night.  Pt will benefit from skilled PT services to address these deficits for improvements in  functional strength, gait safety/performance, dynamic balance, & functional activity tolerance to promote greater independence at home.  -KG     Rehab Potential Fair  -KG     Patient/caregiver participated in establishment of treatment plan and goals Yes  -KG     Patient would benefit from skilled therapy intervention Yes  -KG        PT Plan    PT Frequency 2x/week  -KG     Predicted Duration of Therapy Intervention (PT) 12 visits  -KG     Physical Therapy Interventions (Optional Details) balance training;gait training;home exercise program;lumbar stabilization;modalities;neuromuscular re-education;patient/family education;postural re-education;ROM (Range of Motion);strengthening;stretching;transfer training  -KG     PT Plan Comments Work on overall BLE functional strengthening and dynamic stability. Work towards gait and balance activities in parallel bars. Try seated therex on elevated plinth to work on incoroporating posture. Endurance/activity tolerance.  -KG           User Key  (r) = Recorded By, (t) = Taken By, (c) = Cosigned By    Initials Name Provider Type    KG Sharon Montesinos, PT Physical Therapist                   OP Exercises     Row Name 11/17/22 1300             Subjective Pain    Able to rate subjective pain? yes  -KG      Pre-Treatment Pain Level 6  -KG      Post-Treatment Pain Level 6  -KG      Subjective Pain Comment L hip/back  -KG         Exercise 1    Exercise Name 1 Gait with RW; wheelchair pushed behind  -KG      Cueing 1 Verbal;Tactile  -KG      Additional Comments 40 ft; CGAl PTA pushing wheelchair behind  -KG         Exercise 2    Exercise Name 2 Seated CR/TR  -KG      Cueing 2 Verbal  -KG      Sets 2 1  -KG      Reps 2 10  -KG         Exercise 3    Exercise Name 3 Seated alt LAQ  -KG      Cueing 3 Verbal  -KG      Sets 3 1  -KG      Reps 3 10  -KG         Exercise 4    Exercise Name 4 Seated alt marching  -KG      Cueing 4 Verbal  -KG      Sets 4 1  -KG      Reps 4 10  -KG          "Exercise 5    Exercise Name 5 Seated tband hip abd  -KG      Cueing 5 Verbal  -KG      Sets 5 1  -KG      Reps 5 10  -KG      Additional Comments yellow tband  -KG         Exercise 6    Exercise Name 6 Seated iso hip add squeeze  -KG      Cueing 6 Verbal  -KG      Sets 6 1  -KG      Reps 6 10  -KG      Time 6 5\" hold  -KG            User Key  (r) = Recorded By, (t) = Taken By, (c) = Cosigned By    Initials Name Provider Type    Sharon Berg, PT Physical Therapist                              Outcome Measure Options: Lower Extremity Functional Scale (LEFS), Timed Up and Go (TUG)  Lower Extremity Functional Index  Any of your usual work, housework or school activities: Moderate difficulty  Your usual hobbies, recreational or sporting activities: Extreme difficulty or unable to perform activity  Getting into or out of the bath: Extreme difficulty or unable to perform activity  Walking between rooms: Extreme difficulty or unable to perform activity  Putting on your shoes or socks: Quite a bit of difficulty  Squatting: Extreme difficulty or unable to perform activity  Lifting an object, like a bag of groceries from the floor: Extreme difficulty or unable to perform activity  Performing light activities around your home: Quite a bit of difficulty  Performing heavy activities around your home: Extreme difficulty or unable to perform activity  Getting into or out of a car: Extreme difficulty or unable to perform activity  Walking 2 blocks: Extreme difficulty or unable to perform activity  Walking a mile: Extreme difficulty or unable to perform activity  Going up or down 10 stairs (about 1 flight of stairs): Extreme difficulty or unable to perform activity  Standing for 1 hour: Extreme difficulty or unable to perform activity  Sitting for 1 hour: Moderate difficulty  Running on even ground: Extreme difficulty or unable to perform activity  Running on uneven ground: Extreme difficulty or unable to perform " activity  Making sharp turns while running fast: Extreme difficulty or unable to perform activity  Hopping: Extreme difficulty or unable to perform activity  Rolling over in bed: Moderate difficulty  Total: 8  Timed Up and Go (TUG)  TUG Test 1: 20 seconds  Timed Up and Go Comments: with RW      Time Calculation:     Start Time: 1318  Stop Time: 1400  Time Calculation (min): 42 min  Total Timed Code Minutes- PT: 15 minute(s)     Therapy Charges for Today     Code Description Service Date Service Provider Modifiers Qty    [id number]  PT EVAL MOD COMPLEXITY 2 11/17/2022 Sharon Montesinos, PT GP 1    [id number]  PT THER PROC EA 15 MIN 11/17/2022 Sharon Montesinos, PT GP 1          PT G-Codes  Outcome Measure Options: Lower Extremity Functional Scale (LEFS), Timed Up and Go (TUG)  Total: 8  TUG Test 1: 20 seconds         Sharon Montesinos, PT  11/17/2022

## 2022-11-22 ENCOUNTER — HOSPITAL ENCOUNTER (OUTPATIENT)
Dept: PHYSICAL THERAPY | Facility: HOSPITAL | Age: 77
Setting detail: THERAPIES SERIES
Discharge: HOME | End: 2022-11-22

## 2022-11-22 DIAGNOSIS — R53.83 FATIGUE, UNSPECIFIED TYPE: Primary | ICD-10-CM

## 2022-11-22 PROCEDURE — 97110 THERAPEUTIC EXERCISES: CPT | Performed by: PHYSICAL THERAPIST

## 2022-11-22 PROCEDURE — 97530 THERAPEUTIC ACTIVITIES: CPT | Performed by: PHYSICAL THERAPIST

## 2022-11-23 NOTE — THERAPY TREATMENT NOTE
Outpatient Physical Therapy Ortho Treatment Note  [facility]     Patient Name: Samy Velazquez  : 1945  MRN: [id number]  Today's Date: 2022      Visit Date: 2022     Attendance: 2 visits  Subjective improvement: N/A  MD appt: PRN  Recheck due: 2022    Visit Dx:    ICD-10-CM ICD-9-CM   1. Fatigue, unspecified type  R53.83 780.79       Patient Active Problem List   Diagnosis   • Ischemic heart disease due to coronary artery obstruction (HCC)   • Osteoarthritis of multiple joints   • Cardiac pacemaker   • Gout of multiple sites   • Nephrolithiasis   • Mixed hyperlipidemia   • Morbidly obese (HCC)   • Hypertensive heart disease with heart failure (HCC)   • Major depressive disorder, recurrent, moderate (HCC)   • Chronic kidney disease, stage 4 (severe) (HCC)   • Ventricular tachycardia   • Chronic systolic heart failure (HCC)   • Hypothyroidism   • Paroxysmal atrial fibrillation (HCC)   • Coronary atherosclerosis   • Essential hypertension   • Weakness of right lower extremity        Past Medical History:   Diagnosis Date   • Arthritis    • Coronary artery disease    • Hyperlipidemia    • Hypertensive heart disease with heart failure (HCC) 3/29/2021   • Hypothyroidism 2021   • Kidney stone    • Major depressive disorder, recurrent, moderate (HCC) 3/29/2021   • Morbidly obese (HCC) 2020        Past Surgical History:   Procedure Laterality Date   • CARDIAC DEFIBRILLATOR PLACEMENT     • CARDIAC DEFIBRILLATOR PLACEMENT N/A    • CARDIAC SURGERY     • ENDOSCOPY N/A 10/30/2020    Procedure: ESOPHAGOGASTRODUODENOSCOPY WITH ANESTHESIA;  Surgeon: Brent Stanley MD;  Location: [facility];  Service: Gastroenterology;  Laterality: N/A;  pre dysphagia  post post op gastric surgery  dr jose manuel smith   • ESOPHAGUS SURGERY     • KNEE SURGERY     • TOTAL SHOULDER REPLACEMENT          PT Ortho     Row Name 22 1300       Subjective Comments    Subjective Comments pt states his HEP is  "going ok. Is doing them 2x/day. States he missed one day.  -KG       Precautions and Contraindications    Precautions CVA with R sided weakness, heart disease, arthritis  -KG    Contraindications Pacemaker/defibulator  -KG       Subjective Pain    Able to rate subjective pain? yes  -KG    Pre-Treatment Pain Level 4  -KG    Post-Treatment Pain Level 4  \"tired\"  -KG    Subjective Pain Comment Back  -KG       Posture/Observations    Posture/Observations Comments Pt more alert this date. Some less assist for sit>stands from wheelchair.  -KG          User Key  (r) = Recorded By, (t) = Taken By, (c) = Cosigned By    Initials Name Provider Type    Sharon Berg, PT Physical Therapist                             PT Assessment/Plan     Row Name 11/22/22 1300          PT Assessment    Assessment Comments Pt's gait performance was better this date. Not as unsteady with short distances walked with RW. Pt more alert this date. Fatigues easily with // bar activities working on gait and balance. Good effort given.  -KG        PT Plan    PT Frequency 2x/week  -KG     PT Plan Comments Continue // bar activities working on gait/balance. Continue sit<>stands from elevated plinth. Possibly try gentle step taps to 4\" bench step.  -KG           User Key  (r) = Recorded By, (t) = Taken By, (c) = Cosigned By    Initials Name Provider Type    Sharon Berg, PT Physical Therapist                   OP Exercises     Row Name 11/22/22 1300             Subjective Comments    Subjective Comments pt states his HEP is going ok. Is doing them 2x/day. States he missed one day.  -KG         Subjective Pain    Able to rate subjective pain? yes  -KG      Pre-Treatment Pain Level 4  -KG      Post-Treatment Pain Level 4  \"tired\"  -KG      Subjective Pain Comment Back  -KG         Exercise 1    Exercise Name 1 Stand pivot transfer from transport chair to elevated mat table  -KG      Cueing 1 Verbal;Tactile  -KG      Additional Comments Min " "assist for sit>stand; CGA  -KG         Exercise 2    Exercise Name 2 Seated LAQ with posture  -KG      Cueing 2 Verbal  -KG      Sets 2 2  -KG      Reps 2 10  -KG      Time 2 3-5\" hold  -KG         Exercise 3    Exercise Name 3 Sit<>stands from elevated plinth  -KG      Cueing 3 Verbal  -KG      Sets 3 2  -KG      Reps 3 5  -KG      Additional Comments walker in front of pt  -KG         Exercise 4    Exercise Name 4 Gait from main treatment area to back treatment area with RW; CGA  -KG      Cueing 4 Verbal  -KG      Additional Comments ~45 ft  -KG         Exercise 5    Exercise Name 5 // bars: fwd gait working on heel strike/toe off, step length  -KG      Cueing 5 Verbal  -KG      Sets 5 1  -KG      Reps 5 2 laps  -KG      Additional Comments SBA/CGA  -KG         Exercise 6    Exercise Name 6 // bars: fwd/bwd gait  -KG      Cueing 6 Verbal  -KG      Sets 6 1  -KG      Reps 6 2 laps  -KG      Additional Comments SBA/CGA  -KG         Exercise 7    Exercise Name 7 // bars: sidestepping  -KG      Cueing 7 Verbal  -KG      Sets 7 1  -KG      Reps 7 3 laps  -KG      Additional Comments SBA/CGA  -KG         Exercise 8    Exercise Name 8 // bars: static stance FA  -KG      Cueing 8 Verbal;Tactile  -KG      Reps 8 2  -KG      Time 8 20-30\"  -KG         Exercise 9    Exercise Name 9 Seated iso hip add wit ball  -KG      Cueing 9 Verbal  -KG      Sets 9 1  -KG      Reps 9 15  -KG      Time 9 5\" hold  -KG         Exercise 10    Exercise Name 10 Seated tband hip abd  -KG      Cueing 10 Verbal  -KG      Sets 10 2  -KG      Reps 10 10  -KG      Additional Comments red tband  -KG         Exercise 11    Exercise Name 11 Seated CR/TR  -KG      Cueing 11 Verbal  -KG      Sets 11 2  -KG      Reps 11 10  -KG         Exercise 12    Exercise Name 12 Gait with RW from back treatment area to front  -KG      Cueing 12 Verbal;Tactile  -KG      Additional Comments ~40 ft  -KG            User Key  (r) = Recorded By, (t) = Taken By, (c) = " "Cosigned By    Initials Name Provider Type    ANANT Sharon Montesinos, PT Physical Therapist                              PT OP Goals     Row Name 11/22/22 1300          PT Short Term Goals    STG Date to Achieve 12/08/22  -KG     STG 1 Demonstrate independence/compliance in performance of initial HEP  -KG     STG 1 Progress Progressing  -KG     STG 2 Ambulate 75 ft with RW with CGA/SBA demonstrating improved LE strength/stability and improved step lenght/height  -KG     STG 2 Progress Progressing  -KG     STG 3 Perform static balance FA in parallel bars for 20\"-30\" with improved posture, no LOB, and improved LE control  -KG     STG 3 Progress Progressing  -KG     STG 4 Demonstrate improved bilateral hip flex/abd MMT to 4-/5  -KG     STG 4 Progress Progressing  -KG     STG 5 Perform sit<>stand transfer from transport chair with CGA demonstrating improved BLE/BUE functional strength  -KG     STG 5 Progress Progressing  -KG        Long Term Goals    LTG Date to Achieve 12/29/22  -KG     LTG 1 Subjectively report 50% overall improvement or greater in gait/functional mobility & daily activity performance  -KG     LTG 2 Perform 1x10 sit<>stand tranfers from plinth or airex in chair with BUE assist demonstrate improved BLE functional strength & endurance  -KG     LTG 3 Ambulate 150 ft with RW safely demonstrate improved endurance/activity tolerance and gait kinematics  -KG     LTG 4 Demonstrate improved bilateral knee flex/ext MMT to 4+/5 or greater  -KG     LTG 5 Demonstrate imporved crystal hip flex/abd MMT to 4/5 or greater  -KG     LTG 6 Perform small obstacle course in // bars conisisting of bench step, sm hurldes, bench step, & compliant surface demonstrate improved dynamic balance & LE stability  -KG        Time Calculation    PT Goal Re-Cert Due Date 12/08/22  -KG           User Key  (r) = Recorded By, (t) = Taken By, (c) = Cosigned By    Initials Name Provider Type    ANANT Sharon Montesinos, PT Physical Therapist          "       Therapy Education  Given: HEP, Posture/body mechanics, Mobility training  Program: Reinforced  How Provided: Verbal, Demonstration  Provided to: Patient  Level of Understanding: Verbalized, Demonstrated              Time Calculation:   Start Time: 1301  Stop Time: 1343  Time Calculation (min): 42 min  Therapy Charges for Today     Code Description Service Date Service Provider Modifiers Qty    00430535202  PT THER PROC EA 15 MIN 11/22/2022 Sharon Montesinos, PT GP 2    [id number]  PT THERAPEUTIC ACT EA 15 MIN 11/22/2022 Sharon Montesinos, PT GP 1                    Sharon Montesinos, PT  11/22/2022

## 2022-11-28 ENCOUNTER — HOSPITAL ENCOUNTER (OUTPATIENT)
Dept: PHYSICAL THERAPY | Facility: HOSPITAL | Age: 77
Setting detail: THERAPIES SERIES
Discharge: HOME | End: 2022-11-28

## 2022-11-28 DIAGNOSIS — R53.83 FATIGUE, UNSPECIFIED TYPE: Primary | ICD-10-CM

## 2022-11-28 PROCEDURE — 97110 THERAPEUTIC EXERCISES: CPT | Performed by: PHYSICAL THERAPIST

## 2022-11-28 RX ORDER — APIXABAN 2.5 MG/1
TABLET, FILM COATED ORAL
Qty: 60 TABLET | Refills: 3 | Status: SHIPPED | OUTPATIENT
Start: 2022-11-28

## 2022-11-28 RX ORDER — HYDROCORTISONE 25 MG/ML
LOTION TOPICAL
Qty: 118 ML | Refills: 3 | Status: SHIPPED | OUTPATIENT
Start: 2022-11-28

## 2022-11-28 RX ORDER — TAMSULOSIN HYDROCHLORIDE 0.4 MG/1
CAPSULE ORAL
Qty: 90 CAPSULE | Refills: 1 | Status: SHIPPED | OUTPATIENT
Start: 2022-11-28

## 2022-11-28 RX ORDER — PANTOPRAZOLE SODIUM 40 MG/1
TABLET, DELAYED RELEASE ORAL
Qty: 90 TABLET | Refills: 1 | Status: SHIPPED | OUTPATIENT
Start: 2022-11-28

## 2022-11-28 RX ORDER — BUPROPION HYDROCHLORIDE 150 MG/1
TABLET ORAL
Qty: 90 TABLET | Refills: 3 | Status: SHIPPED | OUTPATIENT
Start: 2022-11-28

## 2022-11-28 RX ORDER — ALLOPURINOL 100 MG/1
TABLET ORAL
Qty: 90 TABLET | Refills: 1 | Status: SHIPPED | OUTPATIENT
Start: 2022-11-28

## 2022-11-29 NOTE — THERAPY TREATMENT NOTE
Outpatient Physical Therapy Ortho Treatment Note  [facility]     Patient Name: Samy Velazquez  : 1945  MRN: [id number]  Today's Date: 2022      Visit Date: 2022     Attendance: 3 visits  Subjective improvement: N/A  MD appt: PRN  Recheck due: 2022    Visit Dx:    ICD-10-CM ICD-9-CM   1. Fatigue, unspecified type  R53.83 780.79       Patient Active Problem List   Diagnosis   • Ischemic heart disease due to coronary artery obstruction (HCC)   • Osteoarthritis of multiple joints   • Cardiac pacemaker   • Gout of multiple sites   • Nephrolithiasis   • Mixed hyperlipidemia   • Morbidly obese (HCC)   • Hypertensive heart disease with heart failure (HCC)   • Major depressive disorder, recurrent, moderate (HCC)   • Chronic kidney disease, stage 4 (severe) ([facility])   • Ventricular tachycardia   • Chronic systolic heart failure (HCC)   • Hypothyroidism   • Paroxysmal atrial fibrillation (HCC)   • Coronary atherosclerosis   • Essential hypertension   • Weakness of right lower extremity        Past Medical History:   Diagnosis Date   • Arthritis    • Coronary artery disease    • Hyperlipidemia    • Hypertensive heart disease with heart failure (HCC) 3/29/2021   • Hypothyroidism 2021   • Kidney stone    • Major depressive disorder, recurrent, moderate (HCC) 3/29/2021   • Morbidly obese (HCC) 2020        Past Surgical History:   Procedure Laterality Date   • CARDIAC DEFIBRILLATOR PLACEMENT     • CARDIAC DEFIBRILLATOR PLACEMENT N/A    • CARDIAC SURGERY     • ENDOSCOPY N/A 10/30/2020    Procedure: ESOPHAGOGASTRODUODENOSCOPY WITH ANESTHESIA;  Surgeon: Brent Stanley MD;  Location: [facility];  Service: Gastroenterology;  Laterality: N/A;  pre dysphagia  post post op gastric surgery  dr jose manuel smith   • ESOPHAGUS SURGERY     • KNEE SURGERY     • TOTAL SHOULDER REPLACEMENT          PT Ortho     Row Name 22 1300       Subjective Comments    Subjective Comments Wife reports he had  a couple of near falls Saturday with walking. R knee wanted to give out. Pt states his R knee is weak.  -KG       Precautions and Contraindications    Precautions CVA with R sided weakness, heart disease, arthritis  -KG    Contraindications Pacemaker/defibulator  -KG       Subjective Pain    Pre-Treatment Pain Level 0  -KG    Post-Treatment Pain Level 0  -KG       Posture/Observations    Posture/Observations Comments Pt not very talkative this date and whispering/soft spoke when speaking.  -KG          User Key  (r) = Recorded By, (t) = Taken By, (c) = Cosigned By    Initials Name Provider Type    Sharon Berg, PT Physical Therapist                             PT Assessment/Plan     Row Name 11/28/22 1300          PT Assessment    Assessment Comments Pt not as tolerant to standing activities and fatigue more quickly in LE's, дмитрий on R. performed more strengthening activities both seated and lying semi-recumbent on mat table. SAQ's challenging due to weakness but performed well with frequent cues.  -KG        PT Plan    PT Frequency 2x/week  -KG     PT Plan Comments Continue mat table strengthening and seated activities. Resume more standing activities as able. Possible alt step taps. Continue quad strengthening.  -KG           User Key  (r) = Recorded By, (t) = Taken By, (c) = Cosigned By    Initials Name Provider Type    Sharon Berg, PT Physical Therapist                   OP Exercises     Row Name 11/28/22 1300             Subjective Comments    Subjective Comments Wife reports he had a couple of near falls Saturday with walking. R knee wanted to give out. Pt states his R knee is weak.  -KG         Subjective Pain    Able to rate subjective pain? yes  -KG      Pre-Treatment Pain Level 0  -KG      Post-Treatment Pain Level 0  -KG         Exercise 1    Exercise Name 1 // bars: fwd gait working on heel strike/toe off, step length; posture  -KG      Cueing 1 Verbal  -KG      Sets 1 1  -KG      Reps 1 3  "laps  -KG      Additional Comments CGA  -KG         Exercise 2    Exercise Name 2 // bars: sidestepping  -KG      Cueing 2 Verbal  -KG      Sets 2 1  -KG      Reps 2 2 laps  -KG      Additional Comments B HHA at rail  -KG         Exercise 3    Exercise Name 3 Seated crystal hamstring stretch; foot on stool  -KG      Cueing 3 Verbal  -KG      Reps 3 2  -KG      Time 3 30\" hold  -KG         Exercise 4    Exercise Name 4 Gait from back treatment room to main treatment room  -KG      Cueing 4 Verbal;Tactile  -KG      Additional Comments ~35 ft; CGA  -KG         Exercise 5    Exercise Name 5 Sit<>stands from elevated plinth  -KG      Cueing 5 Verbal;Tactile  -KG      Sets 5 1  -KG      Reps 5 5  -KG      Additional Comments walker in front of pt; SBA  -KG         Exercise 6    Exercise Name 6 Semi-recumbent hip abd with tband  -KG      Cueing 6 Verbal  -KG      Sets 6 2  -KG      Reps 6 10  -KG      Additional Comments red tband  -KG         Exercise 7    Exercise Name 7 Semi-recumbent iso hip add squeeze  -KG      Cueing 7 Verbal;Tactile  -KG      Sets 7 1  -KG      Reps 7 10  -KG      Time 7 5\" hold  -KG         Exercise 8    Exercise Name 8 SAQ - alt  -KG      Cueing 8 Verbal;Tactile  -KG      Sets 8 1  -KG      Reps 8 10, 5  -KG      Time 8 3-5\" hold (intermittent assist and hold)  -KG      Additional Comments white/red bolster  -KG         Exercise 9    Exercise Name 9 Low level bridge/glute squeeze  -KG      Cueing 9 Verbal;Tactile  -KG      Sets 9 1  -KG      Reps 9 10  -KG      Time 9 pause  -KG         Exercise 10    Exercise Name 10 Seated alt marching  -KG      Cueing 10 Verbal  -KG      Sets 10 1  -KG      Reps 10 10  -KG      Additional Comments elevated plinth  -KG         Exercise 11    Exercise Name 11 Seated iso ham curl with LAQ  -KG      Cueing 11 Verbal;Tactile  -KG      Sets 11 1  -KG      Reps 11 10 ea LE  -KG      Additional Comments yellow pball  -KG            User Key  (r) = Recorded By, (t) = Taken " "By, (c) = Cosigned By    Initials Name Provider Type    ANANT Sharon Montesinos, PT Physical Therapist                              PT OP Goals     Row Name 11/28/22 1300          PT Short Term Goals    STG Date to Achieve 12/08/22  -KG     STG 1 Demonstrate independence/compliance in performance of initial HEP  -KG     STG 1 Progress Progressing  -KG     STG 2 Ambulate 75 ft with RW with CGA/SBA demonstrating improved LE strength/stability and improved step lenght/height  -KG     STG 2 Progress Progressing  -KG     STG 3 Perform static balance FA in parallel bars for 20\"-30\" with improved posture, no LOB, and improved LE control  -KG     STG 3 Progress Progressing  -KG     STG 4 Demonstrate improved bilateral hip flex/abd MMT to 4-/5  -KG     STG 4 Progress Progressing  -KG     STG 5 Perform sit<>stand transfer from transport chair with CGA demonstrating improved BLE/BUE functional strength  -KG     STG 5 Progress Progressing  -KG        Long Term Goals    LTG Date to Achieve 12/29/22  -KG     LTG 1 Subjectively report 50% overall improvement or greater in gait/functional mobility & daily activity performance  -KG     LTG 2 Perform 1x10 sit<>stand tranfers from plinth or airex in chair with BUE assist demonstrate improved BLE functional strength & endurance  -KG     LTG 3 Ambulate 150 ft with RW safely demonstrate improved endurance/activity tolerance and gait kinematics  -KG     LTG 4 Demonstrate improved bilateral knee flex/ext MMT to 4+/5 or greater  -KG     LTG 5 Demonstrate imporved crystal hip flex/abd MMT to 4/5 or greater  -KG     LTG 6 Perform small obstacle course in // bars conisisting of bench step, sm hurldes, bench step, & compliant surface demonstrate improved dynamic balance & LE stability  -KG        Time Calculation    PT Goal Re-Cert Due Date 12/08/22  -KG           User Key  (r) = Recorded By, (t) = Taken By, (c) = Cosigned By    Initials Name Provider Type    ANANT Sharon Montesinos, PT Physical " Therapist                Therapy Education  Education Details: SAQ bilateral  Given: HEP, Posture/body mechanics, Mobility training, Fall prevention and home safety  Program: Reinforced, Progressed  How Provided: Verbal, Demonstration, Written  Provided to: Patient  Level of Understanding: Verbalized, Demonstrated              Time Calculation:   Start Time: 1343  Stop Time: 1429  Time Calculation (min): 46 min  Therapy Charges for Today     Code Description Service Date Service Provider Modifiers Qty    95629666910 HC PT THER PROC EA 15 MIN 11/28/2022 Sharon Montesinos, PT GP 3                    Sharon Montesinos, PT  11/28/2022

## 2022-11-30 ENCOUNTER — HOSPITAL ENCOUNTER (OUTPATIENT)
Dept: PHYSICAL THERAPY | Facility: HOSPITAL | Age: 77
Setting detail: THERAPIES SERIES
Discharge: HOME | End: 2022-11-30

## 2022-11-30 DIAGNOSIS — R53.83 FATIGUE, UNSPECIFIED TYPE: Primary | ICD-10-CM

## 2022-11-30 PROCEDURE — 97110 THERAPEUTIC EXERCISES: CPT | Performed by: PHYSICAL THERAPIST

## 2022-12-01 NOTE — THERAPY TREATMENT NOTE
Outpatient Physical Therapy Ortho Treatment Note  [facility]     Patient Name: Samy Velazquez  : 1945  MRN: [id number]  Today's Date: 2022      Visit Date: 2022     Attendance: 4 visits  Subjective improvement: N/A  MD appt: PRN  Recheck due: 2022    Visit Dx:    ICD-10-CM ICD-9-CM   1. Fatigue, unspecified type  R53.83 780.79       Patient Active Problem List   Diagnosis   • Ischemic heart disease due to coronary artery obstruction (HCC)   • Osteoarthritis of multiple joints   • Cardiac pacemaker   • Gout of multiple sites   • Nephrolithiasis   • Mixed hyperlipidemia   • Morbidly obese (HCC)   • Hypertensive heart disease with heart failure (HCC)   • Major depressive disorder, recurrent, moderate (HCC)   • Chronic kidney disease, stage 4 (severe) (HCC)   • Ventricular tachycardia   • Chronic systolic heart failure (HCC)   • Hypothyroidism   • Paroxysmal atrial fibrillation (HCC)   • Coronary atherosclerosis   • Essential hypertension   • Weakness of right lower extremity        Past Medical History:   Diagnosis Date   • Arthritis    • Coronary artery disease    • Hyperlipidemia    • Hypertensive heart disease with heart failure (HCC) 3/29/2021   • Hypothyroidism 2021   • Kidney stone    • Major depressive disorder, recurrent, moderate (HCC) 3/29/2021   • Morbidly obese (HCC) 2020        Past Surgical History:   Procedure Laterality Date   • CARDIAC DEFIBRILLATOR PLACEMENT     • CARDIAC DEFIBRILLATOR PLACEMENT N/A    • CARDIAC SURGERY     • ENDOSCOPY N/A 10/30/2020    Procedure: ESOPHAGOGASTRODUODENOSCOPY WITH ANESTHESIA;  Surgeon: Brent Stanley MD;  Location: [facility];  Service: Gastroenterology;  Laterality: N/A;  pre dysphagia  post post op gastric surgery  dr jose manuel smith   • ESOPHAGUS SURGERY     • KNEE SURGERY     • TOTAL SHOULDER REPLACEMENT          PT Ortho     Row Name 22 1300       Subjective Comments    Subjective Comments Pt states he's  "really tired today. Not sure if therapy has helped much yet but \"I guess it has a little\".  -KG       Precautions and Contraindications    Precautions CVA with R sided weakness, heart disease, arthritis  -KG    Contraindications Pacemaker/defibulator  -KG       Subjective Pain    Able to rate subjective pain? yes  -KG    Pre-Treatment Pain Level 0  -KG    Subjective Pain Comment c/o back pain with standing activities  -KG       Posture/Observations    Posture/Observations Comments Pt required mod-max assist for sit<>stand from his transport chair (which is quite low) but min assist from airex in regular chair.  -KG          User Key  (r) = Recorded By, (t) = Taken By, (c) = Cosigned By    Initials Name Provider Type    Sharon Berg, PT Physical Therapist                             PT Assessment/Plan     Row Name 11/30/22 1300          PT Assessment    Assessment Comments Pt notably tired throughout treatment. Difficultt sitting on mat table this date without back support so did not do much seated exercises in that position; sat in chair. Static standing challenging, fatigues easily in low back with c/o pain in low back as well. Cues needed for more upright positioning. Did seem to tolerate resisted trunk ext with tband well seated but did need tactile cues to help get better ext.  -KG        PT Plan    PT Frequency 2x/week  -KG     PT Plan Comments Resume mat table strengthening next visit (SAQ's & possible bridges). Continue seated rows and trunk ext. Continue gait/balance activities in // bars as able.  -KG           User Key  (r) = Recorded By, (t) = Taken By, (c) = Cosigned By    Initials Name Provider Type    Sharon Berg, PT Physical Therapist                   OP Exercises     Row Name 11/30/22 1300             Subjective Comments    Subjective Comments Pt states he's really tired today. Not sure if therapy has helped much yet but \"I guess it has a little\".  -KG         Subjective Pain    " "Able to rate subjective pain? yes  -KG      Pre-Treatment Pain Level 0  -KG      Subjective Pain Comment c/o back pain with standing activities  -KG         Exercise 1    Exercise Name 1 // bars: fwd gait working on heel strike/toe off, step length; posture  -KG      Cueing 1 Verbal  -KG      Sets 1 1  -KG      Reps 1 3 laps  -KG      Additional Comments CGA  -KG         Exercise 2    Exercise Name 2 // bars: alt step taps to 4\" step  -KG      Cueing 2 Verbal  -KG      Sets 2 1  -KG      Reps 2 10, 5  -KG      Additional Comments CGA  -KG         Exercise 3    Exercise Name 3 // bars: static balance FA working on upright posture  -KG      Cueing 3 Verbal  -KG      Reps 3 2  -KG      Time 3 30\"  -KG         Exercise 4    Exercise Name 4 // bars: standing FA with manual perturbations at lateral hips  -KG      Cueing 4 Verbal;Tactile  -KG      Time 4 ~2 min  -KG      Additional Comments working on posture & stabiltiy; mirror feedback  -KG         Exercise 5    Exercise Name 5 Gait from back treatment room to mat table in main treatment area  -KG      Additional Comments CGA  -KG         Exercise 6    Exercise Name 6 Seated tband mid rows  -KG      Cueing 6 Verbal  -KG      Sets 6 2  -KG      Reps 6 10  -KG      Additional Comments elevated plinth; red tband  -KG         Exercise 7    Exercise Name 7 Sit<>stands from elevated plinth  -KG      Cueing 7 Verbal;Tactile  -KG      Sets 7 1  -KG      Reps 7 5  -KG      Additional Comments walker in front of pt; SBA  -KG         Exercise 8    Exercise Name 8 Seated in chair: LAQ with iso add  -KG      Cueing 8 Verbal;Tactile  -KG      Sets 8 1  -KG      Reps 8 10  -KG      Time 8 3-5\" hold  -KG         Exercise 9    Exercise Name 9 Seated in chair hip abd with manual PT resistance  -KG      Cueing 9 Verbal;Tactile  -KG      Sets 9 1  -KG      Reps 9 10  -KG      Time 9 pause  -KG         Exercise 10    Exercise Name 10 Seated tband trunk ext  -KG      Cueing 10 Verbal;Tactile  " "-KG      Sets 10 1  -KG      Reps 10 10  -KG      Additional Comments green tband  -KG         Exercise 11    Exercise Name 11 Seated manual pec stretch with PT during rest break  -KG      Reps 11 3  -KG      Time 11 20-30\"  -KG            User Key  (r) = Recorded By, (t) = Taken By, (c) = Cosigned By    Initials Name Provider Type    KG Sharon Montesinos, PT Physical Therapist                              PT OP Goals     Row Name 11/30/22 1300          PT Short Term Goals    STG Date to Achieve 12/08/22  -KG     STG 1 Demonstrate independence/compliance in performance of initial HEP  -KG     STG 1 Progress Progressing  -KG     STG 2 Ambulate 75 ft with RW with CGA/SBA demonstrating improved LE strength/stability and improved step lenght/height  -KG     STG 2 Progress Progressing  -KG     STG 3 Perform static balance FA in parallel bars for 20\"-30\" with improved posture, no LOB, and improved LE control  -KG     STG 3 Progress Progressing  -KG     STG 4 Demonstrate improved bilateral hip flex/abd MMT to 4-/5  -KG     STG 4 Progress Progressing  -KG     STG 5 Perform sit<>stand transfer from transport chair with CGA demonstrating improved BLE/BUE functional strength  -KG     STG 5 Progress Progressing  -KG        Long Term Goals    LTG Date to Achieve 12/29/22  -KG     LTG 1 Subjectively report 50% overall improvement or greater in gait/functional mobility & daily activity performance  -KG     LTG 2 Perform 1x10 sit<>stand tranfers from plinth or airex in chair with BUE assist demonstrate improved BLE functional strength & endurance  -KG     LTG 3 Ambulate 150 ft with RW safely demonstrate improved endurance/activity tolerance and gait kinematics  -KG     LTG 4 Demonstrate improved bilateral knee flex/ext MMT to 4+/5 or greater  -KG     LTG 5 Demonstrate imporved crystal hip flex/abd MMT to 4/5 or greater  -KG     LTG 6 Perform small obstacle course in // bars conisisting of bench step, sm marzena, bench step, & compliant " surface demonstrate improved dynamic balance & LE stability  -KG        Time Calculation    PT Goal Re-Cert Due Date 12/08/22  -KG           User Key  (r) = Recorded By, (t) = Taken By, (c) = Cosigned By    Initials Name Provider Type    Sharon Berg, PT Physical Therapist                Therapy Education  Given: HEP, Posture/body mechanics, Mobility training, Fall prevention and home safety  Program: Reinforced  How Provided: Verbal, Demonstration, Written  Provided to: Patient  Level of Understanding: Verbalized, Demonstrated              Time Calculation:   Start Time: 1351  Stop Time: 1435  Time Calculation (min): 44 min  Therapy Charges for Today     Code Description Service Date Service Provider Modifiers Qty    02372467686 HC PT THER PROC EA 15 MIN 11/30/2022 Sharon Montesinos, PT GP 3                    Sharon Montesinos, PT  11/30/2022

## 2022-12-06 ENCOUNTER — HOSPITAL ENCOUNTER (OUTPATIENT)
Dept: PHYSICAL THERAPY | Facility: HOSPITAL | Age: 77
Setting detail: THERAPIES SERIES
Discharge: HOME | End: 2022-12-06
Payer: MEDICARE

## 2022-12-06 DIAGNOSIS — R53.83 FATIGUE, UNSPECIFIED TYPE: Primary | ICD-10-CM

## 2022-12-06 PROCEDURE — 97530 THERAPEUTIC ACTIVITIES: CPT

## 2022-12-06 PROCEDURE — 97110 THERAPEUTIC EXERCISES: CPT

## 2022-12-06 NOTE — THERAPY TREATMENT NOTE
"    Outpatient Physical Therapy Ortho Treatment Note  [facility]     Patient Name: Samy Velazquez  : 1945  MRN: [id number]  Today's Date: 2022      Visit Date: 2022    Attendance: 5 visits  Subjective improvement: \"not yet\"  MD appt: prn  Recheck due: 22    Visit Dx:    ICD-10-CM ICD-9-CM   1. Fatigue, unspecified type  R53.83 780.79       Patient Active Problem List   Diagnosis   • Ischemic heart disease due to coronary artery obstruction (HCC)   • Osteoarthritis of multiple joints   • Cardiac pacemaker   • Gout of multiple sites   • Nephrolithiasis   • Mixed hyperlipidemia   • Morbidly obese (HCC)   • Hypertensive heart disease with heart failure (HCC)   • Major depressive disorder, recurrent, moderate (HCC)   • Chronic kidney disease, stage 4 (severe) (HCC)   • Ventricular tachycardia   • Chronic systolic heart failure (HCC)   • Hypothyroidism   • Paroxysmal atrial fibrillation (HCC)   • Coronary atherosclerosis   • Essential hypertension   • Weakness of right lower extremity        Past Medical History:   Diagnosis Date   • Arthritis    • Coronary artery disease    • Hyperlipidemia    • Hypertensive heart disease with heart failure (HCC) 3/29/2021   • Hypothyroidism 2021   • Kidney stone    • Major depressive disorder, recurrent, moderate (HCC) 3/29/2021   • Morbidly obese (HCC) 2020        Past Surgical History:   Procedure Laterality Date   • CARDIAC DEFIBRILLATOR PLACEMENT     • CARDIAC DEFIBRILLATOR PLACEMENT N/A    • CARDIAC SURGERY     • ENDOSCOPY N/A 10/30/2020    Procedure: ESOPHAGOGASTRODUODENOSCOPY WITH ANESTHESIA;  Surgeon: Brent Stanley MD;  Location: [facility];  Service: Gastroenterology;  Laterality: N/A;  pre dysphagia  post post op gastric surgery  dr jose manuel smith   • ESOPHAGUS SURGERY     • KNEE SURGERY     • TOTAL SHOULDER REPLACEMENT          PT Ortho     Row Name 22 1400       Subjective Comments    Subjective Comments Pt wife says he " "has not been doing well at home with excs or with walking - almost goes down; doesn't walk a lot at present due to we are afraid he will fall.  -PM       Precautions and Contraindications    Precautions CVA with R sided weakness, heart disease, arthritis  -PM    Contraindications Pacemaker/defibulator  -PM       Subjective Pain    Able to rate subjective pain? yes  -PM    Pre-Treatment Pain Level 0  -PM    Post-Treatment Pain Level 0  -PM    Subjective Pain Comment \"just feel weak and tired\"  -PM       Posture/Observations    Posture/Observations Comments Mod Asst and significant cues for sit stand. This improved to CGA-min during treatment.  -PM          User Key  (r) = Recorded By, (t) = Taken By, (c) = Cosigned By    Initials Name Provider Type    Kristin Calhoun PTA Physical Therapist Assistant                             PT Assessment/Plan     Row Name 12/06/22 1400          PT Assessment    Assessment Comments Pt continues to be quite weak and easily fatigued with all ther ex and ther activity. Pt actually did better with LAQ on R than with SAQ; he did better with sit<>stands following cues to push up from chair arms or plinth rather than walker. Pt also encouraged to slow his gait down and extend the right knee. Advised wife better to put gait belt on him for safety. Also advised pt/wife for him to turn around sit down when getting in auto vs shifting his wt all to right LE and climbing in.  -PM        PT Plan    PT Frequency 2x/week  -PM     PT Plan Comments cont to work on strength and not quite as much walking until he gets a little stronger; possibly a little Pro II as he no longer does his cycle at home per wife  -PM           User Key  (r) = Recorded By, (t) = Taken By, (c) = Cosigned By    Initials Name Provider Type    Kristin Calhoun PTA Physical Therapist Assistant                   OP Exercises     Row Name 12/06/22 1400             Subjective Comments    Subjective Comments Pt wife " "says he has not been doing well at home with excs or with walking - almost goes down; doesn't walk a lot at present due to we are afraid he will fall.  -PM         Subjective Pain    Able to rate subjective pain? yes  -PM      Pre-Treatment Pain Level 0  -PM      Post-Treatment Pain Level 0  -PM      Subjective Pain Comment \"just feel weak and tired\"  -PM         Exercise 1    Exercise Name 1 // bars: FF gait and retro gait with gait belt on patient  -PM      Sets 1 1  -PM      Reps 1 2  -PM      Time 1 laps  -PM      Additional Comments CTA  -PM         Exercise 2    Exercise Name 2 standing TKE attempted in // bars  -PM      Cueing 2 Verbal  -PM      Reps 2 10  -PM      Time 2 3count hold  -PM         Exercise 3    Exercise Name 3 seated hip abd  -PM      Cueing 3 Verbal  -PM      Sets 3 2  -PM      Reps 3 10  -PM      Additional Comments red tband  -PM         Exercise 4    Exercise Name 4 seated back ext to tband  -PM      Cueing 4 Verbal  -PM      Sets 4 2  -PM      Reps 4 10  -PM      Additional Comments GTB  -PM         Exercise 5    Exercise Name 5 seated rows  -PM      Cueing 5 Verbal  -PM      Sets 5 2  -PM      Reps 5 10  -PM      Additional Comments GTB  -PM         Exercise 6    Exercise Name 6 sit <> stand from slightly mod ht plinth  -PM      Cueing 6 Verbal  -PM      Reps 6 5x  -PM         Exercise 7    Exercise Name 7 stand rhythmic stab to R knee  -PM      Cueing 7 Tactile  -PM      Time 7 ~30\"  -PM         Exercise 8    Exercise Name 8 bridge  -PM      Cueing 8 Verbal  -PM      Sets 8 1  -PM      Reps 8 10  -PM         Exercise 9    Exercise Name 9 supine SLR AA/A  -PM      Cueing 9 Verbal;Tactile  -PM      Sets 9 1  -PM      Reps 9 10  -PM      Additional Comments unable to R with out mod assist  -PM         Exercise 10    Exercise Name 10 SAQ  -PM      Cueing 10 Verbal;Tactile  -PM      Sets 10 1  -PM      Reps 10 10  -PM      Additional Comments 3 count hold; R AA P&H/eccentric  -PM         " "Exercise 11    Exercise Name 11 seated add squeezes ball  -PM      Cueing 11 Verbal  -PM      Sets 11 1  -PM      Reps 11 10  -PM         Exercise 12    Exercise Name 12 review seated LAQ and march  -PM      Reps 12 5 ea  -PM         Exercise 13    Exercise Name 13 gait train with RW and wife push WC behind  -PM      Cueing 13 Verbal  -PM      Time 13 ~4'  -PM      Additional Comments ~35 ft  -PM         Exercise 14    Exercise Name 14 t/f car  -PM      Cueing 14 Verbal;Tactile  -PM            User Key  (r) = Recorded By, (t) = Taken By, (c) = Cosigned By    Initials Name Provider Type    PM Kristin Dillard, PTA Physical Therapist Assistant                              PT OP Goals     Row Name 12/06/22 1400          PT Short Term Goals    STG Date to Achieve 12/08/22  -PM     STG 1 Demonstrate independence/compliance in performance of initial HEP  -PM     STG 1 Progress Progressing  -PM     STG 2 Ambulate 75 ft with RW with CGA/SBA demonstrating improved LE strength/stability and improved step lenght/height  -PM     STG 2 Progress Progressing  -PM     STG 3 Perform static balance FA in parallel bars for 20\"-30\" with improved posture, no LOB, and improved LE control  -PM     STG 3 Progress Progressing  -PM     STG 4 Demonstrate improved bilateral hip flex/abd MMT to 4-/5  -PM     STG 4 Progress Progressing  -PM     STG 5 Perform sit<>stand transfer from transport chair with CGA demonstrating improved BLE/BUE functional strength  -PM     STG 5 Progress Progressing  -PM        Long Term Goals    LTG Date to Achieve 12/29/22  -PM     LTG 1 Subjectively report 50% overall improvement or greater in gait/functional mobility & daily activity performance  -PM     LTG 2 Perform 1x10 sit<>stand tranfers from plinth or airex in chair with BUE assist demonstrate improved BLE functional strength & endurance  -PM     LTG 3 Ambulate 150 ft with RW safely demonstrate improved endurance/activity tolerance and gait kinematics  -PM "     LTG 4 Demonstrate improved bilateral knee flex/ext MMT to 4+/5 or greater  -PM     LTG 5 Demonstrate imporved crystal hip flex/abd MMT to 4/5 or greater  -PM     LTG 6 Perform small obstacle course in // bars conisisting of bench step, sm hurldes, bench step, & compliant surface demonstrate improved dynamic balance & LE stability  -PM        Time Calculation    PT Goal Re-Cert Due Date 12/08/22  -PM           User Key  (r) = Recorded By, (t) = Taken By, (c) = Cosigned By    Initials Name Provider Type    PM Kristin Dillard PTA Physical Therapist Assistant                Therapy Education  Given: HEP, Posture/body mechanics, Mobility training, Fall prevention and home safety  Program: Reinforced  How Provided: Verbal, Demonstration, Written  Provided to: Patient  Level of Understanding: Verbalized, Demonstrated              Time Calculation:   Start Time: 1408  Stop Time: 1505  Time Calculation (min): 57 min  Therapy Charges for Today     Code Description Service Date Service Provider Modifiers Qty    05309690848 HC PT THER PROC EA 15 MIN 12/6/2022 Kristin Dillard PTA GP, CQ 3    26832718593 HC PT THERAPEUTIC ACT EA 15 MIN 12/6/2022 Kristin Dillard PTA GP, CQ 1                    Kristin Dillard PTA  12/6/2022

## 2022-12-08 ENCOUNTER — APPOINTMENT (OUTPATIENT)
Dept: PHYSICAL THERAPY | Facility: HOSPITAL | Age: 77
End: 2022-12-08
Payer: MEDICARE

## 2022-12-12 ENCOUNTER — OFFICE VISIT (OUTPATIENT)
Dept: FAMILY MEDICINE CLINIC | Facility: CLINIC | Age: 77
End: 2022-12-12

## 2022-12-12 VITALS
OXYGEN SATURATION: 97 % | HEIGHT: 66 IN | RESPIRATION RATE: 16 BRPM | WEIGHT: 213 LBS | HEART RATE: 67 BPM | DIASTOLIC BLOOD PRESSURE: 60 MMHG | BODY MASS INDEX: 34.23 KG/M2 | TEMPERATURE: 97.5 F | SYSTOLIC BLOOD PRESSURE: 118 MMHG

## 2022-12-12 DIAGNOSIS — R53.83 FATIGUE, UNSPECIFIED TYPE: Primary | ICD-10-CM

## 2022-12-12 DIAGNOSIS — R52 PAIN: ICD-10-CM

## 2022-12-12 DIAGNOSIS — R53.1 WEAKNESS: ICD-10-CM

## 2022-12-12 DIAGNOSIS — I25.9 ISCHEMIC HEART DISEASE: ICD-10-CM

## 2022-12-12 PROCEDURE — 99213 OFFICE O/P EST LOW 20 MIN: CPT | Performed by: FAMILY MEDICINE

## 2022-12-12 RX ORDER — HYDROCODONE BITARTRATE AND ACETAMINOPHEN 5; 325 MG/1; MG/1
1 TABLET ORAL EVERY 8 HOURS PRN
Qty: 60 TABLET | Refills: 0 | Status: SHIPPED | OUTPATIENT
Start: 2022-12-12 | End: 2023-03-17 | Stop reason: SDUPTHER

## 2022-12-12 RX ORDER — NITROGLYCERIN 0.4 MG/1
0.4 TABLET SUBLINGUAL
Qty: 25 TABLET | Refills: 2 | Status: SHIPPED | OUTPATIENT
Start: 2022-12-12

## 2022-12-12 NOTE — PROGRESS NOTES
Subjective   Samy Velazquez is a 77 y.o. male.     History of Present Illness  37-year-old male ischemic heart disease, severe DJD, dementia, complaints of weakness      The following portions of the patient's history were reviewed and updated as appropriate: allergies, current medications, past family history, past medical history, past social history, past surgical history and problem list.    Review of Systems   Constitutional: Positive for fatigue.   Respiratory: Negative for shortness of breath.    Cardiovascular: Negative for chest pain and leg swelling.   Gastrointestinal:        Severe reflux   Neurological: Positive for weakness.       Objective   Physical Exam  Vitals and nursing note reviewed.   Constitutional:       Appearance: He is obese.   Cardiovascular:      Rate and Rhythm: Normal rate and regular rhythm.   Pulmonary:      Effort: Pulmonary effort is normal.      Breath sounds: Normal breath sounds.   Musculoskeletal:      Right lower leg: No edema.      Left lower leg: No edema.   Skin:     General: Skin is warm and dry.   Neurological:      General: No focal deficit present.      Mental Status: He is alert and oriented to person, place, and time.   Psychiatric:         Behavior: Behavior normal.       CONTROLLED SUBSTANCE TRACKING 12/12/2022   Last Justin 12/12/2022   Last UDS (No Data)   Last Controlled Substance Agreement 12/12/2022       Assessment & Plan   Diagnoses and all orders for this visit:    1. Fatigue, unspecified type (Primary)  -     CBC & Differential  -     Comprehensive Metabolic Panel  -     TSH  -     Magnesium  -     Cortisol    2. Weakness  -     Comprehensive Metabolic Panel  -     Magnesium  -     Cortisol    3. Ischemic heart disease  -     Comprehensive Metabolic Panel  -     Magnesium    4. Pain  -     HYDROcodone-acetaminophen (NORCO) 5-325 MG per tablet; Take 1 tablet by mouth Every 8 (Eight) Hours As Needed for Moderate Pain.  Dispense: 60 tablet; Refill: 0    Other  orders  -     nitroglycerin (NITROSTAT) 0.4 MG SL tablet; Place 1 tablet under the tongue Every 5 (Five) Minutes As Needed for Chest Pain. Take no more than 3 doses in 15 minutes.  Dispense: 25 tablet; Refill: 2       Plan aboveNo flowsheet data found.    Plan above- doing physical therapy

## 2022-12-13 ENCOUNTER — HOSPITAL ENCOUNTER (OUTPATIENT)
Dept: PHYSICAL THERAPY | Facility: HOSPITAL | Age: 77
Setting detail: THERAPIES SERIES
Discharge: HOME | End: 2022-12-13
Payer: MEDICARE

## 2022-12-13 DIAGNOSIS — R53.83 FATIGUE, UNSPECIFIED TYPE: Primary | ICD-10-CM

## 2022-12-13 LAB
ALBUMIN SERPL-MCNC: 3.4 G/DL (ref 3.5–5.2)
ALBUMIN/GLOB SERPL: 1.3 G/DL
ALP SERPL-CCNC: 135 U/L (ref 39–117)
ALT SERPL-CCNC: 26 U/L (ref 1–41)
AST SERPL-CCNC: 21 U/L (ref 1–40)
BASOPHILS # BLD AUTO: 0.07 10*3/MM3 (ref 0–0.2)
BASOPHILS NFR BLD AUTO: 1 % (ref 0–1.5)
BILIRUB SERPL-MCNC: 0.4 MG/DL (ref 0–1.2)
BUN SERPL-MCNC: 23 MG/DL (ref 8–23)
BUN/CREAT SERPL: 12.9 (ref 7–25)
CALCIUM SERPL-MCNC: 10.1 MG/DL (ref 8.6–10.5)
CHLORIDE SERPL-SCNC: 106 MMOL/L (ref 98–107)
CO2 SERPL-SCNC: 26 MMOL/L (ref 22–29)
CORTIS SERPL-MCNC: 7.2 UG/DL
CREAT SERPL-MCNC: 1.78 MG/DL (ref 0.76–1.27)
EGFRCR SERPLBLD CKD-EPI 2021: 39.3 ML/MIN/1.73
EOSINOPHIL # BLD AUTO: 0.44 10*3/MM3 (ref 0–0.4)
EOSINOPHIL NFR BLD AUTO: 6.3 % (ref 0.3–6.2)
ERYTHROCYTE [DISTWIDTH] IN BLOOD BY AUTOMATED COUNT: 14.2 % (ref 12.3–15.4)
GLOBULIN SER CALC-MCNC: 2.6 GM/DL
GLUCOSE SERPL-MCNC: 88 MG/DL (ref 65–99)
HCT VFR BLD AUTO: 46.2 % (ref 37.5–51)
HGB BLD-MCNC: 15.1 G/DL (ref 13–17.7)
IMM GRANULOCYTES # BLD AUTO: 0.02 10*3/MM3 (ref 0–0.05)
IMM GRANULOCYTES NFR BLD AUTO: 0.3 % (ref 0–0.5)
LYMPHOCYTES # BLD AUTO: 1.63 10*3/MM3 (ref 0.7–3.1)
LYMPHOCYTES NFR BLD AUTO: 23.2 % (ref 19.6–45.3)
MAGNESIUM SERPL-MCNC: 1.8 MG/DL (ref 1.6–2.4)
MCH RBC QN AUTO: 29.1 PG (ref 26.6–33)
MCHC RBC AUTO-ENTMCNC: 32.7 G/DL (ref 31.5–35.7)
MCV RBC AUTO: 89 FL (ref 79–97)
MONOCYTES # BLD AUTO: 0.71 10*3/MM3 (ref 0.1–0.9)
MONOCYTES NFR BLD AUTO: 10.1 % (ref 5–12)
NEUTROPHILS # BLD AUTO: 4.17 10*3/MM3 (ref 1.7–7)
NEUTROPHILS NFR BLD AUTO: 59.1 % (ref 42.7–76)
NRBC BLD AUTO-RTO: 0 /100 WBC (ref 0–0.2)
PLATELET # BLD AUTO: 174 10*3/MM3 (ref 140–450)
POTASSIUM SERPL-SCNC: 4.3 MMOL/L (ref 3.5–5.2)
PROT SERPL-MCNC: 6 G/DL (ref 6–8.5)
RBC # BLD AUTO: 5.19 10*6/MM3 (ref 4.14–5.8)
SODIUM SERPL-SCNC: 143 MMOL/L (ref 136–145)
TSH SERPL DL<=0.005 MIU/L-ACNC: 2.43 UIU/ML (ref 0.27–4.2)
WBC # BLD AUTO: 7.04 10*3/MM3 (ref 3.4–10.8)

## 2022-12-13 PROCEDURE — 97110 THERAPEUTIC EXERCISES: CPT | Performed by: PHYSICAL THERAPIST

## 2022-12-14 NOTE — THERAPY PROGRESS REPORT/RE-CERT
"    Outpatient Physical Therapy Ortho Progress Note  [facility]     Patient Name: Samy Velazquez  : 1945  MRN: [id number]  Today's Date: 2022      Visit Date: 2022     Attendance: 6 visits  Subjective improvement: \"maybe a little\"  MD appt: prn  Recheck due: 1/3/2023    Visit Dx:    ICD-10-CM ICD-9-CM   1. Fatigue, unspecified type  R53.83 780.79       Patient Active Problem List   Diagnosis   • Ischemic heart disease due to coronary artery obstruction (HCC)   • Osteoarthritis of multiple joints   • Cardiac pacemaker   • Gout of multiple sites   • Nephrolithiasis   • Mixed hyperlipidemia   • Morbidly obese (HCC)   • Hypertensive heart disease with heart failure (HCC)   • Major depressive disorder, recurrent, moderate (HCC)   • Chronic kidney disease, stage 4 (severe) (HCC)   • Ventricular tachycardia   • Chronic systolic heart failure (HCC)   • Hypothyroidism   • Paroxysmal atrial fibrillation (HCC)   • Coronary atherosclerosis   • Essential hypertension   • Weakness of right lower extremity        Past Medical History:   Diagnosis Date   • Arthritis    • Coronary artery disease    • Hyperlipidemia    • Hypertensive heart disease with heart failure (HCC) 3/29/2021   • Hypothyroidism 2021   • Kidney stone    • Major depressive disorder, recurrent, moderate (HCC) 3/29/2021   • Morbidly obese (HCC) 2020        Past Surgical History:   Procedure Laterality Date   • CARDIAC DEFIBRILLATOR PLACEMENT     • CARDIAC DEFIBRILLATOR PLACEMENT N/A    • CARDIAC SURGERY     • ENDOSCOPY N/A 10/30/2020    Procedure: ESOPHAGOGASTRODUODENOSCOPY WITH ANESTHESIA;  Surgeon: Brent Stanley MD;  Location: [facility];  Service: Gastroenterology;  Laterality: N/A;  pre dysphagia  post post op gastric surgery  dr jose manuel smith   • ESOPHAGUS SURGERY     • KNEE SURGERY     • TOTAL SHOULDER REPLACEMENT                   PT Ortho     Row Name 22 1400       Subjective Comments    Subjective Comments " Pt states he has seen a little improvement in his strength. Feels like therapy is helping some. States he has bad days sometimes in regard to fatigue and weakness.  -KG       Precautions and Contraindications    Precautions CVA with R sided weakness, heart disease, arthritis  -KG    Contraindications Pacemaker/defibulator  -KG       Posture/Observations    Posture/Observations Comments Presents in transport chair. Mod assist from transport chair; CGA/min assist with added airex in transport chair. Decreased/poor overall postural awareness. Slumped when seated. Knee varus noted bilaterally R>L. Soft, quiet with speech most of the time.  -KG       General ROM    GENERAL ROM COMMENTS Bilateral shoulder flex/abd limited to ~100 deg. Elbow/wrist AROM WFL. Bilateral hip, knee, ankle AROM WFL but weakness present. Decreased knee ext on R.  -KG       MMT Right Upper Ext    Rt Shoulder Flexion MMT, Gross Movement (3+/5) fair plus  -KG    Rt Shoulder ABduction MMT, Gross Movement (3/5) fair  -KG    Rt Elbow Flexion MMT, Gross Movement: (4/5) good  -KG    Rt Elbow Extension MMT, Gross Movement: (4-/5) good minus  -KG       MMT Left Upper Ext    Lt Shoulder Flexion MMT, Gross Movement (3+/5) fair plus  -KG    Lt Shoulder ABduction MMT, Gross Movement (3/5) fair  -KG    Lt Elbow Flexion MMT, Gross Movement (4/5) good  -KG    Lt Elbow Extension MMT, Gross Movement (4-/5) good minus  -KG       MMT Right Lower Ext    Rt Hip Flexion MMT, Gross Movement (3+/5) fair plus  -KG    Rt Hip ABduction MMT, Gross Movement (4-/5) good minus  -KG    Rt Hip ADduction MMT, Gross Movement (4/5) good  -KG    Rt Knee Extension MMT, Gross Movement (4-/5) good minus  -KG    Rt Knee Flexion MMT, Gross Movement (4/5) good  -KG    Rt Ankle Plantarflexion MMT, Gross Movement (4+/5) good plus  -KG    Rt Ankle Dorsiflexion MMT, Gross Movement (4+/5) good plus  -KG    Rt Lower Extremity Comments  MMT performed seated  -KG       MMT Left Lower Ext    Lt Hip  "Flexion MMT, Gross Movement (3+/5) fair plus  -KG    Lt Hip ABduction MMT, Gross Movement (3+/5) fair plus  -KG    Lt Hip ADduction MMT, Gross Movement (4-/5) good minus  -KG    Lt Knee Extension MMT, Gross Movement (4/5) good  -KG    Lt Knee Flexion MMT, Gross Movement (4/5) good  -KG    Lt Ankle Plantarflexion MMT, Gross Movement (4+/5) good plus  -KG    Lt Ankle Dorsiflexion MMT, Gross Movement (4+/5) good plus  -KG    Lt Lower Extremity Comments  MMT performed seated  -KG       Sensation    Sensation WNL? WFL  -KG       Lower Extremity Flexibility    Hamstrings Bilateral:;Moderately limited  -KG    Hip Flexors Bilateral:;Moderately limited  -KG    Gastrocnemius Bilateral:;Moderately limited  -KG       Balance Skills Training    Balance Comments Static balance: able to stand ~30\" x2 in RW wtih hands hovering above handles with FA. improving strength at BLE's and seemed to be able to stand more upright this date.  -KG       Transfers    Comment, (Transfers) Sit<>stand mod assist from low transport chair. When airex placed in chair, require min assist to CGA with sit>stand. Cues needed intermittently to reach back to chair arms before sitting; doing some better with this. Requires assist for RLE with sit>supine and min assist/CGA with sup>sit  -KG       Gait/Stairs (Locomotion)    Comment, (Gait/Stairs) Pt ambulates with RW. Does have decreased sepideh overall but when tired he tends to take quicker steps and legs fatigue. Cues needed to slow down steps at times. R knee tends to flex due to weakness and pain. Forward flexed with gait.  -KG          User Key  (r) = Recorded By, (t) = Taken By, (c) = Cosigned By    Initials Name Provider Type    KG Sharon Montesinos, PT Physical Therapist                         PT Assessment/Plan     Row Name 12/13/22 1400          PT Assessment    Functional Limitations Decreased safety during functional activities;Impaired gait;Impaired locomotion;Limitation in home " "management;Limitations in community activities;Limitations in functional capacity and performance;Performance in leisure activities;Performance in self-care ADL  -KG     Impairments Balance;Gait;Impaired flexibility;Impaired muscle endurance;Locomotion;Muscle strength;Pain;Poor body mechanics;Posture;Range of motion  -KG     Assessment Comments Pt progressing slowly overall with PT. Pt does seem to have increased tolerance to strengthening activities; able to do more seated and mat table activities but still fatigues easily and is weak in hips & knees. Pt did do better with SAQ and SLR activities and required less assist with RLE. Static standing balance also better and seemed to be more upright this date. Able to hover hands over walker and stand for 30\" with FA with improved control. Pt still fatigues easily with gait, mostly at R knee/RLE. Cues to slow down gait with RW and for kinematics. Pt continues to have good and bad days in regard to fatigue levels and tolerance to activities during therapy and reports same at home. Wife reports he's still having difficulty at home with walking & standing, дмитрий with his R knee, which tends to give on him. Pt gives good effort and works hard during PT. Pt making small gains but still needs work on functional BLE strength, core/postural stabiltiy, balance, gait performance, & functional activity tolerance & will benefit from continued skilled PT services.  -KG     Rehab Potential Fair  -KG     Patient/caregiver participated in establishment of treatment plan and goals Yes  -KG     Patient would benefit from skilled therapy intervention Yes  -KG        PT Plan    PT Frequency 2x/week  -KG     Predicted Duration of Therapy Intervention (PT) 10 visits  -KG     PT Plan Comments Try pro II again and possibly wrap R foot. Continue progressing seated and mat table strengthening, working towards more standing activities as tolerated. Possibly resume sidestepping at handrail or // " "bars next.  -KG           User Key  (r) = Recorded By, (t) = Taken By, (c) = Cosigned By    Initials Name Provider Type    KG Sharon Montesinos, PT Physical Therapist                   OP Exercises     Row Name 12/13/22 1400             Subjective Pain    Able to rate subjective pain? yes  -KG      Pre-Treatment Pain Level 0  -KG      Post-Treatment Pain Level 0  -KG         Exercise 1    Exercise Name 1 Pro II BLE/BUE for endurance/strength  -KG      Cueing 1 Verbal  -KG      Time 1 6 min  -KG      Additional Comments L 1.0  -KG         Exercise 2    Exercise Name 2 gait with RW from fitness to treatment area  -KG      Additional Comments CGA; w/c behind pt  -KG         Exercise 3    Exercise Name 3 Semi-recumbent SAQ  -KG      Cueing 3 Verbal  -KG      Sets 3 2  -KG      Reps 3 10  -KG      Time 3 3\" hold  -KG      Additional Comments white + blue bolster  -KG         Exercise 4    Exercise Name 4 Semi-recumbent SLR flex  -KG      Cueing 4 Verbal  -KG      Sets 4 1  -KG      Reps 4 10 ea LE  -KG      Additional Comments very mild assist on R but able to perform independently  -KG         Exercise 5    Exercise Name 5 Semi-recumbent tband hip abd  -KG      Cueing 5 Verbal  -KG      Sets 5 2  -KG      Reps 5 10  -KG      Additional Comments red tband  -KG         Exercise 6    Exercise Name 6 DKTC with physioball; PT providing resistance with knee/hip ext  -KG      Cueing 6 Verbal;Tactile  -KG      Sets 6 1  -KG      Reps 6 10  -KG      Additional Comments orange pball  -KG         Exercise 7    Exercise Name 7 R STKC wtih pball; PT providing manual resistance with knee/hip ext  -KG      Cueing 7 Verbal;Tactile  -KG      Sets 7 1  -KG      Reps 7 10  -KG         Exercise 8    Exercise Name 8 Bridges  -KG      Cueing 8 Verbal  -KG      Sets 8 1  -KG      Reps 8 10  -KG         Exercise 9    Exercise Name 9 Seated tband rows  -KG      Cueing 9 Verbal  -KG      Sets 9 2  -KG      Reps 9 10  -KG      Additional Comments " "green tband  -KG         Exercise 10    Exercise Name 10 Standing static balance FA; hands hovering above RW  -KG      Cueing 10 Verbal;Tactile  -KG      Reps 10 3  -KG      Time 10 30\"  -KG            User Key  (r) = Recorded By, (t) = Taken By, (c) = Cosigned By    Initials Name Provider Type    KG Sharon Montesinos, PT Physical Therapist                              PT OP Goals     Row Name 12/13/22 1400          PT Short Term Goals    STG Date to Achieve 12/08/22  -KG     STG 1 Demonstrate independence/compliance in performance of initial HEP  -KG     STG 1 Progress Met  -KG     STG 2 Ambulate 75 ft with RW with CGA/SBA demonstrating improved LE strength/stability and improved step lenght/height  -KG     STG 2 Progress Partially Met;Progressing  -KG     STG 3 Perform static balance FA in parallel bars for 20\"-30\" with improved posture, no LOB, and improved LE control  -KG     STG 3 Progress Met  -KG     STG 4 Demonstrate improved bilateral hip flex/abd MMT to 4-/5  -KG     STG 4 Progress Progressing  -KG     STG 5 Perform sit<>stand transfer from transport chair with CGA demonstrating improved BLE/BUE functional strength  -KG     STG 5 Progress Progressing;Partially Met  -KG        Long Term Goals    LTG Date to Achieve 12/29/22  -KG     LTG 1 Subjectively report 50% overall improvement or greater in gait/functional mobility & daily activity performance  -KG     LTG 1 Progress Progressing  -KG     LTG 2 Perform 1x10 sit<>stand tranfers from plinth or airex in chair with BUE assist demonstrate improved BLE functional strength & endurance  -KG     LTG 2 Progress Progressing  -KG     LTG 3 Ambulate 150 ft with RW safely demonstrate improved endurance/activity tolerance and gait kinematics  -KG     LTG 3 Progress Progressing  -KG     LTG 4 Demonstrate improved bilateral knee flex/ext MMT to 4+/5 or greater  -KG     LTG 4 Progress Progressing  -KG     LTG 5 Demonstrate imporved crystal hip flex/abd MMT to 4/5 or greater  " -KG     LTG 5 Progress Progressing  -KG     LTG 6 Perform small obstacle course in // bars conisisting of bench step, sm hurldes, bench step, & compliant surface demonstrate improved dynamic balance & LE stability  -KG     LTG 6 Progress Progressing  -KG           User Key  (r) = Recorded By, (t) = Taken By, (c) = Cosigned By    Initials Name Provider Type    Sharon Berg, PT Physical Therapist                Therapy Education  Given: HEP, Posture/body mechanics, Mobility training, Fall prevention and home safety  Program: Reinforced  How Provided: Verbal, Demonstration  Provided to: Patient  Level of Understanding: Verbalized, Demonstrated    Outcome Measure Options: Lower Extremity Functional Scale (LEFS), Timed Up and Go (TUG)  Lower Extremity Functional Index  Any of your usual work, housework or school activities: Quite a bit of difficulty  Your usual hobbies, recreational or sporting activities: Moderate difficulty  Getting into or out of the bath: Quite a bit of difficulty  Walking between rooms: Moderate difficulty  Putting on your shoes or socks: A little bit of difficulty  Squatting: Quite a bit of difficulty  Lifting an object, like a bag of groceries from the floor: Quite a bit of difficulty  Performing light activities around your home: Quite a bit of difficulty  Performing heavy activities around your home: Extreme difficulty or unable to perform activity  Getting into or out of a car: Extreme difficulty or unable to perform activity  Walking 2 blocks: Extreme difficulty or unable to perform activity  Walking a mile: Extreme difficulty or unable to perform activity  Going up or down 10 stairs (about 1 flight of stairs): Extreme difficulty or unable to perform activity  Standing for 1 hour: Extreme difficulty or unable to perform activity  Sitting for 1 hour: Moderate difficulty  Running on even ground: Extreme difficulty or unable to perform activity  Running on uneven ground: Extreme  difficulty or unable to perform activity  Making sharp turns while running fast: Extreme difficulty or unable to perform activity  Hopping: Extreme difficulty or unable to perform activity  Rolling over in bed: Moderate difficulty  Total: 16      Time Calculation:   Start Time: 1350  Stop Time: 1445  Time Calculation (min): 55 min  Therapy Charges for Today     Code Description Service Date Service Provider Modifiers Qty    12196597767  PT THER PROC EA 15 MIN 12/13/2022 Sharon Montesinos, PT GP 4          PT G-Codes  Outcome Measure Options: Lower Extremity Functional Scale (LEFS), Timed Up and Go (TUG)  Total: 16         Sharon Montesinos, PT  12/14/2022

## 2022-12-15 ENCOUNTER — HOSPITAL ENCOUNTER (OUTPATIENT)
Dept: PHYSICAL THERAPY | Facility: HOSPITAL | Age: 77
Setting detail: THERAPIES SERIES
Discharge: HOME | End: 2022-12-15
Payer: MEDICARE

## 2022-12-15 DIAGNOSIS — R53.83 FATIGUE, UNSPECIFIED TYPE: Primary | ICD-10-CM

## 2022-12-15 PROCEDURE — 97530 THERAPEUTIC ACTIVITIES: CPT | Performed by: PHYSICAL THERAPIST

## 2022-12-15 PROCEDURE — 97110 THERAPEUTIC EXERCISES: CPT | Performed by: PHYSICAL THERAPIST

## 2022-12-16 NOTE — THERAPY TREATMENT NOTE
"    Outpatient Physical Therapy Ortho Treatment Note  [facility]     Patient Name: Samy Velazquez  : 1945  MRN: [id number]  Today's Date: 2022      Visit Date: 12/15/2022     Attendance: 7 visits  Subjective improvement: \"maybe a little\"  MD appt: prn  Recheck due: 1/3/2023    Visit Dx:    ICD-10-CM ICD-9-CM   1. Fatigue, unspecified type  R53.83 780.79       Patient Active Problem List   Diagnosis   • Ischemic heart disease due to coronary artery obstruction (HCC)   • Osteoarthritis of multiple joints   • Cardiac pacemaker   • Gout of multiple sites   • Nephrolithiasis   • Mixed hyperlipidemia   • Morbidly obese (HCC)   • Hypertensive heart disease with heart failure (HCC)   • Major depressive disorder, recurrent, moderate (HCC)   • Chronic kidney disease, stage 4 (severe) (HCC)   • Ventricular tachycardia   • Chronic systolic heart failure (HCC)   • Hypothyroidism   • Paroxysmal atrial fibrillation (HCC)   • Coronary atherosclerosis   • Essential hypertension   • Weakness of right lower extremity        Past Medical History:   Diagnosis Date   • Arthritis    • Coronary artery disease    • Hyperlipidemia    • Hypertensive heart disease with heart failure (HCC) 3/29/2021   • Hypothyroidism 2021   • Kidney stone    • Major depressive disorder, recurrent, moderate (HCC) 3/29/2021   • Morbidly obese (HCC) 2020        Past Surgical History:   Procedure Laterality Date   • CARDIAC DEFIBRILLATOR PLACEMENT     • CARDIAC DEFIBRILLATOR PLACEMENT N/A    • CARDIAC SURGERY     • ENDOSCOPY N/A 10/30/2020    Procedure: ESOPHAGOGASTRODUODENOSCOPY WITH ANESTHESIA;  Surgeon: Brent Stanley MD;  Location: [facility];  Service: Gastroenterology;  Laterality: N/A;  pre dysphagia  post post op gastric surgery  dr jose manuel smith   • ESOPHAGUS SURGERY     • KNEE SURGERY     • TOTAL SHOULDER REPLACEMENT                 PT Ortho     Row Name 12/15/22 1300       Subjective Comments    Subjective Comments " "Pt states today has not been the best day with his walking so hasn't done much.  -KG       Precautions and Contraindications    Precautions CVA with R sided weakness, heart disease, arthritis  -KG    Contraindications Pacemaker/defibulator  -KG       Subjective Pain    Post-Treatment Pain Level 0  -KG    Subjective Pain Comment \"just tired\"  -KG       Posture/Observations    Posture/Observations Comments Presents in transport chair. Min-mod assist for sit>stand from transport chair. CGA when airex pad placed in the seated. Ambulated ~125 ft with RW this date; cues for RW safety and to slow down at times.  -KG          User Key  (r) = Recorded By, (t) = Taken By, (c) = Cosigned By    Initials Name Provider Type    Sharon Berg, PT Physical Therapist                           PT Assessment/Plan     Row Name 12/15/22 1300          PT Assessment    Assessment Comments Pt able to increase gait distance this date. Starts to fatigue at ~75 ft and starts to take quicker steps. Cues to slow down and sit when he starts getting weak in his LE's. Did quite well with HL hip abd/NMRE with quick stretch towards mid line and adding manual resistance to hip abd. More weak in L hip vs R.  -KG        PT Plan    PT Frequency 2x/week  -KG     PT Plan Comments Possibly try gentle step ups vs step taps next. Continue working on gait.  -KG           User Key  (r) = Recorded By, (t) = Taken By, (c) = Cosigned By    Initials Name Provider Type    Sharon Berg, PT Physical Therapist                   OP Exercises     Row Name 12/15/22 1300             Subjective Pain    Able to rate subjective pain? yes  -KG      Pre-Treatment Pain Level 0  -KG         Exercise 1    Exercise Name 1 Pro II BLE/BUE for endurance/strength  -KG      Cueing 1 Verbal  -KG      Time 1 6 min  -KG      Additional Comments L 2.0  -KG         Exercise 2    Exercise Name 2 Sidestepping at handrail  -KG      Cueing 2 Verbal  -KG      Sets 2 1  -KG      " "Reps 2 3 laps  -KG      Additional Comments B HHA at rail; Close SBA/CGA  -KG         Exercise 3    Exercise Name 3 Standing alt step taps to ~8\" stool  -KG      Cueing 3 Verbal  -KG      Sets 3 1  -KG      Reps 3 10  -KG      Additional Comments B HHA at rail; CGA  -KG         Exercise 4    Exercise Name 4 Gait with RW in [employer] working on kinematics/safet  -KG      Cueing 4 Verbal;Tactile  -KG      Reps 4 125 ft; started fatiguing at 80 ft  -KG      Additional Comments CGA; PTA pushing w/c behind pt  -KG         Exercise 5    Exercise Name 5 sit <> stand from slightly mod ht plinth  -KG      Cueing 5 Verbal  -KG      Sets 5 1  -KG      Reps 5 5  -KG      Additional Comments RW in front for balance  -KG         Exercise 6    Exercise Name 6 Seated posture/tband mid rows  -KG      Cueing 6 Verbal  -KG      Sets 6 2  -KG      Reps 6 10  -KG      Additional Comments green tband  -KG         Exercise 7    Exercise Name 7 Seated posture BUE raises to chest with tbar  -KG      Cueing 7 Verbal  -KG      Sets 7 1  -KG      Reps 7 10  -KG      Additional Comments 1# tbar  -KG         Exercise 8    Exercise Name 8 Bridges  -KG      Cueing 8 Verbal  -KG      Sets 8 1  -KG      Reps 8 10  -KG         Exercise 9    Exercise Name 9 Semi-recumbent SLR flex crystal  -KG      Cueing 9 Verbal  -KG      Sets 9 1  -KG      Reps 9 10 ea LE  -KG      Additional Comments mild AA required R>L\  -KG         Exercise 10    Exercise Name 10 Hooklying R & L hip abd with quick stretch  -KG      Cueing 10 Verbal  -KG      Sets 10 1  -KG      Reps 10 10 ea LE  -KG      Additional Comments NMRE: quick stretch the gentle manual resistance to pt hip abd/clam  -KG            User Key  (r) = Recorded By, (t) = Taken By, (c) = Cosigned By    Initials Name Provider Type    KG Sharon Montesinos, PT Physical Therapist                   PT OP Goals     Row Name 12/15/22 1300          PT Short Term Goals    STG Date to Achieve 12/08/22  -KG     STG 1 " "Demonstrate independence/compliance in performance of initial HEP  -KG     STG 1 Progress Met  -KG     STG 2 Ambulate 75 ft with RW with CGA/SBA demonstrating improved LE strength/stability and improved step lenght/height  -KG     STG 2 Progress Partially Met;Progressing  -KG     STG 3 Perform static balance FA in parallel bars for 20\"-30\" with improved posture, no LOB, and improved LE control  -KG     STG 3 Progress Met  -KG     STG 4 Demonstrate improved bilateral hip flex/abd MMT to 4-/5  -KG     STG 4 Progress Progressing  -KG     STG 5 Perform sit<>stand transfer from transport chair with CGA demonstrating improved BLE/BUE functional strength  -KG     STG 5 Progress Progressing;Partially Met  -KG        Long Term Goals    LTG Date to Achieve 12/29/22  -KG     LTG 1 Subjectively report 50% overall improvement or greater in gait/functional mobility & daily activity performance  -KG     LTG 1 Progress Progressing  -KG     LTG 2 Perform 1x10 sit<>stand tranfers from plinth or airex in chair with BUE assist demonstrate improved BLE functional strength & endurance  -KG     LTG 2 Progress Progressing  -KG     LTG 3 Ambulate 150 ft with RW safely demonstrate improved endurance/activity tolerance and gait kinematics  -KG     LTG 3 Progress Progressing  -KG     LTG 4 Demonstrate improved bilateral knee flex/ext MMT to 4+/5 or greater  -KG     LTG 4 Progress Progressing  -KG     LTG 5 Demonstrate imporved crystal hip flex/abd MMT to 4/5 or greater  -KG     LTG 5 Progress Progressing  -KG     LTG 6 Perform small obstacle course in // bars conisisting of bench step, sm hurldes, bench step, & compliant surface demonstrate improved dynamic balance & LE stability  -KG     LTG 6 Progress Progressing  -KG        Time Calculation    PT Goal Re-Cert Due Date 01/03/23  -KG          User Key  (r) = Recorded By, (t) = Taken By, (c) = Cosigned By    Initials Name Provider Type    KG Sharon Montesinos, PT Physical Therapist          "                    Therapy Education  Given: HEP, Posture/body mechanics, Mobility training, Fall prevention and home safety  Program: Reinforced  How Provided: Verbal, Demonstration  Provided to: Patient  Level of Understanding: Verbalized, Demonstrated              Time Calculation:   Start Time: 1348  Stop Time: 1433  Time Calculation (min): 45 min  Therapy Charges for Today     Code Description Service Date Service Provider Modifiers Qty    [id number]  PT THER PROC EA 15 MIN 12/15/2022 Sharon Montesinos, PT GP 2    [id number]  PT THERAPEUTIC ACT EA 15 MIN 12/15/2022 Sharon Montesinos, PT GP 1                    Sharon Montesinos, PT  12/16/2022

## 2022-12-20 ENCOUNTER — HOSPITAL ENCOUNTER (OUTPATIENT)
Dept: PHYSICAL THERAPY | Facility: HOSPITAL | Age: 77
Setting detail: THERAPIES SERIES
Discharge: HOME | End: 2022-12-20
Payer: MEDICARE

## 2022-12-20 ENCOUNTER — OFFICE VISIT (OUTPATIENT)
Dept: FAMILY MEDICINE CLINIC | Facility: CLINIC | Age: 77
End: 2022-12-20

## 2022-12-20 VITALS
BODY MASS INDEX: 35.36 KG/M2 | TEMPERATURE: 97.4 F | OXYGEN SATURATION: 97 % | RESPIRATION RATE: 18 BRPM | SYSTOLIC BLOOD PRESSURE: 128 MMHG | HEIGHT: 66 IN | HEART RATE: 66 BPM | DIASTOLIC BLOOD PRESSURE: 70 MMHG | WEIGHT: 220 LBS

## 2022-12-20 DIAGNOSIS — R53.83 FATIGUE, UNSPECIFIED TYPE: Primary | ICD-10-CM

## 2022-12-20 DIAGNOSIS — L03.113 CELLULITIS OF RIGHT ELBOW: Primary | ICD-10-CM

## 2022-12-20 PROCEDURE — 97110 THERAPEUTIC EXERCISES: CPT | Performed by: PHYSICAL THERAPIST

## 2022-12-20 PROCEDURE — 99213 OFFICE O/P EST LOW 20 MIN: CPT | Performed by: FAMILY MEDICINE

## 2022-12-20 RX ORDER — CEFUROXIME AXETIL 250 MG/1
250 TABLET ORAL 2 TIMES DAILY
Qty: 14 TABLET | Refills: 0 | Status: SHIPPED | OUTPATIENT
Start: 2022-12-20 | End: 2022-12-27

## 2022-12-20 NOTE — PROGRESS NOTES
Subjective   Samy Velazquez is a 77 y.o. male.     History of Present Illness  67-year-old male with cellulitis right above      The following portions of the patient's history were reviewed and updated as appropriate: allergies, current medications, past family history, past medical history, past social history, past surgical history and problem list.    Review of Systems   Respiratory: Negative for shortness of breath.    Cardiovascular: Negative for chest pain and leg swelling.   Musculoskeletal: Positive for arthralgias and back pain.   Skin: Positive for rash.        Cellulitis right elbow       Objective   Physical Exam  Vitals and nursing note reviewed.   Constitutional:       Appearance: He is obese.   Cardiovascular:      Rate and Rhythm: Normal rate and regular rhythm.   Pulmonary:      Effort: Pulmonary effort is normal.      Breath sounds: Normal breath sounds.   Musculoskeletal:         General: Deformity present.      Comments: Right knee DJD   Skin:     General: Skin is warm.      Findings: Erythema and rash present.      Comments: Right elbow cellulitis   Neurological:      General: No focal deficit present.      Mental Status: He is alert and oriented to person, place, and time.   Psychiatric:         Mood and Affect: Mood normal.         Behavior: Behavior normal.         Assessment & Plan   Diagnoses and all orders for this visit:    1. Cellulitis of right elbow (Primary)    Other orders  -     cefuroxime (CEFTIN) 250 MG tablet; Take 1 tablet by mouth 2 (Two) Times a Day for 7 days.  Dispense: 14 tablet; Refill: 0       Plan above

## 2022-12-21 NOTE — THERAPY TREATMENT NOTE
"    Outpatient Physical Therapy Ortho Treatment Note  [facility]     Patient Name: Samy Velazquez  : 1945  MRN: [id number]  Today's Date: 2022      Visit Date: 2022     Attendance: 8 visits  Subjective improvement: \"maybe a little\"  MD appt: prn  Recheck due: 1/3/2023    Visit Dx:    ICD-10-CM ICD-9-CM   1. Fatigue, unspecified type  R53.83 780.79       Patient Active Problem List   Diagnosis   • Ischemic heart disease due to coronary artery obstruction (HCC)   • Osteoarthritis of multiple joints   • Cardiac pacemaker   • Gout of multiple sites   • Nephrolithiasis   • Mixed hyperlipidemia   • Morbidly obese ([facility])   • Hypertensive heart disease with heart failure (HCC)   • Major depressive disorder, recurrent, moderate (HCC)   • Chronic kidney disease, stage 4 (severe) (HCC)   • Ventricular tachycardia   • Chronic systolic heart failure (HCC)   • Hypothyroidism   • Paroxysmal atrial fibrillation (HCC)   • Coronary atherosclerosis   • Essential hypertension   • Weakness of right lower extremity        Past Medical History:   Diagnosis Date   • Arthritis    • Coronary artery disease    • Hyperlipidemia    • Hypertensive heart disease with heart failure (HCC) 3/29/2021   • Hypothyroidism 2021   • Kidney stone    • Major depressive disorder, recurrent, moderate (HCC) 3/29/2021   • Morbidly obese (HCC) 2020        Past Surgical History:   Procedure Laterality Date   • CARDIAC DEFIBRILLATOR PLACEMENT     • CARDIAC DEFIBRILLATOR PLACEMENT N/A    • CARDIAC SURGERY     • ENDOSCOPY N/A 10/30/2020    Procedure: ESOPHAGOGASTRODUODENOSCOPY WITH ANESTHESIA;  Surgeon: Brent Stanley MD;  Location: [facility];  Service: Gastroenterology;  Laterality: N/A;  pre dysphagia  post post op gastric surgery  dr jose manuel smith   • ESOPHAGUS SURGERY     • KNEE SURGERY     • TOTAL SHOULDER REPLACEMENT          PT Ortho     Row Name 22 1300       Subjective Comments    Subjective Comments Wife " stated Dr. Hsieh thought it would be helpful for him to get a knee brace for his R knee and notes they would like to do that. States she worries about his R leg giving out on him.  -KG       Precautions and Contraindications    Precautions CVA with R sided weakness, heart disease, arthritis  -KG    Contraindications Pacemaker/defibulator  -KG       Subjective Pain    Able to rate subjective pain? yes  -KG    Pre-Treatment Pain Level 3  R elbow  -KG    Post-Treatment Pain Level 3  -KG          User Key  (r) = Recorded By, (t) = Taken By, (c) = Cosigned By    Initials Name Provider Type    Sharon Berg, PT Physical Therapist                             PT Assessment/Plan     Row Name 12/20/22 1300          PT Assessment    Assessment Comments Pt more easily fatigued with gait this date. Pt was able to progress to gentle fwd step ups on low step for short reps. Pt's wife present towards end of treatment. Did review some therex activities (hip abd with manual resistance, SLR, SAQ) that she can assist with him at home. Also had discussion about a knee brace, which could help with his knee stabiltiy with gait. PT will look at options and see what will best for the pt.  -KG        PT Plan    PT Frequency 2x/week  -KG     PT Plan Comments Will look into brace options and discuss with pt and pt's wife.  -KG           User Key  (r) = Recorded By, (t) = Taken By, (c) = Cosigned By    Initials Name Provider Type    Sharon Berg, PT Physical Therapist                   OP Exercises     Row Name 12/20/22 1300             Subjective Pain    Able to rate subjective pain? yes  -KG      Pre-Treatment Pain Level 3  R elbow  -KG      Post-Treatment Pain Level 3  -KG         Exercise 1    Exercise Name 1 Pro II BLE/BUE for endurance/strength  -KG      Cueing 1 Verbal  -KG      Time 1 6 min  -KG      Additional Comments L 2.5  -KG         Exercise 2    Exercise Name 2 Sidestepping at handrail  -KG      Cueing 2 Verbal  -KG    "   Sets 2 1  -KG      Reps 2 3 laps  -KG      Additional Comments B HHA at rail; Close SBA/CGA  -KG         Exercise 3    Exercise Name 3 Standing alt fwd step ups at 4\" step  -KG      Cueing 3 Verbal  -KG      Sets 3 1  -KG      Reps 3 10  -KG      Additional Comments B HHA at rail; CGA  -KG         Exercise 4    Exercise Name 4 Gait with RW in [employer] working on kinematics/safety/ posture  -KG      Cueing 4 Verbal;Tactile  -KG      Reps 4 87 ft  -KG         Exercise 5    Exercise Name 5 sit <> stand from slightly mod ht plinth  -KG      Cueing 5 Verbal  -KG      Sets 5 1  -KG      Reps 5 10  -KG      Additional Comments RW in front for balance  -KG         Exercise 6    Exercise Name 6 Seated posture/tband mid rows  -KG      Cueing 6 Verbal  -KG      Sets 6 2  -KG      Reps 6 10  -KG      Additional Comments green tband  -KG         Exercise 7    Exercise Name 7 Seated posture BUE raises to chest with tbar  -KG      Cueing 7 Verbal  -KG      Sets 7 1  -KG      Reps 7 10  -KG      Additional Comments 1# tbar  -KG         Exercise 8    Exercise Name 8 Standing static balance FA; hands hovering above RW  -KG      Cueing 8 Verbal;Tactile  -KG      Reps 8 1x30\", 1x18\"  -KG      Additional Comments CGA  -KG         Exercise 9    Exercise Name 9 Semi-recumbent SLR flex crystal  -KG      Cueing 9 Verbal  -KG      Sets 9 1  -KG      Reps 9 10 ea LE  -KG         Exercise 10    Exercise Name 10 Hooklying R & L hip abd with quick stretch  -KG      Cueing 10 Verbal  -KG      Sets 10 1  -KG      Reps 10 10 ea LE  -KG            User Key  (r) = Recorded By, (t) = Taken By, (c) = Cosigned By    Initials Name Provider Type    KG Sharon Montesinos, PT Physical Therapist                                PT OP Goals     Row Name 12/20/22 1300          PT Short Term Goals    STG Date to Achieve 12/08/22  -KG     STG 1 Demonstrate independence/compliance in performance of initial HEP  -KG     STG 1 Progress Met  -KG     STG 2 Ambulate 75 ft " "with RW with CGA/SBA demonstrating improved LE strength/stability and improved step lenght/height  -KG     STG 2 Progress Partially Met;Progressing  -KG     STG 3 Perform static balance FA in parallel bars for 20\"-30\" with improved posture, no LOB, and improved LE control  -KG     STG 3 Progress Met  -KG     STG 4 Demonstrate improved bilateral hip flex/abd MMT to 4-/5  -KG     STG 4 Progress Progressing  -KG     STG 5 Perform sit<>stand transfer from transport chair with CGA demonstrating improved BLE/BUE functional strength  -KG     STG 5 Progress Progressing;Partially Met  -KG        Long Term Goals    LTG Date to Achieve 12/29/22  -KG     LTG 1 Subjectively report 50% overall improvement or greater in gait/functional mobility & daily activity performance  -KG     LTG 1 Progress Progressing  -KG     LTG 2 Perform 1x10 sit<>stand tranfers from plinth or airex in chair with BUE assist demonstrate improved BLE functional strength & endurance  -KG     LTG 2 Progress Progressing  -KG     LTG 3 Ambulate 150 ft with RW safely demonstrate improved endurance/activity tolerance and gait kinematics  -KG     LTG 3 Progress Progressing  -KG     LTG 4 Demonstrate improved bilateral knee flex/ext MMT to 4+/5 or greater  -KG     LTG 4 Progress Progressing  -KG     LTG 5 Demonstrate imporved crystal hip flex/abd MMT to 4/5 or greater  -KG     LTG 5 Progress Progressing  -KG     LTG 6 Perform small obstacle course in // bars conisisting of bench step, sm hurldes, bench step, & compliant surface demonstrate improved dynamic balance & LE stability  -KG     LTG 6 Progress Progressing  -KG        Time Calculation    PT Goal Re-Cert Due Date 01/03/23  -KG           User Key  (r) = Recorded By, (t) = Taken By, (c) = Cosigned By    Initials Name Provider Type    KG Sharon Montesinos, PT Physical Therapist               Therapy Education  Education Details: Reviewed better ways for wife to assist with sup>sit transfer. Reviewed SLR, SAQ, and " manual resisted hip abd with wife to assist wtih pt  Given: HEP, Posture/body mechanics, Mobility training, Fall prevention and home safety  Program: Reinforced, Progressed  How Provided: Verbal, Demonstration  Provided to: Patient (wife present)  Level of Understanding: Verbalized, Demonstrated, Teach back education performed              Time Calculation:   Start Time: 1302  Stop Time: 1350  Time Calculation (min): 48 min  Therapy Charges for Today     Code Description Service Date Service Provider Modifiers Qty    95207492121 HC PT THER PROC EA 15 MIN 12/20/2022 Sharon Montesinos, PT GP 3                    Sharon Montesinos, PT  12/20/2022

## 2022-12-22 ENCOUNTER — HOSPITAL ENCOUNTER (OUTPATIENT)
Dept: PHYSICAL THERAPY | Facility: HOSPITAL | Age: 77
Setting detail: THERAPIES SERIES
Discharge: HOME | End: 2022-12-22
Payer: MEDICARE

## 2022-12-22 DIAGNOSIS — R53.83 FATIGUE, UNSPECIFIED TYPE: Primary | ICD-10-CM

## 2022-12-22 PROCEDURE — 97110 THERAPEUTIC EXERCISES: CPT | Performed by: PHYSICAL THERAPIST

## 2022-12-22 PROCEDURE — 97530 THERAPEUTIC ACTIVITIES: CPT | Performed by: PHYSICAL THERAPIST

## 2022-12-23 NOTE — THERAPY TREATMENT NOTE
"    Outpatient Physical Therapy Ortho Treatment Note  [facility]     Patient Name: Samy Velazquez  : 1945  MRN: [id number]  Today's Date: 2022      Visit Date: 2022     Attendance: 9 visits  Subjective improvement: \"maybe a little\"  MD appt: prn  Recheck due: 1/3/2023    Visit Dx:    ICD-10-CM ICD-9-CM   1. Fatigue, unspecified type  R53.83 780.79       Patient Active Problem List   Diagnosis   • Ischemic heart disease due to coronary artery obstruction (HCC)   • Osteoarthritis of multiple joints   • Cardiac pacemaker   • Gout of multiple sites   • Nephrolithiasis   • Mixed hyperlipidemia   • Morbidly obese ([facility])   • Hypertensive heart disease with heart failure (HCC)   • Major depressive disorder, recurrent, moderate (HCC)   • Chronic kidney disease, stage 4 (severe) (HCC)   • Ventricular tachycardia   • Chronic systolic heart failure (HCC)   • Hypothyroidism   • Paroxysmal atrial fibrillation (HCC)   • Coronary atherosclerosis   • Essential hypertension   • Weakness of right lower extremity        Past Medical History:   Diagnosis Date   • Arthritis    • Coronary artery disease    • Hyperlipidemia    • Hypertensive heart disease with heart failure (HCC) 3/29/2021   • Hypothyroidism 2021   • Kidney stone    • Major depressive disorder, recurrent, moderate (HCC) 3/29/2021   • Morbidly obese (HCC) 2020        Past Surgical History:   Procedure Laterality Date   • CARDIAC DEFIBRILLATOR PLACEMENT     • CARDIAC DEFIBRILLATOR PLACEMENT N/A    • CARDIAC SURGERY     • ENDOSCOPY N/A 10/30/2020    Procedure: ESOPHAGOGASTRODUODENOSCOPY WITH ANESTHESIA;  Surgeon: Brent Stanley MD;  Location: [facility];  Service: Gastroenterology;  Laterality: N/A;  pre dysphagia  post post op gastric surgery  dr jose manuel smith   • ESOPHAGUS SURGERY     • KNEE SURGERY     • TOTAL SHOULDER REPLACEMENT                     PT Ortho     Row Name 22 1300       Subjective Comments    Subjective " "Comments Pt c/o knee pain with pro II and with gait. Pt states he is a little tired today.  -KG       Precautions and Contraindications    Precautions CVA with R sided weakness, heart disease, arthritis  -KG    Contraindications Pacemaker/defibulator  -KG       Posture/Observations    Posture/Observations Comments Weakness in L hip noted with gait with RW.  -KG          User Key  (r) = Recorded By, (t) = Taken By, (c) = Cosigned By    Initials Name Provider Type    Sharon Berg, PT Physical Therapist                       PT Assessment/Plan     Row Name 12/22/22 1300          PT Assessment    Assessment Comments Pt having some increased knee pain with pro II and walking. Deferred and step taps/step ups due to this. Needed AA for both SAQ and SLR flex, дмитрий on R. Depending on the day, pt able to do more independently. Pt does have notable hip weakness on L with gait, in addition to weakness at R knee.  -KG        PT Plan    PT Frequency 2x/week  -KG     PT Plan Comments Maybe defer pro II due to knee pain. Continue seated and mat table strengthening.  -KG           User Key  (r) = Recorded By, (t) = Taken By, (c) = Cosigned By    Initials Name Provider Type    Sharon Berg, PT Physical Therapist                   OP Exercises     Row Name 12/22/22 1300             Subjective Pain    Able to rate subjective pain? yes  -KG      Pre-Treatment Pain Level 2  -KG      Post-Treatment Pain Level 2  -KG         Exercise 1    Exercise Name 1 Pro II BLE/BUE for endurance/strength  -KG      Cueing 1 Verbal  -KG      Time 1 6 min  -KG      Additional Comments L 2.5  -KG         Exercise 2    Exercise Name 2 Seated crystal hamstring stretch  -KG      Cueing 2 Verbal  -KG      Reps 2 2 ea  -KG      Time 2 30\" hold  -KG         Exercise 3    Exercise Name 3 Gait with RW in [employer] working on kinematics/safety/ posture  -KG      Cueing 3 Verbal;Tactile  -KG      Reps 3 x3; seated rest break in between  -KG      Additional " "Comments PTA pushing wheelchair behind  -KG         Exercise 4    Exercise Name 4 Seated pball iso ham curl  -KG      Cueing 4 Verbal  -KG      Sets 4 1  -KG      Reps 4 15  -KG      Time 4 5\" hold  -KG         Exercise 5    Exercise Name 5 sit <> stand from slightly mod ht plinth  -KG      Cueing 5 Verbal  -KG      Sets 5 1  -KG      Reps 5 5  -KG      Additional Comments RW in front for balance  -KG         Exercise 6    Exercise Name 6 Seated posture/tband mid rows  -KG      Cueing 6 Verbal  -KG      Sets 6 2  -KG      Reps 6 10  -KG      Additional Comments green tband  -KG         Exercise 7    Exercise Name 7 SAQ crystal  -KG      Cueing 7 Verbal  -KG      Sets 7 1  -KG      Reps 7 10  -KG      Additional Comments AA R; white + blue roll  -KG         Exercise 8    Exercise Name 8 Bridges  -KG      Cueing 8 Verbal  -KG      Sets 8 1  -KG      Reps 8 10  -KG         Exercise 9    Exercise Name 9 Semi-recumbent SLR flex crystal  -KG      Cueing 9 Verbal  -KG      Sets 9 1  -KG      Reps 9 10 ea LE  -KG      Additional Comments AA crystal  -KG         Exercise 10    Exercise Name 10 Passive hip/knee flex then PT providing resistance as pt straightens LE  -KG      Cueing 10 Verbal;Tactile  -KG      Sets 10 1  -KG      Reps 10 10 ea LE  -KG            User Key  (r) = Recorded By, (t) = Taken By, (c) = Cosigned By    Initials Name Provider Type    KG Sharon Montesinos, PT Physical Therapist                              PT OP Goals     Row Name 12/22/22 1300          PT Short Term Goals    STG Date to Achieve 12/08/22  -KG     STG 1 Demonstrate independence/compliance in performance of initial HEP  -KG     STG 1 Progress Met  -KG     STG 2 Ambulate 75 ft with RW with CGA/SBA demonstrating improved LE strength/stability and improved step lenght/height  -KG     STG 2 Progress Partially Met;Progressing  -KG     STG 3 Perform static balance FA in parallel bars for 20\"-30\" with improved posture, no LOB, and improved LE control  -KG  "    STG 3 Progress Met  -KG     STG 4 Demonstrate improved bilateral hip flex/abd MMT to 4-/5  -KG     STG 4 Progress Progressing  -KG     STG 5 Perform sit<>stand transfer from transport chair with CGA demonstrating improved BLE/BUE functional strength  -KG     STG 5 Progress Progressing;Partially Met  -KG        Long Term Goals    LTG Date to Achieve 12/29/22  -KG     LTG 1 Subjectively report 50% overall improvement or greater in gait/functional mobility & daily activity performance  -KG     LTG 1 Progress Progressing  -KG     LTG 2 Perform 1x10 sit<>stand tranfers from plinth or airex in chair with BUE assist demonstrate improved BLE functional strength & endurance  -KG     LTG 2 Progress Progressing  -KG     LTG 3 Ambulate 150 ft with RW safely demonstrate improved endurance/activity tolerance and gait kinematics  -KG     LTG 3 Progress Progressing  -KG     LTG 4 Demonstrate improved bilateral knee flex/ext MMT to 4+/5 or greater  -KG     LTG 4 Progress Progressing  -KG     LTG 5 Demonstrate imporved crystal hip flex/abd MMT to 4/5 or greater  -KG     LTG 5 Progress Progressing  -KG     LTG 6 Perform small obstacle course in // bars conisisting of bench step, sm hurldes, bench step, & compliant surface demonstrate improved dynamic balance & LE stability  -KG     LTG 6 Progress Progressing  -KG        Time Calculation    PT Goal Re-Cert Due Date 01/03/23  -KG           User Key  (r) = Recorded By, (t) = Taken By, (c) = Cosigned By    Initials Name Provider Type    KG Sharon Montesinos, PT Physical Therapist                Therapy Education  Given: HEP, Posture/body mechanics, Mobility training, Fall prevention and home safety  Program: Reinforced, Progressed  How Provided: Verbal, Demonstration  Provided to: Patient  Level of Understanding: Verbalized, Demonstrated, Teach back education performed              Time Calculation:   Start Time: 1305  Stop Time: 1356  Time Calculation (min): 51 min  Therapy Charges for  Today     Code Description Service Date Service Provider Modifiers Qty    91552012615 HC PT THER PROC EA 15 MIN 12/22/2022 Sharon Montesinos, PT GP 2    44522402000 HC PT THERAPEUTIC ACT EA 15 MIN 12/22/2022 Sharon Montesinos, PT GP 1                    Sharon Montesinos, PT  12/22/2022

## 2022-12-28 ENCOUNTER — HOSPITAL ENCOUNTER (OUTPATIENT)
Dept: PHYSICAL THERAPY | Facility: HOSPITAL | Age: 77
Setting detail: THERAPIES SERIES
Discharge: HOME | End: 2022-12-28
Payer: MEDICARE

## 2022-12-28 DIAGNOSIS — R53.83 FATIGUE, UNSPECIFIED TYPE: Primary | ICD-10-CM

## 2022-12-28 PROCEDURE — 97110 THERAPEUTIC EXERCISES: CPT | Performed by: PHYSICAL THERAPIST

## 2022-12-28 PROCEDURE — 97530 THERAPEUTIC ACTIVITIES: CPT | Performed by: PHYSICAL THERAPIST

## 2022-12-29 NOTE — THERAPY TREATMENT NOTE
"    Outpatient Physical Therapy Ortho Treatment Note  [facility]     Patient Name: Samy Velazquez  : 1945  MRN: [id number]  Today's Date: 2022      Visit Date: 2022     Attendance: 10 visits  Subjective improvement: \"maybe a little\"  MD appt: prn  Recheck due: 1/3/2023    Visit Dx:    ICD-10-CM ICD-9-CM   1. Fatigue, unspecified type  R53.83 780.79       Patient Active Problem List   Diagnosis   • Ischemic heart disease due to coronary artery obstruction (HCC)   • Osteoarthritis of multiple joints   • Cardiac pacemaker   • Gout of multiple sites   • Nephrolithiasis   • Mixed hyperlipidemia   • Morbidly obese (HCC)   • Hypertensive heart disease with heart failure (HCC)   • Major depressive disorder, recurrent, moderate (HCC)   • Chronic kidney disease, stage 4 (severe) (HCC)   • Ventricular tachycardia   • Chronic systolic heart failure (HCC)   • Hypothyroidism   • Paroxysmal atrial fibrillation (HCC)   • Coronary atherosclerosis   • Essential hypertension   • Weakness of right lower extremity        Past Medical History:   Diagnosis Date   • Arthritis    • Coronary artery disease    • Hyperlipidemia    • Hypertensive heart disease with heart failure (HCC) 3/29/2021   • Hypothyroidism 2021   • Kidney stone    • Major depressive disorder, recurrent, moderate (HCC) 3/29/2021   • Morbidly obese (HCC) 2020        Past Surgical History:   Procedure Laterality Date   • CARDIAC DEFIBRILLATOR PLACEMENT     • CARDIAC DEFIBRILLATOR PLACEMENT N/A    • CARDIAC SURGERY     • ENDOSCOPY N/A 10/30/2020    Procedure: ESOPHAGOGASTRODUODENOSCOPY WITH ANESTHESIA;  Surgeon: Brent Stanley MD;  Location: [facility];  Service: Gastroenterology;  Laterality: N/A;  pre dysphagia  post post op gastric surgery  dr jose manuel smith   • ESOPHAGUS SURGERY     • KNEE SURGERY     • TOTAL SHOULDER REPLACEMENT          PT Ortho     Row Name 22 1300       Subjective Comments    Subjective Comments pt " "states they got his cushion this weekend and it's doing goo. Wife reports she wants another one to keep in the house.  -KG       Precautions and Contraindications    Precautions CVA with R sided weakness, heart disease, arthritis  -KG    Contraindications Pacemaker/defibulator  -KG          User Key  (r) = Recorded By, (t) = Taken By, (c) = Cosigned By    Initials Name Provider Type    ANANT Sharon Montesinos, PT Physical Therapist                             PT Assessment/Plan     Row Name 12/28/22 1300          PT Assessment    Assessment Comments Did better on pro II this date and able to peddle continuously for 6 min; no c/o increased knee pain. At end of treatment, pt did have some increased R knee pain and noted it was quite sore. Rubbed biofreeze on knee which he states helped. Did talk to wife, if she has errands to run next visit, to try to get back a little earlier so we can work on bed mobility (sup>sit transfer) with her and the pt. Pt does use small rail at home to assist but wife reports she still has to \"pull him up\".  -KG        PT Plan    PT Frequency 2x/week  -KG     PT Plan Comments Work on sup>sit transfer assist with wife and more efficient ways to help. Continue working on BLE strengthening. Review & update HEP. PT will bring  -KG           User Key  (r) = Recorded By, (t) = Taken By, (c) = Cosigned By    Initials Name Provider Type    ANANT Sharon Montesinos, PT Physical Therapist                   OP Exercises     Row Name 12/28/22 1300             Subjective Comments    Subjective Comments pt states they got his cushion this weekend and it's doing goo. Wife reports she wants another one to keep in the house.  -KG         Subjective Pain    Able to rate subjective pain? yes  -KG      Pre-Treatment Pain Level 0  -KG      Post-Treatment Pain Level --  \"knee is hurting now\"  -KG         Exercise 1    Exercise Name 1 Pro II BLE/BUE for endurance/strength  -KG      Cueing 1 Verbal  -KG      Time 1 6 min " " -KG      Additional Comments L 2.0; foot strap off  -KG         Exercise 2    Exercise Name 2 Seated fredi hamstring stretch  -KG      Cueing 2 Verbal  -KG      Reps 2 1 ea  -KG      Time 2 30\" hold  -KG         Exercise 3    Exercise Name 3 Gastroc stretch with strap; foot on stool  -KG      Cueing 3 Verbal  -KG      Reps 3 1 ea LE  -KG      Time 3 30\" hold  -KG         Exercise 4    Exercise Name 4 Sidestepping at handrail  -KG      Cueing 4 Verbal  -KG      Sets 4 1  -KG      Reps 4 3 laps  -KG      Additional Comments Fredi HHA at rail  -KG         Exercise 5    Exercise Name 5 Seated in chair LAQ  -KG      Cueing 5 Verbal  -KG      Sets 5 1  -KG      Reps 5 10, 5  -KG      Additional Comments 1# AW; as tolerated on R  -KG         Exercise 6    Exercise Name 6 Seated in chair alt marching  -KG      Cueing 6 Verbal  -KG      Sets 6 2  -KG      Reps 6 10  -KG      Additional Comments 1# AW  -KG         Exercise 7    Exercise Name 7 Standing FA on airex pad  -KG      Cueing 7 Verbal;Tactile  -KG      Reps 7 2  -KG      Time 7 20\"  -KG      Additional Comments HHA at rail; CGA  -KG         Exercise 8    Exercise Name 8 Sit<>stands from elevated plinth  -KG      Cueing 8 Verbal;Tactile  -KG      Sets 8 2  -KG      Reps 8 5  -KG      Time 8 --  -KG         Exercise 9    Exercise Name 9 Bridges: supine with 2 pillows  -KG      Cueing 9 Verbal  -KG      Sets 9 1  -KG      Reps 9 10  -KG      Time 9 pause  -KG         Exercise 10    Exercise Name 10 Worked on sup>sit transfers  -KG      Cueing 10 Verbal;Tactile  -KG      Time 10 5 min  -KG         Exercise 11    Exercise Name 11 Gait from handrail to chair in treatment room beside compruter  -KG      Cueing 11 Verbal;Tactile  -KG      Additional Comments with RW; ~25 ft  -KG         Exercise 12    Exercise Name 12 Biofreeze on R knee  -KG            User Key  (r) = Recorded By, (t) = Taken By, (c) = Cosigned By    Initials Name Provider Type    Sharon Berg, PT " "Physical Therapist                              PT OP Goals     Row Name 12/28/22 1300          PT Short Term Goals    STG 1 Demonstrate independence/compliance in performance of initial HEP  -KG     STG 1 Progress Met  -KG     STG 2 Ambulate 75 ft with RW with CGA/SBA demonstrating improved LE strength/stability and improved step lenght/height  -KG     STG 2 Progress Partially Met;Progressing  -KG     STG 3 Perform static balance FA in parallel bars for 20\"-30\" with improved posture, no LOB, and improved LE control  -KG     STG 3 Progress Met  -KG     STG 4 Demonstrate improved bilateral hip flex/abd MMT to 4-/5  -KG     STG 4 Progress Progressing  -KG     STG 5 Perform sit<>stand transfer from transport chair with CGA demonstrating improved BLE/BUE functional strength  -KG     STG 5 Progress Progressing;Partially Met  Intermittently needs min assist  -KG        Long Term Goals    LTG Date to Achieve 01/18/23  -KG     LTG 1 Subjectively report 50% overall improvement or greater in gait/functional mobility & daily activity performance  -KG     LTG 1 Progress Progressing  -KG     LTG 2 Perform 1x10 sit<>stand tranfers from plinth or airex in chair with BUE assist demonstrate improved BLE functional strength & endurance  -KG     LTG 2 Progress Progressing  -KG     LTG 3 Ambulate 150 ft with RW safely demonstrate improved endurance/activity tolerance and gait kinematics  -KG     LTG 3 Progress Progressing  -KG     LTG 4 Demonstrate improved bilateral knee flex/ext MMT to 4+/5 or greater  -KG     LTG 4 Progress Progressing  -KG     LTG 5 Demonstrate imporved crystal hip flex/abd MMT to 4/5 or greater  -KG     LTG 5 Progress Progressing  -KG     LTG 6 Perform small obstacle course in // bars conisisting of bench step, sm hurldes, bench step, & compliant surface demonstrate improved dynamic balance & LE stability  -KG     LTG 6 Progress Progressing  -KG        Time Calculation    PT Goal Re-Cert Due Date 01/03/23  -KG        "    User Key  (r) = Recorded By, (t) = Taken By, (c) = Cosigned By    Initials Name Provider Type    KG Sharon Montesinos, PT Physical Therapist                Therapy Education  Given: HEP, Posture/body mechanics, Mobility training, Fall prevention and home safety  Program: Reinforced, Progressed  How Provided: Verbal, Demonstration  Provided to: Patient  Level of Understanding: Verbalized, Demonstrated, Teach back education performed              Time Calculation:   Start Time: 1302  Stop Time: 1353  Time Calculation (min): 51 min  Therapy Charges for Today     Code Description Service Date Service Provider Modifiers Qty    39358867133  PT THER PROC EA 15 MIN 12/28/2022 Sharon Montesinos, PT GP 2    [id number]  PT THERAPEUTIC ACT EA 15 MIN 12/28/2022 Sharon Montesinos, PT GP 1                    Sharon Montesinos, PT  12/29/2022

## 2023-01-03 ENCOUNTER — APPOINTMENT (OUTPATIENT)
Dept: PHYSICAL THERAPY | Facility: HOSPITAL | Age: 78
End: 2023-01-03
Payer: MEDICARE

## 2023-01-05 ENCOUNTER — HOSPITAL ENCOUNTER (OUTPATIENT)
Dept: PHYSICAL THERAPY | Facility: HOSPITAL | Age: 78
Setting detail: THERAPIES SERIES
Discharge: HOME | End: 2023-01-05
Payer: MEDICARE

## 2023-01-05 DIAGNOSIS — R53.83 FATIGUE, UNSPECIFIED TYPE: Primary | ICD-10-CM

## 2023-01-05 PROCEDURE — 97110 THERAPEUTIC EXERCISES: CPT

## 2023-01-05 PROCEDURE — 97530 THERAPEUTIC ACTIVITIES: CPT

## 2023-01-05 NOTE — THERAPY TREATMENT NOTE
Outpatient Physical Therapy Ortho Treatment Note  [facility]     Patient Name: Samy Velazquez  : 1945  MRN: [id number]  Today's Date: 2023      Visit Date: 2023    Attendance: 11 visits  Subjective improvement: \"a little\"  MD appt: prn  Recheck due: 1/3/23    Visit Dx:    ICD-10-CM ICD-9-CM   1. Fatigue, unspecified type  R53.83 780.79       Patient Active Problem List   Diagnosis   • Ischemic heart disease due to coronary artery obstruction (HCC)   • Osteoarthritis of multiple joints   • Cardiac pacemaker   • Gout of multiple sites   • Nephrolithiasis   • Mixed hyperlipidemia   • Morbidly obese ([facility])   • Hypertensive heart disease with heart failure (HCC)   • Major depressive disorder, recurrent, moderate (HCC)   • Chronic kidney disease, stage 4 (severe) (HCC)   • Ventricular tachycardia   • Chronic systolic heart failure (HCC)   • Hypothyroidism   • Paroxysmal atrial fibrillation (HCC)   • Coronary atherosclerosis   • Essential hypertension   • Weakness of right lower extremity        Past Medical History:   Diagnosis Date   • Arthritis    • Coronary artery disease    • Hyperlipidemia    • Hypertensive heart disease with heart failure (HCC) 3/29/2021   • Hypothyroidism 2021   • Kidney stone    • Major depressive disorder, recurrent, moderate (HCC) 3/29/2021   • Morbidly obese (HCC) 2020        Past Surgical History:   Procedure Laterality Date   • CARDIAC DEFIBRILLATOR PLACEMENT     • CARDIAC DEFIBRILLATOR PLACEMENT N/A    • CARDIAC SURGERY     • ENDOSCOPY N/A 10/30/2020    Procedure: ESOPHAGOGASTRODUODENOSCOPY WITH ANESTHESIA;  Surgeon: Brent Stanley MD;  Location: [facility];  Service: Gastroenterology;  Laterality: N/A;  pre dysphagia  post post op gastric surgery  dr jose manuel smith   • ESOPHAGUS SURGERY     • KNEE SURGERY     • TOTAL SHOULDER REPLACEMENT          PT Ortho     Row Name 23 1300       Subjective Comments    Subjective Comments Pt states he  continues to feel very tired and very weak; states \"I should not get as tired as I do.\"  -PM       Precautions and Contraindications    Precautions CVA with R sided weakness, heart disease, arthritis  -PM    Contraindications Pacemaker/defibulator  -PM       Subjective Pain    Able to rate subjective pain? yes  -PM    Pre-Treatment Pain Level 0  -PM    Post-Treatment Pain Level 2  -PM    Subjective Pain Comment Pt did complain of knee pain with gait  -PM          User Key  (r) = Recorded By, (t) = Taken By, (c) = Cosigned By    Initials Name Provider Type    Kristin Calhoun PTA Physical Therapist Assistant                             PT Assessment/Plan     Row Name 01/05/23 1300          PT Assessment    Assessment Comments Pt inconsistent on sit<>stands today as regards assist and cues needed for improving technique and ease. Worked on bed mobility training especially supine <> sit which required cues but only very min A if cued pt technique. Wife present and viewed training then did one transfer with patient - she tended to give too much assist but did follow instructions on how to best help pt. Note he uses overhead for scooting in tbe bed - scoots lower body very well but definited would benefit from using OH trap for shifting upper body. Initially in appt min to mod asst for sit to stand but thhis decreased into treatment with incr cues.  -PM        PT Plan    PT Frequency 2x/week  -PM     PT Plan Comments cont to reinforce proper transfers and transitions; cont gait/endurance training and // bars as kenan  -PM           User Key  (r) = Recorded By, (t) = Taken By, (c) = Cosigned By    Initials Name Provider Type    Kristin Calhoun PTA Physical Therapist Assistant                   OP Exercises     Row Name 01/05/23 1300             Subjective Comments    Subjective Comments Pt states he continues to feel very tired and very weak; states \"I should not get as tired as I do.\"  -PM         Subjective  Pain    Able to rate subjective pain? yes  -PM      Pre-Treatment Pain Level 0  -PM      Post-Treatment Pain Level 2  -PM      Subjective Pain Comment Pt did complain of knee pain with gait  -PM         Exercise 1    Exercise Name 1 Pro II LE  -PM      Time 1 5 min  -PM      Additional Comments no resistance  -PM         Exercise 2    Exercise Name 2 side step at HR B HHA  -PM      Reps 2 2  -PM      Time 2 laps  -PM      Additional Comments // bars  -PM         Exercise 3    Exercise Name 3 seated LAQ  -PM      Sets 3 2  -PM      Reps 3 10  -PM      Additional Comments 1# AW  -PM         Exercise 4    Exercise Name 4 standing march  -PM      Sets 4 2  -PM      Reps 4 10  -PM      Additional Comments 1# AW  -PM         Exercise 5    Exercise Name 5 seated hip abd w/TB  -PM      Sets 5 2  -PM      Reps 5 10  -PM      Additional Comments green tb  -PM         Exercise 6    Exercise Name 6 // bars FF and retro walking  -PM      Cueing 6 Tactile  -PM      Reps 6 2 laps  -PM         Exercise 7    Exercise Name 7 gait in RW with WC pushed behind pt  -PM      Additional Comments ~80 ft; ~50-60 ft at end of Rx  -PM         Exercise 8    Exercise Name 8 bed mobility training on mat table  -PM      Cueing 8 Verbal;Tactile  -PM      Reps 8 4x sup<>sit  -PM      Additional Comments worked on rolling, scooting, and sit<>supine  -PM         Exercise 9    Exercise Name 9 HL bridges  -PM      Cueing 9 Verbal  -PM      Sets 9 2  -PM      Reps 9 10  -PM         Exercise 10    Exercise Name 10 SAQ review  -PM      Cueing 10 Verbal  -PM      Sets 10 1  -PM      Reps 10 10  -PM         Exercise 11    Exercise Name 11 transfer sit<>std throughout treatment  -PM            User Key  (r) = Recorded By, (t) = Taken By, (c) = Cosigned By    Initials Name Provider Type    PM Kristin Dillard, ZANA Physical Therapist Assistant                              PT OP Goals     Row Name 01/05/23 1300          PT Short Term Goals    STG 1  Demonstrate independence/compliance in performance of initial HEP  -PM     STG 1 Progress Met  -PM     STG 2 Ambulate 75 ft with RW with CGA/SBA demonstrating improved LE strength/stability and improved step lenght/height  -PM     STG 2 Progress Partially Met;Progressing  -PM     STG 3 Perform static balance FA in parallel bars for 20\"-30\" with improved posture, no LOB, and improved LE control  -PM     STG 3 Progress Met  -PM     STG 4 Demonstrate improved bilateral hip flex/abd MMT to 4-/5  -PM     STG 4 Progress Progressing  -PM     STG 5 Perform sit<>stand transfer from transport chair with CGA demonstrating improved BLE/BUE functional strength  -PM     STG 5 Progress Progressing;Partially Met  Intermittently needs min assist  -PM        Long Term Goals    LTG Date to Achieve 01/18/23  -PM     LTG 1 Subjectively report 50% overall improvement or greater in gait/functional mobility & daily activity performance  -PM     LTG 1 Progress Progressing  -PM     LTG 2 Perform 1x10 sit<>stand tranfers from plinth or airex in chair with BUE assist demonstrate improved BLE functional strength & endurance  -PM     LTG 2 Progress Progressing  -PM     LTG 3 Ambulate 150 ft with RW safely demonstrate improved endurance/activity tolerance and gait kinematics  -PM     LTG 3 Progress Progressing  -PM     LTG 4 Demonstrate improved bilateral knee flex/ext MMT to 4+/5 or greater  -PM     LTG 4 Progress Progressing  -PM     LTG 5 Demonstrate imporved crystal hip flex/abd MMT to 4/5 or greater  -PM     LTG 5 Progress Progressing  -PM     LTG 6 Perform small obstacle course in // bars conisisting of bench step, sm hurldes, bench step, & compliant surface demonstrate improved dynamic balance & LE stability  -PM     LTG 6 Progress Progressing  -PM        Time Calculation    PT Goal Re-Cert Due Date 01/03/23  -PM           User Key  (r) = Recorded By, (t) = Taken By, (c) = Cosigned By    Initials Name Provider Type    Kristin Calhoun,  PTA Physical Therapist Assistant                Therapy Education  Education Details: how to correctly assist pt  Given: HEP, Posture/body mechanics, Mobility training, Fall prevention and home safety  Program: Reinforced  How Provided: Verbal, Demonstration  Provided to: Patient  Level of Understanding: Verbalized, Demonstrated, Teach back education performed              Time Calculation:   Start Time: 1303  Stop Time: 1407  Time Calculation (min): 64 min  Therapy Charges for Today     Code Description Service Date Service Provider Modifiers Qty    62526849109 HC PT THER PROC EA 15 MIN 1/5/2023 Kristin Dillard, ZANA GP, CQ 2    [id number] HC PT THERAPEUTIC ACT EA 15 MIN 1/5/2023 Kristin Dillard PTA GP, CQ 2                    Kristin Dillard PTA  1/5/2023

## 2023-01-09 ENCOUNTER — OFFICE VISIT (OUTPATIENT)
Dept: NEUROLOGY | Facility: CLINIC | Age: 78
End: 2023-01-09
Payer: MEDICARE

## 2023-01-09 VITALS
HEIGHT: 66 IN | BODY MASS INDEX: 35.03 KG/M2 | SYSTOLIC BLOOD PRESSURE: 126 MMHG | HEART RATE: 71 BPM | DIASTOLIC BLOOD PRESSURE: 70 MMHG | WEIGHT: 218 LBS

## 2023-01-09 DIAGNOSIS — I69.30 CHRONIC ARTERIAL ISCHEMIC STROKE: Primary | ICD-10-CM

## 2023-01-09 DIAGNOSIS — G47.33 OSA (OBSTRUCTIVE SLEEP APNEA): ICD-10-CM

## 2023-01-09 DIAGNOSIS — I69.398 GAIT DISTURBANCE, POST-STROKE: ICD-10-CM

## 2023-01-09 DIAGNOSIS — R26.9 GAIT DISTURBANCE, POST-STROKE: ICD-10-CM

## 2023-01-09 PROCEDURE — 99213 OFFICE O/P EST LOW 20 MIN: CPT | Performed by: PHYSICIAN ASSISTANT

## 2023-01-09 RX ORDER — CLOPIDOGREL BISULFATE 75 MG/1
75 TABLET ORAL DAILY
COMMUNITY
Start: 2023-01-08 | End: 2023-01-09

## 2023-01-09 NOTE — PROGRESS NOTES
Subjective   Samy Velazquez is a 77 y.o. male who presents as a follow-up for left basal ganglia stroke.    History of Present Illness     The patient is accompanied by his wife today.    Left basal ganglia stroke  The patient states that he is doing well. He states that he is having trouble with his legs bilaterally. He states that his arms work well. He states that he is tolerating his medications well. His wife states that he has been having trouble swallowing since his stroke. She states that some days he will be okay and the next day he will be okay. She states that he is not walking much and that he is going to therapy twice a week. He states that he does well in therapy. The patients wife notes he has had some recent falls but nothing serious. He states that he does not need a refill on his medications today.    Sleep apnea  He states that he does not treat his sleep apnea anymore. His wife states that he was having a lot of problems at home and she is not sure whether he would remember to take the CPAP off. She states that he has to get up and urinate a lot. She states that at the time they had it scheduled, they ended up having to go to the hospital.    The following portions of the patient's history were reviewed and updated as appropriate: allergies, current medications, past family history, past medical history, past social history, past surgical history and problem list.    The following portions of the patient's history were reviewed and updated as appropriate: allergies, current medications, past family history, past medical history, past social history, past surgical history and problem list.    Review of Systems:  A review of systems was performed, and positive findings are noted in the HPI.      Current Outpatient Medications:   •  acetaminophen (TYLENOL) 650 MG 8 hr tablet, Take 650 mg by mouth 2 (Two) Times a Day., Disp: , Rfl:   •  allopurinol (ZYLOPRIM) 100 MG tablet, TAKE 1 TABLET BY MOUTH EVERY  DAY (Patient taking differently: Take 100 mg by mouth Daily.), Disp: 90 tablet, Rfl: 1  •  aluminum-magnesium hydroxide-simethicone (MAALOX MAX) 400-400-40 MG/5ML suspension, Take 15 mL by mouth 3 (Three) Times a Day., Disp: , Rfl:   •  aspirin 81 MG EC tablet, Take 1 tablet by mouth Daily., Disp: , Rfl:   •  atorvastatin (LIPITOR) 40 MG tablet, Take 40 mg by mouth Every Night., Disp: , Rfl:   •  buPROPion XL (WELLBUTRIN XL) 150 MG 24 hr tablet, TAKE 1 TABLET BY MOUTH EVERY DAY IN THE MORNING (Patient taking differently: Take 150 mg by mouth Every Morning.), Disp: 90 tablet, Rfl: 3  •  Cholecalciferol (VITAMIN D3) 2000 units capsule, Take 2,000 Units by mouth Daily., Disp: , Rfl:   •  cholestyramine (Questran) 4 g packet, Take 1 packet by mouth 3 (Three) Times a Day With Meals., Disp: 15 packet, Rfl: 0  •  diphenhydrAMINE 12.5 MG/5ML elixir 20 mL, aluminum-magnesium hydroxide-simethicone 400-400-40 MG/5ML suspension 20 mL, Lidocaine Viscous HCl 2 % solution 20 mL, Swish and spit Every 4 (Four) Hours As Needed., Disp: , Rfl:   •  docusate sodium (COLACE) 100 MG capsule, Take 100 mg by mouth Daily., Disp: , Rfl:   •  Eliquis 2.5 MG tablet tablet, TAKE 1 TABLET BY MOUTH EVERY 12 HOURS (Patient taking differently: Take 2.5 mg by mouth Every 12 (Twelve) Hours.), Disp: 60 tablet, Rfl: 3  •  famotidine (PEPCID) 20 MG tablet, Take 20 mg by mouth 2 (Two) Times a Day., Disp: , Rfl:   •  fluticasone (FLONASE) 50 MCG/ACT nasal spray, 2 sprays by Each Nare route Daily., Disp: , Rfl:   •  gabapentin (NEURONTIN) 100 MG capsule, Take 100 mg by mouth Daily., Disp: , Rfl:   •  gabapentin (NEURONTIN) 300 MG capsule, 1 in the afternoon for pain (Patient taking differently: Take 300 mg by mouth Every Night. 1 in the afternoon for pain), Disp: 90 capsule, Rfl: 3  •  HYDROcodone-acetaminophen (NORCO) 5-325 MG per tablet, Take 1 tablet by mouth Every 8 (Eight) Hours As Needed for Moderate Pain., Disp: 60 tablet, Rfl: 0  •  hydrocortisone  2.5 % lotion, APPLY TO AFFECTED AREA 3 TIMES A DAY (Patient taking differently: Apply  topically to the appropriate area as directed As Needed.), Disp: 118 mL, Rfl: 3  •  ipratropium (ATROVENT) 0.03 % nasal spray, 1 spray into the nostril(s) as directed by provider Every 12 (Twelve) Hours., Disp: , Rfl:   •  isosorbide mononitrate (IMDUR) 30 MG 24 hr tablet, Take 1 tablet by mouth Daily. (Patient taking differently: Take 60 mg by mouth Daily.), Disp: , Rfl:   •  levothyroxine (SYNTHROID, LEVOTHROID) 50 MCG tablet, Take 50 mcg by mouth Daily., Disp: , Rfl:   •  loperamide (IMODIUM A-D) 1 MG/7.5ML suspension, Imodium A-D 1 mg/7.5 mL oral liquid  Take by oral route., Disp: , Rfl:   •  magnesium oxide (MAG-OX) 400 MG tablet, Take 400 mg by mouth Daily., Disp: , Rfl:   •  Magnesium Oxide 400 (240 Mg) MG tablet, Take  by mouth Daily., Disp: , Rfl:   •  memantine (NAMENDA) 10 MG tablet, Take 1 tablet by mouth Daily., Disp: , Rfl:   •  metoclopramide (REGLAN) 10 MG tablet, Take  by mouth 4 (Four) Times a Day Before Meals & at Bedtime., Disp: , Rfl:   •  metOLazone (ZAROXOLYN) 2.5 MG tablet, Take 2.5 mg by mouth Daily As Needed., Disp: , Rfl:   •  metoprolol succinate XL (TOPROL-XL) 25 MG 24 hr tablet, Take 12.5 mg by mouth Daily., Disp: , Rfl:   •  mexiletine (MEXITIL) 150 MG capsule, Take 1 capsule by mouth 2 (Two) Times a Day., Disp: , Rfl:   •  nitroglycerin (NITROSTAT) 0.4 MG SL tablet, Place 1 tablet under the tongue Every 5 (Five) Minutes As Needed for Chest Pain. Take no more than 3 doses in 15 minutes., Disp: 25 tablet, Rfl: 2  •  pantoprazole (PROTONIX) 40 MG EC tablet, TAKE 1 TABLET BY MOUTH EVERY DAY (Patient taking differently: Take 40 mg by mouth Daily.), Disp: 90 tablet, Rfl: 1  •  polyethyl glycol-propyl glycol (SYSTANE) 0.4-0.3 % solution ophthalmic solution (artificial tears), Administer 1 drop to both eyes Every 1 (One) Hour As Needed., Disp: , Rfl:   •  tamsulosin (FLOMAX) 0.4 MG capsule 24 hr capsule,  TAKE 1 CAPSULE BY MOUTH EVERY DAY (Patient taking differently: Take 1 capsule by mouth Daily.), Disp: 90 capsule, Rfl: 1     Objective   Physical Exam  Vitals and nursing note reviewed.   HENT:      Head: Normocephalic.      Right Ear: Hearing and external ear normal.      Left Ear: Hearing and external ear normal.      Nose: Nose normal.      Mouth/Throat:      Pharynx: Oropharynx is clear.   Eyes:      General: Lids are normal. Vision grossly intact. Gaze aligned appropriately. No scleral icterus.     Extraocular Movements: Extraocular movements intact.      Conjunctiva/sclera: Conjunctivae normal.      Pupils: Pupils are equal, round, and reactive to light.      Visual Fields: Right eye visual fields normal and left eye visual fields normal.   Neck:      Vascular: No carotid bruit or JVD.      Trachea: Trachea and phonation normal.   Cardiovascular:      Rate and Rhythm: Normal rate.      Heart sounds: Normal heart sounds.   Pulmonary:      Effort: Pulmonary effort is normal.      Breath sounds: Normal breath sounds.   Musculoskeletal:      Comments: The patient is in a wheelchair. He is very limited in vocalizations and appears quite cachectic. He does have movement in all four extremities, just very weak overall.   Skin:     General: Skin is warm and dry.   Neurological:      Mental Status: He is alert and oriented to person, place, and time.      GCS: GCS eye subscore is 4. GCS verbal subscore is 5. GCS motor subscore is 6.      Sensory: Sensation is intact.      Motor: Motor function is intact.      Coordination: Coordination is intact.      Gait: Gait is intact.      Deep Tendon Reflexes: Reflexes are normal and symmetric.   Psychiatric:         Attention and Perception: Attention and perception normal.         Mood and Affect: Mood and affect normal.         Speech: Speech normal.         Behavior: Behavior normal.         Thought Content: Thought content normal.         Cognition and Memory: Cognition and  memory normal.         Judgment: Judgment normal.           Assessment & Plan   Diagnoses and all orders for this visit:    1. Chronic arterial ischemic stroke (Primary)    2. Gait disturbance, post-stroke    3. JEWEL (obstructive sleep apnea)        1. Left basal ganglia stroke  - I reviewed medications with the patient and his wife and do not recommend any changes at the present time. Dr. Salinas is filling all of his medications presently. We will see him back in 6 months.             Transcribed from ambient dictation for ADI Preston by Georgia Peterson.  01/09/23   14:39 CST    Patient or patient representative verbalized consent to the visit recording.  I have personally performed the services described in this document as transcribed by the above individual, and it is both accurate and complete.

## 2023-01-10 ENCOUNTER — HOSPITAL ENCOUNTER (OUTPATIENT)
Dept: PHYSICAL THERAPY | Facility: HOSPITAL | Age: 78
Setting detail: THERAPIES SERIES
Discharge: HOME | End: 2023-01-10
Payer: MEDICARE

## 2023-01-10 DIAGNOSIS — R53.83 FATIGUE, UNSPECIFIED TYPE: Primary | ICD-10-CM

## 2023-01-10 PROCEDURE — 97110 THERAPEUTIC EXERCISES: CPT

## 2023-01-10 PROCEDURE — 97530 THERAPEUTIC ACTIVITIES: CPT

## 2023-01-10 NOTE — THERAPY TREATMENT NOTE
"    Outpatient Physical Therapy Ortho Treatment Note  [facility]     Patient Name: Samy Velazquez  : 1945  MRN: [id number]  Today's Date: 1/10/2023      Visit Date: 01/10/2023    Attendance: 12 visits  Subjective improvement: \"don't know - its a bad day\"  MD appt: prn  Recheck due: 1/3/23    Visit Dx:    ICD-10-CM ICD-9-CM   1. Fatigue, unspecified type  R53.83 780.79       Patient Active Problem List   Diagnosis   • Ischemic heart disease due to coronary artery obstruction (HCC)   • Osteoarthritis of multiple joints   • Cardiac pacemaker   • Gout of multiple sites   • Nephrolithiasis   • Mixed hyperlipidemia   • Morbidly obese ([facility])   • Hypertensive heart disease with heart failure (HCC)   • Major depressive disorder, recurrent, moderate (HCC)   • Chronic kidney disease, stage 4 (severe) ([facility])   • Ventricular tachycardia   • Chronic systolic heart failure ([facility])   • Hypothyroidism   • Paroxysmal atrial fibrillation (HCC)   • Coronary atherosclerosis   • Essential hypertension   • Weakness of right lower extremity        Past Medical History:   Diagnosis Date   • Arthritis    • Coronary artery disease    • Hyperlipidemia    • Hypertensive heart disease with heart failure (HCC) 3/29/2021   • Hypothyroidism 2021   • Kidney stone    • Major depressive disorder, recurrent, moderate (HCC) 3/29/2021   • Morbidly obese (HCC) 2020        Past Surgical History:   Procedure Laterality Date   • CARDIAC DEFIBRILLATOR PLACEMENT     • CARDIAC DEFIBRILLATOR PLACEMENT N/A    • CARDIAC SURGERY     • ENDOSCOPY N/A 10/30/2020    Procedure: ESOPHAGOGASTRODUODENOSCOPY WITH ANESTHESIA;  Surgeon: Brent Stanley MD;  Location: [facility];  Service: Gastroenterology;  Laterality: N/A;  pre dysphagia  post post op gastric surgery  dr jose manuel smith   • ESOPHAGUS SURGERY     • KNEE SURGERY     • TOTAL SHOULDER REPLACEMENT          PT Ortho     Row Name 01/10/23 1400       Precautions and Contraindications    " "Precautions CVA with R sided weakness, heart disease, arthritis  -PM    Contraindications Pacemaker/defibulator  -PM       Subjective Pain    Able to rate subjective pain? yes  -PM       Posture/Observations    Posture/Observations Comments rounded back  -PM       Transfers    Comment, (Transfers) sit<>stand and sit<>supine both Min A and moderate cues  -PM          User Key  (r) = Recorded By, (t) = Taken By, (c) = Cosigned By    Initials Name Provider Type    PM Kristin Dillard PTA Physical Therapist Assistant                             PT Assessment/Plan     Row Name 01/10/23 1400          PT Plan    PT Frequency 2x/week  -PM           User Key  (r) = Recorded By, (t) = Taken By, (c) = Cosigned By    Initials Name Provider Type    PM Kristin Dillard PTA Physical Therapist Assistant                   OP Exercises     Row Name 01/10/23 1400             Subjective Comments    Subjective Comments Pt states \"its been a rough couple of days: 2 appts in [place] yesterday.\" Pt and wife state that he had a hard time walking this a.m. due to both knees weak and painful.  -PM         Subjective Pain    Able to rate subjective pain? yes  -PM      Pre-Treatment Pain Level 8  -PM      Post-Treatment Pain Level 6  -PM         Exercise 1    Exercise Name 1 F/B gait in // bars  -PM      Reps 1 3 laps  -PM         Exercise 2    Exercise Name 2 seated HS S  -PM      Reps 2 1  -PM      Time 2 30\" ea leg  -PM         Exercise 3    Exercise Name 3 seated calf S with strap  -PM      Cueing 3 Verbal;Tactile  -PM      Reps 3 1  -PM      Time 3 30\"  -PM         Exercise 4    Exercise Name 4 repetitive sit to stand chair and airex pad  -PM      Cueing 4 Verbal  -PM      Sets 4 2  -PM      Reps 4 5  -PM         Exercise 5    Exercise Name 5 seated add squeezes  -PM      Cueing 5 Verbal  -PM      Sets 5 2  -PM      Reps 5 10  -PM      Time 5 3-5\"  -PM         Exercise 6    Exercise Name 6 t/f wheelchair/mat table with CGA and util " "walker  -PM      Cueing 6 Tactile  -PM         Exercise 7    Exercise Name 7 low level bridge  -PM      Sets 7 2  -PM      Reps 7 10  -PM      Time 7 pause  -PM         Exercise 8    Exercise Name 8 HL manual resisted clam/abd  -PM      Sets 8 2  -PM      Reps 8 10  -PM         Exercise 9    Exercise Name 9 ball rolls hip and knee flex performed unilaterally  -PM      Cueing 9 Verbal  -PM      Sets 9 1  -PM      Reps 9 10 ea  -PM         Exercise 10    Exercise Name 10 SAQ  -PM      Cueing 10 Verbal;Tactile  -PM      Sets 10 1  -PM      Reps 10 10; 5  -PM         Exercise 11    Exercise Name 11 transfer supine<>sit  -PM      Cueing 11 Verbal;Tactile  -PM      Reps 11 1  -PM      Additional Comments CGA-min  -PM         Exercise 12    Exercise Name 12 sit <>std during treatment  -PM      Cueing 12 Verbal;Tactile  -PM      Reps 12 4-5 during treatment  -PM      Additional Comments repetitive with SBA and cues  -PM            User Key  (r) = Recorded By, (t) = Taken By, (c) = Cosigned By    Initials Name Provider Type    PM Kristin Dillard PTA Physical Therapist Assistant                      01/10/23 1400   PT Short Term Goals   STG 1 Demonstrate independence/compliance in performance of initial HEP   STG 1 Progress Met   STG 2 Ambulate 75 ft with RW with CGA/SBA demonstrating improved LE strength/stability and improved step lenght/height   STG 2 Progress Partially Met;Progressing   STG 3 Perform static balance FA in parallel bars for 20\"-30\" with improved posture, no LOB, and improved LE control   STG 3 Progress Met   STG 4 Demonstrate improved bilateral hip flex/abd MMT to 4-/5   STG 4 Progress Progressing   STG 5 Perform sit<>stand transfer from transport chair with CGA demonstrating improved BLE/BUE functional strength   STG 5 Progress Progressing;Partially Met  (Intermittently needs min assist)   Long Term Goals   LTG Date to Achieve 01/18/23   LTG 1 Subjectively report 50% overall improvement or greater in " gait/functional mobility & daily activity performance   LTG 1 Progress Progressing   LTG 2 Perform 1x10 sit<>stand tranfers from plinth or airex in chair with BUE assist demonstrate improved BLE functional strength & endurance   LTG 2 Progress Progressing   LTG 3 Ambulate 150 ft with RW safely demonstrate improved endurance/activity tolerance and gait kinematics   LTG 3 Progress Progressing   LTG 4 Demonstrate improved bilateral knee flex/ext MMT to 4+/5 or greater   LTG 4 Progress Progressing   LTG 5 Demonstrate imporved crystal hip flex/abd MMT to 4/5 or greater   LTG 5 Progress Progressing   LTG 6 Perform small obstacle course in // bars conisisting of bench step, sm hurldes, bench step, & compliant surface demonstrate improved dynamic balance & LE stability   LTG 6 Progress Progressing   Time Calculation   PT Goal Re-Cert Due Date 01/03/23           01/10/23 1400   PT Assessment   Assessment Comments Pt presented very lethargic and in pain as he had had a long day yesterday at [place] of town [place] apt[place]. Pt not up to doing as much gait and balance training but was able to work on seated and mat strengthening fairly well although still tired quickly. Good effort and participation.   PT Plan   PT Frequency 2x/week   PT Plan Comments PT telma nxt visit and as advised                 PT OP Goals     Row Name 01/10/23 1400          Time Calculation    PT Goal Re-Cert Due Date 01/03/23  -PM           User Key  (r) = Recorded By, (t) = Taken By, (c) = Cosigned By    Initials Name Provider Type    Kristin Calhoun, ZANA Physical Therapist Assistant                Therapy Education  Given: HEP, Posture/body mechanics, Mobility training, Fall prevention and home safety  Program: Reinforced  How Provided: Verbal, Demonstration  Provided to: Patient  Level of Understanding: Verbalized, Demonstrated, Teach back education performed              Time Calculation:   Start Time: 1401  Stop Time: 1503  Time Calculation (min): 62  min  Therapy Charges for Today     Code Description Service Date Service Provider Modifiers Qty    71314206040 HC PT THER PROC EA 15 MIN 1/10/2023 Kristin Dillard, ZANA GP, CQ 3    59651471321 HC PT THERAPEUTIC ACT EA 15 MIN 1/10/2023 Kristin Dillard PTA GP, CQ 1                    Kristin Dillard PTA  1/10/2023

## 2023-01-12 ENCOUNTER — HOSPITAL ENCOUNTER (OUTPATIENT)
Dept: PHYSICAL THERAPY | Facility: HOSPITAL | Age: 78
Setting detail: THERAPIES SERIES
Discharge: HOME | End: 2023-01-12
Payer: MEDICARE

## 2023-01-12 DIAGNOSIS — R53.83 FATIGUE, UNSPECIFIED TYPE: Primary | ICD-10-CM

## 2023-01-12 PROCEDURE — 97530 THERAPEUTIC ACTIVITIES: CPT | Performed by: PHYSICAL THERAPIST

## 2023-01-12 PROCEDURE — 97110 THERAPEUTIC EXERCISES: CPT | Performed by: PHYSICAL THERAPIST

## 2023-01-13 NOTE — THERAPY PROGRESS REPORT/RE-CERT
"    Outpatient Physical Therapy Ortho Progress Note  [facility]     Patient Name: Samy Velazquez  : 1945  MRN: [id number]  Today's Date: 2023      Visit Date: 2023     Attendance: 13 visits  Subjective improvement: \"hard to say but I know things are better, just real slow\"  MD appt: prn  Recheck due: 23    Visit Dx:    ICD-10-CM ICD-9-CM   1. Fatigue, unspecified type  R53.83 780.79       Patient Active Problem List   Diagnosis   • Ischemic heart disease due to coronary artery obstruction (HCC)   • Osteoarthritis of multiple joints   • Cardiac pacemaker   • Gout of multiple sites   • Nephrolithiasis   • Mixed hyperlipidemia   • Morbidly obese ([facility])   • Hypertensive heart disease with heart failure (HCC)   • Major depressive disorder, recurrent, moderate (HCC)   • Chronic kidney disease, stage 4 (severe) ([facility])   • Ventricular tachycardia   • Chronic systolic heart failure ([facility])   • Hypothyroidism   • Paroxysmal atrial fibrillation ([facility])   • Coronary atherosclerosis   • Essential hypertension   • Weakness of right lower extremity        Past Medical History:   Diagnosis Date   • Arthritis    • Coronary artery disease    • Hyperlipidemia    • Hypertensive heart disease with heart failure (HCC) 3/29/2021   • Hypothyroidism 2021   • Kidney stone    • Major depressive disorder, recurrent, moderate (HCC) 3/29/2021   • Morbidly obese (HCC) 2020        Past Surgical History:   Procedure Laterality Date   • CARDIAC DEFIBRILLATOR PLACEMENT     • CARDIAC DEFIBRILLATOR PLACEMENT N/A    • CARDIAC SURGERY     • ENDOSCOPY N/A 10/30/2020    Procedure: ESOPHAGOGASTRODUODENOSCOPY WITH ANESTHESIA;  Surgeon: Brent Stanley MD;  Location: [facility];  Service: Gastroenterology;  Laterality: N/A;  pre dysphagia  post post op gastric surgery  dr jose manuel smith   • ESOPHAGUS SURGERY     • KNEE SURGERY     • TOTAL SHOULDER REPLACEMENT                     PT Ortho     Row Name 23 1400       " "Subjective Comments    Subjective Comments Pt states he did go to [place] yesterday for a MD appt but not as tired as was at last treatment. States his wife didn't want him to come that day but states he's glad he did because he felt better when he left. Still doing his exercises. States he doesn't feel like they're getting any easier. States he thinks therapy is helping but things are \"just real slow\".  -KG       Precautions and Contraindications    Precautions CVA with R sided weakness, heart disease, arthritis  -KG    Contraindications Pacemaker/defibulator  -KG       Posture/Observations    Posture/Observations Comments Presents in transport chair. Decreased/poor overall postural awareness with rounded shoulders/back. Slumped when seated. Knee varus noted bilaterally R>L. Pt overall speaking louder more frequently during treatment. Knee sleeves donned bilaterally. Did put new hinged brace on R knee at start of treatment.  -KG       General ROM    GENERAL ROM COMMENTS Bilateral shoulder flex/abd limited to ~100 deg. Elbow/wrist AROM WFL. Bilateral hip, knee, ankle AROM WFL but weakness present. Decreased knee ext on R vs L.  -KG       MMT Right Upper Ext    Rt Shoulder Flexion MMT, Gross Movement (3+/5) fair plus  -KG    Rt Shoulder ABduction MMT, Gross Movement (3+/5) fair plus  -KG    Rt Elbow Flexion MMT, Gross Movement: (4+/5) good plus  -KG    Rt Elbow Extension MMT, Gross Movement: (4/5) good  -KG       MMT Left Upper Ext    Lt Shoulder Flexion MMT, Gross Movement (3+/5) fair plus  -KG    Lt Shoulder ABduction MMT, Gross Movement (3+/5) fair plus  -KG    Lt Elbow Flexion MMT, Gross Movement (4+/5) good plus  -KG    Lt Elbow Extension MMT, Gross Movement (4/5) good  -KG       MMT Right Lower Ext    Rt Hip Flexion MMT, Gross Movement (4-/5) good minus  -KG    Rt Hip ABduction MMT, Gross Movement (4-/5) good minus  -KG    Rt Hip ADduction MMT, Gross Movement (4/5) good  -KG    Rt Knee Extension MMT, Gross " "Movement (4-/5) good minus  -KG    Rt Knee Flexion MMT, Gross Movement (4/5) good  -KG    Rt Ankle Plantarflexion MMT, Gross Movement (4+/5) good plus  -KG    Rt Ankle Dorsiflexion MMT, Gross Movement (4+/5) good plus  -KG    Rt Lower Extremity Comments  MMT performed seated  -KG       MMT Left Lower Ext    Lt Hip Flexion MMT, Gross Movement (4-/5) good minus  -KG    Lt Hip ABduction MMT, Gross Movement (3+/5) fair plus  -KG    Lt Hip ADduction MMT, Gross Movement (4/5) good  -KG    Lt Knee Extension MMT, Gross Movement (4/5) good  -KG    Lt Knee Flexion MMT, Gross Movement (4/5) good  -KG    Lt Ankle Plantarflexion MMT, Gross Movement (4+/5) good plus  -KG    Lt Ankle Dorsiflexion MMT, Gross Movement (4+/5) good plus  -KG    Lt Lower Extremity Comments  MMT performed seated  -KG       Sensation    Sensation WNL? WFL  -KG    Light Touch No apparent deficits  -KG       Lower Extremity Flexibility    Hamstrings Bilateral:;Moderately limited  -KG    Hip Flexors Bilateral:;Moderately limited  -KG    Gastrocnemius Bilateral:;Moderately limited  -KG       Balance Skills Training    Balance Comments Static balance: able to stand ~30\" x1 in RW wtih hands hovering above handles with FA. improving strength at BLE's and able to stand more uprgiht with balance and gait activities  -KG       Transfers    Comment, (Transfers) Sit<>stand min assist with cues. Sit<>supine min assist with cues.  -KG       Gait/Stairs (Locomotion)    Comment, (Gait/Stairs) Pt ambulated with RW for short distance this date. Pt notes R knee felt better with knee brace on. In parallel bars, pt wtih improved step length, needs cues for step height. Doing better at keeping upright posture but still fwd flexed.  -KG          User Key  (r) = Recorded By, (t) = Taken By, (c) = Cosigned By    Initials Name Provider Type    KG Sharon Montesinos, PT Physical Therapist                       PT Assessment/Plan     Row Name 01/12/23 1400          PT Assessment    " Functional Limitations Decreased safety during functional activities;Impaired gait;Impaired locomotion;Limitation in home management;Limitations in community activities;Limitations in functional capacity and performance;Performance in leisure activities;Performance in self-care ADL  -KG     Impairments Balance;Gait;Impaired flexibility;Impaired muscle endurance;Locomotion;Muscle strength;Pain;Poor body mechanics;Posture;Range of motion  -KG     Assessment Comments Pt continues to progress quite slowly with PT but is showing mild improvements. Pt was given hinged kneed brace this date to help with overall R knee stabiltiy & pain levels with weightbearing. Pt noted the brace did help, as reports knee felt better with it on. Did educate both pt and wife on brace wear and fitting. Pt overall not as fatigued this date from previous visit. Pt works hard and gives great effort during treatment. Pt & wife both report doing better wtih sup<>sit transfers at home. Requires less assist for sit>stand transfers towards end of treatment with cues. Pt with greater quad weakness on R and hip/glute weakness on L. Due to the chornicity of pt's issues, pt is progressing slowly. Still needs work on functional strength, functional mobility and gait performance, balance, & functional activity tolerance and will benefit from skilled PT services.  -KG     Rehab Potential Fair  -KG     Patient/caregiver participated in establishment of treatment plan and goals Yes  -KG     Patient would benefit from skilled therapy intervention Yes  -KG        PT Plan    PT Frequency 2x/week  -KG     Predicted Duration of Therapy Intervention (PT) 8-10 visits  -KG     PT Plan Comments Follow up on brace wear.and if has been beneficial. Continue working on gait activities in // bars and overall BLE strength. Continue reinforcing transfer performance & efficiency. Potentially work towards d/c over the next 10 visits.  -KG           User Key  (r) = Recorded  "By, (t) = Taken By, (c) = Cosigned By    Initials Name Provider Type    KG Sharon Montesinos, PT Physical Therapist                   OP Exercises     Row Name 01/12/23 1400            Subjective Pain     Able to rate subjective pain? yes  -KG      Pre-Treatment Pain Level 6  -KG      Post-Treatment Pain Level 5  -KG          Exercise 1     Exercise Name 1 Brace fitting & education  -KG          Exercise 2     Exercise Name 2 // bars: Fwd/bwd gait  -KG      Cueing 2 Verbal  -KG      Sets 2 1  -KG      Reps 2 4 laps  -KG          Exercise 3     Exercise Name 3 seated HS S  -KG      Reps 3 1 ea LE  -KG      Time 3 30\" hold  -KG          Exercise 4     Exercise Name 4 // bars: standing marching  -KG      Cueing 4 Verbal  -KG      Sets 4 2  -KG      Reps 4 10  -KG      Additional Comments 1# AW  -KG          Exercise 5     Exercise Name 5 Seated LAQ  -KG      Cueing 5 Verbal  -KG      Sets 5 2  -KG      Reps 5 10  -KG      Additional Comments 1# AW  -KG          Exercise 6     Exercise Name 6 // bars: sidestepping  -KG      Cueing 6 Verbal  -KG      Sets 6 1  -KG      Reps 6 2 laps  -KG      Additional Comments B HHA  -KG          Exercise 7     Exercise Name 7 Gait wtih RW from // bars to mat table  -KG      Cueing 7 Verbal  -KG      Additional Comments CGA ~10 ft  -KG          Exercise 8     Exercise Name 8 Semi-recumbent low level bridge  -KG      Cueing 8 Verbal  -KG      Sets 8 2  -KG      Reps 8 10  -KG          Exercise 9     Exercise Name 9 ball rolls hip and knee flex performed unilaterally  -KG      Cueing 9 Verbal  -KG      Sets 9 1  -KG      Reps 9 10 ea  -KG          Exercise 10     Exercise Name 10 Semi-recumbent SLR flex  -KG      Cueing 10 Verbal  -KG      Sets 10 1  -KG      Reps 10 10  -KG      Additional Comments AA on R; indep on L  -KG          Exercise 11     Exercise Name 11 transfer supine<>sit  -KG      Cueing 11 Verbal;Tactile  -KG      Additional Comments CGA/min assist  -KG            User " "Key  (r) = Recorded By, (t) = Taken By, (c) = Cosigned By    Initials Name Provider Type    KG Sharon Montesinos, PT Physical Therapist                              PT OP Goals     Row Name 01/12/23 1400          PT Short Term Goals    STG 1 Demonstrate independence/compliance in performance of initial HEP  -KG     STG 1 Progress Met  -KG     STG 2 AAmbulate 50 ft with RW with CGA/SBA demonstrating improved LE strength/stability and improved step lenght/height  -KG     STG 2 Progress Partially Met;Progressing  -KG     STG 3 Perform static balance FA in parallel bars for 20\"-30\" with improved posture, no LOB, and improved LE control  -KG     STG 3 Progress Met  -KG     STG 4 Demonstrate improved bilateral hip flex/abd MMT to 4-/5  -KG     STG 4 Progress Met  -KG     STG 5 Perform sit<>stand transfer from transport chair with CGA demonstrating improved BLE/BUE functional strength  -KG     STG 5 Progress Progressing;Partially Met  Intermittently needs min assist  -KG        Long Term Goals    LTG Date to Achieve 02/23/23  -KG     LTG 1 Subjectively report 50% overall improvement or greater in gait/functional mobility & daily activity performance  -KG     LTG 1 Progress Progressing  -KG     LTG 2 Perform 1x10 sit<>stand tranfers from plinth or airex in chair with BUE assist demonstrate improved BLE functional strength & endurance  -KG     LTG 2 Progress Partially Met;Progressing  -KG     LTG 3 Ambulate 100 ft with RW safely demonstrate improved endurance/activity tolerance and gait kinematics for mobility inside the home  -KG     LTG 3 Progress Goal Revised;Progressing  -KG     LTG 4 Demonstrate improved bilateral knee flex/ext MMT to 4+/5 or greater  -KG     LTG 4 Progress Progressing  -KG     LTG 5 Demonstrate imporved crystal hip flex/abd MMT to 4/5 or greater  -KG     LTG 5 Progress Progressing  -KG     LTG 6 Perform small obstacle course in // bars conisisting of bench step, sm hurldes, & airex pad demonstrating " improved dynamic balance & LE stability  -KG     LTG 6 Progress Goal Revised;Progressing  -KG        Time Calculation    PT Goal Re-Cert Due Date 02/02/23  -KG           User Key  (r) = Recorded By, (t) = Taken By, (c) = Cosigned By    Initials Name Provider Type    Sharon Berg, PT Physical Therapist                Therapy Education  Education Details: brace wear and education  Given: HEP, Posture/body mechanics, Mobility training, Fall prevention and home safety  Program: Reinforced  How Provided: Verbal, Demonstration  Provided to: Patient  Level of Understanding: Verbalized, Demonstrated, Teach back education performed    Outcome Measure Options: Lower Extremity Functional Scale (LEFS), Timed Up and Go (TUG)  Lower Extremity Functional Index  Any of your usual work, housework or school activities: Quite a bit of difficulty  Your usual hobbies, recreational or sporting activities: Extreme difficulty or unable to perform activity  Getting into or out of the bath: Extreme difficulty or unable to perform activity  Walking between rooms: Quite a bit of difficulty  Putting on your shoes or socks: Quite a bit of difficulty  Squatting: Quite a bit of difficulty  Lifting an object, like a bag of groceries from the floor: Quite a bit of difficulty  Performing light activities around your home: Quite a bit of difficulty  Performing heavy activities around your home: Extreme difficulty or unable to perform activity  Getting into or out of a car: Extreme difficulty or unable to perform activity  Walking 2 blocks: Quite a bit of difficulty  Walking a mile: Extreme difficulty or unable to perform activity  Going up or down 10 stairs (about 1 flight of stairs): Extreme difficulty or unable to perform activity  Standing for 1 hour: Extreme difficulty or unable to perform activity  Sitting for 1 hour: Moderate difficulty  Running on even ground: Extreme difficulty or unable to perform activity  Running on uneven ground:  Extreme difficulty or unable to perform activity  Making sharp turns while running fast: Extreme difficulty or unable to perform activity  Hopping: Extreme difficulty or unable to perform activity  Rolling over in bed: Moderate difficulty  Total: 11      Time Calculation:   Start Time: 1410  Stop Time: 1505  Time Calculation (min): 55 min  Therapy Charges for Today     Code Description Service Date Service Provider Modifiers Qty    98604359789  PT THER PROC EA 15 MIN 1/12/2023 Sharon Montesinos, PT GP 3    [id number]  PT THERAPEUTIC ACT EA 15 MIN 1/12/2023 Sharon Montesinos, PT GP 1          PT G-Codes  Outcome Measure Options: Lower Extremity Functional Scale (LEFS), Timed Up and Go (TUG)  Total: 11         Sharon Montesinos PT  1/12/2023

## 2023-01-17 ENCOUNTER — HOSPITAL ENCOUNTER (OUTPATIENT)
Dept: PHYSICAL THERAPY | Facility: HOSPITAL | Age: 78
Setting detail: THERAPIES SERIES
Discharge: HOME | End: 2023-01-17
Payer: MEDICARE

## 2023-01-17 DIAGNOSIS — R53.83 FATIGUE, UNSPECIFIED TYPE: Primary | ICD-10-CM

## 2023-01-17 PROCEDURE — 97110 THERAPEUTIC EXERCISES: CPT

## 2023-01-17 PROCEDURE — 97530 THERAPEUTIC ACTIVITIES: CPT

## 2023-01-17 PROCEDURE — 97116 GAIT TRAINING THERAPY: CPT

## 2023-01-17 NOTE — THERAPY TREATMENT NOTE
"    Outpatient Physical Therapy Ortho Treatment Note  [facility]     Patient Name: Samy Velazquez  : 1945  MRN: [id number]  Today's Date: 2023      Visit Date: 2023    Attendance: 14 visits  Subjective improvement: \"getting a little better now\"  MD appt: prn  Recheck due: 23    Visit Dx:    ICD-10-CM ICD-9-CM   1. Fatigue, unspecified type  R53.83 780.79       Patient Active Problem List   Diagnosis   • Ischemic heart disease due to coronary artery obstruction (HCC)   • Osteoarthritis of multiple joints   • Cardiac pacemaker   • Gout of multiple sites   • Nephrolithiasis   • Mixed hyperlipidemia   • Morbidly obese ([facility])   • Hypertensive heart disease with heart failure (HCC)   • Major depressive disorder, recurrent, moderate (HCC)   • Chronic kidney disease, stage 4 (severe) (HCC)   • Ventricular tachycardia   • Chronic systolic heart failure (HCC)   • Hypothyroidism   • Paroxysmal atrial fibrillation (HCC)   • Coronary atherosclerosis   • Essential hypertension   • Weakness of right lower extremity        Past Medical History:   Diagnosis Date   • Arthritis    • Coronary artery disease    • Hyperlipidemia    • Hypertensive heart disease with heart failure (HCC) 3/29/2021   • Hypothyroidism 2021   • Kidney stone    • Major depressive disorder, recurrent, moderate (HCC) 3/29/2021   • Morbidly obese (HCC) 2020        Past Surgical History:   Procedure Laterality Date   • CARDIAC DEFIBRILLATOR PLACEMENT     • CARDIAC DEFIBRILLATOR PLACEMENT N/A    • CARDIAC SURGERY     • ENDOSCOPY N/A 10/30/2020    Procedure: ESOPHAGOGASTRODUODENOSCOPY WITH ANESTHESIA;  Surgeon: Brent Stanley MD;  Location: [facility];  Service: Gastroenterology;  Laterality: N/A;  pre dysphagia  post post op gastric surgery  dr jose manuel smith   • ESOPHAGUS SURGERY     • KNEE SURGERY     • TOTAL SHOULDER REPLACEMENT          PT Ortho     Row Name 23 1300       Subjective Comments    Subjective " Comments Pt states his knee brace has helped a lot and wants another for the other knee.  -PM       Precautions and Contraindications    Precautions CVA with R sided weakness, heart disease, arthritis  -PM    Contraindications Pacemaker/defibulator  -PM       Subjective Pain    Able to rate subjective pain? yes  -PM    Pre-Treatment Pain Level 0  -PM    Post-Treatment Pain Level 0  -PM       Transfers    Comment, (Transfers) init sit<>std WC min A; then during treatment CGA/SBA and cues  -PM          User Key  (r) = Recorded By, (t) = Taken By, (c) = Cosigned By    Initials Name Provider Type    Kristin Calhoun PTA Physical Therapist Assistant                                   OP Exercises     Row Name 01/17/23 1300             Subjective Comments    Subjective Comments Pt states his knee brace has helped a lot and wants another for the other knee.  -PM         Subjective Pain    Able to rate subjective pain? yes  -PM      Pre-Treatment Pain Level 0  -PM      Post-Treatment Pain Level 0  -PM         Exercise 1    Exercise Name 1 gait training in WR/treatment area pulling WC behind pt  -PM      Cueing 1 Tactile;Verbal  -PM      Time 1 ~8 min  -PM      Additional Comments 150 ft with one seated rest break  -PM         Exercise 2    Exercise Name 2 // bars F/B walk  -PM      Reps 2 3 laps  -PM         Exercise 3    Exercise Name 3 // bars Lat stepping  -PM      Reps 3 3 laps  -PM         Exercise 4    Exercise Name 4 standing step tap alt march  -PM      Cueing 4 Verbal  -PM      Sets 4 1  -PM      Reps 4 10; 5  -PM         Exercise 5    Exercise Name 5 seated LAQ  -PM      Cueing 5 Verbal  -PM      Sets 5 2  -PM      Reps 5 10  -PM      Additional Comments 1# AW, 3 count hold  -PM         Exercise 6    Exercise Name 6 seated hip abd  -PM      Cueing 6 Verbal;Tactile  -PM      Sets 6 2  -PM      Reps 6 10  -PM      Additional Comments one set with red TB; one set with Manual resistance  -PM         Exercise 7  "   Exercise Name 7 seated ham pulls tband/pillow case under foot  -PM      Cueing 7 Verbal  -PM      Sets 7 1  -PM      Reps 7 15  -PM      Additional Comments red tband  -PM         Exercise 8    Exercise Name 8 sit stand from chair/airex  -PM      Cueing 8 Verbal  -PM      Sets 8 2  -PM      Reps 8 5  -PM         Exercise 9    Exercise Name 9 standing shldr ext/scap squeze  -PM      Cueing 9 Verbal  -PM      Sets 9 1  -PM      Reps 9 12-15  -PM      Additional Comments red tband  -PM         Exercise 10    Exercise Name 10 bridge (propped on pillows/wedge)  -PM      Cueing 10 Verbal  -PM      Sets 10 2  -PM      Reps 10 10  -PM         Exercise 11    Exercise Name 11 ball rolls R and L hip and knee flex  -PM      Cueing 11 Verbal  -PM      Sets 11 2  -PM      Reps 11 10  -PM         Exercise 12    Exercise Name 12 ball add squeezes  -PM      Cueing 12 Verbal  -PM      Reps 12 2x10  -PM            User Key  (r) = Recorded By, (t) = Taken By, (c) = Cosigned By    Initials Name Provider Type    Kristin Calhoun, ZANA Physical Therapist Assistant               01/17/23 1300   PT Short Term Goals   STG 1 Demonstrate independence/compliance in performance of initial HEP   STG 1 Progress Met   STG 2 Ambulate 50 ft with RW with CGA/SBA demonstrating improved LE strength/stability and improved step lenght/height   STG 2 Progress Partially Met;Progressing   STG 3 Perform static balance FA in parallel bars for 20\"-30\" with improved posture, no LOB, and improved LE control   STG 3 Progress Met   STG 4 Demonstrate improved bilateral hip flex/abd MMT to 4-/5   STG 4 Progress Met   STG 5 Perform sit<>stand transfer from transport chair with CGA demonstrating improved BLE/BUE functional strength   STG 5 Progress Progressing;Partially Met  (Intermittently needs min assist)   Long Term Goals   LTG Date to Achieve 02/23/23   LTG 1 Subjectively report 50% overall improvement or greater in gait/functional mobility & daily " activity performance   LTG 1 Progress Progressing   LTG 2 Perform 1x10 sit<>stand tranfers from plinth or airex in chair with BUE assist demonstrate improved BLE functional strength & endurance   LTG 2 Progress Partially Met;Progressing   LTG 3 Ambulate 100 ft with RW safely demonstrate improved endurance/activity tolerance and gait kinematics for mobility inside the home   LTG 3 Progress Goal Revised;Progressing   LTG 4 Demonstrate improved bilateral knee flex/ext MMT to 4+/5 or greater   LTG 4 Progress Progressing   LTG 5 Demonstrate imporved crystal hip flex/abd MMT to 4/5 or greater   LTG 5 Progress Progressing   LTG 6 Perform small obstacle course in // bars conisisting of bench step, sm hurldes, & airex pad demonstrating improved dynamic balance & LE stability   LTG 6 Progress Goal Revised;Progressing           01/17/23 1300   PT Assessment   Assessment Comments Improved gait today and less pain; able to well with therex and theractiv. Pt wants another knee brace.   PT Plan   PT Frequency 2x/week   PT Plan Comments cont to progress as able                 PT OP Goals     Row Name 01/17/23 1300          Time Calculation    PT Goal Re-Cert Due Date 02/02/23  -PM           User Key  (r) = Recorded By, (t) = Taken By, (c) = Cosigned By    Initials Name Provider Type    PM Kristin Dillard PTA Physical Therapist Assistant                               Time Calculation:   Start Time: 1302  Stop Time: 1359  Time Calculation (min): 57 min  Therapy Charges for Today     Code Description Service Date Service Provider Modifiers Qty    26410613239 HC PT THER PROC EA 15 MIN 1/17/2023 Kristin Dillard PTA GP, CQ 2    78534886003 HC PT THERAPEUTIC ACT EA 15 MIN 1/17/2023 Kristin Dillard PTA GP, CQ 1    17840887998 HC GAIT TRAINING EA 15 MIN 1/17/2023 Kristin Dillard PTA GP, CQ 1                    Kristin Dillard PTA  1/17/2023

## 2023-01-19 ENCOUNTER — HOSPITAL ENCOUNTER (OUTPATIENT)
Dept: PHYSICAL THERAPY | Facility: HOSPITAL | Age: 78
Setting detail: THERAPIES SERIES
Discharge: HOME | End: 2023-01-19
Payer: MEDICARE

## 2023-01-19 DIAGNOSIS — R53.83 FATIGUE, UNSPECIFIED TYPE: Primary | ICD-10-CM

## 2023-01-19 PROCEDURE — 97110 THERAPEUTIC EXERCISES: CPT

## 2023-01-19 PROCEDURE — 97116 GAIT TRAINING THERAPY: CPT

## 2023-01-19 NOTE — THERAPY TREATMENT NOTE
"    Outpatient Physical Therapy Ortho Treatment Note  [facility]     Patient Name: Samy Velazquez  : 1945  MRN: [id number]  Today's Date: 2023      Visit Date: 2023    Attendance: 15 visits  Subjective improvement: \"getting a little better now\"  MD appt: PRN  Recheck due: 23    Visit Dx:    ICD-10-CM ICD-9-CM   1. Fatigue, unspecified type  R53.83 780.79       Patient Active Problem List   Diagnosis   • Ischemic heart disease due to coronary artery obstruction (HCC)   • Osteoarthritis of multiple joints   • Cardiac pacemaker   • Gout of multiple sites   • Nephrolithiasis   • Mixed hyperlipidemia   • Morbidly obese ([facility])   • Hypertensive heart disease with heart failure (HCC)   • Major depressive disorder, recurrent, moderate (HCC)   • Chronic kidney disease, stage 4 (severe) (HCC)   • Ventricular tachycardia   • Chronic systolic heart failure (HCC)   • Hypothyroidism   • Paroxysmal atrial fibrillation (HCC)   • Coronary atherosclerosis   • Essential hypertension   • Weakness of right lower extremity        Past Medical History:   Diagnosis Date   • Arthritis    • Coronary artery disease    • Hyperlipidemia    • Hypertensive heart disease with heart failure (HCC) 3/29/2021   • Hypothyroidism 2021   • Kidney stone    • Major depressive disorder, recurrent, moderate (HCC) 3/29/2021   • Morbidly obese (HCC) 2020        Past Surgical History:   Procedure Laterality Date   • CARDIAC DEFIBRILLATOR PLACEMENT     • CARDIAC DEFIBRILLATOR PLACEMENT N/A    • CARDIAC SURGERY     • ENDOSCOPY N/A 10/30/2020    Procedure: ESOPHAGOGASTRODUODENOSCOPY WITH ANESTHESIA;  Surgeon: Brent Stanley MD;  Location: [facility];  Service: Gastroenterology;  Laterality: N/A;  pre dysphagia  post post op gastric surgery  dr jose manuel smith   • ESOPHAGUS SURGERY     • KNEE SURGERY     • TOTAL SHOULDER REPLACEMENT          PT Ortho     Row Name 23 1200       Precautions and Contraindications    " Precautions CVA with R sided weakness, heart disease, arthritis  -KM    Contraindications Pacemaker/defibulator  -KM       Subjective Pain    Able to rate subjective pain? yes  -KM       Posture/Observations    Posture/Observations Comments presents in transfer chair.  -KM          User Key  (r) = Recorded By, (t) = Taken By, (c) = Cosigned By    Initials Name Provider Type    Haydee Buck PTA Physical Therapist Assistant                             PT Assessment/Plan     Row Name 01/19/23 1300          PT Assessment    Assessment Comments pt able to increase gait distance slightly today with one seated rest break. Required more assistance with his sit to stands than previous visit- min/mod Ax1. Pt was too tired towards end of treatment to do supine <-> sit transfers  -KM        PT Plan    PT Frequency 2x/week  -KM     PT Plan Comments Resume working on transfers  -KM           User Key  (r) = Recorded By, (t) = Taken By, (c) = Cosigned By    Initials Name Provider Type    Haydee Buck PTA Physical Therapist Assistant                   OP Exercises     Row Name 01/19/23 1200             Subjective Comments    Subjective Comments pt states that he is still liking his knee brace and is ready for his other one  -KM         Subjective Pain    Able to rate subjective pain? yes  -KM      Pre-Treatment Pain Level 0  -KM      Post-Treatment Pain Level 0  -KM         Exercise 1    Exercise Name 1 Gait train w/ RW  -KM      Time 1 ~8 min  -KM      Additional Comments ~ 200 ft; 60 ft x1; 140 ft x1  -KM         Exercise 2    Exercise Name 2 // bars: fwd/bkw walking  -KM      Sets 2 1  -KM      Reps 2 3 laps  -KM         Exercise 3    Exercise Name 3 // bars: lateral side stepping  -KM      Sets 3 1  -KM      Reps 3 3 laps  -KM         Exercise 4    Exercise Name 4 // bars: standing alt step taps  -KM      Reps 4 1x10; 1x5  -KM         Exercise 5    Exercise Name 5 // bars: CR  -KM      Sets 5 1  -KM   "    Reps 5 10  -KM         Exercise 6    Exercise Name 6 Seated HS pulls  -KM      Sets 6 2  -KM      Reps 6 10  -KM      Additional Comments bilateral; red  -KM         Exercise 7    Exercise Name 7 Seated hip abd  -KM      Sets 7 2  -KM      Reps 7 10  -KM      Additional Comments red  -KM         Exercise 8    Exercise Name 8 Seated LAQ  -KM      Sets 8 2  -KM      Reps 8 10  -KM      Additional Comments bilateral; 1# AW  -KM         Exercise 9    Exercise Name 9 Seated march  -KM      Sets 9 2  -KM      Reps 9 10  -KM      Additional Comments bilateral; 1# AW  -KM         Exercise 10    Exercise Name 10 Seated hip add squeezes  -KM      Sets 10 2  -KM      Reps 10 10  -KM      Time 10 5 sec hold  -KM            User Key  (r) = Recorded By, (t) = Taken By, (c) = Cosigned By    Initials Name Provider Type    Haydee Buck, PTA Physical Therapist Assistant                              PT OP Goals     Row Name 01/19/23 1300          PT Short Term Goals    STG 1 Demonstrate independence/compliance in performance of initial HEP  -KM     STG 1 Progress Met  -KM     STG 2 Ambulate 50 ft with RW with CGA/SBA demonstrating improved LE strength/stability and improved step lenght/height  -KM     STG 2 Progress Partially Met;Progressing  -KM     STG 3 Perform static balance FA in parallel bars for 20\"-30\" with improved posture, no LOB, and improved LE control  -KM     STG 3 Progress Met  -KM     STG 4 Demonstrate improved bilateral hip flex/abd MMT to 4-/5  -KM     STG 4 Progress Met  -KM     STG 5 Perform sit<>stand transfer from transport chair with CGA demonstrating improved BLE/BUE functional strength  -KM     STG 5 Progress Progressing;Partially Met  Intermittently needs min assist  -KM        Long Term Goals    LTG Date to Achieve 02/23/23  -KM     LTG 1 Subjectively report 50% overall improvement or greater in gait/functional mobility & daily activity performance  -KM     LTG 1 Progress Progressing  -KM  "    LTG 2 Perform 1x10 sit<>stand tranfers from plinth or airex in chair with BUE assist demonstrate improved BLE functional strength & endurance  -KM     LTG 2 Progress Partially Met;Progressing  -KM     LTG 3 Ambulate 100 ft with RW safely demonstrate improved endurance/activity tolerance and gait kinematics for mobility inside the home  -KM     LTG 3 Progress Goal Revised;Progressing  -KM     LTG 4 Demonstrate improved bilateral knee flex/ext MMT to 4+/5 or greater  -KM     LTG 4 Progress Progressing  -KM     LTG 5 Demonstrate imporved crystal hip flex/abd MMT to 4/5 or greater  -KM     LTG 5 Progress Progressing  -KM     LTG 6 Perform small obstacle course in // bars conisisting of bench step, sm hurldes, & airex pad demonstrating improved dynamic balance & LE stability  -KM     LTG 6 Progress Goal Revised;Progressing  -KM        Time Calculation    PT Goal Re-Cert Due Date 02/02/23  -KM           User Key  (r) = Recorded By, (t) = Taken By, (c) = Cosigned By    Initials Name Provider Type    Haydee Buck PTA Physical Therapist Assistant                Therapy Education  Given: HEP, Posture/body mechanics, Mobility training, Fall prevention and home safety  Program: Reinforced  How Provided: Verbal, Demonstration  Provided to: Patient  Level of Understanding: Verbalized, Demonstrated, Teach back education performed              Time Calculation:   Start Time: 1301  Stop Time: 1356  Time Calculation (min): 55 min  Therapy Charges for Today     Code Description Service Date Service Provider Modifiers Qty    72288840157 HC PT THER PROC EA 15 MIN 1/19/2023 Haydee May PTA GP, CQ 3    [id number] HC GAIT TRAINING EA 15 MIN 1/19/2023 Haydee May PTA GP, CQ 1                    Haydee May PTA  1/19/2023

## 2023-01-20 ENCOUNTER — OFFICE VISIT (OUTPATIENT)
Dept: FAMILY MEDICINE CLINIC | Facility: CLINIC | Age: 78
End: 2023-01-20
Payer: MEDICARE

## 2023-01-20 VITALS
WEIGHT: 216 LBS | TEMPERATURE: 97.8 F | OXYGEN SATURATION: 96 % | DIASTOLIC BLOOD PRESSURE: 60 MMHG | HEART RATE: 73 BPM | SYSTOLIC BLOOD PRESSURE: 104 MMHG | RESPIRATION RATE: 18 BRPM | HEIGHT: 66 IN | BODY MASS INDEX: 34.72 KG/M2

## 2023-01-20 DIAGNOSIS — M89.8X7 PAIN IN METATARSUS OF RIGHT FOOT: Primary | ICD-10-CM

## 2023-01-20 PROCEDURE — 99213 OFFICE O/P EST LOW 20 MIN: CPT | Performed by: FAMILY MEDICINE

## 2023-01-20 NOTE — PROGRESS NOTES
Subjective   Samy Velazquez is a 77 y.o. male.     History of Present Illness  77-year-old with peripheral vascular disease complains of right foot pain-base of fifth metatarsal-been going to physical therapy and been more active      The following portions of the patient's history were reviewed and updated as appropriate: allergies, current medications, past family history, past medical history, past social history, past surgical history and problem list.    Review of Systems   Constitutional: Negative for chills and fever.   Musculoskeletal: Positive for arthralgias and back pain.        Callus base of fifth metatarsal on right       Objective   Physical Exam  Vitals and nursing note reviewed.   Constitutional:       Appearance: He is obese.   Cardiovascular:      Rate and Rhythm: Normal rate and regular rhythm.   Pulmonary:      Effort: Pulmonary effort is normal.      Breath sounds: Normal breath sounds.   Musculoskeletal:      Right lower leg: No edema.      Left lower leg: No edema.      Comments: Callus base fifth metatarsal with breakdown   Skin:     Findings: Erythema present.   Neurological:      General: No focal deficit present.      Mental Status: He is alert and oriented to person, place, and time.   Psychiatric:         Mood and Affect: Mood normal.         Behavior: Behavior normal.         Thought Content: Thought content normal.         Judgment: Judgment normal.         Assessment & Plan   Diagnoses and all orders for this visit:    1. Pain in metatarsus of right foot (Primary)  -     Ambulatory Referral to Wound Clinic    Urgent referral to wound care-continue physical therapy-look stronger feels better

## 2023-01-23 ENCOUNTER — TELEPHONE (OUTPATIENT)
Dept: FAMILY MEDICINE CLINIC | Facility: CLINIC | Age: 78
End: 2023-01-23
Payer: MEDICARE

## 2023-01-23 ENCOUNTER — OFFICE VISIT (OUTPATIENT)
Dept: WOUND CARE | Facility: HOSPITAL | Age: 78
End: 2023-01-23
Payer: MEDICARE

## 2023-01-23 DIAGNOSIS — R26.89 OTHER ABNORMALITIES OF GAIT AND MOBILITY: ICD-10-CM

## 2023-01-23 DIAGNOSIS — E66.01 MORBID (SEVERE) OBESITY DUE TO EXCESS CALORIES: ICD-10-CM

## 2023-01-23 DIAGNOSIS — L97.512 NON-PRESSURE CHRONIC ULCER OF OTHER PART OF RIGHT FOOT WITH FAT LAYER EXPOSED: ICD-10-CM

## 2023-01-23 DIAGNOSIS — L89.891 PRESSURE INJURY OF RIGHT FOOT, STAGE 1: Primary | ICD-10-CM

## 2023-01-23 PROCEDURE — G0463 HOSPITAL OUTPT CLINIC VISIT: HCPCS

## 2023-01-23 PROCEDURE — 99214 OFFICE O/P EST MOD 30 MIN: CPT | Performed by: NURSE PRACTITIONER

## 2023-01-23 PROCEDURE — 11042 DBRDMT SUBQ TIS 1ST 20SQCM/<: CPT | Performed by: NURSE PRACTITIONER

## 2023-01-23 NOTE — TELEPHONE ENCOUNTER
Wife calling and would like wound care referral to be switched to [place]. Foot wound is a lil better.

## 2023-01-23 NOTE — TELEPHONE ENCOUNTER
Wife called--cancel [place] referral--Margie has called and patient is being seen this afternoon.

## 2023-01-24 ENCOUNTER — HOSPITAL ENCOUNTER (OUTPATIENT)
Dept: PHYSICAL THERAPY | Facility: HOSPITAL | Age: 78
Setting detail: THERAPIES SERIES
Discharge: HOME | End: 2023-01-24
Payer: MEDICARE

## 2023-01-24 DIAGNOSIS — R53.83 FATIGUE, UNSPECIFIED TYPE: Primary | ICD-10-CM

## 2023-01-24 PROCEDURE — 97530 THERAPEUTIC ACTIVITIES: CPT

## 2023-01-24 PROCEDURE — 97110 THERAPEUTIC EXERCISES: CPT

## 2023-01-24 NOTE — THERAPY TREATMENT NOTE
"    Outpatient Physical Therapy Ortho Treatment Note  [facility]     Patient Name: Samy Velazquez  : 1945  MRN: [id number]  Today's Date: 2023      Visit Date: 2023    Attendance: 16 visits  Subjective improvement: \"not such a good day/week\"  MD appt: prn  Recheck due: 23    Visit Dx:    ICD-10-CM ICD-9-CM   1. Fatigue, unspecified type  R53.83 780.79       Patient Active Problem List   Diagnosis   • Ischemic heart disease due to coronary artery obstruction (HCC)   • Osteoarthritis of multiple joints   • Cardiac pacemaker   • Gout of multiple sites   • Nephrolithiasis   • Mixed hyperlipidemia   • Morbidly obese ([facility])   • Hypertensive heart disease with heart failure (HCC)   • Major depressive disorder, recurrent, moderate (HCC)   • Chronic kidney disease, stage 4 (severe) (HCC)   • Ventricular tachycardia   • Chronic systolic heart failure (HCC)   • Hypothyroidism   • Paroxysmal atrial fibrillation (HCC)   • Coronary atherosclerosis   • Essential hypertension   • Weakness of right lower extremity        Past Medical History:   Diagnosis Date   • Arthritis    • Coronary artery disease    • Hyperlipidemia    • Hypertensive heart disease with heart failure (HCC) 3/29/2021   • Hypothyroidism 2021   • Kidney stone    • Major depressive disorder, recurrent, moderate (HCC) 3/29/2021   • Morbidly obese (HCC) 2020        Past Surgical History:   Procedure Laterality Date   • CARDIAC DEFIBRILLATOR PLACEMENT     • CARDIAC DEFIBRILLATOR PLACEMENT N/A    • CARDIAC SURGERY     • ENDOSCOPY N/A 10/30/2020    Procedure: ESOPHAGOGASTRODUODENOSCOPY WITH ANESTHESIA;  Surgeon: Brent Stanley MD;  Location: [facility];  Service: Gastroenterology;  Laterality: N/A;  pre dysphagia  post post op gastric surgery  dr jose manuel smith   • ESOPHAGUS SURGERY     • KNEE SURGERY     • TOTAL SHOULDER REPLACEMENT          PT Ortho     Row Name 23 1300       Precautions and Contraindications    " Precautions CVA with R sided weakness, heart disease, arthritis  -PM    Contraindications Pacemaker/defibulator  -PM       Subjective Pain    Able to rate subjective pain? yes  -PM          User Key  (r) = Recorded By, (t) = Taken By, (c) = Cosigned By    Initials Name Provider Type    Kristin Calhoun PTA Physical Therapist Assistant                             PT Assessment/Plan     Row Name 01/24/23 1400          PT Assessment    Assessment Comments pt with some pain in foot and special shoe on; Rx as patient kenan and advice for proper positioning.  -PM        PT Plan    PT Frequency 2x/week  -PM     PT Plan Comments progress as kenan; PT telma; incr gait as able; cont to educate on proper transfers  -PM           User Key  (r) = Recorded By, (t) = Taken By, (c) = Cosigned By    Initials Name Provider Type    Kristin Calhoun PTA Physical Therapist Assistant                   OP Exercises     Row Name 01/24/23 1300             Subjective Comments    Subjective Comments Pt and wife state that at [place] yesterday for wound care; pt says they worked on his foot quite a bit and got it very sore.  -PM         Subjective Pain    Able to rate subjective pain? yes  -PM      Pre-Treatment Pain Level 4  -PM      Post-Treatment Pain Level 4  -PM      Subjective Pain Comment 6/10 with wt bearing/std activities  -PM         Exercise 1    Exercise Name 1 Gait wirh RW and wheeclchair pushed behind pt  -PM      Additional Comments 150 ft  -PM         Exercise 2    Exercise Name 2 // bars F/B walking  -PM      Reps 2 4 laps  -PM         Exercise 3    Exercise Name 3 seated ball add squeezes  -PM      Reps 3 10; 5  -PM      Time 3 3 count hold  -PM         Exercise 4    Exercise Name 4 // bars bench step tapping alternate  -PM      Sets 4 2  -PM      Reps 4 10  -PM         Exercise 5    Exercise Name 5 seated LAQ  -PM      Cueing 5 Verbal  -PM      Sets 5 2  -PM      Reps 5 10  -PM      Additional Comments 1# AW, 3  "count hold  -PM         Exercise 6    Exercise Name 6 // bars side stepping  -PM      Cueing 6 Verbal  -PM      Reps 6 3 laps  -PM         Exercise 7    Exercise Name 7 seated manual resist hip abduct  -PM      Cueing 7 Tactile;Verbal  -PM      Sets 7 1  -PM      Reps 7 10; 5  -PM         Exercise 8    Exercise Name 8 sit<>stand chair + airex  -PM      Cueing 8 Verbal  -PM      Sets 8 2  -PM      Reps 8 5  -PM      Additional Comments SBA  -PM         Exercise 9    Exercise Name 9 transfer sit<>std initial min A and cues; after that CGA-Min A to gait belt and cues to pt  -PM         Exercise 10    Exercise Name 10 sit<>supine transfers  -PM      Cueing 10 Verbal;Tactile  -PM      Additional Comments min A and verbal cues  -PM         Exercise 11    Exercise Name 11 bridge  -PM      Cueing 11 Verbal  -PM      Sets 11 1  -PM      Reps 11 10  -PM         Exercise 12    Exercise Name 12 yellow sm SB ball rolls R/L  -PM      Cueing 12 Verbal;Tactile  -PM      Reps 12 10 EA le  -PM         Exercise 13    Exercise Name 13 pt education on positioning to decr stress to lat foot and ankle with pillow long wise and towel roll to prevent pt from \"frog leg position\"  -PM            User Key  (r) = Recorded By, (t) = Taken By, (c) = Cosigned By    Initials Name Provider Type    PM Kristin Dillard PTA Physical Therapist Assistant                                 Therapy Education  Given: HEP, Posture/body mechanics, Mobility training, Fall prevention and home safety  Program: Reinforced  How Provided: Verbal, Demonstration  Provided to: Patient  Level of Understanding: Verbalized, Demonstrated, Teach back education performed              Time Calculation:   Start Time: 1300  Stop Time: 1405  Time Calculation (min): 65 min  Therapy Charges for Today     Code Description Service Date Service Provider Modifiers Qty    18610044502 HC PT THER PROC EA 15 MIN 1/24/2023 Kristin Dillard PTA GP, CQ 2    78248420495 HC PT " THERAPEUTIC ACT EA 15 MIN 1/24/2023 Kristin Dillard, ZANA GP, CQ 2                    Kristin Dillard PTA  1/24/2023

## 2023-01-25 LAB
ALBUMIN SERPL-MCNC: 3.8 G/DL (ref 3.7–4.7)
ALBUMIN/GLOB SERPL: 1.4 {RATIO} (ref 1.2–2.2)
ALP SERPL-CCNC: 169 IU/L (ref 44–121)
ALT SERPL-CCNC: 22 IU/L (ref 0–44)
AMBIG ABBREV CMP14 DEFAULT: NORMAL
AST SERPL-CCNC: 21 IU/L (ref 0–40)
BASOPHILS # BLD AUTO: 0.1 X10E3/UL (ref 0–0.2)
BASOPHILS NFR BLD AUTO: 1 %
BILIRUB SERPL-MCNC: 0.4 MG/DL (ref 0–1.2)
BUN SERPL-MCNC: 23 MG/DL (ref 8–27)
BUN/CREAT SERPL: 15 (ref 10–24)
CALCIUM SERPL-MCNC: 10.5 MG/DL (ref 8.6–10.2)
CHLORIDE SERPL-SCNC: 108 MMOL/L (ref 96–106)
CO2 SERPL-SCNC: 19 MMOL/L (ref 20–29)
CREAT SERPL-MCNC: 1.58 MG/DL (ref 0.76–1.27)
CRP SERPL HS-MCNC: 1.29 MG/L (ref 0–3)
EGFRCR SERPLBLD CKD-EPI 2021: 45 ML/MIN/1.73
EOSINOPHIL # BLD AUTO: 0.5 X10E3/UL (ref 0–0.4)
EOSINOPHIL NFR BLD AUTO: 5 %
ERYTHROCYTE [DISTWIDTH] IN BLOOD BY AUTOMATED COUNT: 13.8 % (ref 11.6–15.4)
ERYTHROCYTE [SEDIMENTATION RATE] IN BLOOD BY WESTERGREN METHOD: 23 MM/HR (ref 0–30)
GLOBULIN SER CALC-MCNC: 2.8 G/DL (ref 1.5–4.5)
GLUCOSE SERPL-MCNC: 145 MG/DL (ref 70–99)
HBA1C MFR BLD: 6.3 % (ref 4.8–5.6)
HCT VFR BLD AUTO: 47.3 % (ref 37.5–51)
HGB BLD-MCNC: 16.1 G/DL (ref 13–17.7)
IMM GRANULOCYTES # BLD AUTO: 0 X10E3/UL (ref 0–0.1)
IMM GRANULOCYTES NFR BLD AUTO: 0 %
LYMPHOCYTES # BLD AUTO: 1.8 X10E3/UL (ref 0.7–3.1)
LYMPHOCYTES NFR BLD AUTO: 20 %
MCH RBC QN AUTO: 30.2 PG (ref 26.6–33)
MCHC RBC AUTO-ENTMCNC: 34 G/DL (ref 31.5–35.7)
MCV RBC AUTO: 89 FL (ref 79–97)
MONOCYTES # BLD AUTO: 1 X10E3/UL (ref 0.1–0.9)
MONOCYTES NFR BLD AUTO: 11 %
NEUTROPHILS # BLD AUTO: 5.6 X10E3/UL (ref 1.4–7)
NEUTROPHILS NFR BLD AUTO: 63 %
PLATELET # BLD AUTO: 182 X10E3/UL (ref 150–450)
POTASSIUM SERPL-SCNC: 4.4 MMOL/L (ref 3.5–5.2)
PREALB SERPL-MCNC: 20 MG/DL (ref 9–32)
PROT SERPL-MCNC: 6.6 G/DL (ref 6–8.5)
RBC # BLD AUTO: 5.33 X10E6/UL (ref 4.14–5.8)
SODIUM SERPL-SCNC: 143 MMOL/L (ref 134–144)
WBC # BLD AUTO: 9 X10E3/UL (ref 3.4–10.8)

## 2023-01-26 ENCOUNTER — HOSPITAL ENCOUNTER (OUTPATIENT)
Dept: PHYSICAL THERAPY | Facility: HOSPITAL | Age: 78
Setting detail: THERAPIES SERIES
Discharge: HOME | End: 2023-01-26
Payer: MEDICARE

## 2023-01-26 DIAGNOSIS — R53.83 FATIGUE, UNSPECIFIED TYPE: Primary | ICD-10-CM

## 2023-01-26 PROCEDURE — 97110 THERAPEUTIC EXERCISES: CPT | Performed by: PHYSICAL THERAPIST

## 2023-01-26 PROCEDURE — 97530 THERAPEUTIC ACTIVITIES: CPT | Performed by: PHYSICAL THERAPIST

## 2023-01-27 NOTE — THERAPY TREATMENT NOTE
"    Outpatient Physical Therapy Ortho Treatment Note  [facility]     Patient Name: Samy Velazquez  : 1945  MRN: [id number]  Today's Date: 2023      Visit Date: 2023     Attendance: 17 visits  Subjective improvement: \"not such a good day/week\"  MD appt: prn  Recheck due: 23    Visit Dx:    ICD-10-CM ICD-9-CM   1. Fatigue, unspecified type  R53.83 780.79       Patient Active Problem List   Diagnosis   • Ischemic heart disease due to coronary artery obstruction (HCC)   • Osteoarthritis of multiple joints   • Cardiac pacemaker   • Gout of multiple sites   • Nephrolithiasis   • Mixed hyperlipidemia   • Morbidly obese ([facility])   • Hypertensive heart disease with heart failure (HCC)   • Major depressive disorder, recurrent, moderate (HCC)   • Chronic kidney disease, stage 4 (severe) (HCC)   • Ventricular tachycardia   • Chronic systolic heart failure (HCC)   • Hypothyroidism   • Paroxysmal atrial fibrillation (HCC)   • Coronary atherosclerosis   • Essential hypertension   • Weakness of right lower extremity        Past Medical History:   Diagnosis Date   • Arthritis    • Coronary artery disease    • Hyperlipidemia    • Hypertensive heart disease with heart failure (HCC) 3/29/2021   • Hypothyroidism 2021   • Kidney stone    • Major depressive disorder, recurrent, moderate (HCC) 3/29/2021   • Morbidly obese (HCC) 2020        Past Surgical History:   Procedure Laterality Date   • CARDIAC DEFIBRILLATOR PLACEMENT     • CARDIAC DEFIBRILLATOR PLACEMENT N/A    • CARDIAC SURGERY     • ENDOSCOPY N/A 10/30/2020    Procedure: ESOPHAGOGASTRODUODENOSCOPY WITH ANESTHESIA;  Surgeon: Brent Stanley MD;  Location: [facility];  Service: Gastroenterology;  Laterality: N/A;  pre dysphagia  post post op gastric surgery  dr jose manuel smith   • ESOPHAGUS SURGERY     • KNEE SURGERY     • TOTAL SHOULDER REPLACEMENT          PT Ortho     Row Name 23 1300       Subjective Comments    Subjective Comments " Pt states his foot is really sore today. States doing some better with walking at home. Brace really helping. After getting the L one today, pt states it felt better today too.  -KG       Precautions and Contraindications    Precautions CVA with R sided weakness, heart disease, arthritis  -KG    Contraindications Pacemaker/defibulator  -KG       Subjective Pain    Able to rate subjective pain? yes  -KG    Pre-Treatment Pain Level 5  -KG    Post-Treatment Pain Level 5  -KG       Posture/Observations    Posture/Observations Comments Given L knee brace, which was donned prior to treatment. Pt more alert and talkative this date.  -KG          User Key  (r) = Recorded By, (t) = Taken By, (c) = Cosigned By    Initials Name Provider Type    Sharon Berg, PT Physical Therapist                             PT Assessment/Plan     Row Name 01/26/23 1300          PT Assessment    Assessment Comments Pt not able to walk as far of a distance with a single trial, more so due to R foot soreness. Pt is more controlled with gait and demos better RW management. Bilateral knee braces donned and pt notes they do make his knees feel more stable. Pt was able to tolerate slight increase in standing activities. Did fair with standing tband TKE's; frequent cues required for form and proper quad contraction but was able to better with increased reps.  -KG        PT Plan    PT Frequency 2x/week  -KG     PT Plan Comments Continue standing TKE's. Could also try multi-directional perturbations at knees next for stabiltiy.  -KG           User Key  (r) = Recorded By, (t) = Taken By, (c) = Cosigned By    Initials Name Provider Type    Sharon Berg, PT Physical Therapist                   OP Exercises     Row Name 01/26/23 1300 01/26/23 1200          Subjective Comments    Subjective Comments Pt states his foot is really sore today. States doing some better with walking at home. Brace really helping. After getting the L one today, pt  "states it felt better today too.  -KG --        Subjective Pain    Able to rate subjective pain? yes  -KG --     Pre-Treatment Pain Level 5  -KG --     Post-Treatment Pain Level 5  -KG --        Exercise 1    Exercise Name 1 -- Gait with RW and wheeclchair pushed behind pt  -KG     Additional Comments -- 91 ft, 56 ft  -KG        Exercise 2    Exercise Name 2 -- // bars F/B walking  -KG     Cueing 2 -- Verbal  -KG     Sets 2 -- 1  -KG     Reps 2 -- 4 laps  -KG        Exercise 3    Exercise Name 3 -- Seated iso add squeeze with ball  -KG     Cueing 3 -- Verbal  -KG     Sets 3 -- 1  -KG     Reps 3 -- 15  -KG     Time 3 -- 3-5\" hold  -KG        Exercise 4    Exercise Name 4 -- // bars: alt step taps to bench step  -KG     Cueing 4 -- Verbal  -KG     Sets 4 -- 2  -KG     Reps 4 -- 10  -KG     Additional Comments -- 4\" bench step  -KG        Exercise 5    Exercise Name 5 -- Standing tband mid rows  -KG     Cueing 5 -- Verbal  -KG     Sets 5 -- 2  -KG     Reps 5 -- 10  -KG     Additional Comments -- red tband  -KG        Exercise 6    Exercise Name 6 -- Standing shoulder ext/scap retraction  -KG     Cueing 6 -- Verbal  -KG     Sets 6 -- 1  -KG     Reps 6 -- 15  -KG     Additional Comments -- red tband  -KG        Exercise 7    Exercise Name 7 -- Sit<>stand from chair/airex  -KG     Cueing 7 -- Verbal  -KG     Sets 7 -- 3  -KG     Reps 7 -- 5  -KG     Additional Comments -- SBA  -KG        Exercise 8    Exercise Name 8 -- Standing crystal tband TKE  -KG     Cueing 8 -- Verbal;Tactile  -KG     Sets 8 -- 1  -KG     Reps 8 -- 12 ea  -KG     Time 8 -- 3-5\" hold  -KG     Additional Comments -- red tband; tactile cues at quad; pt standing with RW  -KG        Exercise 9    Exercise Name 9 -- Sit>supine transfer  -KG     Additional Comments -- min assist; mainly for LE  -KG        Exercise 10    Exercise Name 10 -- Bridges  -KG     Cueing 10 -- Verbal  -KG     Sets 10 -- 2  -KG     Reps 10 -- 10  -KG        Exercise 11    Exercise Name " "11 -- yellow sm SB ball rolls with resisted push out  -KG     Cueing 11 -- Verbal  -KG     Sets 11 -- 1  -KG     Reps 11 -- 12 ea LE  -KG        Exercise 12    Exercise Name 12 -- Sup>sit transfer  -KG     Cueing 12 -- Verbal  -KG     Additional Comments -- verbal cues only (minor CGA initially)  -KG           User Key  (r) = Recorded By, (t) = Taken By, (c) = Cosigned By    Initials Name Provider Type    KG Sharon Montesinos, PT Physical Therapist                              PT OP Goals     Row Name 01/26/23 1300          PT Short Term Goals    STG 1 Demonstrate independence/compliance in performance of initial HEP  -KG     STG 1 Progress Met  -KG     STG 2 Ambulate 50 ft with RW with CGA/SBA demonstrating improved LE strength/stability and improved step lenght/height  -KG     STG 2 Progress Partially Met;Progressing  -KG     STG 3 Perform static balance FA in parallel bars for 20\"-30\" with improved posture, no LOB, and improved LE control  -KG     STG 3 Progress Met  -KG     STG 4 Demonstrate improved bilateral hip flex/abd MMT to 4-/5  -KG     STG 4 Progress Met  -KG     STG 5 Perform sit<>stand transfer from transport chair with CGA demonstrating improved BLE/BUE functional strength  -KG     STG 5 Progress Progressing;Partially Met  Intermittently needs min assist  -KG        Long Term Goals    LTG Date to Achieve 02/23/23  -KG     LTG 1 Subjectively report 50% overall improvement or greater in gait/functional mobility & daily activity performance  -KG     LTG 1 Progress Progressing  -KG     LTG 2 Perform 1x10 sit<>stand tranfers from plinth or airex in chair with BUE assist demonstrate improved BLE functional strength & endurance  -KG     LTG 2 Progress Partially Met;Progressing  -KG     LTG 3 Ambulate 100 ft with RW safely demonstrate improved endurance/activity tolerance and gait kinematics for mobility inside the home  -KG     LTG 3 Progress Goal Revised;Progressing  -KG     LTG 4 Demonstrate improved " bilateral knee flex/ext MMT to 4+/5 or greater  -KG     LTG 4 Progress Progressing  -KG     LTG 5 Demonstrate imporved crystal hip flex/abd MMT to 4/5 or greater  -KG     LTG 5 Progress Progressing  -KG     LTG 6 Perform small obstacle course in // bars conisisting of bench step, sm hurldes, & airex pad demonstrating improved dynamic balance & LE stability  -KG     LTG 6 Progress Goal Revised;Progressing  -KG        Time Calculation    PT Goal Re-Cert Due Date 02/02/23  -KG           User Key  (r) = Recorded By, (t) = Taken By, (c) = Cosigned By    Initials Name Provider Type    KG Sharon Montesinos, PT Physical Therapist                Therapy Education  Given: HEP, Posture/body mechanics, Mobility training, Fall prevention and home safety  Program: Reinforced  How Provided: Verbal, Demonstration  Provided to: Patient  Level of Understanding: Verbalized, Demonstrated, Teach back education performed              Time Calculation:   Start Time: 1302  Stop Time: 1400  Time Calculation (min): 58 min  Therapy Charges for Today     Code Description Service Date Service Provider Modifiers Qty    02483570726  PT THER PROC EA 15 MIN 1/26/2023 Sharon Montesinos, PT GP 2    [id number]  PT THERAPEUTIC ACT EA 15 MIN 1/26/2023 Sharon Montesinos, PT GP 2                    Sharon Montesinos PT  1/26/2023

## 2023-01-30 ENCOUNTER — OFFICE VISIT (OUTPATIENT)
Dept: WOUND CARE | Facility: HOSPITAL | Age: 78
End: 2023-01-30
Payer: MEDICARE

## 2023-01-30 DIAGNOSIS — L97.922 NON-PRESSURE CHRONIC ULCER OF UNSPECIFIED PART OF LEFT LOWER LEG WITH FAT LAYER EXPOSED: ICD-10-CM

## 2023-01-30 DIAGNOSIS — L97.512 NON-PRESSURE CHRONIC ULCER OF OTHER PART OF RIGHT FOOT WITH FAT LAYER EXPOSED: ICD-10-CM

## 2023-01-30 DIAGNOSIS — E66.01 MORBID (SEVERE) OBESITY DUE TO EXCESS CALORIES: ICD-10-CM

## 2023-01-30 DIAGNOSIS — R26.89 OTHER ABNORMALITIES OF GAIT AND MOBILITY: ICD-10-CM

## 2023-01-30 PROCEDURE — 11042 DBRDMT SUBQ TIS 1ST 20SQCM/<: CPT | Performed by: NURSE PRACTITIONER

## 2023-01-30 PROCEDURE — 99213 OFFICE O/P EST LOW 20 MIN: CPT | Performed by: NURSE PRACTITIONER

## 2023-01-31 ENCOUNTER — APPOINTMENT (OUTPATIENT)
Dept: PHYSICAL THERAPY | Facility: HOSPITAL | Age: 78
End: 2023-01-31
Payer: MEDICARE

## 2023-01-31 ENCOUNTER — TRANSCRIBE ORDERS (OUTPATIENT)
Dept: ADMINISTRATIVE | Facility: HOSPITAL | Age: 78
End: 2023-01-31
Payer: MEDICARE

## 2023-01-31 DIAGNOSIS — I73.9 PERIPHERAL VASCULAR DISEASE, UNSPECIFIED: Primary | ICD-10-CM

## 2023-02-02 ENCOUNTER — APPOINTMENT (OUTPATIENT)
Dept: PHYSICAL THERAPY | Facility: HOSPITAL | Age: 78
End: 2023-02-02
Payer: MEDICARE

## 2023-02-03 DIAGNOSIS — R52 PAIN, UNSPECIFIED: ICD-10-CM

## 2023-02-03 DIAGNOSIS — R52 PAIN: ICD-10-CM

## 2023-02-03 RX ORDER — GABAPENTIN 300 MG/1
CAPSULE ORAL
Qty: 90 CAPSULE | Refills: 1 | Status: SHIPPED | OUTPATIENT
Start: 2023-02-03 | End: 2023-02-07 | Stop reason: SDUPTHER

## 2023-02-03 RX ORDER — GABAPENTIN 100 MG/1
CAPSULE ORAL
Qty: 90 CAPSULE | Refills: 1 | Status: SHIPPED | OUTPATIENT
Start: 2023-02-03 | End: 2023-02-07 | Stop reason: SDUPTHER

## 2023-02-03 NOTE — TELEPHONE ENCOUNTER
Rx Refill Note  Requested Prescriptions     Pending Prescriptions Disp Refills   • gabapentin (NEURONTIN) 100 MG capsule [Pharmacy Med Name: GABAPENTIN 100 MG CAPSULE] 90 capsule 1     Sig: TAKE 1 CAPSULE BY MOUTH EVERY MORNING FOR NERVE PAIN   • gabapentin (NEURONTIN) 300 MG capsule [Pharmacy Med Name: GABAPENTIN 300 MG CAPSULE] 90 capsule 1     Sig: TAKE 1 CAPSULE BY MOUTH AT BEDTIME EVERY DAY FOR NERVE PAIN      Last office visit with prescribing clinician: 1/20/2023   Last telemedicine visit with prescribing clinician: Visit date not found   Next office visit with prescribing clinician: Visit date not found   CPE done 03/01/2022    Drug therapy monitoring 12/12/2022  Contract 12/12/2022  UDS unable to void                       Would you like a call back once the refill request has been completed: [] Yes [] No    If the office needs to give you a call back, can they leave a voicemail: [] Yes [] No    Yue Schmidt MA  02/03/23, 15:25 CST

## 2023-02-03 NOTE — PROGRESS NOTES
Phone call today she is having changes in functional status in the morning for about 4 to 6 hours after he takes his a.m. medicines- Zaroxolyn two-point 2.5 mg Monday Wednesday Friday plus start taking Toprol XL 20 5 at night

## 2023-02-06 ENCOUNTER — OFFICE VISIT (OUTPATIENT)
Dept: WOUND CARE | Facility: HOSPITAL | Age: 78
End: 2023-02-06
Payer: MEDICARE

## 2023-02-06 ENCOUNTER — HOSPITAL ENCOUNTER (OUTPATIENT)
Dept: ULTRASOUND IMAGING | Facility: HOSPITAL | Age: 78
Discharge: HOME | End: 2023-02-06
Admitting: NURSE PRACTITIONER
Payer: MEDICARE

## 2023-02-06 DIAGNOSIS — L97.512 NON-PRESSURE CHRONIC ULCER OF OTHER PART OF RIGHT FOOT WITH FAT LAYER EXPOSED: ICD-10-CM

## 2023-02-06 DIAGNOSIS — L97.922 NON-PRESSURE CHRONIC ULCER OF UNSPECIFIED PART OF LEFT LOWER LEG WITH FAT LAYER EXPOSED: ICD-10-CM

## 2023-02-06 DIAGNOSIS — E66.01 MORBID (SEVERE) OBESITY DUE TO EXCESS CALORIES: ICD-10-CM

## 2023-02-06 DIAGNOSIS — R26.89 OTHER ABNORMALITIES OF GAIT AND MOBILITY: ICD-10-CM

## 2023-02-06 DIAGNOSIS — I73.9 PERIPHERAL VASCULAR DISEASE, UNSPECIFIED: ICD-10-CM

## 2023-02-06 PROCEDURE — 93923 UPR/LXTR ART STDY 3+ LVLS: CPT | Performed by: SURGERY

## 2023-02-06 PROCEDURE — 11042 DBRDMT SUBQ TIS 1ST 20SQCM/<: CPT | Performed by: NURSE PRACTITIONER

## 2023-02-06 PROCEDURE — 93923 UPR/LXTR ART STDY 3+ LVLS: CPT

## 2023-02-07 ENCOUNTER — APPOINTMENT (OUTPATIENT)
Dept: PHYSICAL THERAPY | Facility: HOSPITAL | Age: 78
End: 2023-02-07
Payer: MEDICARE

## 2023-02-07 ENCOUNTER — TELEPHONE (OUTPATIENT)
Dept: FAMILY MEDICINE CLINIC | Facility: CLINIC | Age: 78
End: 2023-02-07

## 2023-02-07 DIAGNOSIS — R52 PAIN: ICD-10-CM

## 2023-02-07 DIAGNOSIS — R52 PAIN, UNSPECIFIED: ICD-10-CM

## 2023-02-07 RX ORDER — GABAPENTIN 300 MG/1
CAPSULE ORAL
Qty: 90 CAPSULE | Refills: 3 | Status: SHIPPED | OUTPATIENT
Start: 2023-02-07

## 2023-02-07 RX ORDER — GABAPENTIN 100 MG/1
100 CAPSULE ORAL EVERY MORNING
Qty: 90 CAPSULE | Refills: 3 | Status: SHIPPED | OUTPATIENT
Start: 2023-02-07

## 2023-02-07 NOTE — TELEPHONE ENCOUNTER
Caller: DENNIS ZELAYA     Relationship: [employer]     Best call back number:    [phone] (Mobile)         Requested Prescriptions:    gabapentin (NEURONTIN) 100 MG capsule   gabapentin (NEURONTIN) 300 MG capsule       Pharmacy where request should be sent:    [facility]/pharmacy #[id number] - [place], [place] - [address] [place] [address] [phone] [facility] [phone]   [phone]  Additional details provided by patient: NONE     Does the patient have less than a 3 day supply:  [] Yes  [x] No    Would you like a call back once the refill request has been completed: [x] Yes [] No    If the office needs to give you a call back, can they leave a voicemail: [] Yes [x] No    Carolynn Heredia Rep   02/07/23 09:34 CST

## 2023-02-07 NOTE — TELEPHONE ENCOUNTER
Caller: DENNIS ZELAYA      Relationship: [employer]      Best call back number:     [phone] (Mobile)            Requested Prescriptions:    gabapentin (NEURONTIN) 100 MG capsule    gabapentin (NEURONTIN) 300 MG capsule         Pharmacy where request should be sent:    [facility]/pharmacy #[id number] - [place], [place] - [address] [place] [address] [phone] [facility] [phone]   [phone]  Additional details provided by patient: NONE      Does the patient have less than a 3 day supply:  []? Yes  [x]? No     Would you like a call back once the refill request has been completed: [x]? Yes []? No     If the office needs to give you a call back, can they leave a voicemail: []? Yes [x]? No     Carolynn Heredia Rep   02/07/23 09:34 CST

## 2023-02-07 NOTE — TELEPHONE ENCOUNTER
Drug therapy monitoring 12/12/2022  Contract 12/12/2022  UDS unable to void     Rx Refill Note  Requested Prescriptions     Pending Prescriptions Disp Refills   • gabapentin (NEURONTIN) 300 MG capsule 90 capsule 3     Sig: TAKE 1 CAPSULE BY MOUTH AT BEDTIME EVERY DAY FOR NERVE PAIN   • gabapentin (NEURONTIN) 100 MG capsule 90 capsule 3     Sig: Take 1 capsule by mouth Every Morning.      Last office visit with prescribing clinician: 1/20/2023   Last telemedicine visit with prescribing clinician: Visit date not found   Next office visit with prescribing clinician: Visit date not found                         Would you like a call back once the refill request has been completed: [] Yes [] No    If the office needs to give you a call back, can they leave a voicemail: [] Yes [] No    Yue Dougherty MA  02/07/23, 12:24 CST

## 2023-02-09 ENCOUNTER — HOSPITAL ENCOUNTER (OUTPATIENT)
Dept: PHYSICAL THERAPY | Facility: HOSPITAL | Age: 78
Setting detail: THERAPIES SERIES
Discharge: HOME | End: 2023-02-09
Payer: MEDICARE

## 2023-02-09 DIAGNOSIS — R53.83 FATIGUE, UNSPECIFIED TYPE: Primary | ICD-10-CM

## 2023-02-09 PROCEDURE — 97110 THERAPEUTIC EXERCISES: CPT | Performed by: PHYSICAL THERAPIST

## 2023-02-09 PROCEDURE — 97110 THERAPEUTIC EXERCISES: CPT

## 2023-02-10 NOTE — THERAPY PROGRESS REPORT/RE-CERT
"    Outpatient Physical Therapy Ortho Progress Note  [facility], [place]     Patient Name: Samy Velazquez  : 1945  MRN: [id number]  Today's Date: 2023      Visit Date: 2023     Attendance: 18 visits  Subjective improvement: \"has been a bad 2 weeks\"  MD appt: prn  Recheck due: 3/2/2023    Visit Dx:    ICD-10-CM ICD-9-CM   1. Fatigue, unspecified type  R53.83 780.79       Patient Active Problem List   Diagnosis   • Ischemic heart disease due to coronary artery obstruction (HCC)   • Osteoarthritis of multiple joints   • Cardiac pacemaker   • Gout of multiple sites   • Nephrolithiasis   • Mixed hyperlipidemia   • Morbidly obese ([facility])   • Hypertensive heart disease with heart failure (HCC)   • Major depressive disorder, recurrent, moderate (HCC)   • Chronic kidney disease, stage 4 (severe) (HCC)   • Ventricular tachycardia   • Chronic systolic heart failure (HCC)   • Hypothyroidism   • Paroxysmal atrial fibrillation (HCC)   • Coronary atherosclerosis   • Essential hypertension   • Weakness of right lower extremity        Past Medical History:   Diagnosis Date   • Arthritis    • Coronary artery disease    • Hyperlipidemia    • Hypertensive heart disease with heart failure (HCC) 3/29/2021   • Hypothyroidism 2021   • Kidney stone    • Major depressive disorder, recurrent, moderate (HCC) 3/29/2021   • Morbidly obese (HCC) 2020        Past Surgical History:   Procedure Laterality Date   • CARDIAC DEFIBRILLATOR PLACEMENT     • CARDIAC DEFIBRILLATOR PLACEMENT N/A    • CARDIAC SURGERY     • ENDOSCOPY N/A 10/30/2020    Procedure: ESOPHAGOGASTRODUODENOSCOPY WITH ANESTHESIA;  Surgeon: Brent Stanley MD;  Location: [facility];  Service: Gastroenterology;  Laterality: N/A;  pre dysphagia  post post op gastric surgery  dr jose manuel smith   • ESOPHAGUS SURGERY     • KNEE SURGERY     • TOTAL SHOULDER REPLACEMENT                         PT Ortho     Row Name 23 1300       Subjective Comments    " Subjective Comments Pt states he's not had a very good week. Had to cancel Tuesday's appt because he didn't have the strength or energy to get here. States he goes to wound care 1x/week. When they debride the wound on his foot, he can't walk or stand for 2 days. States both knees have been hurting and he hasn't been able to do much. Wife reports it's also been a bad week or two. Having more trouble getting him up and trouble with walking/transfers.  -KG       Precautions and Contraindications    Precautions CVA with R sided weakness, heart disease, arthritis  -KG    Contraindications Pacemaker/defibulator  -KG       Posture/Observations    Posture/Observations Comments Presents in transport chair. Knee brace donned on R knee. Ortho shoe on R due to wound. Continues with poor overall postural awareness with rounded shoulders/back. Slumped when seated. Knee varus noted bilaterally R>L with gait. Decreased TKE during stance R>L.  -KG       General ROM    GENERAL ROM COMMENTS Bilateral shoulder flex/abd limited to ~100 deg. Elbow/wrist AROM WFL. Bilateral hip, knee, ankle AROM WFL but weakness present. Decreased knee ext on R vs L.  -KG       MMT Right Upper Ext    Rt Shoulder Flexion MMT, Gross Movement (3+/5) fair plus  -KG    Rt Shoulder ABduction MMT, Gross Movement (3+/5) fair plus  -KG    Rt Elbow Flexion MMT, Gross Movement: (4+/5) good plus  -KG    Rt Elbow Extension MMT, Gross Movement: (4/5) good  -KG       MMT Left Upper Ext    Lt Shoulder Flexion MMT, Gross Movement (3+/5) fair plus  -KG    Lt Shoulder ABduction MMT, Gross Movement (3+/5) fair plus  -KG    Lt Elbow Flexion MMT, Gross Movement (4+/5) good plus  -KG    Lt Elbow Extension MMT, Gross Movement (4/5) good  -KG       MMT Right Lower Ext    Rt Hip Flexion MMT, Gross Movement (4-/5) good minus  -KG    Rt Hip ABduction MMT, Gross Movement (4-/5) good minus  -KG    Rt Hip ADduction MMT, Gross Movement (4/5) good  -KG    Rt Knee Extension MMT, Gross  Movement (4-/5) good minus  -KG    Rt Knee Flexion MMT, Gross Movement (4/5) good  -KG    Rt Ankle Plantarflexion MMT, Gross Movement (4+/5) good plus  -KG    Rt Ankle Dorsiflexion MMT, Gross Movement (4+/5) good plus  -KG    Rt Lower Extremity Comments  MMT performed seated  -KG       MMT Left Lower Ext    Lt Hip Flexion MMT, Gross Movement (4-/5) good minus  -KG    Lt Hip ABduction MMT, Gross Movement (3+/5) fair plus  -KG    Lt Hip ADduction MMT, Gross Movement (4/5) good  -KG    Lt Knee Extension MMT, Gross Movement (4/5) good  -KG    Lt Knee Flexion MMT, Gross Movement (4/5) good  -KG    Lt Ankle Plantarflexion MMT, Gross Movement (4+/5) good plus  -KG    Lt Ankle Dorsiflexion MMT, Gross Movement (4+/5) good plus  -KG    Lt Lower Extremity Comments  MMT performed seated  -KG       Sensation    Sensation WNL? WFL  -KG    Light Touch No apparent deficits  -KG       Lower Extremity Flexibility    Overall LE Flexibility Moderately limited  -KG    Hamstrings Bilateral:;Moderately limited  -KG    Hip Flexors Bilateral:;Moderately limited  -KG    Gastrocnemius Bilateral:;Moderately limited  -KG       Balance Skills Training    Balance Comments Difficulty with static balance without UE on walker or in // bars. Unable to get knees in good position; keeps them flexed and unable to acheive better TKE. Limited by both pain & weakness.  -KG       Transfers    Comment, (Transfers) Min assist x2 with sit>stand transfers. More challenging/difficult for pt this date compared to previous visits.  -KG       Gait/Stairs (Locomotion)    Comment, (Gait/Stairs) Pt ambulated ~10 ft in // bars. Difficulty getting better TKE on R during stance in order to advance LLE. Decreased step length & height bilaterally. Fwd flexed at hips. Min-mod assist x2 for gait this date.  -KG          User Key  (r) = Recorded By, (t) = Taken By, (c) = Cosigned By    Initials Name Provider Type    Sharon Berg, PT Physical Therapist                       PT Assessment/Plan     Row Name 02/09/23 1400          PT Assessment    Functional Limitations Decreased safety during functional activities;Impaired gait;Impaired locomotion;Limitation in home management;Limitations in community activities;Limitations in functional capacity and performance;Performance in leisure activities;Performance in self-care ADL  -KG     Impairments Balance;Gait;Impaired flexibility;Impaired muscle endurance;Locomotion;Muscle strength;Pain;Poor body mechanics;Posture;Range of motion  -KG     Assessment Comments Pt continues to make slow progress with PT and has showed some decline in functional mobility, strength, & performance over the past couple weeks. Pt & pt's wife note that he has had a very hard time even just standing up and can hardly take any steps without pain/weakness in his BLE's. Pt reports after he goes to wound care and they debride the wound on his R foot, he can't hardly stand/walk for 2 days. Pt required increased assistance with sit>stand transfers and gait. Was only able to ambulate ~10 ft this date and demonstrated poor kinematics at R>LLE. Pt fatigued very easily with treatment this date. Have been working on bed mobility, transfers, gait, & functional strengthening with PT and pt/wife still report issues at home. They also report increased difficulty getting in/out of car. Pt does have a knee braces for both knees, notes they do help with some stability. Due to pt's decline in functional mobility, difficulty with transfers, ability to get to PT, and decreased tolerance to treatment, advised that at this time he might benefit from home health. As pt improves, he may return to outpatient. Pt currently still in wound care 1x/week which is also limits his performance with PT. Wife notes he has an appt with wound care on Monday 2/13 and they were going to discuss his current frequency. Will place pt hon hold at this time. Will talk to referring physician  "regarding pt's status and possible home health referral.  -KG     Rehab Potential Fair  -KG     Patient/caregiver participated in establishment of treatment plan and goals Yes  -KG     Patient would benefit from skilled therapy intervention Yes  -KG        PT Plan    Predicted Duration of Therapy Intervention (PT) Pt currently on hold  -KG     PT Plan Comments Will discuss pt's status with referring/primary care physician and discuss potential referral to home health.  -KG           User Key  (r) = Recorded By, (t) = Taken By, (c) = Cosigned By    Initials Name Provider Type    KG Sharon Montesinos, PT Physical Therapist                     OP Exercises     Row Name 02/09/23 1300             Subjective Pain    Able to rate subjective pain? yes  -KG      Pre-Treatment Pain Level 6  -KG         Exercise 1    Exercise Name 1 Sit<>stand from wheelchair/chair throughout treatment  -KG      Reps 1 ~3  -KG      Additional Comments min assist x2  -KG         Exercise 2    Exercise Name 2 Gait in parallel bars  -KG      Cueing 2 Verbal;Tactile  -KG      Additional Comments min-mod assist x2  -KG         Exercise 4    Exercise Name 4 BLE strength measurements  -KG         Exercise 5    Exercise Name 5 assisted PT with transfers and guarding of pt with gait  -PM      Cueing 5 Verbal;Tactile  -PM      Time 5 ~5 min  -PM         Exercise 6    Exercise Name 6 ball add squeezes  -PM      Reps 6 15  -PM      Time 6 3-5\"  -PM         Exercise 7    Exercise Name 7 seated hip abd  -PM      Cueing 7 Verbal  -PM      Sets 7 2  -PM      Reps 7 10  -PM      Additional Comments YTB  -PM         Exercise 8    Exercise Name 8 seated ham curls with pillow case under foot  -PM      Cueing 8 Verbal  -PM      Sets 8 2  -PM      Reps 8 8  -PM      Additional Comments YTB L; no band R  -PM         Exercise 9    Exercise Name 9 LAQ  -PM      Cueing 9 Verbal;Tactile;Demo  -PM      Sets 9 2  -PM      Reps 9 8  -PM      Time 9 3-5 count hold  -PM      " "   Exercise 10    Exercise Name 10 seated hip flexion  -PM      Cueing 10 Verbal  visual cue - raise knee to PTA hand  -PM      Sets 10 1  -PM      Reps 10 10  -PM            User Key  (r) = Recorded By, (t) = Taken By, (c) = Cosigned By    Initials Name Provider Type    PM Kristin Dillard, ZANA Physical Therapist Assistant    KG Sharon Montesinos, PT Physical Therapist                              PT OP Goals     Row Name 02/09/23 1400          PT Short Term Goals    STG 1 Demonstrate independence/compliance in performance of initial HEP  -KG     STG 1 Progress Met  -KG     STG 2 Ambulate 50 ft with RW with CGA/SBA demonstrating improved LE strength/stability and improved step lenght/height  -KG     STG 2 Progress Partially Met;Progressing  -KG     STG 3 Perform static balance FA in parallel bars for 20\"-30\" with improved posture, no LOB, and improved LE control  -KG     STG 3 Progress Met  -KG     STG 4 Demonstrate improved bilateral hip flex/abd MMT to 4-/5  -KG     STG 4 Progress Met  -KG     STG 5 Perform sit<>stand transfer from transport chair with CGA demonstrating improved BLE/BUE functional strength  -KG     STG 5 Progress Progressing;Partially Met  Intermittently needs min assist  -KG        Long Term Goals    LTG Date to Achieve 02/23/23  -KG     LTG 1 Subjectively report 50% overall improvement or greater in gait/functional mobility & daily activity performance  -KG     LTG 1 Progress Progressing  -KG     LTG 2 Perform 1x10 sit<>stand tranfers from plinth or airex in chair with BUE assist demonstrate improved BLE functional strength & endurance  -KG     LTG 2 Progress Partially Met;Progressing  -KG     LTG 3 Ambulate 100 ft with RW safely demonstrate improved endurance/activity tolerance and gait kinematics for mobility inside the home  -KG     LTG 3 Progress Goal Revised;Progressing  -KG     LTG 4 Demonstrate improved bilateral knee flex/ext MMT to 4/5 or greater  -KG     LTG 4 Progress Partially " Met;Progressing  -KG     LTG 5 Demonstrate imporved crystal hip flex/abd MMT to 4/5 or greater  -KG     LTG 5 Progress Progressing  -KG     LTG 6 Perform small obstacle course in // bars conisisting of bench step, sm hurldes, & airex pad demonstrating improved dynamic balance & LE stability  -KG     LTG 6 Progress Goal Revised;Progressing  -KG        Time Calculation    PT Goal Re-Cert Due Date 03/02/23  -KG           User Key  (r) = Recorded By, (t) = Taken By, (c) = Cosigned By    Initials Name Provider Type    Sharon Berg, PT Physical Therapist                Therapy Education  Given: HEP, Posture/body mechanics, Mobility training, Fall prevention and home safety  Program: Reinforced  How Provided: Verbal, Demonstration  Provided to: Patient  Level of Understanding: Verbalized, Demonstrated, Teach back education performed    Outcome Measure Options: Lower Extremity Functional Scale (LEFS), Timed Up and Go (TUG)  Lower Extremity Functional Index  Any of your usual work, housework or school activities: Extreme difficulty or unable to perform activity  Your usual hobbies, recreational or sporting activities: Extreme difficulty or unable to perform activity  Getting into or out of the bath: Moderate difficulty  Walking between rooms: Moderate difficulty  Putting on your shoes or socks: Quite a bit of difficulty  Squatting: Extreme difficulty or unable to perform activity  Lifting an object, like a bag of groceries from the floor: Quite a bit of difficulty  Performing light activities around your home: Quite a bit of difficulty  Performing heavy activities around your home: Extreme difficulty or unable to perform activity  Getting into or out of a car: Quite a bit of difficulty  Walking 2 blocks: Extreme difficulty or unable to perform activity  Walking a mile: Extreme difficulty or unable to perform activity  Going up or down 10 stairs (about 1 flight of stairs): Extreme difficulty or unable to perform  activity  Standing for 1 hour: Extreme difficulty or unable to perform activity  Sitting for 1 hour: Extreme difficulty or unable to perform activity  Running on even ground: Extreme difficulty or unable to perform activity  Running on uneven ground: Extreme difficulty or unable to perform activity  Making sharp turns while running fast: Extreme difficulty or unable to perform activity  Hopping: Extreme difficulty or unable to perform activity  Rolling over in bed: Extreme difficulty or unable to perform activity  Total: 8      Time Calculation:   Start Time: 1351  Stop Time: 1430  Time Calculation (min): 39 min  Therapy Charges for Today     Code Description Service Date Service Provider Modifiers Qty    [id number]  PT THER PROC EA 15 MIN 2/9/2023 Sharon Montesinos, PT GP 1      Therapeutic procedure x1; Kristin Dillard PTA    PT G-Codes  Outcome Measure Options: Lower Extremity Functional Scale (LEFS), Timed Up and Go (TUG)  Total: 8         Sharon Montesinos, PT  2/9/2023

## 2023-02-13 ENCOUNTER — OFFICE VISIT (OUTPATIENT)
Dept: WOUND CARE | Facility: HOSPITAL | Age: 78
End: 2023-02-13
Payer: MEDICARE

## 2023-02-13 DIAGNOSIS — E66.01 MORBID (SEVERE) OBESITY DUE TO EXCESS CALORIES: ICD-10-CM

## 2023-02-13 DIAGNOSIS — L97.922 NON-PRESSURE CHRONIC ULCER OF UNSPECIFIED PART OF LEFT LOWER LEG WITH FAT LAYER EXPOSED: ICD-10-CM

## 2023-02-13 DIAGNOSIS — R26.89 OTHER ABNORMALITIES OF GAIT AND MOBILITY: ICD-10-CM

## 2023-02-13 DIAGNOSIS — L97.512 NON-PRESSURE CHRONIC ULCER OF OTHER PART OF RIGHT FOOT WITH FAT LAYER EXPOSED: ICD-10-CM

## 2023-02-13 PROCEDURE — 99213 OFFICE O/P EST LOW 20 MIN: CPT | Performed by: NURSE PRACTITIONER

## 2023-02-13 PROCEDURE — G0463 HOSPITAL OUTPT CLINIC VISIT: HCPCS

## 2023-02-14 DIAGNOSIS — R53.1 WEAKNESS: ICD-10-CM

## 2023-02-14 DIAGNOSIS — R53.83 FATIGUE, UNSPECIFIED TYPE: Primary | ICD-10-CM

## 2023-02-14 DIAGNOSIS — Z91.81 AT HIGH RISK FOR FALLS: ICD-10-CM

## 2023-02-20 ENCOUNTER — OFFICE VISIT (OUTPATIENT)
Dept: FAMILY MEDICINE CLINIC | Facility: CLINIC | Age: 78
End: 2023-02-20
Payer: MEDICARE

## 2023-02-20 VITALS
HEIGHT: 66 IN | TEMPERATURE: 97.4 F | RESPIRATION RATE: 16 BRPM | DIASTOLIC BLOOD PRESSURE: 76 MMHG | BODY MASS INDEX: 35.03 KG/M2 | OXYGEN SATURATION: 98 % | WEIGHT: 218 LBS | SYSTOLIC BLOOD PRESSURE: 110 MMHG | HEART RATE: 74 BPM

## 2023-02-20 DIAGNOSIS — J01.00 ACUTE NON-RECURRENT MAXILLARY SINUSITIS: Primary | ICD-10-CM

## 2023-02-20 PROCEDURE — 87428 SARSCOV & INF VIR A&B AG IA: CPT | Performed by: FAMILY MEDICINE

## 2023-02-20 PROCEDURE — 99213 OFFICE O/P EST LOW 20 MIN: CPT | Performed by: FAMILY MEDICINE

## 2023-02-20 PROCEDURE — 96372 THER/PROPH/DIAG INJ SC/IM: CPT | Performed by: FAMILY MEDICINE

## 2023-02-20 RX ORDER — METOPROLOL SUCCINATE 25 MG/1
12.5 TABLET, EXTENDED RELEASE ORAL DAILY
Qty: 90 TABLET | Refills: 3 | Status: SHIPPED | OUTPATIENT
Start: 2023-02-20

## 2023-02-20 RX ORDER — TRIAMCINOLONE ACETONIDE 40 MG/ML
40 INJECTION, SUSPENSION INTRA-ARTICULAR; INTRAMUSCULAR ONCE
Status: COMPLETED | OUTPATIENT
Start: 2023-02-20 | End: 2023-02-20

## 2023-02-20 RX ORDER — CEFUROXIME AXETIL 250 MG/1
250 TABLET ORAL 2 TIMES DAILY
Qty: 14 TABLET | Refills: 0 | Status: SHIPPED | OUTPATIENT
Start: 2023-02-20 | End: 2023-02-27

## 2023-02-20 RX ADMIN — TRIAMCINOLONE ACETONIDE 40 MG: 40 INJECTION, SUSPENSION INTRA-ARTICULAR; INTRAMUSCULAR at 11:12

## 2023-02-20 NOTE — PROGRESS NOTES
Subjective   Samy Velazquez is a 77 y.o. male.     History of Present Illness  77-year-old male with sinusitis      The following portions of the patient's history were reviewed and updated as appropriate: allergies, current medications, past family history, past medical history, past social history, past surgical history and problem list.    Review of Systems   Constitutional: Positive for fatigue. Negative for fever.   HENT: Positive for postnasal drip. Negative for ear discharge and facial swelling.    Respiratory: Positive for cough. Negative for shortness of breath.    Cardiovascular: Negative for chest pain and leg swelling.   Gastrointestinal: Negative for diarrhea.       Objective   Physical Exam  Vitals and nursing note reviewed.   Constitutional:       Appearance: He is obese.   HENT:      Mouth/Throat:      Mouth: Mucous membranes are moist.      Pharynx: Oropharynx is clear.   Cardiovascular:      Rate and Rhythm: Normal rate and regular rhythm.   Pulmonary:      Effort: Pulmonary effort is normal.      Breath sounds: Normal breath sounds.   Musculoskeletal:      Right lower leg: No edema.      Left lower leg: No edema.   Skin:     General: Skin is warm and dry.   Neurological:      General: No focal deficit present.      Mental Status: He is alert and oriented to person, place, and time.   Psychiatric:         Mood and Affect: Mood normal.         Behavior: Behavior normal.         Assessment & Plan   Diagnoses and all orders for this visit:    1. Acute non-recurrent maxillary sinusitis (Primary)  -     POCT SARS-CoV-2 Antigen KENNETH  -     triamcinolone acetonide (KENALOG-40) injection 40 mg    Other orders  -     metoprolol succinate XL (TOPROL-XL) 25 MG 24 hr tablet; Take 0.5 tablets by mouth Daily.  Dispense: 90 tablet; Refill: 3  -     cefuroxime (CEFTIN) 250 MG tablet; Take 1 tablet by mouth 2 (Two) Times a Day for 7 days.  Dispense: 14 tablet; Refill: 0           Plan COVID and flu negative-above plus  Zyrtec

## 2023-02-22 ENCOUNTER — OFFICE VISIT (OUTPATIENT)
Dept: WOUND CARE | Facility: HOSPITAL | Age: 78
End: 2023-02-22
Payer: MEDICARE

## 2023-02-22 DIAGNOSIS — L97.928 NON-PRESSURE CHRONIC ULCER OF UNSPECIFIED PART OF LEFT LOWER LEG WITH OTHER SPECIFIED SEVERITY: ICD-10-CM

## 2023-02-22 DIAGNOSIS — E66.01 MORBID (SEVERE) OBESITY DUE TO EXCESS CALORIES: ICD-10-CM

## 2023-02-22 DIAGNOSIS — R26.89 OTHER ABNORMALITIES OF GAIT AND MOBILITY: ICD-10-CM

## 2023-02-22 PROCEDURE — 97597 DBRDMT OPN WND 1ST 20 CM/<: CPT | Performed by: NURSE PRACTITIONER

## 2023-03-01 ENCOUNTER — OFFICE VISIT (OUTPATIENT)
Dept: WOUND CARE | Facility: HOSPITAL | Age: 78
End: 2023-03-01
Payer: MEDICARE

## 2023-03-01 DIAGNOSIS — L97.928 NON-PRESSURE CHRONIC ULCER OF UNSPECIFIED PART OF LEFT LOWER LEG WITH OTHER SPECIFIED SEVERITY: ICD-10-CM

## 2023-03-01 DIAGNOSIS — R26.89 OTHER ABNORMALITIES OF GAIT AND MOBILITY: ICD-10-CM

## 2023-03-01 DIAGNOSIS — E66.01 MORBID (SEVERE) OBESITY DUE TO EXCESS CALORIES: ICD-10-CM

## 2023-03-01 PROCEDURE — 11042 DBRDMT SUBQ TIS 1ST 20SQCM/<: CPT | Performed by: SURGERY

## 2023-03-01 PROCEDURE — 99213 OFFICE O/P EST LOW 20 MIN: CPT | Performed by: SURGERY

## 2023-03-09 ENCOUNTER — OFFICE VISIT (OUTPATIENT)
Dept: WOUND CARE | Facility: HOSPITAL | Age: 78
End: 2023-03-09
Payer: MEDICARE

## 2023-03-09 DIAGNOSIS — M20.11 HALLUX VALGUS (ACQUIRED), RIGHT FOOT: ICD-10-CM

## 2023-03-09 DIAGNOSIS — M20.41 OTHER HAMMER TOE(S) (ACQUIRED), RIGHT FOOT: ICD-10-CM

## 2023-03-09 DIAGNOSIS — R26.89 OTHER ABNORMALITIES OF GAIT AND MOBILITY: ICD-10-CM

## 2023-03-09 DIAGNOSIS — L97.922 NON-PRESSURE CHRONIC ULCER OF UNSPECIFIED PART OF LEFT LOWER LEG WITH FAT LAYER EXPOSED: ICD-10-CM

## 2023-03-09 DIAGNOSIS — M20.12 HALLUX VALGUS (ACQUIRED), LEFT FOOT: ICD-10-CM

## 2023-03-09 DIAGNOSIS — M79.671 PAIN IN RIGHT FOOT: ICD-10-CM

## 2023-03-09 DIAGNOSIS — E66.01 MORBID (SEVERE) OBESITY DUE TO EXCESS CALORIES: ICD-10-CM

## 2023-03-09 PROCEDURE — 11042 DBRDMT SUBQ TIS 1ST 20SQCM/<: CPT | Performed by: PODIATRIST

## 2023-03-09 PROCEDURE — 99213 OFFICE O/P EST LOW 20 MIN: CPT | Performed by: PODIATRIST

## 2023-03-13 RX ORDER — FAMOTIDINE 20 MG/1
TABLET, FILM COATED ORAL
Qty: 180 TABLET | Refills: 3 | Status: SHIPPED | OUTPATIENT
Start: 2023-03-13

## 2023-03-13 RX ORDER — MEMANTINE HYDROCHLORIDE 10 MG/1
TABLET ORAL
Qty: 180 TABLET | Refills: 3 | Status: SHIPPED | OUTPATIENT
Start: 2023-03-13

## 2023-03-13 NOTE — TELEPHONE ENCOUNTER
Rx Refill Note  Requested Prescriptions     Pending Prescriptions Disp Refills   • famotidine (PEPCID) 20 MG tablet [Pharmacy Med Name: FAMOTIDINE 20 MG TABLET] 180 tablet 3     Sig: TAKE 1 TABLET BY MOUTH TWICE A DAY      Last office visit with prescribing clinician: 2/20/23  Last telemedicine visit with prescribing clinician: Visit date not found   Next office visit with prescribing clinician: Visit date not found   {  Yue Dougherty MA  03/13/23, 07:36 CDT

## 2023-03-13 NOTE — TELEPHONE ENCOUNTER
Rx Refill Note  Requested Prescriptions     Pending Prescriptions Disp Refills   • memantine (NAMENDA) 10 MG tablet [Pharmacy Med Name: MEMANTINE HCL 10 MG TABLET] 180 tablet 3     Sig: TAKE 1 TABLET BY MOUTH TWICE A DAY      Last office visit with prescribing clinician: 2/20/2023   Last telemedicine visit with prescribing clinician: 3/11/2023   Next office visit with prescribing clinician: Visit date not found   {  Yue Dougherty MA  03/13/23, 07:34 CDT

## 2023-03-16 ENCOUNTER — OFFICE VISIT (OUTPATIENT)
Dept: WOUND CARE | Facility: HOSPITAL | Age: 78
End: 2023-03-16
Payer: MEDICARE

## 2023-03-16 DIAGNOSIS — M20.41 OTHER HAMMER TOE(S) (ACQUIRED), RIGHT FOOT: ICD-10-CM

## 2023-03-16 DIAGNOSIS — L97.922 NON-PRESSURE CHRONIC ULCER OF UNSPECIFIED PART OF LEFT LOWER LEG WITH FAT LAYER EXPOSED: ICD-10-CM

## 2023-03-16 DIAGNOSIS — M79.671 PAIN IN RIGHT FOOT: ICD-10-CM

## 2023-03-16 DIAGNOSIS — E66.01 MORBID (SEVERE) OBESITY DUE TO EXCESS CALORIES: ICD-10-CM

## 2023-03-16 DIAGNOSIS — R26.89 OTHER ABNORMALITIES OF GAIT AND MOBILITY: ICD-10-CM

## 2023-03-16 DIAGNOSIS — M20.42 OTHER HAMMER TOE(S) (ACQUIRED), LEFT FOOT: ICD-10-CM

## 2023-03-17 DIAGNOSIS — R52 PAIN: ICD-10-CM

## 2023-03-17 RX ORDER — HYDROCODONE BITARTRATE AND ACETAMINOPHEN 5; 325 MG/1; MG/1
1 TABLET ORAL EVERY 8 HOURS PRN
Qty: 60 TABLET | Refills: 0 | Status: SHIPPED | OUTPATIENT
Start: 2023-03-17 | End: 2023-04-03 | Stop reason: SDUPTHER

## 2023-03-17 NOTE — TELEPHONE ENCOUNTER
Caller: Beth Velazquez    Relationship: Emergency Contact    Best call back number: [phone]    Requested Prescriptions:   Requested Prescriptions     Pending Prescriptions Disp Refills   • HYDROcodone-acetaminophen (NORCO) 5-325 MG per tablet 60 tablet 0     Sig: Take 1 tablet by mouth Every 8 (Eight) Hours As Needed for Moderate Pain.        Pharmacy where request should be sent: [id number] - [address] [phone] [facility] [phone] [facility]     Additional details provided by patient: HAS ABOUT 10 LEFT BUT DID NOT KNOW HOW SOON TO CALL    Does the patient have less than a 3 day supply:  [] Yes  [x] No    Would you like a call back once the refill request has been completed: [x] Yes [] No    If the office needs to give you a call back, can they leave a voicemail: [] Yes [x] No    Sarah Campos, Carolynn Rep   03/17/23 11:29 CDT

## 2023-03-17 NOTE — TELEPHONE ENCOUNTER
Rx Refill Note  Requested Prescriptions     Pending Prescriptions Disp Refills   • HYDROcodone-acetaminophen (NORCO) 5-325 MG per tablet 60 tablet 0     Sig: Take 1 tablet by mouth Every 8 (Eight) Hours As Needed for Moderate Pain.      Last office visit with prescribing clinician: 2/20/2023   Last telemedicine visit with prescribing clinician: Visit date not found   Next office visit with prescribing clinician: Visit date not found   CPE done     Drug therapy monitoring 02/20/2023  Contract 12/12/2022  UDS unable to void                   {TIP  Is Refill Pharmacy correct?: yes    Yue Schmidt MA  03/17/23, 12:28 CDT

## 2023-03-23 ENCOUNTER — OFFICE VISIT (OUTPATIENT)
Dept: WOUND CARE | Facility: HOSPITAL | Age: 78
End: 2023-03-23
Payer: MEDICARE

## 2023-03-23 DIAGNOSIS — E66.01 MORBID (SEVERE) OBESITY DUE TO EXCESS CALORIES: ICD-10-CM

## 2023-03-23 DIAGNOSIS — R26.89 OTHER ABNORMALITIES OF GAIT AND MOBILITY: ICD-10-CM

## 2023-03-23 DIAGNOSIS — L97.922 NON-PRESSURE CHRONIC ULCER OF UNSPECIFIED PART OF LEFT LOWER LEG WITH FAT LAYER EXPOSED: ICD-10-CM

## 2023-03-23 DIAGNOSIS — M79.671 PAIN IN RIGHT FOOT: ICD-10-CM

## 2023-03-23 PROCEDURE — G0463 HOSPITAL OUTPT CLINIC VISIT: HCPCS

## 2023-03-24 DIAGNOSIS — E03.9 HYPOTHYROIDISM, UNSPECIFIED: ICD-10-CM

## 2023-03-24 RX ORDER — LEVOTHYROXINE SODIUM 0.05 MG/1
TABLET ORAL
Qty: 90 TABLET | Refills: 0 | Status: SHIPPED | OUTPATIENT
Start: 2023-03-24

## 2023-03-24 NOTE — TELEPHONE ENCOUNTER
Rx Refill Note  Requested Prescriptions     Pending Prescriptions Disp Refills   • levothyroxine (SYNTHROID, LEVOTHROID) 50 MCG tablet [Pharmacy Med Name: LEVOTHYROXINE 50 MCG TABLET] 90 tablet 3     Sig: TAKE 1 TABLET BY MOUTH EVERY DAY      Last office visit with prescribing clinician: 2/20/23  Last telemedicine visit with prescribing clinician: Visit date not found   Next office visit with prescribing clinician: Visit date not found       {TIP  Please add Last Relevant Lab 12/12/22      Yue Dougherty MA  03/24/23, 07:33 CDT

## 2023-03-27 ENCOUNTER — TELEPHONE (OUTPATIENT)
Dept: FAMILY MEDICINE CLINIC | Facility: CLINIC | Age: 78
End: 2023-03-27
Payer: MEDICARE

## 2023-03-27 DIAGNOSIS — R27.0 ATAXIA: Primary | ICD-10-CM

## 2023-03-27 NOTE — TELEPHONE ENCOUNTER
Spoke with Beth. Wound not open.    Beth asking if patient needs referral for physical therapy.  States was discharged when patient started wound care.  Please advise.

## 2023-03-27 NOTE — TELEPHONE ENCOUNTER
Caller: Beth Velazquez    Relationship: Emergency Contact    Best call back number: [phone]    What is the best time to reach you: ANYTIME    Who are you requesting to speak with (clinical staff, provider,  specific staff member): CLINICAL      What was the call regarding: PATIENTS WIFE STATED THAT PATIENT WAS DISMISSED BY WOUND CARE SPECIALIST 3.16.23. SHE WOULD LIKE A CALL BACK FROM [facility] TO SEE IF DR. DENNY WANTS TO SEE HIM OR IF THEY CAN REFER HIM TO SOMEONE ELSE.    Do you require a callback: YES CALL THE NUMBER LISTED ABOVE OR THE [facility] NUMBER [phone]

## 2023-04-03 DIAGNOSIS — R52 PAIN: ICD-10-CM

## 2023-04-03 RX ORDER — HYDROCODONE BITARTRATE AND ACETAMINOPHEN 5; 325 MG/1; MG/1
1 TABLET ORAL EVERY 8 HOURS PRN
Qty: 60 TABLET | Refills: 0 | Status: SHIPPED | OUTPATIENT
Start: 2023-04-03

## 2023-04-03 NOTE — TELEPHONE ENCOUNTER
[facility] has it and I let Beth know that can get it at [facility] tomorrow.         Rx Refill Note  Requested Prescriptions     Pending Prescriptions Disp Refills   • HYDROcodone-acetaminophen (NORCO) 5-325 MG per tablet 60 tablet 0     Sig: Take 1 tablet by mouth Every 8 (Eight) Hours As Needed for Moderate Pain.      Last office visit with prescribing clinician: 2/20/2023   Last telemedicine visit with prescribing clinician: Visit date not found   Next office visit with prescribing clinician: Visit date not found                         Would you like a call back once the refill request has been completed: [] Yes [] No    If the office needs to give you a call back, can they leave a voicemail: [] Yes [] No    Kerry Owens, PCT  04/03/23, 16:03 CDT

## 2023-04-03 NOTE — TELEPHONE ENCOUNTER
Pharmacy called- said they had faxed back that hydrocodone 5/325 is on backorder and haven't been able to get it for several weeks- it was faxed back Fill as is but they can't as they dont have any. She wants us to send in an RX for something different please.

## 2023-04-04 ENCOUNTER — HOSPITAL ENCOUNTER (OUTPATIENT)
Dept: PHYSICAL THERAPY | Facility: HOSPITAL | Age: 78
Setting detail: THERAPIES SERIES
Discharge: HOME | End: 2023-04-04
Payer: MEDICARE

## 2023-04-04 DIAGNOSIS — R27.0 ATAXIA: Primary | ICD-10-CM

## 2023-04-04 DIAGNOSIS — R29.898 WEAKNESS OF BOTH LOWER EXTREMITIES: ICD-10-CM

## 2023-04-04 PROCEDURE — 97162 PT EVAL MOD COMPLEX 30 MIN: CPT | Performed by: PHYSICAL THERAPIST

## 2023-04-04 PROCEDURE — 97110 THERAPEUTIC EXERCISES: CPT | Performed by: PHYSICAL THERAPIST

## 2023-04-05 NOTE — THERAPY EVALUATION
Outpatient Physical Therapy Ortho Initial Evaluation  [facility]     Patient Name: Samy Velazquez  : 1945  MRN: [id number]  Today's Date: 2023      Visit Date: 2023     Attendance: 1 visits  Subjective improvement: N/A  MD appt: PRN  Recheck due: 2023      Patient Active Problem List   Diagnosis   • Ischemic heart disease due to coronary artery obstruction   • Osteoarthritis of multiple joints   • Cardiac pacemaker   • Gout of multiple sites   • Nephrolithiasis   • Mixed hyperlipidemia   • Morbidly obese   • Hypertensive heart disease with heart failure   • Major depressive disorder, recurrent, moderate   • Chronic kidney disease, stage 4 (severe) ([facility])   • Ventricular tachycardia   • Chronic systolic heart failure (HCC)   • Hypothyroidism   • Paroxysmal atrial fibrillation (HCC)   • Coronary atherosclerosis   • Essential hypertension   • Weakness of right lower extremity        Past Medical History:   Diagnosis Date   • Arthritis    • Coronary artery disease    • Hyperlipidemia    • Hypertensive heart disease with heart failure 3/29/2021   • Hypothyroidism 2021   • Kidney stone    • Major depressive disorder, recurrent, moderate 3/29/2021   • Morbidly obese 2020        Past Surgical History:   Procedure Laterality Date   • CARDIAC DEFIBRILLATOR PLACEMENT     • CARDIAC DEFIBRILLATOR PLACEMENT N/A    • CARDIAC SURGERY     • ENDOSCOPY N/A 10/30/2020    Procedure: ESOPHAGOGASTRODUODENOSCOPY WITH ANESTHESIA;  Surgeon: Brent Stanley MD;  Location: [facility];  Service: Gastroenterology;  Laterality: N/A;  pre dysphagia  post post op gastric surgery  dr jose manuel smith   • ESOPHAGUS SURGERY     • KNEE SURGERY     • TOTAL SHOULDER REPLACEMENT         Visit Dx:     ICD-10-CM ICD-9-CM   1. Ataxia  R27.0 781.3   2. Weakness of both lower extremities  R29.898 729.89          Patient History     Row Name 23 1300             History    Chief Complaint Difficulty  "Walking;Difficulty with daily activities;Balance Problems;Muscle weakness;Pain  -KG      Type of Pain Back pain;Knee pain  -KG      Date Current Problem(s) Began --  Chronic  -KG      Brief Description of Current Complaint Pt finished wound care yesterday. States he couldn't have home health PT while he was in wound care. States R foot is still sore but the wound has healed up. Now that he's done he can start PT again. States his knees aren't bothering him today but he still has pain. Still wearing knee braces. Hasn't been doing any of his HEP while he's not been in PT. States there hasn't been much change in his overall mobility. Bed mobility is better. Walking he states is about the same. Walking limited by weakness, fatigue, & pain. States he still wants to get his legs stronger so he can walk and move around better. Denies any recent falls.  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 Worse with standing/walking. Chronic low back pain as well.  -KG      Is your sleep disturbed? Yes  Not always related to pain  -KG         Fall Risk Assessment    Any falls in the past year: No  -KG         Services    Are you currently receiving Home Health services No  -KG      Do you plan to receive Home Health services in the near future No  -KG            User Key  (r) = Recorded By, (t) " = Taken By, (c) = Cosigned By    Initials Name Provider Type    KG Sharon Montesinos, PT Physical Therapist                     PT Ortho     Row Name 04/04/23 1300       Precautions and Contraindications    Precautions CVA with R sided weakness, heart disease, arthritis  -KG    Contraindications Pacemaker/defibulator  -KG       Subjective Pain    Able to rate subjective pain? yes  -KG       Posture/Observations    Posture/Observations Comments Presents in transport chair. Knee brace donned at bilateral knees. Decreased/poor postural awareness. Rounded shoulders/back. Slumped when seated; has new transport wheelchair and does sit more upright. Knee varus noted bilaterally R>L with gait. Decreased TKE during stance R>L.  -KG       General ROM    GENERAL ROM COMMENTS Bilateral shoulder flex/abd limited to ~90 deg. Elbow/wrist AROM WFL. Bilateral hip, knee, ankle AROM WFL but weakness present bilaterally.  -KG       MMT (Manual Muscle Testing)    Rt Upper Ext Rt Shoulder Flexion;Rt Shoulder ABduction;Rt Elbow Flexion;Rt Elbow Extension  -KG    Lt Upper Ext Lt Shoulder Flexion;Lt Shoulder ABduction;Lt Elbow Extension;Lt Elbow Flexion  -KG    Rt Lower Ext Rt Hip Flexion;Rt Hip ABduction;Rt Hip ADduction;Rt Knee Extension;Rt Knee Flexion;Rt Ankle Plantarflexion;Rt Ankle Dorsiflexion  -KG    Lt Lower Ext Lt Hip Flexion;Lt Hip ADduction;Lt Hip ABduction;Lt Knee Extension;Lt Knee Flexion;Lt Ankle Plantarflexion;Lt Ankle Dorsiflexion  -KG       MMT Right Upper Ext    Rt Shoulder Flexion MMT, Gross Movement (3+/5) fair plus  -KG    Rt Shoulder ABduction MMT, Gross Movement (3+/5) fair plus  -KG    Rt Elbow Flexion MMT, Gross Movement: (4+/5) good plus  -KG    Rt Elbow Extension MMT, Gross Movement: (4+/5) good plus  -KG       MMT Left Upper Ext    Lt Shoulder Flexion MMT, Gross Movement (3+/5) fair plus  -KG    Lt Shoulder ABduction MMT, Gross Movement (3+/5) fair plus  -KG    Lt Elbow Flexion MMT, Gross Movement (4+/5) good  "plus  -KG    Lt Elbow Extension MMT, Gross Movement (4+/5) good plus  -KG       MMT Right Lower Ext    Rt Hip Flexion MMT, Gross Movement (4-/5) good minus  -KG    Rt Hip ABduction MMT, Gross Movement (4-/5) good minus  -KG    Rt Hip ADduction MMT, Gross Movement (4/5) good  -KG    Rt Knee Extension MMT, Gross Movement (4-/5) good minus  -KG    Rt Knee Flexion MMT, Gross Movement (4/5) good  -KG    Rt Ankle Plantarflexion MMT, Gross Movement (4+/5) good plus  -KG    Rt Ankle Dorsiflexion MMT, Gross Movement (4+/5) good plus  -KG    Rt Lower Extremity Comments  MMT performed seated  -KG       MMT Left Lower Ext    Lt Hip Flexion MMT, Gross Movement (4+/5) good plus  -KG    Lt Hip ABduction MMT, Gross Movement (3+/5) fair plus  -KG    Lt Hip ADduction MMT, Gross Movement (4/5) good  -KG    Lt Knee Extension MMT, Gross Movement (4/5) good  -KG    Lt Knee Flexion MMT, Gross Movement (4/5) good  -KG    Lt Ankle Plantarflexion MMT, Gross Movement (4+/5) good plus  -KG    Lt Ankle Dorsiflexion MMT, Gross Movement (4+/5) good plus  -KG    Lt Lower Extremity Comments  MMT performed seated  -KG       Sensation    Sensation WNL? WFL  -KG    Light Touch No apparent deficits  -KG       Flexibility    Flexibility Tested? Lower Extremity  -KG       Lower Extremity Flexibility    Hamstrings Bilateral:;Moderately limited  -KG    Hip Flexors Bilateral:;Moderately limited  -KG    Gastrocnemius Bilateral:;Moderately limited  -KG       Balance Skills Training    Balance Comments Static balance FA (at RW): ~20\"; tends to forward flex & lean forward more when fatigued. More challenged with FT and less LE and trunk stability noted.  -KG       Transfers    Comment, (Transfers) Requires BUE assist for sit<>stand transfers. Sit>stand CGA from regular chair. CGA & intermittent min assist from transport chair, as it is lower. Pt required more assist when fatigued.  -KG       Gait/Stairs (Locomotion)    Comment, (Gait/Stairs) Ambulates 82 ft " with RW this date. Slightly forward flexed at trunk. Decreased sepideh. Kept good control of RW with minor cues more so with changing direction. CGA. Fatigue noted in trunk & BLE's towards end of gait. Transport chair pushed behind pt with gait.  -KG          User Key  (r) = Recorded By, (t) = Taken By, (c) = Cosigned By    Initials Name Provider Type    KG Sharon Montesinos, PT Physical Therapist                            Therapy Education  Education Details: POC education. HEP: see exercise flowsheet for details  Given: HEP, Posture/body mechanics, Mobility training, Fall prevention and home safety  Program: New  How Provided: Verbal, Demonstration, Written  Provided to: Patient (wife present at end of eval)  Level of Understanding: Verbalized, Demonstrated      PT OP Goals     Row Name 04/04/23 1400          PT Short Term Goals    STG Date to Achieve 04/25/23  -KG     STG 1 Demonstrate independence/compliance in performance of initial HEP  -KG     STG 2 Ambulate 85 ft with RW with CGA demonstrating improved LE strength/stability and improved step length/height  -KG     STG 3 Perform sit<>stand transfer from chair with CGA demonstrating improved BLE/BUE functional strength to assist with transfers at home  -KG     STG 4 Perform sup<>sit transfer on mat table safely with min assist x1 and mild cues for set up/performance  -KG        Long Term Goals    LTG Date to Achieve 05/30/23  -KG     LTG 1 Subjectively report 50% overall improvement or greater in gait/functional mobility & daily activity performance  -KG     LTG 2 Perform 1x10 sit<>stand tranfers from plinth or airex in chair with BUE assist demonstrate improved BLE functional strength & endurance  -KG     LTG 3 Ambulate 125 ft with RW safely demonstrating improved endurance/activity tolerance and gait kinematics for mobility inside the home  -KG     LTG 4 Perform small obstacle course in // bars conisisting of bench step, sm hurldes, & airex pad  demonstrating improved dynamic balance & LE stability  -KG     LTG 5 Demonstrate improved crystal hip abd MMT to 4/5 or greater  -KG        Time Calculation    PT Goal Re-Cert Due Date 04/25/23  -KG           User Key  (r) = Recorded By, (t) = Taken By, (c) = Cosigned By    Initials Name Provider Type    KG Sharon Montesinos, PT Physical Therapist                     PT Assessment/Plan     Row Name 04/04/23 1300          PT Assessment    Functional Limitations Decreased safety during functional activities;Impaired gait;Impaired locomotion;Limitation in home management;Limitations in community activities;Limitations in functional capacity and performance;Performance in leisure activities;Performance in self-care ADL  -KG     Impairments Balance;Gait;Impaired flexibility;Impaired muscle endurance;Locomotion;Muscle strength;Pain;Poor body mechanics;Posture;Range of motion  -KG     Assessment Comments Pt is a 77 y.o. male presenting with strength, balance, and gait deficits that are limiting performance of functional mobility & daily activities. Pt with hx of CVA in July 2022 and spent time in SNF for rehab. Had outpatient PT for a few months starting in November 2022. Pt was receiving wound care during that time for his R foot/LLE which was limiting his performance and progress with PT. Held PT until pt was discharged from wound care and now returns for new evaluation. Pt presents with deficits in overall BLE functional strength. Greater quad weakness noted on R and hip/glute weakness on L, which is affecting his gait performance. Pt had good RW management this date with gait but did require some mild cues with direction changes. Fatigues with distance in trunk & BLE’s which effects his kinematics and balance. Requires CGA/min assist for sit>stand transfer. Pt did well with initial therex activities for HEP administration but does fatigue. Pt will benefit from skilled PT services to address these deficits for improvements  in functional BLE strength, dynamic stability, gait safety/performance, dynamic balance, & functional activity tolerance to promote greater independence at home.  -KG     Rehab Potential Fair  Potential barriers: Chronicity of current condition, OA, chronic pain  -KG     Patient/caregiver participated in establishment of treatment plan and goals Yes  -KG     Patient would benefit from skilled therapy intervention Yes  -KG        PT Plan    PT Frequency 2x/week  -KG     Predicted Duration of Therapy Intervention (PT) 12 visits  -KG     Physical Therapy Interventions (Optional Details) balance training;gait training;home exercise program;lumbar stabilization;neuromuscular re-education;patient/family education;postural re-education;strengthening;stretching;transfer training  -KG     PT Plan Comments Work on overall BLE functional strengthening & stability. Continue gait and balance activities utilizing parallel bars. Fwd/bwd gait next and possible sidestepping. Continue seated strengthening. Mat table strengthening (bridges, SAQ's, etc). Endurance/activity tolerance  -KG           User Key  (r) = Recorded By, (t) = Taken By, (c) = Cosigned By    Initials Name Provider Type    KG Sharon Montesinos, PT Physical Therapist                   OP Exercises     Row Name 04/04/23 1300             Subjective Comments    Subjective Comments Refer to therapy pt history for details  -KG         Subjective Pain    Able to rate subjective pain? yes  -KG      Pre-Treatment Pain Level 0  -KG      Post-Treatment Pain Level 0  -KG         Exercise 1    Exercise Name 1 Gaith with RW  -KG      Additional Comments 82 ft in [facility] area  -KG         Exercise 2    Exercise Name 2 Sit<>stands from mat table  -KG      Cueing 2 Verbal  -KG      Sets 2 2  -KG      Reps 2 5  -KG         Exercise 3    Exercise Name 3 Seated alt LAQ  -KG      Cueing 3 Verbal  -KG      Sets 3 1  -KG      Reps 3 10  -KG         Exercise 4    Exercise Name 4 Seated tband  "hip abd  -KG      Cueing 4 Verbal  -KG      Sets 4 2  -KG      Reps 4 10  -KG      Additional Comments red tband  -KG         Exercise 5    Exercise Name 5 Seated iso hip adduction ball squeeze  -KG      Cueing 5 Verbal  -KG      Sets 5 1  -KG      Reps 5 10  -KG      Time 5 5\" hold  -KG            User Key  (r) = Recorded By, (t) = Taken By, (c) = Cosigned By    Initials Name Provider Type    Sharon Berg, PT Physical Therapist                              Outcome Measure Options: Lower Extremity Functional Scale (LEFS)  Lower Extremity Functional Index  Any of your usual work, housework or school activities: Extreme difficulty or unable to perform activity  Your usual hobbies, recreational or sporting activities: Extreme difficulty or unable to perform activity  Getting into or out of the bath: A little bit of difficulty  Walking between rooms: Moderate difficulty  Putting on your shoes or socks: Moderate difficulty  Squatting: Quite a bit of difficulty  Lifting an object, like a bag of groceries from the floor: Extreme difficulty or unable to perform activity  Performing light activities around your home: Extreme difficulty or unable to perform activity  Performing heavy activities around your home: Extreme difficulty or unable to perform activity  Getting into or out of a car: Quite a bit of difficulty  Walking 2 blocks: Extreme difficulty or unable to perform activity  Walking a mile: Extreme difficulty or unable to perform activity  Going up or down 10 stairs (about 1 flight of stairs): Quite a bit of difficulty  Standing for 1 hour: Quite a bit of difficulty  Sitting for 1 hour: No difficulty  Running on even ground: Extreme difficulty or unable to perform activity  Running on uneven ground: Extreme difficulty or unable to perform activity  Making sharp turns while running fast: Extreme difficulty or unable to perform activity  Hopping: Extreme difficulty or unable to perform activity  Rolling over in " bed: Quite a bit of difficulty  Total: 16      Time Calculation:     Start Time: 1358  Stop Time: 1436  Time Calculation (min): 38 min  Total Timed Code Minutes- PT: 13 minute(s)   Therapy Charges for Today     Code Description Service Date Service Provider Modifiers Qty    35247055998 HC PT EVAL MOD COMPLEXITY 2 4/4/2023 Sharon Montesinos, PT GP 1    52643372071 HC PT THER PROC EA 15 MIN 4/4/2023 Sharon Montesinos, PT GP 1            PT G-Codes  Outcome Measure Options: Lower Extremity Functional Scale (LEFS)  Total: 16         Sharon Montesinos, PT  4/4/2023

## 2023-04-10 RX ORDER — APIXABAN 2.5 MG/1
TABLET, FILM COATED ORAL
Qty: 60 TABLET | Refills: 3 | Status: SHIPPED | OUTPATIENT
Start: 2023-04-10

## 2023-04-10 NOTE — TELEPHONE ENCOUNTER
Rx Refill Note  Requested Prescriptions     Pending Prescriptions Disp Refills   • Eliquis 2.5 MG tablet tablet [Pharmacy Med Name: ELIQUIS 2.5 MG TABLET] 60 tablet 3     Sig: TAKE 1 TABLET BY MOUTH EVERY 12 HOURS      Last office visit with prescribing clinician: 2/20/2023   Last telemedicine visit with prescribing clinician: Visit date not found   Next office visit with prescribing clinician: Visit date not found                         Would you like a call back once the refill request has been completed: [] Yes [] No    If the office needs to give you a call back, can they leave a voicemail: [] Yes [] No    Yue Dougherty MA  04/10/23, 07:31 CDT

## 2023-04-11 ENCOUNTER — APPOINTMENT (OUTPATIENT)
Dept: PHYSICAL THERAPY | Facility: HOSPITAL | Age: 78
End: 2023-04-11
Payer: MEDICARE

## 2023-04-15 DIAGNOSIS — E78.2 MIXED HYPERLIPIDEMIA: Primary | ICD-10-CM

## 2023-04-17 RX ORDER — ATORVASTATIN CALCIUM 40 MG/1
TABLET, FILM COATED ORAL
Qty: 90 TABLET | Refills: 3 | Status: SHIPPED | OUTPATIENT
Start: 2023-04-17

## 2023-04-17 NOTE — TELEPHONE ENCOUNTER
Rx Refill Note  Requested Prescriptions     Pending Prescriptions Disp Refills   • atorvastatin (LIPITOR) 40 MG tablet [Pharmacy Med Name: ATORVASTATIN 40 MG TABLET] 90 tablet 3     Sig: TAKE 1 TABLET BY MOUTH EVERY DAY EVERY NIGHT      Last office visit with prescribing clinician: 2/20/2023   Last telemedicine visit with prescribing clinician: Visit date not found   Next office visit with prescribing clinician: Visit date not found       {TIP  Please add Last Relevant Lab 7/19/22  Yue Dougherty MA  04/17/23, 07:36 CDT

## 2023-04-18 ENCOUNTER — HOSPITAL ENCOUNTER (OUTPATIENT)
Dept: PHYSICAL THERAPY | Facility: HOSPITAL | Age: 78
Setting detail: THERAPIES SERIES
Discharge: HOME | End: 2023-04-18
Payer: MEDICARE

## 2023-04-18 DIAGNOSIS — R27.0 ATAXIA: Primary | ICD-10-CM

## 2023-04-18 PROCEDURE — 97530 THERAPEUTIC ACTIVITIES: CPT | Performed by: PHYSICAL THERAPIST

## 2023-04-18 PROCEDURE — 97110 THERAPEUTIC EXERCISES: CPT | Performed by: PHYSICAL THERAPIST

## 2023-04-19 NOTE — THERAPY TREATMENT NOTE
Outpatient Physical Therapy Ortho Treatment Note   [facility]     Patient Name: Samy Velazquez  : 1945  MRN: [id number]  Today's Date: 2023      Visit Date: 2023     Attendance: 2 visits  Subjective improvement: N/A  MD appt: PRN  Recheck due: 2023    Visit Dx:    ICD-10-CM ICD-9-CM   1. Ataxia  R27.0 781.3       Patient Active Problem List   Diagnosis   • Ischemic heart disease due to coronary artery obstruction   • Osteoarthritis of multiple joints   • Cardiac pacemaker   • Gout of multiple sites   • Nephrolithiasis   • Mixed hyperlipidemia   • Morbidly obese   • Hypertensive heart disease with heart failure   • Major depressive disorder, recurrent, moderate   • Chronic kidney disease, stage 4 (severe) (HCC)   • Ventricular tachycardia   • Chronic systolic heart failure (HCC)   • Hypothyroidism   • Paroxysmal atrial fibrillation (HCC)   • Coronary atherosclerosis   • Essential hypertension   • Weakness of right lower extremity        Past Medical History:   Diagnosis Date   • Arthritis    • Coronary artery disease    • Hyperlipidemia    • Hypertensive heart disease with heart failure 3/29/2021   • Hypothyroidism 2021   • Kidney stone    • Major depressive disorder, recurrent, moderate 3/29/2021   • Morbidly obese 2020        Past Surgical History:   Procedure Laterality Date   • CARDIAC DEFIBRILLATOR PLACEMENT     • CARDIAC DEFIBRILLATOR PLACEMENT N/A    • CARDIAC SURGERY     • ENDOSCOPY N/A 10/30/2020    Procedure: ESOPHAGOGASTRODUODENOSCOPY WITH ANESTHESIA;  Surgeon: Brent Stanley MD;  Location: [facility];  Service: Gastroenterology;  Laterality: N/A;  pre dysphagia  post post op gastric surgery  dr jose manuel smith   • ESOPHAGUS SURGERY     • KNEE SURGERY     • TOTAL SHOULDER REPLACEMENT          PT Ortho     Row Name 23 1300       Precautions and Contraindications    Precautions CVA with R sided weakness, heart disease, arthritis  -KG    Contraindications  Pacemaker/defibulator  -KG       Posture/Observations    Posture/Observations Comments Presents in transport chair. CGA to min assist for sit to stands throughout treatment from chair  -KG          User Key  (r) = Recorded By, (t) = Taken By, (c) = Cosigned By    Initials Name Provider Type    Sharon Berg, PT Physical Therapist                             PT Assessment/Plan     Row Name 04/18/23 1300          PT Assessment    Assessment Comments Pt did reasonably well with treatment. Was able to perform gait and standing activities in parallel bars without much increase in knee pain. Tends to start step too early with stepping over jhon. Cues to get close to jhon before attempting to step over. Was able to increase gait distance; fatigues and weakness noted in L>R hip towards end of ambulation. Great effort given. Pt reports compliance with HEP.  -KG        PT Plan    PT Frequency 2x/week  -KG     PT Plan Comments Continue // bar activities for gait and strengthening. Could try seated pball iso ham curls next.  -KG           User Key  (r) = Recorded By, (t) = Taken By, (c) = Cosigned By    Initials Name Provider Type    Sharon Berg, PT Physical Therapist                   OP Exercises     Row Name 04/18/23 1300             Subjective Comments    Subjective Comments Pt states he's been doing his exercises. Needs a new red band for his hip exercises. States he fell ~1 week ago and they hd ot call the ambulance to get him up. states he was in the bathroom looking in the mirror and just fell forward. states he was sore after but not as sore as he thought he would be.  -KG         Subjective Pain    Able to rate subjective pain? yes  -KG      Pre-Treatment Pain Level 0  -KG      Post-Treatment Pain Level 0  -KG         Exercise 1    Exercise Name 1 // bars: fwd/bwd gait working on kinematics & posture  -KG      Cueing 1 Verbal  -KG      Sets 1 1  -KG      Reps 1 3 laps  -KG         Exercise 2     Exercise Name 2 // bars: sidestepping  -KG      Cueing 2 Verbal  -KG      Sets 2 1  -KG      Reps 2 3 laps  -KG         Exercise 3    Exercise Name 3 Seated in chair tband hip abd  -KG      Cueing 3 Verbal  -KG      Sets 3 2  -KG      Reps 3 10  -KG      Additional Comments red tband  -KG         Exercise 4    Exercise Name 4 Seated in chair alt LAQ  -KG      Cueing 4 Verbal  -KG      Sets 4 2  -KG      Reps 4 10  -KG      Additional Comments 1# AW  -KG         Exercise 5    Exercise Name 5 // bars: gait with reciprocal stepping over 3 sm hurdles  -KG      Cueing 5 Verbal  -KG      Sets 5 1  -KG      Reps 5 4 laps  -KG      Additional Comments close SBA/CGA  -KG         Exercise 6    Exercise Name 6 Sit <>stands from elevated plinth  -KG      Cueing 6 Verbal  -KG      Sets 6 2  -KG      Reps 6 5  -KG      Additional Comments RW in front of pt  -KG         Exercise 7    Exercise Name 7 Gait with RW in waiting area working on kinematics and safety  -KG      Cueing 7 Verbal;Tactile  -KG      Additional Comments 96 ft; CGA, transport chair pushed behind. Cues for pacing and RW management with turns  -KG            User Key  (r) = Recorded By, (t) = Taken By, (c) = Cosigned By    Initials Name Provider Type    KG Sharon Montesinos, PT Physical Therapist                              PT OP Goals     Row Name 04/18/23 1300          PT Short Term Goals    STG Date to Achieve 04/25/23  -KG     STG 1 Demonstrate independence/compliance in performance of initial HEP  -KG     STG 1 Progress Progressing  -KG     STG 2 Ambulate 85 ft with RW with CGA demonstrating improved LE strength/stability and improved step length/height  -KG     STG 2 Progress Progressing  -KG     STG 3 Perform sit<>stand transfer from chair with CGA demonstrating improved BLE/BUE functional strength to assist with transfers at home  -KG     STG 3 Progress Progressing  -KG     STG 4 Perform sup<>sit transfer on mat table safely with min assist x1 and mild  cues for set up/performance  -KG     STG 4 Progress Progressing  -KG        Long Term Goals    LTG Date to Achieve 05/30/23  -KG     LTG 1 Subjectively report 50% overall improvement or greater in gait/functional mobility & daily activity performance  -KG     LTG 2 Perform 1x10 sit<>stand tranfers from plinth or airex in chair with BUE assist demonstrate improved BLE functional strength & endurance  -KG     LTG 3 Ambulate 125 ft with RW safely demonstrating improved endurance/activity tolerance and gait kinematics for mobility inside the home  -KG     LTG 4 Perform small obstacle course in // bars conisisting of bench step, sm hurldes, & airex pad demonstrating improved dynamic balance & LE stability  -KG     LTG 5 Demonstrate improved crystal hip abd MMT to 4/5 or greater  -KG        Time Calculation    PT Goal Re-Cert Due Date 04/25/23  -KG           User Key  (r) = Recorded By, (t) = Taken By, (c) = Cosigned By    Initials Name Provider Type    KG Sharon Montesinos, PT Physical Therapist                Therapy Education  Education Details: given red tband for seated hip abd  Given: HEP, Posture/body mechanics, Mobility training, Fall prevention and home safety  Program: Reinforced  How Provided: Verbal, Demonstration  Provided to: Patient  Level of Understanding: Verbalized, Demonstrated              Time Calculation:   Start Time: 1350  Stop Time: 1430  Time Calculation (min): 40 min  Therapy Charges for Today     Code Description Service Date Service Provider Modifiers Qty    04827179316  PT THER PROC EA 15 MIN 4/18/2023 Sharon Montesinos, PT GP 2    [id number]  PT THERAPEUTIC ACT EA 15 MIN 4/18/2023 Sharon Montesinos, PT GP 1                    Sharon Montesinos PT  4/18/2023

## 2023-04-20 ENCOUNTER — HOSPITAL ENCOUNTER (OUTPATIENT)
Dept: PHYSICAL THERAPY | Facility: HOSPITAL | Age: 78
Setting detail: THERAPIES SERIES
Discharge: HOME | End: 2023-04-20
Payer: MEDICARE

## 2023-04-20 DIAGNOSIS — R27.0 ATAXIA: Primary | ICD-10-CM

## 2023-04-20 PROCEDURE — 97530 THERAPEUTIC ACTIVITIES: CPT | Performed by: PHYSICAL THERAPIST

## 2023-04-20 PROCEDURE — 97110 THERAPEUTIC EXERCISES: CPT | Performed by: PHYSICAL THERAPIST

## 2023-04-21 NOTE — THERAPY TREATMENT NOTE
Outpatient Physical Therapy Ortho Treatment Note  [facility]     Patient Name: Samy Velazquez  : 1945  MRN: [id number]  Today's Date: 2023      Visit Date: 2023     Attendance: 3 visits  Subjective improvement: N/A  MD appt: PRN  Recheck due: 2023    Visit Dx:    ICD-10-CM ICD-9-CM   1. Ataxia  R27.0 781.3       Patient Active Problem List   Diagnosis   • Ischemic heart disease due to coronary artery obstruction   • Osteoarthritis of multiple joints   • Cardiac pacemaker   • Gout of multiple sites   • Nephrolithiasis   • Mixed hyperlipidemia   • Morbidly obese   • Hypertensive heart disease with heart failure   • Major depressive disorder, recurrent, moderate   • Chronic kidney disease, stage 4 (severe) (HCC)   • Ventricular tachycardia   • Chronic systolic heart failure (HCC)   • Hypothyroidism   • Paroxysmal atrial fibrillation (HCC)   • Coronary atherosclerosis   • Essential hypertension   • Weakness of right lower extremity        Past Medical History:   Diagnosis Date   • Arthritis    • Coronary artery disease    • Hyperlipidemia    • Hypertensive heart disease with heart failure 3/29/2021   • Hypothyroidism 2021   • Kidney stone    • Major depressive disorder, recurrent, moderate 3/29/2021   • Morbidly obese 2020        Past Surgical History:   Procedure Laterality Date   • CARDIAC DEFIBRILLATOR PLACEMENT     • CARDIAC DEFIBRILLATOR PLACEMENT N/A    • CARDIAC SURGERY     • ENDOSCOPY N/A 10/30/2020    Procedure: ESOPHAGOGASTRODUODENOSCOPY WITH ANESTHESIA;  Surgeon: Brent Stanley MD;  Location: [facility];  Service: Gastroenterology;  Laterality: N/A;  pre dysphagia  post post op gastric surgery  dr jose manuel smith   • ESOPHAGUS SURGERY     • KNEE SURGERY     • TOTAL SHOULDER REPLACEMENT          PT Ortho     Row Name 23 1400       Subjective Comments    Subjective Comments Pt states he fell backwards into a chair yesterday. States it happened so fast,  "doesn't know what cause dit. States he does get dizzy sometimes but doesn't think that was it. States his bottom is a little sore.  -KG       Precautions and Contraindications    Precautions CVA with R sided weakness, heart disease, arthritis  -KG    Contraindications Pacemaker/defibulator  -KG       Subjective Pain    Pre-Treatment Pain Level 0  -KG    Post-Treatment Pain Level 0  -KG       Posture/Observations    Posture/Observations Comments Presents in transport chair. Increased [place]elrance to standing activities this date.  -KG          User Key  (r) = Recorded By, (t) = Taken By, (c) = Cosigned By    Initials Name Provider Type    Sharon Berg, PT Physical Therapist                             PT Assessment/Plan     Row Name 04/20/23 1400          PT Assessment    Assessment Comments Pt was able to increase gait distance this date and had better control of BLE\"s and RW with turning; cues still required. Did better wtih stepping over hurdles, not as many cues to step closer before stepping over. More challenged with tband TKE on R vs L. Compensates more on R; tactile cues required. Pt did note he fell backwards into a chair yesterday. Had no instances of LOB during treatment this date.  -KG        PT Plan    PT Frequency 2x/week  -KG     PT Plan Comments Continue tband TKE activities. Update HEP. Recheck next week.  -KG           User Key  (r) = Recorded By, (t) = Taken By, (c) = Cosigned By    Initials Name Provider Type    Sharon Berg, PT Physical Therapist                   OP Exercises     Row Name 04/20/23 1400             Subjective Comments    Subjective Comments Pt states he fell backwards into a chair yesterday. States it happened so fast, doesn't know what cause dit. States he does get dizzy sometimes but doesn't think that was it. States his bottom is a little sore.  -KG         Subjective Pain    Able to rate subjective pain? yes  -KG      Pre-Treatment Pain Level 0  -KG      " Post-Treatment Pain Level 0  -KG         Exercise 1    Exercise Name 1 // bars: fwd/bwd gait working on kinematics & posture  -KG      Cueing 1 Verbal  -KG      Sets 1 1  -KG      Reps 1 3 laps  -KG         Exercise 2    Exercise Name 2 // bars: sidestepping  -KG      Cueing 2 Verbal  -KG      Sets 2 1  -KG      Reps 2 3 laps  -KG         Exercise 3    Exercise Name 3 Seated in chair alt marching  -KG      Cueing 3 Verbal  -KG      Sets 3 2  -KG      Reps 3 10  -KG         Exercise 4    Exercise Name 4 Seated in chair alt LAQ  -KG      Cueing 4 Verbal  -KG      Sets 4 2  -KG      Reps 4 10  -KG      Additional Comments 1# AW  -KG         Exercise 5    Exercise Name 5 // bars: small balance course: 1 sm jhon, airex pad, 1 sm jhon  -KG      Cueing 5 Verbal  -KG      Sets 5 1  -KG      Reps 5 4 laps  -KG      Additional Comments close SBA/CGA  -KG         Exercise 6    Exercise Name 6 Seated tband trunk extension  -KG      Cueing 6 Verbal  -KG      Sets 6 1  -KG      Reps 6 15  -KG      Additional Comments green tband  -KG         Exercise 7    Exercise Name 7 Standing tband TKE bilaterall  -KG      Cueing 7 Verbal;Tactile  -KG      Sets 7 1  -KG      Reps 7 15 ea LE  -KG      Additional Comments red tband (hooked to // bars; pt using crystal HHA at rail; PT providing tactile cues at quad and hip  -KG         Exercise 8    Exercise Name 8 Gait with RW in waiting area working on kinematics and safety  -KG      Additional Comments 109 ft; CGA, transport chair pushed behind pt. Performed at start of treatment  -KG         Exercise 9    Exercise Name 9 Standing FA with PT providing manual perturbations at hips F/B & lateral  -KG      Cueing 9 Verbal;Tactile  -KG      Time 9 2 min  -KG            User Key  (r) = Recorded By, (t) = Taken By, (c) = Cosigned By    Initials Name Provider Type    KG Sharon Montesinos, PT Physical Therapist                              PT OP Goals     Row Name 04/20/23 1400          PT Short Term  Goals    STG Date to Achieve 04/25/23  -KG     STG 1 Demonstrate independence/compliance in performance of initial HEP  -KG     STG 1 Progress Progressing  -KG     STG 2 Ambulate 85 ft with RW with CGA demonstrating improved LE strength/stability and improved step length/height  -KG     STG 2 Progress Progressing  -KG     STG 3 Perform sit<>stand transfer from chair with CGA demonstrating improved BLE/BUE functional strength to assist with transfers at home  -KG     STG 3 Progress Progressing  -KG     STG 4 Perform sup<>sit transfer on mat table safely with min assist x1 and mild cues for set up/performance  -KG     STG 4 Progress Progressing  -KG        Long Term Goals    LTG Date to Achieve 05/30/23  -KG     LTG 1 Subjectively report 50% overall improvement or greater in gait/functional mobility & daily activity performance  -KG     LTG 2 Perform 1x10 sit<>stand tranfers from plinth or airex in chair with BUE assist demonstrate improved BLE functional strength & endurance  -KG     LTG 3 Ambulate 125 ft with RW safely demonstrating improved endurance/activity tolerance and gait kinematics for mobility inside the home  -KG     LTG 4 Perform small obstacle course in // bars conisisting of bench step, sm hurldes, & airex pad demonstrating improved dynamic balance & LE stability  -KG     LTG 5 Demonstrate improved crystal hip abd MMT to 4/5 or greater  -KG        Time Calculation    PT Goal Re-Cert Due Date 04/25/23  -KG           User Key  (r) = Recorded By, (t) = Taken By, (c) = Cosigned By    Initials Name Provider Type    KG Sharon Montesinos, PT Physical Therapist                Therapy Education  Given: HEP, Posture/body mechanics, Mobility training, Fall prevention and home safety  Program: Reinforced  How Provided: Verbal  Provided to: Patient  Level of Understanding: Verbalized, Demonstrated              Time Calculation:   Start Time: 1430  Stop Time: 1514  Time Calculation (min): 44 min  Therapy Charges for  Today     Code Description Service Date Service Provider Modifiers Qty    57877411839 HC PT THER PROC EA 15 MIN 4/20/2023 Sharon Montesinos, PT GP 2    17009127681 HC PT THERAPEUTIC ACT EA 15 MIN 4/20/2023 Sharon Montesinos, PT GP 1                    Sharon Montesinos, PT  4/21/2023

## 2023-04-25 ENCOUNTER — HOSPITAL ENCOUNTER (OUTPATIENT)
Dept: PHYSICAL THERAPY | Facility: HOSPITAL | Age: 78
Setting detail: THERAPIES SERIES
Discharge: HOME | End: 2023-04-25
Payer: MEDICARE

## 2023-04-25 DIAGNOSIS — R27.0 ATAXIA: Primary | ICD-10-CM

## 2023-04-25 PROCEDURE — 97110 THERAPEUTIC EXERCISES: CPT | Performed by: PHYSICAL THERAPIST

## 2023-04-25 PROCEDURE — 97530 THERAPEUTIC ACTIVITIES: CPT | Performed by: PHYSICAL THERAPIST

## 2023-04-26 NOTE — THERAPY PROGRESS REPORT/RE-CERT
Outpatient Physical Therapy Ortho Progress Note  [facility]     Patient Name: Samy Velazquez  : 1945  MRN: [id number]  Today's Date: 2023      Visit Date: 2023     Attendance: 4 visits  Subjective improvement: ~50%  MD appt: PRN  Recheck due: 2023    Visit Dx:    ICD-10-CM ICD-9-CM   1. Ataxia  R27.0 781.3       Patient Active Problem List   Diagnosis   • Ischemic heart disease due to coronary artery obstruction   • Osteoarthritis of multiple joints   • Cardiac pacemaker   • Gout of multiple sites   • Nephrolithiasis   • Mixed hyperlipidemia   • Morbidly obese   • Hypertensive heart disease with heart failure   • Major depressive disorder, recurrent, moderate   • Chronic kidney disease, stage 4 (severe) (HCC)   • Ventricular tachycardia   • Chronic systolic heart failure (HCC)   • Hypothyroidism   • Paroxysmal atrial fibrillation (HCC)   • Coronary atherosclerosis   • Essential hypertension   • Weakness of right lower extremity        Past Medical History:   Diagnosis Date   • Arthritis    • Coronary artery disease    • Hyperlipidemia    • Hypertensive heart disease with heart failure 3/29/2021   • Hypothyroidism 2021   • Kidney stone    • Major depressive disorder, recurrent, moderate 3/29/2021   • Morbidly obese 2020        Past Surgical History:   Procedure Laterality Date   • CARDIAC DEFIBRILLATOR PLACEMENT     • CARDIAC DEFIBRILLATOR PLACEMENT N/A    • CARDIAC SURGERY     • ENDOSCOPY N/A 10/30/2020    Procedure: ESOPHAGOGASTRODUODENOSCOPY WITH ANESTHESIA;  Surgeon: Brent Stanley MD;  Location: [facility];  Service: Gastroenterology;  Laterality: N/A;  pre dysphagia  post post op gastric surgery  dr jose manuel smith   • ESOPHAGUS SURGERY     • KNEE SURGERY     • TOTAL SHOULDER REPLACEMENT               PT Ortho     Row Name 23 1400       Subjective Comments    Subjective Comments Pt reports 50% improvement. States he's doing good with his HEP at home. Feels  like he has a little more strength. Still gets tired really easily, дмитрий with standing & walking.  -KG       Precautions and Contraindications    Precautions CVA with R sided weakness, heart disease, arthritis  -KG    Contraindications Pacemaker/defibulator  -KG       Posture/Observations    Posture/Observations Comments Presents in transport chair. Pt more fatigued this date (notes he had to travel to [place] yesterday for MD appts). Knee braces donned bilaterally. Contnues with decreased postural awareness; cues provided. Rounded shoulders/back. Knee varus bilaterally R>L with gait and standing. Decreased TKE.  -KG       General ROM    GENERAL ROM COMMENTS Bilateral shoulder flex/abd limited to ~90 deg. Elbow/wrist AROM WFL. Bilateral hip, knee, ankle AROM WFL but weakness present bilaterally.  -KG       MMT Right Upper Ext    Rt Shoulder Flexion MMT, Gross Movement (3+/5) fair plus  -KG    Rt Shoulder ABduction MMT, Gross Movement (3+/5) fair plus  -KG    Rt Elbow Flexion MMT, Gross Movement: (4+/5) good plus  -KG    Rt Elbow Extension MMT, Gross Movement: (4+/5) good plus  -KG       MMT Left Upper Ext    Lt Shoulder Flexion MMT, Gross Movement (3+/5) fair plus  -KG    Lt Shoulder ABduction MMT, Gross Movement (3+/5) fair plus  -KG    Lt Elbow Flexion MMT, Gross Movement (4+/5) good plus  -KG    Lt Elbow Extension MMT, Gross Movement (4+/5) good plus  -KG       MMT Right Lower Ext    Rt Hip Flexion MMT, Gross Movement (4-/5) good minus  -KG    Rt Hip ABduction MMT, Gross Movement (4-/5) good minus  -KG    Rt Hip ADduction MMT, Gross Movement (4+/5) good plus  -KG    Rt Knee Extension MMT, Gross Movement (4-/5) good minus  -KG    Rt Knee Flexion MMT, Gross Movement (4/5) good  -KG    Rt Ankle Plantarflexion MMT, Gross Movement (4+/5) good plus  -KG    Rt Ankle Dorsiflexion MMT, Gross Movement (4+/5) good plus  -KG    Rt Lower Extremity Comments  MMT performed seated  -KG       MMT Left Lower Ext    Lt Hip Flexion  MMT, Gross Movement (4+/5) good plus  -KG    Lt Hip ABduction MMT, Gross Movement (3+/5) fair plus  -KG    Lt Hip ADduction MMT, Gross Movement (4/5) good  -KG    Lt Knee Extension MMT, Gross Movement (4/5) good  -KG    Lt Knee Flexion MMT, Gross Movement (4+/5) good plus  -KG    Lt Ankle Plantarflexion MMT, Gross Movement (4+/5) good plus  -KG    Lt Ankle Dorsiflexion MMT, Gross Movement (4+/5) good plus  -KG    Lt Lower Extremity Comments  MMT performed seated  -KG       Sensation    Sensation WNL? WFL  -KG    Light Touch No apparent deficits  -KG       Lower Extremity Flexibility    Hamstrings Bilateral:;Moderately limited  -KG    Hip Flexors Bilateral:;Moderately limited  -KG    Gastrocnemius Bilateral:;Moderately limited  -KG       Balance Skills Training    Balance Comments Standing static balance (at RW with single HHA): reaching with single UE hand taps to PTA hand in various directions. No LOB noted. Tend to not turn head and look where his target is. Cues to correct this. Slightly more unsteady when reaching outside of SUSIE.  -KG       Transfers    Comment, (Transfers) Requires BUE assist for sit<>stand transfers. Sit>stand mostly CGA but does require min assist intermittently due to fatigue this date.  -KG       Gait/Stairs (Locomotion)    Comment, (Gait/Stairs) Ambulates with RW. Slightly forward flexed at trunk. Decreased sepideh but improving. Decreased standing time noted at L with fatigue due to hip weakness. Less cuing for RW managament but still requires some with direction changes.  -KG          User Key  (r) = Recorded By, (t) = Taken By, (c) = Cosigned By    Initials Name Provider Type    Sharon Berg, PT Physical Therapist                             PT Assessment/Plan     Row Name 04/25/23 1400          PT Assessment    Functional Limitations Decreased safety during functional activities;Impaired gait;Impaired locomotion;Limitation in home management;Limitations in community  activities;Limitations in functional capacity and performance;Performance in leisure activities;Performance in self-care ADL  -KG     Impairments Balance;Gait;Impaired flexibility;Impaired muscle endurance;Locomotion;Muscle strength;Pain;Poor body mechanics;Posture;Range of motion  -KG     Assessment Comments Pt making steady progress with PT at this time. Demonstrates improvements in gait kinematics & posture with longer distance walking. Requires intermittent cues for performance. Pt does still fatigue quickly and glute weakness in L hip more notable at that point. Pt is a little more tired/fatigued upon presentation to clinic this date as he had to travel to [place] yesterday for appointments, which always wears him out. Pt is doing well with gait and balance activities in parallel bars. Pt unsteady with UE reaching with single UE support at RW when reaching outside of SUSIE to tap PTA's hand. Pt often does not look to his target when reaching, cues provided to improve this. Pt reports 50% overall improvement and has been compliant with HEP. Pt making slow, steady gains with PT but still needs work functional BLE strength, muscle endurance, gait safety/performance, dynamic balance, & functional activity tolerance & will benefit from continued skilled PT services.  -KG     Rehab Potential Fair  Potential barriers: Chronicity of current condition, OA, chronic pain  -KG     Patient/caregiver participated in establishment of treatment plan and goals Yes  -KG     Patient would benefit from skilled therapy intervention Yes  -KG        PT Plan    PT Frequency 2x/week  -KG     Predicted Duration of Therapy Intervention (PT) 10 visits  -KG     PT Plan Comments Continue progressing BLE functional strength & stability. Continue gait activities in // bars. Postural stability. Continue balance/endurance activities. Mat table strengthening/bed mobility next visit.  -KG           User Key  (r) = Recorded By, (t) = Taken By, (c) =  "Cosigned By    Initials Name Provider Type    Sharon Berg, PT Physical Therapist                   OP Exercises     Row Name 04/25/23 1400             Subjective Pain    Able to rate subjective pain? yes  -KG      Pre-Treatment Pain Level 0  -KG      Post-Treatment Pain Level 0  -KG         Exercise 1    Exercise Name 1 gait with RW from waiting area to back treatment room  -KG         Exercise 2    Exercise Name 2 // bars: fwd/bwd gait working on kinematics & posture  -KG      Cueing 2 Verbal  -KG      Sets 2 1  -KG      Reps 2 3 laps  -KG         Exercise 3    Exercise Name 3 Seated in chair alt marching  -KG      Cueing 3 Verbal  -KG      Sets 3 2  -KG      Reps 3 10  -KG         Exercise 4    Exercise Name 4 Seated in chair alt LAQ  -KG      Cueing 4 Verbal  -KG      Sets 4 2  -KG      Reps 4 10  -KG         Exercise 5    Exercise Name 5 // bars: small balance course: 1 sm jhon, airex pad, 1 sm jhon  -KG      Cueing 5 Verbal  -KG      Sets 5 1  -KG      Reps 5 2 laps  -KG         Exercise 6    Exercise Name 6 Seated tband trunk extension  -KG      Cueing 6 Verbal  -KG      Sets 6 1  -KG      Reps 6 15  -KG         Exercise 7    Exercise Name 7 // bars: Standing tband sidestepping (1 step left, 1 step right)  -KG      Cueing 7 Verbal;Tactile  -KG      Sets 7 1  -KG      Reps 7 6  -KG      Additional Comments red tband above knees  -KG         Exercise 8    Exercise Name 8 BLE strength measurements  -KG         Exercise 9    Exercise Name 9 Standing FA with RW (single hand support on walker): UE reaching/hand taps to PTA hand in various directions (outside of SUSIE & across midline)  -KG      Cueing 9 Verbal;Tactile  -KG      Reps 9 1 ea UE  -KG      Time 9 ~30\"  -KG      Additional Comments CGA provided by PT  -KG            User Key  (r) = Recorded By, (t) = Taken By, (c) = Cosigned By    Initials Name Provider Type    Sharon Berg, PT Physical Therapist                              PT OP " Goals     Row Name 04/25/23 1400          PT Short Term Goals    STG Date to Achieve 04/25/23  -KG     STG 1 Demonstrate independence/compliance in performance of initial HEP  -KG     STG 1 Progress Met  -KG     STG 2 Ambulate 85 ft with RW with CGA demonstrating improved LE strength/stability and improved step length/height  -KG     STG 2 Progress Partially Met;Progressing  -KG     STG 3 Perform sit<>stand transfer from chair with CGA demonstrating improved BLE/BUE functional strength to assist with transfers at home  -KG     STG 3 Progress Met  -KG     STG 4 Perform sup<>sit transfer on mat table safely with min assist x1 and mild cues for set up/performance  -KG     STG 4 Progress Progressing  -KG        Long Term Goals    LTG Date to Achieve 05/30/23  -KG     LTG 1 Subjectively report 50% overall improvement or greater in gait/functional mobility & daily activity performance  -KG     LTG 1 Progress Met  -KG     LTG 2 Perform 1x10 sit<>stand tranfers from plinth or airex in chair with BUE assist demonstrate improved BLE functional strength & endurance  -KG     LTG 2 Progress Progressing  -KG     LTG 3 Ambulate 125 ft with RW safely demonstrating improved endurance/activity tolerance and gait kinematics for mobility inside the home  -KG     LTG 3 Progress Progressing  -KG     LTG 4 Perform small obstacle course in // bars conisisting of bench step, sm hurldes, & airex pad demonstrating improved dynamic balance & LE stability  -KG     LTG 4 Progress Progressing  -KG     LTG 5 Demonstrate improved crystal hip abd MMT to 4/5 or greater  -KG     LTG 5 Progress Progressing  -KG           User Key  (r) = Recorded By, (t) = Taken By, (c) = Cosigned By    Initials Name Provider Type    KG Sharon Montesinos, PT Physical Therapist                Therapy Education  Given: HEP, Posture/body mechanics, Mobility training, Fall prevention and home safety  Program: Reinforced  How Provided: Verbal  Provided to: Patient  Level of  Understanding: Verbalized, Demonstrated              Time Calculation:   Start Time: 1435  Stop Time: 1515  Time Calculation (min): 40 min     Therapy Charges for Today     Code Description Service Date Service Provider Modifiers Qty    74566894723  PT THER PROC EA 15 MIN 4/25/2023 Sharon Montesinos, PT GP 2    [id number]  PT THERAPEUTIC ACT EA 15 MIN 4/25/2023 Sharon Montesinos, PT GP 1                      Sharon Montesinos, PT  4/25/2023

## 2023-04-27 ENCOUNTER — HOSPITAL ENCOUNTER (OUTPATIENT)
Dept: PHYSICAL THERAPY | Facility: HOSPITAL | Age: 78
Setting detail: THERAPIES SERIES
Discharge: HOME | End: 2023-04-27
Payer: MEDICARE

## 2023-04-27 DIAGNOSIS — R27.0 ATAXIA: Primary | ICD-10-CM

## 2023-04-27 PROCEDURE — 97110 THERAPEUTIC EXERCISES: CPT | Performed by: PHYSICAL THERAPIST

## 2023-04-28 NOTE — THERAPY TREATMENT NOTE
Outpatient Physical Therapy Ortho Treatment Note  [facility]     Patient Name: Samy Velazquez  : 1945  MRN: [id number]  Today's Date: 2023      Visit Date: 2023     Attendance: 5 visits  Subjective improvement: ~50%  MD appt: PRN  Recheck due: 2023    Visit Dx:    ICD-10-CM ICD-9-CM   1. Ataxia  R27.0 781.3       Patient Active Problem List   Diagnosis   • Ischemic heart disease due to coronary artery obstruction   • Osteoarthritis of multiple joints   • Cardiac pacemaker   • Gout of multiple sites   • Nephrolithiasis   • Mixed hyperlipidemia   • Morbidly obese   • Hypertensive heart disease with heart failure   • Major depressive disorder, recurrent, moderate   • Chronic kidney disease, stage 4 (severe) (HCC)   • Ventricular tachycardia   • Chronic systolic heart failure (HCC)   • Hypothyroidism   • Paroxysmal atrial fibrillation (HCC)   • Coronary atherosclerosis   • Essential hypertension   • Weakness of right lower extremity        Past Medical History:   Diagnosis Date   • Arthritis    • Coronary artery disease    • Hyperlipidemia    • Hypertensive heart disease with heart failure 3/29/2021   • Hypothyroidism 2021   • Kidney stone    • Major depressive disorder, recurrent, moderate 3/29/2021   • Morbidly obese 2020        Past Surgical History:   Procedure Laterality Date   • CARDIAC DEFIBRILLATOR PLACEMENT     • CARDIAC DEFIBRILLATOR PLACEMENT N/A    • CARDIAC SURGERY     • ENDOSCOPY N/A 10/30/2020    Procedure: ESOPHAGOGASTRODUODENOSCOPY WITH ANESTHESIA;  Surgeon: Brent Stanley MD;  Location: [facility];  Service: Gastroenterology;  Laterality: N/A;  pre dysphagia  post post op gastric surgery  dr jose manuel smith   • ESOPHAGUS SURGERY     • KNEE SURGERY     • TOTAL SHOULDER REPLACEMENT          PT Ortho     Row Name 23 1600       Subjective Comments    Subjective Comments Pt states he's feeling better today, still tired but not near as much  -KG        Precautions and Contraindications    Precautions CVA with R sided weakness, heart disease, arthritis  -KG    Contraindications Pacemaker/defibulator  -KG       Subjective Pain    Able to rate subjective pain? yes  -KG    Pre-Treatment Pain Level 0  -KG    Post-Treatment Pain Level 0  -KG       Posture/Observations    Posture/Observations Comments CGA to min assist with sit<>stands transfers. CGA with sup>sit transfer. Assist needed at LE's for sit>supine (semi-recumbent)  -KG          User Key  (r) = Recorded By, (t) = Taken By, (c) = Cosigned By    Initials Name Provider Type    Sharon Berg, PT Physical Therapist                             PT Assessment/Plan     Row Name 04/27/23 1600          PT Assessment    Assessment Comments Increase in gait distance with RW. Pt steps tend to get short and quicker with distance but when cued to slow down/stop, pt was able to reset and demonstrate better step length and height. Sup<>sit transfer performance has improved. CGA/SBA provided for sup (semi-recumbent)>sit. Verbal cues provided for form/set up.  -KG        PT Plan    PT Frequency 2x/week  -KG     PT Plan Comments Resume // bar activities and static balance activities. Resume tband sidestepping (1 step left, 1 step right). Update HEP  -KG           User Key  (r) = Recorded By, (t) = Taken By, (c) = Cosigned By    Initials Name Provider Type    Sharon Berg, PT Physical Therapist                   OP Exercises     Row Name 04/27/23 1600             Subjective Comments    Subjective Comments Pt states he's feeling better today, still tired but not near as much  -KG         Subjective Pain    Able to rate subjective pain? yes  -KG      Pre-Treatment Pain Level 0  -KG      Post-Treatment Pain Level 0  -KG         Exercise 1    Exercise Name 1 Gait with RW in [facility]  -KG      Additional Comments 92 feet; CGA  -KG         Exercise 2    Exercise Name 2 Seated in chair: Fredi LAQ  -KG      Cueing 2 Verbal   "-KG      Sets 2 2  -KG      Reps 2 10  -KG      Additional Comments 1# AW  -KG         Exercise 3    Exercise Name 3 Seated in chair: marching  -KG      Cueing 3 Verbal  -KG      Sets 3 2  -KG      Reps 3 10  -KG      Additional Comments 1# AW  -KG         Exercise 4    Exercise Name 4 Standing ball toss to PT; chair behind pt, RW in front  -KG      Cueing 4 Verbal  -KG      Sets 4 2  -KG      Reps 4 10 tosses  -KG      Additional Comments red inflatable ball  -KG         Exercise 5    Exercise Name 5 Sit<>stand from elevated plinth  -KG      Cueing 5 Verbal  -KG      Sets 5 1  -KG      Reps 5 x10, x5  -KG      Additional Comments mild BUE assist; SBA  -KG         Exercise 6    Exercise Name 6 Semi-recumbent bridges  -KG      Cueing 6 Verbal;Tactile  -KG      Sets 6 2  -KG      Reps 6 10  -KG      Time 6 ~2-3\" hold  -KG         Exercise 7    Exercise Name 7 Semi-recumbent HL hip abd R/L with NMRE techniques to L  -KG      Cueing 7 Verbal  -KG      Sets 7 1  -KG      Reps 7 10  -KG      Additional Comments quick stretch with manual PT resistance on L  -KG         Exercise 8    Exercise Name 8 Semi-recumbent SAQ crystal  -KG      Cueing 8 Verbal  -KG      Sets 8 2  -KG      Reps 8 10  -KG      Additional Comments white/blue bolster  -KG         Exercise 9    Exercise Name 9 Semi-recumbent SLR flex  -KG      Cueing 9 Verbal;Tactile  -KG      Sets 9 1  -KG      Reps 9 10 ea LE  -KG         Exercise 10    Exercise Name 10 Seated on mat table: tband mid rows  -KG      Cueing 10 Verbal  -KG      Sets 10 2  -KG      Reps 10 10  -KG      Additional Comments green tband  -KG            User Key  (r) = Recorded By, (t) = Taken By, (c) = Cosigned By    Initials Name Provider Type    KG Sharon Montesinos, PT Physical Therapist                              PT OP Goals     Row Name 04/27/23 1600          PT Short Term Goals    STG Date to Achieve 04/25/23  -KG     STG 1 Demonstrate independence/compliance in performance of initial HEP "  -KG     STG 1 Progress Met  -KG     STG 2 Ambulate 85 ft with RW with CGA demonstrating improved LE strength/stability and improved step length/height  -KG     STG 2 Progress Partially Met;Progressing  -KG     STG 3 Perform sit<>stand transfer from airex/chair with CGA demonstrating improved BLE/BUE functional strength to assist with transfers at home  -KG     STG 3 Progress Met  -KG     STG 4 Perform sup<>sit transfer on mat table safely with min assist x1 and mild cues for set up/performance  -KG     STG 4 Progress Partially Met;Progressing  -KG        Long Term Goals    LTG Date to Achieve 05/30/23  -KG     LTG 1 Subjectively report 50% overall improvement or greater in gait/functional mobility & daily activity performance  -KG     LTG 1 Progress Met  -KG     LTG 2 Perform 1x10 sit<>stand tranfers from plinth or airex in chair with BUE assist demonstrate improved BLE functional strength & endurance  -KG     LTG 2 Progress Progressing  -KG     LTG 3 Ambulate 125 ft with RW safely demonstrating improved endurance/activity tolerance and gait kinematics for mobility inside the home  -KG     LTG 3 Progress Progressing  -KG     LTG 4 Perform small obstacle course in // bars conisisting of bench step, sm hurldes, & airex pad demonstrating improved dynamic balance & LE stability  -KG     LTG 4 Progress Progressing  -KG     LTG 5 Demonstrate improved crystal hip abd MMT to 4/5 or greater  -KG     LTG 5 Progress Progressing  -KG        Time Calculation    PT Goal Re-Cert Due Date 05/16/23  -KG           User Key  (r) = Recorded By, (t) = Taken By, (c) = Cosigned By    Initials Name Provider Type    Sharon Berg, PT Physical Therapist                Therapy Education  Given: HEP, Posture/body mechanics, Mobility training, Fall prevention and home safety  Program: Reinforced  How Provided: Verbal  Provided to: Patient  Level of Understanding: Verbalized, Demonstrated              Time Calculation:   Start Time:  1522  Stop Time: 1605  Time Calculation (min): 43 min  Therapy Charges for Today     Code Description Service Date Service Provider Modifiers Qty    [id number] HC PT THER PROC EA 15 MIN 4/27/2023 Sharon Montesinos, PT GP 3                    Sharon Montesinos, PT  4/27/2023

## 2023-05-02 ENCOUNTER — HOSPITAL ENCOUNTER (OUTPATIENT)
Dept: PHYSICAL THERAPY | Facility: HOSPITAL | Age: 78
Setting detail: THERAPIES SERIES
Discharge: HOME | End: 2023-05-02
Payer: MEDICARE

## 2023-05-02 DIAGNOSIS — R27.0 ATAXIA: Primary | ICD-10-CM

## 2023-05-02 DIAGNOSIS — R53.83 FATIGUE, UNSPECIFIED TYPE: ICD-10-CM

## 2023-05-02 DIAGNOSIS — R29.898 WEAKNESS OF BOTH LOWER EXTREMITIES: ICD-10-CM

## 2023-05-02 PROCEDURE — 97110 THERAPEUTIC EXERCISES: CPT

## 2023-05-02 PROCEDURE — 97530 THERAPEUTIC ACTIVITIES: CPT

## 2023-05-02 NOTE — THERAPY TREATMENT NOTE
Outpatient Physical Therapy Ortho Treatment Note  [facility]     Patient Name: Samy Velazquez  : 1945  MRN: [id number]  Today's Date: 2023      Visit Date: 2023    Attendance: 6 visits  Subjective improvement: ~50%  MD appt: PRN  Recheck due: 23    Visit Dx:    ICD-10-CM ICD-9-CM   1. Ataxia  R27.0 781.3   2. Weakness of both lower extremities  R29.898 729.89   3. Fatigue, unspecified type  R53.83 780.79       Patient Active Problem List   Diagnosis   • Ischemic heart disease due to coronary artery obstruction   • Osteoarthritis of multiple joints   • Cardiac pacemaker   • Gout of multiple sites   • Nephrolithiasis   • Mixed hyperlipidemia   • Morbidly obese   • Hypertensive heart disease with heart failure   • Major depressive disorder, recurrent, moderate   • Chronic kidney disease, stage 4 (severe) ([facility])   • Ventricular tachycardia   • Chronic systolic heart failure (HCC)   • Hypothyroidism   • Paroxysmal atrial fibrillation (HCC)   • Coronary atherosclerosis   • Essential hypertension   • Weakness of right lower extremity        Past Medical History:   Diagnosis Date   • Arthritis    • Coronary artery disease    • Hyperlipidemia    • Hypertensive heart disease with heart failure 3/29/2021   • Hypothyroidism 2021   • Kidney stone    • Major depressive disorder, recurrent, moderate 3/29/2021   • Morbidly obese 2020        Past Surgical History:   Procedure Laterality Date   • CARDIAC DEFIBRILLATOR PLACEMENT     • CARDIAC DEFIBRILLATOR PLACEMENT N/A    • CARDIAC SURGERY     • ENDOSCOPY N/A 10/30/2020    Procedure: ESOPHAGOGASTRODUODENOSCOPY WITH ANESTHESIA;  Surgeon: Brent Stanley MD;  Location: [facility];  Service: Gastroenterology;  Laterality: N/A;  pre dysphagia  post post op gastric surgery  dr jose manuel smith   • ESOPHAGUS SURGERY     • KNEE SURGERY     • TOTAL SHOULDER REPLACEMENT          PT Ortho     Row Name 23 1500       Precautions and  Contraindications    Precautions CVA with R sided weakness, heart disease, arthritis  -PM    Contraindications Pacemaker/defibulator  -PM          User Key  (r) = Recorded By, (t) = Taken By, (c) = Cosigned By    Initials Name Provider Type    Kristin Calhoun PTA Physical Therapist Assistant                             PT Assessment/Plan     Row Name 05/02/23 1500          PT Assessment    Assessment Comments A little more incr in gait distance, and again as pt fatigues he starts to take short fast steps and too forward lean on RW, cues given but pt tired and opted to sit down and rest. Did well with all // bar activities. Per pt report when questioned it doesn't seem pt doing much exc at home so did review today and updated HEP with pics of ex as indicated in education. Gave to pt wife as well who presented end of treatment to pick him up.  -PM        PT Plan    PT Frequency 2x/week  -PM     PT Plan Comments cont balance, stability in // bars as well as strength and gait  -PM           User Key  (r) = Recorded By, (t) = Taken By, (c) = Cosigned By    Initials Name Provider Type    Kristin Calhoun PTA Physical Therapist Assistant                   OP Exercises     Row Name 05/02/23 1500             Subjective Comments    Subjective Comments Pt states he is sleepy but ok.  -PM         Subjective Pain    Able to rate subjective pain? yes  -PM      Pre-Treatment Pain Level 0  -PM      Post-Treatment Pain Level 0  -PM         Exercise 1    Exercise Name 1 gait in WR with RW  -PM      Additional Comments 99 ft CGA; WC behind pt  -PM         Exercise 2    Exercise Name 2 // bars forward/backward gait  -PM      Reps 2 4 laps  -PM      Additional Comments // bar support  -PM         Exercise 3    Exercise Name 3 // bars gentle obstacle course: step over low 1/2 bolster, airex pad up down; medium bolster  -PM      Cueing 3 Demo  -PM      Reps 3 3 laps  -PM      Additional Comments // bar support  -PM          Exercise 4    Exercise Name 4 lat step R and L for hip abd strength  -PM      Cueing 4 Demo  -PM      Sets 4 1  -PM      Reps 4 10 ea direction  -PM      Additional Comments red tband just above knees  -PM         Exercise 5    Exercise Name 5 standing  // bars mid row  -PM      Cueing 5 Verbal  -PM      Sets 5 2  -PM      Reps 5 10  -PM      Time 5 red TB to far // bar  -PM      Additional Comments close SBA/CGA  -PM         Exercise 6    Exercise Name 6 standing // bars shldr ext  -PM      Cueing 6 Verbal  -PM      Sets 6 2  -PM      Reps 6 10  -PM      Additional Comments red tband close SBA/CGA  -PM         Exercise 7    Exercise Name 7 review bridge for HEP  -PM      Sets 7 2  -PM      Reps 7 10  -PM         Exercise 8    Exercise Name 8 sit stand from elevated plinth  -PM      Sets 8 2  -PM      Reps 8 10  -PM         Exercise 9    Exercise Name 9 LAQ sitting on plinth  -PM      Cueing 9 Verbal  -PM      Sets 9 2  -PM      Reps 9 10  -PM      Time 9 5 count hold  -PM      Additional Comments 1#  -PM         Exercise 10    Exercise Name 10 seated hip flex on plinth  -PM      Cueing 10 Verbal  -PM      Sets 10 2  -PM      Reps 10 10  -PM         Exercise 11    Exercise Name 11 seated hip abd  -PM      Cueing 11 Verbal  -PM      Sets 11 2  -PM      Reps 11 10  -PM         Exercise 12    Exercise Name 12 seated ball squeezes  -PM      Cueing 12 Verbal  -PM      Reps 12 2x10  -PM      Time 12 1  -PM         Exercise 13    Exercise Name 13 reviewed HEP with patient and wife  -PM            User Key  (r) = Recorded By, (t) = Taken By, (c) = Cosigned By    Initials Name Provider Type    PM Kristin Dillard, PTA Physical Therapist Assistant                              PT OP Goals     Row Name 05/02/23 1500          PT Short Term Goals    STG Date to Achieve 04/25/23  -PM     STG 1 Demonstrate independence/compliance in performance of initial HEP  -PM     STG 1 Progress Met  -PM     STG 2 Ambulate 85 ft with RW with  CGA demonstrating improved LE strength/stability and improved step length/height  -PM     STG 2 Progress Partially Met;Progressing  -PM     STG 3 Perform sit<>stand transfer from airex/chair with CGA demonstrating improved BLE/BUE functional strength to assist with transfers at home  -PM     STG 3 Progress Met  -PM     STG 4 Perform sup<>sit transfer on mat table safely with min assist x1 and mild cues for set up/performance  -PM     STG 4 Progress Partially Met;Progressing  -PM        Long Term Goals    LTG Date to Achieve 05/30/23  -PM     LTG 1 Subjectively report 50% overall improvement or greater in gait/functional mobility & daily activity performance  -PM     LTG 1 Progress Met  -PM     LTG 2 Perform 1x10 sit<>stand tranfers from plinth or airex in chair with BUE assist demonstrate improved BLE functional strength & endurance  -PM     LTG 2 Progress Progressing  -PM     LTG 3 Ambulate 125 ft with RW safely demonstrating improved endurance/activity tolerance and gait kinematics for mobility inside the home  -PM     LTG 3 Progress Progressing  -PM     LTG 4 Perform small obstacle course in // bars conisisting of bench step, sm hurldes, & airex pad demonstrating improved dynamic balance & LE stability  -PM     LTG 4 Progress Progressing  -PM     LTG 5 Demonstrate improved crystal hip abd MMT to 4/5 or greater  -PM     LTG 5 Progress Progressing  -PM        Time Calculation    PT Goal Re-Cert Due Date 05/16/23  -PM           User Key  (r) = Recorded By, (t) = Taken By, (c) = Cosigned By    Initials Name Provider Type    PM Kristin Dillard, ZANA Physical Therapist Assistant                Therapy Education  Education Details: bridge, seated HS S; seated hip flex, seat LAQ, seat hip abd; sit <>stands  Given: HEP, Posture/body mechanics, Mobility training, Fall prevention and home safety  Program: Reinforced, Progressed  How Provided: Verbal, Demonstration, Written  Provided to: Patient  Level of Understanding:  Verbalized, Demonstrated              Time Calculation:   Start Time: 1503  Stop Time: 1604  Time Calculation (min): 61 min  Therapy Charges for Today     Code Description Service Date Service Provider Modifiers Qty    66523324531  PT THER PROC EA 15 MIN 5/2/2023 Kristin Dillard PTA GP, CQ 2    39872562423 HC PT THERAPEUTIC ACT EA 15 MIN 5/2/2023 Kristin Dillard PTA GP, CQ 2                    Kristin Dillard PTA  5/2/2023

## 2023-05-04 ENCOUNTER — APPOINTMENT (OUTPATIENT)
Dept: PHYSICAL THERAPY | Facility: HOSPITAL | Age: 78
End: 2023-05-04
Payer: MEDICARE

## 2023-05-08 ENCOUNTER — APPOINTMENT (OUTPATIENT)
Dept: PHYSICAL THERAPY | Facility: HOSPITAL | Age: 78
End: 2023-05-08
Payer: MEDICARE

## 2023-05-08 NOTE — PROGRESS NOTES
"Subjective    Mr. Velazquez is 77 y.o. male    Chief Complaint: F/U Urinary Retention & new problem gross heamturia    History of Present Illness     77-year-old male established patient in for 6 month follow-up regarding acute urinary retention.  Patient reporting new problem as well of gross hematuria that started 3 days ago.  Patient reports originally started as a \"krishna color\" and has now been more of a brighter red.  Patient reports ongoing difficulty with urination where he does not feel as though he is emptying.  Remains on tamsulosin 0.4 mg daily.  Patient with previous history July of this past year during hospitalization for which patient required indwelling Treviño catheter usage.    Patient denies any fever, chills, nausea vomiting or flank pain.    The following portions of the patient's history were reviewed and updated as appropriate: allergies, current medications, past family history, past medical history, past social history, past surgical history and problem list.    Review of Systems   Constitutional: Positive for fatigue. Negative for chills and fever.   Gastrointestinal: Negative for nausea and vomiting.   Genitourinary: Positive for decreased urine volume, difficulty urinating and hematuria (gross hematuria). Negative for flank pain.         Current Outpatient Medications:   •  acetaminophen (TYLENOL) 650 MG 8 hr tablet, Take 1 tablet by mouth 2 (Two) Times a Day., Disp: , Rfl:   •  allopurinol (ZYLOPRIM) 100 MG tablet, TAKE 1 TABLET BY MOUTH EVERY DAY (Patient taking differently: Take 1 tablet by mouth Daily.), Disp: 90 tablet, Rfl: 1  •  aluminum-magnesium hydroxide-simethicone (MAALOX MAX) 400-400-40 MG/5ML suspension, Take 15 mL by mouth 3 (Three) Times a Day., Disp: , Rfl:   •  aspirin 81 MG EC tablet, Take 1 tablet by mouth Daily., Disp: , Rfl:   •  atorvastatin (LIPITOR) 40 MG tablet, TAKE 1 TABLET BY MOUTH EVERY DAY EVERY NIGHT, Disp: 90 tablet, Rfl: 3  •  buPROPion XL (WELLBUTRIN XL) 150 MG " 24 hr tablet, TAKE 1 TABLET BY MOUTH EVERY DAY IN THE MORNING (Patient taking differently: Take 1 tablet by mouth Every Morning.), Disp: 90 tablet, Rfl: 3  •  Cholecalciferol (VITAMIN D3) 2000 units capsule, Take 1 capsule by mouth Daily., Disp: , Rfl:   •  cholestyramine (Questran) 4 g packet, Take 1 packet by mouth 3 (Three) Times a Day With Meals., Disp: 15 packet, Rfl: 0  •  diphenhydrAMINE 12.5 MG/5ML elixir 20 mL, aluminum-magnesium hydroxide-simethicone 400-400-40 MG/5ML suspension 20 mL, Lidocaine Viscous HCl 2 % solution 20 mL, Swish and spit Every 4 (Four) Hours As Needed., Disp: , Rfl:   •  docusate sodium (COLACE) 100 MG capsule, Take 1 capsule by mouth Daily., Disp: , Rfl:   •  Eliquis 2.5 MG tablet tablet, TAKE 1 TABLET BY MOUTH EVERY 12 HOURS, Disp: 60 tablet, Rfl: 3  •  famotidine (PEPCID) 20 MG tablet, TAKE 1 TABLET BY MOUTH TWICE A DAY, Disp: 180 tablet, Rfl: 3  •  fluticasone (FLONASE) 50 MCG/ACT nasal spray, 2 sprays by Each Nare route Daily., Disp: , Rfl:   •  HYDROcodone-acetaminophen (NORCO) 5-325 MG per tablet, Take 1 tablet by mouth Every 8 (Eight) Hours As Needed for Moderate Pain., Disp: 60 tablet, Rfl: 0  •  hydrocortisone 2.5 % lotion, APPLY TO AFFECTED AREA 3 TIMES A DAY (Patient taking differently: Apply  topically to the appropriate area as directed As Needed.), Disp: 118 mL, Rfl: 3  •  ipratropium (ATROVENT) 0.03 % nasal spray, 1 spray into the nostril(s) as directed by provider Every 12 (Twelve) Hours., Disp: , Rfl:   •  isosorbide mononitrate (IMDUR) 30 MG 24 hr tablet, Take 1 tablet by mouth Daily. (Patient taking differently: Take 2 tablets by mouth Daily.), Disp: , Rfl:   •  levothyroxine (SYNTHROID, LEVOTHROID) 50 MCG tablet, TAKE 1 TABLET BY MOUTH EVERY DAY, Disp: 90 tablet, Rfl: 0  •  loperamide (IMODIUM A-D) 1 MG/7.5ML suspension, Imodium A-D 1 mg/7.5 mL oral liquid  Take by oral route., Disp: , Rfl:   •  magnesium oxide (MAG-OX) 400 MG tablet, Take 1 tablet by mouth Daily.,  Disp: , Rfl:   •  memantine (NAMENDA) 10 MG tablet, TAKE 1 TABLET BY MOUTH TWICE A DAY, Disp: 180 tablet, Rfl: 3  •  metoclopramide (REGLAN) 10 MG tablet, Take  by mouth 4 (Four) Times a Day Before Meals & at Bedtime., Disp: , Rfl:   •  metOLazone (ZAROXOLYN) 2.5 MG tablet, Take 1 tablet by mouth Daily As Needed., Disp: , Rfl:   •  metoprolol succinate XL (TOPROL-XL) 25 MG 24 hr tablet, Take 0.5 tablets by mouth Daily., Disp: 90 tablet, Rfl: 3  •  mexiletine (MEXITIL) 150 MG capsule, Take 1 capsule by mouth 2 (Two) Times a Day., Disp: , Rfl:   •  nitroglycerin (NITROSTAT) 0.4 MG SL tablet, Place 1 tablet under the tongue Every 5 (Five) Minutes As Needed for Chest Pain. Take no more than 3 doses in 15 minutes., Disp: 25 tablet, Rfl: 2  •  pantoprazole (PROTONIX) 40 MG EC tablet, TAKE 1 TABLET BY MOUTH EVERY DAY (Patient taking differently: Take 1 tablet by mouth Daily.), Disp: 90 tablet, Rfl: 1  •  polyethyl glycol-propyl glycol (SYSTANE) 0.4-0.3 % solution ophthalmic solution (artificial tears), Administer 1 drop to both eyes Every 1 (One) Hour As Needed., Disp: , Rfl:   •  tamsulosin (FLOMAX) 0.4 MG capsule 24 hr capsule, Take 1 capsule by mouth 2 (Two) Times a Day., Disp: 180 capsule, Rfl: 3  •  gabapentin (NEURONTIN) 100 MG capsule, Take 1 capsule by mouth Every Morning. (Patient not taking: Reported on 5/11/2023), Disp: 90 capsule, Rfl: 3  •  gabapentin (NEURONTIN) 300 MG capsule, TAKE 1 CAPSULE BY MOUTH AT BEDTIME EVERY DAY FOR NERVE PAIN (Patient not taking: Reported on 5/11/2023), Disp: 90 capsule, Rfl: 3    Past Medical History:   Diagnosis Date   • Arthritis    • Coronary artery disease    • Hyperlipidemia    • Hypertensive heart disease with heart failure 3/29/2021   • Hypothyroidism 4/1/2021   • Kidney stone    • Major depressive disorder, recurrent, moderate 3/29/2021   • Morbidly obese 9/18/2020       Past Surgical History:   Procedure Laterality Date   • CARDIAC DEFIBRILLATOR PLACEMENT     • CARDIAC  "DEFIBRILLATOR PLACEMENT N/A 2008   • CARDIAC SURGERY     • ENDOSCOPY N/A 10/30/2020    Procedure: ESOPHAGOGASTRODUODENOSCOPY WITH ANESTHESIA;  Surgeon: Brent Stanley MD;  Location: [facility];  Service: Gastroenterology;  Laterality: N/A;  pre dysphagia  post post op gastric surgery  dr jose manuel smith   • ESOPHAGUS SURGERY     • KNEE SURGERY     • TOTAL SHOULDER REPLACEMENT         Social History     Socioeconomic History   • Marital status:    Tobacco Use   • Smoking status: Never   • Smokeless tobacco: Never   Vaping Use   • Vaping Use: Never used   Substance and Sexual Activity   • Alcohol use: No   • Drug use: No   • Sexual activity: Defer       Family History   Problem Relation Age of Onset   • Hypertension Mother    • Stroke Mother    • Cancer Father    • Hypertension Father    • Diabetes Sister    • Hypertension Sister    • Crohn's disease Daughter    • No Known Problems Son    • No Known Problems Maternal Grandmother    • No Known Problems Maternal Grandfather    • No Known Problems Paternal Grandmother    • No Known Problems Paternal Grandfather    • Hypertension Sister    • Hypertension Sister    • Hypertension Sister    • No Known Problems Son        Objective    Temp 97.7 °F (36.5 °C)   Ht 167.6 cm (65.98\")   Wt 98.9 kg (218 lb)   BMI 35.20 kg/m²     Physical Exam  Constitutional:       Appearance: He is obese.   Abdominal:      Tenderness: There is no right CVA tenderness or left CVA tenderness.   Musculoskeletal:      Comments: W/c       Skin:     General: Skin is warm and dry.   Neurological:      Mental Status: He is alert and oriented to person, place, and time.   Psychiatric:         Mood and Affect: Mood normal.         Behavior: Behavior normal.             Results for orders placed or performed in visit on 05/11/23   POC Urinalysis Dipstick, Multipro    Specimen: Urine   Result Value Ref Range    Color Yellow Yellow, Straw, Dark Yellow, Candis    Clarity, UA Clear Clear    Glucose, UA " Negative Negative mg/dL    Bilirubin Negative Negative    Ketones, UA Negative Negative    Specific Gravity  1.030 1.005 - 1.030    Blood, UA Small (A) Negative    pH, Urine 5.0 5.0 - 8.0    Protein,  mg/dL (A) Negative mg/dL    Urobilinogen, UA 0.2 E.U./dL Normal, 0.2 E.U./dL    Nitrite, UA Negative Negative    Leukocytes Negative Negative   Estimation of residual urine via abdominal ultrasound  Residual Urine: 311 ml  Indication: Urinary Retention  Position: Supine  Examination: Incremental scanning of the suprapubic area using 3 MHz transducer using copious amounts of acoustic gel.   Findings: An anechoic area was demonstrated which represented the bladder, with measurement of residual urine as noted. I inspected this myself. In that the residual urine was stable or insignificant, no treatment will be necessary at this time.       Assessment and Plan    Diagnoses and all orders for this visit:    1. Urinary retention (Primary)  -     POC Urinalysis Dipstick, Multipro  -     tamsulosin (FLOMAX) 0.4 MG capsule 24 hr capsule; Take 1 capsule by mouth 2 (Two) Times a Day.  Dispense: 180 capsule; Refill: 3    2. Gross hematuria  -     CT abdomen pelvis wo contrast; Future  -     Non-gynecologic Cytology  -     Urine Culture - Urine, Urine, Clean Catch      77-year-old male established patient in for 6 month follow-up regarding acute urinary retention.  Patient reporting new problem as well of gross hematuria that started 3 days ago.    PVR today 311 mL    Patient does appear to be retaining more urine than would like    Will increase the tamsulosin to twice daily dosing    Giving the new report of gross hematuria recommend full hematuria evaluation with urine cytology, CT abdomen pelvis imaging(will have to obtain noncontrast as patient reports a severe allergy to contrast dye), and cystoscopy eval., will go ahead and send urine for culture to make sure no infection is present

## 2023-05-09 ENCOUNTER — TELEPHONE (OUTPATIENT)
Dept: FAMILY MEDICINE CLINIC | Facility: CLINIC | Age: 78
End: 2023-05-09
Payer: MEDICARE

## 2023-05-09 NOTE — TELEPHONE ENCOUNTER
Wife calling and states patient is passing blood--poss through urine.  Dark brown/ red area of blood top area of diaper and also in toilet.  Had bath and noticed recently.  Do you need to see in office?  Wife has appt in [place] @ 1pm--they were getting ready to leave and wasn't sure to go or cancel it.

## 2023-05-10 ENCOUNTER — APPOINTMENT (OUTPATIENT)
Dept: PHYSICAL THERAPY | Facility: HOSPITAL | Age: 78
End: 2023-05-10
Payer: MEDICARE

## 2023-05-11 ENCOUNTER — OFFICE VISIT (OUTPATIENT)
Dept: UROLOGY | Facility: CLINIC | Age: 78
End: 2023-05-11
Payer: MEDICARE

## 2023-05-11 VITALS — HEIGHT: 66 IN | WEIGHT: 218 LBS | BODY MASS INDEX: 35.03 KG/M2 | TEMPERATURE: 97.7 F

## 2023-05-11 DIAGNOSIS — R33.9 URINARY RETENTION: Primary | ICD-10-CM

## 2023-05-11 DIAGNOSIS — R31.0 GROSS HEMATURIA: ICD-10-CM

## 2023-05-11 LAB
BILIRUB BLD-MCNC: NEGATIVE MG/DL
CLARITY, POC: CLEAR
COLOR UR: YELLOW
GLUCOSE UR STRIP-MCNC: NEGATIVE MG/DL
KETONES UR QL: NEGATIVE
LEUKOCYTE EST, POC: NEGATIVE
NITRITE UR-MCNC: NEGATIVE MG/ML
PH UR: 5 [PH] (ref 5–8)
PROT UR STRIP-MCNC: ABNORMAL MG/DL
RBC # UR STRIP: ABNORMAL /UL
SP GR UR: 1.03 (ref 1–1.03)
UROBILINOGEN UR QL: ABNORMAL

## 2023-05-11 PROCEDURE — 87186 SC STD MICRODIL/AGAR DIL: CPT

## 2023-05-11 PROCEDURE — 87077 CULTURE AEROBIC IDENTIFY: CPT

## 2023-05-11 PROCEDURE — 87086 URINE CULTURE/COLONY COUNT: CPT

## 2023-05-11 PROCEDURE — 88112 CYTOPATH CELL ENHANCE TECH: CPT

## 2023-05-11 RX ORDER — TAMSULOSIN HYDROCHLORIDE 0.4 MG/1
1 CAPSULE ORAL 2 TIMES DAILY
Qty: 180 CAPSULE | Refills: 3 | Status: SHIPPED | OUTPATIENT
Start: 2023-05-11

## 2023-05-13 LAB — BACTERIA SPEC AEROBE CULT: ABNORMAL

## 2023-05-14 ENCOUNTER — DOCUMENTATION (OUTPATIENT)
Dept: UROLOGY | Facility: CLINIC | Age: 78
End: 2023-05-14
Payer: MEDICARE

## 2023-05-14 DIAGNOSIS — N30.01 ACUTE CYSTITIS WITH HEMATURIA: Primary | ICD-10-CM

## 2023-05-14 RX ORDER — CEFDINIR 300 MG/1
300 CAPSULE ORAL 2 TIMES DAILY
Qty: 20 CAPSULE | Refills: 0 | Status: SHIPPED | OUTPATIENT
Start: 2023-05-14

## 2023-05-15 ENCOUNTER — APPOINTMENT (OUTPATIENT)
Dept: PHYSICAL THERAPY | Facility: HOSPITAL | Age: 78
End: 2023-05-15

## 2023-05-15 ENCOUNTER — TELEPHONE (OUTPATIENT)
Dept: UROLOGY | Facility: CLINIC | Age: 78
End: 2023-05-15
Payer: MEDICARE

## 2023-05-15 LAB
CYTO UR: NORMAL
LAB AP CASE REPORT: NORMAL
Lab: NORMAL
PATH REPORT.FINAL DX SPEC: NORMAL
PATH REPORT.GROSS SPEC: NORMAL

## 2023-05-15 NOTE — TELEPHONE ENCOUNTER
Spoke with patients wife and let her know his cytology came back no malignant cells seen. Wife verbalized understanding.

## 2023-05-15 NOTE — TELEPHONE ENCOUNTER
Spoke with patients wife about his urine culture coming back positive so we have sent patient in a antibiotic. Wife verbalized understanding.

## 2023-05-15 NOTE — TELEPHONE ENCOUNTER
----- Message from GEN Shepard sent at 5/14/2023  8:11 PM CDT -----  Urine culture is positive for bacteria. I have sent in Omn[facility] to pt pharmacy

## 2023-05-17 ENCOUNTER — APPOINTMENT (OUTPATIENT)
Dept: PHYSICAL THERAPY | Facility: HOSPITAL | Age: 78
End: 2023-05-17

## 2023-05-18 ENCOUNTER — HOSPITAL ENCOUNTER (OUTPATIENT)
Dept: CT IMAGING | Facility: HOSPITAL | Age: 78
Discharge: HOME | End: 2023-05-18
Payer: MEDICARE

## 2023-05-18 DIAGNOSIS — R31.0 GROSS HEMATURIA: ICD-10-CM

## 2023-05-18 PROCEDURE — 74176 CT ABD & PELVIS W/O CONTRAST: CPT

## 2023-05-19 ENCOUNTER — TELEPHONE (OUTPATIENT)
Dept: FAMILY MEDICINE CLINIC | Facility: CLINIC | Age: 78
End: 2023-05-19
Payer: MEDICARE

## 2023-05-19 DIAGNOSIS — I73.9 PERIPHERAL VASCULAR DISEASE: Primary | ICD-10-CM

## 2023-05-19 NOTE — TELEPHONE ENCOUNTER
Spoke with Beth.  She is requesting referral to [facility] for wound care.  States that is hard on patient going to [place] and with her dizzy spells difficult for her to drive patient that distance.      Advised that Dr. Salinas is out of office until 5/23/23.  She is fine to wait until Dr. Salinas is back in the office.

## 2023-05-19 NOTE — TELEPHONE ENCOUNTER
Caller: Beth Velazquez    Relationship: Emergency Contact    Best call back number: [phone]    What is the best time to reach you: ANYTIME    Who are you requesting to speak with (clinical staff, provider,  specific staff member): CLINICAL      What was the call regarding: PATIENTS WIFE IS REQUESTING A CALL BACK TO TALK ABOUT ISSUES WITH PATIENTS TOES. AND TO SEE IF THEY CAN SEE A WOUND CARE SPECIALISTS IN [place].    Do you require a callback: YES

## 2023-05-24 ENCOUNTER — PROCEDURE VISIT (OUTPATIENT)
Dept: UROLOGY | Facility: CLINIC | Age: 78
End: 2023-05-24
Payer: MEDICARE

## 2023-05-24 DIAGNOSIS — R31.0 GROSS HEMATURIA: ICD-10-CM

## 2023-05-24 DIAGNOSIS — R33.9 URINARY RETENTION: Primary | ICD-10-CM

## 2023-05-24 PROCEDURE — 52000 CYSTOURETHROSCOPY: CPT | Performed by: UROLOGY

## 2023-05-24 PROCEDURE — 81001 URINALYSIS AUTO W/SCOPE: CPT | Performed by: UROLOGY

## 2023-05-24 NOTE — PROGRESS NOTES
Pre- operative diagnosis:  Hematuria    Post operative diagnosis:  Same    Procedure:  The patient was prepped and draped in a normal sterile fashion.  The urethra was anesthetized with 2% lidocaine jelly.  A flexible cystoscope was introduced per urethra.      Urethra:  Normal    Bladder:  mild trabeculation    Ureteral orifices:  Normal position bilaterally    Prostate:  lateral lobe hypertrophy    Patient tolerated the procedure well    Complications: none    Blood loss: minimal    Follow up:    Routine follow up    Hematuria work-up negative.  He does have a large prostate with lateral lobe hypertrophy which likely could be a source of gross hematuria.

## 2023-06-02 ENCOUNTER — TELEPHONE (OUTPATIENT)
Dept: UROLOGY | Facility: CLINIC | Age: 78
End: 2023-06-02

## 2023-06-02 NOTE — TELEPHONE ENCOUNTER
----- Message from Nereyda Marcelino MA sent at 6/2/2023  9:16 AM CDT -----  Patient had a cysto done 5/24/2023 and he is still having blood in his urine and pain when urinating.    Please advise. Thank you ma'am

## 2023-06-02 NOTE — TELEPHONE ENCOUNTER
Called patient's wife, I informed her per Dr Bergeron's procedure note (Hematuria work-up negative. He does have a large prostate with lateral lobe hypertrophy which likely could be a source of gross hematuria.)  She v/u

## 2023-06-06 RX ORDER — ALLOPURINOL 100 MG/1
100 TABLET ORAL DAILY
Qty: 90 TABLET | Refills: 0 | Status: SHIPPED | OUTPATIENT
Start: 2023-06-06

## 2023-06-06 NOTE — TELEPHONE ENCOUNTER
Rx Refill Note  Requested Prescriptions     Pending Prescriptions Disp Refills    allopurinol (ZYLOPRIM) 100 MG tablet [Pharmacy Med Name: ALLOPURINOL 100 MG TABLET] 90 tablet 1     Sig: TAKE 1 TABLET BY MOUTH EVERY DAY      Last office visit with prescribing clinician: 2/20/2023   Last telemedicine visit with prescribing clinician: Visit date not found   Next office visit with prescribing clinician: Visit date not found   CPE done     {TIP  Please add Last Relevant Lab Date if appropriate: 01/24/2023             {TIP  Is Refill Pharmacy correct?: yes    Yue Schmidt MA  06/06/23, 10:20 CDT

## 2023-06-07 DIAGNOSIS — R33.9 URINARY RETENTION: ICD-10-CM

## 2023-06-07 RX ORDER — TAMSULOSIN HYDROCHLORIDE 0.4 MG/1
CAPSULE ORAL
Qty: 90 CAPSULE | Refills: 1 | OUTPATIENT
Start: 2023-06-07

## 2023-06-12 ENCOUNTER — HOSPITAL ENCOUNTER (OUTPATIENT)
Dept: PHYSICAL THERAPY | Facility: HOSPITAL | Age: 78
Setting detail: THERAPIES SERIES
Discharge: HOME | End: 2023-06-12
Payer: MEDICARE

## 2023-06-12 DIAGNOSIS — R27.0 ATAXIA: Primary | ICD-10-CM

## 2023-06-12 PROCEDURE — 97110 THERAPEUTIC EXERCISES: CPT | Performed by: PHYSICAL THERAPIST

## 2023-06-12 PROCEDURE — 97530 THERAPEUTIC ACTIVITIES: CPT | Performed by: PHYSICAL THERAPIST

## 2023-06-13 NOTE — THERAPY PROGRESS REPORT/RE-CERT
Outpatient Physical Therapy Ortho Progress Note  [facility] [place]     Patient Name: Samy Velazquez  : 1945  MRN: [id number]  Today's Date: 2023      Visit Date: 2023    Attendance: 7 visits  Subjective improvement: 50%  MD appt: PRN  Recheck due: 7/3/2023    Visit Dx:    ICD-10-CM ICD-9-CM   1. Ataxia  R27.0 781.3       Patient Active Problem List   Diagnosis    Ischemic heart disease due to coronary artery obstruction    Osteoarthritis of multiple joints    Cardiac pacemaker    Gout of multiple sites    Nephrolithiasis    Mixed hyperlipidemia    Morbidly obese    Hypertensive heart disease with heart failure    Major depressive disorder, recurrent, moderate    Chronic kidney disease, stage 4 (severe)    Ventricular tachycardia    Chronic systolic heart failure    Hypothyroidism    Paroxysmal atrial fibrillation    Coronary atherosclerosis    Essential hypertension    Weakness of right lower extremity        Past Medical History:   Diagnosis Date    Arthritis     Coronary artery disease     Hyperlipidemia     Hypertensive heart disease with heart failure 3/29/2021    Hypothyroidism 2021    Kidney stone     Major depressive disorder, recurrent, moderate 3/29/2021    Morbidly obese 2020        Past Surgical History:   Procedure Laterality Date    CARDIAC DEFIBRILLATOR PLACEMENT      CARDIAC DEFIBRILLATOR PLACEMENT N/A     CARDIAC SURGERY      ENDOSCOPY N/A 10/30/2020    Procedure: ESOPHAGOGASTRODUODENOSCOPY WITH ANESTHESIA;  Surgeon: Brent Stanley MD;  Location: [facility] OR;  Service: Gastroenterology;  Laterality: N/A;  pre dysphagia  post post op gastric surgery  dr jose manuel smith    ESOPHAGUS SURGERY      KNEE SURGERY      TOTAL SHOULDER REPLACEMENT                    PT Ortho       Row Name 23 1400       Subjective Comments    Subjective Comments Pt states he's not any better but not any worse. Had to miss ~1 month of PT due to some other issues (hematuria). States  things with that are a little better. Hasn't been doing his exercises at home. Still wants to work on his walking and strength.  -KG       Precautions and Contraindications    Precautions CVA with R sided weakness, heart disease, arthritis  -KG    Contraindications Pacemaker/defibulator  -KG       Subjective Pain    Post-Treatment Pain Level 3  -KG       Posture/Observations    Posture/Observations Comments Presents in transport chair. No acute distress. No c/o fatigue and pt quite alert this date. Knee braces donned bilaterally. Contnues with decreased postural awareness; cues provided throughout treatment. Not as slumped in chair this date. Rounded shoulders/back. Knee varus bilaterally R>L with gait and standing. Decreased TKE.  -KG       General ROM    GENERAL ROM COMMENTS Bilateral shoulder flex/abd limited to ~90 deg. Elbow/wrist AROM WFL. Bilateral hip, knee, ankle AROM WFL but weakness present bilaterally.  -KG       MMT Right Upper Ext    Rt Shoulder Flexion MMT, Gross Movement (3+/5) fair plus  -KG    Rt Shoulder ABduction MMT, Gross Movement (3+/5) fair plus  -KG    Rt Elbow Flexion MMT, Gross Movement: (4+/5) good plus  -KG    Rt Elbow Extension MMT, Gross Movement: (4+/5) good plus  -KG       MMT Left Upper Ext    Lt Shoulder Flexion MMT, Gross Movement (3+/5) fair plus  -KG    Lt Shoulder ABduction MMT, Gross Movement (3+/5) fair plus  -KG    Lt Elbow Flexion MMT, Gross Movement (4+/5) good plus  -KG    Lt Elbow Extension MMT, Gross Movement (4+/5) good plus  -KG       MMT Right Lower Ext    Rt Hip Flexion MMT, Gross Movement (4/5) good  -KG    Rt Hip ABduction MMT, Gross Movement (4-/5) good minus  -KG    Rt Hip ADduction MMT, Gross Movement (4+/5) good plus  -KG    Rt Knee Extension MMT, Gross Movement (4/5) good  -KG    Rt Knee Flexion MMT, Gross Movement (4/5) good  -KG    Rt Ankle Plantarflexion MMT, Gross Movement (4+/5) good plus  -KG    Rt Ankle Dorsiflexion MMT, Gross Movement (4+/5) good plus   -KG    Rt Lower Extremity Comments  MMT performed seated  -KG       MMT Left Lower Ext    Lt Hip Flexion MMT, Gross Movement (4+/5) good plus  -KG    Lt Hip ABduction MMT, Gross Movement (3+/5) fair plus  -KG    Lt Hip ADduction MMT, Gross Movement (4+/5) good plus  -KG    Lt Knee Extension MMT, Gross Movement (4/5) good  -KG    Lt Knee Flexion MMT, Gross Movement (4+/5) good plus  -KG    Lt Ankle Plantarflexion MMT, Gross Movement (4+/5) good plus  -KG    Lt Ankle Dorsiflexion MMT, Gross Movement (4+/5) good plus  -KG    Lt Lower Extremity Comments  MMT performed seated  -KG       Sensation    Sensation WNL? WFL  -KG    Light Touch No apparent deficits  -KG       Lower Extremity Flexibility    Hamstrings Bilateral:;Moderately limited  -KG    Hip Flexors Bilateral:;Moderately limited  -KG    Gastrocnemius Bilateral:;Moderately limited  -KG       Balance Skills Training    Balance Comments Standing static balance (at RW with single HHA): reaching with single UE hand taps to PT's hand in various directions. No LOB noted. Tend to not turn head and look where his target is. Cues to correct this. Slightly more unsteady when reaching outside of SUSIE.  -KG       Transfers    Comment, (Transfers) Requires BUE assist for sit<>stand transfers. Sit>stand mostly CGA  -KG       Gait/Stairs (Locomotion)    Comment, (Gait/Stairs) Ambulates with RW. Slightly forward flexed at trunk. Decreased sepideh but improving.  -KG              User Key  (r) = Recorded By, (t) = Taken By, (c) = Cosigned By      Initials Name Provider Type    Sharon Berg, PT Physical Therapist                           PT Assessment/Plan       Row Name 06/12/23 1500          PT Assessment    Functional Limitations Decreased safety during functional activities;Impaired gait;Impaired locomotion;Limitation in home management;Limitations in community activities;Limitations in functional capacity and performance;Performance in leisure activities;Performance  "in self-care ADL  -KG     Impairments Balance;Gait;Impaired flexibility;Impaired muscle endurance;Locomotion;Muscle strength;Pain;Poor body mechanics;Posture;Range of motion  -KG     Assessment Comments Pt returns to PT after missing ~1 month due to other health issues. Overall pt did quite well today. Had better tolerance to standing and gait activities in parallel bars. Pt does still fatigue easily in BLE's but tolerates better and demonstrates improved BLE strength. Pt demonstrates better safety awareness with gait with RW in lobby. Controlled walker better and required less cues for turns. Pt also did better with seated therex. Was able to extend knees better with LAQ as strength is improving. Pt does still present with deficits in overall dynamic and static balance. Was able to stand to perform tband rows this date with chair behind him. Pt making steady progress towards goals but still needs work on functional strength, muscle endurance, gait/balance performance, trunk stability, & functional activity tolerance.  -KG     Rehab Potential Fair  Potential barriers: Chronicity of current condition, OA, chronic pain  -KG     Patient/caregiver participated in establishment of treatment plan and goals Yes  -KG     Patient would benefit from skilled therapy intervention Yes  -KG        PT Plan    PT Frequency 2x/week  -KG     Predicted Duration of Therapy Intervention (PT) 8-10 visits  -KG     PT Plan Comments Continue progressing BLE functional strength, gait, and balance activities. Try alt step taps to 4\" bench step next. Contnue small balance course in // bars. Standing tband shoulder ext next.  -KG               User Key  (r) = Recorded By, (t) = Taken By, (c) = Cosigned By      Initials Name Provider Type    KG Sharon Montesinos, PT Physical Therapist                       OP Exercises       Row Name 06/12/23 1500             Exercise 2    Exercise Name 2 // bars forward/backward gait  -KG      Reps 2 4 laps  " "-KG      Additional Comments // bar support  -KG         Exercise 3    Exercise Name 3 // bars gentle obstacle course: step over low 1/2 bolster, airex pad up down; medium bolster, small jhon  -KG      Cueing 3 Demo  -KG      Reps 3 3 laps  -KG      Additional Comments // bar support  -KG         Exercise 4    Exercise Name 4 lat step R and L for hip abd strength  -KG      Cueing 4 Demo  -KG      Sets 4 1  -KG      Reps 4 10, 5  -KG      Additional Comments red tband loop above knees  -KG         Exercise 5    Exercise Name 5 Gait in lobby wtih RW  -KG      Additional Comments ft CGA; WC behind pt  -KG         Exercise 6    Exercise Name 6 LAQ sitting on plinth  -KG      Cueing 6 Verbal  -KG      Sets 6 2  -KG      Reps 6 10  -KG      Time 6 5\" hold  -KG      Additional Comments 1# AW  -KG         Exercise 7    Exercise Name 7 seated hip flex on plinth  -KG      Cueing 7 Verbal  -KG      Sets 7 2  -KG      Reps 7 10  -KG         Exercise 8    Exercise Name 8 Sit<>stand from elevated plinth  -KG      Sets 8 1  -KG      Reps 8 10  -KG      Additional Comments no UE assist; RW in front of pt  -KG         Exercise 9    Exercise Name 9 Standing tband mid rows  -KG      Cueing 9 Verbal  -KG      Sets 9 2  -KG      Reps 9 10  -KG      Additional Comments red tband; chair behind pt  -KG         Exercise 10    Exercise Name 10 Standing FA with RW (single hand support on walker): UE reaching/hand taps to PT hand in various directions (outside of SUSIE & across midline)  -KG      Cueing 10 Verbal  -KG      Sets 10 1  -KG      Reps 10 ~15 ea UE  -KG      Additional Comments chair behind pt  -KG                User Key  (r) = Recorded By, (t) = Taken By, (c) = Cosigned By      Initials Name Provider Type    KG Sharon Montesinos, PT Physical Therapist                                  PT OP Goals       Row Name 06/12/23 1500          PT Short Term Goals    STG Date to Achieve 07/03/23  -KG     STG 1 Demonstrate " independence/compliance in performance of initial HEP  -KG     STG 1 Progress Partially Met  -KG     STG 2 Ambulate 85 ft with RW with CGA demonstrating improved LE strength/stability and improved step length/height  -KG     STG 2 Progress Partially Met;Progressing  -KG     STG 3 Perform sit<>stand transfer from airex/chair with CGA demonstrating improved BLE/BUE functional strength to assist with transfers at home  -KG     STG 3 Progress Met  -KG     STG 4 Perform sup<>sit transfer on mat table safely with min assist x1 and mild cues for set up/performance  -KG     STG 4 Progress Met  -KG        Long Term Goals    LTG Date to Achieve 07/24/23  -KG     LTG 1 Subjectively report 50% overall improvement or greater in gait/functional mobility & daily activity performance  -KG     LTG 1 Progress Met  -KG     LTG 2 Perform 1x10 sit<>stand tranfers from plinth or airex in chair with BUE assist demonstrate improved BLE functional strength & endurance  -KG     LTG 2 Progress Partially Met  -KG     LTG 3 Ambulate 125 ft with RW safely demonstrating improved endurance/activity tolerance and gait kinematics for mobility inside the home  -KG     LTG 3 Progress Progressing  -KG     LTG 4 Perform small obstacle course in // bars conisisting of bench step, sm hurldes, & airex pad demonstrating improved dynamic balance & LE stability  -KG     LTG 4 Progress Progressing  -KG     LTG 5 Demonstrate improved crystal hip abd MMT to 4/5 or greater  -KG     LTG 5 Progress Progressing  -KG        Time Calculation    PT Goal Re-Cert Due Date 07/03/23  -KG               User Key  (r) = Recorded By, (t) = Taken By, (c) = Cosigned By      Initials Name Provider Type    KG Sharon Montesinos, PT Physical Therapist                    Therapy Education  Education Details: Standing tband L/R sidesteping (red tband)  Given: HEP, Symptoms/condition management, Posture/body mechanics, Mobility training, Fall prevention and home safety  Program:  Reinforced, Progressed  How Provided: Demonstration, Written, Verbal  Provided to: Patient  Level of Understanding: Teach back education performed, Verbalized, Demonstrated    Outcome Measure Options: Lower Extremity Functional Scale (LEFS)  Lower Extremity Functional Index  Any of your usual work, housework or school activities: Extreme difficulty or unable to perform activity  Your usual hobbies, recreational or sporting activities: Extreme difficulty or unable to perform activity  Getting into or out of the bath: Moderate difficulty  Walking between rooms: Moderate difficulty  Putting on your shoes or socks: Quite a bit of difficulty  Squatting: Quite a bit of difficulty  Lifting an object, like a bag of groceries from the floor: Extreme difficulty or unable to perform activity  Performing light activities around your home: Quite a bit of difficulty  Performing heavy activities around your home: Extreme difficulty or unable to perform activity  Getting into or out of a car: Quite a bit of difficulty  Walking 2 blocks: Extreme difficulty or unable to perform activity  Walking a mile: Extreme difficulty or unable to perform activity  Going up or down 10 stairs (about 1 flight of stairs): Quite a bit of difficulty  Standing for 1 hour: Extreme difficulty or unable to perform activity  Sitting for 1 hour: No difficulty  Running on even ground: Extreme difficulty or unable to perform activity  Running on uneven ground: Extreme difficulty or unable to perform activity  Making sharp turns while running fast: Extreme difficulty or unable to perform activity  Hopping: Extreme difficulty or unable to perform activity  Rolling over in bed: Moderate difficulty  Total: 15      Time Calculation:   Start Time: 1506  Stop Time: 1600  Time Calculation (min): 54 min    Therapy Charges for Today       Code Description Service Date Service Provider Modifiers Qty    93586090355 HC PT THER PROC EA 15 MIN 6/12/2023 Sharon Montesinos,  PT GP 3    [id number]  PT THERAPEUTIC ACT EA 15 MIN 6/12/2023 Sharon Montesinos, PT GP 1              PT G-Codes  Outcome Measure Options: Lower Extremity Functional Scale (LEFS)  Total: 15         Sharon Montesinos, PT  6/13/2023

## 2023-06-15 ENCOUNTER — HOSPITAL ENCOUNTER (OUTPATIENT)
Dept: PHYSICAL THERAPY | Facility: HOSPITAL | Age: 78
Setting detail: THERAPIES SERIES
Discharge: HOME | End: 2023-06-15
Payer: MEDICARE

## 2023-06-15 DIAGNOSIS — R29.898 WEAKNESS OF BOTH LOWER EXTREMITIES: ICD-10-CM

## 2023-06-15 DIAGNOSIS — R27.0 ATAXIA: Primary | ICD-10-CM

## 2023-06-15 PROCEDURE — 97110 THERAPEUTIC EXERCISES: CPT | Performed by: PHYSICAL THERAPIST

## 2023-06-15 PROCEDURE — 97530 THERAPEUTIC ACTIVITIES: CPT | Performed by: PHYSICAL THERAPIST

## 2023-06-16 NOTE — THERAPY TREATMENT NOTE
Outpatient Physical Therapy Ortho Treatment Note  [facility]     Patient Name: Samy Velazquez  : 1945  MRN: [id number]  Today's Date: 6/15/2023      Visit Date: 06/15/2023    Attendance: 8 visits  Subjective improvement: 50%  MD appt: PRN  Recheck due: 7/3/2023    Visit Dx:    ICD-10-CM ICD-9-CM   1. Ataxia  R27.0 781.3   2. Weakness of both lower extremities  R29.898 729.89       Patient Active Problem List   Diagnosis    Ischemic heart disease due to coronary artery obstruction    Osteoarthritis of multiple joints    Cardiac pacemaker    Gout of multiple sites    Nephrolithiasis    Mixed hyperlipidemia    Morbidly obese    Hypertensive heart disease with heart failure    Major depressive disorder, recurrent, moderate    Chronic kidney disease, stage 4 (severe)    Ventricular tachycardia    Chronic systolic heart failure    Hypothyroidism    Paroxysmal atrial fibrillation    Coronary atherosclerosis    Essential hypertension    Weakness of right lower extremity        Past Medical History:   Diagnosis Date    Arthritis     Coronary artery disease     Hyperlipidemia     Hypertensive heart disease with heart failure 3/29/2021    Hypothyroidism 2021    Kidney stone     Major depressive disorder, recurrent, moderate 3/29/2021    Morbidly obese 2020        Past Surgical History:   Procedure Laterality Date    CARDIAC DEFIBRILLATOR PLACEMENT      CARDIAC DEFIBRILLATOR PLACEMENT N/A     CARDIAC SURGERY      ENDOSCOPY N/A 10/30/2020    Procedure: ESOPHAGOGASTRODUODENOSCOPY WITH ANESTHESIA;  Surgeon: Brent Stanley MD;  Location: [facility];  Service: Gastroenterology;  Laterality: N/A;  pre dysphagia  post post op gastric surgery  dr jose manuel smith    ESOPHAGUS SURGERY      KNEE SURGERY      TOTAL SHOULDER REPLACEMENT          PT Ortho       Row Name 06/15/23 1400       Subjective Comments    Subjective Comments Pt states he did fine after last treatment. A little tired today but doing ok.   -KG       Precautions and Contraindications    Precautions CVA with R sided weakness, heart disease, arthritis  -KG    Contraindications Pacemaker/defibulator  -KG       Subjective Pain    Able to rate subjective pain? yes  -KG    Pre-Treatment Pain Level 0  -KG    Post-Treatment Pain Level 0  -KG       Posture/Observations    Posture/Observations Comments CGA for sit<>stands throughout treatment. Difficulty keeping upright posture with standing activities that don't provide UE support (standing tband rows & shoulder ext)  -KG              User Key  (r) = Recorded By, (t) = Taken By, (c) = Cosigned By      Initials Name Provider Type    Sharon Berg, PT Physical Therapist                       PT Assessment/Plan       Row Name 06/15/23 1400          PT Assessment    Assessment Comments Pt continues to do well with gait activities. Fatigued a little easier with gait in lobby this date vs previous visit but continues to do better. Was able to progress to airex beam activities in parallel bars with crystal HHA at railings. Gets fatigue in trunk/back with standing rows and ext and requires verbal/tactile cues to improve.  -KG        PT Plan    PT Frequency 2x/week  -KG     PT Plan Comments Continue progressing standing postural strengthening and stability. Tband trunk ext next.  -KG               User Key  (r) = Recorded By, (t) = Taken By, (c) = Cosigned By      Initials Name Provider Type    Sharon Berg, PT Physical Therapist                                   OP Exercises       Row Name 06/15/23 1400             Subjective Comments    Subjective Comments Pt states he did fine after last treatment. A little tired today but doing ok.  -KG         Subjective Pain    Able to rate subjective pain? yes  -KG      Pre-Treatment Pain Level 0  -KG      Post-Treatment Pain Level 0  -KG                User Key  (r) = Recorded By, (t) = Taken By, (c) = Cosigned By      Initials Name Provider Type    Sharon Berg  "N, PT Physical Therapist                  Exercise 1   Exercise Name 1 Gait in lobby working on endurance & gait kinematics   Additional Comments 97 ft, RW; CGA; w/c pushed behind pt   Exercise 2   Exercise Name 2 // bars: forward/backward gait working on kinematics   Sets 2 1   Reps 2 4 laps   Additional Comments // bar support   Exercise 3   Exercise Name 3 // bars gentle obstacle course: step over two 6\" hurdles, on/off airex pad; one 6\" jhon   Cueing 3 Verbal   Sets 3 1   Reps 3 3 laps   Additional Comments // bar support   Exercise 4   Exercise Name 4 // bars: airex beam sidestepping   Cueing 4 Verbal   Sets 4 1   Reps 4 3 laps   Exercise 5   Exercise Name 5 // bars: airex beam tandem gait   Cueing 5 Verbal   Reps 5 3 laps   Additional Comments // bar support   Exercise 6   Exercise Name 6 Standing alt step taps to 4\" step   Cueing 6 Verbal   Sets 6 2   Reps 6 10   Additional Comments B HHA at // bars   Exercise 7   Exercise Name 7 seated hip flex on low mat table/2 airex   Cueing 7 Verbal   Sets 7 2   Reps 7 10   Additional Comments 1# AW   Exercise 8   Exercise Name 8 Seated alt LAQ on low mat table/2 airex   Cueing 8 Verbal   Sets 8 2   Reps 8 10   Additional Comments 1# AW   Exercise 9   Exercise Name 9 Sit<>stands from low mat table/2 airex   Cueing 9 Verbal   Sets 9 2   Reps 9 10   Additional Comments RW in front; no UE assist   Exercise 10   Exercise Name 10 Standing tband mid rows   Cueing 10 Verbal   Sets 10 2   Reps 10 10   Additional Comments red tband; CGA   Exercise 11   Exercise Name 11 Standing tband crystal shoulder ext/scap retraction   Cueing 11 Verbal;Tactile   Sets 11 2   Reps 11 10   Additional Comments red tband; CGA                PT OP Goals       Row Name 06/15/23 1400          PT Short Term Goals    STG Date to Achieve 07/03/23  -KG     STG 1 Demonstrate independence/compliance in performance of initial HEP  -KG     STG 1 Progress Partially Met  -KG     STG 2 Ambulate 85 ft with RW with " CGA demonstrating improved LE strength/stability and improved step length/height  -KG     STG 2 Progress Partially Met;Progressing  -KG     STG 3 Perform sit<>stand transfer from airex/chair with CGA demonstrating improved BLE/BUE functional strength to assist with transfers at home  -KG     STG 3 Progress Met  -KG     STG 4 Perform sup<>sit transfer on mat table safely with min assist x1 and mild cues for set up/performance  -KG     STG 4 Progress Met  -KG        Long Term Goals    LTG Date to Achieve 07/24/23  -KG     LTG 1 Subjectively report 50% overall improvement or greater in gait/functional mobility & daily activity performance  -KG     LTG 1 Progress Met  -KG     LTG 2 Perform 1x10 sit<>stand tranfers from plinth or airex in chair with BUE assist demonstrate improved BLE functional strength & endurance  -KG     LTG 2 Progress Partially Met  -KG     LTG 3 Ambulate 125 ft with RW safely demonstrating improved endurance/activity tolerance and gait kinematics for mobility inside the home  -KG     LTG 3 Progress Progressing  -KG     LTG 4 Perform small obstacle course in // bars conisisting of bench step, sm hurldes, & airex pad demonstrating improved dynamic balance & LE stability  -KG     LTG 4 Progress Progressing  -KG     LTG 5 Demonstrate improved crystal hip abd MMT to 4/5 or greater  -KG     LTG 5 Progress Progressing  -KG        Time Calculation    PT Goal Re-Cert Due Date 07/03/23  -KG               User Key  (r) = Recorded By, (t) = Taken By, (c) = Cosigned By      Initials Name Provider Type    KG Sharon Montesinos, PT Physical Therapist                             Therapy Education  Given: HEP, Symptoms/condition management, Posture/body mechanics, Mobility training, Fall prevention and home safety  Program: Reinforced  How Provided: Demonstration, Verbal  Provided to: Patient  Level of Understanding: Verbalized, Demonstrated              Time Calculation:   Start Time: 1405  Stop Time: 1458  Time  Calculation (min): 53 min  Therapy Charges for Today       Code Description Service Date Service Provider Modifiers Qty    93220082992  PT THER PROC EA 15 MIN 6/15/2023 Sharon Montesinos, PT GP 3    [id number]  PT THERAPEUTIC ACT EA 15 MIN 6/15/2023 Sharon Montesinos, PT GP 1                      Sharon Montesinos, PT  6/15/2023

## 2023-06-22 PROBLEM — M86.9 OSTEOMYELITIS OF RIGHT FOOT, UNSPECIFIED TYPE: Status: ACTIVE | Noted: 2023-06-22

## 2023-06-27 PROBLEM — L03.032 CELLULITIS OF SECOND TOE OF LEFT FOOT: Status: ACTIVE | Noted: 2023-06-22

## 2023-06-27 PROBLEM — N18.32 STAGE 3B CHRONIC KIDNEY DISEASE (CKD): Status: ACTIVE | Noted: 2021-03-29

## 2023-06-28 PROBLEM — E44.0 MODERATE MALNUTRITION: Status: ACTIVE | Noted: 2023-06-28

## 2023-07-17 ENCOUNTER — OFFICE VISIT (OUTPATIENT)
Dept: NEUROLOGY | Facility: CLINIC | Age: 78
End: 2023-07-17
Payer: MEDICARE

## 2023-07-17 VITALS
WEIGHT: 228 LBS | BODY MASS INDEX: 34.56 KG/M2 | HEIGHT: 68 IN | OXYGEN SATURATION: 97 % | DIASTOLIC BLOOD PRESSURE: 60 MMHG | HEART RATE: 87 BPM | SYSTOLIC BLOOD PRESSURE: 120 MMHG

## 2023-07-17 DIAGNOSIS — I48.0 PAROXYSMAL ATRIAL FIBRILLATION: ICD-10-CM

## 2023-07-17 DIAGNOSIS — R26.9 GAIT DISTURBANCE, POST-STROKE: ICD-10-CM

## 2023-07-17 DIAGNOSIS — I69.30 CHRONIC ARTERIAL ISCHEMIC STROKE: Primary | ICD-10-CM

## 2023-07-17 DIAGNOSIS — I69.398 GAIT DISTURBANCE, POST-STROKE: ICD-10-CM

## 2023-07-17 PROCEDURE — 1159F MED LIST DOCD IN RCRD: CPT | Performed by: PHYSICIAN ASSISTANT

## 2023-07-17 PROCEDURE — 3074F SYST BP LT 130 MM HG: CPT | Performed by: PHYSICIAN ASSISTANT

## 2023-07-17 PROCEDURE — 99213 OFFICE O/P EST LOW 20 MIN: CPT | Performed by: PHYSICIAN ASSISTANT

## 2023-07-17 PROCEDURE — 1160F RVW MEDS BY RX/DR IN RCRD: CPT | Performed by: PHYSICIAN ASSISTANT

## 2023-07-17 PROCEDURE — 3078F DIAST BP <80 MM HG: CPT | Performed by: PHYSICIAN ASSISTANT

## 2023-07-17 RX ORDER — HYDROCODONE BITARTRATE AND ACETAMINOPHEN 5; 325 MG/1; MG/1
1 TABLET ORAL EVERY 6 HOURS PRN
COMMUNITY
End: 2023-07-21 | Stop reason: SDUPTHER

## 2023-07-17 NOTE — PROGRESS NOTES
Subjective   Samy Velazquez is a 78 y.o. male is here today for follow-up of stroke. He is accompanied by his wife today.    History of Present Illness     Stroke  The patient states that he is doing well from his stroke. His wife states that he has gotten a lot quieter, not talking much, and does not want to answer questions at times. She notes that he does not use the CPAP machine. She adds that he does not seem to rest well. She states that some days he will sleep all day and then he is awake all night. She states that a lot of this has been happening at home now.    The following portions of the patient's history were reviewed and updated as appropriate: allergies, current medications, past family history, past medical history, past social history, past surgical history and problem list.    Review of Systems:  A review of systems was performed, and positive findings are noted in the HPI.      Current Outpatient Medications:     acetaminophen (TYLENOL) 325 MG tablet, Take 2 tablets by mouth Every 4 (Four) Hours As Needed for Mild Pain., Disp: , Rfl:     allopurinol (ZYLOPRIM) 100 MG tablet, Take 1 tablet by mouth Daily., Disp: 90 tablet, Rfl: 0    apixaban (ELIQUIS) 2.5 MG tablet tablet, Take 1 tablet by mouth Every 12 (Twelve) Hours., Disp: , Rfl:     aspirin 81 MG EC tablet, Take 1 tablet by mouth Daily., Disp: , Rfl:     atorvastatin (LIPITOR) 40 MG tablet, Take 1 tablet by mouth Daily., Disp: , Rfl:     buPROPion XL (WELLBUTRIN XL) 150 MG 24 hr tablet, Take 1 tablet by mouth Daily., Disp: , Rfl:     Cholecalciferol (VITAMIN D3) 2000 units capsule, Take 1 capsule by mouth Daily., Disp: , Rfl:     famotidine (PEPCID) 20 MG tablet, Take 1 tablet by mouth 2 (Two) Times a Day., Disp: , Rfl:     fluticasone (FLONASE) 50 MCG/ACT nasal spray, 2 sprays by Each Nare route Daily., Disp: , Rfl:     HYDROcodone-acetaminophen (NORCO) 5-325 MG per tablet, Take 1 tablet by mouth Every 6 (Six) Hours As Needed., Disp: , Rfl:      ipratropium (ATROVENT) 0.03 % nasal spray, 1 spray into the nostril(s) as directed by provider Every 12 (Twelve) Hours., Disp: , Rfl:     isosorbide mononitrate (IMDUR) 30 MG 24 hr tablet, Take 1 tablet by mouth Daily., Disp: , Rfl:     levothyroxine (SYNTHROID, LEVOTHROID) 50 MCG tablet, Take 1 tablet by mouth Daily., Disp: , Rfl:     memantine (NAMENDA) 10 MG tablet, Take 1 tablet by mouth 2 (Two) Times a Day., Disp: , Rfl:     metoclopramide (REGLAN) 10 MG tablet, Take  by mouth 3 (Three) Times a Day., Disp: , Rfl:     metOLazone (ZAROXOLYN) 2.5 MG tablet, Take 1 tablet by mouth Daily As Needed., Disp: , Rfl:     metoprolol succinate XL (TOPROL-XL) 25 MG 24 hr tablet, Take 0.5 tablets by mouth Daily., Disp: 90 tablet, Rfl: 3    mexiletine (MEXITIL) 150 MG capsule, Take 1 capsule by mouth 4 (Four) Times a Day., Disp: , Rfl:     naloxone (NARCAN) 4 MG/0.1ML nasal spray, Call 911. Don't prime. [place] in 1 nostril for overdose. Repeat in 2-3 minutes in other nostril if no or minimal breathing/responsiveness., Disp: 2 each, Rfl: 0    nitroglycerin (NITROSTAT) 0.4 MG SL tablet, Place 1 tablet under the tongue Every 5 (Five) Minutes As Needed for Chest Pain. Take no more than 3 doses in 15 minutes., Disp: 25 tablet, Rfl: 2    polyethyl glycol-propyl glycol (SYSTANE) 0.4-0.3 % solution ophthalmic solution (artificial tears), Administer 1 drop to both eyes Every 1 (One) Hour As Needed., Disp: , Rfl:     tamsulosin (FLOMAX) 0.4 MG capsule 24 hr capsule, Take 1 capsule by mouth 2 (Two) Times a Day., Disp: 180 capsule, Rfl: 3     Objective   Physical Exam  Vitals and nursing note reviewed.   HENT:      Right Ear: Hearing normal.      Left Ear: Hearing normal.   Eyes:      General: Lids are normal. Vision grossly intact. Gaze aligned appropriately. No scleral icterus.     Extraocular Movements: Extraocular movements intact.      Conjunctiva/sclera: Conjunctivae normal.   Neck:      Trachea: Trachea and phonation normal.    Cardiovascular:      Rate and Rhythm: Normal rate.   Pulmonary:      Effort: Pulmonary effort is normal.   Musculoskeletal:      Comments: The patient is in a wheelchair. He is very limited in vocalizations and appears quite cachectic. He does have movement in all four extremities, just very weak overall.   Skin:     General: Skin is warm and dry.   Neurological:      Mental Status: He is alert and oriented to person, place, and time.      GCS: GCS eye subscore is 4. GCS verbal subscore is 5. GCS motor subscore is 6.      Sensory: Sensation is intact.      Motor: Weakness and tremor present.      Coordination: Coordination is intact.      Gait: Gait abnormal.   Psychiatric:         Attention and Perception: He is inattentive.         Mood and Affect: Affect is flat.         Speech: Speech is delayed.         Behavior: Behavior is cooperative.         Thought Content: Thought content normal.         Cognition and Memory: Cognition is impaired.         Judgment: Judgment is impulsive.       Assessment & Plan   Diagnoses and all orders for this visit:    1. Chronic arterial ischemic stroke (Primary)    2. Gait disturbance, post-stroke    3. Paroxysmal atrial fibrillation      1. Stroke  - The patient is stable regarding his stroke. He is less verbal today. Continues to demonstrate weakness on the right side and globally. He is non-ambulatory and both feet are bandaged and covered with cast shoes. I have discussed with his wife some strategies for realigning excessive sleeping in the daytime while being awake at night. These will be more effectively employed when he is discharged to home, which they are planning. She will contact us when this occurs.               Transcribed from ambient dictation for ADI Preston by Conchita Olson.  07/17/23   14:58 CDT    Patient or patient representative verbalized consent to the visit recording.  I have personally performed the services described in this document as  transcribed by the above individual, and it is both accurate and complete.

## 2023-07-24 ENCOUNTER — TELEPHONE (OUTPATIENT)
Dept: FAMILY MEDICINE CLINIC | Facility: CLINIC | Age: 78
End: 2023-07-24
Payer: MEDICARE

## 2023-07-24 NOTE — TELEPHONE ENCOUNTER
Caller: Beth Velazquez    Relationship: Emergency Contact    Best call back number: [phone]     What is the best time to reach you: ANYTIME    Who are you requesting to speak with (clinical staff, provider,  specific staff member): [facility]      What was the call regarding: PATIENT'S WIFE IS REQUESTING A CALL BACK FROM CALLI REGARDING PATIENTS CARE

## 2023-07-24 NOTE — TELEPHONE ENCOUNTER
Called and spoke with Beth--she would like to speak with you this afternoon.  Has therapy here @ 2pm--will stop by either before or after appt.

## 2023-07-27 ENCOUNTER — TELEPHONE (OUTPATIENT)
Dept: FAMILY MEDICINE CLINIC | Facility: CLINIC | Age: 78
End: 2023-07-27
Payer: MEDICARE

## 2023-07-27 ENCOUNTER — OFFICE VISIT (OUTPATIENT)
Dept: WOUND CARE | Facility: HOSPITAL | Age: 78
End: 2023-07-27
Payer: MEDICARE

## 2023-07-27 DIAGNOSIS — M54.2 CERVICAL PAIN (NECK): ICD-10-CM

## 2023-07-27 PROCEDURE — 0QBR0ZZ EXCISION OF LEFT TOE PHALANX, OPEN APPROACH: ICD-10-PCS | Performed by: PODIATRIST

## 2023-07-27 PROCEDURE — G0463 HOSPITAL OUTPT CLINIC VISIT: HCPCS

## 2023-07-27 PROCEDURE — 88307 TISSUE EXAM BY PATHOLOGIST: CPT | Performed by: PODIATRIST

## 2023-07-27 PROCEDURE — 88311 DECALCIFY TISSUE: CPT | Performed by: PODIATRIST

## 2023-07-27 NOTE — TELEPHONE ENCOUNTER
Caller: MARQUEZ - PHARMACIST AT [facility]    Relationship:  PHARMACIST    Best call back number:  [phone]     What is the best time to reach you:  ANYTIME    Who are you requesting to speak with:  CLINICAL    What was the call regarding:  PHARMACIST AT [facility] IN [place] REQUESTS THE PRESCRIPTION FOR NORCO TO BE SENT TO THEIR PHARMACY (SENT TO [facility] IN [place]).  CALLER SAID PATIENT IS STAYING AT [facility] IN [place], & THAT [facility] SERVICES THE NURSING HOME.           28-Apr-2021

## 2023-07-28 ENCOUNTER — APPOINTMENT (OUTPATIENT)
Dept: GENERAL RADIOLOGY | Facility: HOSPITAL | Age: 78
DRG: 503 | End: 2023-07-28
Payer: MEDICARE

## 2023-07-28 ENCOUNTER — APPOINTMENT (OUTPATIENT)
Dept: ULTRASOUND IMAGING | Facility: HOSPITAL | Age: 78
DRG: 503 | End: 2023-07-28
Payer: MEDICARE

## 2023-07-28 ENCOUNTER — LAB REQUISITION (OUTPATIENT)
Dept: LAB | Facility: HOSPITAL | Age: 78
DRG: 503 | End: 2023-07-28
Payer: MEDICARE

## 2023-07-28 ENCOUNTER — APPOINTMENT (OUTPATIENT)
Dept: CT IMAGING | Facility: HOSPITAL | Age: 78
DRG: 503 | End: 2023-07-28
Payer: MEDICARE

## 2023-07-28 ENCOUNTER — HOSPITAL ENCOUNTER (INPATIENT)
Facility: HOSPITAL | Age: 78
LOS: 5 days | Discharge: NURSING, REHAB OR LONG-TERM CARE | DRG: 503 | End: 2023-08-02
Attending: EMERGENCY MEDICINE | Admitting: INTERNAL MEDICINE
Payer: MEDICARE

## 2023-07-28 DIAGNOSIS — R13.10 DYSPHAGIA, UNSPECIFIED TYPE: ICD-10-CM

## 2023-07-28 DIAGNOSIS — Z74.09 IMPAIRED FUNCTIONAL MOBILITY AND ACTIVITY TOLERANCE: ICD-10-CM

## 2023-07-28 DIAGNOSIS — R41.82 ALTERED MENTAL STATUS, UNSPECIFIED ALTERED MENTAL STATUS TYPE: Primary | ICD-10-CM

## 2023-07-28 DIAGNOSIS — Z78.9 DECREASED ACTIVITIES OF DAILY LIVING (ADL): ICD-10-CM

## 2023-07-28 DIAGNOSIS — L08.9 LEFT FOOT INFECTION: ICD-10-CM

## 2023-07-28 DIAGNOSIS — M54.2 CERVICAL PAIN (NECK): ICD-10-CM

## 2023-07-28 DIAGNOSIS — I96 GANGRENE: ICD-10-CM

## 2023-07-28 LAB
ALBUMIN SERPL-MCNC: 3.2 G/DL (ref 3.5–5.2)
ALBUMIN/GLOB SERPL: 0.9 G/DL
ALP SERPL-CCNC: 187 U/L (ref 39–117)
ALT SERPL W P-5'-P-CCNC: 22 U/L (ref 1–41)
ANION GAP SERPL CALCULATED.3IONS-SCNC: 14 MMOL/L (ref 5–15)
ARTERIAL PATENCY WRIST A: ABNORMAL
AST SERPL-CCNC: 24 U/L (ref 1–40)
ATMOSPHERIC PRESS: 751 MMHG
BACTERIA UR QL AUTO: ABNORMAL /HPF
BASE EXCESS BLDA CALC-SCNC: 0.8 MMOL/L (ref 0–2)
BASOPHILS # BLD AUTO: 0.06 10*3/MM3 (ref 0–0.2)
BASOPHILS NFR BLD AUTO: 0.4 % (ref 0–1.5)
BDY SITE: ABNORMAL
BILIRUB SERPL-MCNC: 1 MG/DL (ref 0–1.2)
BILIRUB UR QL STRIP: NEGATIVE
BODY TEMPERATURE: 37 C
BUN SERPL-MCNC: 24 MG/DL (ref 8–23)
BUN/CREAT SERPL: 13.7 (ref 7–25)
CALCIUM SPEC-SCNC: 10 MG/DL (ref 8.6–10.5)
CHLORIDE SERPL-SCNC: 94 MMOL/L (ref 98–107)
CLARITY UR: CLEAR
CO2 SERPL-SCNC: 24 MMOL/L (ref 22–29)
COLOR UR: ABNORMAL
CREAT SERPL-MCNC: 1.75 MG/DL (ref 0.76–1.27)
CRP SERPL-MCNC: 4.35 MG/DL (ref 0–0.5)
D-LACTATE SERPL-SCNC: 2.1 MMOL/L (ref 0.5–2)
D-LACTATE SERPL-SCNC: 2.6 MMOL/L (ref 0.5–2)
D-LACTATE SERPL-SCNC: 3.2 MMOL/L (ref 0.5–2)
DEPRECATED RDW RBC AUTO: 44.5 FL (ref 37–54)
EGFRCR SERPLBLD CKD-EPI 2021: 39.9 ML/MIN/1.73
EOSINOPHIL # BLD AUTO: 0.18 10*3/MM3 (ref 0–0.4)
EOSINOPHIL NFR BLD AUTO: 1.1 % (ref 0.3–6.2)
ERYTHROCYTE [DISTWIDTH] IN BLOOD BY AUTOMATED COUNT: 13.7 % (ref 12.3–15.4)
ERYTHROCYTE [SEDIMENTATION RATE] IN BLOOD: 20 MM/HR (ref 0–20)
GLOBULIN UR ELPH-MCNC: 3.4 GM/DL
GLUCOSE SERPL-MCNC: 192 MG/DL (ref 65–99)
GLUCOSE UR STRIP-MCNC: NEGATIVE MG/DL
HCO3 BLDA-SCNC: 24.2 MMOL/L (ref 20–26)
HCT VFR BLD AUTO: 51.2 % (ref 37.5–51)
HGB BLD-MCNC: 16.5 G/DL (ref 13–17.7)
HGB UR QL STRIP.AUTO: NEGATIVE
HYALINE CASTS UR QL AUTO: ABNORMAL /LPF
IMM GRANULOCYTES # BLD AUTO: 0.11 10*3/MM3 (ref 0–0.05)
IMM GRANULOCYTES NFR BLD AUTO: 0.7 % (ref 0–0.5)
INR PPP: 1.15 (ref 0.91–1.09)
KETONES UR QL STRIP: NEGATIVE
LEUKOCYTE ESTERASE UR QL STRIP.AUTO: ABNORMAL
LYMPHOCYTES # BLD AUTO: 1.66 10*3/MM3 (ref 0.7–3.1)
LYMPHOCYTES NFR BLD AUTO: 9.9 % (ref 19.6–45.3)
Lab: ABNORMAL
MAGNESIUM SERPL-MCNC: 1.6 MG/DL (ref 1.6–2.4)
MCH RBC QN AUTO: 29 PG (ref 26.6–33)
MCHC RBC AUTO-ENTMCNC: 32.2 G/DL (ref 31.5–35.7)
MCV RBC AUTO: 90.1 FL (ref 79–97)
MODALITY: ABNORMAL
MONOCYTES # BLD AUTO: 1.56 10*3/MM3 (ref 0.1–0.9)
MONOCYTES NFR BLD AUTO: 9.3 % (ref 5–12)
NEUTROPHILS NFR BLD AUTO: 13.21 10*3/MM3 (ref 1.7–7)
NEUTROPHILS NFR BLD AUTO: 78.6 % (ref 42.7–76)
NITRITE UR QL STRIP: NEGATIVE
NRBC BLD AUTO-RTO: 0 /100 WBC (ref 0–0.2)
PCO2 BLDA: 34.8 MM HG (ref 35–45)
PCO2 TEMP ADJ BLD: 34.8 MM HG (ref 35–45)
PH BLDA: 7.45 PH UNITS (ref 7.35–7.45)
PH UR STRIP.AUTO: 5.5 [PH] (ref 5–8)
PH, TEMP CORRECTED: 7.45 PH UNITS (ref 7.35–7.45)
PLATELET # BLD AUTO: 197 10*3/MM3 (ref 140–450)
PMV BLD AUTO: 9.8 FL (ref 6–12)
PO2 BLDA: 70.4 MM HG (ref 83–108)
PO2 TEMP ADJ BLD: 70.4 MM HG (ref 83–108)
POTASSIUM SERPL-SCNC: 3.6 MMOL/L (ref 3.5–5.2)
PROCALCITONIN SERPL-MCNC: 0.93 NG/ML (ref 0–0.25)
PROT SERPL-MCNC: 6.6 G/DL (ref 6–8.5)
PROT UR QL STRIP: NEGATIVE
PROTHROMBIN TIME: 14.9 SECONDS (ref 11.8–14.8)
RBC # BLD AUTO: 5.68 10*6/MM3 (ref 4.14–5.8)
RBC # UR STRIP: ABNORMAL /HPF
REF LAB TEST METHOD: ABNORMAL
SAO2 % BLDCOA: 95.1 % (ref 94–99)
SARS-COV-2 RNA RESP QL NAA+PROBE: NOT DETECTED
SODIUM SERPL-SCNC: 132 MMOL/L (ref 136–145)
SP GR UR STRIP: 1.02 (ref 1–1.03)
SQUAMOUS #/AREA URNS HPF: ABNORMAL /HPF
UROBILINOGEN UR QL STRIP: ABNORMAL
VENTILATOR MODE: ABNORMAL
WBC # UR STRIP: ABNORMAL /HPF
WBC NRBC COR # BLD: 16.78 10*3/MM3 (ref 3.4–10.8)

## 2023-07-28 PROCEDURE — 83605 ASSAY OF LACTIC ACID: CPT | Performed by: EMERGENCY MEDICINE

## 2023-07-28 PROCEDURE — 83735 ASSAY OF MAGNESIUM: CPT | Performed by: EMERGENCY MEDICINE

## 2023-07-28 PROCEDURE — 81001 URINALYSIS AUTO W/SCOPE: CPT | Performed by: EMERGENCY MEDICINE

## 2023-07-28 PROCEDURE — 71045 X-RAY EXAM CHEST 1 VIEW: CPT

## 2023-07-28 PROCEDURE — 36415 COLL VENOUS BLD VENIPUNCTURE: CPT | Performed by: EMERGENCY MEDICINE

## 2023-07-28 PROCEDURE — 85652 RBC SED RATE AUTOMATED: CPT | Performed by: EMERGENCY MEDICINE

## 2023-07-28 PROCEDURE — 82803 BLOOD GASES ANY COMBINATION: CPT

## 2023-07-28 PROCEDURE — 25010000002 METRONIDAZOLE 500 MG/100ML SOLUTION: Performed by: EMERGENCY MEDICINE

## 2023-07-28 PROCEDURE — 93926 LOWER EXTREMITY STUDY: CPT

## 2023-07-28 PROCEDURE — 85025 COMPLETE CBC W/AUTO DIFF WBC: CPT | Performed by: EMERGENCY MEDICINE

## 2023-07-28 PROCEDURE — 73630 X-RAY EXAM OF FOOT: CPT

## 2023-07-28 PROCEDURE — 87040 BLOOD CULTURE FOR BACTERIA: CPT | Performed by: EMERGENCY MEDICINE

## 2023-07-28 PROCEDURE — 70450 CT HEAD/BRAIN W/O DYE: CPT

## 2023-07-28 PROCEDURE — 25010000002 VANCOMYCIN 10 G RECONSTITUTED SOLUTION: Performed by: EMERGENCY MEDICINE

## 2023-07-28 PROCEDURE — 85610 PROTHROMBIN TIME: CPT | Performed by: EMERGENCY MEDICINE

## 2023-07-28 PROCEDURE — P9612 CATHETERIZE FOR URINE SPEC: HCPCS

## 2023-07-28 PROCEDURE — 80053 COMPREHEN METABOLIC PANEL: CPT | Performed by: EMERGENCY MEDICINE

## 2023-07-28 PROCEDURE — 36600 WITHDRAWAL OF ARTERIAL BLOOD: CPT

## 2023-07-28 PROCEDURE — 93926 LOWER EXTREMITY STUDY: CPT | Performed by: SURGERY

## 2023-07-28 PROCEDURE — 0 CEFEPIME PER 500 MG: Performed by: EMERGENCY MEDICINE

## 2023-07-28 PROCEDURE — 86140 C-REACTIVE PROTEIN: CPT | Performed by: EMERGENCY MEDICINE

## 2023-07-28 PROCEDURE — 87635 SARS-COV-2 COVID-19 AMP PRB: CPT | Performed by: EMERGENCY MEDICINE

## 2023-07-28 PROCEDURE — 99285 EMERGENCY DEPT VISIT HI MDM: CPT

## 2023-07-28 PROCEDURE — 84145 PROCALCITONIN (PCT): CPT | Performed by: EMERGENCY MEDICINE

## 2023-07-28 RX ORDER — METRONIDAZOLE 500 MG/100ML
500 INJECTION, SOLUTION INTRAVENOUS ONCE
Status: COMPLETED | OUTPATIENT
Start: 2023-07-28 | End: 2023-07-28

## 2023-07-28 RX ORDER — HYDROCODONE BITARTRATE AND ACETAMINOPHEN 5; 325 MG/1; MG/1
1 TABLET ORAL EVERY 6 HOURS PRN
Qty: 120 TABLET | Refills: 0 | Status: ON HOLD | OUTPATIENT
Start: 2023-07-28

## 2023-07-28 RX ORDER — VANCOMYCIN 2 GRAM/500 ML IN 0.9 % SODIUM CHLORIDE INTRAVENOUS
2000 ONCE
Status: COMPLETED | OUTPATIENT
Start: 2023-07-28 | End: 2023-07-28

## 2023-07-28 RX ORDER — SODIUM CHLORIDE 0.9 % (FLUSH) 0.9 %
10 SYRINGE (ML) INJECTION AS NEEDED
Status: DISCONTINUED | OUTPATIENT
Start: 2023-07-28 | End: 2023-08-02 | Stop reason: HOSPADM

## 2023-07-28 RX ADMIN — VANCOMYCIN HYDROCHLORIDE 2000 MG: 10 INJECTION, POWDER, LYOPHILIZED, FOR SOLUTION INTRAVENOUS at 16:29

## 2023-07-28 RX ADMIN — CEFEPIME 2000 MG: 2 INJECTION, POWDER, FOR SOLUTION INTRAVENOUS at 15:50

## 2023-07-28 RX ADMIN — METRONIDAZOLE 500 MG: 500 INJECTION, SOLUTION INTRAVENOUS at 16:29

## 2023-07-28 NOTE — ED PROVIDER NOTES
Subjective   History of Present Illness  78-year-old male presents to the ED with complaint of altered mental status.  He has a history of hypertension, hyperlipidemia, coronary disease, and had a recent admission 6/22 through 6/28 for cellulitis left foot.  Currently at skilled nursing facility, sent to ED for altered mental status.  No reports of fever.  Staff states the patient was not acting like himself, confused and not answering questions appropriately.  This apparently has been transient.  Upon arrival to the ED was oriented x4.  He is awake but drowsy appearing, generally weak appearing.  He has no real complaints but unclear how reliable his history is.    History provided by:  Patient    Review of Systems   All other systems reviewed and are negative.    Past Medical History:   Diagnosis Date    Arthritis     Coronary artery disease     Hyperlipidemia     Hypertensive heart disease with heart failure 3/29/2021    Hypothyroidism 4/1/2021    Kidney stone     Major depressive disorder, recurrent, moderate 3/29/2021    Morbidly obese 9/18/2020       Allergies   Allergen Reactions    Iodine Hives    Strawberry Hives    Iodinated Contrast Media Unknown - High Severity    Clindamycin/Lincomycin Diarrhea    Penicillins Rash    Vancomycin Nausea And Vomiting       Past Surgical History:   Procedure Laterality Date    CARDIAC DEFIBRILLATOR PLACEMENT      CARDIAC DEFIBRILLATOR PLACEMENT N/A 2008    CARDIAC SURGERY      ENDOSCOPY N/A 10/30/2020    Procedure: ESOPHAGOGASTRODUODENOSCOPY WITH ANESTHESIA;  Surgeon: Brent Stalney MD;  Location: [facility];  Service: Gastroenterology;  Laterality: N/A;  pre dysphagia  post post op gastric surgery  dr jose manuel smith    ESOPHAGUS SURGERY      KNEE SURGERY      TOTAL SHOULDER REPLACEMENT         Family History   Problem Relation Age of Onset    Hypertension Mother     Stroke Mother     Cancer Father     Hypertension Father     Diabetes Sister     Hypertension Sister     Crohn's  disease Daughter     No Known Problems Son     No Known Problems Maternal Grandmother     No Known Problems Maternal Grandfather     No Known Problems Paternal Grandmother     No Known Problems Paternal Grandfather     Hypertension Sister     Hypertension Sister     Hypertension Sister     No Known Problems Son        Social History     Socioeconomic History    Marital status:    Tobacco Use    Smoking status: Never    Smokeless tobacco: Never   Vaping Use    Vaping Use: Never used   Substance and Sexual Activity    Alcohol use: No    Drug use: No    Sexual activity: Defer           Objective   Physical Exam  Vitals and nursing note reviewed.   Constitutional:       Appearance: He is well-developed and normal weight.   HENT:      Head: Normocephalic and atraumatic.      Nose: Nose normal. No rhinorrhea.      Mouth/Throat:      Mouth: Mucous membranes are moist.   Eyes:      Extraocular Movements: Extraocular movements intact.      Pupils: Pupils are equal, round, and reactive to light.   Cardiovascular:      Rate and Rhythm: Normal rate and regular rhythm.      Heart sounds: Normal heart sounds. No murmur heard.     Comments: Diminished pulses bilateral lower extremities  Pulmonary:      Effort: Pulmonary effort is normal.      Breath sounds: Normal breath sounds.   Abdominal:      General: Abdomen is flat. Bowel sounds are normal.      Palpations: Abdomen is soft.   Musculoskeletal:         General: Swelling and tenderness present. Normal range of motion.      Cervical back: Normal range of motion and neck supple.   Skin:     Capillary Refill: Capillary refill takes 2 to 3 seconds.      Comments: Feet slightly cool    Wound to dorsum of left second toe, surrounded by erythema and inflammation.  No foul-smelling discharge or drainage   Neurological:      Mental Status: He is alert.       Procedures           ED Course  ED Course as of 07/30/23 1706   Fri Jul 28, 2023   1653 78-year-old male with history of  hypertension, hyperlipidemia, coronary disease, recent admission 6/22 through 6/28 for cellulitis of left foot who presents to the ED with complaint of altered mental status.  Patient afebrile, weak appearing but not confused, more encephalopathic.  Slow to answer questions but does seem to answer questions appropriately.  Mild worsening CKD.  Labs otherwise reveal elevated WBC, lactate, procalcitonin, CRP.    He does have a wound to the left foot but does not appear significantly changed from his exam from yesterday per podiatry who saw the patient in the office.  Etiology for patient's confusion uncertain, may have some low-grade infection.  We will go ahead and provide broad-spectrum antibiotics, plan for admission to the hospitalist service.  Spoke to podiatry, no need for surgery.  Spoke to hospitalist, before admission they request patient have some vascular studies to ensure he has good flow to the wound as we do not have vascular surgery on this weekend.  Signing over care to Dr. Gutierres. [RP]   [address] Piña on 7/28/2023  7:00 PM CDT  LLE: patent arterial system with somewhat dampened flow in distal posterior-tibial artery     [RP]      ED Course User Index  [RP] Karlos Gutierres MD                                           Medical Decision Making  Problems Addressed:  Altered mental status, unspecified altered mental status type: complicated acute illness or injury  Left foot infection: complicated acute illness or injury    Amount and/or Complexity of Data Reviewed  Labs: ordered.  Radiology: ordered.    Risk  Prescription drug management.  Decision regarding hospitalization.        Final diagnoses:   Altered mental status, unspecified altered mental status type   Left foot infection       ED Disposition  ED Disposition       ED Disposition   Decision to Admit    Condition   --    Comment   Level of Care: Remote Telemetry [26]   Diagnosis: Altered mental status [780.97.ICD-9-CM]   Admitting  Physician: GURU BURSN [[id number]]   Attending Physician: GURU BURNS [[id number]]   Certification: I Certify That Inpatient Hospital Services Are Medically Necessary For Greater Than 2 Midnights                 No follow-up provider specified.       Medication List        Changed      HYDROcodone-acetaminophen 5-325 MG per tablet  Commonly known as: NORCO  Take 1 tablet by mouth Every 6 (Six) Hours As Needed for Moderate Pain.  What changed: when to take this            ASK your doctor about these medications      metoprolol succinate XL 25 MG 24 hr tablet  Commonly known as: TOPROL-XL  Ask about: Which instructions should I use?                 Guanaco Salcido MD  07/28/23 3364       Guanaco Salcido MD  07/30/23 1704

## 2023-07-29 LAB
ANION GAP SERPL CALCULATED.3IONS-SCNC: 14 MMOL/L (ref 5–15)
BASOPHILS # BLD AUTO: 0.1 10*3/MM3 (ref 0–0.2)
BASOPHILS NFR BLD AUTO: 1 % (ref 0–1.5)
BUN SERPL-MCNC: 21 MG/DL (ref 8–23)
BUN/CREAT SERPL: 14.7 (ref 7–25)
CALCIUM SPEC-SCNC: 9.7 MG/DL (ref 8.6–10.5)
CHLORIDE SERPL-SCNC: 101 MMOL/L (ref 98–107)
CO2 SERPL-SCNC: 21 MMOL/L (ref 22–29)
CREAT SERPL-MCNC: 1.43 MG/DL (ref 0.76–1.27)
D-LACTATE SERPL-SCNC: 2 MMOL/L (ref 0.5–2)
DEPRECATED RDW RBC AUTO: 46.8 FL (ref 37–54)
EGFRCR SERPLBLD CKD-EPI 2021: 50.8 ML/MIN/1.73
EOSINOPHIL # BLD AUTO: 0.5 10*3/MM3 (ref 0–0.4)
EOSINOPHIL NFR BLD AUTO: 4.9 % (ref 0.3–6.2)
ERYTHROCYTE [DISTWIDTH] IN BLOOD BY AUTOMATED COUNT: 13.7 % (ref 12.3–15.4)
GLUCOSE SERPL-MCNC: 125 MG/DL (ref 65–99)
HCT VFR BLD AUTO: 50 % (ref 37.5–51)
HGB BLD-MCNC: 15.6 G/DL (ref 13–17.7)
IMM GRANULOCYTES # BLD AUTO: 0.05 10*3/MM3 (ref 0–0.05)
IMM GRANULOCYTES NFR BLD AUTO: 0.5 % (ref 0–0.5)
LYMPHOCYTES # BLD AUTO: 1.25 10*3/MM3 (ref 0.7–3.1)
LYMPHOCYTES NFR BLD AUTO: 12.3 % (ref 19.6–45.3)
MCH RBC QN AUTO: 29.2 PG (ref 26.6–33)
MCHC RBC AUTO-ENTMCNC: 31.2 G/DL (ref 31.5–35.7)
MCV RBC AUTO: 93.5 FL (ref 79–97)
MONOCYTES # BLD AUTO: 0.94 10*3/MM3 (ref 0.1–0.9)
MONOCYTES NFR BLD AUTO: 9.2 % (ref 5–12)
MRSA DNA SPEC QL NAA+PROBE: ABNORMAL
NEUTROPHILS NFR BLD AUTO: 7.34 10*3/MM3 (ref 1.7–7)
NEUTROPHILS NFR BLD AUTO: 72.1 % (ref 42.7–76)
NRBC BLD AUTO-RTO: 0 /100 WBC (ref 0–0.2)
PLATELET # BLD AUTO: 156 10*3/MM3 (ref 140–450)
PMV BLD AUTO: 10.2 FL (ref 6–12)
POTASSIUM SERPL-SCNC: 3.7 MMOL/L (ref 3.5–5.2)
RBC # BLD AUTO: 5.35 10*6/MM3 (ref 4.14–5.8)
SODIUM SERPL-SCNC: 136 MMOL/L (ref 136–145)
WBC NRBC COR # BLD: 10.18 10*3/MM3 (ref 3.4–10.8)

## 2023-07-29 PROCEDURE — 85025 COMPLETE CBC W/AUTO DIFF WBC: CPT | Performed by: FAMILY MEDICINE

## 2023-07-29 PROCEDURE — 87641 MR-STAPH DNA AMP PROBE: CPT | Performed by: FAMILY MEDICINE

## 2023-07-29 PROCEDURE — 92610 EVALUATE SWALLOWING FUNCTION: CPT

## 2023-07-29 PROCEDURE — 83605 ASSAY OF LACTIC ACID: CPT | Performed by: EMERGENCY MEDICINE

## 2023-07-29 PROCEDURE — 0 CEFEPIME PER 500 MG: Performed by: FAMILY MEDICINE

## 2023-07-29 PROCEDURE — 25010000002 VANCOMYCIN 10 G RECONSTITUTED SOLUTION: Performed by: FAMILY MEDICINE

## 2023-07-29 PROCEDURE — 97166 OT EVAL MOD COMPLEX 45 MIN: CPT

## 2023-07-29 PROCEDURE — 80048 BASIC METABOLIC PNL TOTAL CA: CPT | Performed by: FAMILY MEDICINE

## 2023-07-29 RX ORDER — HYDROCODONE BITARTRATE AND ACETAMINOPHEN 5; 325 MG/1; MG/1
1 TABLET ORAL EVERY 6 HOURS PRN
Status: DISCONTINUED | OUTPATIENT
Start: 2023-07-29 | End: 2023-08-02 | Stop reason: HOSPADM

## 2023-07-29 RX ORDER — ONDANSETRON 2 MG/ML
4 INJECTION INTRAMUSCULAR; INTRAVENOUS EVERY 6 HOURS PRN
Status: DISCONTINUED | OUTPATIENT
Start: 2023-07-29 | End: 2023-08-01 | Stop reason: SDUPTHER

## 2023-07-29 RX ORDER — POLYETHYLENE GLYCOL 3350 17 G/17G
17 POWDER, FOR SOLUTION ORAL DAILY PRN
Status: DISCONTINUED | OUTPATIENT
Start: 2023-07-29 | End: 2023-08-02 | Stop reason: HOSPADM

## 2023-07-29 RX ORDER — NITROGLYCERIN 0.4 MG/1
0.4 TABLET SUBLINGUAL
Status: DISCONTINUED | OUTPATIENT
Start: 2023-07-29 | End: 2023-08-02 | Stop reason: HOSPADM

## 2023-07-29 RX ORDER — ATORVASTATIN CALCIUM 40 MG/1
40 TABLET, FILM COATED ORAL DAILY
Status: DISCONTINUED | OUTPATIENT
Start: 2023-07-29 | End: 2023-08-02 | Stop reason: HOSPADM

## 2023-07-29 RX ORDER — BISACODYL 5 MG/1
5 TABLET, DELAYED RELEASE ORAL DAILY PRN
Status: DISCONTINUED | OUTPATIENT
Start: 2023-07-29 | End: 2023-08-02 | Stop reason: HOSPADM

## 2023-07-29 RX ORDER — BISACODYL 10 MG
10 SUPPOSITORY, RECTAL RECTAL DAILY PRN
Status: DISCONTINUED | OUTPATIENT
Start: 2023-07-29 | End: 2023-08-02 | Stop reason: HOSPADM

## 2023-07-29 RX ORDER — BISACODYL 5 MG/1
10 TABLET, DELAYED RELEASE ORAL DAILY PRN
COMMUNITY

## 2023-07-29 RX ORDER — ISOSORBIDE MONONITRATE 30 MG/1
30 TABLET, EXTENDED RELEASE ORAL
Status: DISCONTINUED | OUTPATIENT
Start: 2023-07-29 | End: 2023-07-30

## 2023-07-29 RX ORDER — ACETAMINOPHEN 325 MG/1
650 TABLET ORAL EVERY 4 HOURS PRN
Status: DISCONTINUED | OUTPATIENT
Start: 2023-07-29 | End: 2023-08-02 | Stop reason: HOSPADM

## 2023-07-29 RX ORDER — SODIUM CHLORIDE 0.9 % (FLUSH) 0.9 %
10 SYRINGE (ML) INJECTION AS NEEDED
Status: DISCONTINUED | OUTPATIENT
Start: 2023-07-29 | End: 2023-08-02 | Stop reason: HOSPADM

## 2023-07-29 RX ORDER — AMOXICILLIN 250 MG
2 CAPSULE ORAL 2 TIMES DAILY
Status: DISCONTINUED | OUTPATIENT
Start: 2023-07-29 | End: 2023-08-02 | Stop reason: HOSPADM

## 2023-07-29 RX ORDER — TAMSULOSIN HYDROCHLORIDE 0.4 MG/1
0.4 CAPSULE ORAL 2 TIMES DAILY
Status: DISCONTINUED | OUTPATIENT
Start: 2023-07-29 | End: 2023-08-02 | Stop reason: HOSPADM

## 2023-07-29 RX ORDER — SODIUM CHLORIDE 9 MG/ML
40 INJECTION, SOLUTION INTRAVENOUS AS NEEDED
Status: DISCONTINUED | OUTPATIENT
Start: 2023-07-29 | End: 2023-08-02 | Stop reason: HOSPADM

## 2023-07-29 RX ORDER — METOPROLOL SUCCINATE 25 MG/1
25 TABLET, EXTENDED RELEASE ORAL DAILY
COMMUNITY
End: 2023-08-02 | Stop reason: HOSPADM

## 2023-07-29 RX ORDER — ASPIRIN 81 MG/1
81 TABLET ORAL DAILY
Status: DISCONTINUED | OUTPATIENT
Start: 2023-07-29 | End: 2023-08-02 | Stop reason: HOSPADM

## 2023-07-29 RX ORDER — FAMOTIDINE 20 MG/1
20 TABLET, FILM COATED ORAL 2 TIMES DAILY
Status: DISCONTINUED | OUTPATIENT
Start: 2023-07-29 | End: 2023-08-02 | Stop reason: HOSPADM

## 2023-07-29 RX ORDER — IBUPROFEN 800 MG/1
800 TABLET ORAL EVERY 8 HOURS PRN
COMMUNITY
End: 2023-08-02 | Stop reason: HOSPADM

## 2023-07-29 RX ORDER — ALLOPURINOL 100 MG/1
100 TABLET ORAL DAILY
Status: DISCONTINUED | OUTPATIENT
Start: 2023-07-29 | End: 2023-08-02 | Stop reason: HOSPADM

## 2023-07-29 RX ORDER — LEVOTHYROXINE SODIUM 0.05 MG/1
50 TABLET ORAL
Status: DISCONTINUED | OUTPATIENT
Start: 2023-07-29 | End: 2023-08-02 | Stop reason: HOSPADM

## 2023-07-29 RX ORDER — MEMANTINE HYDROCHLORIDE 5 MG/1
10 TABLET ORAL 2 TIMES DAILY
Status: DISCONTINUED | OUTPATIENT
Start: 2023-07-29 | End: 2023-08-02 | Stop reason: HOSPADM

## 2023-07-29 RX ORDER — MUPIROCIN CALCIUM 20 MG/G
1 CREAM TOPICAL DAILY
COMMUNITY

## 2023-07-29 RX ORDER — SODIUM CHLORIDE 0.9 % (FLUSH) 0.9 %
10 SYRINGE (ML) INJECTION EVERY 12 HOURS SCHEDULED
Status: DISCONTINUED | OUTPATIENT
Start: 2023-07-29 | End: 2023-08-02 | Stop reason: HOSPADM

## 2023-07-29 RX ORDER — METOPROLOL SUCCINATE 25 MG/1
12.5 TABLET, EXTENDED RELEASE ORAL DAILY
Status: DISCONTINUED | OUTPATIENT
Start: 2023-07-29 | End: 2023-08-02 | Stop reason: HOSPADM

## 2023-07-29 RX ORDER — SODIUM CHLORIDE 9 MG/ML
50 INJECTION, SOLUTION INTRAVENOUS CONTINUOUS
Status: DISCONTINUED | OUTPATIENT
Start: 2023-07-29 | End: 2023-07-29

## 2023-07-29 RX ADMIN — ATORVASTATIN CALCIUM 40 MG: 40 TABLET ORAL at 10:29

## 2023-07-29 RX ADMIN — TAMSULOSIN HYDROCHLORIDE 0.4 MG: 0.4 CAPSULE ORAL at 10:29

## 2023-07-29 RX ADMIN — Medication 10 ML: at 20:22

## 2023-07-29 RX ADMIN — ASPIRIN 81 MG: 81 TABLET, COATED ORAL at 10:29

## 2023-07-29 RX ADMIN — SODIUM CHLORIDE 50 ML/HR: 9 INJECTION, SOLUTION INTRAVENOUS at 03:21

## 2023-07-29 RX ADMIN — CEFEPIME 2000 MG: 2 INJECTION, POWDER, FOR SOLUTION INTRAVENOUS at 16:35

## 2023-07-29 RX ADMIN — APIXABAN 2.5 MG: 2.5 TABLET, FILM COATED ORAL at 20:20

## 2023-07-29 RX ADMIN — APIXABAN 2.5 MG: 2.5 TABLET, FILM COATED ORAL at 10:29

## 2023-07-29 RX ADMIN — CEFEPIME 2000 MG: 2 INJECTION, POWDER, FOR SOLUTION INTRAVENOUS at 03:21

## 2023-07-29 RX ADMIN — FAMOTIDINE 20 MG: 20 TABLET, FILM COATED ORAL at 10:29

## 2023-07-29 RX ADMIN — MEMANTINE HYDROCHLORIDE 10 MG: 5 TABLET, FILM COATED ORAL at 20:19

## 2023-07-29 RX ADMIN — MEMANTINE HYDROCHLORIDE 10 MG: 5 TABLET, FILM COATED ORAL at 10:29

## 2023-07-29 RX ADMIN — ALLOPURINOL 100 MG: 100 TABLET ORAL at 10:29

## 2023-07-29 RX ADMIN — TAMSULOSIN HYDROCHLORIDE 0.4 MG: 0.4 CAPSULE ORAL at 20:19

## 2023-07-29 RX ADMIN — DOCUSATE SODIUM 50 MG AND SENNOSIDES 8.6 MG 2 TABLET: 8.6; 5 TABLET, FILM COATED ORAL at 20:19

## 2023-07-29 RX ADMIN — VANCOMYCIN HYDROCHLORIDE 1250 MG: 10 INJECTION, POWDER, LYOPHILIZED, FOR SOLUTION INTRAVENOUS at 15:05

## 2023-07-29 RX ADMIN — FAMOTIDINE 20 MG: 20 TABLET, FILM COATED ORAL at 20:19

## 2023-07-29 RX ADMIN — HYDROCODONE BITARTRATE AND ACETAMINOPHEN 1 TABLET: 5; 325 TABLET ORAL at 21:47

## 2023-07-29 RX ADMIN — METOPROLOL SUCCINATE 12.5 MG: 25 TABLET, EXTENDED RELEASE ORAL at 10:29

## 2023-07-29 RX ADMIN — ISOSORBIDE MONONITRATE 30 MG: 30 TABLET, EXTENDED RELEASE ORAL at 10:29

## 2023-07-29 NOTE — THERAPY EVALUATION
Patient Name: Samy Velazquez  : 1945    MRN: [id number]                              Today's Date: 2023       Admit Date: 2023    Visit Dx:     ICD-10-CM ICD-9-CM   1. Altered mental status, unspecified altered mental status type  R41.82 780.97   2. Left foot infection  L08.9 686.9   3. 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    Osteoarthritis of multiple joints    Cardiac pacemaker    Chronic gout    Nephrolithiasis    Mixed hyperlipidemia    Morbidly obese    Hypertensive heart disease with heart failure    Major depressive disorder, recurrent, moderate    Stage 3b chronic kidney disease (CKD)    Ventricular tachycardia    Chronic systolic heart failure    Hypothyroidism    Paroxysmal atrial fibrillation    Coronary atherosclerosis    Essential hypertension    Weakness of right lower extremity    Cellulitis of second toe of left foot    Moderate malnutrition    Altered mental status     Past Medical History:   Diagnosis Date    Arthritis     Coronary artery disease     Hyperlipidemia     Hypertensive heart disease with heart failure 3/29/2021    Hypothyroidism 2021    Kidney stone     Major depressive disorder, recurrent, moderate 3/29/2021    Morbidly obese 2020     Past Surgical History:   Procedure Laterality Date    CARDIAC DEFIBRILLATOR PLACEMENT      CARDIAC DEFIBRILLATOR PLACEMENT N/A     CARDIAC SURGERY      ENDOSCOPY N/A 10/30/2020    Procedure: ESOPHAGOGASTRODUODENOSCOPY WITH ANESTHESIA;  Surgeon: Brent Stanley MD;  Location: [facility];  Service: Gastroenterology;  Laterality: N/A;  pre dysphagia  post post op gastric surgery  dr jose manuel smith    ESOPHAGUS SURGERY      KNEE SURGERY      TOTAL SHOULDER REPLACEMENT        General Information       Row Name 23 1057          OT Time and Intention    Document Type evaluation  Presented from SNF due to change in mental  status. Dx: Cellulitis of second toe of L foot, AMS. PMH: Arthritis, CAD, HLD, Hypertensive heart disease with heart failure, Pacemaker.  -     Mode of Treatment occupational therapy  -       Row Name 07/29/23 1057          General Information    Patient Profile Reviewed yes  -LS     Prior Level of Function max assist:;ADL's;all household mobility;transfer;w/c or scooter  Pt's wife reports that the Pt did not ambulate often but utilized a rwx when he did  -     Barriers to Rehab previous functional deficit  -       Row Name 07/29/23 1057          Living Environment    People in Home facility resident  -       Row Name 07/29/23 1057          Home Main Entrance    Number of Stairs, Main Entrance none  -       Row Name 07/29/23 1057          Stairs Within Home, Primary    Number of Stairs, Within Home, Primary none  -       Row Name 07/29/23 1057          Cognition    Orientation Status (Cognition) oriented to;person;place;disoriented to;situation;time  -               User Key  (r) = Recorded By, (t) = Taken By, (c) = Cosigned By      Initials Name Provider Type     Alexandra Berg OTR/L Occupational Therapist                     Mobility/ADL's       Row Name 07/29/23 1057          Bed Mobility    Bed Mobility supine-sit  -     Supine-Sit [place] (Bed Mobility) minimum assist (75% patient effort)  -     Assistive Device (Bed Mobility) bed rails;head of bed elevated  -       Row Name 07/29/23 1057          Transfers    Transfers sit-stand transfer;bed-chair transfer  -       Row Name 07/29/23 1057          Bed-Chair Transfer    Bed-Chair [place] (Transfers) minimum assist (75% patient effort);2 person assist  -     Assistive Device (Bed-Chair Transfers) walker, front-wheeled  -       Row Name 07/29/23 1057          Sit-Stand Transfer    Sit-Stand [place] (Transfers) moderate assist (50% patient effort);2 person assist  -       Row Name 07/29/23 1057          Activities of  Daily Living    BADL Assessment/Intervention upper body dressing;lower body dressing;toileting  -[facility] Name 07/29/23 1057          Upper Body Dressing Assessment/Training    [place] Level (Upper Body Dressing) upper body dressing skills;maximum assist (25% patient effort)  -     Position (Upper Body Dressing) edge of bed sitting  -LS       Row Name 07/29/23 1057          Lower Body Dressing Assessment/Training    [place] Level (Lower Body Dressing) lower body dressing skills;dependent (less than 25% patient effort)  -     Position (Lower Body Dressing) edge of bed sitting;supported standing  -LS       Row Name 07/29/23 1057          Toileting Assessment/Training    [place] Level (Toileting) toileting skills;dependent (less than 25% patient effort)  -               User Key  (r) = Recorded By, (t) = Taken By, (c) = Cosigned By      Initials Name Provider Type    Alexandra Maurice OTR/L Occupational Therapist                   Obj/Interventions       Row Name 07/29/23 1057          Sensory Assessment (Somatosensory)    Sensory Assessment (Somatosensory) UE sensation intact  -LS       Row Name 07/29/23 1057          Vision Assessment/Intervention    Visual Impairment/Limitations WFL;corrective lenses for reading  -LS       Row Name 07/29/23 1057          Range of Motion Comprehensive    General Range of Motion upper extremity range of motion deficits identified  -     Comment, General Range of Motion BUE shoulder ROM impaired 25%; BUE ROM WFL at all other joints  -LS       Row Name 07/29/23 1057          Strength Comprehensive (MMT)    General Manual Muscle Testing (MMT) Assessment upper extremity strength deficits identified  -     Comment, General Manual Muscle Testing (MMT) Assessment BUE strength grossly 4/5 within available range.  -LS       Row Name 07/29/23 1057          Balance    Balance Assessment sitting static balance;sitting dynamic balance;standing static balance;standing  dynamic balance  -LS     Static Sitting Balance standby assist  -LS     Dynamic Sitting Balance contact guard;minimal assist  -LS     Position, Sitting Balance sitting edge of bed  -LS     Static Standing Balance contact guard;minimal assist  -LS     Dynamic Standing Balance minimal assist;2-person assist  -LS     Position/Device Used, Standing Balance supported;walker, front-wheeled  -LS               User Key  (r) = Recorded By, (t) = Taken By, (c) = Cosigned By      Initials Name Provider Type    Alexandra Maurice OTR/L Occupational Therapist                   Goals/Plan       Row Name 07/29/23 1057          Transfer Goal 1 (OT)    Activity/Assistive Device (Transfer Goal 1, OT) commode, bedside without drop arms  -LS     [place] Level/Cues Needed (Transfer Goal 1, OT) moderate assist (50-74% patient effort)  -LS     Time Frame (Transfer Goal 1, OT) long term goal (LTG);10 days  -LS     Progress/Outcome (Transfer Goal 1, OT) new goal  -       Row Name 07/29/23 1057          Toileting Goal 1 (OT)    Activity/Device (Toileting Goal 1, OT) toileting skills, all;commode, bedside without drop arms  -LS     [place] Level/Cues Needed (Toileting Goal 1, OT) maximum assist (25-49% patient effort)  -LS     Time Frame (Toileting Goal 1, OT) long term goal (LTG);10 days  -LS     Progress/Outcome (Toileting Goal 1, OT) new goal  -       Row Name 07/29/23 1057          Therapy Assessment/Plan (OT)    Planned Therapy Interventions (OT) activity tolerance training;functional balance retraining;occupation/activity based interventions;ROM/therapeutic exercise;strengthening exercise;transfer/mobility retraining;patient/caregiver education/training;passive ROM/stretching;adaptive equipment training;BADL retraining;manual therapy/joint mobilization  -LS               User Key  (r) = Recorded By, (t) = Taken By, (c) = Cosigned By      Initials Name Provider Type    Alexandra Maurice OTR/L Occupational Therapist                    Clinical Impression       Row Name 07/29/23 1057          Pain Assessment    Pretreatment Pain Rating 2/10  -LS     Posttreatment Pain Rating 5/10  -LS     Pain Location generalized  -LS     Pain Intervention(s) Repositioned;Ambulation/increased activity  -       Row Name 07/29/23 1057          Plan of Care Review    Plan of Care Reviewed With patient;spouse  -LS     Progress no change  -     Outcome Evaluation OT eval completed. Pt in fowlers upon therapist arrival; A&O person and place; 2/10 generalized pain reported; Pt's wife also present. Pt's wife reports that the Pt required Max Afor BADLs at baseline. Today, Pt performed supine>sit utilizing bedrail with HOB elevated with Min A. Pt was dependent for LB dressing while sitting/standing as appropriate; Pt required Max A for UB dressing while seated EOB. Pt performed sit<>stand with Mod A x2. Once standing, the Pt performed pivot from bed>recliner utilizing rwx with Min A x2 and verbal cues for positioning and sequencing. Pt additionally demonstrates BUE weakness. Skilled OT intervention indicated in order to address deficits in fxl mobility, fxl activity tolerance, balance, strength, and use of adaptive techniques/equipment during performance of BADLs. Recommend SNF at discharge.  -       Row Name 07/29/23 1057          Therapy Assessment/Plan (OT)    Rehab Potential (OT) good, to achieve stated therapy goals  -     Criteria for Skilled Therapeutic Interventions Met (OT) yes;skilled treatment is necessary  -     Therapy Frequency (OT) 3 times/wk  -       Row Name 07/29/23 1057          Therapy Plan Review/Discharge Plan (OT)    Anticipated Discharge Disposition (OT) skilled nursing facility  -       Row Name 07/29/23 1057          Positioning and Restraints    Pre-Treatment Position in bed  -LS     Post Treatment Position chair  -LS     In Chair call light within reach;encouraged to call for assist;with nsg;with family/caregiver;reclined   -LS               User Key  (r) = Recorded By, (t) = Taken By, (c) = Cosigned By      Initials Name Provider Type    Alexandra Maurice OTR/L Occupational Therapist                   Outcome Measures       Row Name 07/29/23 1057          How much help from another is currently needed...    Putting on and taking off regular lower body clothing? 1  -LS     Bathing (including washing, rinsing, and drying) 2  -LS     Toileting (which includes using toilet bed pan or urinal) 1  -LS     Putting on and taking off regular upper body clothing 2  -LS     Taking care of personal grooming (such as brushing teeth) 3  -LS     Eating meals 3  -LS     AM-PAC 6 Clicks Score (OT) 12  -LS       Row Name 07/29/23 1057          Functional Assessment    Outcome Measure Options AM-PAC 6 Clicks Daily Activity (OT)  -               User Key  (r) = Recorded By, (t) = Taken By, (c) = Cosigned By      Initials Name Provider Type    Alexandra Maurice OTR/L Occupational Therapist                    Occupational Therapy Education       Title: PT OT SLP Therapies (In Progress)       Topic: Occupational Therapy (In Progress)       Point: ADL training (Done)       Description:   Instruct learner(s) on proper safety adaptation and remediation techniques during self care or transfers.   Instruct in proper use of assistive devices.                  Learning Progress Summary             Patient Acceptance, E, VU,NR by  at 7/29/2023 1148                         Point: Home exercise program (Not Started)       Description:   Instruct learner(s) on appropriate technique for monitoring, assisting and/or progressing therapeutic exercises/activities.                  Learner Progress:  Not documented in this visit.              Point: Precautions (Done)       Description:   Instruct learner(s) on prescribed precautions during self-care and functional transfers.                  Learning Progress Summary             Patient Acceptance, E, VU,NR by  at  7/29/2023 1148                         Point: Body mechanics (Done)       Description:   Instruct learner(s) on proper positioning and spine alignment during self-care, functional mobility activities and/or exercises.                  Learning Progress Summary             Patient Acceptance, E, VU,NR by ISAÍAS at 7/29/2023 1148                                         User Key       Initials Effective Dates Name Provider Type Discipline    ISAÍAS 06/20/22 -  Alexandra Berg, OTR/L Occupational Therapist OT                  OT Recommendation and Plan  Planned Therapy Interventions (OT): activity tolerance training, functional balance retraining, occupation/activity based interventions, ROM/therapeutic exercise, strengthening exercise, transfer/mobility retraining, patient/caregiver education/training, passive ROM/stretching, adaptive equipment training, BADL retraining, manual therapy/joint mobilization  Therapy Frequency (OT): 3 times/wk  Plan of Care Review  Plan of Care Reviewed With: patient, spouse  Progress: no change  Outcome Evaluation: OT eval completed. Pt in fowlers upon therapist arrival; A&O person and place; 2/10 generalized pain reported; Pt's wife also present. Pt's wife reports that the Pt required Max Afor BADLs at baseline. Today, Pt performed supine>sit utilizing bedrail with HOB elevated with Min A. Pt was dependent for LB dressing while sitting/standing as appropriate; Pt required Max A for UB dressing while seated EOB. Pt performed sit<>stand with Mod A x2. Once standing, the Pt performed pivot from bed>recliner utilizing rwx with Min A x2 and verbal cues for positioning and sequencing. Pt additionally demonstrates BUE weakness. Skilled OT intervention indicated in order to address deficits in fxl mobility, fxl activity tolerance, balance, strength, and use of adaptive techniques/equipment during performance of BADLs. Recommend SNF at discharge.     Time Calculation:         Time Calculation- OT       Row  Name 07/29/23 1057             Time Calculation- OT    OT Start Time 1057  +10 minutes chart review  -LS      OT Stop Time 1140  -LS      OT Time Calculation (min) 43 min  -LS      OT Non-Billable Time (min) 53 min  -      OT Received On 07/29/23  -      OT Goal Re-Cert Due Date 08/08/23  -                User Key  (r) = Recorded By, (t) = Taken By, (c) = Cosigned By      Initials Name Provider Type     Alexandra Berg, OTR/L Occupational Therapist                  Therapy Charges for Today       Code Description Service Date Service Provider Modifiers Qty    45228276865 HC OT EVAL MOD COMPLEXITY 4 7/29/2023 Aleaxndra Berg OTR/L GO 1                 Alexandra Berg OTR/LEVI  7/29/2023

## 2023-07-29 NOTE — PROGRESS NOTES
Unable to manually palpate posterior tibial and dorsalis pedis pulse.  Am able to auscultate the posterior tibial pulse utilizing a Doppler.

## 2023-07-29 NOTE — PROGRESS NOTES
[facility]  INPATIENT PROGRESS NOTE    Patient Name: Samy Velazquez  Date of Admission: 7/28/2023  Today's Date: 07/29/23  Length of Stay: 1  Primary Care Physician: Garret Salinas MD    Subjective   Chief Complaint: f/u L foot cellulitis/2nd toe wound    HPI   Patient seen sitting in chair at bedside with his family at bedside.  He is eating lunch.  Awake alert interactive. Still with some foot pain but unchanged.  Denies any other complaints.  No chest pain shortness of breath dizziness or nausea.  Tolerating p.o.        Review of Systems   All pertinent negatives and positives are as above. All other systems have been reviewed and are negative unless otherwise stated.     Objective    Temp:  [97.8 øF (36.6 øC)-98.6 øF (37 øC)] 97.8 øF (36.6 øC)  Heart Rate:  [62-83] 62  Resp:  [16-20] 16  BP: (105-146)/(68-84) 117/78  Physical Exam  GEN: Awake, alert, interactive, in NAD  HEENT:  PERRLA, EOMI, Anicteric, Trachea midline  Lungs:  no wheezing/rales/rhonchi  Heart: RRR, +S1/s2, no rub  ABD: soft, nt/nd, +BS, no guarding/rebound  Extremities: legs warm, both feet on cooler side, left foot with hallux deviation and 2nd toe wound, trace LE edema b/l, no overt pitting  Skin: L 2nd digit purple/red with wound on top of digit  Neuro: No obvious focal deficits, gait not tested  Psych: normal mood & affect        Results Review:  I have reviewed the labs, radiology results, and diagnostic studies.    Laboratory Data:   Results from last 7 days   Lab Units 07/29/23  0605 07/28/23  1402   WBC 10*3/mm3 10.18 16.78*   HEMOGLOBIN g/dL 15.6 16.5   HEMATOCRIT % 50.0 51.2*   PLATELETS 10*3/mm3 156 197        Results from last 7 days   Lab Units 07/29/23  0605 07/28/23  1402   SODIUM mmol/L 136 132*   POTASSIUM mmol/L 3.7 3.6   CHLORIDE mmol/L 101 94*   CO2 mmol/L 21.0* 24.0   BUN mg/dL 21 24*   CREATININE mg/dL 1.43* 1.75*   CALCIUM mg/dL 9.7 10.0   BILIRUBIN mg/dL  --   1.0   ALK PHOS U/L  --  187*   ALT (SGPT) U/L  --  22   AST (SGOT) U/L  --  24   GLUCOSE mg/dL 125* 192*       Culture Data:   No results found for: BLOODCX, URINECX, WOUNDCX, MRSACX, RESPCX, STOOLCX    Radiology Data:   Imaging Results (Last 24 Hours)       Procedure Component Value Units Date/Time    US Arterial Doppler Lower Extremity Left [[id number]] Resulted: 07/28/23 1851     Updated: 07/28/23 1902    XR Foot 3+ View Left [[id number]] Collected: 07/28/23 1602     Updated: 07/28/23 1607    Narrative:      HISTORY: Left foot infection     Left foot: 3 views of the left foot are obtained.     COMPARISON: 06/22/2023     FINDINGS: Hallux valgus deformity of the arthritic first MTP joint.  Hammertoe deformities of the second through fifth digits. Cortical  thickening of the fourth metatarsal. Stable peripherally sclerotic 1.3  cm lucent lesion proximal second metatarsal. Moderate calcaneal  spurring. Osteopenia. Vascular calcification.       Impression:      1. Osteopenia with chronic advanced changes described in detail above.  No interval change since the 06/22/2023 exam. No convincing radiographic  evidence of acute osteomyelitis.  This report was finalized on 07/28/2023 16:04 by Dr. Yue Godoy MD.    CT Head Without Contrast [[id number]] Collected: 07/28/23 1505     Updated: 07/28/23 1510    Narrative:      CT HEAD WO CONTRAST- 7/28/2023 2:55 PM CDT     HISTORY: ams      COMPARISON: 07/20/2022     DOSE LENGTH PRODUCT: 604 mGy cm. Automated exposure control was also  utilized to decrease patient radiation dose.     TECHNIQUE: Axial images of the brain obtained without contrast     FINDINGS:  Stable prominence of the sulci and ventricles favoring  underlying volume loss. Chronic small vessel ischemic change. No  intracranial hemorrhage or mass effect. No acute signs of ischemia. No  extra-axial hematoma or subarachnoid hemorrhage.     Visible paranasal sinuses and mastoid air cells well-aerated. Bony  calvarium  appears       Impression:      1. Moderate generalized volume loss with mild chronic small vessel  ischemia. No acute intracranial process identified.  This report was finalized on 07/28/2023 15:07 by Dr. Yue Godoy MD.    XR Chest 1 View [[id number]] Collected: 07/28/23 1449     Updated: 07/28/23 1453    Narrative:      EXAM/TECHNIQUE: XR CHEST 1 VW-     INDICATION: Altered mental status     COMPARISON: 07/18/2022     FINDINGS:     Lung volumes are low. Cardiac silhouette is stable. LEFT chest wall  cardiac ICD device is stable in position. Sternotomy wires are stable in  alignment with multiple fractured wires again noted. No pleural  effusion, pneumothorax, or focal consolidation. Partially imaged RIGHT  shoulder arthroplasty. Severe LEFT glenohumeral joint osteoarthritis  with bone-on-bone appearance.       Impression:         No acute findings. Low lung volumes.  This report was finalized on 07/28/2023 14:50 by Dr. Rupesh Rivas MD.            I have reviewed the patient's current medications.     Assessment/Plan   Assessment  Active Hospital Problems    Diagnosis     **Cellulitis of second toe of left foot     Altered mental status     Paroxysmal atrial fibrillation     Stage 3b chronic kidney disease (CKD)     Osteoarthritis of multiple joints     Chronic gout        Treatment Plan  #1 left second toe cellulitis -with wound on dorsal aspect of the toe.  Toe is somewhat purple.  Question adequate blood flow.  Arterial Dopplers are pending.  Podiatry following.  Continue antibiotics with bank and cefepime.  X-ray showed no signs of osteo or bone disease.    #2 CKD 3 - creat actually a little better this AM than last evening or last stay, monitor closely and avoid nephrotoxic meds. Eating and drinking well, hold ivf, recheck in AM    #3 chronic gout -on allopurinol.  No significant erythema or signs of active gout flare.     #4 paroxysmal A-fib -on Eliquis and toprol xL     #5 hypertension -bp stable on  regimen     #6 hyperlipidemia -statin    Medical Decision Making  Number and Complexity of problems: 1 acute on chronic, multiple chronic  Differential Diagnosis: as above    Conditions and Status        Condition is improving.     MDM Data  External documents reviewed: none  Cardiac tracing (EKG, telemetry) interpretation: none  Radiology interpretation: reports reviewed, arterial doppler pending  Labs reviewed: as above  Any tests that were considered but not ordered: none     Decision rules/scores evaluated (example [id number], [place], etc): none     Discussed with: patient, family, nursing     Care Planning  Shared decision making: Patient/family apprised of current labs, vitals, imaging and treatment plan.  They are agreeable with proceeding with plans as discussed.    Code status and discussions: full code    Disposition  Social Determinants of Health that impact treatment or disposition: none  I expect the patient to be discharged to home vs snf pending therapy evals and pt/family wishes in 2-3 days    Electronically signed by Jose Lezama DO, 07/29/23, 11:36 CDT.

## 2023-07-29 NOTE — PLAN OF CARE
Pt alert and oriented x4. VSS. no c/o pain. BUTLER. PPP. SCDS  for VTE. . Tolerating cardiac diet. Passed swallow study with speech.   Skin wound assed. Wound care came to see patient. Wound care orders in. . Voiding in brief, incontinent. . Last BM 7/29/23. 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  D/c plans to go back to [facility]. Call light within reach. Safety maintained.     Goal Outcome Evaluation:

## 2023-07-29 NOTE — CONSULTS
[facility]     Referring Provider: Karlos Gutierres MD    Reason for Consultation: Cellulits, wound, second digit, left    Patient Care Team:  Garret Salinas MD as PCP - Michela Tobin APRN as Nurse Practitioner (Urology)      Subjective .     Chief complaint/History of present illness: Patient is a 78-year-old white male who was admitted through the emergency room service yesterday with cellulitis and wound of the second digit, left and  as well as altered mental status.  The patient's family member states that he has had issues on and off with the wound over the last several months.  He has a hammertoe deformity of the second digit.  He states that the wound will heal and then open back up due to deformity when wearing shoes.  He states that it recently became infected.  He states that he had several rounds of oral antibiotics.  Has also been followed at wound care at [facility].  He states that over the last several days the wound of the toe became worsened with increased redness, pain, swelling.  He was admitted for IV antibiotic therapy.  He does relate improvement.  Does have a decrease in white blood cell count from 16 down to 10.  He states the redness has improved.  Denies any other complaints.      Viewing initial pictures in the patient's chart of the second digit.  Redness has significantly improved following initiation of IV antibiotic therapy.    Did review ZOILA completed on 2/6/2023.  ZOILA did reveal not obtainable due to noncompressibility of the vessels.  Waveforms are triphasic and well-maintained at the ankle.    Review of Systems  Review of Systems   Constitutional:  Positive for fatigue. Negative for chills, diaphoresis and fever.   HENT: Negative.     Eyes: Negative.    Respiratory: Negative.     Cardiovascular: Negative.    Gastrointestinal: Negative.    Endocrine: Negative.    Genitourinary: Negative.    Skin:  Positive for wound.    Allergic/Immunologic: Negative.    Neurological: Negative.    Psychiatric/Behavioral: Negative.       History  Past Medical History:   Diagnosis Date    Arthritis     Coronary artery disease     Hyperlipidemia     Hypertensive heart disease with heart failure 3/29/2021    Hypothyroidism 4/1/2021    Kidney stone     Major depressive disorder, recurrent, moderate 3/29/2021    Morbidly obese 9/18/2020   ,   Past Surgical History:   Procedure Laterality Date    CARDIAC DEFIBRILLATOR PLACEMENT      CARDIAC DEFIBRILLATOR PLACEMENT N/A 2008    CARDIAC SURGERY      ENDOSCOPY N/A 10/30/2020    Procedure: ESOPHAGOGASTRODUODENOSCOPY WITH ANESTHESIA;  Surgeon: Brent Stanley MD;  Location: [facility] OR;  Service: Gastroenterology;  Laterality: N/A;  pre dysphagia  post post op gastric surgery  dr jose manuel smith    ESOPHAGUS SURGERY      KNEE SURGERY      TOTAL SHOULDER REPLACEMENT     ,   Family History   Problem Relation Age of Onset    Hypertension Mother     Stroke Mother     Cancer Father     Hypertension Father     Diabetes Sister     Hypertension Sister     Crohn's disease Daughter     No Known Problems Son     No Known Problems Maternal Grandmother     No Known Problems Maternal Grandfather     No Known Problems Paternal Grandmother     No Known Problems Paternal Grandfather     Hypertension Sister     Hypertension Sister     Hypertension Sister     No Known Problems Son    ,   Social History     Tobacco Use    Smoking status: Never    Smokeless tobacco: Never   Vaping Use    Vaping Use: Never used   Substance Use Topics    Alcohol use: No    Drug use: No   ,   Medications Prior to Admission   Medication Sig Dispense Refill Last Dose    acetaminophen (TYLENOL) 325 MG tablet Take 2 tablets by mouth Every 4 (Four) Hours As Needed for Mild Pain.       allopurinol (ZYLOPRIM) 100 MG tablet Take 1 tablet by mouth Daily. 90 tablet 0     apixaban (ELIQUIS) 2.5 MG tablet tablet Take 1 tablet by mouth Every 12 (Twelve) Hours.        aspirin 81 MG EC tablet Take 1 tablet by mouth Daily.       atorvastatin (LIPITOR) 40 MG tablet Take 1 tablet by mouth Daily.       buPROPion XL (WELLBUTRIN XL) 150 MG 24 hr tablet Take 1 tablet by mouth Daily.       Cholecalciferol (VITAMIN D3) 2000 units capsule Take 1 capsule by mouth Daily.       famotidine (PEPCID) 20 MG tablet Take 1 tablet by mouth 2 (Two) Times a Day.       fluticasone (FLONASE) 50 MCG/ACT nasal spray 2 sprays by Each Nare route Daily.       HYDROcodone-acetaminophen (NORCO) 5-325 MG per tablet Take 1 tablet by mouth Every 6 (Six) Hours As Needed for Moderate Pain. 120 tablet 0     ipratropium (ATROVENT) 0.03 % nasal spray 1 spray into the nostril(s) as directed by provider Every 12 (Twelve) Hours.       isosorbide mononitrate (IMDUR) 30 MG 24 hr tablet Take 1 tablet by mouth Daily.       levothyroxine (SYNTHROID, LEVOTHROID) 50 MCG tablet Take 1 tablet by mouth Daily.       memantine (NAMENDA) 10 MG tablet Take 1 tablet by mouth 2 (Two) Times a Day.       metoclopramide (REGLAN) 10 MG tablet Take  by mouth 3 (Three) Times a Day.       metOLazone (ZAROXOLYN) 2.5 MG tablet Take 1 tablet by mouth Daily As Needed.       metoprolol succinate XL (TOPROL-XL) 25 MG 24 hr tablet Take 0.5 tablets by mouth Daily. 90 tablet 3     mexiletine (MEXITIL) 150 MG capsule Take 1 capsule by mouth 4 (Four) Times a Day.       naloxone (NARCAN) 4 MG/0.1ML nasal spray Call 911. Don't prime. [place] in 1 nostril for overdose. Repeat in 2-3 minutes in other nostril if no or minimal breathing/responsiveness. 2 each 0     nitroglycerin (NITROSTAT) 0.4 MG SL tablet Place 1 tablet under the tongue Every 5 (Five) Minutes As Needed for Chest Pain. Take no more than 3 doses in 15 minutes. 25 tablet 2     polyethyl glycol-propyl glycol (SYSTANE) 0.4-0.3 % solution ophthalmic solution (artificial tears) Administer 1 drop to both eyes Every 1 (One) Hour As Needed.       tamsulosin (FLOMAX) 0.4 MG capsule 24 hr capsule Take 1  capsule by mouth 2 (Two) Times a Day. 180 capsule 3     and Allergies:  Iodine, Strawberry, Iodinated contrast media, Clindamycin/lincomycin, Penicillins, and Vancomycin    Objective     Vital Signs   Temp:  [97.8 øF (36.6 øC)-98.6 øF (37 øC)] 97.8 øF (36.6 øC)  Heart Rate:  [62-83] 62  Resp:  [16-20] 16  BP: (105-146)/(68-84) 117/78    Physical Exam:  Physical Exam  Constitutional:       Appearance: Normal appearance.   HENT:      Head: Normocephalic and atraumatic.   Eyes:      Extraocular Movements: Extraocular movements intact.      Pupils: Pupils are equal, round, and reactive to light.   Pulmonary:      Effort: Pulmonary effort is normal.   Skin:     General: Skin is warm.   Neurological:      Mental Status: He is alert.   Psychiatric:         Mood and Affect: Mood normal.         Thought Content: Thought content normal.       Lateral deviation of the hallux, left as well as contracture, ridge of the second digit.  Does have a small area of eschar to the dorsal aspect of the second digit.  Wound does not probe.  There is no fluctuance.  No erythema present on exam.  No other skin breakdown.    Able to differentiate between sharp, dull, proprioception    Results Review:  Lab Results (last 24 hours)       Procedure Component Value Units Date/Time    MRSA Screen, PCR (Inpatient) - Swab, Nares [[id number]] Collected: 07/29/23 1035    Specimen: Swab from Nares Updated: 07/29/23 1045    Basic Metabolic Panel [[id number]]  (Abnormal) Collected: 07/29/23 0605    Specimen: Blood Updated: 07/29/23 0642     Glucose 125 mg/dL      BUN 21 mg/dL      Creatinine 1.43 mg/dL      Sodium 136 mmol/L      Potassium 3.7 mmol/L      Comment: Specimen hemolyzed.  Results may be affected.        Chloride 101 mmol/L      CO2 21.0 mmol/L      Calcium 9.7 mg/dL      BUN/Creatinine Ratio 14.7     Anion Gap 14.0 mmol/L      eGFR 50.8 mL/min/1.73     Narrative:      GFR Normal >60  Chronic Kidney Disease <60  Kidney Failure <15    The GFR  formula is only valid for adults with stable renal function between ages 18 and 70.    STAT Lactic Acid, Reflex [[id number]]  (Normal) Collected: 07/29/23 0605    Specimen: Blood Updated: 07/29/23 0640     Lactate 2.0 mmol/L     CBC & Differential [[id number]]  (Abnormal) Collected: 07/29/23 0605    Specimen: Blood Updated: 07/29/23 0620    Narrative:      The following orders were created for panel order CBC & Differential.  Procedure                               Abnormality         Status                     ---------                               -----------         ------                     CBC Auto Differential[[id number]]        Abnormal            Final result                 Please view results for these tests on the individual orders.    CBC Auto Differential [[id number]]  (Abnormal) Collected: 07/29/23 0605    Specimen: Blood Updated: 07/29/23 0620     WBC 10.18 10*3/mm3      RBC 5.35 10*6/mm3      Hemoglobin 15.6 g/dL      Hematocrit 50.0 %      MCV 93.5 fL      MCH 29.2 pg      MCHC 31.2 g/dL      RDW 13.7 %      RDW-SD 46.8 fl      MPV 10.2 fL      Platelets 156 10*3/mm3      Neutrophil % 72.1 %      Lymphocyte % 12.3 %      Monocyte % 9.2 %      Eosinophil % 4.9 %      Basophil % 1.0 %      Immature Grans % 0.5 %      Neutrophils, Absolute 7.34 10*3/mm3      Lymphocytes, Absolute 1.25 10*3/mm3      Monocytes, Absolute 0.94 10*3/mm3      Eosinophils, Absolute 0.50 10*3/mm3      Basophils, Absolute 0.10 10*3/mm3      Immature Grans, Absolute 0.05 10*3/mm3      nRBC 0.0 /100 WBC     STAT Lactic Acid, Reflex [[id number]]  (Abnormal) Collected: 07/28/23 2155    Specimen: Blood Updated: 07/28/23 2221     Lactate 2.6 mmol/L     STAT Lactic Acid, Reflex [[id number]]  (Abnormal) Collected: 07/28/23 1910    Specimen: Blood Updated: 07/28/23 1934     Lactate 2.1 mmol/L     C-reactive Protein [[id number]]  (Abnormal) Collected: 07/28/23 1402    Specimen: Blood Updated: 07/28/23 1605     C-Reactive Protein 4.35 mg/dL   "   Sedimentation Rate [[id number]]  (Normal) Collected: 07/28/23 1402    Specimen: Blood Updated: 07/28/23 1547     Sed Rate 20 mm/hr     Urinalysis With Culture If Indicated - Urine, Catheter [[id number]]  (Abnormal) Collected: 07/28/23 1440    Specimen: Urine, Catheter Updated: 07/28/23 1450     Color, UA Dark Yellow     Appearance, UA Clear     pH, UA 5.5     Specific Gravity, UA 1.017     Glucose, UA Negative     Ketones, UA Negative     Bilirubin, UA Negative     Blood, UA Negative     Protein, UA Negative     Leuk Esterase, UA Trace     Nitrite, UA Negative     Urobilinogen, UA 1.0 E.U./dL    Narrative:      In absence of clinical symptoms, the presence of pyuria, bacteria, and/or nitrites on the urinalysis result does not correlate with infection.    Urinalysis, Microscopic Only - Urine, Catheter [[id number]]  (Abnormal) Collected: 07/28/23 1440    Specimen: Urine, Catheter Updated: 07/28/23 1450     RBC, UA None Seen /HPF      WBC, UA 0-2 /HPF      Comment: Urine culture not indicated.        Bacteria, UA None Seen /HPF      Squamous Epithelial Cells, UA 0-2 /HPF      Hyaline Casts, UA 0-2 /LPF      Methodology Automated Microscopy    Procalcitonin [[id number]]  (Abnormal) Collected: 07/28/23 1402    Specimen: Blood Updated: 07/28/23 1445     Procalcitonin 0.93 ng/mL     Narrative:      As a Marker for Sepsis (Non-Neonates):    1. <0.5 ng/mL represents a low risk of severe sepsis and/or septic shock.  2. >2 ng/mL represents a high risk of severe sepsis and/or septic shock.    As a Marker for Lower Respiratory Tract Infections that require antibiotic therapy:    PCT on Admission    Antibiotic Therapy       6-12 Hrs later    >0.5                Strongly Recommended  >0.25 - <0.5        Recommended   0.1 - 0.25          Discouraged              Remeasure/reassess PCT  <0.1                Strongly Discouraged     Remeasure/reassess PCT    As 28 day mortality risk marker: \"Change in Procalcitonin Result\" (>80% or " <=80%) if Day 0 (or Day 1) and Day 4 values are available. Refer to http://www.[facility]-pct-calculator.com    Change in PCT <=80%  A decrease of PCT levels below or equal to 80% defines a positive change in PCT test result representing a higher risk for 28-day all-cause mortality of patients diagnosed with severe sepsis for septic shock.    Change in PCT >80%  A decrease of PCT levels of more than 80% defines a negative change in PCT result representing a lower risk for 28-day all-cause mortality of patients diagnosed with severe sepsis or septic shock.       Comprehensive Metabolic Panel [[id number]]  (Abnormal) Collected: 07/28/23 1402    Specimen: Blood Updated: 07/28/23 1440     Glucose 192 mg/dL      BUN 24 mg/dL      Creatinine 1.75 mg/dL      Sodium 132 mmol/L      Potassium 3.6 mmol/L      Comment: Slight hemolysis detected by analyzer. Results may be affected.        Chloride 94 mmol/L      CO2 24.0 mmol/L      Calcium 10.0 mg/dL      Total Protein 6.6 g/dL      Albumin 3.2 g/dL      ALT (SGPT) 22 U/L      AST (SGOT) 24 U/L      Alkaline Phosphatase 187 U/L      Total Bilirubin 1.0 mg/dL      Globulin 3.4 gm/dL      A/G Ratio 0.9 g/dL      BUN/Creatinine Ratio 13.7     Anion Gap 14.0 mmol/L      eGFR 39.9 mL/min/1.73     Narrative:      GFR Normal >60  Chronic Kidney Disease <60  Kidney Failure <15    The GFR formula is only valid for adults with stable renal function between ages 18 and 70.    Magnesium [[id number]]  (Normal) Collected: 07/28/23 1402    Specimen: Blood Updated: 07/28/23 1437     Magnesium 1.6 mg/dL     Lactic Acid, Plasma [[id number]]  (Abnormal) Collected: 07/28/23 1402    Specimen: Blood Updated: 07/28/23 1436     Lactate 3.2 mmol/L     COVID-19,CEPHEID/KATHIE,COR/JOSE/PAD/ANA/MAD IN-HOUSE(OR EMERGENT/ADD-ON),NP SWAB IN TRANSPORT MEDIA 3-4 HR TAT, RT-PCR - Swab, Nasopharynx [[id number]]  (Normal) Collected: 07/28/23 1403    Specimen: Swab from Nasopharynx Updated: 07/28/23 1432     COVID19 Not  Detected    Narrative:      Fact sheet for providers: https://www.fda.gov/media/251402/download     Fact sheet for patients: https://www.fda.gov/media/401805/download  Fact sheet for providers: https://www.fda.gov/media/611955/download    Fact sheet for patients: https://www.fda.gov/media/680872/download    Test performed by PCR.    Blood Culture - Blood, Arm, Right [[id number]] Collected: 07/28/23 1414    Specimen: Blood from Arm, Right Updated: 07/28/23 1423    Blood Gas, Arterial - [[id number]]  (Abnormal) Collected: 07/28/23 1425    Specimen: Arterial Blood Updated: 07/28/23 1421     Site Right Radial     Jeffry's Test N/A     pH, Arterial 7.451 pH units      Comment: 83 Value above reference range        pCO2, Arterial 34.8 mm Hg      Comment: 84 Value below reference range        pO2, Arterial 70.4 mm Hg      Comment: 84 Value below reference range        HCO3, Arterial 24.2 mmol/L      Base Excess, Arterial 0.8 mmol/L      O2 Saturation, Arterial 95.1 %      Temperature 37.0 C      Barometric Pressure for Blood Gas 751 mmHg      Modality Room Air     Ventilator Mode NA     Collected by [id number]     Comment: Meter: [id number]     :  [id number]        pCO2, Temperature Corrected 34.8 mm Hg      pH, Temp Corrected 7.451 pH Units      pO2, Temperature Corrected 70.4 mm Hg     Protime-INR [[id number]]  (Abnormal) Collected: 07/28/23 1402    Specimen: Blood Updated: 07/28/23 1420     Protime 14.9 Seconds      INR 1.15    Blood Culture - Blood, Arm, Right [[id number]] Collected: 07/28/23 1402    Specimen: Blood from Arm, Right Updated: 07/28/23 1413    CBC & Differential [[id number]]  (Abnormal) Collected: 07/28/23 1402    Specimen: Blood Updated: 07/28/23 1412    Narrative:      The following orders were created for panel order CBC & Differential.  Procedure                               Abnormality         Status                     ---------                               -----------         ------                      CBC Auto Differential[[id number]]        Abnormal            Final result                 Please view results for these tests on the individual orders.    CBC Auto Differential [[id number]]  (Abnormal) Collected: 07/28/23 1402    Specimen: Blood Updated: 07/28/23 1412     WBC 16.78 10*3/mm3      RBC 5.68 10*6/mm3      Hemoglobin 16.5 g/dL      Hematocrit 51.2 %      MCV 90.1 fL      MCH 29.0 pg      MCHC 32.2 g/dL      RDW 13.7 %      RDW-SD 44.5 fl      MPV 9.8 fL      Platelets 197 10*3/mm3      Neutrophil % 78.6 %      Lymphocyte % 9.9 %      Monocyte % 9.3 %      Eosinophil % 1.1 %      Basophil % 0.4 %      Immature Grans % 0.7 %      Neutrophils, Absolute 13.21 10*3/mm3      Lymphocytes, Absolute 1.66 10*3/mm3      Monocytes, Absolute 1.56 10*3/mm3      Eosinophils, Absolute 0.18 10*3/mm3      Basophils, Absolute 0.06 10*3/mm3      Immature Grans, Absolute 0.11 10*3/mm3      nRBC 0.0 /100 WBC               Assessment & Plan       Cellulitis of second toe of left foot    Osteoarthritis of multiple joints    Cardiac pacemaker    Chronic gout    Stage 3b chronic kidney disease (CKD)    Chronic systolic heart failure    Paroxysmal atrial fibrillation    Altered mental status      Patient's condition was discussed.  Cellulitis of the second digit, left has much improved.  Did apply topical Betadine to the second digit wound.  We will place order for bandage changes along with daily topical Betadine.  I Feel like this can be treated nonoperatively.  Does not require any emergent type of surgical intervention to the second digit.  Will need to be placed in a postop surgical type shoe to allow for a bandage to the second digit  and to avoid additional breakdown.  Will also need a  silicone type toe sleeve to the second digit post discharge to prevent any recurrent ulceration.  Malika Krishna, APRABDI  07/29/23  11:20 CDT

## 2023-07-29 NOTE — ED NOTES
"Nursing report ED to floor  Samy Velazquez  78 y.o.  male    HPI:   Chief Complaint   Patient presents with    Altered Mental Status       Admitting doctor:   El Merino MD    Consulting provider(s):  Consults       No orders found from 6/29/2023 to 7/29/2023.             Admitting diagnosis:   The primary encounter diagnosis was Altered mental status, unspecified altered mental status type. A diagnosis of Left foot infection was also pertinent to this visit.    Code status:   Current Code Status       Date Active Code Status Order ID Comments User Context       Prior            Allergies:   Iodine, Strawberry, Iodinated contrast media, Clindamycin/lincomycin, Penicillins, and Vancomycin    Intake and Output    Intake/Output Summary (Last 24 hours) at 7/28/2023 2056  Last data filed at 7/28/2023 1841  Gross per 24 hour   Intake 100 ml   Output --   Net 100 ml       Weight:       07/28/23  1348   Weight: 114 kg (251 lb)       Most recent vitals:   Vitals:    07/28/23 1348 07/28/23 1701 07/28/23 1816 07/28/23 2056   BP: 115/83 105/68 114/73 122/75   BP Location:    Right arm   Patient Position:    Lying   Pulse: 74 67 66 69   Resp: 18 20 18 20   Temp: 98 øF (36.7 øC)   98 øF (36.7 øC)   TempSrc:    Oral   SpO2: 95% 97% 96% 96%   Weight: 114 kg (251 lb)      Height: 170.2 cm (67\")        Oxygen Therapy: .    Active LDAs/IV Access:   Lines, Drains & Airways       Active LDAs       Name Placement date Placement time Site Days    Peripheral IV 07/28/23 1550 Right Antecubital 07/28/23  1550  Antecubital  less than 1    External Urinary Catheter 06/27/23  2030  --  31                    Labs (abnormal labs have a star):   Labs Reviewed   COMPREHENSIVE METABOLIC PANEL - Abnormal; Notable for the following components:       Result Value    Glucose 192 (*)     BUN 24 (*)     Creatinine 1.75 (*)     Sodium 132 (*)     Chloride 94 (*)     Albumin 3.2 (*)     Alkaline Phosphatase 187 (*)     eGFR 39.9 (*)     All other " "components within normal limits    Narrative:     GFR Normal >60  Chronic Kidney Disease <60  Kidney Failure <15    The GFR formula is only valid for adults with stable renal function between ages 18 and 70.   PROTIME-INR - Abnormal; Notable for the following components:    Protime 14.9 (*)     INR 1.15 (*)     All other components within normal limits   URINALYSIS W/ CULTURE IF INDICATED - Abnormal; Notable for the following components:    Color, UA Dark Yellow (*)     Leuk Esterase, UA Trace (*)     All other components within normal limits    Narrative:     In absence of clinical symptoms, the presence of pyuria, bacteria, and/or nitrites on the urinalysis result does not correlate with infection.   LACTIC ACID, PLASMA - Abnormal; Notable for the following components:    Lactate 3.2 (*)     All other components within normal limits   PROCALCITONIN - Abnormal; Notable for the following components:    Procalcitonin 0.93 (*)     All other components within normal limits    Narrative:     As a Marker for Sepsis (Non-Neonates):    1. <0.5 ng/mL represents a low risk of severe sepsis and/or septic shock.  2. >2 ng/mL represents a high risk of severe sepsis and/or septic shock.    As a Marker for Lower Respiratory Tract Infections that require antibiotic therapy:    PCT on Admission    Antibiotic Therapy       6-12 Hrs later    >0.5                Strongly Recommended  >0.25 - <0.5        Recommended   0.1 - 0.25          Discouraged              Remeasure/reassess PCT  <0.1                Strongly Discouraged     Remeasure/reassess PCT    As 28 day mortality risk marker: \"Change in Procalcitonin Result\" (>80% or <=80%) if Day 0 (or Day 1) and Day 4 values are available. Refer to http://www.[facility]-pct-calculator.com    Change in PCT <=80%  A decrease of PCT levels below or equal to 80% defines a positive change in PCT test result representing a higher risk for 28-day all-cause mortality of patients diagnosed with severe " sepsis for septic shock.    Change in PCT >80%  A decrease of PCT levels of more than 80% defines a negative change in PCT result representing a lower risk for 28-day all-cause mortality of patients diagnosed with severe sepsis or septic shock.      CBC WITH AUTO DIFFERENTIAL - Abnormal; Notable for the following components:    WBC 16.78 (*)     Hematocrit 51.2 (*)     Neutrophil % 78.6 (*)     Lymphocyte % 9.9 (*)     Immature Grans % 0.7 (*)     Neutrophils, Absolute 13.21 (*)     Monocytes, Absolute 1.56 (*)     Immature Grans, Absolute 0.11 (*)     All other components within normal limits   BLOOD GAS, ARTERIAL - Abnormal; Notable for the following components:    pH, Arterial 7.451 (*)     pCO2, Arterial 34.8 (*)     pO2, Arterial 70.4 (*)     pCO2, Temperature Corrected 34.8 (*)     pH, Temp Corrected 7.451 (*)     pO2, Temperature Corrected 70.4 (*)     All other components within normal limits   LACTIC ACID, REFLEX - Abnormal; Notable for the following components:    Lactate 2.1 (*)     All other components within normal limits   URINALYSIS, MICROSCOPIC ONLY - Abnormal; Notable for the following components:    WBC, UA 0-2 (*)     All other components within normal limits   C-REACTIVE PROTEIN - Abnormal; Notable for the following components:    C-Reactive Protein 4.35 (*)     All other components within normal limits   COVID-19,CEPHEID/KATHIE,COR/JOSE/PAD/ANA/MAD IN-HOUSE,NP SWAB IN TRANSPORT MEDIA 3-4 HR TAT, RT-PCR - Normal    Narrative:     Fact sheet for providers: https://www.fda.gov/media/993110/download     Fact sheet for patients: https://www.fda.gov/media/166091/download  Fact sheet for providers: https://www.fda.gov/media/141138/download    Fact sheet for patients: https://www.fda.gov/media/303219/download    Test performed by PCR.   MAGNESIUM - Normal   SEDIMENTATION RATE - Normal   BLOOD CULTURE   BLOOD CULTURE   BLOOD GAS, ARTERIAL   LACTIC ACID, REFLEX   CBC AND DIFFERENTIAL    Narrative:     The  following orders were created for panel order CBC & Differential.  Procedure                               Abnormality         Status                     ---------                               -----------         ------                     CBC Auto Differential[[id number]]        Abnormal            Final result                 Please view results for these tests on the individual orders.       Meds given in ED:   Medications   sodium chloride 0.9 % flush 10 mL (has no administration in time range)   lactated ringers bolus 1,000 mL (1,000 mL Intravenous Incomplete 7/28/23 1441)   vancomycin IVPB 2000 mg in 0.9% Sodium Chloride 500 mL (0 mg Intravenous Stopped 7/28/23 1941)   metroNIDAZOLE (FLAGYL) IVPB 500 mg (0 mg Intravenous Stopped 7/28/23 1841)   cefepime 2 gm IVPB in 100 ml NS (MBP) (0 mg Intravenous Stopped 7/28/23 1629)           NIH Stroke Scale:       Isolation/Infection(s):  No active isolations   MRSA, COVID (rule out)     COVID Testing  Collected .  Resulted .    Nursing report ED to floor:  Mental status: .  Ambulatory status: .  Precautions: .    ED nurse phone extentsion- ..

## 2023-07-29 NOTE — CASE MANAGEMENT/SOCIAL WORK
Discharge Planning Assessment  [facility]     Patient Name: Samy Velazquez  MRN: [id number]  Today's Date: 7/29/2023    Admit Date: 7/28/2023        Discharge Needs Assessment       Row Name 07/29/23 1137       Living Environment    People in Home facility resident    Current Living Arrangements extended care facility    Potentially Unsafe Housing Conditions none    Primary Care Provided by other (see comments)    Provides Primary Care For no one, unable/limited ability to care for self    Family Caregiver if Needed spouse    Quality of Family Relationships helpful;involved;supportive    Able to Return to Prior Arrangements yes       Resource/Environmental Concerns    Resource/Environmental Concerns none    Transportation Concerns none       Transition Planning    Patient/Family Anticipates Transition to long-term care facility    Patient/Family Anticipated Services at Transition none    Transportation Anticipated health plan transportation       Discharge Needs Assessment    Readmission Within the Last 30 Days previous discharge plan unsuccessful    Current Outpatient/Agency/Support Group skilled nursing facility    Anticipated Changes Related to Illness none    Outpatient/Agency/Support Group Needs skilled nursing facility    Discharge Facility/Level of Care Needs nursing facility, skilled    Discharge Coordination/Progress PT is a current resident of [facility]. PT has a bed hold according to RN. PT may return to SNF when medically ready.                   Discharge Plan    No documentation.                 Continued Care and Services - Admitted Since 7/28/2023    Coordination has not been started for this encounter.       Selected Continued Care - Prior Encounters Includes continued care and service providers with selected services from prior encounters from 4/29/2023 to 7/29/2023      Discharged on 6/28/2023 Admission date: 6/22/2023 - Discharge disposition: Skilled Nursing Facility (DC - External)       Destination       Service Provider Selected Services Address Phone Fax Patient Preferred    [facility] [address] [place] [address] [facility] [id number] [phone] --                             Demographic Summary    No documentation.                  Functional Status    No documentation.                  Psychosocial    No documentation.                  Abuse/Neglect    No documentation.                  Legal    No documentation.                  Substance Abuse    No documentation.                  Patient Forms    No documentation.                     SINGH Calderon

## 2023-07-29 NOTE — H&P
[facility] Medicine Services  HISTORY AND PHYSICAL    Date of Admission: 7/28/2023  Primary Care Physician: Garret Smith MD    Subjective   Primary Historian: Patient and wife    Chief Complaint: Confusion    History of Present Illness  78 year old male with PMH of CAD, HTN, gout, hypothyroidism, dementia, chronic pain, chronic osteomyelitis of left second toe, that is referred from the SNF due to mental status changes this morning, now improved. Wife at bedside provides some details, patient is able to answer some questions as well.    Blood work consistent with elevated WBC and inflammatory markers with a left 2nd toe chronic wound. Podiatry notified and will follow over the weekend.     Initial concern was for infected toe and systemic involvement, podiatry was notified and will follow over the weekend.     Review of Systems   Otherwise complete ROS reviewed and negative except as mentioned in the HPI.    Past Medical History:   Past Medical History:   Diagnosis Date    Arthritis     Coronary artery disease     Hyperlipidemia     Hypertensive heart disease with heart failure 3/29/2021    Hypothyroidism 4/1/2021    Kidney stone     Major depressive disorder, recurrent, moderate 3/29/2021    Morbidly obese 9/18/2020     Past Surgical History:  Past Surgical History:   Procedure Laterality Date    CARDIAC DEFIBRILLATOR PLACEMENT      CARDIAC DEFIBRILLATOR PLACEMENT N/A 2008    CARDIAC SURGERY      ENDOSCOPY N/A 10/30/2020    Procedure: ESOPHAGOGASTRODUODENOSCOPY WITH ANESTHESIA;  Surgeon: Brent Stanley MD;  Location: [facility];  Service: Gastroenterology;  Laterality: N/A;  pre dysphagia  post post op gastric surgery  dr jose manuel smith    ESOPHAGUS SURGERY      KNEE SURGERY      TOTAL SHOULDER REPLACEMENT       Social History:  reports that he has never smoked. He has never used smokeless tobacco. He reports that he does not drink alcohol and does not use  drugs.    Family History: family history includes Cancer in his father; Crohn's disease in his daughter; Diabetes in his sister; Hypertension in his father, mother, sister, sister, sister, and sister; No Known Problems in his maternal grandfather, maternal grandmother, paternal grandfather, paternal grandmother, son, and son; Stroke in his mother.       Allergies:  Allergies   Allergen Reactions    Iodine Hives    Strawberry Hives    Iodinated Contrast Media Unknown - High Severity    Clindamycin/Lincomycin Diarrhea    Penicillins Rash    Vancomycin Nausea And Vomiting       Medications:  Prior to Admission medications    Medication Sig Start Date End Date Taking? Authorizing Provider   acetaminophen (TYLENOL) 325 MG tablet Take 2 tablets by mouth Every 4 (Four) Hours As Needed for Mild Pain. 6/28/23   Jose Lezama DO   allopurinol (ZYLOPRIM) 100 MG tablet Take 1 tablet by mouth Daily. 6/6/23   Garret Salinas MD   apixaban (ELIQUIS) 2.5 MG tablet tablet Take 1 tablet by mouth Every 12 (Twelve) Hours.    Walter Call MD   aspirin 81 MG EC tablet Take 1 tablet by mouth Daily. 7/22/22   Sean Rosales MD   atorvastatin (LIPITOR) 40 MG tablet Take 1 tablet by mouth Daily.    Walter Call MD   buPROPion XL (WELLBUTRIN XL) 150 MG 24 hr tablet Take 1 tablet by mouth Daily.    Walter Call MD   Cholecalciferol (VITAMIN D3) 2000 units capsule Take 1 capsule by mouth Daily.    Walter Call MD   famotidine (PEPCID) 20 MG tablet Take 1 tablet by mouth 2 (Two) Times a Day.    Walter Call MD   fluticasone (FLONASE) 50 MCG/ACT nasal spray 2 sprays by Each Nare route Daily. 7/23/22   Sean Rosales MD   HYDROcodone-acetaminophen (NORCO) 5-325 MG per tablet Take 1 tablet by mouth Every 6 (Six) Hours As Needed for Moderate Pain. 7/28/23   Garret Salinas MD   ipratropium (ATROVENT) 0.03 % nasal spray 1 spray into the nostril(s) as directed by provider Every 12  (Twelve) Hours. 7/23/22   Walter Call MD   isosorbide mononitrate (IMDUR) 30 MG 24 hr tablet Take 1 tablet by mouth Daily. 6/29/23   Jose Lezama DO   levothyroxine (SYNTHROID, LEVOTHROID) 50 MCG tablet Take 1 tablet by mouth Daily.    Walter Call MD   memantine (NAMENDA) 10 MG tablet Take 1 tablet by mouth 2 (Two) Times a Day.    Walter Call MD   metoclopramide (REGLAN) 10 MG tablet Take  by mouth 3 (Three) Times a Day.    Walter Call MD   metOLazone (ZAROXOLYN) 2.5 MG tablet Take 1 tablet by mouth Daily As Needed.    Walter Call MD   metoprolol succinate XL (TOPROL-XL) 25 MG 24 hr tablet Take 0.5 tablets by mouth Daily. 2/20/23   Garret Salinas MD   mexiletine (MEXITIL) 150 MG capsule Take 1 capsule by mouth 4 (Four) Times a Day.    Walter Call MD   naloxone (NARCAN) 4 MG/0.1ML nasal spray Call 911. Don't prime. [place] in 1 nostril for overdose. Repeat in 2-3 minutes in other nostril if no or minimal breathing/responsiveness. 6/28/23   Jose Lezama DO   nitroglycerin (NITROSTAT) 0.4 MG SL tablet Place 1 tablet under the tongue Every 5 (Five) Minutes As Needed for Chest Pain. Take no more than 3 doses in 15 minutes. 12/12/22   Garret Salinas MD   polyethyl glycol-propyl glycol (SYSTANE) 0.4-0.3 % solution ophthalmic solution (artificial tears) Administer 1 drop to both eyes Every 1 (One) Hour As Needed.    Walter Call MD   tamsulosin (FLOMAX) 0.4 MG capsule 24 hr capsule Take 1 capsule by mouth 2 (Two) Times a Day. 5/11/23   Michela Sagastume APRN   HYDROcodone-acetaminophen (NORCO) 5-325 MG per tablet Take 1 tablet by mouth Every 6 (Six) Hours As Needed for Moderate Pain. 7/21/23 7/28/23  Yajaira Ricci MD     I have utilized all available immediate resources to obtain, update, or review the patient's current medications (including all prescriptions, over-the-counter products, herbals, cannabis/cannabidiol  "products, and vitamin/mineral/dietary (nutritional) supplements).    Objective     Vital Signs: /75 (BP Location: Right arm, Patient Position: Lying)   Pulse 69   Temp 98 øF (36.7 øC) (Oral)   Resp 20   Ht 170.2 cm (67\")   Wt 114 kg (251 lb)   SpO2 96%   BMI 39.31 kg/mý   Physical Exam  Constitutional:       General: He is not in acute distress.     Appearance: He is not toxic-appearing or diaphoretic.   HENT:      Head: Normocephalic and atraumatic.      Right Ear: External ear normal.      Left Ear: External ear normal.      Nose: Nose normal.      Mouth/Throat:      Mouth: Mucous membranes are dry.   Eyes:      General:         Right eye: No discharge.         Left eye: No discharge.      Extraocular Movements: Extraocular movements intact.      Conjunctiva/sclera: Conjunctivae normal.      Pupils: Pupils are equal, round, and reactive to light.   Cardiovascular:      Rate and Rhythm: Normal rate and regular rhythm.      Pulses: Normal pulses.   Pulmonary:      Effort: Pulmonary effort is normal.      Breath sounds: Normal breath sounds. No wheezing or rales.   Abdominal:      General: Abdomen is flat. Bowel sounds are normal. There is no distension.      Palpations: Abdomen is soft. There is no mass.      Tenderness: There is no abdominal tenderness.   Musculoskeletal:         General: Normal range of motion.      Cervical back: Normal range of motion and neck supple.   Skin:     General: Skin is warm and dry.   Neurological:      General: No focal deficit present.      Mental Status: He is alert and oriented to person, place, and time. Mental status is at baseline.      Cranial Nerves: No cranial nerve deficit.            Results Reviewed:  Lab Results (last 24 hours)       Procedure Component Value Units Date/Time    STAT Lactic Acid, Reflex [[id number]]  (Abnormal) Collected: 07/28/23 1910    Specimen: Blood Updated: 07/28/23 1934     Lactate 2.1 mmol/L     C-reactive Protein [[id number]]  " (Abnormal) Collected: 07/28/23 1402    Specimen: Blood Updated: 07/28/23 1605     C-Reactive Protein 4.35 mg/dL     Sedimentation Rate [[id number]]  (Normal) Collected: 07/28/23 1402    Specimen: Blood Updated: 07/28/23 1547     Sed Rate 20 mm/hr     Urinalysis With Culture If Indicated - Urine, Catheter [[id number]]  (Abnormal) Collected: 07/28/23 1440    Specimen: Urine, Catheter Updated: 07/28/23 1450     Color, UA Dark Yellow     Appearance, UA Clear     pH, UA 5.5     Specific Gravity, UA 1.017     Glucose, UA Negative     Ketones, UA Negative     Bilirubin, UA Negative     Blood, UA Negative     Protein, UA Negative     Leuk Esterase, UA Trace     Nitrite, UA Negative     Urobilinogen, UA 1.0 E.U./dL    Narrative:      In absence of clinical symptoms, the presence of pyuria, bacteria, and/or nitrites on the urinalysis result does not correlate with infection.    Urinalysis, Microscopic Only - Urine, Catheter [[id number]]  (Abnormal) Collected: 07/28/23 1440    Specimen: Urine, Catheter Updated: 07/28/23 1450     RBC, UA None Seen /HPF      WBC, UA 0-2 /HPF      Comment: Urine culture not indicated.        Bacteria, UA None Seen /HPF      Squamous Epithelial Cells, UA 0-2 /HPF      Hyaline Casts, UA 0-2 /LPF      Methodology Automated Microscopy    Procalcitonin [[id number]]  (Abnormal) Collected: 07/28/23 1402    Specimen: Blood Updated: 07/28/23 1445     Procalcitonin 0.93 ng/mL     Narrative:      As a Marker for Sepsis (Non-Neonates):    1. <0.5 ng/mL represents a low risk of severe sepsis and/or septic shock.  2. >2 ng/mL represents a high risk of severe sepsis and/or septic shock.    As a Marker for Lower Respiratory Tract Infections that require antibiotic therapy:    PCT on Admission    Antibiotic Therapy       6-12 Hrs later    >0.5                Strongly Recommended  >0.25 - <0.5        Recommended   0.1 - 0.25          Discouraged              Remeasure/reassess PCT  <0.1                Strongly  "Discouraged     Remeasure/reassess PCT    As 28 day mortality risk marker: \"Change in Procalcitonin Result\" (>80% or <=80%) if Day 0 (or Day 1) and Day 4 values are available. Refer to http://[email or web address]-pct-calculator.com    Change in PCT <=80%  A decrease of PCT levels below or equal to 80% defines a positive change in PCT test result representing a higher risk for 28-day all-cause mortality of patients diagnosed with severe sepsis for septic shock.    Change in PCT >80%  A decrease of PCT levels of more than 80% defines a negative change in PCT result representing a lower risk for 28-day all-cause mortality of patients diagnosed with severe sepsis or septic shock.       Comprehensive Metabolic Panel [[id number]]  (Abnormal) Collected: 07/28/23 1402    Specimen: Blood Updated: 07/28/23 1440     Glucose 192 mg/dL      BUN 24 mg/dL      Creatinine 1.75 mg/dL      Sodium 132 mmol/L      Potassium 3.6 mmol/L      Comment: Slight hemolysis detected by analyzer. Results may be affected.        Chloride 94 mmol/L      CO2 24.0 mmol/L      Calcium 10.0 mg/dL      Total Protein 6.6 g/dL      Albumin 3.2 g/dL      ALT (SGPT) 22 U/L      AST (SGOT) 24 U/L      Alkaline Phosphatase 187 U/L      Total Bilirubin 1.0 mg/dL      Globulin 3.4 gm/dL      A/G Ratio 0.9 g/dL      BUN/Creatinine Ratio 13.7     Anion Gap 14.0 mmol/L      eGFR 39.9 mL/min/1.73     Narrative:      GFR Normal >60  Chronic Kidney Disease <60  Kidney Failure <15    The GFR formula is only valid for adults with stable renal function between ages 18 and 70.    Magnesium [[id number]]  (Normal) Collected: 07/28/23 1402    Specimen: Blood Updated: 07/28/23 1437     Magnesium 1.6 mg/dL     Lactic Acid, Plasma [[id number]]  (Abnormal) Collected: 07/28/23 1402    Specimen: Blood Updated: 07/28/23 1436     Lactate 3.2 mmol/L     COVID-19,CEPHEID/KATHIE,COR/JOSE/PAD/ANA/MAD IN-HOUSE(OR EMERGENT/ADD-ON),NP SWAB IN TRANSPORT MEDIA 3-4 HR TAT, RT-PCR - Swab, Nasopharynx " [[id number]]  (Normal) Collected: 07/28/23 1403    Specimen: Swab from Nasopharynx Updated: 07/28/23 1432     COVID19 Not Detected    Narrative:      Fact sheet for providers: https://www.fda.gov/media/859014/download     Fact sheet for patients: https://www.fda.gov/media/560694/download  Fact sheet for providers: https://www.fda.gov/media/729268/download    Fact sheet for patients: https://www.fda.gov/media/017645/download    Test performed by PCR.    Blood Culture - Blood, Arm, Right [[id number]] Collected: 07/28/23 1414    Specimen: Blood from Arm, Right Updated: 07/28/23 1423    Blood Gas, Arterial - [[id number]]  (Abnormal) Collected: 07/28/23 1425    Specimen: Arterial Blood Updated: 07/28/23 1421     Site Right Radial     Jeffry's Test N/A     pH, Arterial 7.451 pH units      Comment: 83 Value above reference range        pCO2, Arterial 34.8 mm Hg      Comment: 84 Value below reference range        pO2, Arterial 70.4 mm Hg      Comment: 84 Value below reference range        HCO3, Arterial 24.2 mmol/L      Base Excess, Arterial 0.8 mmol/L      O2 Saturation, Arterial 95.1 %      Temperature 37.0 C      Barometric Pressure for Blood Gas 751 mmHg      Modality Room Air     Ventilator Mode NA     Collected by [id number]     Comment: Meter: [id number]     :  [id number]        pCO2, Temperature Corrected 34.8 mm Hg      pH, Temp Corrected 7.451 pH Units      pO2, Temperature Corrected 70.4 mm Hg     Protime-INR [[id number]]  (Abnormal) Collected: 07/28/23 1402    Specimen: Blood Updated: 07/28/23 1420     Protime 14.9 Seconds      INR 1.15    Blood Culture - Blood, Arm, Right [[id number]] Collected: 07/28/23 1402    Specimen: Blood from Arm, Right Updated: 07/28/23 1413    CBC & Differential [[id number]]  (Abnormal) Collected: 07/28/23 1402    Specimen: Blood Updated: 07/28/23 1412    Narrative:      The following orders were created for panel order CBC & Differential.  Procedure                                Abnormality         Status                     ---------                               -----------         ------                     CBC Auto Differential[[id number]]        Abnormal            Final result                 Please view results for these tests on the individual orders.    CBC Auto Differential [[id number]]  (Abnormal) Collected: 07/28/23 1402    Specimen: Blood Updated: 07/28/23 1412     WBC 16.78 10*3/mm3      RBC 5.68 10*6/mm3      Hemoglobin 16.5 g/dL      Hematocrit 51.2 %      MCV 90.1 fL      MCH 29.0 pg      MCHC 32.2 g/dL      RDW 13.7 %      RDW-SD 44.5 fl      MPV 9.8 fL      Platelets 197 10*3/mm3      Neutrophil % 78.6 %      Lymphocyte % 9.9 %      Monocyte % 9.3 %      Eosinophil % 1.1 %      Basophil % 0.4 %      Immature Grans % 0.7 %      Neutrophils, Absolute 13.21 10*3/mm3      Lymphocytes, Absolute 1.66 10*3/mm3      Monocytes, Absolute 1.56 10*3/mm3      Eosinophils, Absolute 0.18 10*3/mm3      Basophils, Absolute 0.06 10*3/mm3      Immature Grans, Absolute 0.11 10*3/mm3      nRBC 0.0 /100 WBC           Imaging Results (Last 24 Hours)       Procedure Component Value Units Date/Time    US Arterial Doppler Lower Extremity Left [[id number]] Resulted: 07/28/23 1851     Updated: 07/28/23 1902    XR Foot 3+ View Left [[id number]] Collected: 07/28/23 1602     Updated: 07/28/23 1607    Narrative:      HISTORY: Left foot infection     Left foot: 3 views of the left foot are obtained.     COMPARISON: 06/22/2023     FINDINGS: Hallux valgus deformity of the arthritic first MTP joint.  Hammertoe deformities of the second through fifth digits. Cortical  thickening of the fourth metatarsal. Stable peripherally sclerotic 1.3  cm lucent lesion proximal second metatarsal. Moderate calcaneal  spurring. Osteopenia. Vascular calcification.       Impression:      1. Osteopenia with chronic advanced changes described in detail above.  No interval change since the 06/22/2023 exam. No convincing  radiographic  evidence of acute osteomyelitis.  This report was finalized on 07/28/2023 16:04 by Dr. Yue Godoy MD.    CT Head Without Contrast [[id number]] Collected: 07/28/23 1505     Updated: 07/28/23 1510    Narrative:      CT HEAD WO CONTRAST- 7/28/2023 2:55 PM CDT     HISTORY: ams      COMPARISON: 07/20/2022     DOSE LENGTH PRODUCT: 604 mGy cm. Automated exposure control was also  utilized to decrease patient radiation dose.     TECHNIQUE: Axial images of the brain obtained without contrast     FINDINGS:  Stable prominence of the sulci and ventricles favoring  underlying volume loss. Chronic small vessel ischemic change. No  intracranial hemorrhage or mass effect. No acute signs of ischemia. No  extra-axial hematoma or subarachnoid hemorrhage.     Visible paranasal sinuses and mastoid air cells well-aerated. Bony  calvarium appears       Impression:      1. Moderate generalized volume loss with mild chronic small vessel  ischemia. No acute intracranial process identified.  This report was finalized on 07/28/2023 15:07 by Dr. Yue Godoy MD.    XR Chest 1 View [[id number]] Collected: 07/28/23 1449     Updated: 07/28/23 1453    Narrative:      EXAM/TECHNIQUE: XR CHEST 1 VW-     INDICATION: Altered mental status     COMPARISON: 07/18/2022     FINDINGS:     Lung volumes are low. Cardiac silhouette is stable. LEFT chest wall  cardiac ICD device is stable in position. Sternotomy wires are stable in  alignment with multiple fractured wires again noted. No pleural  effusion, pneumothorax, or focal consolidation. Partially imaged RIGHT  shoulder arthroplasty. Severe LEFT glenohumeral joint osteoarthritis  with bone-on-bone appearance.       Impression:         No acute findings. Low lung volumes.  This report was finalized on 07/28/2023 14:50 by Dr. Rupesh Rivas MD.          I have personally reviewed and interpreted the radiology studies and ECG obtained at time of admission.     Assessment / Plan    Assessment:   Active Hospital Problems    Diagnosis     **Cellulitis of second toe of left foot     Altered mental status     Chronic systolic heart failure     Paroxysmal atrial fibrillation     Stage 3b chronic kidney disease (CKD)     Osteoarthritis of multiple joints     Cardiac pacemaker     Chronic gout      Treatment Plan  The patient will be admitted to my service here at [facility].   Admit to medical floor  Vitals every 4 hours  Cardiac diet  IVF   Cefepime/vancomycin for left toe cellulitis > ?osteomyelitis  Podiatry follow up    Home medications reviewed    Medical Decision Making  Number and Complexity of problems: 3 complex problems  Differential Diagnosis: None    Conditions and Status        Condition is unchanged.     [facility] Data  External documents reviewed: [employer]  Cardiac tracing (EKG, telemetry) interpretation: -  Radiology interpretation: -  Labs reviewed: -  Any tests that were considered but not ordered: -     Decision rules/scores evaluated (example [id number], Wells, etc): -     Discussed with: Patient and wife     Care Planning  Shared decision making: Patient and wife  Code status and discussions:  Full    Disposition  Social Determinants of Health that impact treatment or disposition: None  Estimated length of stay is over 2 midnights.     I confirmed that the patient's advanced care plan is present, code status is documented, and a surrogate decision maker is listed in the patient's medical record.     The patient's surrogate decision maker is wife.     The patient was seen and examined by me on 7/28/2023 at 2100.    Electronically signed by El Merino MD, 07/28/23, 21:00 CDT.

## 2023-07-29 NOTE — CONSULTS
"Pharmacy Dosing Service  Pharmacokinetics  Vancomycin Initial Evaluation  Assessment/Action/Plan:  Loading dose: 2,000 mg  Current Order: Vancomycin 1,250 mg IVPB every 24 hours  Current end date/final dose: 8/2/23 at 1500  Levels: None ordered at this time              Nasal PCR for MRSA has been ordered  Additional antimicrobial agent(s): Cefepime    Vancomycin dosage initiated based on population pharmacokinetic parameters. Pharmacy will continue to follow daily and adjust dose accordingly.     Subjective:  Samy Velazquez is a 78 y.o. male with a Vancomycin \"Pharmacy to Dose\" consult for the treatment of SSTI (LLE cellulitis) , day 2 (7/29/23) of treatment.    AUC Model Data:  Regimen: 1250 mg IV every 24 hours.  Start time: 15:00 on 07/29/2023  Exposure target: AUC24 (range)400-600 mg/L.hr   AUC24,ss: 476 mg/L.hr  PAUC*: 72 %  Ctrough,ss: 15.4 mg/L  Pconc*: 29 %  Tox.: 11 %    Objective:  Ht: 170.2 cm (67\"); Wt: 114 kg (251 lb)  Estimated Creatinine Clearance: 43.3 mL/min (A) (by C-G formula based on SCr of 1.75 mg/dL (H)).   Creatinine   Date Value Ref Range Status   07/28/2023 1.75 (H) 0.76 - 1.27 mg/dL Final      Lab Results   Component Value Date    WBC 16.78 (H) 07/28/2023      Baseline culture results:  Microbiology Results (last 10 days)       Procedure Component Value - Date/Time    COVID-19,CEPHEID/KATHIE,COR/JOSE/PAD/ANA/MAD IN-HOUSE(OR EMERGENT/ADD-ON),NP SWAB IN TRANSPORT MEDIA 3-4 HR TAT, RT-PCR - Swab, Nasopharynx [[id number]]  (Normal) Collected: 07/28/23 1403    Lab Status: Final result Specimen: Swab from Nasopharynx Updated: 07/28/23 1432     COVID19 Not Detected    Narrative:      Fact sheet for providers: https://www.fda.gov/media/651442/download     Fact sheet for patients: https://www.fda.gov/media/361009/download  Fact sheet for providers: https://www.fda.gov/media/217242/download    Fact sheet for patients: https://www.fda.gov/media/553778/download    Test performed by PCR.            Dino SIMS" Merrick PharmD  07/29/23 02:53 CDT

## 2023-07-29 NOTE — PLAN OF CARE
Goal Outcome Evaluation:  Plan of Care Reviewed With: patient, spouse        Progress: no change  Outcome Evaluation: OT eval completed. Pt in fowlers upon therapist arrival; A&O person and place; 2/10 generalized pain reported; Pt's wife also present. Pt's wife reports that the Pt required Max Afor BADLs at baseline. Today, Pt performed supine>sit utilizing bedrail with HOB elevated with Min A. Pt was dependent for LB dressing while sitting/standing as appropriate; Pt required Max A for UB dressing while seated EOB. Pt performed sit<>stand with Mod A x2. Once standing, the Pt performed pivot from bed>recliner utilizing rwx with Min A x2 and verbal cues for positioning and sequencing. Pt additionally demonstrates BUE weakness. Skilled OT intervention indicated in order to address deficits in fxl mobility, fxl activity tolerance, balance, strength, and use of adaptive techniques/equipment during performance of BADLs. Recommend SNF at discharge.      Anticipated Discharge Disposition (OT): skilled nursing facility

## 2023-07-29 NOTE — THERAPY DISCHARGE NOTE
Acute Care - Speech Language Pathology   Swallow Initial Evaluation/Discharge [facility]     Patient Name: Samy Velazquez  : 1945  MRN: [id number]  Today's Date: 2023               Admit Date: 2023    SPEECH-LANGUAGE PATHOLOGY EVALUATION - SWALLOW  Subjective: The patient was seen on this date for a Clinical Swallow evaluation.  Patient was alert and cooperative.  Significant history: AMS, chronic systolic heart failure, paroxysmal a-fib, stage 3b CKD, osteoarthritis, cardiac pacemaker, chronic gout, dementia, chronic osteomyelitis of L 2nd toe.   Objective: Textures given included  pudding thick, thin liquid, nectar thick liquid, honey thick liquid, and regular consistency.  Assessment: Difficulties were noted with none of the above consistencies.  Observations:  No overt s/s of aspiration. Good mastication and clearance of oral residue with the regular solid.   SLP Findings:  Patient presents with functional swallow, without esophageal component.   Recommendations: Diet Textures: thin liquid, regular consistency food.  Medications should be taken whole with thin liquids.   Recommended Strategies: Upright for PO, small bites and sips, and alternate liquids and solids. Oral care before breakfast, after all meals and PRN.  Dysphagia therapy is not recommended.   Ailyn Dominique, ERIKA-SLP 2023 09:33 CDT    Visit Dx:    ICD-10-CM ICD-9-CM   1. Altered mental status, unspecified altered mental status type  R41.82 780.97   2. Left foot infection  L08.9 686.9   3. 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    Osteoarthritis of multiple joints    Cardiac pacemaker    Chronic gout    Nephrolithiasis    Mixed hyperlipidemia    Morbidly obese    Hypertensive heart disease with heart failure    Major depressive disorder, recurrent, moderate    Stage 3b chronic kidney disease (CKD)    Ventricular  tachycardia    Chronic systolic heart failure    Hypothyroidism    Paroxysmal atrial fibrillation    Coronary atherosclerosis    Essential hypertension    Weakness of right lower extremity    Cellulitis of second toe of left foot    Moderate malnutrition    Altered mental status     Past Medical History:   Diagnosis Date    Arthritis     Coronary artery disease     Hyperlipidemia     Hypertensive heart disease with heart failure 3/29/2021    Hypothyroidism 4/1/2021    Kidney stone     Major depressive disorder, recurrent, moderate 3/29/2021    Morbidly obese 9/18/2020     Past Surgical History:   Procedure Laterality Date    CARDIAC DEFIBRILLATOR PLACEMENT      CARDIAC DEFIBRILLATOR PLACEMENT N/A 2008    CARDIAC SURGERY      ENDOSCOPY N/A 10/30/2020    Procedure: ESOPHAGOGASTRODUODENOSCOPY WITH ANESTHESIA;  Surgeon: Brent Stanley MD;  Location: [facility];  Service: Gastroenterology;  Laterality: N/A;  pre dysphagia  post post op gastric surgery  dr jose manuel smith    ESOPHAGUS SURGERY      KNEE SURGERY      TOTAL SHOULDER REPLACEMENT         SLP Recommendation and Plan  SLP Swallowing Diagnosis: swallow WFL/no suspected pharyngeal impairment (07/29/23 0838)  SLP Diet Recommendation: regular textures, thin liquids (07/29/23 0838)     Monitor for Signs of Aspiration: yes, cough, gurgly voice, throat clearing, notify SLP if any concerns (07/29/23 0838)     Swallow Criteria for Skilled Therapeutic Interventions Met: no problems identified which require skilled intervention (07/29/23 0838)  Anticipated Discharge Disposition (SLP): skilled nursing facility (07/29/23 0932)     Therapy Frequency (Swallow): evaluation only (07/29/23 0838)  Predicted Duration Therapy Intervention (Days): until discharge (07/29/23 0838)           Anticipated Discharge Disposition (SLP): skilled nursing facility (07/29/23 0932)           Reason for Discharge: other (see comments) (WFL) (07/29/23 0932)                Plan of Care Reviewed With:  patient (07/29/23 0929)  Outcome Evaluation: See note (07/29/23 0929)    SWALLOW EVALUATION (last 72 hours)       SLP Adult Swallow Evaluation       Row Name 07/29/23 0838                   Rehab Evaluation    Document Type evaluation  -MB        Subjective Information no complaints  -MB        Patient Observations cooperative  Fatigued  -MB        Patient/Family/Caregiver Comments/Observations No family present  -MB           General Information    Patient Profile Reviewed yes  -MB        Pertinent History Of Current Problem AMS, chronic systolic heart failure, paroxysmal a-fib, stage 3b CKD, osteoarthritis, cardiac pacemaker, chronic gout, dementia, chronic osteomyelitis of L 2nd toe.  -MB        Current Method of Nutrition NPO  -MB        Precautions/Limitations, Vision WFL;for purposes of eval  -MB        Precautions/Limitations, Hearing for purposes of eval  -MB        Prior Level of Function-Communication cognitive-linguistic impairment;other (see comments)  dementia  -MB        Prior Level of Function-Swallowing unknown  -MB        Plans/Goals Discussed with patient  -MB        Barriers to Rehab cognitive status  -MB        Patient's Goals for Discharge patient did not state  -MB           Pain    Additional Documentation Pain Scale: FACES Pre/Post-Treatment (Group)  -MB           Pain Scale: FACES Pre/Post-Treatment    Pain: FACES Scale, Pretreatment 0-->no hurt  -MB           Oral Motor Structure and Function    Dentition Assessment missing teeth  -MB        Secretion Management WNL/WFL  -MB        Mucosal Quality moist, healthy  -MB           Oral Musculature and Cranial Nerve Assessment    Oral Motor General Assessment generalized oral motor weakness  -MB           General Eating/Swallowing Observations    Respiratory Support Currently in Use room air  -MB        Eating/Swallowing Skills fed by SLP  -MB        Positioning During Eating upright in bed  -MB        Utensils Used spoon;straw  -MB         Consistencies Trialed regular textures;thin liquids;nectar/syrup-thick liquids;honey-thick liquids;pudding thick  -MB           Clinical Swallow Eval    Oral Prep Phase WFL  -MB        Oral Transit WFL  -MB        Oral Residue WFL  -MB        Pharyngeal Phase WFL  -MB        Esophageal Phase unremarkable  -MB        Clinical Swallow Evaluation Summary See note  -MB           SLP Evaluation Clinical Impression    SLP Swallowing Diagnosis swallow WFL/no suspected pharyngeal impairment  -MB        Functional Impact no impact on function  -MB        Swallow Criteria for Skilled Therapeutic Interventions Met no problems identified which require skilled intervention  -MB           Recommendations    Therapy Frequency (Swallow) evaluation only  -MB        Predicted Duration Therapy Intervention (Days) until discharge  -MB        SLP Diet Recommendation regular textures;thin liquids  -MB        Recommended Precautions and Strategies upright posture during/after eating;small bites of food and sips of liquid;alternate between small bites of food and sips of liquid;general aspiration precautions  -MB        Oral Care Recommendations Oral Care BID/PRN  -MB        SLP Rec. for Method of Medication Administration meds whole;with thin liquids  -MB        Monitor for Signs of Aspiration yes;cough;gurgly voice;throat clearing;notify SLP if any concerns  -MB        Anticipated Discharge Disposition (SLP) [facility] nursing [facility]  -MB                  User Key  (r) = Recorded By, (t) = Taken By, (c) = Cosigned By      Initials Name Effective Dates    MB Ailyn Dominique, CCC-SLP 02/03/23 -                     EDUCATION  The patient has been educated in the following areas:   Dysphagia (Swallowing Impairment).                 Time Calculation:    Time Calculation- SLP       Row Name 07/29/23 0993             Time Calculation- SLP    SLP Start Time 0838  -MB      SLP Stop Time 0933  -MB      SLP Time Calculation (min) 55 min  -MB       SLP Received On 07/29/23  -MB         Untimed Charges    60542-BP Eval Oral Pharyng Swallow Minutes 55  -MB         Total Minutes    Untimed Charges Total Minutes 55  -MB       Total Minutes 55  -MB                User Key  (r) = Recorded By, (t) = Taken By, (c) = Cosigned By      Initials Name Provider Type    Ailyn Jones CCC-SLP Speech and Language Pathologist                    Therapy Charges for Today       Code Description Service Date Service Provider Modifiers Qty    [id number]  ST EVAL ORAL PHARYNG SWALLOW 4 7/29/2023 Ailyn Dominique CCC-SLP GN 1                 SLP Discharge Summary  Anticipated Discharge Disposition (SLP): skilled nursing facility  Reason for Discharge: other (see comments) (WFL)  Progress Toward Achieving Short/long Term Goals: other (see comments) (WFL)  Discharge Destination: other (see comments) (Still in acute care)    GOLD Ivey  7/29/2023

## 2023-07-29 NOTE — ED PROVIDER NOTES
Subjective   History of Present Illness  Please see Dr. Guanaco Salcido note for complete history and physical.      Review of Systems    Past Medical History:   Diagnosis Date    Arthritis     Coronary artery disease     Hyperlipidemia     Hypertensive heart disease with heart failure 3/29/2021    Hypothyroidism 4/1/2021    Kidney stone     Major depressive disorder, recurrent, moderate 3/29/2021    Morbidly obese 9/18/2020       Allergies   Allergen Reactions    Iodine Hives    Strawberry Hives    Iodinated Contrast Media Unknown - High Severity    Clindamycin/Lincomycin Diarrhea    Penicillins Rash    Vancomycin Nausea And Vomiting       Past Surgical History:   Procedure Laterality Date    CARDIAC DEFIBRILLATOR PLACEMENT      CARDIAC DEFIBRILLATOR PLACEMENT N/A 2008    CARDIAC SURGERY      ENDOSCOPY N/A 10/30/2020    Procedure: ESOPHAGOGASTRODUODENOSCOPY WITH ANESTHESIA;  Surgeon: Brent Stanley MD;  Location: [facility] OR;  Service: Gastroenterology;  Laterality: N/A;  pre dysphagia  post post op gastric surgery  dr jose manuel smith    ESOPHAGUS SURGERY      KNEE SURGERY      TOTAL SHOULDER REPLACEMENT         Family History   Problem Relation Age of Onset    Hypertension Mother     Stroke Mother     Cancer Father     Hypertension Father     Diabetes Sister     Hypertension Sister     Crohn's disease Daughter     No Known Problems Son     No Known Problems Maternal Grandmother     No Known Problems Maternal Grandfather     No Known Problems Paternal Grandmother     No Known Problems Paternal Grandfather     Hypertension Sister     Hypertension Sister     Hypertension Sister     No Known Problems Son        Social History     Socioeconomic History    Marital status:    Tobacco Use    Smoking status: Never    Smokeless tobacco: Never   Vaping Use    Vaping Use: Never used   Substance and Sexual Activity    Alcohol use: No    Drug use: No    Sexual activity: Defer           Objective   Physical  Exam    Procedures           ED Course  ED Course as of 07/28/23 2103 Fri Jul 28, 2023   1653 78-year-old male with history of hypertension, hyperlipidemia, coronary disease, recent admission 6/22 through 6/28 for cellulitis of left foot who presents to the ED with complaint of altered mental status.  Patient afebrile, weak appearing but not confused, more encephalopathic.  Slow to answer questions but does seem to answer questions appropriately.  Mild worsening CKD.  Labs otherwise reveal elevated WBC, lactate, procalcitonin, CRP.    He does have a wound to the left foot but does not appear significantly changed from his exam from yesterday per podiatry who saw the patient in the office.  Etiology for patient's confusion uncertain, may have some low-grade infection.  We will go ahead and provide broad-spectrum antibiotics, plan for admission to the hospitalist service.  Spoke to podiatry, no need for surgery.  Spoke to hospitalist, before admission they request patient have some vascular studies to ensure he has good flow to the wound as we do not have vascular surgery on this weekend.  Signing over care to Dr. Gutierres. [RP]   [address] Piña on 7/28/2023  7:00 PM CDT  LLE: patent arterial system with somewhat dampened flow in distal posterior-tibial artery     [RP]      ED Course User Index  [RP] Karlos Gutierres MD                                         Lab Results (last 24 hours)       Procedure Component Value Units Date/Time    CBC & Differential [[id number]]  (Abnormal) Collected: 07/28/23 1402    Specimen: Blood Updated: 07/28/23 1412    Narrative:      The following orders were created for panel order CBC & Differential.  Procedure                               Abnormality         Status                     ---------                               -----------         ------                     CBC Auto Differential[[id number]]        Abnormal            Final result                 Please view results  for these tests on the individual orders.    Comprehensive Metabolic Panel [[id number]]  (Abnormal) Collected: 07/28/23 1402    Specimen: Blood Updated: 07/28/23 1440     Glucose 192 mg/dL      BUN 24 mg/dL      Creatinine 1.75 mg/dL      Sodium 132 mmol/L      Potassium 3.6 mmol/L      Comment: Slight hemolysis detected by analyzer. Results may be affected.        Chloride 94 mmol/L      CO2 24.0 mmol/L      Calcium 10.0 mg/dL      Total Protein 6.6 g/dL      Albumin 3.2 g/dL      ALT (SGPT) 22 U/L      AST (SGOT) 24 U/L      Alkaline Phosphatase 187 U/L      Total Bilirubin 1.0 mg/dL      Globulin 3.4 gm/dL      A/G Ratio 0.9 g/dL      BUN/Creatinine Ratio 13.7     Anion Gap 14.0 mmol/L      eGFR 39.9 mL/min/1.73     Narrative:      GFR Normal >60  Chronic Kidney Disease <60  Kidney Failure <15    The GFR formula is only valid for adults with stable renal function between ages 18 and 70.    Magnesium [[id number]]  (Normal) Collected: 07/28/23 1402    Specimen: Blood Updated: 07/28/23 1437     Magnesium 1.6 mg/dL     Protime-INR [[id number]]  (Abnormal) Collected: 07/28/23 1402    Specimen: Blood Updated: 07/28/23 1420     Protime 14.9 Seconds      INR 1.15    Blood Culture - Blood, Arm, Right [[id number]] Collected: 07/28/23 1402    Specimen: Blood from Arm, Right Updated: 07/28/23 1413    Lactic Acid, Plasma [[id number]]  (Abnormal) Collected: 07/28/23 1402    Specimen: Blood Updated: 07/28/23 1436     Lactate 3.2 mmol/L     Procalcitonin [[id number]]  (Abnormal) Collected: 07/28/23 1402    Specimen: Blood Updated: 07/28/23 1445     Procalcitonin 0.93 ng/mL     Narrative:      As a Marker for Sepsis (Non-Neonates):    1. <0.5 ng/mL represents a low risk of severe sepsis and/or septic shock.  2. >2 ng/mL represents a high risk of severe sepsis and/or septic shock.    As a Marker for Lower Respiratory Tract Infections that require antibiotic therapy:    PCT on Admission    Antibiotic Therapy       6-12 Hrs later    >0.5   "              Strongly Recommended  >0.25 - <0.5        Recommended   0.1 - 0.25          Discouraged              Remeasure/reassess PCT  <0.1                Strongly Discouraged     Remeasure/reassess PCT    As 28 day mortality risk marker: \"Change in Procalcitonin Result\" (>80% or <=80%) if Day 0 (or Day 1) and Day 4 values are available. Refer to http://www.[facility]-pct-calculator.com    Change in PCT <=80%  A decrease of PCT levels below or equal to 80% defines a positive change in PCT test result representing a higher risk for 28-day all-cause mortality of patients diagnosed with severe sepsis for septic shock.    Change in PCT >80%  A decrease of PCT levels of more than 80% defines a negative change in PCT result representing a lower risk for 28-day all-cause mortality of patients diagnosed with severe sepsis or septic shock.       CBC Auto Differential [[id number]]  (Abnormal) Collected: 07/28/23 1402    Specimen: Blood Updated: 07/28/23 1412     WBC 16.78 10*3/mm3      RBC 5.68 10*6/mm3      Hemoglobin 16.5 g/dL      Hematocrit 51.2 %      MCV 90.1 fL      MCH 29.0 pg      MCHC 32.2 g/dL      RDW 13.7 %      RDW-SD 44.5 fl      MPV 9.8 fL      Platelets 197 10*3/mm3      Neutrophil % 78.6 %      Lymphocyte % 9.9 %      Monocyte % 9.3 %      Eosinophil % 1.1 %      Basophil % 0.4 %      Immature Grans % 0.7 %      Neutrophils, Absolute 13.21 10*3/mm3      Lymphocytes, Absolute 1.66 10*3/mm3      Monocytes, Absolute 1.56 10*3/mm3      Eosinophils, Absolute 0.18 10*3/mm3      Basophils, Absolute 0.06 10*3/mm3      Immature Grans, Absolute 0.11 10*3/mm3      nRBC 0.0 /100 WBC     C-reactive Protein [[id number]]  (Abnormal) Collected: 07/28/23 1402    Specimen: Blood Updated: 07/28/23 1605     C-Reactive Protein 4.35 mg/dL     Sedimentation Rate [[id number]]  (Normal) Collected: 07/28/23 1402    Specimen: Blood Updated: 07/28/23 1547     Sed Rate 20 mm/hr     COVID-19,CEPHEID/KATHIE,COR/JOSE/PAD/ANA/MAD IN-HOUSE(OR " EMERGENT/ADD-ON),NP SWAB IN TRANSPORT MEDIA 3-4 HR TAT, RT-PCR - Swab, Nasopharynx [[id number]]  (Normal) Collected: 07/28/23 1403    Specimen: Swab from Nasopharynx Updated: 07/28/23 1432     COVID19 Not Detected    Narrative:      Fact sheet for providers: https://www.fda.gov/media/378494/download     Fact sheet for patients: https://www.fda.gov/media/719997/download  Fact sheet for providers: https://www.fda.gov/media/833029/download    Fact sheet for patients: https://www.fda.gov/media/914336/download    Test performed by PCR.    Blood Culture - Blood, Arm, Right [[id number]] Collected: 07/28/23 1414    Specimen: Blood from Arm, Right Updated: 07/28/23 1423    Blood Gas, Arterial - [[id number]]  (Abnormal) Collected: 07/28/23 1425    Specimen: Arterial Blood Updated: 07/28/23 1421     Site Right Radial     Jeffry's Test N/A     pH, Arterial 7.451 pH units      Comment: 83 Value above reference range        pCO2, Arterial 34.8 mm Hg      Comment: 84 Value below reference range        pO2, Arterial 70.4 mm Hg      Comment: 84 Value below reference range        HCO3, Arterial 24.2 mmol/L      Base Excess, Arterial 0.8 mmol/L      O2 Saturation, Arterial 95.1 %      Temperature 37.0 C      Barometric Pressure for Blood Gas 751 mmHg      Modality Room Air     Ventilator Mode NA     Collected by [id number]     Comment: Meter: [id number]     :  [id number]        pCO2, Temperature Corrected 34.8 mm Hg      pH, Temp Corrected 7.451 pH Units      pO2, Temperature Corrected 70.4 mm Hg     Urinalysis With Culture If Indicated - Urine, Catheter [[id number]]  (Abnormal) Collected: 07/28/23 1440    Specimen: Urine, Catheter Updated: 07/28/23 1450     Color, UA Dark Yellow     Appearance, UA Clear     pH, UA 5.5     Specific Gravity, UA 1.017     Glucose, UA Negative     Ketones, UA Negative     Bilirubin, UA Negative     Blood, UA Negative     Protein, UA Negative     Leuk Esterase, UA Trace     Nitrite, UA Negative      Urobilinogen, UA 1.0 E.U./dL    Narrative:      In absence of clinical symptoms, the presence of pyuria, bacteria, and/or nitrites on the urinalysis result does not correlate with infection.    Urinalysis, Microscopic Only - Urine, Catheter [[id number]]  (Abnormal) Collected: 07/28/23 1440    Specimen: Urine, Catheter Updated: 07/28/23 1450     RBC, UA None Seen /HPF      WBC, UA 0-2 /HPF      Comment: Urine culture not indicated.        Bacteria, UA None Seen /HPF      Squamous Epithelial Cells, UA 0-2 /HPF      Hyaline Casts, UA 0-2 /LPF      Methodology Automated Microscopy    STAT Lactic Acid, Reflex [[id number]]  (Abnormal) Collected: 07/28/23 1910    Specimen: Blood Updated: 07/28/23 1934     Lactate 2.1 mmol/L             XR Foot 3+ View Left   Final Result   1. Osteopenia with chronic advanced changes described in detail above.   No interval change since the 06/22/2023 exam. No convincing radiographic   evidence of acute osteomyelitis.   This report was finalized on 07/28/2023 16:04 by Dr. Yue Godoy MD.      CT Head Without Contrast   Final Result   1. Moderate generalized volume loss with mild chronic small vessel   ischemia. No acute intracranial process identified.   This report was finalized on 07/28/2023 15:07 by Dr. Yue Godoy MD.      XR Chest 1 View   Final Result       No acute findings. Low lung volumes.   This report was finalized on 07/28/2023 14:50 by Dr. Rupesh Rivas MD.      US Arterial Doppler Lower Extremity Left    (Results Pending)       Medications   sodium chloride 0.9 % flush 10 mL (has no administration in time range)   lactated ringers bolus 1,000 mL (1,000 mL Intravenous Incomplete 7/28/23 1441)   vancomycin IVPB 2000 mg in 0.9% Sodium Chloride 500 mL (0 mg Intravenous Stopped 7/28/23 1941)   metroNIDAZOLE (FLAGYL) IVPB 500 mg (0 mg Intravenous Stopped 7/28/23 1841)   cefepime 2 gm IVPB in 100 ml NS (MBP) (0 mg Intravenous Stopped 7/28/23 1629)         Medical Decision  Making  78-year-old male was found to have infection of the foot.  Dr. Marcano and Dr. Carrion were consulted.  They have recommended patient is stable for admission to the hospital.  I discussed the case with Dr. Burns and he has accepted the patient under his services.    Problems Addressed:  Altered mental status, unspecified altered mental status type: complicated acute illness or injury  Left foot infection: complicated acute illness or injury    Amount and/or Complexity of Data Reviewed  Labs: ordered. Decision-making details documented in ED Course.  Radiology: ordered. Decision-making details documented in ED Course.  Discussion of management or test interpretation with external provider(s): Dr. Marcano -podiatry  Dr. Carrion -podiatry    Risk  Prescription drug management.  Decision regarding hospitalization.        Final diagnoses:   Altered mental status, unspecified altered mental status type   Left foot infection       ED Disposition  ED Disposition       ED Disposition   Decision to Admit    Condition   --    Comment   Level of Care: Remote Telemetry [26]   Diagnosis: Altered mental status [780.97.ICD-9-CM]   Admitting Physician: GURU BURNS [[id number]]   Attending Physician: GURU BURNS [[id number]]   Certification: I Certify That Inpatient Hospital Services Are Medically Necessary For Greater Than 2 Midnights                 No follow-up provider specified.       Medication List      No changes were made to your prescriptions during this visit.            Karlos Gutierres MD  07/28/23 1380

## 2023-07-29 NOTE — PLAN OF CARE
Goal Outcome Evaluation:                      Pt received from ER for AMS, A&O disoriented to month. Incontinent of urine to brief. Wounds to L 2nd toe, bilateral great toes, R lateral foot, and sacrum documented in chart. On RA saturating in 90s, Tele per orders. Eliquis for VTE. NPO d/t failed dysphagia screen, ST eval placed. IV to RAC running NS @50 and ABX per orders. Safety maintained, call light within reach.

## 2023-07-30 LAB
ANION GAP SERPL CALCULATED.3IONS-SCNC: 11 MMOL/L (ref 5–15)
BUN SERPL-MCNC: 23 MG/DL (ref 8–23)
BUN/CREAT SERPL: 14.3 (ref 7–25)
CALCIUM SPEC-SCNC: 10 MG/DL (ref 8.6–10.5)
CHLORIDE SERPL-SCNC: 102 MMOL/L (ref 98–107)
CO2 SERPL-SCNC: 24 MMOL/L (ref 22–29)
CREAT SERPL-MCNC: 1.61 MG/DL (ref 0.76–1.27)
EGFRCR SERPLBLD CKD-EPI 2021: 44 ML/MIN/1.73
GLUCOSE SERPL-MCNC: 126 MG/DL (ref 65–99)
POTASSIUM SERPL-SCNC: 3.1 MMOL/L (ref 3.5–5.2)
SODIUM SERPL-SCNC: 137 MMOL/L (ref 136–145)

## 2023-07-30 PROCEDURE — 80048 BASIC METABOLIC PNL TOTAL CA: CPT | Performed by: FAMILY MEDICINE

## 2023-07-30 PROCEDURE — 0 CEFEPIME PER 500 MG: Performed by: FAMILY MEDICINE

## 2023-07-30 PROCEDURE — 25010000002 VANCOMYCIN 10 G RECONSTITUTED SOLUTION: Performed by: FAMILY MEDICINE

## 2023-07-30 RX ORDER — POTASSIUM CHLORIDE 750 MG/1
40 CAPSULE, EXTENDED RELEASE ORAL ONCE
Status: COMPLETED | OUTPATIENT
Start: 2023-07-30 | End: 2023-07-30

## 2023-07-30 RX ORDER — ISOSORBIDE MONONITRATE 30 MG/1
30 TABLET, EXTENDED RELEASE ORAL
Status: DISCONTINUED | OUTPATIENT
Start: 2023-07-31 | End: 2023-08-02 | Stop reason: HOSPADM

## 2023-07-30 RX ADMIN — Medication 10 ML: at 21:20

## 2023-07-30 RX ADMIN — METOPROLOL SUCCINATE 12.5 MG: 25 TABLET, EXTENDED RELEASE ORAL at 09:04

## 2023-07-30 RX ADMIN — FAMOTIDINE 20 MG: 20 TABLET, FILM COATED ORAL at 21:20

## 2023-07-30 RX ADMIN — ASPIRIN 81 MG: 81 TABLET, COATED ORAL at 09:04

## 2023-07-30 RX ADMIN — POTASSIUM CHLORIDE 40 MEQ: 10 CAPSULE, COATED, EXTENDED RELEASE ORAL at 09:02

## 2023-07-30 RX ADMIN — LEVOTHYROXINE SODIUM 50 MCG: 50 TABLET ORAL at 05:13

## 2023-07-30 RX ADMIN — CEFEPIME 2000 MG: 2 INJECTION, POWDER, FOR SOLUTION INTRAVENOUS at 03:52

## 2023-07-30 RX ADMIN — ALLOPURINOL 100 MG: 100 TABLET ORAL at 09:04

## 2023-07-30 RX ADMIN — TAMSULOSIN HYDROCHLORIDE 0.4 MG: 0.4 CAPSULE ORAL at 21:20

## 2023-07-30 RX ADMIN — CEFEPIME 2000 MG: 2 INJECTION, POWDER, FOR SOLUTION INTRAVENOUS at 15:59

## 2023-07-30 RX ADMIN — MEMANTINE HYDROCHLORIDE 10 MG: 5 TABLET, FILM COATED ORAL at 09:04

## 2023-07-30 RX ADMIN — APIXABAN 2.5 MG: 2.5 TABLET, FILM COATED ORAL at 09:06

## 2023-07-30 RX ADMIN — TAMSULOSIN HYDROCHLORIDE 0.4 MG: 0.4 CAPSULE ORAL at 09:03

## 2023-07-30 RX ADMIN — APIXABAN 2.5 MG: 2.5 TABLET, FILM COATED ORAL at 21:20

## 2023-07-30 RX ADMIN — MEMANTINE HYDROCHLORIDE 10 MG: 5 TABLET, FILM COATED ORAL at 21:20

## 2023-07-30 RX ADMIN — ATORVASTATIN CALCIUM 40 MG: 40 TABLET ORAL at 09:04

## 2023-07-30 RX ADMIN — FAMOTIDINE 20 MG: 20 TABLET, FILM COATED ORAL at 09:04

## 2023-07-30 RX ADMIN — VANCOMYCIN HYDROCHLORIDE 1250 MG: 10 INJECTION, POWDER, LYOPHILIZED, FOR SOLUTION INTRAVENOUS at 14:21

## 2023-07-30 NOTE — CONSULTS
20 gauge 1.88 inch USGPIV placed in right upper cephalic vein with 2 attempt(s). Initial attempt in right forearm unsuccessful, threaded well with good blood return, but painful when flushed per patient.

## 2023-07-30 NOTE — PLAN OF CARE
Goal Outcome Evaluation:      Alert and oriented x4. Tmax 97.9. C/o pain to left leg, administered norco x1, see MAR for details. Large BM this shift. Removed IV with antibiotic running, multiple nurses unable to place new IV, vascular access team notified.

## 2023-07-30 NOTE — PLAN OF CARE
Goal Outcome Evaluation:  Pt alert and oriented x4. VSS. no c/o pain. BUTLER. PPP. SCDS  for VTE. . Tolerating regular diet. Skin wound cleaned,. Voiding incontient in brief. Last BM 7/29/23.  Surgical shoe at bedside. New 20 g right upper arm. Vanco trough 7/31 at 1400. Isolation contact D/c plans back to rehab. Call light within reach. Safety maintained.     Plan of Care Reviewed With: patient

## 2023-07-30 NOTE — PROGRESS NOTES
[facility] Medicine Services  INPATIENT PROGRESS NOTE    Patient Name: Samy Velazquez  Date of Admission: 7/28/2023  Today's Date: 07/30/23  Length of Stay: 2  Primary Care Physician: Garret Salinas MD    Subjective   Chief Complaint: f/u L foot cellulitis/2nd toe wound    HPI   Patient seen resting comfortably in bed.  Denies any chest pain or shortness of breath.  No fevers overnight.  Still has pain in his foot but stable and unchanged.  No new numbness or tingling.      Review of Systems   All pertinent negatives and positives are as above. All other systems have been reviewed and are negative unless otherwise stated.     Objective    Temp:  [97.6 øF (36.4 øC)-97.9 øF (36.6 øC)] 97.8 øF (36.6 øC)  Heart Rate:  [64-76] 66  Resp:  [16-20] 20  BP: ()/(50-70) 103/50  Physical Exam  GEN: Awake, alert, interactive, in NAD  HEENT:  PERRLA, EOMI, Anicteric, Trachea midline  Lungs:  no wheezing/rales/rhonchi  Heart: RRR, +S1/s2, no rub  ABD: soft, nt/nd, +BS, no guarding/rebound  Extremities: legs warm, both feet on cooler side, left foot with hallux deviation and 2nd toe wound, trace LE edema b/l, no overt pitting  Skin: L 2nd digit purple/red with wound on top of digit  Neuro: No obvious focal deficits, gait not tested  Psych: normal mood & affect        Results Review:  I have reviewed the labs, radiology results, and diagnostic studies.    Laboratory Data:   Results from last 7 days   Lab Units 07/29/23  0605 07/28/23  1402   WBC 10*3/mm3 10.18 16.78*   HEMOGLOBIN g/dL 15.6 16.5   HEMATOCRIT % 50.0 51.2*   PLATELETS 10*3/mm3 156 197          Results from last 7 days   Lab Units 07/30/23  0550 07/29/23  0605 07/28/23  1402   SODIUM mmol/L 137 136 132*   POTASSIUM mmol/L 3.1* 3.7 3.6   CHLORIDE mmol/L 102 101 94*   CO2 mmol/L 24.0 21.0* 24.0   BUN mg/dL 23 21 24*   CREATININE mg/dL 1.61* 1.43* 1.75*   CALCIUM mg/dL 10.0 9.7 10.0   BILIRUBIN mg/dL  --   --  1.0   ALK  PHOS U/L  --   --  187*   ALT (SGPT) U/L  --   --  22   AST (SGOT) U/L  --   --  24   GLUCOSE mg/dL 126* 125* 192*         Culture Data:   No results found for: BLOODCX, URINECX, WOUNDCX, MRSACX, RESPCX, STOOLCX    Radiology Data:   Imaging Results (Last 24 Hours)       ** No results found for the last 24 hours. **            I have reviewed the patient's current medications.     Assessment/Plan   Assessment  Active Hospital Problems    Diagnosis     **Cellulitis of second toe of left foot     Altered mental status     Paroxysmal atrial fibrillation     Stage 3b chronic kidney disease (CKD)     Osteoarthritis of multiple joints     Chronic gout        Treatment Plan  #1 left second toe cellulitis -with wound on dorsal aspect of the toe.  Toe is somewhat purple.  Question adequate blood flow.  Arterial Dopplers are pending.  Podiatry following.  Continue antibiotics with vancomycin and cefepime.  X-ray showed no signs of osteo or bone disease.    #2 CKD 3 -renal function around baseline.  Monitor closely and avoid nephrotoxic meds. Eating and drinking well, holding IV fluids.  Recheck in AM    #3 chronic gout -on allopurinol.  No significant erythema or signs of active gout flare.     #4 paroxysmal A-fib -on Eliquis and toprol xL     #5 hypertension -bp stable on regimen     #6 hyperlipidemia -statin    Medical Decision Making  Number and Complexity of problems: 1 acute on chronic, multiple chronic  Differential Diagnosis: as above    Conditions and Status        Condition is improving.     MDM Data  External documents reviewed: none  Cardiac tracing (EKG, telemetry) interpretation: none  Radiology interpretation: reports reviewed, arterial doppler pending  Labs reviewed: as above  Any tests that were considered but not ordered: none     Decision rules/scores evaluated (example [id number], Wells, etc): none     Discussed with: patient, family, nursing     Care Planning  Shared decision making: Patient/family  apprised of current labs, vitals, imaging and treatment plan.  They are agreeable with proceeding with plans as discussed.    Code status and discussions: full code    Disposition  Social Determinants of Health that impact treatment or disposition: none  I expect the patient to be discharged to home vs snf pending pt/family wishes and any other podiatry recommendations in 2-3 days    Electronically signed by Jose Lezama DO, 07/30/23, 08:35 CDT.

## 2023-07-31 ENCOUNTER — TELEPHONE (OUTPATIENT)
Dept: FAMILY MEDICINE CLINIC | Facility: CLINIC | Age: 78
End: 2023-07-31
Payer: MEDICARE

## 2023-07-31 LAB
ANION GAP SERPL CALCULATED.3IONS-SCNC: 11 MMOL/L (ref 5–15)
BUN SERPL-MCNC: 19 MG/DL (ref 8–23)
BUN/CREAT SERPL: 12.4 (ref 7–25)
CALCIUM SPEC-SCNC: 10.6 MG/DL (ref 8.6–10.5)
CHLORIDE SERPL-SCNC: 102 MMOL/L (ref 98–107)
CO2 SERPL-SCNC: 25 MMOL/L (ref 22–29)
CREAT SERPL-MCNC: 1.53 MG/DL (ref 0.76–1.27)
CYTO UR: NORMAL
EGFRCR SERPLBLD CKD-EPI 2021: 46.8 ML/MIN/1.73
GLUCOSE SERPL-MCNC: 121 MG/DL (ref 65–99)
LAB AP CASE REPORT: NORMAL
Lab: NORMAL
MAGNESIUM SERPL-MCNC: 1.5 MG/DL (ref 1.6–2.4)
PATH REPORT.FINAL DX SPEC: NORMAL
PATH REPORT.GROSS SPEC: NORMAL
POTASSIUM SERPL-SCNC: 3.6 MMOL/L (ref 3.5–5.2)
SODIUM SERPL-SCNC: 138 MMOL/L (ref 136–145)
VANCOMYCIN TROUGH SERPL-MCNC: 14.5 MCG/ML (ref 5–20)

## 2023-07-31 PROCEDURE — 25010000002 VANCOMYCIN 10 G RECONSTITUTED SOLUTION: Performed by: FAMILY MEDICINE

## 2023-07-31 PROCEDURE — 83735 ASSAY OF MAGNESIUM: CPT | Performed by: INTERNAL MEDICINE

## 2023-07-31 PROCEDURE — 80048 BASIC METABOLIC PNL TOTAL CA: CPT | Performed by: FAMILY MEDICINE

## 2023-07-31 PROCEDURE — 0 CEFEPIME PER 500 MG: Performed by: FAMILY MEDICINE

## 2023-07-31 PROCEDURE — 99221 1ST HOSP IP/OBS SF/LOW 40: CPT | Performed by: NURSE PRACTITIONER

## 2023-07-31 PROCEDURE — 80202 ASSAY OF VANCOMYCIN: CPT | Performed by: INTERNAL MEDICINE

## 2023-07-31 PROCEDURE — 97162 PT EVAL MOD COMPLEX 30 MIN: CPT

## 2023-07-31 PROCEDURE — 0 CEFEPIME PER 500 MG: Performed by: NURSE PRACTITIONER

## 2023-07-31 RX ADMIN — MEMANTINE HYDROCHLORIDE 10 MG: 5 TABLET, FILM COATED ORAL at 20:42

## 2023-07-31 RX ADMIN — DOCUSATE SODIUM 50 MG AND SENNOSIDES 8.6 MG 2 TABLET: 8.6; 5 TABLET, FILM COATED ORAL at 20:42

## 2023-07-31 RX ADMIN — Medication 10 ML: at 08:28

## 2023-07-31 RX ADMIN — ACETAMINOPHEN 650 MG: 325 TABLET, FILM COATED ORAL at 23:06

## 2023-07-31 RX ADMIN — METOPROLOL SUCCINATE 12.5 MG: 25 TABLET, EXTENDED RELEASE ORAL at 08:27

## 2023-07-31 RX ADMIN — FAMOTIDINE 20 MG: 20 TABLET, FILM COATED ORAL at 08:27

## 2023-07-31 RX ADMIN — FAMOTIDINE 20 MG: 20 TABLET, FILM COATED ORAL at 20:42

## 2023-07-31 RX ADMIN — DOCUSATE SODIUM 50 MG AND SENNOSIDES 8.6 MG 2 TABLET: 8.6; 5 TABLET, FILM COATED ORAL at 08:27

## 2023-07-31 RX ADMIN — ATORVASTATIN CALCIUM 40 MG: 40 TABLET ORAL at 08:27

## 2023-07-31 RX ADMIN — ASPIRIN 81 MG: 81 TABLET, COATED ORAL at 08:27

## 2023-07-31 RX ADMIN — LEVOTHYROXINE SODIUM 50 MCG: 50 TABLET ORAL at 04:38

## 2023-07-31 RX ADMIN — HYDROCODONE BITARTRATE AND ACETAMINOPHEN 1 TABLET: 5; 325 TABLET ORAL at 17:55

## 2023-07-31 RX ADMIN — HYDROCODONE BITARTRATE AND ACETAMINOPHEN 1 TABLET: 5; 325 TABLET ORAL at 00:01

## 2023-07-31 RX ADMIN — VANCOMYCIN HYDROCHLORIDE 1250 MG: 10 INJECTION, POWDER, LYOPHILIZED, FOR SOLUTION INTRAVENOUS at 14:57

## 2023-07-31 RX ADMIN — TAMSULOSIN HYDROCHLORIDE 0.4 MG: 0.4 CAPSULE ORAL at 20:42

## 2023-07-31 RX ADMIN — MEMANTINE HYDROCHLORIDE 10 MG: 5 TABLET, FILM COATED ORAL at 08:27

## 2023-07-31 RX ADMIN — ALLOPURINOL 100 MG: 100 TABLET ORAL at 08:27

## 2023-07-31 RX ADMIN — APIXABAN 2.5 MG: 2.5 TABLET, FILM COATED ORAL at 20:42

## 2023-07-31 RX ADMIN — APIXABAN 2.5 MG: 2.5 TABLET, FILM COATED ORAL at 08:27

## 2023-07-31 RX ADMIN — TAMSULOSIN HYDROCHLORIDE 0.4 MG: 0.4 CAPSULE ORAL at 08:27

## 2023-07-31 RX ADMIN — ISOSORBIDE MONONITRATE 30 MG: 30 TABLET, EXTENDED RELEASE ORAL at 08:27

## 2023-07-31 RX ADMIN — Medication 10 ML: at 20:44

## 2023-07-31 RX ADMIN — CEFEPIME 2000 MG: 2 INJECTION, POWDER, FOR SOLUTION INTRAVENOUS at 16:19

## 2023-07-31 RX ADMIN — CEFEPIME 2000 MG: 2 INJECTION, POWDER, FOR SOLUTION INTRAVENOUS at 04:38

## 2023-07-31 RX ADMIN — HYDROCODONE BITARTRATE AND ACETAMINOPHEN 1 TABLET: 5; 325 TABLET ORAL at 11:24

## 2023-07-31 NOTE — PLAN OF CARE
Goal Outcome Evaluation:  Pt alert and oriented x4. VSS.  c/o of foot pain x1 gave Norco. BUTLER. PPP. SCDS for VTE. . Tolerating regular diet. Will be NPO after midnight for  2nd toe amputation tomorrow. Skin wound addressed.. Voiding via incontinent, brief. Last BM 7/30/23. BS monitoring. Isolation contact maintained. D/c plans back to SNF. Call light within reach. Safety maintained.     Plan of Care Reviewed With: patient

## 2023-07-31 NOTE — THERAPY EVALUATION
Patient Name: Samy Velazquez  : 1945    MRN: [id number]                              Today's Date: 2023       Admit Date: 2023    Visit Dx:     ICD-10-CM ICD-9-CM   1. Altered mental status, unspecified altered mental status type  R41.82 780.97   2. Left foot infection  L08.9 686.9   3. Dysphagia, unspecified type  R13.10 787.20   4. 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    Osteoarthritis of multiple joints    Cardiac pacemaker    Chronic gout    Nephrolithiasis    Mixed hyperlipidemia    Morbidly obese    Hypertensive heart disease with heart failure    Major depressive disorder, recurrent, moderate    Stage 3b chronic kidney disease (CKD)    Ventricular tachycardia    Chronic systolic heart failure    Hypothyroidism    Paroxysmal atrial fibrillation    Coronary atherosclerosis    Essential hypertension    Weakness of right lower extremity    Cellulitis of second toe of left foot    Moderate malnutrition    Altered mental status     Past Medical History:   Diagnosis Date    Arthritis     Coronary artery disease     Hyperlipidemia     Hypertensive heart disease with heart failure 3/29/2021    Hypothyroidism 2021    Kidney stone     Major depressive disorder, recurrent, moderate 3/29/2021    Morbidly obese 2020     Past Surgical History:   Procedure Laterality Date    CARDIAC DEFIBRILLATOR PLACEMENT      CARDIAC DEFIBRILLATOR PLACEMENT N/A     CARDIAC SURGERY      ENDOSCOPY N/A 10/30/2020    Procedure: ESOPHAGOGASTRODUODENOSCOPY WITH ANESTHESIA;  Surgeon: Brent Stanley MD;  Location: [facility];  Service: Gastroenterology;  Laterality: N/A;  pre dysphagia  post post op gastric surgery  dr jose manuel smith    ESOPHAGUS SURGERY      KNEE SURGERY      TOTAL SHOULDER REPLACEMENT        General Information       Row Name 23 1035           Physical Therapy Time and Intention    Document Type evaluation;other (see comments)  see MAR  -MARTINEZ     Mode of Treatment physical therapy  -       Row Name 07/31/23 1035          General Information    Patient Profile Reviewed yes  -MARTINEZ     Prior Level of Function --  pt has been in a facility, staff has been assisting him, required equipment, wife was not sure how much walking pt had been doing  -MARTINEZ     Existing Precautions/Restrictions fall;other (see comments)   surgical shoe L foot  -MARTINEZ     Barriers to Rehab medically complex;previous functional deficit  -       Row Name 07/31/23 1035          Living Environment    People in Home facility resident  -MARTINEZ     Name(s) of People in Home was at [facility]  -       Row Name 07/31/23 1035          Home Main Entrance    Number of Stairs, Main Entrance none  -       Row Name 07/31/23 1035          Cognition    Orientation Status (Cognition) oriented x 4;other (see comments)  pt knew a hospital in [place], but did not know which one  -       Row Name 07/31/23 1035          Safety Issues, Functional Mobility    Safety Issues Affecting Function (Mobility) at risk behavior observed;friction/shear risk;safety precaution awareness  -MARTINEZ     Impairments Affecting Function (Mobility) balance;endurance/activity tolerance;pain;strength  -               User Key  (r) = Recorded By, (t) = Taken By, (c) = Cosigned By      Initials Name Provider Type    Juliana Sommers, PT Physical Therapist                   Mobility       Row Name 07/31/23 1035          Bed Mobility    Bed Mobility rolling right;supine-sit;sit-supine  -MARTINEZ     Rolling Right [place] (Bed Mobility) minimum assist (75% patient effort);verbal cues  -MARTINEZ     Supine-Sit [place] (Bed Mobility) contact guard;verbal cues  -MARTINEZ     Sit-Supine [place] (Bed Mobility) contact guard;verbal cues  -MARTINEZ     Assistive Device (Bed Mobility) bed rails;head of bed elevated  -       Row Name 07/31/23 1035           Transfers    Comment, (Transfers) surgical shoe donned prior to out of bed activity  -       Row Name 07/31/23 1035          Bed-Chair Transfer    Bed-Chair [place] (Transfers) contact guard;minimum assist (75% patient effort);verbal cues  -     Assistive Device (Bed-Chair Transfers) walker, front-wheeled  -       Row Name 07/31/23 1035          Sit-Stand Transfer    Sit-Stand [place] (Transfers) minimum assist (75% patient effort);contact guard;verbal cues  -       Row Name 07/31/23 1035          Gait/Stairs (Locomotion)    [place] Level (Gait) contact guard;verbal cues  -     Assistive Device (Gait) walker, front-wheeled  -     Distance in Feet (Gait) ~ 25 ft  -     Deviations/Abnormal Patterns (Gait) gait speed decreased;stride length decreased  -     Bilateral Gait Deviations forward flexed posture;heel strike decreased  -               User Key  (r) = Recorded By, (t) = Taken By, (c) = Cosigned By      Initials Name Provider Type     Juliana Yeh, PT Physical Therapist                   Obj/Interventions       Row Name 07/31/23 1035          Range of Motion Comprehensive    Comment, General Range of Motion AROM all 4 extremities WFLS except L shld limitied to ~ 50% and B knees w/ noted tight hamstrings  -[facility] Name 07/31/23 1035          Strength Comprehensive (MMT)    Comment, General Manual Muscle Testing (MMT) Assessment B UE grossly 4 to 4+/5, B hip flexors 2/5, B knee flex/extensors 4-/5, R ankle DF/PF 4 to 4-/5  -[facility] Name 07/31/23 1035          Motor Skills    Motor Skills other (see comments)  intact to finger opposition B  -       Row Name 07/31/23 1035          Balance    Balance Assessment sitting static balance;sitting dynamic balance;sit to stand dynamic balance;standing static balance;standing dynamic balance  -     Static Sitting Balance standby assist  -     Dynamic Sitting Balance standby assist  -     Position, Sitting Balance  supported;unsupported;sitting edge of bed  -     Sit to Stand Dynamic Balance minimal assist;contact guard;verbal cues  -     Static Standing Balance contact guard;verbal cues  -     Dynamic Standing Balance contact guard;minimal assist;verbal cues  -     Position/Device Used, Standing Balance supported;walker, front-wheeled  -       Row Name 07/31/23 1035          Sensory Assessment (Somatosensory)    Sensory Assessment (Somatosensory) sensation intact  -               User Key  (r) = Recorded By, (t) = Taken By, (c) = Cosigned By      Initials Name Provider Type    Juliana Sommers, PT Physical Therapist                   Goals/Plan       Row Name 07/31/23 1035          Bed Mobility Goal 1 (PT)    Activity/Assistive Device (Bed Mobility Goal 1, PT) rolling to left;rolling to right;scooting;bridging;sit to supine/supine to sit;sidelying to sit/sit to sidelying  -     [place] Level/Cues Needed (Bed Mobility Goal 1, PT) standby assist;modified independence  -     Time Frame (Bed Mobility Goal 1, PT) long term goal (LTG);10 days  -     Progress/Outcomes (Bed Mobility Goal 1, PT) goal ongoing  [facility]       Row Name 07/31/23 1035          Transfer Goal 1 (PT)    Activity/Assistive Device (Transfer Goal 1, PT) sit-to-stand/stand-to-sit;bed-to-chair/chair-to-bed  rwx for bed to/from chair  -     [place] Level/Cues Needed (Transfer Goal 1, PT) standby assist  -     Time Frame (Transfer Goal 1, PT) long term goal (LTG);10 days  -     Progress/Outcome (Transfer Goal 1, PT) goal ongoing  -       Row Name 07/31/23 1035          Gait Training Goal 1 (PT)    Activity/Assistive Device (Gait Training Goal 1, PT) gait (walking locomotion);assistive device use;decrease fall risk;improve balance and speed;increase endurance/gait distance;walker, rolling  -     [place] Level (Gait Training Goal 1, PT) standby assist  -     Distance (Gait Training Goal 1, PT) 50 ft w/ surgical shoe L foot   -     Time Frame (Gait Training Goal 1, PT) long term goal (LTG);10 days  -     Progress/Outcome (Gait Training Goal 1, PT) goal ongoing  -       Row Name 07/31/23 1035          Therapy Assessment/Plan (PT)    Planned Therapy Interventions (PT) balance training;bed mobility training;gait training;home exercise program;postural re-education;patient/family education;strengthening;transfer training;other (see comments)  safety/falls prevention, skin protection/pressure relief  -               User Key  (r) = Recorded By, (t) = Taken By, (c) = Cosigned By      Initials Name Provider Type    Juliana Sommers, PT Physical Therapist                   Clinical Impression       Row Name 07/31/23 1035          Pain    Pretreatment Pain Rating 0/10 - no pain  -     Posttreatment Pain Rating 8/10  -     Pre/Posttreatment Pain Comment buttocks and L foot  -     Pain Intervention(s) Ambulation/increased activity;Repositioned;Rest  -       Row Name 07/31/23 1035          Plan of Care Review    Plan of Care Reviewed With patient;spouse  -     Progress improving  -     Outcome Evaluation PT eval completed.  Pt pleasant and agreeable to therapy, oriented X 4, w/ flat affect.  Spouse present and involved in pt's care.  Pt demonstrates generalized weakness LEs worse than UEs, decrease balance and tolerance to out of bed activity w/ increase pain L foot w/ standing/gait.  Pt was able to perform all functional mobility w/ assist of 1 and use of rwx tolerating ~ 25 ft of gait in the room this date.  Returned to sit in the chair w/ pt voicing pain not only in the L foot but in his buttocks as well.  Pt stood and 2 pillows placed in chair w/ pt returning to sit w/ continued c/os.  Pt w/ reddness noted at sacralcoccygeal area, however blanchabel.  Pt unable to get comfortable in the chair, therefore he was assisted back to bed and positioned in slight R sidelying to assist w/ pressure relief.  RN notified of pt's c/os  w/ pain reported as 8/10.  Pt will benefit from continued PT services to improve strength, activity tolerance, safety awareness, balance, and I w/ functional mobility.  Will follow for progress and needs, however anticipate pt to return to SNF for continued care at discharge.  -       Row Name 07/31/23 1035          Therapy Assessment/Plan (PT)    Patient/Family Therapy Goals Statement (PT) decrease pain  -     Rehab Potential (PT) good, to achieve stated therapy goals  -     Criteria for Skilled Interventions Met (PT) yes;meets criteria;skilled treatment is necessary  -     Therapy Frequency (PT) 2 times/day  -     Predicted Duration of Therapy Intervention (PT) until discharge or goals achieved  -       Row Name 07/31/23 1035          Vital Signs    O2 Delivery Pre Treatment room air  -     O2 Delivery Intra Treatment room air  -     O2 Delivery Post Treatment room air  -JE     Pre Patient Position Side Lying  -JE     Intra Patient Position Standing  -     Post Patient Position Side Lying  -       Row Name 07/31/23 1035          Positioning and Restraints    Pre-Treatment Position in bed  -     Post Treatment Position bed  -JE     In Bed notified nsg;side lying right;fowlers;call light within reach;encouraged to call for assist;with family/caregiver;LLE elevated;side rails up x3  heel off loaded  -               User Key  (r) = Recorded By, (t) = Taken By, (c) = Cosigned By      Initials Name Provider Type    Juliana Sommers, PT Physical Therapist                   Outcome Measures       Row Name 07/31/23 1035 07/31/23 0827       How much help from another person do you currently need...    Turning from your back to your side while in flat bed without using bedrails? 3  -JE 3  -KP    Moving from lying on back to sitting on the side of a flat bed without bedrails? 3  -JE 3  -KP    Moving to and from a bed to a chair (including a wheelchair)? 3  -JE 3  -KP    Standing up from a chair  using your arms (e.g., wheelchair, bedside chair)? 3  - 3  -    Climbing 3-5 steps with a railing? 2  - 1  -    To walk in hospital room? 3  - 3  -KP    AM-PAC 6 Clicks Score (PT) 17  - 16  -    Highest level of mobility 5 --> Static standing  - 5 --> Static standing  -      Row Name 07/31/23 1035          Functional Assessment    Outcome Measure Options AM-PAC 6 Clicks Basic Mobility (PT)  -               User Key  (r) = Recorded By, (t) = Taken By, (c) = Cosigned By      Initials Name Provider Type    Juliana Sommers, PT Physical Therapist    Loyda Gudino, RN Registered Nurse                                 Physical Therapy Education       Title: PT OT SLP Therapies (In Progress)       Topic: Physical Therapy (In Progress)       Point: Mobility training (Done)       Learning Progress Summary             Patient Acceptance, E,TB,D, VU,DU,NR by  at 7/31/2023 1144    Comment: Education re: purpose of PT/importance of activity, for safety/falls prevention, improved tech w/ bed mobility, tfers and gait w/ rwx, positioning for skin protection   Significant Other Acceptance, E,TB,D, VU,DU,NR by  at 7/31/2023 1144    Comment: Education re: purpose of PT/importance of activity, for safety/falls prevention, improved tech w/ bed mobility, tfers and gait w/ rwx, positioning for skin protection                         Point: Home exercise program (Not Started)       Learner Progress:  Not documented in this visit.              Point: Precautions (Done)       Learning Progress Summary             Patient Acceptance, E,TB,D, VU,DU,NR by  at 7/31/2023 1144    Comment: Education re: purpose of PT/importance of activity, for safety/falls prevention, improved tech w/ bed mobility, tfers and gait w/ rwx, positioning for skin protection   Significant Other Acceptance, E,TB,D, VU,DU,NR by  at 7/31/2023 1144    Comment: Education re: purpose of PT/importance of activity, for safety/falls  prevention, improved tech w/ bed mobility, tfers and gait w/ rwx, positioning for skin protection                                         User Key       Initials Effective Dates Name Provider Type Discipline     08/02/18 -  Juliana Yeh, PT Physical Therapist PT                  PT Recommendation and Plan  Planned Therapy Interventions (PT): balance training, bed mobility training, gait training, home exercise program, postural re-education, patient/family education, strengthening, transfer training, other (see comments) (safety/falls prevention, skin protection/pressure relief)  Plan of Care Reviewed With: patient, spouse  Progress: improving  Outcome Evaluation: PT eval completed.  Pt pleasant and agreeable to therapy, oriented X 4, w/ flat affect.  Spouse present and involved in pt's care.  Pt demonstrates generalized weakness LEs worse than UEs, decrease balance and tolerance to out of bed activity w/ increase pain L foot w/ standing/gait.  Pt was able to perform all functional mobility w/ assist of 1 and use of rwx tolerating ~ 25 ft of gait in the room this date.  Returned to sit in the chair w/ pt voicing pain not only in the L foot but in his buttocks as well.  Pt stood and 2 pillows placed in chair w/ pt returning to sit w/ continued c/os.  Pt w/ reddness noted at sacralcoccygeal area, however blanchabel.  Pt unable to get comfortable in the chair, therefore he was assisted back to bed and positioned in slight R sidelying to assist w/ pressure relief.  RN notified of pt's c/os w/ pain reported as 8/10.  Pt will benefit from continued PT services to improve strength, activity tolerance, safety awareness, balance, and I w/ functional mobility.  Will follow for progress and needs, however anticipate pt to return to SNF for continued care at discharge.     Time Calculation:         PT Charges       Row Name 07/31/23 1140             Time Calculation    Start Time 1035  -JE      Stop Time 1130  -JE       Time Calculation (min) 55 min  -MARTINEZ      PT Received On 07/31/23  -MARTINEZ      PT Goal Re-Cert Due Date 08/10/23  -MARTINEZ                User Key  (r) = Recorded By, (t) = Taken By, (c) = Cosigned By      Initials Name Provider Type    Juliana Sommers, PT Physical Therapist                  Therapy Charges for Today       Code Description Service Date Service Provider Modifiers Qty    [id number] HC PT EVAL MOD COMPLEXITY 4 7/31/2023 Juliana Yeh, PT GP 1            PT G-Codes  Outcome Measure Options: AM-PAC 6 Clicks Basic Mobility (PT)  AM-PAC 6 Clicks Score (PT): 17  AM-PAC 6 Clicks Score (OT): 12  PT Discharge Summary  Anticipated Discharge Disposition (PT): skilled nursing facility    Juliana Yeh, PT  7/31/2023

## 2023-07-31 NOTE — PROGRESS NOTES
[facility] Medicine Services  INPATIENT PROGRESS NOTE    Patient Name: Samy Velazquez  Date of Admission: 7/28/2023  Today's Date: 07/31/23  Length of Stay: 3  Primary Care Physician: Garret Salinas MD    Subjective   Chief Complaint: left foot pain  HPI   Mr. Velazquez is a 78-year-old male who presented to [facility] on 7/28 for altered mental status with wound to second digit, left foot.  Per family member he has had issues on and off with the wound for the last several months.  He has a hammertoe deformity of the second digit.  Wound will weal and then open back up due to deformity when wearing shoes.  States that it recently became infected.  He several rounds of oral antibiotics.  He is followed by wound care at [facility].  In the last several days, wound of the toe became worsened with increased redness, pain and swelling.  CRP 4, lactate 3.2, procalcitonin 0.93, WBC 16.  Started on vancomycin and cefepime.  Chest x-ray showed no acute findings.  CT head showed no acute process.  X-ray left foot showed showed no convincing radiographic evidence of acute osteomyelitis.    Lying in bed on left side.   He has had worsening pain to the second toe and relates darkening of the wound of the second digit. States pain is currently controlled as he had pain medicine around 1130.  Afebrile.  Podiatry considering amputation.    Review of Systems   All pertinent negatives and positives are as above. All other systems have been reviewed and are negative unless otherwise stated.     Objective    Temp:  [97.4 øF (36.3 øC)-98.1 øF (36.7 øC)] 97.4 øF (36.3 øC)  Heart Rate:  [61-74] 74  Resp:  [16-18] 18  BP: (110-123)/(56-74) 113/74  Physical Exam  Vitals reviewed.   Constitutional:       General: He is not in acute distress.     Appearance: He is obese. He is not toxic-appearing.      Comments: Lying in bed.  No acute distress.  On room air.  No family at  bedside.   HENT:      Head: Normocephalic and atraumatic.      Mouth/Throat:      Mouth: Mucous membranes are moist.      Pharynx: Oropharynx is clear.   Eyes:      Extraocular Movements: Extraocular movements intact.      Conjunctiva/sclera: Conjunctivae normal.      Pupils: Pupils are equal, round, and reactive to light.   Cardiovascular:      Rate and Rhythm: Normal rate and regular rhythm.      Pulses: Normal pulses.   Pulmonary:      Effort: Pulmonary effort is normal. No respiratory distress.      Breath sounds: Normal breath sounds.   Abdominal:      General: Bowel sounds are normal. There is no distension.      Palpations: Abdomen is soft.      Tenderness: There is no abdominal tenderness.   Musculoskeletal:         General: No swelling or tenderness. Normal range of motion.      Cervical back: Normal range of motion and neck supple. No muscular tenderness.   Skin:     General: Skin is warm and dry.      Findings: No erythema or rash.   Neurological:      General: No focal deficit present.      Mental Status: He is alert and oriented to person, place, and time.      Cranial Nerves: No cranial nerve deficit.      Motor: No weakness.   Psychiatric:         Mood and Affect: Mood normal.         Behavior: Behavior normal.         Results Review:  I have reviewed the labs, radiology results, and diagnostic studies.    Laboratory Data:   Results from last 7 days   Lab Units 07/29/23  0605 07/28/23  1402   WBC 10*3/mm3 10.18 16.78*   HEMOGLOBIN g/dL 15.6 16.5   HEMATOCRIT % 50.0 51.2*   PLATELETS 10*3/mm3 156 197        Results from last 7 days   Lab Units 07/31/23  0618 07/30/23  0550 07/29/23  0605 07/28/23  1402   SODIUM mmol/L 138 137 136 132*   POTASSIUM mmol/L 3.6 3.1* 3.7 3.6   CHLORIDE mmol/L 102 102 101 94*   CO2 mmol/L 25.0 24.0 21.0* 24.0   BUN mg/dL 19 23 21 24*   CREATININE mg/dL 1.53* 1.61* 1.43* 1.75*   CALCIUM mg/dL 10.6* 10.0 9.7 10.0   BILIRUBIN mg/dL  --   --   --  1.0   ALK PHOS U/L  --   --    --  187*   ALT (SGPT) U/L  --   --   --  22   AST (SGOT) U/L  --   --   --  24   GLUCOSE mg/dL 121* 126* 125* 192*       Culture Data:   Microbiology Results (last 10 days)       Procedure Component Value - Date/Time    MRSA Screen, PCR (Inpatient) - Swab, Nares [[id number]]  (Abnormal) Collected: 07/29/23 1035    Lab Status: Final result Specimen: Swab from Nares Updated: 07/29/23 1151     MRSA PCR MRSA Detected    Narrative:      The negative predictive value of this diagnostic test is high and should only be used to consider de-escalating anti-MRSA therapy. A positive result may indicate colonization with MRSA and must be correlated clinically.    Blood Culture - Blood, Arm, Right [[id number]]  (Normal) Collected: 07/28/23 1414    Lab Status: Preliminary result Specimen: Blood from Arm, Right Updated: 07/30/23 1431     Blood Culture No growth at 2 days    COVID-19,CEPHEID/KATHIE,COR/JOSE/PAD/ANA/MAD IN-HOUSE(OR EMERGENT/ADD-ON),NP SWAB IN TRANSPORT MEDIA 3-4 HR TAT, RT-PCR - Swab, Nasopharynx [[id number]]  (Normal) Collected: 07/28/23 1403    Lab Status: Final result Specimen: Swab from Nasopharynx Updated: 07/28/23 1432     COVID19 Not Detected    Narrative:      Fact sheet for providers: https://www.fda.gov/media/250898/download     Fact sheet for patients: https://www.fda.gov/media/367359/download  Fact sheet for providers: https://www.fda.gov/media/880506/download    Fact sheet for patients: https://www.fda.gov/media/160367/download    Test performed by PCR.    Blood Culture - Blood, Arm, Right [[id number]]  (Normal) Collected: 07/28/23 1402    Lab Status: Preliminary result Specimen: Blood from Arm, Right Updated: 07/30/23 1415     Blood Culture No growth at 2 days          Radiology Data:   Imaging Results (Last 24 Hours)       Procedure Component Value Units Date/Time    US Arterial Doppler Lower Extremity Left [[id number]] Collected: 07/31/23 1008     Updated: 07/31/23 1013    Narrative:      History: PAD, left  foot wound     Comments: Left lower extremity arterial duplex was performed using gray  scale imaging as well as color flow Doppler.     On the left the common femoral artery peak systolic velocity is 102  cm/s, the proximal deep femoral artery peak systolic velocity is 27.2  cm/s, proximal superficial femoral artery peak systolic velocity is 84.4  cm/s, the mid superficial femoral artery peak systolic velocity is 61.2  cm/s, distal superficial femoral artery peak systolic velocity is 51.0  cm/s, the proximal popliteal artery peak systolic velocity is 36.5 cm/s,  the distal posterior tibial artery peak systolic velocity is 22.5cm/s,  and the proximal anterior tibial artery peak systolic velocity is 33.7  cm/s. All arteries appear patent with no evidence of significant  stenosis or occlusion.       Impression:      Impression:  1. Patent left lower extremity arterial system with no evidence of  significant stenosis or occlusion.     This report was finalized on 07/31/2023 10:09 by Dr. Leif Castro MD.            I have reviewed the patient's current medications.     Assessment/Plan   Assessment  Active Hospital Problems    Diagnosis     **Cellulitis of second toe of left foot     Altered mental status     Paroxysmal atrial fibrillation     Stage 3b chronic kidney disease (CKD)     Osteoarthritis of multiple joints     Chronic gout        Treatment Plan  Cellulitis of second toe of left foot.  Continue vancomycin and cefepime.  Blood culture no growth to date.  He has had worsening pain to the second toe and relates darkening of the wound of the second digit.  Podiatry following, considering proceeding with amputation second digit -possibly tomorrow.    Left lower extremity arterial duplex reveals patent left lower extremity arterial system without evidence of significant stenosis or occlusion.    Chronic kidney disease stage IIIb. Stable with creatinine 1.53.     Paroxysmal A-fib chronically anticoagulated on  Eliquis.  Continue toprol xL.    Wound care consult.    Eliquis will serve as DVT prophylaxis.    Bowel regimen.    PT/OT.    Pain control.    Medical Decision Making  Number and Complexity of problems: 1 acute problem in the form of cellulitis of second toe of left foot with possible need for amputation  Differential Diagnosis: None considered at present    Conditions and Status        Condition is worsening.     [facility] Data  External documents reviewed: Prior epic records  Cardiac tracing (EKG, telemetry) interpretation: none  Radiology interpretation: Interpreted by radiology  Labs reviewed: As above  Any tests that were considered but not ordered: None considered at present     Decision rules/scores evaluated (example [id number], Wells, etc): None considered at present     Discussed with: Patient and Dr. Sim     Care Planning  Shared decision making: Patient is agreeable to ongoing work-up and treatment  Code status and discussions: Full code with full interventions    Disposition  Social Determinants of Health that impact treatment or disposition: Will return to [facility] -  he has a bed hold  I expect the patient to be discharged to [facility] in 1-2 days.     Electronically signed by GEN Horner, 07/31/23, 13:15 CDT.

## 2023-07-31 NOTE — PLAN OF CARE
Goal Outcome Evaluation:  Plan of Care Reviewed With: patient, spouse        Progress: improving  Outcome Evaluation: PT eval completed.  Pt pleasant and agreeable to therapy, oriented X 4, w/ flat affect.  Spouse present and involved in pt's care.  Pt demonstrates generalized weakness LEs worse than UEs, decrease balance and tolerance to out of bed activity w/ increase pain L foot w/ standing/gait.  Pt was able to perform all functional mobility w/ assist of 1 and use of rwx tolerating ~ 25 ft of gait in the room this date.  Returned to sit in the chair w/ pt voicing pain not only in the L foot but in his buttocks as well.  Pt stood and 2 pillows placed in chair w/ pt returning to sit w/ continued c/os.  Pt w/ reddness noted at sacralcoccygeal area, however blanchabel.  Pt unable to get comfortable in the chair, therefore he was assisted back to bed and positioned in slight R sidelying to assist w/ pressure relief.  RN notified of pt's c/os w/ pain reported as 8/10.  Pt will benefit from continued PT services to improve strength, activity tolerance, safety awareness, balance, and I w/ functional mobility.  Will follow for progress and needs, however anticipate pt to return to SNF for continued care at discharge.      Anticipated Discharge Disposition (PT): skilled nursing facility

## 2023-07-31 NOTE — PROGRESS NOTES
"Pharmacy Dosing Service  Pharmacokinetics  Vancomycin Follow-up Evaluation    Assessment/Action/Plan:      AUC Model Data:  Regimen: 1250 mg IV every 24 hours.  Exposure target: AUC24 (range)400-600 mg/L.hr   AUC24,ss: 492 mg/L.hr  PAUC*: 87 %  Ctrough,ss: 15.6 mg/L  Pconc*: 16 %  Tox.: 11 %  Additional antimicrobial agent(s): Cefepime    Calculated AUC based on trough drawn today therapeutic at 492. Continue current Vancomycin dosing of 1250mg IV q24. Pharmacy will continue to follow daily and adjust dose accordingly.     Subjective:  Samy Velazquez is a 78 y.o. male currently on Vancomycin 1250 mg IV every 24 hours for the treatment of SSTI, day 3 of therapy. Current end date: 8/2/23    Objective:  Ht: 170.2 cm (67\"); Wt: 103 kg (227 lb)  Estimated Creatinine Clearance: 47 mL/min (A) (by C-G formula based on SCr of 1.53 mg/dL (H)).   Creatinine   Date Value Ref Range Status   07/31/2023 1.53 (H) 0.76 - 1.27 mg/dL Final   07/30/2023 1.61 (H) 0.76 - 1.27 mg/dL Final   07/29/2023 1.43 (H) 0.76 - 1.27 mg/dL Final      Lab Results   Component Value Date    WBC 10.18 07/29/2023    WBC 16.78 (H) 07/28/2023    WBC 6.43 06/22/2023         Lab Results   Component Value Date    VANCOTROUGH 14.50 07/31/2023         Culture Results:  Microbiology Results (last 10 days)       Procedure Component Value - Date/Time    MRSA Screen, PCR (Inpatient) - Swab, Nares [[id number]]  (Abnormal) Collected: 07/29/23 1035    Lab Status: Final result Specimen: Swab from Nares Updated: 07/29/23 1151     MRSA PCR MRSA Detected    Narrative:      The negative predictive value of this diagnostic test is high and should only be used to consider de-escalating anti-MRSA therapy. A positive result may indicate colonization with MRSA and must be correlated clinically.    Blood Culture - Blood, Arm, Right [[id number]]  (Normal) Collected: 07/28/23 1414    Lab Status: Preliminary result Specimen: Blood from Arm, Right Updated: 07/31/23 1431     Blood " Culture No growth at 3 days    COVID-19,CEPHEID/KATHIE,COR/JOSE/PAD/ANA/MAD IN-HOUSE(OR EMERGENT/ADD-ON),NP SWAB IN TRANSPORT MEDIA 3-4 HR TAT, RT-PCR - Swab, Nasopharynx [[id number]]  (Normal) Collected: 07/28/23 1403    Lab Status: Final result Specimen: Swab from Nasopharynx Updated: 07/28/23 1432     COVID19 Not Detected    Narrative:      Fact sheet for providers: https://www.fda.gov/media/973773/download     Fact sheet for patients: https://www.fda.gov/media/001422/download  Fact sheet for providers: https://www.fda.gov/media/535792/download    Fact sheet for patients: https://www.fda.gov/media/895165/download    Test performed by PCR.    Blood Culture - Blood, Arm, Right [[id number]]  (Normal) Collected: 07/28/23 1402    Lab Status: Preliminary result Specimen: Blood from Arm, Right Updated: 07/31/23 1415     Blood Culture No growth at 3 days            BC Henao, Padma   07/31/23 15:42 CDT

## 2023-07-31 NOTE — CONSULTS
[facility]  INPATIENT WOUND & OSTOMY CONSULTATION    Today's Date: 07/31/23    Patient Name: Samy Velazquez  MRN: [id number]  CSN: [id number]  PCP: Garret Salinas MD  Referring Provider:   Consulting Provider (From admission, onward)      Start Ordered     Status Ordering Provider    07/28/23 2131 07/28/23 2131  Wound / Ostomy  Care Provider Consult  Once        Specialty:  Wound Care  Provider:  Jackie Glover APRN Acknowledged POLIDORI, MARIANO ARIEL           Attending Provider: Bernard Sim MD  Length of Stay: 3    SUBJECTIVE   Chief Complaint: Sacral wound    HPI: Samy Velazquez, a 78 y.o.male, presents with a past medical history of hyperlipidemia, coronary artery disease, hypertensive heart disease with heart failure, dementia, chronic osteomyelitis of left second toe and hypothyroidism.  A full past medical history as listed below.  Patient [facility] the [facility] due to confusion.  Blood work was consistent with elevated white blood cell and inflammatory markers with a left second toe chronic wound.  Podiatry is following.    Inpatient wound care consulted due to sacral skin breakdown.  Pictures were taken on admission.  Patient has a stage II pressure injury of sacrum.  He states that has been sore over the past week.  He is incontinent of bowel and bladder.  He states he is too disabled to get to the bathroom on time.  He is currently lying on right side completely turned off of sacrum.  Area is very painful to touch.      Visit Dx:    ICD-10-CM ICD-9-CM   1. Altered mental status, unspecified altered mental status type  R41.82 780.97   2. Left foot infection  L08.9 686.9   3. Dysphagia, unspecified type  R13.10 787.20   4. Impaired functional mobility and activity tolerance [Z74.09 (ICD-10-CM)]  Z74.09 V49.89       Hospital Problem List:     Cellulitis of second toe of left foot    Osteoarthritis of multiple joints    Chronic gout    Stage 3b  chronic kidney disease (CKD)    Paroxysmal atrial fibrillation    Altered mental status      History:   Past Medical History:   Diagnosis Date    Arthritis     Coronary artery disease     Hyperlipidemia     Hypertensive heart disease with heart failure 3/29/2021    Hypothyroidism 4/1/2021    Kidney stone     Major depressive disorder, recurrent, moderate 3/29/2021    Morbidly obese 9/18/2020     Past Surgical History:   Procedure Laterality Date    CARDIAC DEFIBRILLATOR PLACEMENT      CARDIAC DEFIBRILLATOR PLACEMENT N/A 2008    CARDIAC SURGERY      ENDOSCOPY N/A 10/30/2020    Procedure: ESOPHAGOGASTRODUODENOSCOPY WITH ANESTHESIA;  Surgeon: Brent Stanley MD;  Location: [facility] OR;  Service: Gastroenterology;  Laterality: N/A;  pre dysphagia  post post op gastric surgery  dr jose manuel smith    ESOPHAGUS SURGERY      KNEE SURGERY      TOTAL SHOULDER REPLACEMENT       Social History     Socioeconomic History    Marital status:    Tobacco Use    Smoking status: Never    Smokeless tobacco: Never   Vaping Use    Vaping Use: Never used   Substance and Sexual Activity    Alcohol use: No    Drug use: No    Sexual activity: Defer     Family History   Problem Relation Age of Onset    Hypertension Mother     Stroke Mother     Cancer Father     Hypertension Father     Diabetes Sister     Hypertension Sister     Crohn's disease Daughter     No Known Problems Son     No Known Problems Maternal Grandmother     No Known Problems Maternal Grandfather     No Known Problems Paternal Grandmother     No Known Problems Paternal Grandfather     Hypertension Sister     Hypertension Sister     Hypertension Sister     No Known Problems Son        Allergies:  Allergies   Allergen Reactions    Iodine Hives    Strawberry Hives    Iodinated Contrast Media Unknown - High Severity    Clindamycin/Lincomycin Diarrhea    Penicillins Rash    Vancomycin Nausea And Vomiting       Medications:    Current Facility-Administered Medications:      acetaminophen (TYLENOL) tablet 650 mg, 650 mg, Oral, Q4H PRN, El Merino MD    allopurinol (ZYLOPRIM) tablet 100 mg, 100 mg, Oral, Daily, El Merino MD, 100 mg at 07/31/23 0827    apixaban (ELIQUIS) tablet 2.5 mg, 2.5 mg, Oral, Q12H, El Merino MD, 2.5 mg at 07/31/23 0827    aspirin EC tablet 81 mg, 81 mg, Oral, Daily, El Merino MD, 81 mg at 07/31/23 0827    atorvastatin (LIPITOR) tablet 40 mg, 40 mg, Oral, Daily, El Merino MD, 40 mg at 07/31/23 0827    sennosides-docusate (PERICOLACE) 8.6-50 MG per tablet 2 tablet, 2 tablet, Oral, BID, 2 tablet at 07/31/23 0827 **AND** polyethylene glycol (MIRALAX) packet 17 g, 17 g, Oral, Daily PRN **AND** bisacodyl (DULCOLAX) EC tablet 5 mg, 5 mg, Oral, Daily PRN **AND** bisacodyl (DULCOLAX) suppository 10 mg, 10 mg, Rectal, Daily PRN, El Merino MD    cefepime 2 gm IVPB in 100 ml NS (MBP), 2,000 mg, Intravenous, Q12H, Jossy Yanez APRN    famotidine (PEPCID) tablet 20 mg, 20 mg, Oral, BID, El Merino MD, 20 mg at 07/31/23 0827    HYDROcodone-acetaminophen (NORCO) 5-325 MG per tablet 1 tablet, 1 tablet, Oral, Q6H PRN, El Merino MD, 1 tablet at 07/31/23 1124    isosorbide mononitrate (IMDUR) 24 hr tablet 30 mg, 30 mg, Oral, Q24H, Jose Lezama DO, 30 mg at 07/31/23 0827    lactated ringers bolus 1,000 mL, 1,000 mL, Intravenous, Once, Guanaco Salcido MD, Held at 07/28/23 1441    levothyroxine (SYNTHROID, LEVOTHROID) tablet 50 mcg, 50 mcg, Oral, Q AM, El Merino MD, 50 mcg at 07/31/23 0438    memantine (NAMENDA) tablet 10 mg, 10 mg, Oral, BID, El Merino MD, 10 mg at 07/31/23 0827    metoprolol succinate XL (TOPROL-XL) 24 hr tablet 12.5 mg, 12.5 mg, Oral, Daily, El Merino MD, 12.5 mg at 07/31/23 0827    nitroglycerin (NITROSTAT) SL tablet 0.4 mg, 0.4 mg, Sublingual, Q5 Min PRN, El Merino MD     ondansetron (ZOFRAN) injection 4 mg, 4 mg, Intravenous, Q6H PRN, El Merino MD    [COMPLETED] Insert Peripheral IV, , , Once **AND** sodium chloride 0.9 % flush 10 mL, 10 mL, Intravenous, PRN, El Merino MD    sodium chloride 0.9 % flush 10 mL, 10 mL, Intravenous, Q12H, El Merino MD, 10 mL at 07/31/23 0828    sodium chloride 0.9 % flush 10 mL, 10 mL, Intravenous, PRN, El Merino MD    sodium chloride 0.9 % infusion 40 mL, 40 mL, Intravenous, PRN, El Merino MD    tamsulosin (FLOMAX) 24 hr capsule 0.4 mg, 0.4 mg, Oral, BID, El Merino MD, 0.4 mg at 07/31/23 0827    vancomycin 1250 mg/250 mL 0.9% NS IVPB ([facility]), 1,250 mg, Intravenous, Q24H, El Merino MD, 1,250 mg at 07/30/23 1421    Review of Systems:   Review of Systems   Constitutional:  Positive for fatigue. Negative for chills and fever.   HENT:  Negative for rhinorrhea and sore throat.    Respiratory:  Negative for cough and shortness of breath.    Cardiovascular:  Negative for chest pain and palpitations.   Gastrointestinal:  Negative for diarrhea, nausea and vomiting.   Genitourinary:  Negative for flank pain and hematuria.   Musculoskeletal:  Positive for myalgias. Negative for arthralgias.   Skin:  Positive for color change and wound.   Neurological:  Positive for weakness. Negative for dizziness and headaches.   Psychiatric/Behavioral:  Negative for agitation and behavioral problems.        OBJECTIVE     Vitals:    07/31/23 0700   BP: 113/74   Pulse: 74   Resp: 18   Temp: 97.4 øF (36.3 øC)   SpO2: 96%       PHYSICAL EXAM:   Physical Exam  Vitals and nursing note reviewed.   Constitutional:       General: He is awake.      Appearance: He is obese.      Comments: Body mass index is 35.55 kg/mý.    HENT:      Head: Normocephalic and atraumatic.   Eyes:      General: Lids are normal. Gaze aligned appropriately.   Cardiovascular:      Rate and Rhythm: Normal rate  and regular rhythm.   Pulmonary:      Effort: Pulmonary effort is normal. No respiratory distress.   Abdominal:      General: Abdomen is protuberant.      Palpations: Abdomen is soft.   Musculoskeletal:      Cervical back: Normal range of motion and neck supple.   Skin:     General: Skin is warm and dry.      Findings: Wound present.      Comments: Stage 2 pressure injury of sacrum.  Superficial skin breakdown with irregular edges.  No tunneling or undermining noted.  Slight erythema periwound area.  Scant moisture present over wound bed.  No signs of infection.   Neurological:      Mental Status: He is alert and oriented to person, place, and time.      Motor: Weakness present.      Gait: Gait abnormal.   Psychiatric:         Attention and Perception: Attention normal.         Mood and Affect: Mood normal.         Speech: Speech normal.         Behavior: Behavior is cooperative.            Results Review:  Lab Results (last 48 hours)       Procedure Component Value Units Date/Time    Magnesium [[id number]]  (Abnormal) Collected: 07/31/23 0618    Specimen: Blood Updated: 07/31/23 0652     Magnesium 1.5 mg/dL     Basic Metabolic Panel [[id number]]  (Abnormal) Collected: 07/31/23 0618    Specimen: Blood Updated: 07/31/23 0652     Glucose 121 mg/dL      BUN 19 mg/dL      Creatinine 1.53 mg/dL      Sodium 138 mmol/L      Potassium 3.6 mmol/L      Chloride 102 mmol/L      CO2 25.0 mmol/L      Calcium 10.6 mg/dL      BUN/Creatinine Ratio 12.4     Anion Gap 11.0 mmol/L      eGFR 46.8 mL/min/1.73     Narrative:      GFR Normal >60  Chronic Kidney Disease <60  Kidney Failure <15    The GFR formula is only valid for adults with stable renal function between ages 18 and 70.    Blood Culture - Blood, Arm, Right [[id number]]  (Normal) Collected: 07/28/23 1414    Specimen: Blood from Arm, Right Updated: 07/30/23 1431     Blood Culture No growth at 2 days    Blood Culture - Blood, Arm, Right [[id number]]  (Normal) Collected: 07/28/23  1402    Specimen: Blood from Arm, Right Updated: 07/30/23 1415     Blood Culture No growth at 2 days    Basic Metabolic Panel [[id number]]  (Abnormal) Collected: 07/30/23 0550    Specimen: Blood Updated: 07/30/23 0702     Glucose 126 mg/dL      BUN 23 mg/dL      Creatinine 1.61 mg/dL      Sodium 137 mmol/L      Potassium 3.1 mmol/L      Chloride 102 mmol/L      CO2 24.0 mmol/L      Calcium 10.0 mg/dL      BUN/Creatinine Ratio 14.3     Anion Gap 11.0 mmol/L      eGFR 44.0 mL/min/1.73     Narrative:      GFR Normal >60  Chronic Kidney Disease <60  Kidney Failure <15    The GFR formula is only valid for adults with stable renal function between ages 18 and 70.          Imaging Results (Last 72 Hours)       Procedure Component Value Units Date/Time    US Arterial Doppler Lower Extremity Left [[id number]] Collected: 07/31/23 1008     Updated: 07/31/23 1013    Narrative:      History: PAD, left foot wound     Comments: Left lower extremity arterial duplex was performed using gray  scale imaging as well as color flow Doppler.     On the left the common femoral artery peak systolic velocity is 102  cm/s, the proximal deep femoral artery peak systolic velocity is 27.2  cm/s, proximal superficial femoral artery peak systolic velocity is 84.4  cm/s, the mid superficial femoral artery peak systolic velocity is 61.2  cm/s, distal superficial femoral artery peak systolic velocity is 51.0  cm/s, the proximal popliteal artery peak systolic velocity is 36.5 cm/s,  the distal posterior tibial artery peak systolic velocity is 22.5cm/s,  and the proximal anterior tibial artery peak systolic velocity is 33.7  cm/s. All arteries appear patent with no evidence of significant  stenosis or occlusion.       Impression:      Impression:  1. Patent left lower extremity arterial system with no evidence of  significant stenosis or occlusion.     This report was finalized on 07/31/2023 10:09 by Dr. Leif Castro MD.    XR Foot 3+ View Left  [[id number]] Collected: 07/28/23 1602     Updated: 07/28/23 1607    Narrative:      HISTORY: Left foot infection     Left foot: 3 views of the left foot are obtained.     COMPARISON: 06/22/2023     FINDINGS: Hallux valgus deformity of the arthritic first MTP joint.  Hammertoe deformities of the second through fifth digits. Cortical  thickening of the fourth metatarsal. Stable peripherally sclerotic 1.3  cm lucent lesion proximal second metatarsal. Moderate calcaneal  spurring. Osteopenia. Vascular calcification.       Impression:      1. Osteopenia with chronic advanced changes described in detail above.  No interval change since the 06/22/2023 exam. No convincing radiographic  evidence of acute osteomyelitis.  This report was finalized on 07/28/2023 16:04 by Dr. Yue Godoy MD.    CT Head Without Contrast [[id number]] Collected: 07/28/23 1505     Updated: 07/28/23 1510    Narrative:      CT HEAD WO CONTRAST- 7/28/2023 2:55 PM CDT     HISTORY: ams      COMPARISON: 07/20/2022     DOSE LENGTH PRODUCT: 604 mGy cm. Automated exposure control was also  utilized to decrease patient radiation dose.     TECHNIQUE: Axial images of the brain obtained without contrast     FINDINGS:  Stable prominence of the sulci and ventricles favoring  underlying volume loss. Chronic small vessel ischemic change. No  intracranial hemorrhage or mass effect. No acute signs of ischemia. No  extra-axial hematoma or subarachnoid hemorrhage.     Visible paranasal sinuses and mastoid air cells well-aerated. Bony  calvarium appears       Impression:      1. Moderate generalized volume loss with mild chronic small vessel  ischemia. No acute intracranial process identified.  This report was finalized on 07/28/2023 15:07 by Dr. Yue Godoy MD.    XR Chest 1 View [[id number]] Collected: 07/28/23 1449     Updated: 07/28/23 1453    Narrative:      EXAM/TECHNIQUE: XR CHEST 1 VW-     INDICATION: Altered mental status     COMPARISON: 07/18/2022      FINDINGS:     Lung volumes are low. Cardiac silhouette is stable. LEFT chest wall  cardiac ICD device is stable in position. Sternotomy wires are stable in  alignment with multiple fractured wires again noted. No pleural  effusion, pneumothorax, or focal consolidation. Partially imaged RIGHT  shoulder arthroplasty. Severe LEFT glenohumeral joint osteoarthritis  with bone-on-bone appearance.       Impression:         No acute findings. Low lung volumes.  This report was finalized on 07/28/2023 14:50 by Dr. Rupesh Rivas MD.               ASSESSMENT/PLAN       Examination and evaluation of wound(s) was performed.    DIAGNOSIS:   Pressure injury of sacral region, stage 2  Impaired mobility and ADLs  Bowel and bladder continence    Hospital problem list:    Cellulitis of second toe of left foot    Osteoarthritis of multiple joints    Chronic gout    Stage 3b chronic kidney disease (CKD)    Paroxysmal atrial fibrillation    Altered mental status        PLAN:   Patient presents as a stage 2 pressure injury of sacral region which he was admitted with.  Orders placed for pressure and moisture management as listed below.  Wound to be covered with use of Calazime barrier cream to promote healing and help with pain.        Start     Ordered    07/31/23 1800  Apply Moisture Barrier After Any Incontinence  4 Times Daily      Comments: Calazime to sacral area.   Question:  Wound Care Instructions  Answer:  Apply Moisture Barrier After Any Incontinence    07/31/23 1403    07/31/23 1404  Apply Waffle mattress overlay Until Discontinued  Until Discontinued        Question:  Supply to be applied:  Answer:  Waffle mattress overlay    07/31/23 1403    07/31/23 1403  Elevate Heels Off of Bed  Until Discontinued         07/31/23 1403    07/31/23 1403  Turn Patient  Now Then Every 2 Hours         07/31/23 1403    07/31/23 1403  Use Repositioning Wedge to Position Patient  Continuous        Comments: Use Comfort Glide repositioning  sheet and wedges to position patient.    07/31/23 1403    07/31/23 1403  Use Seat Cushion When Up In Chair  Continuous         07/31/23 1403    Unscheduled  Wound Care  As Needed      Question:  Wound Locations  Answer:  clean wound 2nd toe left with topical betadine daily.  Keep covered with dry gauze.    07/29/23 1138                   Discussed findings and treatment plan including risks, benefits, and treatment options with patient in detail. Patient agreed with treatment plan.      This document has been electronically signed by GEN Calloway on 7/31/2023 12:51 CDT

## 2023-07-31 NOTE — PROGRESS NOTES
Orthopedic Foot/Ankle Progress Note    Subjective     Hospital Course: Patient resting in bed.  Wife at bedside.  Relates worsening pain to the second toe.  He states that he had to ask for a pain pill earlier.  Also relates darkening of the wound of the second digit.  Continues IV antibiotic therapy.  Did have some vascular studies today.      Review of left lower extremity arterial duplex reveals patent left lower extremity arterial system with no evidence of significant stenosis or occlusion.      Review of Systems   HENT: Negative.     Cardiovascular: Negative.    Gastrointestinal: Negative.    Skin:  Positive for color change and wound.     Objective     Vital signs in last 24 hours:  Temp:  [97.4 øF (36.3 øC)-98.1 øF (36.7 øC)] 97.4 øF (36.3 øC)  Heart Rate:  [61-74] 74  Resp:  [16-18] 18  BP: (110-123)/(56-74) 113/74    Physical Exam:  Physical Exam  HENT:      Head: Normocephalic and atraumatic.   Eyes:      Extraocular Movements: Extraocular movements intact.      Pupils: Pupils are equal, round, and reactive to light.   Cardiovascular:      Pulses:           Posterior tibial pulses are detected w/ Doppler on the right side.   Pulmonary:      Effort: Pulmonary effort is normal.   Musculoskeletal:      Right foot: Deformity present.   Feet:      Right foot:      Skin integrity: Ulcer and skin breakdown present.   Neurological:      Mental Status: He is alert.     Contracture of the second digit, left.  Does have mild erythema and darkening of the second digit.  Also has an wound to the dorsal aspect second digit with eschar appearance.  Significant pain with palpation.  Lateral deviation of the hallux.     Data Review  CBC:  Results from last 7 days   Lab Units 07/29/23  0605   WBC 10*3/mm3 10.18   RBC 10*6/mm3 5.35   HEMOGLOBIN g/dL 15.6   HEMATOCRIT % 50.0   PLATELETS 10*3/mm3 156     Lab Results (last 24 hours)       Procedure Component Value Units Date/Time    Magnesium [[id number]]  (Abnormal) Collected:  07/31/23 0618    Specimen: Blood Updated: 07/31/23 0652     Magnesium 1.5 mg/dL     Basic Metabolic Panel [[id number]]  (Abnormal) Collected: 07/31/23 0618    Specimen: Blood Updated: 07/31/23 0652     Glucose 121 mg/dL      BUN 19 mg/dL      Creatinine 1.53 mg/dL      Sodium 138 mmol/L      Potassium 3.6 mmol/L      Chloride 102 mmol/L      CO2 25.0 mmol/L      Calcium 10.6 mg/dL      BUN/Creatinine Ratio 12.4     Anion Gap 11.0 mmol/L      eGFR 46.8 mL/min/1.73     Narrative:      GFR Normal >60  Chronic Kidney Disease <60  Kidney Failure <15    The GFR formula is only valid for adults with stable renal function between ages 18 and 70.    Blood Culture - Blood, Arm, Right [[id number]]  (Normal) Collected: 07/28/23 1414    Specimen: Blood from Arm, Right Updated: 07/30/23 1431     Blood Culture No growth at 2 days    Blood Culture - Blood, Arm, Right [[id number]]  (Normal) Collected: 07/28/23 1402    Specimen: Blood from Arm, Right Updated: 07/30/23 1415     Blood Culture No growth at 2 days            Assessment & Plan   Patient's condition was discussed.  His vascular studies were reviewed.  We did discuss amputation of the second digit.  Will review with Dr. Carrion to make sure his vascular status is appropriate to proceed with amputation second digit.  If appropriate will proceed tomorrow.  Risk and benefits were discussed and reviewed.  Patient does have deformity of the second digit relates worsening pain and now has darkening of the wound.  We will continue to follow.       LOS: 3 days     Malika Krishna, APRN    Date: 7/31/2023  Time: 12:24 CDT

## 2023-08-01 ENCOUNTER — ANESTHESIA EVENT (OUTPATIENT)
Dept: PERIOP | Facility: HOSPITAL | Age: 78
DRG: 503 | End: 2023-08-01
Payer: MEDICARE

## 2023-08-01 ENCOUNTER — ANESTHESIA (OUTPATIENT)
Dept: PERIOP | Facility: HOSPITAL | Age: 78
DRG: 503 | End: 2023-08-01
Payer: MEDICARE

## 2023-08-01 PROCEDURE — 25010000002 DROPERIDOL PER 5 MG: Performed by: ANESTHESIOLOGY

## 2023-08-01 PROCEDURE — 88311 DECALCIFY TISSUE: CPT | Performed by: PODIATRIST

## 2023-08-01 PROCEDURE — 88305 TISSUE EXAM BY PATHOLOGIST: CPT | Performed by: PODIATRIST

## 2023-08-01 PROCEDURE — 97530 THERAPEUTIC ACTIVITIES: CPT

## 2023-08-01 PROCEDURE — 97116 GAIT TRAINING THERAPY: CPT

## 2023-08-01 PROCEDURE — 25010000002 MAGNESIUM SULFATE 2 GM/50ML SOLUTION: Performed by: NURSE PRACTITIONER

## 2023-08-01 PROCEDURE — 94799 UNLISTED PULMONARY SVC/PX: CPT

## 2023-08-01 PROCEDURE — 25010000002 PROPOFOL 10 MG/ML EMULSION: Performed by: NURSE ANESTHETIST, CERTIFIED REGISTERED

## 2023-08-01 PROCEDURE — 25010000002 VANCOMYCIN 10 G RECONSTITUTED SOLUTION: Performed by: FAMILY MEDICINE

## 2023-08-01 PROCEDURE — 0Y6S0Z0 DETACHMENT AT LEFT 2ND TOE, COMPLETE, OPEN APPROACH: ICD-10-PCS | Performed by: PODIATRIST

## 2023-08-01 PROCEDURE — 97110 THERAPEUTIC EXERCISES: CPT

## 2023-08-01 PROCEDURE — 94761 N-INVAS EAR/PLS OXIMETRY MLT: CPT

## 2023-08-01 PROCEDURE — 97535 SELF CARE MNGMENT TRAINING: CPT

## 2023-08-01 PROCEDURE — 0 CEFEPIME PER 500 MG: Performed by: NURSE PRACTITIONER

## 2023-08-01 PROCEDURE — 25010000002 ROPIVACAINE PER 1 MG: Performed by: PODIATRIST

## 2023-08-01 PROCEDURE — 94664 DEMO&/EVAL PT USE INHALER: CPT

## 2023-08-01 RX ORDER — LIDOCAINE HYDROCHLORIDE 10 MG/ML
0.5 INJECTION, SOLUTION EPIDURAL; INFILTRATION; INTRACAUDAL; PERINEURAL ONCE AS NEEDED
Status: DISCONTINUED | OUTPATIENT
Start: 2023-08-01 | End: 2023-08-01 | Stop reason: HOSPADM

## 2023-08-01 RX ORDER — HYDROMORPHONE HYDROCHLORIDE 1 MG/ML
0.5 INJECTION, SOLUTION INTRAMUSCULAR; INTRAVENOUS; SUBCUTANEOUS
Status: DISCONTINUED | OUTPATIENT
Start: 2023-08-01 | End: 2023-08-01 | Stop reason: HOSPADM

## 2023-08-01 RX ORDER — ROPIVACAINE HYDROCHLORIDE 5 MG/ML
INJECTION, SOLUTION EPIDURAL; INFILTRATION; PERINEURAL AS NEEDED
Status: DISCONTINUED | OUTPATIENT
Start: 2023-08-01 | End: 2023-08-01 | Stop reason: HOSPADM

## 2023-08-01 RX ORDER — OXYCODONE AND ACETAMINOPHEN 10; 325 MG/1; MG/1
1 TABLET ORAL ONCE AS NEEDED
Status: DISCONTINUED | OUTPATIENT
Start: 2023-08-01 | End: 2023-08-01 | Stop reason: HOSPADM

## 2023-08-01 RX ORDER — SODIUM CHLORIDE 9 MG/ML
INJECTION, SOLUTION INTRAVENOUS AS NEEDED
Status: DISCONTINUED | OUTPATIENT
Start: 2023-08-01 | End: 2023-08-01 | Stop reason: HOSPADM

## 2023-08-01 RX ORDER — ONDANSETRON 2 MG/ML
4 INJECTION INTRAMUSCULAR; INTRAVENOUS
Status: DISCONTINUED | OUTPATIENT
Start: 2023-08-01 | End: 2023-08-01 | Stop reason: HOSPADM

## 2023-08-01 RX ORDER — LIDOCAINE HYDROCHLORIDE 20 MG/ML
INJECTION, SOLUTION EPIDURAL; INFILTRATION; INTRACAUDAL; PERINEURAL AS NEEDED
Status: DISCONTINUED | OUTPATIENT
Start: 2023-08-01 | End: 2023-08-01 | Stop reason: SURG

## 2023-08-01 RX ORDER — SODIUM CHLORIDE, SODIUM LACTATE, POTASSIUM CHLORIDE, CALCIUM CHLORIDE 600; 310; 30; 20 MG/100ML; MG/100ML; MG/100ML; MG/100ML
100 INJECTION, SOLUTION INTRAVENOUS CONTINUOUS
Status: DISCONTINUED | OUTPATIENT
Start: 2023-08-01 | End: 2023-08-02 | Stop reason: HOSPADM

## 2023-08-01 RX ORDER — FENTANYL CITRATE 50 UG/ML
25 INJECTION, SOLUTION INTRAMUSCULAR; INTRAVENOUS
Status: DISCONTINUED | OUTPATIENT
Start: 2023-08-01 | End: 2023-08-01 | Stop reason: HOSPADM

## 2023-08-01 RX ORDER — SODIUM CHLORIDE 0.9 % (FLUSH) 0.9 %
10 SYRINGE (ML) INJECTION EVERY 12 HOURS SCHEDULED
Status: DISCONTINUED | OUTPATIENT
Start: 2023-08-01 | End: 2023-08-01 | Stop reason: HOSPADM

## 2023-08-01 RX ORDER — DROPERIDOL 2.5 MG/ML
0.62 INJECTION, SOLUTION INTRAMUSCULAR; INTRAVENOUS ONCE AS NEEDED
Status: COMPLETED | OUTPATIENT
Start: 2023-08-01 | End: 2023-08-01

## 2023-08-01 RX ORDER — NALOXONE HCL 0.4 MG/ML
0.04 VIAL (ML) INJECTION AS NEEDED
Status: DISCONTINUED | OUTPATIENT
Start: 2023-08-01 | End: 2023-08-01 | Stop reason: HOSPADM

## 2023-08-01 RX ORDER — FLUMAZENIL 0.1 MG/ML
0.2 INJECTION INTRAVENOUS AS NEEDED
Status: DISCONTINUED | OUTPATIENT
Start: 2023-08-01 | End: 2023-08-01 | Stop reason: HOSPADM

## 2023-08-01 RX ORDER — SODIUM CHLORIDE 0.9 % (FLUSH) 0.9 %
10 SYRINGE (ML) INJECTION AS NEEDED
Status: DISCONTINUED | OUTPATIENT
Start: 2023-08-01 | End: 2023-08-01 | Stop reason: HOSPADM

## 2023-08-01 RX ORDER — BUPIVACAINE HCL/0.9 % NACL/PF 0.125 %
PLASTIC BAG, INJECTION (ML) EPIDURAL AS NEEDED
Status: DISCONTINUED | OUTPATIENT
Start: 2023-08-01 | End: 2023-08-01 | Stop reason: SURG

## 2023-08-01 RX ORDER — PROPOFOL 10 MG/ML
VIAL (ML) INTRAVENOUS AS NEEDED
Status: DISCONTINUED | OUTPATIENT
Start: 2023-08-01 | End: 2023-08-01 | Stop reason: SURG

## 2023-08-01 RX ORDER — ONDANSETRON 2 MG/ML
4 INJECTION INTRAMUSCULAR; INTRAVENOUS EVERY 6 HOURS PRN
Status: DISCONTINUED | OUTPATIENT
Start: 2023-08-01 | End: 2023-08-02 | Stop reason: HOSPADM

## 2023-08-01 RX ORDER — IBUPROFEN 600 MG/1
600 TABLET ORAL ONCE AS NEEDED
Status: DISCONTINUED | OUTPATIENT
Start: 2023-08-01 | End: 2023-08-01 | Stop reason: HOSPADM

## 2023-08-01 RX ORDER — DEXTROSE MONOHYDRATE 25 G/50ML
12.5 INJECTION, SOLUTION INTRAVENOUS AS NEEDED
Status: DISCONTINUED | OUTPATIENT
Start: 2023-08-01 | End: 2023-08-01 | Stop reason: HOSPADM

## 2023-08-01 RX ORDER — MAGNESIUM SULFATE HEPTAHYDRATE 40 MG/ML
2 INJECTION, SOLUTION INTRAVENOUS ONCE
Status: COMPLETED | OUTPATIENT
Start: 2023-08-01 | End: 2023-08-01

## 2023-08-01 RX ORDER — SODIUM CHLORIDE, SODIUM LACTATE, POTASSIUM CHLORIDE, CALCIUM CHLORIDE 600; 310; 30; 20 MG/100ML; MG/100ML; MG/100ML; MG/100ML
9 INJECTION, SOLUTION INTRAVENOUS CONTINUOUS
Status: DISCONTINUED | OUTPATIENT
Start: 2023-08-01 | End: 2023-08-01 | Stop reason: HOSPADM

## 2023-08-01 RX ORDER — LABETALOL HYDROCHLORIDE 5 MG/ML
5 INJECTION, SOLUTION INTRAVENOUS
Status: DISCONTINUED | OUTPATIENT
Start: 2023-08-01 | End: 2023-08-01 | Stop reason: HOSPADM

## 2023-08-01 RX ADMIN — CEFEPIME 2000 MG: 2 INJECTION, POWDER, FOR SOLUTION INTRAVENOUS at 16:19

## 2023-08-01 RX ADMIN — MAGNESIUM SULFATE HEPTAHYDRATE 2 G: 2 INJECTION, SOLUTION INTRAVENOUS at 11:04

## 2023-08-01 RX ADMIN — MEMANTINE HYDROCHLORIDE 10 MG: 5 TABLET, FILM COATED ORAL at 20:29

## 2023-08-01 RX ADMIN — CEFEPIME 2000 MG: 2 INJECTION, POWDER, FOR SOLUTION INTRAVENOUS at 03:56

## 2023-08-01 RX ADMIN — METOPROLOL SUCCINATE 12.5 MG: 25 TABLET, EXTENDED RELEASE ORAL at 09:06

## 2023-08-01 RX ADMIN — APIXABAN 2.5 MG: 2.5 TABLET, FILM COATED ORAL at 20:28

## 2023-08-01 RX ADMIN — DOCUSATE SODIUM 50 MG AND SENNOSIDES 8.6 MG 2 TABLET: 8.6; 5 TABLET, FILM COATED ORAL at 20:28

## 2023-08-01 RX ADMIN — FAMOTIDINE 20 MG: 20 TABLET, FILM COATED ORAL at 09:07

## 2023-08-01 RX ADMIN — ISOSORBIDE MONONITRATE 30 MG: 30 TABLET, EXTENDED RELEASE ORAL at 09:07

## 2023-08-01 RX ADMIN — VANCOMYCIN HYDROCHLORIDE 1250 MG: 10 INJECTION, POWDER, LYOPHILIZED, FOR SOLUTION INTRAVENOUS at 14:23

## 2023-08-01 RX ADMIN — HYDROCODONE BITARTRATE AND ACETAMINOPHEN 1 TABLET: 5; 325 TABLET ORAL at 00:09

## 2023-08-01 RX ADMIN — HYDROCODONE BITARTRATE AND ACETAMINOPHEN 1 TABLET: 5; 325 TABLET ORAL at 20:28

## 2023-08-01 RX ADMIN — FAMOTIDINE 20 MG: 20 TABLET, FILM COATED ORAL at 20:29

## 2023-08-01 RX ADMIN — MEMANTINE HYDROCHLORIDE 10 MG: 5 TABLET, FILM COATED ORAL at 09:06

## 2023-08-01 RX ADMIN — TAMSULOSIN HYDROCHLORIDE 0.4 MG: 0.4 CAPSULE ORAL at 20:29

## 2023-08-01 RX ADMIN — Medication 200 MCG: at 18:34

## 2023-08-01 RX ADMIN — Medication 10 ML: at 09:07

## 2023-08-01 RX ADMIN — SODIUM CHLORIDE, POTASSIUM CHLORIDE, SODIUM LACTATE AND CALCIUM CHLORIDE 100 ML/HR: 600; 310; 30; 20 INJECTION, SOLUTION INTRAVENOUS at 20:34

## 2023-08-01 RX ADMIN — TAMSULOSIN HYDROCHLORIDE 0.4 MG: 0.4 CAPSULE ORAL at 09:06

## 2023-08-01 RX ADMIN — DROPERIDOL 0.62 MG: 2.5 INJECTION, SOLUTION INTRAMUSCULAR; INTRAVENOUS at 19:19

## 2023-08-01 RX ADMIN — ALLOPURINOL 100 MG: 100 TABLET ORAL at 09:07

## 2023-08-01 RX ADMIN — Medication 10 ML: at 20:34

## 2023-08-01 RX ADMIN — DOCUSATE SODIUM 50 MG AND SENNOSIDES 8.6 MG 2 TABLET: 8.6; 5 TABLET, FILM COATED ORAL at 09:06

## 2023-08-01 RX ADMIN — PROPOFOL 200 MG: 10 INJECTION, EMULSION INTRAVENOUS at 18:23

## 2023-08-01 RX ADMIN — LEVOTHYROXINE SODIUM 50 MCG: 50 TABLET ORAL at 06:34

## 2023-08-01 RX ADMIN — LIDOCAINE HYDROCHLORIDE 100 MG: 20 INJECTION, SOLUTION EPIDURAL; INFILTRATION; INTRACAUDAL; PERINEURAL at 18:23

## 2023-08-01 RX ADMIN — ATORVASTATIN CALCIUM 40 MG: 40 TABLET ORAL at 09:07

## 2023-08-01 RX ADMIN — SODIUM CHLORIDE, POTASSIUM CHLORIDE, SODIUM LACTATE AND CALCIUM CHLORIDE 100 ML/HR: 600; 310; 30; 20 INJECTION, SOLUTION INTRAVENOUS at 16:55

## 2023-08-01 NOTE — THERAPY TREATMENT NOTE
Acute Care - Occupational Therapy Treatment Note  [facility]     Patient Name: Samy Velazquez  : 1945  MRN: [id number]  Today's Date: 2023             Admit Date: 2023       ICD-10-CM ICD-9-CM   1. Altered mental status, unspecified altered mental status type  R41.82 780.97   2. Left foot infection  L08.9 686.9   3. Dysphagia, unspecified type  R13.10 787.20   4. Impaired functional mobility and activity tolerance [Z74.09 (ICD-10-CM)]  Z74.09 V49.89   5. Decreased activities of daily living (ADL) [Z78.9 (ICD-10-CM)]  Z78.9 V49.89     Patient Active Problem List   Diagnosis    Ischemic heart disease due to coronary artery obstruction    Osteoarthritis of multiple joints    Cardiac pacemaker    Chronic gout    Nephrolithiasis    Mixed hyperlipidemia    Morbidly obese    Hypertensive heart disease with heart failure    Major depressive disorder, recurrent, moderate    Stage 3b chronic kidney disease (CKD)    Ventricular tachycardia    Chronic systolic heart failure    Hypothyroidism    Paroxysmal atrial fibrillation    Coronary atherosclerosis    Essential hypertension    Weakness of right lower extremity    Cellulitis of second toe of left foot    Moderate malnutrition    Altered mental status     Past Medical History:   Diagnosis Date    Arthritis     Coronary artery disease     Hyperlipidemia     Hypertensive heart disease with heart failure 3/29/2021    Hypothyroidism 2021    Kidney stone     Major depressive disorder, recurrent, moderate 3/29/2021    Morbidly obese 2020     Past Surgical History:   Procedure Laterality Date    CARDIAC DEFIBRILLATOR PLACEMENT      CARDIAC DEFIBRILLATOR PLACEMENT N/A     CARDIAC SURGERY      ENDOSCOPY N/A 10/30/2020    Procedure: ESOPHAGOGASTRODUODENOSCOPY WITH ANESTHESIA;  Surgeon: Brent Stanley MD;  Location: [facility];  Service: Gastroenterology;  Laterality: N/A;  pre dysphagia  post post op gastric surgery  dr jose manuel smith    ESOPHAGUS SURGERY       KNEE SURGERY      TOTAL SHOULDER REPLACEMENT           OT ASSESSMENT FLOWSHEET (last 12 hours)       OT Evaluation and Treatment       Row Name 08/01/23 1043                   OT Time and Intention    Subjective Information complains of;pain  -TS        Document Type therapy note (daily note)  -TS        Mode of Treatment occupational therapy  -TS        Patient Effort good  -TS        Comment LLE 2nd toe amputation 8/1 in PM  -TS           General Information    Existing Precautions/Restrictions fall  -TS           Pain Assessment    Pretreatment Pain Rating 4/10  -TS        Posttreatment Pain Rating 0/10 - no pain  -TS        Pain Location - buttock  -TS        Pre/Posttreatment Pain Comment pt c/o pain while sitting EOB in buttock and in lower back, once pt returned to supine and positioned on L side pt pain decreased  -TS           Cognition    Personal Safety Interventions fall prevention program maintained;gait belt;nonskid shoes/slippers when out of bed  -TS           Activities of Daily Living    BADL Assessment/Intervention grooming;toileting  -TS           Grooming Assessment/Training    Traill Level (Grooming) shave face;maximum assist (25% patient effort);wash face, hands;set up  -TS        Position (Grooming) edge of bed sitting  -TS           Toileting Assessment/Training    Traill Level (Toileting) perform perineal hygiene;change pad/brief;maximum assist (25% patient effort)  -TS        Position (Toileting) supine  -TS           Bed Mobility    Bed Mobility sit-supine;supine-sit;rolling left;rolling right  -TS        Rolling Left Traill (Bed Mobility) contact guard  -TS        Rolling Right Traill (Bed Mobility) standby assist  -TS        Supine-Sit Traill (Bed Mobility) standby assist  -TS        Sit-Supine Traill (Bed Mobility) contact guard;minimum assist (75% patient effort)  -TS        Assistive Device (Bed Mobility) bed rails  -TS           Balance    Static  Sitting Balance contact guard;minimal assist  -TS           Wound 07/28/23 2133 gluteal    Wound - Properties Group Placement Date: 07/28/23  -AR Placement Time: 2133 -AR Present on Hospital Admission: Y  -AR Location: gluteal  -AR    Retired Wound - Properties Group Placement Date: 07/28/23  -AR Placement Time: 2133 -AR Present on Hospital Admission: Y  -AR Location: gluteal  -AR    Retired Wound - Properties Group Date first assessed: 07/28/23  -AR Time first assessed: 2133 -AR Present on Hospital Admission: Y  -AR Location: gluteal  -AR       Wound 07/28/23 2139 Bilateral anterior hip    Wound - Properties Group Placement Date: 07/28/23  -AR Placement Time: 2139 -AR Side: Bilateral  -AR Orientation: anterior  -AR Location: hip  -AR    Retired Wound - Properties Group Placement Date: 07/28/23  -AR Placement Time: 2139 -AR Side: Bilateral  -AR Orientation: anterior  -AR Location: hip  -AR    Retired Wound - Properties Group Date first assessed: 07/28/23  -AR Time first assessed: 2139 -AR Side: Bilateral  -AR Location: hip  -AR       Wound 07/28/23 2140 Right anterior greater trochanter    Wound - Properties Group Placement Date: 07/28/23  -AR Placement Time: 2140 -AR Side: Right  -AR Orientation: anterior  -AR Location: greater trochanter  -AR    Retired Wound - Properties Group Placement Date: 07/28/23  -AR Placement Time: 2140 -AR Side: Right  -AR Orientation: anterior  -AR Location: greater trochanter  -AR    Retired Wound - Properties Group Date first assessed: 07/28/23  -AR Time first assessed: 2140  -AR Side: Right  -AR Location: greater trochanter  -AR       Wound 06/22/23 1642 Left anterior second toe    Wound - Properties Group Placement Date: 06/22/23  -AM Placement Time: 1642  -AM Side: Left  -AM Orientation: anterior  -AM Location: second toe  -AM    Retired Wound - Properties Group Placement Date: 06/22/23  -AM Placement Time: 1642  -AM Side: Left  -AM Orientation: anterior  -AM Location:  second toe  -AM    Retired Wound - Properties Group Date first assessed: 06/22/23  -AM Time first assessed: 1642  -AM Side: Left  -AM Location: second toe  -AM       Wound 06/22/23 1952 Right anterior great toe    Wound - Properties Group Placement Date: 06/22/23  -AM Placement Time: 1952 -AM Present on Hospital Admission: Y  -AM Side: Right  -AM Orientation: anterior  -AM Location: great toe  -AM    Retired Wound - Properties Group Placement Date: 06/22/23  -AM Placement Time: 1952 -AM Present on Hospital Admission: Y  -AM Side: Right  -AM Orientation: anterior  -AM Location: great toe  -AM    Retired Wound - Properties Group Date first assessed: 06/22/23  -AM Time first assessed: 1952 -AM Present on Hospital Admission: Y  -AM Side: Right  -AM Location: great toe  -AM       Wound 07/28/23 2145 Right posterior foot    Wound - Properties Group Placement Date: 07/28/23  -AR Placement Time: 2145 -AR Side: Right  -AR Orientation: posterior  -AR Location: foot  -AR    Retired Wound - Properties Group Placement Date: 07/28/23  -AR Placement Time: 2145 -AR Side: Right  -AR Orientation: posterior  -AR Location: foot  -AR    Retired Wound - Properties Group Date first assessed: 07/28/23  -AR Time first assessed: 2145 -AR Side: Right  -AR Location: foot  -AR       Plan of Care Review    Plan of Care Reviewed With patient  -TS        Progress improving  -TS           Positioning and Restraints    Pre-Treatment Position in bed  -TS        Post Treatment Position bed  -TS        In Bed side lying left;call light within reach;encouraged to call for assist;side rails up x2;pillow between legs  -TS                  User Key  (r) = Recorded By, (t) = Taken By, (c) = Cosigned By      Initials Name Effective Dates    TS Syeda Merritt COTA 02/03/23 -     AM Nereyda Doherty RN 06/16/21 -     AR Yajaira Sutherland RN 05/24/22 -                      Occupational Therapy Education       Title: PT OT SLP Therapies (Done)        Topic: Occupational Therapy (Done)       Point: ADL training (Done)       Description:   Instruct learner(s) on proper safety adaptation and remediation techniques during self care or transfers.   Instruct in proper use of assistive devices.                  Learning Progress Summary             Patient Acceptance, E, VU by  at 7/31/2023 1707    Acceptance, E, VU by  at 7/29/2023 1435    Acceptance, E, VU,NR by  at 7/29/2023 1148                         Point: Home exercise program (Done)       Description:   Instruct learner(s) on appropriate technique for monitoring, assisting and/or progressing therapeutic exercises/activities.                  Learning Progress Summary             Patient Acceptance, E, VU by  at 7/31/2023 1707    Acceptance, E, VU by  at 7/29/2023 1435                         Point: Precautions (Done)       Description:   Instruct learner(s) on prescribed precautions during self-care and functional transfers.                  Learning Progress Summary             Patient Acceptance, E, VU by  at 7/31/2023 1707    Acceptance, E, VU by  at 7/29/2023 1435    Acceptance, E, VU,NR by  at 7/29/2023 1148                         Point: Body mechanics (Done)       Description:   Instruct learner(s) on proper positioning and spine alignment during self-care, functional mobility activities and/or exercises.                  Learning Progress Summary             Patient Acceptance, E, VU by  at 7/31/2023 1707    Acceptance, E, VU by  at 7/29/2023 1435    Acceptance, E, VU,NR by  at 7/29/2023 1148                                         User Key       Initials Effective Dates Name Provider Type Discipline     06/20/22 -  Alexandra Berg OTR/L Occupational Therapist OT     07/05/23 -  Loyda Chatterjee RN Registered Nurse Nurse                      OT Recommendation and Plan     Plan of Care Review  Plan of Care Reviewed With: patient  Progress: improving  Plan of Care Reviewed  With: patient     Outcome Measures       Row Name 08/01/23 1200             How much help from another is currently needed...    Putting on and taking off regular lower body clothing? 2  -TS      Bathing (including washing, rinsing, and drying) 2  -TS      Toileting (which includes using toilet bed pan or urinal) 2  -TS      Putting on and taking off regular upper body clothing 2  -TS      Taking care of personal grooming (such as brushing teeth) 3  -TS      Eating meals 4  -TS      AM-PAC 6 Clicks Score (OT) 15  -TS                User Key  (r) = Recorded By, (t) = Taken By, (c) = Cosigned By      Initials Name Provider Type    Syeda Mg COTA Occupational Therapist Assistant                    Time Calculation:    Time Calculation- OT       Row Name 08/01/23 1217             Time Calculation- OT    OT Start Time 1043  -TS      OT Stop Time 1122  -TS      OT Time Calculation (min) 39 min  -TS      Total Timed Code Minutes- OT 39 minute(s)  -TS      OT Received On 08/01/23  -TS         Timed Charges    46063 - OT Self Care/Mgmt Minutes 39  -TS         Total Minutes    Timed Charges Total Minutes 39  -TS       Total Minutes 39  -TS                User Key  (r) = Recorded By, (t) = Taken By, (c) = Cosigned By      Initials Name Provider Type    Syeda Mg COTA Occupational Therapist Assistant                  Therapy Charges for Today       Code Description Service Date Service Provider Modifiers Qty    [id number] HC OT SELF CARE/MGMT/TRAIN EA 15 MIN 8/1/2023 Syeda Merritt COTA GO 3                 Syeda PATTON. KAVITA Merritt  8/1/2023

## 2023-08-01 NOTE — OP NOTE
AMPUTATION DIGIT  Procedure Note    Samy Velazquez  8/1/2023    Pre-op Diagnosis:   Osteomyelitis toe, cellulitis toe, hammertoe, open wound    Post-op Diagnosis:     Post-Op Diagnosis Codes:     * Osteomyelitis of second toe of left foot [M86.9]     * Ulcer of toe [L97.509]     * Cellulitis of second toe [L03.039]     Procedure/CPT Codes:       Procedure(s):  AMPUTATION OF SECOND DIGIT, LEFT FOOT with disarticulation at the metatarsal phalangeal joint.    Surgeon(s):  Josh Carrion DPM    Anesthesia: General    Staff:   Circulator: Stanislav Burns RN  Scrub Person: Ally Tran; Lola Simms     was responsible for performing the following activities: Retraction and their skilled assistance was necessary for the success of this case.    Indications for procedure:  Osteomyelitis    Procedure details:  The patient brought the operating room placed under general anesthesia.  Left foot prepped and draped in usual sterile fashion.    Procedure number 1 second agent amputation with disarticulation at the metatarsal phalangeal joint left foot    Attention was then directed to the base the second digit where 2 converging semielliptical incisions were made.  It was deepened with sharp and blunt dissection technique.  The digit was disarticulated at the metatarsal phalangeal joint.  Wound was then irrigated.  Capsular tissues were closed with 2-0 Vicryl.  Subcutaneous tissues were closed with 2-0 Vicryl.  Skin was remodeled and closed with 3-0 nylon.  Well-padded compression bandage applied.    Estimated Blood Loss: none    Specimens:                None      Drains:   [REMOVED] External Urinary Catheter (Removed)   Site Assessment Clean;Skin intact 06/28/23 0800   Application/Removal external catheter changed 06/28/23 0558   Collection Container Wall suction 06/28/23 0558   Wall suction (mmHG) 125 mmHG 06/28/23 0800   Securement Method Tape 06/28/23 0800   Catheter care complete Yes 06/28/23 0558   Output (mL) 550 mL  06/28/23 0405       Implants: Nothing was implanted during the procedure     Complications: none           Follow up:   Bandage applied.  We will follow but should be able to be discharged on oral antibiotic tomorrow if medically stable.    Josh Carrion DPM     Date: 8/1/2023  Time: 18:42 CDT

## 2023-08-01 NOTE — PLAN OF CARE
Problem: Fall Injury Risk  Goal: Absence of Fall and Fall-Related Injury  Outcome: Ongoing, Progressing  Intervention: Identify and Manage Contributors  Recent Flowsheet Documentation  Taken 8/1/2023 1400 by Loyda Chatterjee RN  Medication Review/Management: medications reviewed  Taken 8/1/2023 1200 by Loyda Chatterjee RN  Medication Review/Management: medications reviewed  Taken 8/1/2023 1000 by Loyda Chatterjee RN  Medication Review/Management: medications reviewed  Taken 8/1/2023 0855 by Loyda Chatterjee RN  Medication Review/Management: medications reviewed  Self-Care Promotion: independence encouraged  Intervention: Promote Injury-Free Environment  Recent Flowsheet Documentation  Taken 8/1/2023 1400 by Loyda Chatterjee RN  Safety Promotion/Fall Prevention:   toileting scheduled   safety round/check completed   room organization consistent   nonskid shoes/slippers when out of bed   fall prevention program maintained   clutter free environment maintained   assistive device/personal items within reach   activity supervised  Taken 8/1/2023 1200 by Loyda Chatterjee RN  Safety Promotion/Fall Prevention:   activity supervised   assistive device/personal items within reach   clutter free environment maintained   fall prevention program maintained   nonskid shoes/slippers when out of bed   room organization consistent   safety round/check completed   toileting scheduled  Taken 8/1/2023 1000 by Loyda Chatterjee RN  Safety Promotion/Fall Prevention:   activity supervised   assistive device/personal items within reach   clutter free environment maintained   fall prevention program maintained   mobility aid in reach   room organization consistent   safety round/check completed   toileting scheduled  Taken 8/1/2023 0855 by Loyda Chatterjee RN  Safety Promotion/Fall Prevention:   activity supervised   assistive device/personal items within reach   clutter free environment maintained   fall prevention program  maintained   nonskid shoes/slippers when out of bed   room organization consistent   safety round/check completed   toileting scheduled     Problem: Skin Injury Risk Increased  Goal: Skin Health and Integrity  Outcome: Ongoing, Progressing  Intervention: Optimize Skin Protection  Recent Flowsheet Documentation  Taken 8/1/2023 1400 by Loyda Chatterjee RN  Head of Bed (HOB) Positioning: HOB elevated  Taken 8/1/2023 1200 by Loyda Chatterjee RN  Head of Bed (HOB) Positioning: HOB elevated  Taken 8/1/2023 1000 by Loyda Chatterjee RN  Head of Bed (HOB) Positioning: HOB elevated  Taken 8/1/2023 0855 by Loyda Chatterjee RN  Pressure Reduction Techniques:   frequent weight shift encouraged   heels elevated off bed  Head of Bed (HOB) Positioning: HOB elevated  Pressure Reduction Devices:   specialty bed utilized   positioning supports utilized   heel offloading device utilized  Skin Protection: adhesive use limited     Problem: Adult Inpatient Plan of Care  Goal: Plan of Care Review  Outcome: Ongoing, Progressing  Flowsheets (Taken 8/1/2023 1043 by Syeda Merritt COTA)  Plan of Care Reviewed With: patient  Goal: Patient-Specific Goal (Individualized)  Outcome: Ongoing, Progressing  Goal: Absence of Hospital-Acquired Illness or Injury  Outcome: Ongoing, Progressing  Intervention: Identify and Manage Fall Risk  Recent Flowsheet Documentation  Taken 8/1/2023 1400 by Loyad Chatterjee RN  Safety Promotion/Fall Prevention:   toileting scheduled   safety round/check completed   room organization consistent   nonskid shoes/slippers when out of bed   fall prevention program maintained   clutter free environment maintained   assistive device/personal items within reach   activity supervised  Taken 8/1/2023 1200 by Loyda Chatterjee RN  Safety Promotion/Fall Prevention:   activity supervised   assistive device/personal items within reach   clutter free environment maintained   fall prevention program maintained    nonskid shoes/slippers when out of bed   room organization consistent   safety round/check completed   toileting scheduled  Taken 8/1/2023 1000 by Loyda Chatterjee RN  Safety Promotion/Fall Prevention:   activity supervised   assistive device/personal items within reach   clutter free environment maintained   fall prevention program maintained   mobility aid in reach   room organization consistent   safety round/check completed   toileting scheduled  Taken 8/1/2023 0855 by Loyda Chatterjee RN  Safety Promotion/Fall Prevention:   activity supervised   assistive device/personal items within reach   clutter free environment maintained   fall prevention program maintained   nonskid shoes/slippers when out of bed   room organization consistent   safety round/check completed   toileting scheduled  Intervention: Prevent Skin Injury  Recent Flowsheet Documentation  Taken 8/1/2023 1400 by Loyda Chatterjee RN  Body Position: 30 degrees  Taken 8/1/2023 1200 by Loyda Chatterjee RN  Body Position: 30 degrees  Taken 8/1/2023 1000 by Loyda Chatterjee RN  Body Position: 30 degrees  Taken 8/1/2023 0855 by Loyda Chatterjee RN  Body Position: 30 degrees  Skin Protection: adhesive use limited  Intervention: Prevent and Manage VTE (Venous Thromboembolism) Risk  Recent Flowsheet Documentation  Taken 8/1/2023 0855 by Loyda Chatterjee RN  Activity Management: back to bed  VTE Prevention/Management:   sequential compression devices off   bilateral  Range of Motion: active ROM (range of motion) encouraged  Intervention: Prevent Infection  Recent Flowsheet Documentation  Taken 8/1/2023 1400 by Loyda Chatterjee RN  Infection Prevention: rest/sleep promoted  Taken 8/1/2023 1200 by Loyda Chatterjee RN  Infection Prevention: rest/sleep promoted  Taken 8/1/2023 1000 by Loyda Chatterjee RN  Infection Prevention: rest/sleep promoted  Goal: Optimal Comfort and Wellbeing  Outcome: Ongoing, Progressing  Intervention: Monitor Pain  and Promote Comfort  Recent Flowsheet Documentation  Taken 8/1/2023 1400 by Loyda Chatterjee RN  Pain Management Interventions: no interventions per patient request  Taken 8/1/2023 1200 by Loyda Chatterjee RN  Pain Management Interventions: no interventions per patient request  Taken 8/1/2023 1000 by Loyda Chatterjee RN  Pain Management Interventions: no interventions per patient request  Taken 8/1/2023 0855 by Loyda Chatterjee RN  Pain Management Interventions: no interventions per patient request  Intervention: Provide Person-Centered Care  Recent Flowsheet Documentation  Taken 8/1/2023 0855 by Loyda Chatterjee RN  Trust Relationship/Rapport:   care explained   questions answered   thoughts/feelings acknowledged  Goal: Readiness for Transition of Care  Outcome: Ongoing, Progressing     Problem: Hypertension Comorbidity  Goal: Blood Pressure in Desired Range  Outcome: Ongoing, Progressing  Intervention: Maintain Blood Pressure Management  Recent Flowsheet Documentation  Taken 8/1/2023 1400 by Loyda Chatterjee RN  Medication Review/Management: medications reviewed  Taken 8/1/2023 1200 by Loyda Chatterjee RN  Medication Review/Management: medications reviewed  Taken 8/1/2023 1000 by Loyda Chatterjee RN  Medication Review/Management: medications reviewed  Taken 8/1/2023 0855 by Loyda Chatterjee RN  Medication Review/Management: medications reviewed   Goal Outcome Evaluation:  Pt alert and oriented x4. VSS. no c/o pain. BUTLER. PPP. SCDS  for VTE. . npo diet for surgery today for amputation of 2nd toe of left foot.  Skin wounds assessed. Voiding in brief. Last BM 7/31/23. Surgery today. Isolation contact maintained D/c plans. Call light within reach. Safety maintained.     Plan of Care Reviewed With: patient

## 2023-08-01 NOTE — CASE MANAGEMENT/SOCIAL WORK
Continued Stay Note   [place]     Patient Name: Samy Velazquez  MRN: [id number]  Today's Date: 8/1/2023    Admit Date: 7/28/2023        Discharge Plan       Row Name 08/01/23 1414       Plan    Plan Comments Events noted. Plan is still for return to [facility] [place]. Will continue to follow.    Final Discharge Disposition Code 03 - skilled nursing facility (SNF)                   Discharge Codes    No documentation.                 Expected Discharge Date and Time       Expected Discharge Date Expected Discharge Time    Aug 2, 2023               KOTA Lauren

## 2023-08-01 NOTE — PROGRESS NOTES
[facility] Emergency Room Medicine Services  INPATIENT PROGRESS NOTE    Patient Name: Samy Velazquez  Date of Admission: 7/28/2023  Today's Date: 08/01/23  Length of Stay: 4  Primary Care Physician: Garret Salinas MD    Subjective   Chief Complaint: left foot pain  HPI   Mr. Velazquez is a 78-year-old male who presented to [facility] on 7/28 for altered mental status with wound to second digit, left foot.  Per family member he has had issues on and off with the wound for the last several months.  He has a hammertoe deformity of the second digit.  Wound will weal and then open back up due to deformity when wearing shoes.  States that it recently became infected.  He several rounds of oral antibiotics.  He is followed by wound care at [facility].  In the last several days, wound of the toe became worsened with increased redness, pain and swelling.  CRP 4, lactate 3.2, procalcitonin 0.93, WBC 16.  Started on vancomycin and cefepime.  Chest x-ray showed no acute findings.  CT head showed no acute process.  X-ray left foot showed showed no convincing radiographic evidence of acute osteomyelitis.    Lying in bed.  He is NPO for amputation second digit left foot with Dr. Carrion at approximately 6 PM.  He voices no new complaints.    Review of Systems   All pertinent negatives and positives are as above. All other systems have been reviewed and are negative unless otherwise stated.     Objective    Temp:  [97.5 øF (36.4 øC)-98.3 øF (36.8 øC)] 97.9 øF (36.6 øC)  Heart Rate:  [70-85] 70  Resp:  [16-18] 18  BP: ()/(61-76) 121/76  Physical Exam  Vitals reviewed.   Constitutional:       General: He is not in acute distress.     Appearance: He is obese. He is not toxic-appearing.      Comments: Lying in bed.  No acute distress.  On room air.  No family at bedside.  Discussed with his nurse abel.   HENT:      Head: Normocephalic and atraumatic.      Mouth/Throat:      Mouth:  Mucous membranes are moist.      Pharynx: Oropharynx is clear.   Eyes:      Extraocular Movements: Extraocular movements intact.      Conjunctiva/sclera: Conjunctivae normal.      Pupils: Pupils are equal, round, and reactive to light.   Cardiovascular:      Rate and Rhythm: Normal rate and regular rhythm.      Pulses: Normal pulses.   Pulmonary:      Effort: Pulmonary effort is normal. No respiratory distress.      Breath sounds: Normal breath sounds.   Abdominal:      General: Bowel sounds are normal. There is no distension.      Palpations: Abdomen is soft.      Tenderness: There is no abdominal tenderness.   Musculoskeletal:         General: No swelling or tenderness. Normal range of motion.      Cervical back: Normal range of motion and neck supple. No muscular tenderness.   Skin:     General: Skin is warm and dry.      Findings: No erythema or rash.   Neurological:      General: No focal deficit present.      Mental Status: He is alert and oriented to person, place, and time.      Cranial Nerves: No cranial nerve deficit.      Motor: No weakness.   Psychiatric:         Mood and Affect: Mood normal.         Behavior: Behavior normal.         Results Review:  I have reviewed the labs, radiology results, and diagnostic studies.    Laboratory Data:   Results from last 7 days   Lab Units 07/29/23  0605 07/28/23  1402   WBC 10*3/mm3 10.18 16.78*   HEMOGLOBIN g/dL 15.6 16.5   HEMATOCRIT % 50.0 51.2*   PLATELETS 10*3/mm3 156 197          Results from last 7 days   Lab Units 07/31/23  0618 07/30/23  0550 07/29/23  0605 07/28/23  1402   SODIUM mmol/L 138 137 136 132*   POTASSIUM mmol/L 3.6 3.1* 3.7 3.6   CHLORIDE mmol/L 102 102 101 94*   CO2 mmol/L 25.0 24.0 21.0* 24.0   BUN mg/dL 19 23 21 24*   CREATININE mg/dL 1.53* 1.61* 1.43* 1.75*   CALCIUM mg/dL 10.6* 10.0 9.7 10.0   BILIRUBIN mg/dL  --   --   --  1.0   ALK PHOS U/L  --   --   --  187*   ALT (SGPT) U/L  --   --   --  22   AST (SGOT) U/L  --   --   --  24   GLUCOSE  mg/dL 121* 126* 125* 192*         Culture Data:   Microbiology Results (last 10 days)       Procedure Component Value - Date/Time    MRSA Screen, PCR (Inpatient) - Swab, Nares [[id number]]  (Abnormal) Collected: 07/29/23 1035    Lab Status: Final result Specimen: Swab from Nares Updated: 07/29/23 1151     MRSA PCR MRSA Detected    Narrative:      The negative predictive value of this diagnostic test is high and should only be used to consider de-escalating anti-MRSA therapy. A positive result may indicate colonization with MRSA and must be correlated clinically.    Blood Culture - Blood, Arm, Right [[id number]]  (Normal) Collected: 07/28/23 1414    Lab Status: Preliminary result Specimen: Blood from Arm, Right Updated: 07/31/23 1431     Blood Culture No growth at 3 days    COVID-19,CEPHEID/KATHIE,COR/JOSE/PAD/ANA/MAD IN-HOUSE(OR EMERGENT/ADD-ON),NP SWAB IN TRANSPORT MEDIA 3-4 HR TAT, RT-PCR - Swab, Nasopharynx [[id number]]  (Normal) Collected: 07/28/23 1403    Lab Status: Final result Specimen: Swab from Nasopharynx Updated: 07/28/23 1432     COVID19 Not Detected    Narrative:      Fact sheet for providers: https://www.fda.gov/media/684312/download     Fact sheet for patients: https://www.fda.gov/media/404033/download  Fact sheet for providers: https://www.fda.gov/media/760012/download    Fact sheet for patients: https://www.fda.gov/media/829778/download    Test performed by PCR.    Blood Culture - Blood, Arm, Right [[id number]]  (Normal) Collected: 07/28/23 1402    Lab Status: Preliminary result Specimen: Blood from Arm, Right Updated: 07/31/23 1415     Blood Culture No growth at 3 days          Radiology Data:   Imaging Results (Last 24 Hours)       ** No results found for the last 24 hours. **            I have reviewed the patient's current medications.     Assessment/Plan   Assessment  Active Hospital Problems    Diagnosis     **Cellulitis of second toe of left foot     Altered mental status     Paroxysmal atrial  fibrillation     Stage 3b chronic kidney disease (CKD)     Osteoarthritis of multiple joints     Chronic gout        Treatment Plan  Cellulitis of second toe of left foot.  Continue vancomycin and cefepime.  Blood culture no growth to date.  He has had worsening pain to the second toe and relates darkening of the wound of the second digit.  Podiatry following, proceeding with amputation second digit today with Dr. Carrion.    Left lower extremity arterial duplex reveals patent left lower extremity arterial system without evidence of significant stenosis or occlusion.    Chronic kidney disease stage IIIb. Stable with creatinine 1.53.     Paroxysmal A-fib chronically anticoagulated on Eliquis.  Continue toprol xL.    Wound care consult.    Eliquis will serve as DVT prophylaxis.     Bowel regimen.    PT/OT.    Pain control.    Medical Decision Making  Number and Complexity of problems: 1 acute problem in the form of cellulitis of second toe of left foot with need for amputation  Differential Diagnosis: None considered at present    Conditions and Status        Condition is unchanged.     [facility] Data  External documents reviewed: Prior [facility] records  Cardiac tracing (EKG, telemetry) interpretation: none  Radiology interpretation: Interpreted by radiology  Labs reviewed: As above  Any tests that were considered but not ordered: None considered at present     Decision rules/scores evaluated (example [id number], Wells, etc): None considered at present     Discussed with: Patient and Dr. Sim     Care Planning  Shared decision making: Patient is agreeable to ongoing work-up and treatment  Code status and discussions: Full code with full interventions    Disposition  Social Determinants of Health that impact treatment or disposition: Will return to [facility] -  he has a bed hold  I expect the patient to be discharged to [facility] in 1-2 days.     Electronically signed by GEN Horner, 08/01/23, 10:23 CDT.

## 2023-08-01 NOTE — PLAN OF CARE
Goal Outcome Evaluation:              Outcome Evaluation: Pt A&Ox4, slow to respond, flat affect, participated more in conversation this shift. Incontinent of bladder to brief. On Tele NSR, on RA saturating low 90s. Medications given per orders, PRN Norco given with positive effect and Tylenol given without effect. On Eliquis for VTE. CHG complete for sx, NPO at midnight. Safety maintained, call light within reach.

## 2023-08-01 NOTE — PLAN OF CARE
Goal Outcome Evaluation:  Plan of Care Reviewed With: patient, spouse      Progress: improving  Outcome Evaluation: Ntn follow up. Pt reports appetite is okay. NPO today for surgery. Oral intake 56% of 4 meals. Encouraged oral intake once diet is resumed. Wilbert BID. Cont to follow for plan of care.

## 2023-08-01 NOTE — THERAPY TREATMENT NOTE
Acute Care - Physical Therapy Treatment Note  [facility]     Patient Name: Samy Velazquez  : 1945  MRN: [id number]  Today's Date: 2023      Visit Dx:     ICD-10-CM ICD-9-CM   1. Altered mental status, unspecified altered mental status type  R41.82 780.97   2. Left foot infection  L08.9 686.9   3. Dysphagia, unspecified type  R13.10 787.20   4. Impaired functional mobility and activity tolerance [Z74.09 (ICD-10-CM)]  Z74.09 V49.89   5. Decreased activities of daily living (ADL) [Z78.9 (ICD-10-CM)]  Z78.9 V49.89     Patient Active Problem List   Diagnosis    Ischemic heart disease due to coronary artery obstruction    Osteoarthritis of multiple joints    Cardiac pacemaker    Chronic gout    Nephrolithiasis    Mixed hyperlipidemia    Morbidly obese    Hypertensive heart disease with heart failure    Major depressive disorder, recurrent, moderate    Stage 3b chronic kidney disease (CKD)    Ventricular tachycardia    Chronic systolic heart failure    Hypothyroidism    Paroxysmal atrial fibrillation    Coronary atherosclerosis    Essential hypertension    Weakness of right lower extremity    Cellulitis of second toe of left foot    Moderate malnutrition    Altered mental status     Past Medical History:   Diagnosis Date    Arthritis     Coronary artery disease     Hyperlipidemia     Hypertensive heart disease with heart failure 3/29/2021    Hypothyroidism 2021    Kidney stone     Major depressive disorder, recurrent, moderate 3/29/2021    Morbidly obese 2020     Past Surgical History:   Procedure Laterality Date    CARDIAC DEFIBRILLATOR PLACEMENT      CARDIAC DEFIBRILLATOR PLACEMENT N/A     CARDIAC SURGERY      ENDOSCOPY N/A 10/30/2020    Procedure: ESOPHAGOGASTRODUODENOSCOPY WITH ANESTHESIA;  Surgeon: Brent Stanley MD;  Location: [facility];  Service: Gastroenterology;  Laterality: N/A;  pre dysphagia  post post op gastric surgery  dr jose manuel smith    ESOPHAGUS SURGERY      KNEE SURGERY      TOTAL  SHOULDER REPLACEMENT       PT Assessment (last 12 hours)       PT Evaluation and Treatment       Row Name 08/01/23 1420          Physical Therapy Time and Intention    Subjective Information no complaints  -KJ     Document Type therapy note (daily note)  -KJ     Mode of Treatment physical therapy  -KJ     Patient Effort good  -KJ       Row Name 08/01/23 1420          General Information    Existing Precautions/Restrictions fall  -KJ       Row Name 08/01/23 1420          Pain    Pretreatment Pain Rating 0/10 - no pain  -KJ     Posttreatment Pain Rating 0/10 - no pain  -KJ       Row Name 08/01/23 1420          Bed Mobility    Supine-Sit Webster (Bed Mobility) verbal cues;minimum assist (75% patient effort)  -KJ     Sit-Supine Webster (Bed Mobility) supervision;verbal cues  -KJ     Assistive Device (Bed Mobility) bed rails  -KJ       Row Name 08/01/23 1420          Sit-Stand Transfer    Sit-Stand Webster (Transfers) verbal cues;minimum assist (75% patient effort)  -KJ       Row Name 08/01/23 1420          Stand-Sit Transfer    Stand-Sit Webster (Transfers) verbal cues;minimum assist (75% patient effort)  -KJ     Assistive Device (Stand-Sit Transfers) walker, front-wheeled  -KJ       Row Name 08/01/23 1420          Gait/Stairs (Locomotion)    Webster Level (Gait) verbal cues;contact guard;minimum assist (75% patient effort)  -KJ     Assistive Device (Gait) walker, front-wheeled  -KJ     Distance in Feet (Gait) 10' x 4  -KJ     Deviations/Abnormal Patterns (Gait) gait speed decreased;stride length decreased  -KJ     Bilateral Gait Deviations forward flexed posture  -KJ       Row Name 08/01/23 1420          Motor Skills    Therapeutic Exercise aerobic  -KJ       Row Name 08/01/23 1420          Aerobic Exercise    Comment, Aerobic Exercise (Therapeutic Exercise) AROM x 15 reps  -KJ       Row Name             Wound 07/28/23 2133 gluteal    Wound - Properties Group Placement Date: 07/28/23  -AR  Placement Time: 2133 -AR Present on Hospital Admission: Y  -AR Location: gluteal  -AR    Retired Wound - Properties Group Placement Date: 07/28/23  -AR Placement Time: 2133 -AR Present on Hospital Admission: Y  -AR Location: gluteal  -AR    Retired Wound - Properties Group Date first assessed: 07/28/23  -AR Time first assessed: 2133 -AR Present on Hospital Admission: Y  -AR Location: gluteal  -AR      Row Name             Wound 07/28/23 2139 Bilateral anterior hip    Wound - Properties Group Placement Date: 07/28/23  -AR Placement Time: 2139 -AR Side: Bilateral  -AR Orientation: anterior  -AR Location: hip  -AR    Retired Wound - Properties Group Placement Date: 07/28/23  -AR Placement Time: 2139 -AR Side: Bilateral  -AR Orientation: anterior  -AR Location: hip  -AR    Retired Wound - Properties Group Date first assessed: 07/28/23  -AR Time first assessed: 2139 -AR Side: Bilateral  -AR Location: hip  -AR      Row Name             Wound 07/28/23 2140 Right anterior greater trochanter    Wound - Properties Group Placement Date: 07/28/23  -AR Placement Time: 2140 -AR Side: Right  -AR Orientation: anterior  -AR Location: greater trochanter  -AR    Retired Wound - Properties Group Placement Date: 07/28/23  -AR Placement Time: 2140 -AR Side: Right  -AR Orientation: anterior  -AR Location: greater trochanter  -AR    Retired Wound - Properties Group Date first assessed: 07/28/23  -AR Time first assessed: 2140  -AR Side: Right  -AR Location: greater trochanter  -AR      Row Name             Wound 06/22/23 1642 Left anterior second toe    Wound - Properties Group Placement Date: 06/22/23  -AM Placement Time: 1642  -AM Side: Left  -AM Orientation: anterior  -AM Location: second toe  -AM    Retired Wound - Properties Group Placement Date: 06/22/23  -AM Placement Time: 1642  -AM Side: Left  -AM Orientation: anterior  -AM Location: second toe  -AM    Retired Wound - Properties Group Date first assessed: 06/22/23  -AM Time  first assessed: 1642 -AM Side: Left  -AM Location: second toe  -AM      Row Name             Wound 06/22/23 1952 Right anterior great toe    Wound - Properties Group Placement Date: 06/22/23  -AM Placement Time: 1952 -AM Present on Hospital Admission: Y  -AM Side: Right  -AM Orientation: anterior  -AM Location: great toe  -AM    Retired Wound - Properties Group Placement Date: 06/22/23  -AM Placement Time: 1952 -AM Present on Hospital Admission: Y  -AM Side: Right  -AM Orientation: anterior  -AM Location: great toe  -AM    Retired Wound - Properties Group Date first assessed: 06/22/23  -AM Time first assessed: 1952 -AM Present on Hospital Admission: Y  -AM Side: Right  -AM Location: great toe  -AM      Row Name             Wound 07/28/23 2145 Right posterior foot    Wound - Properties Group Placement Date: 07/28/23  -AR Placement Time: 2145 -AR Side: Right  -AR Orientation: posterior  -AR Location: foot  -AR    Retired Wound - Properties Group Placement Date: 07/28/23  -AR Placement Time: 2145 -AR Side: Right  -AR Orientation: posterior  -AR Location: foot  -AR    Retired Wound - Properties Group Date first assessed: 07/28/23  -AR Time first assessed: 2145 -AR Side: Right  -AR Location: foot  -AR      Row Name 08/01/23 1420          Positioning and Restraints    Pre-Treatment Position in bed  -KJ     Post Treatment Position bed  -KJ     In Bed call light within reach  -KJ               User Key  (r) = Recorded By, (t) = Taken By, (c) = Cosigned By      Initials Name Provider Type    Mile Briones PTA Physical Therapist Assistant    Nereyda Diaz RN Registered Nurse    Yajaira Terrell, RN Registered Nurse                    Physical Therapy Education       Title: PT OT SLP Therapies (Done)       Topic: Physical Therapy (Done)       Point: Mobility training (Done)       Learning Progress Summary             Patient Acceptance, E, VU by  at 7/31/2023 1707    Acceptance, E,TB,D, VU,DU,NR by MARTINEZ  at 7/31/2023 1144    Comment: Education re: purpose of PT/importance of activity, for safety/falls prevention, improved tech w/ bed mobility, tfers and gait w/ rwx, positioning for skin protection   Significant Other Acceptance, E,TB,D, VU,DU,NR by  at 7/31/2023 1144    Comment: Education re: purpose of PT/importance of activity, for safety/falls prevention, improved tech w/ bed mobility, tfers and gait w/ rwx, positioning for skin protection                         Point: Home exercise program (Done)       Learning Progress Summary             Patient Acceptance, E, VU by  at 7/31/2023 1707                         Point: Precautions (Done)       Learning Progress Summary             Patient Acceptance, E, VU by  at 7/31/2023 1707    Acceptance, E,TB,D, VU,DU,NR by  at 7/31/2023 1144    Comment: Education re: purpose of PT/importance of activity, for safety/falls prevention, improved tech w/ bed mobility, tfers and gait w/ rwx, positioning for skin protection   Significant Other Acceptance, E,TB,D, VU,DU,NR by  at 7/31/2023 1144    Comment: Education re: purpose of PT/importance of activity, for safety/falls prevention, improved tech w/ bed mobility, tfers and gait w/ rwx, positioning for skin protection                                         User Key       Initials Effective Dates Name Provider Type Discipline     08/02/18 -  Juliana Yeh, PT Physical Therapist PT     07/05/23 -  Loyda Chatterjee, RN Registered Nurse Nurse                  PT Recommendation and Plan         Outcome Measures       Row Name 08/01/23 1500 08/01/23 1200          How much help from another person do you currently need...    Turning from your back to your side while in flat bed without using bedrails? 3  -KJ --     Moving from lying on back to sitting on the side of a flat bed without bedrails? 3  -KJ --     Moving to and from a bed to a chair (including a wheelchair)? 3  -KJ --     Standing up from a chair using your  arms (e.g., wheelchair, bedside chair)? 3  -KJ --     Climbing 3-5 steps with a railing? 3  -KJ --     To walk in hospital room? 3  -KJ --     AM-PAC 6 Clicks Score (PT) 18  -KJ --        How much help from another is currently needed...    Putting on and taking off regular lower body clothing? -- 2  -TS     Bathing (including washing, rinsing, and drying) -- 2  -TS     Toileting (which includes using toilet bed pan or urinal) -- 2  -TS     Putting on and taking off regular upper body clothing -- 2  -TS     Taking care of personal grooming (such as brushing teeth) -- 3  -TS     Eating meals -- 4  -TS     AM-PAC 6 Clicks Score (OT) -- 15  -TS        Functional Assessment    Outcome Measure Options AM-PAC 6 Clicks Basic Mobility (PT)  -KJ --               User Key  (r) = Recorded By, (t) = Taken By, (c) = Cosigned By      Initials Name Provider Type    Mile Briones PTA Physical Therapist Assistant     Syeda Merritt COTA Occupational Therapist Assistant                     Time Calculation:    PT Charges       Row Name 08/01/23 1523             Time Calculation    Start Time 1420  -KJ      Stop Time 1500  -KJ      Time Calculation (min) 40 min  -KJ      PT Received On 08/01/23  -KJ      PT Goal Re-Cert Due Date 08/10/23  -KJ         Time Calculation- PT    Total Timed Code Minutes- PT 40 minute(s)  -KJ                User Key  (r) = Recorded By, (t) = Taken By, (c) = Cosigned By      Initials Name Provider Type    Mile Briones PTA Physical Therapist Assistant                  Therapy Charges for Today       Code Description Service Date Service Provider Modifiers Qty    18812735832 HC PT THER PROC EA 15 MIN 8/1/2023 [place], PTA GP 1    26097058465 HC PT THERAPEUTIC ACT EA 15 MIN 8/1/2023 [place], PTA GP 1    73272750796 HC GAIT TRAINING EA 15 MIN 8/1/2023 [place], PTA GP 1            PT G-Codes  Outcome Measure Options: AM-PAC 6 Clicks Basic Mobility (PT)  AM-PAC 6  Clicks Score (PT): 18  AM-PAC 6 Clicks Score (OT): 15    [place], PTA  8/1/2023

## 2023-08-02 VITALS
WEIGHT: 227 LBS | HEART RATE: 83 BPM | BODY MASS INDEX: 35.63 KG/M2 | OXYGEN SATURATION: 94 % | DIASTOLIC BLOOD PRESSURE: 59 MMHG | RESPIRATION RATE: 18 BRPM | TEMPERATURE: 98 F | HEIGHT: 67 IN | SYSTOLIC BLOOD PRESSURE: 106 MMHG

## 2023-08-02 PROBLEM — G93.41 METABOLIC ENCEPHALOPATHY: Status: ACTIVE | Noted: 2023-08-02

## 2023-08-02 LAB
ANION GAP SERPL CALCULATED.3IONS-SCNC: 10 MMOL/L (ref 5–15)
BACTERIA SPEC AEROBE CULT: NORMAL
BACTERIA SPEC AEROBE CULT: NORMAL
BUN SERPL-MCNC: 16 MG/DL (ref 8–23)
BUN/CREAT SERPL: 11.5 (ref 7–25)
CALCIUM SPEC-SCNC: 10 MG/DL (ref 8.6–10.5)
CHLORIDE SERPL-SCNC: 105 MMOL/L (ref 98–107)
CO2 SERPL-SCNC: 24 MMOL/L (ref 22–29)
CREAT SERPL-MCNC: 1.39 MG/DL (ref 0.76–1.27)
EGFRCR SERPLBLD CKD-EPI 2021: 52.5 ML/MIN/1.73
GLUCOSE SERPL-MCNC: 104 MG/DL (ref 65–99)
MAGNESIUM SERPL-MCNC: 1.5 MG/DL (ref 1.6–2.4)
POTASSIUM SERPL-SCNC: 3.6 MMOL/L (ref 3.5–5.2)
SODIUM SERPL-SCNC: 139 MMOL/L (ref 136–145)

## 2023-08-02 PROCEDURE — 25010000002 VANCOMYCIN 10 G RECONSTITUTED SOLUTION: Performed by: FAMILY MEDICINE

## 2023-08-02 PROCEDURE — 97535 SELF CARE MNGMENT TRAINING: CPT

## 2023-08-02 PROCEDURE — 25010000002 MAGNESIUM SULFATE 2 GM/50ML SOLUTION: Performed by: INTERNAL MEDICINE

## 2023-08-02 PROCEDURE — 0 CEFEPIME PER 500 MG: Performed by: NURSE PRACTITIONER

## 2023-08-02 PROCEDURE — 80048 BASIC METABOLIC PNL TOTAL CA: CPT | Performed by: PODIATRIST

## 2023-08-02 PROCEDURE — 83735 ASSAY OF MAGNESIUM: CPT | Performed by: PODIATRIST

## 2023-08-02 RX ORDER — MAGNESIUM SULFATE HEPTAHYDRATE 40 MG/ML
2 INJECTION, SOLUTION INTRAVENOUS ONCE
Status: COMPLETED | OUTPATIENT
Start: 2023-08-02 | End: 2023-08-02

## 2023-08-02 RX ORDER — HYDROCODONE BITARTRATE AND ACETAMINOPHEN 5; 325 MG/1; MG/1
1 TABLET ORAL EVERY 8 HOURS PRN
Qty: 9 TABLET | Refills: 0 | Status: SHIPPED | OUTPATIENT
Start: 2023-08-02

## 2023-08-02 RX ORDER — DOXYCYCLINE HYCLATE 100 MG/1
100 CAPSULE ORAL 2 TIMES DAILY
Qty: 10 CAPSULE | Refills: 0
Start: 2023-08-02 | End: 2023-08-07

## 2023-08-02 RX ORDER — AMOXICILLIN 250 MG
2 CAPSULE ORAL 2 TIMES DAILY
Start: 2023-08-02

## 2023-08-02 RX ORDER — CEFDINIR 300 MG/1
300 CAPSULE ORAL 2 TIMES DAILY
Qty: 10 CAPSULE | Refills: 0
Start: 2023-08-02 | End: 2023-08-07

## 2023-08-02 RX ORDER — METOPROLOL SUCCINATE 25 MG/1
12.5 TABLET, EXTENDED RELEASE ORAL DAILY
Start: 2023-08-03

## 2023-08-02 RX ADMIN — CEFEPIME 2000 MG: 2 INJECTION, POWDER, FOR SOLUTION INTRAVENOUS at 04:02

## 2023-08-02 RX ADMIN — ATORVASTATIN CALCIUM 40 MG: 40 TABLET ORAL at 09:41

## 2023-08-02 RX ADMIN — MEMANTINE HYDROCHLORIDE 10 MG: 5 TABLET, FILM COATED ORAL at 09:41

## 2023-08-02 RX ADMIN — VANCOMYCIN HYDROCHLORIDE 1250 MG: 10 INJECTION, POWDER, LYOPHILIZED, FOR SOLUTION INTRAVENOUS at 15:28

## 2023-08-02 RX ADMIN — ISOSORBIDE MONONITRATE 30 MG: 30 TABLET, EXTENDED RELEASE ORAL at 09:41

## 2023-08-02 RX ADMIN — HYDROCODONE BITARTRATE AND ACETAMINOPHEN 1 TABLET: 5; 325 TABLET ORAL at 12:44

## 2023-08-02 RX ADMIN — LEVOTHYROXINE SODIUM 50 MCG: 50 TABLET ORAL at 05:44

## 2023-08-02 RX ADMIN — TAMSULOSIN HYDROCHLORIDE 0.4 MG: 0.4 CAPSULE ORAL at 09:41

## 2023-08-02 RX ADMIN — DOCUSATE SODIUM 50 MG AND SENNOSIDES 8.6 MG 2 TABLET: 8.6; 5 TABLET, FILM COATED ORAL at 09:41

## 2023-08-02 RX ADMIN — MAGNESIUM SULFATE HEPTAHYDRATE 2 G: 2 INJECTION, SOLUTION INTRAVENOUS at 13:11

## 2023-08-02 RX ADMIN — ALLOPURINOL 100 MG: 100 TABLET ORAL at 09:41

## 2023-08-02 RX ADMIN — FAMOTIDINE 20 MG: 20 TABLET, FILM COATED ORAL at 09:41

## 2023-08-02 RX ADMIN — ASPIRIN 81 MG: 81 TABLET, COATED ORAL at 09:41

## 2023-08-02 RX ADMIN — APIXABAN 2.5 MG: 2.5 TABLET, FILM COATED ORAL at 09:42

## 2023-08-02 RX ADMIN — CEFEPIME 2000 MG: 2 INJECTION, POWDER, FOR SOLUTION INTRAVENOUS at 15:35

## 2023-08-02 NOTE — CASE MANAGEMENT/SOCIAL WORK
Continued Stay Note  [facility]     Patient Name: Samy Velazquez  MRN: [id number]  Today's Date: 8/2/2023    Admit Date: 7/28/2023        Discharge Plan       Row Name 08/02/23 1400       Plan    Final Discharge Disposition Code 03 - skilled nursing facility (SNF)    Final Note Pt/wife prefer [facility] and they did offer a bed and can accept pt today. Pt is being dcd to [facility]. Pharmacy updated in [employer]. Call report number is [phone]. Fax number is [phone].                   Discharge Codes    No documentation.                 Expected Discharge Date and Time       Expected Discharge Date Expected Discharge Time    Aug 2, 2023               KOTA Lauren

## 2023-08-02 NOTE — PLAN OF CARE
Goal Outcome Evaluation:  Plan of Care Reviewed With: patient           Outcome Evaluation: Awake. He returned from post op with wife at bedside. He is able to let some needs be known. He is unable to tell when he urinates. He is incontinent of urine with perineum care done. His dressing to left foot is dry and intact. He does not use the call bell appropriately. He is an assistance x2 with turning and repositioning. He recieved PRN norco as ordered for c/o lower back pain.

## 2023-08-02 NOTE — NURSING NOTE
New wound dressing not charted per OR. Have called and asked and they said they will. Stockinetter over gauze dry and intact

## 2023-08-02 NOTE — PROGRESS NOTES
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  Date of procedure 8/1/2023.    Patient resting in bed.  He is eating lunch.  Wife at bedside.  Denies any complaints.  Denies any fever or chills.      Review of Systems   Constitutional:  Negative for diaphoresis and fever.   Respiratory: Negative.     Gastrointestinal: Negative.      Objective     Vital signs in last 24 hours:  Temp:  [97 øF (36.1 øC)-98.3 øF (36.8 øC)] 98.3 øF (36.8 øC)  Heart Rate:  [] 81  Resp:  [11-20] 18  BP: ()/(58-94) 102/72    Physical Exam:  Physical Exam  HENT:      Head: Normocephalic and atraumatic.   Eyes:      Pupils: Pupils are equal, round, and reactive to light.   Pulmonary:      Effort: Pulmonary effort is normal.   Neurological:      Mental Status: He is alert.   Sutures are intact.  Wound is well approximated.  No surrounding erythema or evidence of infection.    CBC:  Results from last 7 days   Lab Units 07/29/23  0605   WBC 10*3/mm3 10.18   RBC 10*6/mm3 5.35   HEMOGLOBIN g/dL 15.6   HEMATOCRIT % 50.0   PLATELETS 10*3/mm3 156     Lab Results (last 24 hours)       Procedure Component Value Units Date/Time    Tissue Pathology Exam [[id number]] Collected: 08/01/23 1835    Specimen: Tissue from Toe, Left Updated: 08/02/23 0812    Magnesium [[id number]]  (Abnormal) Collected: 08/02/23 0722    Specimen: Blood Updated: 08/02/23 0756     Magnesium 1.5 mg/dL     Basic Metabolic Panel [[id number]]  (Abnormal) Collected: 08/02/23 0722    Specimen: Blood Updated: 08/02/23 0756     Glucose 104 mg/dL      BUN 16 mg/dL      Creatinine 1.39 mg/dL      Sodium 139 mmol/L      Potassium 3.6 mmol/L      Chloride 105 mmol/L      CO2 24.0 mmol/L      Calcium 10.0 mg/dL      BUN/Creatinine Ratio 11.5     Anion Gap 10.0 mmol/L      eGFR 52.5 mL/min/1.73     Narrative:      GFR Normal >60  Chronic Kidney Disease  <60  Kidney Failure <15    The GFR formula is only valid for adults with stable renal function between ages 18 and 70.    Blood Culture - Blood, Arm, Right [[id number]]  (Normal) Collected: 07/28/23 1414    Specimen: Blood from Arm, Right Updated: 08/01/23 1431     Blood Culture No growth at 4 days    Blood Culture - Blood, Arm, Right [[id number]]  (Normal) Collected: 07/28/23 1402    Specimen: Blood from Arm, Right Updated: 08/01/23 1415     Blood Culture No growth at 4 days            Assessment & Plan     New bandage applied.  Can weight-bear as tolerated in surgical shoe.  Keep incision clean and dry.  Will need daily dry bandage changes to evaluate incision site once discharged back to skilled nursing facility.  Keep incision clean and dry.  Okay from Dr. Carrion standpoint for discharge when medically stable.           LOS: 5 days     Malika Krishna, APRN    Date: 8/2/2023  Time: 12:42 CDT

## 2023-08-02 NOTE — THERAPY DISCHARGE NOTE
Acute Care - Occupational Therapy Treatment Note/Discharge  [facility]     Patient Name: Samy Velazquez  : 1945  MRN: [id number]  Today's Date: 2023               Admit Date: 2023       ICD-10-CM ICD-9-CM   1. Altered mental status, unspecified altered mental status type  R41.82 780.97   2. Left foot infection  L08.9 686.9   3. Dysphagia, unspecified type  R13.10 787.20   4. Impaired functional mobility and activity tolerance [Z74.09 (ICD-10-CM)]  Z74.09 V49.89   5. Decreased activities of daily living (ADL) [Z78.9 (ICD-10-CM)]  Z78.9 V49.89   6. Gangrene  I96 785.4   7. Cervical pain (neck)  M54.2 723.1     Patient Active Problem List   Diagnosis    Ischemic heart disease due to coronary artery obstruction    Osteoarthritis of multiple joints    Cardiac pacemaker    Chronic gout    Nephrolithiasis    Mixed hyperlipidemia    Morbidly obese    Hypertensive heart disease with heart failure    Major depressive disorder, recurrent, moderate    Stage 3b chronic kidney disease (CKD)    Ventricular tachycardia    Chronic systolic heart failure    Hypothyroidism    Paroxysmal atrial fibrillation    Coronary atherosclerosis    Essential hypertension    Weakness of right lower extremity    Cellulitis of second toe of left foot    Moderate malnutrition    Altered mental status    Metabolic encephalopathy secondary to wound     Past Medical History:   Diagnosis Date    Arthritis     Coronary artery disease     Hyperlipidemia     Hypertensive heart disease with heart failure 3/29/2021    Hypothyroidism 2021    Kidney stone     Major depressive disorder, recurrent, moderate 3/29/2021    Morbidly obese 2020     Past Surgical History:   Procedure Laterality Date    AMPUTATION DIGIT Left 2023    Procedure: AMPUTATION OF SECOND DIGIT, LEFT FOOT;  Surgeon: Josh Carrion DPM;  Location: [facility];  Service: Podiatry;  Laterality: Left;    CARDIAC DEFIBRILLATOR PLACEMENT      CARDIAC DEFIBRILLATOR  PLACEMENT N/A 2008    CARDIAC SURGERY      ENDOSCOPY N/A 10/30/2020    Procedure: ESOPHAGOGASTRODUODENOSCOPY WITH ANESTHESIA;  Surgeon: Brent Stanley MD;  Location: [facility];  Service: Gastroenterology;  Laterality: N/A;  pre dysphagia  post post op gastric surgery  dr jose manuel smith    ESOPHAGUS SURGERY      KNEE SURGERY      TOTAL SHOULDER REPLACEMENT         OT ASSESSMENT FLOWSHEET (last 12 hours)       OT Evaluation and Treatment       Row Name 08/02/23 1425                   OT Time and Intention    Subjective Information complains of;pain  -AC        Document Type therapy note (daily note)  -        Mode of Treatment occupational therapy  -           General Information    Existing Precautions/Restrictions fall  surgical shoe to L foot  -           Pain Assessment    Pretreatment Pain Rating 5/10  -        Pain Location - Side/Orientation Left  -        Pain Location - foot  -        Pain Intervention(s) Repositioned;Rest  -           Activities of Daily Living    BADL Assessment/Intervention upper body dressing;lower body dressing  -           Upper Body Dressing Assessment/Training    [place] Level (Upper Body Dressing) don;pull-over garment;set up  -        Position (Upper Body Dressing) edge of bed sitting  -           Lower Body Dressing Assessment/Training    [place] Level (Lower Body Dressing) don;pants/bottoms;minimum assist (75% patient effort)  -        Position (Lower Body Dressing) edge of bed sitting;supported standing  -           Bed Mobility    Supine-Sit [place] (Bed Mobility) standby assist  -        Assistive Device (Bed Mobility) bed rails;head of bed elevated  -           Functional Mobility    Functional Mobility- Ind. Level contact guard assist  -        Functional Mobility- Device walker, front-wheeled  -        Functional Mobility- Comment steps from bed to chair  -           Transfer Assessment/Treatment    Transfers sit-stand  transfer;stand-sit transfer  -AC           Sit-Stand Transfer    Sit-Stand [place] (Transfers) minimum assist (75% patient effort);verbal cues;nonverbal cues (demo/gesture)  -AC        Assistive Device (Sit-Stand Transfers) walker, front-wheeled  -AC           Stand-Sit Transfer    Stand-Sit [place] (Transfers) minimum assist (75% patient effort);verbal cues;nonverbal cues (demo/gesture)  -AC           Wound 07/28/23 2133 gluteal    Wound - Properties Group Placement Date: 07/28/23  -AR Placement Time: 2133 -AR Present on Hospital Admission: Y  -AR Location: gluteal  -AR    Retired Wound - Properties Group Placement Date: 07/28/23  -AR Placement Time: 2133 -AR Present on Hospital Admission: Y  -AR Location: gluteal  -AR    Retired Wound - Properties Group Date first assessed: 07/28/23  -AR Time first assessed: 2133 -AR Present on Hospital Admission: Y  -AR Location: gluteal  -AR       Wound 07/28/23 2139 Bilateral anterior hip    Wound - Properties Group Placement Date: 07/28/23  -AR Placement Time: 2139 -AR Side: Bilateral  -AR Orientation: anterior  -AR Location: hip  -AR    Retired Wound - Properties Group Placement Date: 07/28/23  -AR Placement Time: 2139 -AR Side: Bilateral  -AR Orientation: anterior  -AR Location: hip  -AR    Retired Wound - Properties Group Date first assessed: 07/28/23  -AR Time first assessed: 2139  -AR Side: Bilateral  -AR Location: hip  -AR       Wound 07/28/23 2140 Right anterior greater trochanter    Wound - Properties Group Placement Date: 07/28/23  -AR Placement Time: 2140 -AR Side: Right  -AR Orientation: anterior  -AR Location: greater trochanter  -AR    Retired Wound - Properties Group Placement Date: 07/28/23  -AR Placement Time: 2140  -AR Side: Right  -AR Orientation: anterior  -AR Location: greater trochanter  -AR    Retired Wound - Properties Group Date first assessed: 07/28/23  -AR Time first assessed: 2140  -AR Side: Right  -AR Location: greater trochanter   -AR       Wound 06/22/23 1642 Left anterior second toe    Wound - Properties Group Placement Date: 06/22/23  -AM Placement Time: 1642  -AM Side: Left  -AM Orientation: anterior  -AM Location: second toe  -AM    Retired Wound - Properties Group Placement Date: 06/22/23  -AM Placement Time: 1642  -AM Side: Left  -AM Orientation: anterior  -AM Location: second toe  -AM    Retired Wound - Properties Group Date first assessed: 06/22/23  -AM Time first assessed: 1642 -AM Side: Left  -AM Location: second toe  -AM       Wound 06/22/23 1952 Right anterior great toe    Wound - Properties Group Placement Date: 06/22/23  -AM Placement Time: 1952 -AM Present on Hospital Admission: Y  -AM Side: Right  -AM Orientation: anterior  -AM Location: great toe  -AM    Retired Wound - Properties Group Placement Date: 06/22/23 -AM Placement Time: 1952 -AM Present on Hospital Admission: Y  -AM Side: Right  -AM Orientation: anterior  -AM Location: great toe  -AM    Retired Wound - Properties Group Date first assessed: 06/22/23 -AM Time first assessed: 1952 -AM Present on Hospital Admission: Y  -AM Side: Right  -AM Location: great toe  -AM       Wound 07/28/23 2145 Right posterior foot    Wound - Properties Group Placement Date: 07/28/23  -AR Placement Time: 2145 -AR Side: Right  -AR Orientation: posterior  -AR Location: foot  -AR    Retired Wound - Properties Group Placement Date: 07/28/23  -AR Placement Time: 2145 -AR Side: Right  -AR Orientation: posterior  -AR Location: foot  -AR    Retired Wound - Properties Group Date first assessed: 07/28/23  -AR Time first assessed: 2145 -AR Side: Right  -AR Location: foot  -AR       Plan of Care Review    Plan of Care Reviewed With patient  -AC        Progress improving  -AC        Outcome Evaluation OT tx completed.  Pt dressed UB with set up while EOB and LB with modA while seated and in standing.  MaxA to don shoes.  SBA for bed mobility and Rigoberto for sit<>stand.  Pt took a few steps over  to chair and transferred with CGA-Rigoberto.  C/o pain with WBing on L foot.  Pt planning to discharge to SNF today.  OT in agreement with plan.  -AC           Positioning and Restraints    Pre-Treatment Position in bed  -AC        Post Treatment Position chair  -AC        In Chair reclined;call light within reach;encouraged to call for assist;legs elevated  -AC                  User Key  (r) = Recorded By, (t) = Taken By, (c) = Cosigned By      Initials Name Effective Dates    AC Gian Mosley, OTR/L, CNT 02/03/23 -     Nereyda Diaz RN 06/16/21 -     Yajaira Terrell, ITZ 05/24/22 -                     Occupational Therapy Education       Title: PT OT SLP Therapies (Done)       Topic: Occupational Therapy (Done)       Point: ADL training (Done)       Description:   Instruct learner(s) on proper safety adaptation and remediation techniques during self care or transfers.   Instruct in proper use of assistive devices.                  Learning Progress Summary             Patient Acceptance, E, VU by AC at 8/2/2023 1505    Acceptance, E, VU by KP at 7/31/2023 1707    Acceptance, E, VU by KP at 7/29/2023 1435    Acceptance, E, VU,NR by  at 7/29/2023 1148                         Point: Home exercise program (Done)       Description:   Instruct learner(s) on appropriate technique for monitoring, assisting and/or progressing therapeutic exercises/activities.                  Learning Progress Summary             Patient Acceptance, E, VU by AC at 8/2/2023 1505    Acceptance, E, VU by KP at 7/31/2023 1707    Acceptance, E, VU by KP at 7/29/2023 1435                         Point: Precautions (Done)       Description:   Instruct learner(s) on prescribed precautions during self-care and functional transfers.                  Learning Progress Summary             Patient Acceptance, E, VU by AC at 8/2/2023 1505    Acceptance, E, VU by KP at 7/31/2023 1707    Acceptance, E, VU by KP at 7/29/2023 1435    Acceptance, E,  VU,NR by  at 7/29/2023 1148                         Point: Body mechanics (Done)       Description:   Instruct learner(s) on proper positioning and spine alignment during self-care, functional mobility activities and/or exercises.                  Learning Progress Summary             Patient Acceptance, E, VU by  at 8/2/2023 1505    Acceptance, E, VU by  at 7/31/2023 1707    Acceptance, E, VU by  at 7/29/2023 1435    Acceptance, E, VU,NR by  at 7/29/2023 1148                                         User Key       Initials Effective Dates Name Provider Type Discipline     02/03/23 -  Gian Mosley, OTR/L, CNT Occupational Therapist OT     06/20/22 -  Alexandra Berg OTR/L Occupational Therapist OT     07/05/23 -  Loyda Chatterjee RN Registered Nurse Nurse                    OT Recommendation and Plan     Plan of Care Review  Plan of Care Reviewed With: patient  Progress: improving  Outcome Evaluation: OT tx completed.  Pt dressed UB with set up while EOB and LB with modA while seated and in standing.  MaxA to don shoes.  SBA for bed mobility and [place] for sit<>stand.  Pt took a few steps over to chair and transferred with CGA-Rigoberto.  C/o pain with WBing on L foot.  Pt planning to discharge to SNF today.  OT in agreement with plan.  Plan of Care Reviewed With: patient  Outcome Evaluation: OT tx completed.  Pt dressed UB with set up while EOB and LB with modA while seated and in standing.  MaxA to don shoes.  SBA for bed mobility and [place]ober[place] for sit<>stand.  Pt took a few steps over to chair and transferred with CGA-Rigoberto.  C/o pain with WBing on L foot.  Pt planning to discharge to SNF today.  OT in agreement with plan.      OT Rehab Goals       Row Name 08/02/23 1500             Transfer Goal 1 (OT)    Activity/Assistive Device (Transfer Goal 1, OT) commode, bedside without drop arms  -AC      [place] Level/Cues Needed (Transfer Goal 1, OT) moderate assist (50-74% patient effort)  -AC      Time  Frame (Transfer Goal 1, OT) long term goal (LTG);10 days  -AC      Progress/Outcome (Transfer Goal 1, OT) goal not met  -AC         Toileting Goal 1 (OT)    Activity/Device (Toileting Goal 1, OT) toileting skills, all;commode, bedside without drop arms  -AC      [place] Level/Cues Needed (Toileting Goal 1, OT) maximum assist (25-49% patient effort)  -AC      Time Frame (Toileting Goal 1, OT) long term goal (LTG);10 days  -AC      Progress/Outcome (Toileting Goal 1, OT) goal not met  -AC                User Key  (r) = Recorded By, (t) = Taken By, (c) = Cosigned By      Initials Name Provider Type Discipline    Gian Barrow, OTR/L, DEBORAH Occupational Therapist OT                     Outcome Measures       Row Name 08/02/23 1421 08/01/23 1500 08/01/23 1200       How much help from another person do you currently need...    Turning from your back to your side while in flat bed without using bedrails? -- 3  -KJ --    Moving from lying on back to sitting on the side of a flat bed without bedrails? -- 3  -KJ --    Moving to and from a bed to a chair (including a wheelchair)? -- 3  -KJ --    Standing up from a chair using your arms (e.g., wheelchair, bedside chair)? -- 3  -KJ --    Climbing 3-5 steps with a railing? -- 3  -KJ --    To walk in hospital room? -- 3  -KJ --    AM-PAC 6 Clicks Score (PT) -- 18  -KJ --       How much help from another is currently needed...    Putting on and taking off regular lower body clothing? 2  -AC -- 2  -TS    Bathing (including washing, rinsing, and drying) 2  -AC -- 2  -TS    Toileting (which includes using toilet bed pan or urinal) 2  -AC -- 2  -TS    Putting on and taking off regular upper body clothing 4  -AC -- 2  -TS    Taking care of personal grooming (such as brushing teeth) 3  -AC -- 3  -TS    Eating meals 4  -AC -- 4  -TS    AM-PAC 6 Clicks Score (OT) 17  -AC -- 15  -TS       Functional Assessment    Outcome Measure Options AM-PAC 6 Clicks Daily Activity (OT)  -AC  AM-PAC 6 Clicks Basic Mobility (PT)  -KJ --              User Key  (r) = Recorded By, (t) = Taken By, (c) = Cosigned By      Initials Name Provider Type    Gian Barrow OTR/L, DEBORAH Occupational Therapist    Mile Briones, PTA Physical Therapist Assistant    Syeda Mg COTA Occupational Therapist Assistant                    Time Calculation:    Time Calculation- OT       Row Name 08/02/23 1505             Time Calculation- OT    OT Start Time 1421  -AC      OT Stop Time 1450  -AC      OT Time Calculation (min) 29 min  -AC      Total Timed Code Minutes- OT 29 minute(s)  -AC      OT Received On 08/02/23  -AC                User Key  (r) = Recorded By, (t) = Taken By, (c) = Cosigned By      Initials Name Provider Type    Gian Barrow OTR/L, DEBORAH Occupational Therapist                  Timed Therapy Charges  Total Units: 3      Suggested Charges  Total Units: 3      Procedure Name Documented Minutes Units Code    HC OT SELF CARE/MGMT/TRAIN EA 15 MIN 39 3   04914 (CPTr)                 Documented Minutes  Total Minutes: 39      Therapy Provided Minutes    65765 - OT Self Care/Mgmt Minutes 39                  Therapy Charges for Today       Code Description Service Date Service Provider Modifiers Qty    [id number] HC OT SELF CARE/MGMT/TRAIN EA 15 MIN 8/2/2023 Gian Mosley OTR/L, CNT GO 2                 OT Discharge Summary  Anticipated Discharge Disposition (OT): skilled nursing facility  Reason for Discharge: Discharge from facility  Outcomes Achieved: Refer to plan of care for updates on goals achieved  Discharge Destination: SNF    ERWIN [place]/L, CNT  8/2/2023

## 2023-08-02 NOTE — NURSING NOTE
Report called to [facility] at 1609. Spoke with JENNIFER Enriquez. EMS to be notified for transport.

## 2023-08-02 NOTE — PLAN OF CARE
Goal Outcome Evaluation:  Plan of Care Reviewed With: patient        Progress: improving  Outcome Evaluation: OT tx completed.  Pt dressed UB with set up while EOB and LB with modA while seated and in standing.  MaxA to don shoes.  SBA for bed mobility and [place] for sit<>stand.  Pt took a few steps over to chair and transferred with CGA-Rigoberto.  C/o pain with WBing on L foot.  Pt planning to discharge to SNF today.  OT in agreement with plan.

## 2023-08-02 NOTE — DISCHARGE SUMMARY
[facility] Medicine Services  DISCHARGE SUMMARY       Date of Admission: 7/28/2023  Date of Discharge:  8/2/2023  Primary Care Physician: Garret Salinas MD    Presenting Problem/History of Present Illness:  Altered mental status with wound to second digit, left foot    Final Discharge Diagnoses:  Active Hospital Problems    Diagnosis     **Cellulitis of second toe of left foot     Metabolic encephalopathy secondary to wound     Paroxysmal atrial fibrillation     Stage 3b chronic kidney disease (CKD)     Osteoarthritis of multiple joints     Chronic gout        Consults: Dr. Carrion with podiatry.    Procedures Performed:     Amputation second digit left foot by Dr. Carrion on 8/1/2023.    Pertinent Test Results:   Results for orders placed during the hospital encounter of 07/18/22    Adult Transthoracic Echo Complete W/ Cont if Necessary Per Protocol (With Agitated Saline)    Interpretation Summary  ú There is calcification of the aortic valve.  ú Left ventricular diastolic function is consistent with (grade I) impaired relaxation.  ú The following left ventricular wall segments are akinetic: basal inferolateral, basal inferior and mid inferior.  ú Left ventricular ejection fraction appears to be 51 - 55%. Left ventricular systolic function is low normal.  ú The left ventricular cavity is mild to moderately dilated.  ú Estimated right ventricular systolic pressure from tricuspid regurgitation is normal (<35 mmHg).      Imaging Results (All)       Procedure Component Value Units Date/Time    US Arterial Doppler Lower Extremity Left [[id number]] Collected: 07/31/23 1008     Updated: 07/31/23 1013    Narrative:      History: PAD, left foot wound     Comments: Left lower extremity arterial duplex was performed using gray  scale imaging as well as color flow Doppler.     On the left the common femoral artery peak systolic velocity is 102  cm/s, the proximal deep femoral artery  peak systolic velocity is 27.2  cm/s, proximal superficial femoral artery peak systolic velocity is 84.4  cm/s, the mid superficial femoral artery peak systolic velocity is 61.2  cm/s, distal superficial femoral artery peak systolic velocity is 51.0  cm/s, the proximal popliteal artery peak systolic velocity is 36.5 cm/s,  the distal posterior tibial artery peak systolic velocity is 22.5cm/s,  and the proximal anterior tibial artery peak systolic velocity is 33.7  cm/s. All arteries appear patent with no evidence of significant  stenosis or occlusion.       Impression:      Impression:  1. Patent left lower extremity arterial system with no evidence of  significant stenosis or occlusion.     This report was finalized on 07/31/2023 10:09 by Dr. Leif Castro MD.    XR Foot 3+ View Left [[id number]] Collected: 07/28/23 1602     Updated: 07/28/23 1607    Narrative:      HISTORY: Left foot infection     Left foot: 3 views of the left foot are obtained.     COMPARISON: 06/22/2023     FINDINGS: Hallux valgus deformity of the arthritic first MTP joint.  Hammertoe deformities of the second through fifth digits. Cortical  thickening of the fourth metatarsal. Stable peripherally sclerotic 1.3  cm lucent lesion proximal second metatarsal. Moderate calcaneal  spurring. Osteopenia. Vascular calcification.       Impression:      1. Osteopenia with chronic advanced changes described in detail above.  No interval change since the 06/22/2023 exam. No convincing radiographic  evidence of acute osteomyelitis.  This report was finalized on 07/28/2023 16:04 by Dr. Yue Godoy MD.    CT Head Without Contrast [[id number]] Collected: 07/28/23 1505     Updated: 07/28/23 1510    Narrative:      CT HEAD WO CONTRAST- 7/28/2023 2:55 PM CDT     HISTORY: ams      COMPARISON: 07/20/2022     DOSE LENGTH PRODUCT: 604 mGy cm. Automated exposure control was also  utilized to decrease patient radiation dose.     TECHNIQUE: Axial images of the brain  obtained without contrast     FINDINGS:  Stable prominence of the sulci and ventricles favoring  underlying volume loss. Chronic small vessel ischemic change. No  intracranial hemorrhage or mass effect. No acute signs of ischemia. No  extra-axial hematoma or subarachnoid hemorrhage.     Visible paranasal sinuses and mastoid air cells well-aerated. Bony  calvarium appears       Impression:      1. Moderate generalized volume loss with mild chronic small vessel  ischemia. No acute intracranial process identified.  This report was finalized on 07/28/2023 15:07 by Dr. Yue Godoy MD.    XR Chest 1 View [[id number]] Collected: 07/28/23 1449     Updated: 07/28/23 1453    Narrative:      EXAM/TECHNIQUE: XR CHEST 1 VW-     INDICATION: Altered mental status     COMPARISON: 07/18/2022     FINDINGS:     Lung volumes are low. Cardiac silhouette is stable. LEFT chest wall  cardiac ICD device is stable in position. Sternotomy wires are stable in  alignment with multiple fractured wires again noted. No pleural  effusion, pneumothorax, or focal consolidation. Partially imaged RIGHT  shoulder arthroplasty. Severe LEFT glenohumeral joint osteoarthritis  with bone-on-bone appearance.       Impression:         No acute findings. Low lung volumes.  This report was finalized on 07/28/2023 14:50 by Dr. Rupesh Rivas MD.          LAB RESULTS:      Lab 07/29/23  0605 07/28/23  2155 07/28/23  1910 07/28/23  1402   WBC 10.18  --   --  16.78*   HEMOGLOBIN 15.6  --   --  16.5   HEMATOCRIT 50.0  --   --  51.2*   PLATELETS 156  --   --  197   NEUTROS ABS 7.34*  --   --  13.21*   IMMATURE GRANS (ABS) 0.05  --   --  0.11*   LYMPHS ABS 1.25  --   --  1.66   MONOS ABS 0.94*  --   --  1.56*   EOS ABS 0.50*  --   --  0.18   MCV 93.5  --   --  90.1   SED RATE  --   --   --  20   CRP  --   --   --  4.35*   PROCALCITONIN  --   --   --  0.93*   LACTATE 2.0 2.6* 2.1* 3.2*   PROTIME  --   --   --  14.9*         Lab 08/02/23  0722 07/31/23  0618  07/30/23  0550 07/29/23  0605 07/28/23  1402   SODIUM 139 138 137 136 132*   POTASSIUM 3.6 3.6 3.1* 3.7 3.6   CHLORIDE 105 102 102 101 94*   CO2 24.0 25.0 24.0 21.0* 24.0   ANION GAP 10.0 11.0 11.0 14.0 14.0   BUN 16 19 23 21 24*   CREATININE 1.39* 1.53* 1.61* 1.43* 1.75*   EGFR 52.5* 46.8* 44.0* 50.8* 39.9*   GLUCOSE 104* 121* 126* 125* 192*   CALCIUM 10.0 10.6* 10.0 9.7 10.0   MAGNESIUM 1.5* 1.5*  --   --  1.6         Lab 07/28/23  1402   TOTAL PROTEIN 6.6   ALBUMIN 3.2*   GLOBULIN 3.4   ALT (SGPT) 22   AST (SGOT) 24   BILIRUBIN 1.0   ALK PHOS 187*         Lab 07/28/23  1402   PROTIME 14.9*   INR 1.15*                 Lab 07/28/23  1425   PH, ARTERIAL 7.451*   PCO2, ARTERIAL 34.8*   PO2 ART 70.4*   O2 SATURATION ART 95.1   HCO3 ART 24.2   BASE EXCESS ART 0.8     Brief Urine Lab Results  (Last result in the past 365 days)        Color   Clarity   Blood   Leuk Est   Nitrite   Protein   CREAT   Urine HCG        07/28/23 1440 Dark Yellow   Clear   Negative   Trace   Negative   Negative                 Microbiology Results (last 10 days)       Procedure Component Value - Date/Time    MRSA Screen, PCR (Inpatient) - Swab, Nares [[id number]]  (Abnormal) Collected: 07/29/23 1035    Lab Status: Final result Specimen: Swab from Nares Updated: 07/29/23 1151     MRSA PCR MRSA Detected    Narrative:      The negative predictive value of this diagnostic test is high and should only be used to consider de-escalating anti-MRSA therapy. A positive result may indicate colonization with MRSA and must be correlated clinically.    Blood Culture - Blood, Arm, Right [[id number]]  (Normal) Collected: 07/28/23 1414    Lab Status: Preliminary result Specimen: Blood from Arm, Right Updated: 08/01/23 1431     Blood Culture No growth at 4 days    COVID-19,CEPHEID/KATHIE,COR/JOSE/PAD/ANA/MAD IN-HOUSE(OR EMERGENT/ADD-ON),NP SWAB IN TRANSPORT MEDIA 3-4 HR TAT, RT-PCR - Swab, Nasopharynx [[id number]]  (Normal) Collected: 07/28/23 7644    Lab Status:  Final result Specimen: Swab from Nasopharynx Updated: 07/28/23 1432     COVID19 Not Detected    Narrative:      Fact sheet for providers: https://www.fda.gov/media/100466/download     Fact sheet for patients: https://www.fda.gov/media/969123/download  Fact sheet for providers: https://www.fda.gov/media/525007/download    Fact sheet for patients: https://www.fda.gov/media/244363/download    Test performed by PCR.    Blood Culture - Blood, Arm, Right [[id number]]  (Normal) Collected: 07/28/23 1402    Lab Status: Preliminary result Specimen: Blood from Arm, Right Updated: 08/01/23 1415     Blood Culture No growth at 4 days            Hospital Course:   Mr. Velazquez is a 78-year-old male who presented to [facility] on 7/28 for altered mental status with wound to second digit, left foot. Per family member he has had issues on and off with the wound for the last several months. He has a hammertoe deformity of the second digit. Wound will weal and then open back up due to deformity when wearing shoes. States that it recently became infected. He several rounds of oral antibiotics. He is followed by wound care at [facility]. In the last several days, wound of the toe became worsened with increased redness, pain and swelling. CRP 4, lactate 3.2, procalcitonin 0.93, WBC 16. Started on vancomycin and cefepime. Chest x-ray showed no acute findings. CT head showed no acute process. X-ray left foot showed showed no convincing radiographic evidence of acute osteomyelitis.     Cellulitis of second toe of left foot.  He has received days of IV vancomycin and IV cefepime.  Blood culture no growth to date.  We will transition antibiotics to Omnicef and doxycycline for 5 additional days to complete a total of 10 days antibiotic therapy.  He has had worsening pain to the second toe and relates darkening of the wound of the second digit.  Podiatry following, he underwent amputation second digit, left foot on 8/1/2023 by  "Dr. Carrion.  New bandage applied today per podiatry. Can weight-bear as tolerated in surgical shoe.  Keep incision clean and dry.  Will need daily dry bandage changes to evaluate incision site once discharged back to skilled nursing facility. Okay from Dr. Carrion standpoint for discharge when medically stable.      Left lower extremity arterial duplex reveals patent left lower extremity arterial system without evidence of significant stenosis or occlusion.     Chronic kidney disease stage IIIb. Stable with creatinine 1.39.     Paroxysmal A-fib chronically anticoagulated on Eliquis.  Continue toprol xL.    Eliquis will serve as DVT prophylaxis.      Bowel regimen.     PT/OT.  Therapy recommends discharge back to SNF.     He has reached maximum benefit of hospitalization.  He is medically stable and appropriate for discharge today.    Physical Exam on Discharge:  /72 (BP Location: Right arm, Patient Position: Lying)   Pulse 81   Temp 98.3 øF (36.8 øC) (Oral)   Resp 18   Ht 170.2 cm (67\")   Wt 103 kg (227 lb)   SpO2 97%   BMI 35.55 kg/mý   Physical Exam  Vitals reviewed.   Constitutional:       General: He is not in acute distress.     Appearance: He is obese. He is not toxic-appearing.      Comments: Lying in bed.  No acute distress.  On room air.  No family at bedside.  Discussed with his nurse Yajaira.   HENT:      Head: Normocephalic and atraumatic.      Mouth/Throat:      Mouth: Mucous membranes are moist.      Pharynx: Oropharynx is clear.   Eyes:      Extraocular Movements: Extraocular movements intact.      Conjunctiva/sclera: Conjunctivae normal.      Pupils: Pupils are equal, round, and reactive to light.   Cardiovascular:      Rate and Rhythm: Normal rate and regular rhythm.      Pulses: Normal pulses.   Pulmonary:      Effort: Pulmonary effort is normal. No respiratory distress.      Breath sounds: Normal breath sounds.   Abdominal:      General: Bowel sounds are normal. There is no distension.    "   Palpations: Abdomen is soft.      Tenderness: There is no abdominal tenderness.   Musculoskeletal:         General: No swelling or tenderness. Normal range of motion.      Cervical back: Normal range of motion and neck supple. No muscular tenderness.   Skin:     General: Skin is warm and dry.      Findings: No erythema or rash.      Comments: Dressing to left foot dry/intact  Neurological:      General: No focal deficit present.      Mental Status: He is alert and oriented to person, place, and time.      Cranial Nerves: No cranial nerve deficit.      Motor: No weakness.   Psychiatric:         Mood and Affect: Mood normal.         Behavior: Behavior normal.     Condition on Discharge: medically stable.    Discharge Disposition:  Skilled Nursing Facility (DC - External)    Discharge Medications:     Discharge Medications        New Medications        Instructions Start Date   cefdinir 300 MG capsule  Commonly known as: OMNICEF   300 mg, Oral, 2 Times Daily      doxycycline 100 MG capsule  Commonly known as: VIBRAMYCIN   100 mg, Oral, 2 Times Daily      sennosides-docusate 8.6-50 MG per tablet  Commonly known as: PERICOLACE   2 tablets, Oral, 2 Times Daily             Changes to Medications        Instructions Start Date   HYDROcodone-acetaminophen 5-325 MG per tablet  Commonly known as: NORCO  What changed: when to take this   1 tablet, Oral, Every 6 Hours PRN      metoprolol succinate XL 25 MG 24 hr tablet  Commonly known as: TOPROL-XL  What changed:   how much to take  Another medication with the same name was removed. Continue taking this medication, and follow the directions you see here.   12.5 mg, Oral, Daily   Start Date: August 3, 2023            Continue These Medications        Instructions Start Date   acetaminophen 325 MG tablet  Commonly known as: TYLENOL   650 mg, Oral, Every 4 Hours PRN      allopurinol 100 MG tablet  Commonly known as: ZYLOPRIM   100 mg, Oral, Daily      apixaban 2.5 MG tablet  tablet  Commonly known as: ELIQUIS   2.5 mg, Oral, Every 12 Hours Scheduled      aspirin 81 MG EC tablet   81 mg, Oral, Daily      atorvastatin 40 MG tablet  Commonly known as: LIPITOR   40 mg, Oral, Daily      bisacodyl 5 MG EC tablet  Commonly known as: DULCOLAX   10 mg, Oral, Daily PRN      buPROPion  MG 24 hr tablet  Commonly known as: WELLBUTRIN XL   150 mg, Oral, Daily      cholecalciferol 25 MCG (1000 UT) tablet  Commonly known as: VITAMIN D3   2,000 Units, Oral, Daily      famotidine 20 MG tablet  Commonly known as: PEPCID   20 mg, Oral, 2 Times Daily      fluticasone 50 MCG/ACT nasal spray  Commonly known as: FLONASE   2 sprays, Each Nare, Daily      ipratropium 0.03 % nasal spray  Commonly known as: ATROVENT   1 spray, Nasal, Every 12 Hours      isosorbide mononitrate 30 MG 24 hr tablet  Commonly known as: IMDUR   30 mg, Oral, Every 24 Hours Scheduled      levothyroxine 50 MCG tablet  Commonly known as: SYNTHROID, LEVOTHROID   50 mcg, Oral, Daily      memantine 10 MG tablet  Commonly known as: NAMENDA   10 mg, Oral, 2 Times Daily      metoclopramide 10 MG tablet  Commonly known as: REGLAN   10 mg, Oral, 3 Times Daily      metOLazone 10 MG tablet  Commonly known as: ZAROXOLYN   10 mg, Oral, 2 Times Daily      mexiletine 150 MG capsule  Commonly known as: MEXITIL   150 mg, Oral, 4 Times Daily      mupirocin 2 % cream  Commonly known as: BACTROBAN   1 application , Topical, Daily, Left 1st toe       naloxone 4 MG/0.1ML nasal spray  Commonly known as: NARCAN   Call 911. Don't prime. [place] in 1 nostril for overdose. Repeat in 2-3 minutes in other nostril if no or minimal breathing/responsiveness.      nitroglycerin 0.4 MG SL tablet  Commonly known as: NITROSTAT   0.4 mg, Sublingual, Every 5 Minutes PRN, Take no more than 3 doses in 15 minutes.      polyethyl glycol-propyl glycol 0.4-0.3 % solution ophthalmic solution (artificial tears)  Commonly known as: SYSTANE   1 drop, Both Eyes, Every 1 Hour PRN       tamsulosin 0.4 MG capsule 24 hr capsule  Commonly known as: FLOMAX   0.4 mg, Oral, 2 Times Daily             Stop These Medications      ibuprofen 800 MG tablet  Commonly known as: ADVIL,MOTRIN            Discharge Diet:   Diet Instructions       Diet: Regular/House Diet, Diabetic Diets, Cardiac Diets, Renal Diets; Low Sodium (2g); Regular Texture (IDDSI 7); Thin (IDDSI 0); Consistent Carbohydrate; Low Sodium (2-3g)      Discharge Diet:  Regular/House Diet  Diabetic Diets  Cardiac Diets  Renal Diets       Cardiac Diet: Low Sodium (2g)    Texture: Regular Texture (IDDSI 7)    Fluid Consistency: Thin (IDDSI 0)    Diabetic Diet: Consistent Carbohydrate    Renal Diet: Low Sodium (2-3g)            Activity at Discharge:   Activity Instructions       Other Activity Instructions      Activity Instructions: Can weight-bear as tolerated in surgical shoe.            Follow-up Appointments:   1.  Follow-up with Dr. Salinas in 1 week.  2.  Follow-up with podiatry per recommendations.  Future Appointments   Date Time Provider Department Center   8/3/2023 10:45 AM Shadi Marcano DPM [facility]   1/18/2024  1:00 PM Tal Tran PA [facility] PAD       Test Results Pending at Discharge: none.    Electronically signed by GEN Horner, 08/02/23, 13:51 CDT.    Time: 35 minutes.

## 2023-08-03 ENCOUNTER — APPOINTMENT (OUTPATIENT)
Dept: WOUND CARE | Facility: HOSPITAL | Age: 78
DRG: 503 | End: 2023-08-03
Payer: MEDICARE

## 2023-08-03 ENCOUNTER — OUTSIDE FACILITY SERVICE (OUTPATIENT)
Dept: FAMILY MEDICINE CLINIC | Facility: CLINIC | Age: 78
End: 2023-08-03
Payer: MEDICARE

## 2023-08-03 DIAGNOSIS — R52 PAIN: Primary | ICD-10-CM

## 2023-08-03 RX ORDER — HYDROCODONE BITARTRATE AND ACETAMINOPHEN 5; 325 MG/1; MG/1
1 TABLET ORAL EVERY 6 HOURS PRN
Qty: 120 TABLET | Refills: 0 | Status: SHIPPED | OUTPATIENT
Start: 2023-08-03 | End: 2023-08-03 | Stop reason: SDUPTHER

## 2023-08-03 NOTE — THERAPY DISCHARGE NOTE
Acute Care - Physical Therapy Discharge Summary  [facility]       Patient Name: Samy Velazquez  : 1945  MRN: [id number]    Today's Date: 8/3/2023                 Admit Date: 2023      PT Recommendation and Plan    Visit Dx:    ICD-10-CM ICD-9-CM   1. Altered mental status, unspecified altered mental status type  R41.82 780.97   2. Left foot infection  L08.9 686.9   3. Dysphagia, unspecified type  R13.10 787.20   4. Impaired functional mobility and activity tolerance [Z74.09 (ICD-10-CM)]  Z74.09 V49.89   5. Decreased activities of daily living (ADL) [Z78.9 (ICD-10-CM)]  Z78.9 V49.89   6. Gangrene  I96 785.4   7. Cervical pain (neck)  M54.2 723.1        Outcome Measures       Row Name 23 1421 23 1500 23 1200       How much help from another person do you currently need...    Turning from your back to your side while in flat bed without using bedrails? -- 3  -KJ --    Moving from lying on back to sitting on the side of a flat bed without bedrails? -- 3  -KJ --    Moving to and from a bed to a chair (including a wheelchair)? -- 3  -KJ --    Standing up from a chair using your arms (e.g., wheelchair, bedside chair)? -- 3  -KJ --    Climbing 3-5 steps with a railing? -- 3  -KJ --    To walk in hospital room? -- 3  -KJ --    AM-PAC 6 Clicks Score (PT) -- 18  -KJ --       How much help from another is currently needed...    Putting on and taking off regular lower body clothing? 2  -AC -- 2  -TS    Bathing (including washing, rinsing, and drying) 2  -AC -- 2  -TS    Toileting (which includes using toilet bed pan or urinal) 2  -AC -- 2  -TS    Putting on and taking off regular upper body clothing 4  -AC -- 2  -TS    Taking care of personal grooming (such as brushing teeth) 3  -AC -- 3  -TS    Eating meals 4  -AC -- 4  -TS    AM-PAC 6 Clicks Score (OT) 17  -AC -- 15  -TS       Functional Assessment    Outcome Measure Options AM-PAC 6 Clicks Daily Activity (OT)  -AC AM-PAC 6 Clicks Basic Mobility  (PT)  -KJ --              User Key  (r) = Recorded By, (t) = Taken By, (c) = Cosigned By      Initials Name Provider Type    AC Gian Mosley N, OTR/L, CNT Occupational Therapist    Mile Briones, PTA Physical Therapist Assistant    Syeda Mg COTA Occupational Therapist Assistant                         PT Rehab Goals       Row Name 08/03/23 1149             Bed Mobility Goal 1 (PT)    Activity/Assistive Device (Bed Mobility Goal 1, PT) rolling to left;rolling to right;scooting;bridging;sit to supine/supine to sit;sidelying to sit/sit to sidelying  -JONATHAN      [place] Level/Cues Needed (Bed Mobility Goal 1, PT) standby assist;modified independence  -JONATHAN      Time Frame (Bed Mobility Goal 1, PT) long term goal (LTG);10 days  -JONATHAN      Progress/Outcomes (Bed Mobility Goal 1, PT) goal not met  -JONATHAN         Transfer Goal 1 (PT)    Activity/Assistive Device (Transfer Goal 1, PT) sit-to-stand/stand-to-sit;bed-to-chair/chair-to-bed  rwx for bed to/from chair  -JONATHAN      [place] Level/Cues Needed (Transfer Goal 1, PT) standby assist  -JONATHAN      Time Frame (Transfer Goal 1, PT) long term goal (LTG);10 days  -JONATHAN      Progress/Outcome (Transfer Goal 1, PT) goal not met  -JONATHAN         Gait Training Goal 1 (PT)    Activity/Assistive Device (Gait Training Goal 1, PT) gait (walking locomotion);assistive device use;decrease fall risk;improve balance and speed;increase endurance/gait distance;walker, rolling  -JONATHAN      [place] Level (Gait Training Goal 1, PT) standby assist  -JONATHAN      Distance (Gait Training Goal 1, PT) 50 ft w/ surgical shoe L foot  -JONATHAN      Time Frame (Gait Training Goal 1, PT) long term goal (LTG);10 days  -JONATHAN      Progress/Outcome (Gait Training Goal 1, PT) goal not met  -JONATHAN                User Key  (r) = Recorded By, (t) = Taken By, (c) = Cosigned By      Initials Name Provider Type Discipline    David Cuellar, PTA Physical Therapist Assistant PT                        PT Discharge  Summary  Anticipated Discharge Disposition (PT): skilled nursing facility  Reason for Discharge: Discharge from facility  Outcomes Achieved: Refer to plan of care for updates on goals achieved  Discharge Destination: SNF      David Leonard, PTA   8/3/2023

## 2023-08-03 NOTE — PLAN OF CARE
Goal Outcome Evaluation:      Pt Aox4. Turned for safety. Contact precautions maintained for MRSA in nares. PPP. Wife at bedside most of shift. DC to [facility] via EMS.

## 2023-08-04 RX ORDER — HYDROCODONE BITARTRATE AND ACETAMINOPHEN 5; 325 MG/1; MG/1
1 TABLET ORAL EVERY 6 HOURS PRN
Qty: 120 TABLET | Refills: 0 | Status: SHIPPED | OUTPATIENT
Start: 2023-08-04

## 2023-08-08 ENCOUNTER — TELEPHONE (OUTPATIENT)
Dept: FAMILY MEDICINE CLINIC | Facility: CLINIC | Age: 78
End: 2023-08-08
Payer: MEDICARE

## 2023-08-08 NOTE — TELEPHONE ENCOUNTER
Dagmar from [facility] called- Samy has an unwitnessed fall with the following-  -Skin tear on right elbow  -bruise on right cheek  - Contusion left lower sternum     Otherwise he is fine

## 2023-08-28 ENCOUNTER — NURSING HOME (OUTPATIENT)
Dept: FAMILY MEDICINE CLINIC | Facility: CLINIC | Age: 78
End: 2023-08-28
Payer: MEDICARE

## 2023-08-28 ENCOUNTER — OUTSIDE FACILITY SERVICE (OUTPATIENT)
Dept: FAMILY MEDICINE CLINIC | Facility: CLINIC | Age: 78
End: 2023-08-28
Payer: MEDICARE

## 2023-08-28 DIAGNOSIS — S98.132A AMPUTATION OF TOE OF LEFT FOOT: Primary | ICD-10-CM

## 2023-08-29 ENCOUNTER — TELEPHONE (OUTPATIENT)
Dept: FAMILY MEDICINE CLINIC | Facility: CLINIC | Age: 78
End: 2023-08-29
Payer: MEDICARE

## 2023-08-30 VITALS — SYSTOLIC BLOOD PRESSURE: 129 MMHG | DIASTOLIC BLOOD PRESSURE: 74 MMHG

## 2023-08-30 NOTE — PROGRESS NOTES
Name: Samy Velazquez  Medical Record Number:  [id number]  YOB: 1945  Date of Service: 8/30/2023    Samy Velazquez is a 78 y.o. yo with left 2nd digit of lower extremity amputation who is at [facility] for rehabilitation services prior to returning home.   Patient is currently doing well, though he does forget to ask for assistance with ambulation at times, and is at high risk for falls as a result of this.       Pre-Admission Details  Admitted from:[facility] hospital  Admitted on: 08/02/23  Primary caregiver: Dr. Garret Salinas    : wife    Advance Directive: DNR      Review of Systems  Pertinent items are noted in HPI.      Fall History   History of falls yes   Frequency in last year: multiple, unknown total       Assistive Devices: Per ortho direction based on recent amputation of 2nd digit left foot      Physical Exam  /74   Wt Readings from Last 3 Encounters:   07/29/23 103 kg (227 lb)   07/17/23 103 kg (228 lb)   06/22/23 104 kg (228 lb 9.9 oz)     BP Readings from Last 3 Encounters:   08/25/23 129/74   08/02/23 106/59   07/17/23 120/60       Gen:  Cognitively impaired, no acute distress  HEENT: Decreased hearing  Lungs:  Clear to auscultation bilaterally  Heart:  Regular rate and rhythm, no murmurs  Abdomen: Soft, non tender, non distended  Extremities: No edema  Skin:  Intact, clean dry bandage on left foot  Neuro:  No focal deficit      Summary / Assessment / Plan  Samy Velazquez was seen on 8/30/2023 with GEN Koroma      Continue wound care and therapy per ortho instructions.     Weight at hospital discharge was 8/3 was 227. Admission weight at rehab center on 8/3 was 295.4.   There has been concern that he has lost 70+ pounds since rehab admission,   However, it appears that his initial weights at rehab facility of 295+ were erroneous as the weights from our office from  June and July were 218 and 227.     Problems Addressed this Visit    None  Visit  Diagnoses       Amputation of toe of left foot    -  Primary          Diagnoses         Codes Comments    Amputation of toe of left foot    -  Primary ICD-10-CM: S98.132A  ICD-9-CM: 895.0

## 2023-08-30 NOTE — TELEPHONE ENCOUNTER
Spoke with Beth. Samy was wanting to come home and she was wondering if he was ready. I advised that staff had advised he was still pretty unsteady on his feet and for HER safety as well as his she needed him to be as strong and steady as possible before coming home.

## 2023-09-26 ENCOUNTER — NURSING HOME (OUTPATIENT)
Dept: FAMILY MEDICINE CLINIC | Facility: CLINIC | Age: 78
End: 2023-09-26
Payer: MEDICARE

## 2023-09-26 DIAGNOSIS — S98.132A AMPUTATION OF TOE OF LEFT FOOT: Primary | ICD-10-CM

## 2023-09-27 NOTE — PROGRESS NOTES
Name: Samy Velazquez  Medical Record Number:  [id number]  YOB: 1945  Date of Service: 9/27/2023    Samy Velazquez is a 78 y.o. yo with No chief complaint on file..       Pre-Admission Details  Admitted from:[facility]  Admitted on: 08/02/23  Primary caregiver: Dr. Garret Salinas    : wife    Advance Directive: DNR    The following portions of the patient's history were reviewed and updated as appropriate: allergies, current medications, past family history, past medical history, past social history, past surgical history, and problem list.    Review of Systems  Pertinent items are noted in HPI.      Fall History   History of falls yes   Frequency in last year: multiple       Assistive Devices: Per ortho. 2nd digit left foot amputation.      Physical Exam  There were no vitals taken for this visit.  Wt Readings from Last 3 Encounters:   07/29/23 103 kg (227 lb)   07/17/23 103 kg (228 lb)   06/22/23 104 kg (228 lb 9.9 oz)     BP Readings from Last 3 Encounters:   08/25/23 129/74   08/02/23 106/59   07/17/23 120/60       Gen:  no acute distress  HEENT: Decreased hearing  Lungs:  Clear to auscultation bilaterally  Heart:  Regular rate and rhythm, no murmurs  Abdomen:   Extremities: No edema  Skin:  Intact, Pallor  Neuro:  No focal deficit      Summary / Assessment / Plan  Samy Velazquez was seen on 9/27/2023 with GEN Koroma      Patient was being wheeled back from PT when I arrived. Therapist states that he has been doing well in therapy, however, today was an off day for him. Notes that he had significant difficulty with balance, even while sitting today. Patient states that he is very fatigued today, but denies any other symptoms. Patient has recently recovered from COVID. Will check urine, CBC and CMP.   Continue with therapy per ortho direction.       Problems Addressed this Visit    None  Visit Diagnoses       Amputation of toe of left foot    -  Primary          Diagnoses          Codes Comments    Amputation of toe of left foot    -  Primary ICD-10-CM: S98.132A  ICD-9-CM: 895.0

## 2023-09-28 ENCOUNTER — TELEPHONE (OUTPATIENT)
Dept: FAMILY MEDICINE CLINIC | Facility: CLINIC | Age: 78
End: 2023-09-28
Payer: MEDICARE

## 2023-09-28 NOTE — TELEPHONE ENCOUNTER
When I was there Tuesday he had not had a good day in therapy. I talked with the therapist and she said he was really tired and weak and was having trouble balancing even sitting up by himself. I would want to know if he could stay longer.  She needs to let the nursing home know that she is in swing bed and that he won't have help at home. I would say they can delay his discharge.

## 2023-09-28 NOTE — TELEPHONE ENCOUNTER
Advised Beth- she said on October 10th the 100 days that insurance would pay for would be up. Then it would be private pay. She is going to talk to the nursing home and see if there is anything else they can suggest to get it covered and she will let us know if we needed to do anything else to help.

## 2023-09-28 NOTE — TELEPHONE ENCOUNTER
His wife Beth called today and said that he is supposed to be able to get out of the nursing home in about 10 days. She is currently in a swing bed and doesn't know if she will be able to care for him even with home health assisting. She wants to check with you to see what you would suggest for him since you saw him last at nursing home?

## 2023-10-26 ENCOUNTER — NURSING HOME (OUTPATIENT)
Dept: FAMILY MEDICINE CLINIC | Facility: CLINIC | Age: 78
End: 2023-10-26
Payer: MEDICARE

## 2023-10-26 VITALS — BODY MASS INDEX: 33.74 KG/M2 | WEIGHT: 215.4 LBS | DIASTOLIC BLOOD PRESSURE: 76 MMHG | SYSTOLIC BLOOD PRESSURE: 131 MMHG

## 2023-10-26 DIAGNOSIS — S98.132A AMPUTATION OF TOE OF LEFT FOOT: Primary | ICD-10-CM

## 2023-10-26 NOTE — PROGRESS NOTES
"Name: Samy Velazquez  Medical Record Number:  [id number]  YOB: 1945  Date of Service: 10/26/2023    Samy Velazquez is a 78 y.o. yo with amputation of toe of left foot. Patient is in therapy when I arrive today and is seen in therapy room. Reports that he \"is ready to go home.\" Therapist states that he is doing well and his balance is improving. No concerns at this time.       Pre-Admission Details  Admitted from:[facility]  Admitted on: 08/02/23  Primary caregiver: Dr. Garret Salinas    : Beth Causey    Advance Directive: DNR    The following portions of the patient's history were reviewed and updated as appropriate: allergies, current medications, past family history, past medical history, past social history, past surgical history, and problem list.    Review of Systems  Pertinent items are noted in HPI.      Fall History   History of falls yes   Frequency in last year multiple       Assistive Devices: Per Ortho and PT      Physical Exam  /76   Wt 97.7 kg (215 lb 6.4 oz)   BMI 33.74 kg/m²   Wt Readings from Last 3 Encounters:   10/26/23 97.7 kg (215 lb 6.4 oz)   07/29/23 103 kg (227 lb)   07/17/23 103 kg (228 lb)     BP Readings from Last 3 Encounters:   10/26/23 131/76   08/25/23 129/74   08/02/23 106/59       Gen:  no acute distress  HEENT: Decreased hearing  Lungs:  Clear to auscultation bilaterally  Heart:  Regular rate and rhythm, no murmurs      Summary / Assessment / Plan  Samy Velazquez was seen on 10/26/2023 with GEN Koroma        Continue with PT as directed by ortho. No additional needs at this time.     Problems Addressed this Visit    None  Visit Diagnoses       Amputation of toe of left foot    -  Primary          Diagnoses         Codes Comments    Amputation of toe of left foot    -  Primary ICD-10-CM: S98.132A  ICD-9-CM: 895.0           "

## 2023-11-22 DIAGNOSIS — R53.1 WEAKNESS: Primary | ICD-10-CM

## 2023-11-22 DIAGNOSIS — Z91.81 AT HIGH RISK FOR FALLS: ICD-10-CM

## 2023-11-22 DIAGNOSIS — S91.309A WOUND OF FOOT: ICD-10-CM

## 2023-11-28 ENCOUNTER — TELEPHONE (OUTPATIENT)
Dept: FAMILY MEDICINE CLINIC | Facility: CLINIC | Age: 78
End: 2023-11-28
Payer: MEDICARE

## 2023-11-28 NOTE — TELEPHONE ENCOUNTER
Home health said that [facility] will have to evaluate for hospital bed ect., Nursing home says they didn't know he was being discharged as Beth hasn't mentioned it but due to financial reasons Beth says she has to bring him home. She said they already have hospital bed ect ready at home- I advised her to call the nursing home and let them know that she is needing to get him discharged.

## 2023-11-28 NOTE — TELEPHONE ENCOUNTER
Referral was sent to [facility] but is outside of service area- faxed referral to [facility]. Does Beth need to wait until all is set up to bring him home? She wasn't sure if you had already let [facility] know he will be going home?

## 2023-11-28 NOTE — TELEPHONE ENCOUNTER
-call home health and tell them to evaluate to see if he needs a hospital bed etc. so we can get the equipment there before discharge

## 2023-11-28 NOTE — TELEPHONE ENCOUNTER
Beth wanted to check and see if we have started the process of sending Samy home? I know there is a referral for home health in, anything else we need to be doing?

## 2023-12-01 ENCOUNTER — READMISSION MANAGEMENT (OUTPATIENT)
Dept: CALL CENTER | Facility: HOSPITAL | Age: 78
End: 2023-12-01
Payer: MEDICARE

## 2023-12-01 NOTE — OUTREACH NOTE
Prep Survey      Flowsheet Row Responses   [facility] facility patient discharged from? Non-BH   Is LACE score < 7 ? Non-BH Discharge   Eligibility [facility] -   Date of Discharge 11/30/23   Discharge Disposition Home or Self Care   Discharge diagnosis Hypertensive heart disease with heart failure   Does the patient have one of the following disease processes/diagnoses(primary or secondary)? CHF   Prep survey completed? Yes            Kaycee GARCIA - Registered Nurse

## 2023-12-04 ENCOUNTER — TRANSITIONAL CARE MANAGEMENT TELEPHONE ENCOUNTER (OUTPATIENT)
Dept: CALL CENTER | Facility: HOSPITAL | Age: 78
End: 2023-12-04
Payer: MEDICARE

## 2023-12-04 NOTE — OUTREACH NOTE
Call Center TCM Note      Flowsheet Row Responses   [facility] patient discharged from? Non-   Does the patient have one of the following disease processes/diagnoses(primary or secondary)? CHF   TCM attempt successful? Yes   Call start time 1033   Call end time 1034   Discharge diagnosis Hypertensive heart disease with heart failure   Person spoke with today (if not patient) and relationship Beth(spouse)   Meds reviewed with patient/caregiver? Yes   Is the patient having any side effects they believe may be caused by any medication additions or changes? No   Does the patient have all medications ordered at discharge? Yes   Is the patient taking all medications as directed (includes completed medication regime)? Yes   Does the patient have an appointment with their PCP within 7-14 days of discharge? No   Nursing Interventions Patient declined scheduling/rescheduling appointment at this time   What is the Home health agency?  [facility] comments HH coming today   Psychosocial issues? No   Did the patient receive a copy of their discharge instructions? Yes   Nursing interventions Reviewed instructions with patient   What is the patient's perception of their health status since discharge? Improving   Is the patient/caregiver able to teach back signs and symptoms related to disease process for when to call PCP? Yes   Is the patient/caregiver able to teach back signs and symptoms related to disease process for when to call 911? Yes   Is the patient/caregiver able to teach back the hierarchy of who to call/visit for symptoms/problems? PCP, Specialist, Home health nurse, Urgent Care, ED, 911 Yes   If the patient is a current smoker, are they able to teach back resources for cessation? Not a smoker   TCM call completed? Yes   Call end time 1034   Would this patient benefit from a Referral to Amb Social Work? No   Is the patient interested in additional calls from an ambulatory ? No             Shannon Aquino RN    12/4/2023, 10:36 CST

## 2023-12-05 ENCOUNTER — OFFICE VISIT (OUTPATIENT)
Dept: FAMILY MEDICINE CLINIC | Facility: CLINIC | Age: 78
End: 2023-12-05
Payer: MEDICARE

## 2023-12-05 VITALS
HEART RATE: 82 BPM | BODY MASS INDEX: 33.74 KG/M2 | WEIGHT: 215 LBS | TEMPERATURE: 98.1 F | SYSTOLIC BLOOD PRESSURE: 120 MMHG | DIASTOLIC BLOOD PRESSURE: 62 MMHG | HEIGHT: 67 IN | OXYGEN SATURATION: 91 % | RESPIRATION RATE: 16 BRPM

## 2023-12-05 DIAGNOSIS — I25.9 ISCHEMIC HEART DISEASE: Primary | ICD-10-CM

## 2023-12-05 DIAGNOSIS — L03.032 CELLULITIS OF LEFT TOE: ICD-10-CM

## 2023-12-05 DIAGNOSIS — M19.91 PRIMARY OSTEOARTHRITIS, UNSPECIFIED SITE: ICD-10-CM

## 2023-12-05 DIAGNOSIS — I73.9 PERIPHERAL VASCULAR DISEASE: ICD-10-CM

## 2023-12-05 DIAGNOSIS — F03.A0 MILD DEMENTIA, UNSPECIFIED DEMENTIA TYPE, UNSPECIFIED WHETHER BEHAVIORAL, PSYCHOTIC, OR MOOD DISTURBANCE OR ANXIETY: ICD-10-CM

## 2023-12-05 NOTE — PROGRESS NOTES
"Samy Velazquez    1945    Chief Complaint   Patient presents with    Home Health Care     Face to Face       Vitals:    12/05/23 1331   BP: 120/62   BP Location: Left arm   Patient Position: Sitting   Cuff Size: Adult   Pulse: 82   Resp: 16   Temp: 98.1 °F (36.7 °C)   TempSrc: Temporal   SpO2: 91%   Weight: 97.5 kg (215 lb)  Comment: per spouse   Height: 170.2 cm (67\")   PainSc: 0-No pain       78-year-old male with ischemic heart disease pacemaker, history of amputations left foot, hyperlipidemia joint nephrolithiasis mild dementia        Review of Systems   Respiratory:  Negative for shortness of breath.    Cardiovascular:  Negative for chest pain and leg swelling.   Gastrointestinal:  Negative for constipation.        Severe GERD   Genitourinary:  Negative for difficulty urinating.   Musculoskeletal:  Positive for arthralgias and back pain.       Past Medical History:   Diagnosis Date    Arthritis     Coronary artery disease     Hyperlipidemia     Hypertensive heart disease with heart failure 3/29/2021    Hypothyroidism 4/1/2021    Kidney stone     Major depressive disorder, recurrent, moderate 3/29/2021    Morbidly obese 9/18/2020         Current Outpatient Medications:     Acetaminophen (CHLORASEPTIC SORE THROAT PO), Take  by mouth., Disp: , Rfl:     acetaminophen (TYLENOL) 325 MG tablet, Take 2 tablets by mouth Every 4 (Four) Hours As Needed for Mild Pain., Disp: , Rfl:     allopurinol (ZYLOPRIM) 100 MG tablet, Take 1 tablet by mouth Daily., Disp: 90 tablet, Rfl: 0    apixaban (ELIQUIS) 2.5 MG tablet tablet, Take 1 tablet by mouth Every 12 (Twelve) Hours., Disp: , Rfl:     aspirin 81 MG EC tablet, Take 1 tablet by mouth Daily., Disp: , Rfl:     atorvastatin (LIPITOR) 40 MG tablet, Take 1 tablet by mouth Daily., Disp: , Rfl:     bisacodyl (DULCOLAX) 5 MG EC tablet, Take 2 tablets by mouth Daily As Needed for Constipation., Disp: , Rfl:     buPROPion XL (WELLBUTRIN XL) 150 MG 24 hr tablet, Take 1 tablet by " mouth Daily., Disp: , Rfl:     cholecalciferol (VITAMIN D3) 25 MCG (1000 UT) tablet, Take 2 tablets by mouth Daily., Disp: , Rfl:     dextromethorphan-guaifenesin (ROBITUSSIN-DM)  MG/5ML syrup, Take 5 mL by mouth Every 4 (Four) Hours As Needed (COUGH)., Disp: , Rfl:     diphenhydrAMINE (BENADRYL) 25 mg capsule, Take 1 capsule by mouth Every 6 (Six) Hours As Needed for Itching., Disp: , Rfl:     famotidine (PEPCID) 20 MG tablet, Take 1 tablet by mouth 2 (Two) Times a Day., Disp: , Rfl:     fluticasone (FLONASE) 50 MCG/ACT nasal spray, 2 sprays by Each Nare route Daily., Disp: , Rfl:     HYDROcodone-acetaminophen (Norco) 5-325 MG per tablet, Take 1 tablet by mouth Every 6 (Six) Hours As Needed for Moderate Pain or Severe Pain., Disp: 120 tablet, Rfl: 0    hydrocortisone (ANUSOL-HC) 25 MG suppository, Insert 1 suppository into the rectum 2 (Two) Times a Day., Disp: , Rfl:     isosorbide mononitrate (IMDUR) 30 MG 24 hr tablet, Take 1 tablet by mouth Daily., Disp: , Rfl:     levothyroxine (SYNTHROID, LEVOTHROID) 50 MCG tablet, Take 1 tablet by mouth Daily., Disp: , Rfl:     magnesium hydroxide (MILK OF MAGNESIA) 400 MG/5ML suspension, Take 30 mL by mouth Daily As Needed for Constipation., Disp: , Rfl:     memantine (NAMENDA) 10 MG tablet, Take 1 tablet by mouth 2 (Two) Times a Day., Disp: , Rfl:     metoclopramide (REGLAN) 10 MG tablet, Take 1 tablet by mouth 3 (Three) Times a Day., Disp: , Rfl:     metOLazone (ZAROXOLYN) 10 MG tablet, Take 1 tablet by mouth 2 (Two) Times a Day., Disp: , Rfl:     metoprolol succinate XL (TOPROL-XL) 25 MG 24 hr tablet, Take 0.5 tablets by mouth Daily. (Patient taking differently: Take 0.5 tablets by mouth Daily. HOLD IF SBP <110 OR DBP <60), Disp: , Rfl:     mexiletine (MEXITIL) 150 MG capsule, Take 1 capsule by mouth 2 (Two) Times a Day. AFIB, Disp: , Rfl:     naloxone (NARCAN) 4 MG/0.1ML nasal spray, Call 911. Don't prime. [place] in 1 nostril for overdose. Repeat in 2-3 minutes in  other nostril if no or minimal breathing/responsiveness., Disp: 2 each, Rfl: 0    nitroglycerin (NITROSTAT) 0.4 MG SL tablet, Place 1 tablet under the tongue Every 5 (Five) Minutes As Needed for Chest Pain. Take no more than 3 doses in 15 minutes., Disp: 25 tablet, Rfl: 2    ondansetron ODT (ZOFRAN-ODT) 4 MG disintegrating tablet, Place 1 tablet on the tongue Every 6 (Six) Hours As Needed for Nausea or Vomiting., Disp: , Rfl:     polyethyl glycol-propyl glycol (SYSTANE) 0.4-0.3 % solution ophthalmic solution (artificial tears), Administer 1 drop to both eyes Every 1 (One) Hour As Needed., Disp: , Rfl:     saline (AYR) gel nasal gel, Apply 2 application  topically to the appropriate area as directed As Needed (NASAL DRYNESS)., Disp: , Rfl:     sennosides-docusate (PERICOLACE) 8.6-50 MG per tablet, Take 2 tablets by mouth 2 (Two) Times a Day., Disp: , Rfl:     tamsulosin (FLOMAX) 0.4 MG capsule 24 hr capsule, Take 1 capsule by mouth 2 (Two) Times a Day., Disp: 180 capsule, Rfl: 3    Allergies   Allergen Reactions    Iodine Hives    Strawberry Hives    Iodinated Contrast Media Unknown - High Severity    Clindamycin/Lincomycin Diarrhea    Penicillins Rash    Vancomycin Nausea And Vomiting       Patient Active Problem List   Diagnosis    Ischemic heart disease due to coronary artery obstruction    Osteoarthritis of multiple joints    Cardiac pacemaker    Chronic gout    Nephrolithiasis    Mixed hyperlipidemia    Morbidly obese    Hypertensive heart disease with heart failure    Major depressive disorder, recurrent, moderate    Stage 3b chronic kidney disease (CKD)    Ventricular tachycardia    Chronic systolic heart failure    Hypothyroidism    Paroxysmal atrial fibrillation    Coronary atherosclerosis    Essential hypertension    Weakness of right lower extremity    Cellulitis of second toe of left foot    Moderate malnutrition    Altered mental status    Metabolic encephalopathy secondary to wound       Social History      Socioeconomic History    Marital status:    Tobacco Use    Smoking status: Never    Smokeless tobacco: Never   Vaping Use    Vaping Use: Never used   Substance and Sexual Activity    Alcohol use: No    Drug use: No    Sexual activity: Defer       Past Surgical History:   Procedure Laterality Date    AMPUTATION DIGIT Left 8/1/2023    Procedure: AMPUTATION OF SECOND DIGIT, LEFT FOOT;  Surgeon: Josh Carrion DPM;  Location:  [facility] OR;  Service: Podiatry;  Laterality: Left;    CARDIAC DEFIBRILLATOR PLACEMENT      CARDIAC DEFIBRILLATOR PLACEMENT N/A 2008    CARDIAC SURGERY      ENDOSCOPY N/A 10/30/2020    Procedure: ESOPHAGOGASTRODUODENOSCOPY WITH ANESTHESIA;  Surgeon: Brent Stanley MD;  Location:  [facility] OR;  Service: Gastroenterology;  Laterality: N/A;  pre dysphagia  post post op gastric surgery  dr jose manuel smith    ESOPHAGUS SURGERY      KNEE SURGERY      TOTAL SHOULDER REPLACEMENT         Physical Exam  Vitals and nursing note reviewed.   Constitutional:       Appearance: He is obese.   Eyes:      Extraocular Movements: Extraocular movements intact.      Pupils: Pupils are equal, round, and reactive to light.   Neck:      Vascular: No carotid bruit.   Cardiovascular:      Rate and Rhythm: Normal rate and regular rhythm.   Pulmonary:      Effort: Pulmonary effort is normal.      Breath sounds: Normal breath sounds.   Abdominal:      General: Abdomen is flat.      Palpations: Abdomen is soft.   Musculoskeletal:      Right lower leg: No edema.      Left lower leg: No edema.      Comments: Amputation left foot second toe-third toe on the fence for infection   Lymphadenopathy:      Cervical: No cervical adenopathy.   Skin:     General: Skin is warm and dry.   Neurological:      Mental Status: He is alert and oriented to person, place, and time.   Psychiatric:         Mood and Affect: Mood normal.         Behavior: Behavior normal.      Comments: Executive function down         Vitals:    12/05/23 1331   BP:  "120/62   BP Location: Left arm   Patient Position: Sitting   Cuff Size: Adult   Pulse: 82   Resp: 16   Temp: 98.1 °F (36.7 °C)   TempSrc: Temporal   SpO2: 91%   Weight: 97.5 kg (215 lb)   Height: 170.2 cm (67\")             Diagnoses and all orders for this visit:    1. Ischemic heart disease (Primary)    2. Peripheral vascular disease    3. Primary osteoarthritis, unspecified site    4. Mild dementia, unspecified dementia type, unspecified whether behavioral, psychotic, or mood disturbance or anxiety        Problems Addressed this Visit    None  Visit Diagnoses       Ischemic heart disease    -  Primary    Peripheral vascular disease        Primary osteoarthritis, unspecified site        Mild dementia, unspecified dementia type, unspecified whether behavioral, psychotic, or mood disturbance or anxiety              Diagnoses         Codes Comments    Ischemic heart disease    -  Primary ICD-10-CM: I25.9  ICD-9-CM: 414.9     Peripheral vascular disease     ICD-10-CM: I73.9  ICD-9-CM: 443.9     Primary osteoarthritis, unspecified site     ICD-10-CM: M19.91  ICD-9-CM: 715.10     Mild dementia, unspecified dementia type, unspecified whether behavioral, psychotic, or mood disturbance or anxiety     ICD-10-CM: F03.A0  ICD-9-CM: 294.20             Health Maintenance:  Immunization History   Administered Date(s) Administered    COVID-19 (MODERNA) 1st,2nd,3rd Dose Monovalent 01/05/2021, 02/02/2021    COVID-19 (PFIZER) BIVALENT 12+YRS 09/29/2022    COVID-19 (UNSPECIFIED) 10/26/2023    COVID-19 F23 (MODERNA) 12YRS+ (SPIKEVAX) 10/26/2023    Covid-19 (Pfizer) Gray Cap Monovalent 03/01/2022    DTaP / HiB / IPV 09/30/2021    FLUAD TRI 65YR+ 11/05/2019    Fluad Quad 65+ 11/05/2019, 09/18/2020    Fluzone High Dose =>65 Years ([facility] ONLY) 10/26/2016, 09/11/2017, 10/03/2018    Fluzone High-Dose 65+yrs 09/30/2021, 09/29/2022    Hepatitis A 11/20/2018, 05/20/2019    Influenza, Unspecified 10/30/2023    Pneumococcal Conjugate 13-Valent " (PCV13) 10/08/2018    Pneumococcal Polysaccharide (PPSV23) 07/03/2013, 11/01/2019    Tdap 06/10/2000          Plan dire need of home health physical therapy occupational therapy-referred to wound care                      Electronically signed by Garret Salinas MD 12/05/2023

## 2023-12-13 ENCOUNTER — OUTSIDE FACILITY SERVICE (OUTPATIENT)
Dept: FAMILY MEDICINE CLINIC | Facility: CLINIC | Age: 78
End: 2023-12-13
Payer: MEDICARE

## 2023-12-13 RX ORDER — ISOSORBIDE MONONITRATE 30 MG/1
30 TABLET, EXTENDED RELEASE ORAL DAILY
Qty: 90 TABLET | Refills: 2 | Status: SHIPPED | OUTPATIENT
Start: 2023-12-13

## 2023-12-13 RX ORDER — NITROGLYCERIN 0.4 MG/1
0.4 TABLET SUBLINGUAL
Qty: 25 TABLET | Refills: 2 | Status: SHIPPED | OUTPATIENT
Start: 2023-12-13

## 2023-12-13 RX ORDER — LEVOTHYROXINE SODIUM 0.05 MG/1
50 TABLET ORAL DAILY
Qty: 90 TABLET | Refills: 2 | Status: SHIPPED | OUTPATIENT
Start: 2023-12-13

## 2023-12-13 NOTE — TELEPHONE ENCOUNTER
Nurse is there at residence--pt does have  meds--needs refills.    Rx Refill Note  Requested Prescriptions     Pending Prescriptions Disp Refills    isosorbide mononitrate (IMDUR) 30 MG 24 hr tablet 90 tablet      Sig: Take 1 tablet by mouth Daily.    levothyroxine (SYNTHROID, LEVOTHROID) 50 MCG tablet 90 tablet      Sig: Take 1 tablet by mouth Daily.    nitroglycerin (NITROSTAT) 0.4 MG SL tablet 25 tablet 2     Sig: Place 1 tablet under the tongue Every 5 (Five) Minutes As Needed for Chest Pain. Take no more than 3 doses in 15 minutes.      Last office visit with prescribing clinician: 2023   Last telemedicine visit with prescribing clinician: Visit date not found   Next office visit with prescribing clinician: Visit date not found                         Would you like a call back once the refill request has been completed: [] Yes [] No    If the office needs to give you a call back, can they leave a voicemail: [] Yes [] No    Carolynn Galeano Rep  23, 15:16 CST

## 2023-12-29 DIAGNOSIS — R21 SKIN RASH: ICD-10-CM

## 2023-12-29 DIAGNOSIS — M1A.0790 CHRONIC GOUT OF FOOT, UNSPECIFIED CAUSE, UNSPECIFIED LATERALITY: Primary | ICD-10-CM

## 2023-12-29 DIAGNOSIS — J30.89 ENVIRONMENTAL AND SEASONAL ALLERGIES: ICD-10-CM

## 2023-12-29 DIAGNOSIS — K21.00 GASTROESOPHAGEAL REFLUX DISEASE WITH ESOPHAGITIS WITHOUT HEMORRHAGE: ICD-10-CM

## 2023-12-29 DIAGNOSIS — F33.1 MAJOR DEPRESSIVE DISORDER, RECURRENT, MODERATE: ICD-10-CM

## 2023-12-29 RX ORDER — HYDROCORTISONE 25 MG/ML
LOTION TOPICAL AS NEEDED
Qty: 118 ML | Refills: 0 | Status: SHIPPED | OUTPATIENT
Start: 2023-12-29

## 2023-12-29 RX ORDER — BUPROPION HYDROCHLORIDE 150 MG/1
150 TABLET ORAL EVERY MORNING
Qty: 90 TABLET | Refills: 0 | Status: SHIPPED | OUTPATIENT
Start: 2023-12-29

## 2023-12-29 RX ORDER — ALLOPURINOL 100 MG/1
100 TABLET ORAL DAILY
Qty: 90 TABLET | Refills: 0 | Status: SHIPPED | OUTPATIENT
Start: 2023-12-29

## 2023-12-29 RX ORDER — IPRATROPIUM BROMIDE 21 UG/1
SPRAY, METERED NASAL
Qty: 30 EACH | Refills: 0 | Status: SHIPPED | OUTPATIENT
Start: 2023-12-29

## 2023-12-29 RX ORDER — METOCLOPRAMIDE 10 MG/1
10 TABLET ORAL 3 TIMES DAILY
Qty: 90 TABLET | Refills: 0 | Status: SHIPPED | OUTPATIENT
Start: 2023-12-29

## 2023-12-29 NOTE — TELEPHONE ENCOUNTER
Rx Refill Note  Requested Prescriptions     Pending Prescriptions Disp Refills    metoclopramide (REGLAN) 10 MG tablet [Pharmacy Med Name: METOCLOPRAMIDE 10 MG TABLET] 90 tablet 3     Sig: TAKE 1 TABLET BY MOUTH 3 TIMES A DAY    hydrocortisone 2.5 % lotion [Pharmacy Med Name: HYDROCORTISONE 2.5% LOTION] 118 mL 3     Sig: Apply  topically to the appropriate area as directed As Needed for Rash or Irritation.    ipratropium (ATROVENT) 0.03 % nasal spray [Pharmacy Med Name: IPRATROPIUM 0.03% SPRAY] 30 each 12     Si SPRAYS INTO THE NOSTRIL(S) AS DIRECTED BY PROVIDER EVERY 12 (TWELVE) HOURS.    allopurinol (ZYLOPRIM) 100 MG tablet [Pharmacy Med Name: ALLOPURINOL 100 MG TABLET] 90 tablet 0     Sig: TAKE 1 TABLET BY MOUTH EVERY DAY    buPROPion XL (WELLBUTRIN XL) 150 MG 24 hr tablet [Pharmacy Med Name: BUPROPION HCL  MG TABLET] 90 tablet 3     Sig: TAKE 1 TABLET BY MOUTH EVERY DAY IN THE MORNING      Last office visit with prescribing clinician: 2023   Last telemedicine visit with prescribing clinician: Visit date not found   Next office visit with prescribing clinician: Visit date not found   CPE done 2022                      Would you like a call back once the refill request has been completed: [] Yes [] No    If the office needs to give you a call back, can they leave a voicemail: [] Yes [] No    Yue Schmidt MA  23, 09:25 CST

## 2024-01-01 ENCOUNTER — OFFICE VISIT (OUTPATIENT)
Dept: FAMILY MEDICINE CLINIC | Facility: CLINIC | Age: 79
End: 2024-01-01
Payer: MEDICARE

## 2024-01-01 VITALS
BODY MASS INDEX: 36.61 KG/M2 | RESPIRATION RATE: 16 BRPM | SYSTOLIC BLOOD PRESSURE: 106 MMHG | HEART RATE: 91 BPM | DIASTOLIC BLOOD PRESSURE: 60 MMHG | OXYGEN SATURATION: 91 % | HEIGHT: 65 IN | TEMPERATURE: 97.1 F

## 2024-01-01 DIAGNOSIS — E78.2 MIXED HYPERLIPIDEMIA: ICD-10-CM

## 2024-01-01 DIAGNOSIS — L89.312 PRESSURE INJURY OF RIGHT BUTTOCK, STAGE 2: Primary | ICD-10-CM

## 2024-01-01 DIAGNOSIS — K22.2 ESOPHAGEAL STRICTURE: ICD-10-CM

## 2024-01-01 PROCEDURE — 3074F SYST BP LT 130 MM HG: CPT | Performed by: FAMILY MEDICINE

## 2024-01-01 PROCEDURE — 3078F DIAST BP <80 MM HG: CPT | Performed by: FAMILY MEDICINE

## 2024-01-01 PROCEDURE — 99213 OFFICE O/P EST LOW 20 MIN: CPT | Performed by: FAMILY MEDICINE

## 2024-01-01 PROCEDURE — 1125F AMNT PAIN NOTED PAIN PRSNT: CPT | Performed by: FAMILY MEDICINE

## 2024-01-01 PROCEDURE — 1160F RVW MEDS BY RX/DR IN RCRD: CPT | Performed by: FAMILY MEDICINE

## 2024-01-01 PROCEDURE — 1159F MED LIST DOCD IN RCRD: CPT | Performed by: FAMILY MEDICINE

## 2024-01-01 RX ORDER — UREA 10 %
10 LOTION (ML) TOPICAL NIGHTLY
COMMUNITY

## 2024-01-01 RX ORDER — ATORVASTATIN CALCIUM 40 MG/1
TABLET, FILM COATED ORAL
Qty: 90 TABLET | Refills: 3 | Status: SHIPPED | OUTPATIENT
Start: 2024-01-01

## 2024-01-01 NOTE — TELEPHONE ENCOUNTER
Past due physical - appt scheduled   As reported by delivery room nurse- 38.3 wk SGA male born via repeat C/S on  at 1402 to a 37 y/o  mother.  Maternal history of asthma. Prenatal history of IUGR and GDM A1. Maternal labs include Blood type B+ mother. PNL as follows: HIV-/HepB-/HepC+ with HEP C RNA NEGATIVE/RPR NR/Rubella I/GBS- from 10/21, AROM at delivery with clear fluids (ROM hours: 0). Baby was warmed, dried suctioned and stimulated with APGARS of 9/9. Mom plans to initiate breastfeeding and formula feed, declines Hep B vaccine and declines circ. Highest maternal temp: 36.5 EOS- N/A As reported by delivery room nurse- 38.3 wk SGA male born via repeat C/S on  at 1402 to a 37 y/o  mother.  Maternal history of asthma. Prenatal history of IUGR and GDM A1. Maternal labs include Blood type B+ mother. PNL as follows: HIV-/HepB-/HepC+ with HEP C RNA NEGATIVE/RPR NR/Rubella I/GBS- from 10/21, AROM at delivery with clear fluids (ROM hours: 0). Baby was warmed, dried suctioned and stimulated with APGARS of 9/9. Mom plans to initiate breastfeeding and formula feed, declines Hep B vaccine and declines circ. Highest maternal temp: 36.5 EOS- N/A    Since admission to the mother baby unit, baby has been feeding, voiding, and stooling appropriately. Vitals remained stable during admission. Baby received routine  care.     Discharge weight was 2555 g  Weight Change Percentage: -3.95     Discharge Bilirubin  Sternum 4.6   at 36 hours of life, with phototherapy threshold of 14.2.    See below for hepatitis B vaccine status, hearing screen and CCHD results.  G6PD level sent as part of the [facility]  screening program. Results pending at time of discharge.  Stable for discharge home with instructions to follow up with pediatrician in 1-2 days. As reported by delivery room nurse- 38.3 wk SGA male born via repeat C/S on  at 1402 to a 39 y/o  mother.  Maternal history of asthma. Prenatal history of IUGR and GDM A1. Maternal labs include Blood type B+ mother. PNL as follows: HIV-/HepB-/HepC+ with HEP C RNA NEGATIVE/RPR NR/Rubella I/GBS- from 10/21, AROM at delivery with clear fluids (ROM hours: 0). Baby was warmed, dried suctioned and stimulated with APGARS of 9/9. Mom plans to initiate breastfeeding and formula feed, declines Hep B vaccine and declines circ. Highest maternal temp: 36.5 EOS- N/A    Since admission to the mother baby unit, baby has been feeding, voiding, and stooling appropriately. Vitals remained stable during admission. Baby received routine  care. Baby completed Accucheck protocol as a result of being IDM and asymmetric SGA, had some hypoglycemia, required glucose gel, subsequent blood glucoses normal.      Discharge weight was 2555 g  Weight Change Percentage: -3.95     Discharge Bilirubin  Sternum 4.6   at 36 hours of life, with phototherapy threshold of 14.2.  F/U red reflex bilaterally as an outpatient by pediatrician - unable to obtain prior to discharge due to bilateral eyelid swelling, parent in agreement with plan    See below for hepatitis B vaccine status, hearing screen and CCHD results.  G6PD level sent as part of the [facility]  screening program. Results pending at time of discharge.  Stable for discharge home with instructions to follow up with pediatrician in 1-2 days. As reported by delivery room nurse- 38.3 wk SGA male born via repeat C/S on  at 1402 to a 37 y/o  mother.  Maternal history of asthma. Prenatal history of IUGR and GDM A1. Maternal labs include Blood type B+ mother. PNL as follows: HIV-/HepB-/HepC+ with HEP C RNA NEGATIVE/RPR NR/Rubella I/GBS- from 10/21, AROM at delivery with clear fluids (ROM hours: 0). Baby was warmed, dried suctioned and stimulated with APGARS of 9/9. Mom plans to initiate breastfeeding and formula feed, declines Hep B vaccine and declines circ. Highest maternal temp: 36.5 EOS- N/A    Since admission to the mother baby unit, baby has been feeding, voiding, and stooling appropriately. Vitals remained stable during admission. Baby received routine  care. Baby completed Accucheck protocol as a result of being IDM and asymmetric SGA, had some hypoglycemia, required glucose gel, subsequent blood glucoses normal.      Discharge weight was 2555 g  Weight Change Percentage: -3.95     Discharge Bilirubin  Sternum 4.6   at 36 hours of life, with phototherapy threshold of 14.2.  F/U red reflex bilaterally as an outpatient by pediatrician - unable to obtain prior to discharge due to bilateral eyelid swelling, parent in agreement with plan    According to mother she received RSV vaccine >2 weeks ago  See below for hepatitis B vaccine status, hearing screen and CCHD results.  G6PD level sent as part of the [facility]  screening program. Results pending at time of discharge.  Stable for discharge home with instructions to follow up with pediatrician in 1-2 days.

## 2024-01-23 RX ORDER — MEXILETINE HYDROCHLORIDE 150 MG/1
CAPSULE ORAL
Qty: 360 CAPSULE | Refills: 3 | Status: SHIPPED | OUTPATIENT
Start: 2024-01-23

## 2024-01-23 NOTE — TELEPHONE ENCOUNTER
Rx Refill Note  Requested Prescriptions     Pending Prescriptions Disp Refills    mexiletine (MEXITIL) 150 MG capsule [Pharmacy Med Name: MEXILETINE 150 MG CAPSULE] 360 capsule 3     Sig: TAKE 1 CAPSULE BY MOUTH EVERY MORNING THEN 1 CAP AT NOON AND 2 CAP AT BEDTIME      Last office visit with prescribing clinician: 12/5/2023   Last telemedicine visit with prescribing clinician: Visit date not found   Next office visit with prescribing clinician: Visit date not found   CPE done 03/01/2023    {TIP  Please add Last Relevant Lab Date if appropriate:              {TIP  Is Refill Pharmacy correct? Yes     Would you like a call back once the refill request has been completed: [] Yes [x] No    If the office needs to give you a call back, can they leave a voicemail: [] Yes [] No    Yue Schmidt MA  01/23/24, 13:58 CST

## 2024-02-09 DIAGNOSIS — R09.02 HYPOXEMIA: Primary | ICD-10-CM

## 2024-02-12 ENCOUNTER — OFFICE VISIT (OUTPATIENT)
Dept: FAMILY MEDICINE CLINIC | Facility: CLINIC | Age: 79
End: 2024-02-12
Payer: MEDICARE

## 2024-02-12 ENCOUNTER — OUTSIDE FACILITY SERVICE (OUTPATIENT)
Dept: FAMILY MEDICINE CLINIC | Facility: CLINIC | Age: 79
End: 2024-02-12
Payer: MEDICARE

## 2024-02-12 VITALS
SYSTOLIC BLOOD PRESSURE: 122 MMHG | HEIGHT: 67 IN | BODY MASS INDEX: 34.53 KG/M2 | HEART RATE: 93 BPM | RESPIRATION RATE: 18 BRPM | DIASTOLIC BLOOD PRESSURE: 78 MMHG | TEMPERATURE: 98.1 F | WEIGHT: 220 LBS | OXYGEN SATURATION: 94 %

## 2024-02-12 DIAGNOSIS — R73.9 HYPERGLYCEMIA: ICD-10-CM

## 2024-02-12 DIAGNOSIS — L97.519 ULCER OF RIGHT FOOT, UNSPECIFIED ULCER STAGE: ICD-10-CM

## 2024-02-12 DIAGNOSIS — Z12.5 SPECIAL SCREENING FOR MALIGNANT NEOPLASM OF PROSTATE: ICD-10-CM

## 2024-02-12 DIAGNOSIS — L89.90 PRESSURE INJURY OF SKIN, UNSPECIFIED INJURY STAGE, UNSPECIFIED LOCATION: ICD-10-CM

## 2024-02-12 DIAGNOSIS — E78.2 MIXED HYPERLIPIDEMIA: ICD-10-CM

## 2024-02-12 DIAGNOSIS — R06.02 SHORTNESS OF BREATH: ICD-10-CM

## 2024-02-12 DIAGNOSIS — Z00.00 MEDICARE ANNUAL WELLNESS VISIT, SUBSEQUENT: ICD-10-CM

## 2024-02-12 DIAGNOSIS — Z12.11 SCREENING FOR COLORECTAL CANCER: ICD-10-CM

## 2024-02-12 DIAGNOSIS — R41.3 MEMORY LOSS: ICD-10-CM

## 2024-02-12 DIAGNOSIS — E55.9 VITAMIN D DEFICIENCY: ICD-10-CM

## 2024-02-12 DIAGNOSIS — R53.83 FATIGUE, UNSPECIFIED TYPE: ICD-10-CM

## 2024-02-12 DIAGNOSIS — M1A.0790 CHRONIC GOUT OF FOOT, UNSPECIFIED CAUSE, UNSPECIFIED LATERALITY: Primary | ICD-10-CM

## 2024-02-12 DIAGNOSIS — Z12.12 SCREENING FOR COLORECTAL CANCER: ICD-10-CM

## 2024-02-12 DIAGNOSIS — R21 SKIN RASH: ICD-10-CM

## 2024-02-12 PROCEDURE — 3074F SYST BP LT 130 MM HG: CPT | Performed by: FAMILY MEDICINE

## 2024-02-12 PROCEDURE — G0439 PPPS, SUBSEQ VISIT: HCPCS | Performed by: FAMILY MEDICINE

## 2024-02-12 PROCEDURE — 1170F FXNL STATUS ASSESSED: CPT | Performed by: FAMILY MEDICINE

## 2024-02-12 PROCEDURE — 3078F DIAST BP <80 MM HG: CPT | Performed by: FAMILY MEDICINE

## 2024-02-12 PROCEDURE — 1160F RVW MEDS BY RX/DR IN RCRD: CPT | Performed by: FAMILY MEDICINE

## 2024-02-12 PROCEDURE — 1159F MED LIST DOCD IN RCRD: CPT | Performed by: FAMILY MEDICINE

## 2024-02-12 RX ORDER — HYDROCORTISONE 25 MG/ML
LOTION TOPICAL AS NEEDED
Qty: 118 ML | Refills: 0 | Status: SHIPPED | OUTPATIENT
Start: 2024-02-12

## 2024-02-13 LAB
25(OH)D3+25(OH)D2 SERPL-MCNC: 33.1 NG/ML (ref 30–100)
ALBUMIN SERPL-MCNC: 3.5 G/DL (ref 3.8–4.8)
ALBUMIN/GLOB SERPL: 0.9 {RATIO} (ref 1.2–2.2)
ALP SERPL-CCNC: 178 IU/L (ref 44–121)
ALT SERPL-CCNC: 28 IU/L (ref 0–44)
AST SERPL-CCNC: 25 IU/L (ref 0–40)
BILIRUB SERPL-MCNC: 0.4 MG/DL (ref 0–1.2)
BNP SERPL-MCNC: 12.8 PG/ML (ref 0–100)
BUN SERPL-MCNC: 20 MG/DL (ref 8–27)
BUN/CREAT SERPL: 11 (ref 10–24)
CALCIUM SERPL-MCNC: 11.2 MG/DL (ref 8.6–10.2)
CHLORIDE SERPL-SCNC: 99 MMOL/L (ref 96–106)
CHOLEST SERPL-MCNC: 144 MG/DL (ref 100–199)
CO2 SERPL-SCNC: 25 MMOL/L (ref 20–29)
CREAT SERPL-MCNC: 1.78 MG/DL (ref 0.76–1.27)
EGFRCR SERPLBLD CKD-EPI 2021: 39 ML/MIN/1.73
FOLATE SERPL-MCNC: 9.6 NG/ML
GLOBULIN SER CALC-MCNC: 3.7 G/DL (ref 1.5–4.5)
GLUCOSE SERPL-MCNC: 191 MG/DL (ref 70–99)
HDLC SERPL-MCNC: 37 MG/DL
LDLC SERPL CALC-MCNC: 65 MG/DL (ref 0–99)
POTASSIUM SERPL-SCNC: 4.4 MMOL/L (ref 3.5–5.2)
PROT SERPL-MCNC: 7.2 G/DL (ref 6–8.5)
PSA SERPL-MCNC: 0.9 NG/ML (ref 0–4)
SODIUM SERPL-SCNC: 137 MMOL/L (ref 134–144)
SPECIMEN STATUS: NORMAL
T4 FREE SERPL-MCNC: 1.54 NG/DL (ref 0.82–1.77)
TRIGL SERPL-MCNC: 260 MG/DL (ref 0–149)
TSH SERPL DL<=0.005 MIU/L-ACNC: 2.83 UIU/ML (ref 0.45–4.5)
URATE SERPL-MCNC: 7 MG/DL (ref 3.8–8.4)
VIT B12 SERPL-MCNC: 599 PG/ML (ref 232–1245)
VLDLC SERPL CALC-MCNC: 42 MG/DL (ref 5–40)

## 2024-02-15 ENCOUNTER — TELEPHONE (OUTPATIENT)
Dept: FAMILY MEDICINE CLINIC | Facility: CLINIC | Age: 79
End: 2024-02-15
Payer: MEDICARE

## 2024-02-15 RX ORDER — NITROGLYCERIN 0.4 MG/1
0.4 TABLET SUBLINGUAL
Qty: 25 TABLET | Refills: 2 | Status: SHIPPED | OUTPATIENT
Start: 2024-02-15

## 2024-02-16 ENCOUNTER — TELEPHONE (OUTPATIENT)
Dept: FAMILY MEDICINE CLINIC | Facility: CLINIC | Age: 79
End: 2024-02-16

## 2024-02-16 DIAGNOSIS — R19.7 DIARRHEA, UNSPECIFIED TYPE: ICD-10-CM

## 2024-02-16 DIAGNOSIS — R11.0 NAUSEA: Primary | ICD-10-CM

## 2024-02-16 RX ORDER — APIXABAN 2.5 MG/1
2.5 TABLET, FILM COATED ORAL EVERY 12 HOURS
Qty: 60 TABLET | Refills: 6 | Status: SHIPPED | OUTPATIENT
Start: 2024-02-16

## 2024-02-16 RX ORDER — ONDANSETRON 4 MG/1
4 TABLET, FILM COATED ORAL EVERY 6 HOURS PRN
Qty: 30 TABLET | Refills: 1 | Status: SHIPPED | OUTPATIENT
Start: 2024-02-16

## 2024-02-16 NOTE — TELEPHONE ENCOUNTER
Caller: Beth Velazquez    Relationship: Emergency Contact    Best call back number: [phone]     What is the best time to reach you: AS SOON AS POSSIBLE    Who are you requesting to speak with (clinical staff, provider,  specific staff member): CLINICAL    What was the call regarding: TORSTEN IS SICK AND NAUSEATED. HE HAS HAD VERY BAD DIARRHEA    Is it okay if the provider responds through MyChart: NO WANTS A CALL

## 2024-02-19 DIAGNOSIS — N39.0 URINARY TRACT INFECTION WITH HEMATURIA, SITE UNSPECIFIED: Primary | ICD-10-CM

## 2024-02-19 DIAGNOSIS — R31.9 URINARY TRACT INFECTION WITH HEMATURIA, SITE UNSPECIFIED: Primary | ICD-10-CM

## 2024-02-20 RX ORDER — NITROGLYCERIN 0.4 MG/1
0.4 TABLET SUBLINGUAL
Qty: 75 TABLET | Refills: 1 | Status: SHIPPED | OUTPATIENT
Start: 2024-02-20

## 2024-02-20 NOTE — TELEPHONE ENCOUNTER
Rx Refill Note  Requested Prescriptions     Pending Prescriptions Disp Refills    nitroglycerin (NITROSTAT) 0.4 MG SL tablet 75 tablet 1     Sig: Place 1 tablet under the tongue Every 5 (Five) Minutes As Needed for Chest Pain. Take no more than 3 doses in 15 minutes.      Last office visit with prescribing clinician: 2/12/2024   Last telemedicine visit with prescribing clinician: Visit date not found   Next office visit with prescribing clinician: Visit date not found                         Would you like a call back once the refill request has been completed: [] Yes [] No    If the office needs to give you a call back, can they leave a voicemail: [] Yes [] No    Carolynn Galeano Rep  02/20/24, 15:29 CST

## 2024-02-21 ENCOUNTER — TELEPHONE (OUTPATIENT)
Dept: FAMILY MEDICINE CLINIC | Facility: CLINIC | Age: 79
End: 2024-02-21
Payer: MEDICARE

## 2024-02-21 NOTE — TELEPHONE ENCOUNTER
Called and spoke with Glenys--fax order and she will clarify with pharmacy and then call patient to schedule.     Order faxed successfully.

## 2024-02-21 NOTE — TELEPHONE ENCOUNTER
Wife called ED and there is no record of orders being faxed.      Called Loli at home and IM medication was not available at the pharmacies and mentioned Millie called you about that---Loli understood pt would be receiving OP IV medication.     You will need to write orders and fax to [phone].

## 2024-02-21 NOTE — TELEPHONE ENCOUNTER
Let Denisse know it has been faxed to the hospital--we will probably have to call  and tell them to take it to the IV people

## 2024-02-28 ENCOUNTER — TELEPHONE (OUTPATIENT)
Dept: FAMILY MEDICINE CLINIC | Facility: CLINIC | Age: 79
End: 2024-02-28
Payer: MEDICARE

## 2024-02-28 NOTE — TELEPHONE ENCOUNTER
Left voice mail with home care giver to have Beth call the office to let her know Home health will repeat urine and culture.

## 2024-02-28 NOTE — TELEPHONE ENCOUNTER
Caller: Beth Velazquez    Relationship: Emergency Contact    Best call back number: [phone]     What is the best time to reach you: ANY    Who are you requesting to speak with (clinical staff, provider,  specific staff member): CLINICIAL    Do you know the name of the person who called: WIFE    What was the call regarding: PATIENT HAS FINISHED HIS MEDICATION AND SHE WOULD LIKE TO KNOW IF AN APPT IS NOW NEEDED. PLEASE CALL BACK TO ADVISE    Is it okay if the provider responds through MyChart: CALL BACK PREFERRED

## 2024-02-28 NOTE — TELEPHONE ENCOUNTER
Call home health to repeat a urinalysis and urine culture and sensitivity in 1 week-posttreatment culture

## 2024-03-06 DIAGNOSIS — R07.9 CHEST PAIN, UNSPECIFIED TYPE: Primary | ICD-10-CM

## 2024-03-07 DIAGNOSIS — F33.1 MAJOR DEPRESSIVE DISORDER, RECURRENT, MODERATE: ICD-10-CM

## 2024-03-07 DIAGNOSIS — M1A.0790 CHRONIC GOUT OF FOOT, UNSPECIFIED CAUSE, UNSPECIFIED LATERALITY: ICD-10-CM

## 2024-03-07 DIAGNOSIS — K21.00 GASTROESOPHAGEAL REFLUX DISEASE WITH ESOPHAGITIS WITHOUT HEMORRHAGE: ICD-10-CM

## 2024-03-07 RX ORDER — METOCLOPRAMIDE 10 MG/1
10 TABLET ORAL 3 TIMES DAILY
Qty: 90 TABLET | Refills: 6 | Status: SHIPPED | OUTPATIENT
Start: 2024-03-07

## 2024-03-07 RX ORDER — METOLAZONE 2.5 MG/1
2.5 TABLET ORAL 2 TIMES DAILY
Qty: 60 TABLET | Refills: 6 | Status: SHIPPED | OUTPATIENT
Start: 2024-03-07

## 2024-03-07 RX ORDER — METOPROLOL SUCCINATE 25 MG/1
12.5 TABLET, EXTENDED RELEASE ORAL DAILY
Qty: 45 TABLET | Refills: 3 | Status: SHIPPED | OUTPATIENT
Start: 2024-03-07

## 2024-03-07 RX ORDER — BUPROPION HYDROCHLORIDE 150 MG/1
150 TABLET ORAL EVERY MORNING
Qty: 90 TABLET | Refills: 3 | Status: SHIPPED | OUTPATIENT
Start: 2024-03-07

## 2024-03-07 RX ORDER — ALLOPURINOL 100 MG/1
100 TABLET ORAL DAILY
Qty: 90 TABLET | Refills: 3 | Status: SHIPPED | OUTPATIENT
Start: 2024-03-07

## 2024-03-07 NOTE — TELEPHONE ENCOUNTER
Rx Refill Note  Requested Prescriptions     Pending Prescriptions Disp Refills    metoprolol succinate XL (TOPROL-XL) 25 MG 24 hr tablet [Pharmacy Med Name: METOPROLOL SUCC ER 25 MG TAB] 45 tablet 7     Sig: TAKE 1/2 TABLET BY MOUTH EVERY DAY      Last office visit with prescribing clinician: 2/12/2024   Last telemedicine visit with prescribing clinician: Visit date not found   Next office visit with prescribing clinician:     {TIP  Please add Last Relevant Lab 2/12/24    Yue Dougherty MA  03/07/24, 12:49 CST

## 2024-03-07 NOTE — TELEPHONE ENCOUNTER
Rx Refill Note  Requested Prescriptions     Pending Prescriptions Disp Refills    metoclopramide (REGLAN) 10 MG tablet [Pharmacy Med Name: METOCLOPRAMIDE 10 MG TABLET] 90 tablet 0     Sig: TAKE 1 TABLET BY MOUTH THREE TIMES A DAY    allopurinol (ZYLOPRIM) 100 MG tablet [Pharmacy Med Name: ALLOPURINOL 100 MG TABLET] 90 tablet 0     Sig: TAKE 1 TABLET BY MOUTH EVERY DAY    buPROPion XL (WELLBUTRIN XL) 150 MG 24 hr tablet [Pharmacy Med Name: BUPROPION HCL  MG TABLET] 90 tablet 0     Sig: TAKE 1 TABLET BY MOUTH EVERY DAY IN THE MORNING      Last office visit with prescribing clinician: 2/12/24   Last telemedicine visit with prescribing clinician: Visit date not found   Next office visit with prescribing clinician:     {TIP  Please add Last Relevant Lab 2/12/24    Yue Dougherty MA  03/07/24, 12:48 CST

## 2024-03-07 NOTE — TELEPHONE ENCOUNTER
Rx Refill Note  Requested Prescriptions     Pending Prescriptions Disp Refills    metOLazone (ZAROXOLYN) 2.5 MG tablet [Pharmacy Med Name: METOLAZONE 2.5 MG TABLET] 60 tablet      Sig: TAKE 1 TABLET BY MOUTH TWICE A DAY      Last office visit with prescribing clinician: 2/12/2024   Last telemedicine visit with prescribing clinician: Visit date not found   Next office visit with prescribing clinician:     {TIP  Please add Last Relevant Lab 2/12/24    Yue Dougherty MA  03/07/24, 07:29 CST    Will call patient's wife to verify dose.  Med list has 10 mg twice daily.

## 2024-03-08 RX ORDER — CEFUROXIME AXETIL 500 MG/1
500 TABLET ORAL 2 TIMES DAILY
Qty: 14 TABLET | Refills: 0 | Status: SHIPPED | OUTPATIENT
Start: 2024-03-08 | End: 2024-03-15

## 2024-03-11 ENCOUNTER — TELEPHONE (OUTPATIENT)
Dept: FAMILY MEDICINE CLINIC | Facility: CLINIC | Age: 79
End: 2024-03-11
Payer: MEDICARE

## 2024-03-11 NOTE — TELEPHONE ENCOUNTER
Beth advised.  
Spoke with wife.  Patient is having difficulty swallowing pills.  She is asking on the recent antibiotic that was sent in if this could be changed to  liquid?  
There isn't a liquid on this med. Ok to crush though. 
ADL retraining/bed mobility training/balance training/ROM/strengthening/transfer training

## 2024-03-15 RX ORDER — MEMANTINE HYDROCHLORIDE 10 MG/1
10 TABLET ORAL 2 TIMES DAILY
Qty: 180 TABLET | Refills: 3 | Status: SHIPPED | OUTPATIENT
Start: 2024-03-15

## 2024-03-15 NOTE — TELEPHONE ENCOUNTER
Rx Refill Note  Requested Prescriptions     Pending Prescriptions Disp Refills    memantine (NAMENDA) 10 MG tablet [Pharmacy Med Name: MEMANTINE HCL 10 MG TABLET] 180 tablet 3     Sig: TAKE 1 TABLET BY MOUTH TWICE A DAY      Last office visit with prescribing clinician: 2/12/2024   Last telemedicine visit with prescribing clinician: Visit date not found   Next office visit with prescribing clinician:     Yue Dougherty MA  03/15/24, 07:31 CDT

## 2024-03-28 ENCOUNTER — TELEPHONE (OUTPATIENT)
Dept: FAMILY MEDICINE CLINIC | Facility: CLINIC | Age: 79
End: 2024-03-28
Payer: MEDICARE

## 2024-03-28 NOTE — TELEPHONE ENCOUNTER
Mayuri Nuñez with Home health called stating patient has had a 3.2 pound weight gain over the last 8 days.  Dr. Salinas advised and said to recheck weight in 1 week and if still gaining weight will change is diuretic.

## 2024-04-11 ENCOUNTER — OUTSIDE FACILITY SERVICE (OUTPATIENT)
Dept: FAMILY MEDICINE CLINIC | Facility: CLINIC | Age: 79
End: 2024-04-11
Payer: MEDICARE

## 2024-04-14 DIAGNOSIS — J30.89 ENVIRONMENTAL AND SEASONAL ALLERGIES: ICD-10-CM

## 2024-04-15 RX ORDER — IPRATROPIUM BROMIDE 21 UG/1
SPRAY, METERED NASAL
Qty: 30 EACH | Refills: 3 | Status: SHIPPED | OUTPATIENT
Start: 2024-04-15

## 2024-04-15 NOTE — TELEPHONE ENCOUNTER
Rx Refill Note  Requested Prescriptions     Pending Prescriptions Disp Refills    ipratropium (ATROVENT) 0.03 % nasal spray [Pharmacy Med Name: IPRATROPIUM 0.03% SPRAY] 30 each 0     Sig: INSTILL 2 SPRAYS INTO THE NOSTRIL(S) AS DIRECTED BY PROVIDER EVERY 12 (TWELVE) HOURS.      Last office visit with prescribing clinician: 2/12/24   Last telemedicine visit with prescribing clinician: Visit date not found   Next office visit with prescribing clinician:   Yue Dougherty MA  04/15/24, 07:44 CDT

## 2024-04-16 DIAGNOSIS — N39.0 RECURRENT URINARY TRACT INFECTION: Primary | ICD-10-CM

## 2024-04-18 NOTE — PROGRESS NOTES
Subjective    Mr. Velazquez is 78 y.o. male    Chief Complaint: BPH     History of Present Illness    78-year-old male established patient in for annual follow-up regarding history of BPH with urinary retention, history of gross hematuria and recurrent UTIs.    Denies further gross hematuria.  Underwent cystoscopy evaluation by Dr. Mata 5/2023 which revealed prior note a large bladder with lateral lobe hypertrophy likely the source of gross hematuria.  Tamsulosin was increased to 0.8 mg daily due to acute urinary retention and feeling of incomplete emptying for which has remained at this time.  Patient now reporting an ongoing nicholson with urinary tract infections.  Has been having home health come out and every couple months prior wife's report has been having to have his bladder drained and urine checked.    previous history July 2022 during hospitalization for which patient required indwelling Treviño catheter usage.     Patient denies any fever, chills, nausea vomiting or flank pain.    The following portions of the patient's history were reviewed and updated as appropriate: allergies, current medications, past family history, past medical history, past social history, past surgical history and problem list.    Review of Systems   Constitutional:  Negative for chills, fatigue and fever.   Gastrointestinal:  Negative for nausea and vomiting.   Genitourinary:  Positive for decreased urine volume and difficulty urinating.         Current Outpatient Medications:     Acetaminophen (CHLORASEPTIC SORE THROAT PO), Take  by mouth., Disp: , Rfl:     acetaminophen (TYLENOL) 325 MG tablet, Take 2 tablets by mouth Every 4 (Four) Hours As Needed for Mild Pain., Disp: , Rfl:     allopurinol (ZYLOPRIM) 100 MG tablet, TAKE 1 TABLET BY MOUTH EVERY DAY, Disp: 90 tablet, Rfl: 3    aspirin 81 MG EC tablet, Take 1 tablet by mouth Daily., Disp: , Rfl:     atorvastatin (LIPITOR) 40 MG tablet, Take 1 tablet by mouth Daily., Disp: , Rfl:      bisacodyl (DULCOLAX) 5 MG EC tablet, Take 2 tablets by mouth Daily As Needed for Constipation., Disp: , Rfl:     buPROPion XL (WELLBUTRIN XL) 150 MG 24 hr tablet, TAKE 1 TABLET BY MOUTH EVERY DAY IN THE MORNING, Disp: 90 tablet, Rfl: 3    cholecalciferol (VITAMIN D3) 25 MCG (1000 UT) tablet, Take 2 tablets by mouth Daily., Disp: , Rfl:     dextromethorphan-guaifenesin (ROBITUSSIN-DM)  MG/5ML syrup, Take 5 mL by mouth Every 4 (Four) Hours As Needed (COUGH)., Disp: , Rfl:     diphenhydrAMINE (BENADRYL) 25 mg capsule, Take 1 capsule by mouth Every 6 (Six) Hours As Needed for Itching., Disp: , Rfl:     Eliquis 2.5 MG tablet tablet, TAKE 1 TABLET BY MOUTH EVERY 12 HOURS, Disp: 60 tablet, Rfl: 6    famotidine (PEPCID) 20 MG tablet, Take 1 tablet by mouth 2 (Two) Times a Day., Disp: , Rfl:     fluticasone (FLONASE) 50 MCG/ACT nasal spray, 2 sprays by Each Nare route Daily., Disp: , Rfl:     HYDROcodone-acetaminophen (Norco) 5-325 MG per tablet, Take 1 tablet by mouth Every 6 (Six) Hours As Needed for Moderate Pain or Severe Pain., Disp: 120 tablet, Rfl: 0    hydrocortisone (ANUSOL-HC) 25 MG suppository, Insert 1 suppository into the rectum 2 (Two) Times a Day., Disp: , Rfl:     hydrocortisone 2.5 % lotion, Apply  topically to the appropriate area as directed As Needed for Rash or Irritation., Disp: 118 mL, Rfl: 0    ipratropium (ATROVENT) 0.03 % nasal spray, INSTILL 2 SPRAYS INTO THE NOSTRIL(S) AS DIRECTED BY PROVIDER EVERY 12 (TWELVE) HOURS., Disp: 30 each, Rfl: 3    isosorbide mononitrate (IMDUR) 30 MG 24 hr tablet, Take 1 tablet by mouth Daily., Disp: 90 tablet, Rfl: 2    levothyroxine (SYNTHROID, LEVOTHROID) 50 MCG tablet, Take 1 tablet by mouth Daily., Disp: 90 tablet, Rfl: 2    loperamide (IMODIUM) 1 MG/5ML solution, Take 5 mL by mouth 4 (Four) Times a Day As Needed for Diarrhea., Disp: 118 mL, Rfl: 0    magnesium hydroxide (MILK OF MAGNESIA) 400 MG/5ML suspension, Take 30 mL by mouth Daily As Needed for  Constipation., Disp: , Rfl:     memantine (NAMENDA) 10 MG tablet, TAKE 1 TABLET BY MOUTH TWICE A DAY, Disp: 180 tablet, Rfl: 3    metoclopramide (REGLAN) 10 MG tablet, Take 1 tablet by mouth 3 (Three) Times a Day., Disp: 90 tablet, Rfl: 6    metOLazone (ZAROXOLYN) 2.5 MG tablet, Take 1 tablet by mouth 2 (Two) Times a Day., Disp: 60 tablet, Rfl: 6    metoprolol succinate XL (TOPROL-XL) 25 MG 24 hr tablet, Take 0.5 tablets by mouth Daily. HOLD IF SBP <110 OR DBP <60, Disp: 45 tablet, Rfl: 3    mexiletine (MEXITIL) 150 MG capsule, TAKE 1 CAPSULE BY MOUTH EVERY MORNING THEN 1 CAP AT NOON AND 2 CAP AT BEDTIME, Disp: 360 capsule, Rfl: 3    naloxone (NARCAN) 4 MG/0.1ML nasal spray, Call 911. Don't prime. [place] in 1 nostril for overdose. Repeat in 2-3 minutes in other nostril if no or minimal breathing/responsiveness., Disp: 2 each, Rfl: 0    nitroglycerin (NITROSTAT) 0.4 MG SL tablet, Place 1 tablet under the tongue Every 5 (Five) Minutes As Needed for Chest Pain. Take no more than 3 doses in 15 minutes., Disp: 75 tablet, Rfl: 1    ondansetron (Zofran) 4 MG tablet, Take 1 tablet by mouth Every 6 (Six) Hours As Needed for Nausea or Vomiting., Disp: 30 tablet, Rfl: 1    polyethyl glycol-propyl glycol (SYSTANE) 0.4-0.3 % solution ophthalmic solution (artificial tears), Administer 1 drop to both eyes Every 1 (One) Hour As Needed., Disp: , Rfl:     saline (AYR) gel nasal gel, Apply 2 Applications topically to the appropriate area as directed As Needed (NASAL DRYNESS)., Disp: , Rfl:     sennosides-docusate (PERICOLACE) 8.6-50 MG per tablet, Take 2 tablets by mouth 2 (Two) Times a Day., Disp: , Rfl:     tamsulosin (FLOMAX) 0.4 MG capsule 24 hr capsule, Take 1 capsule by mouth 2 (Two) Times a Day., Disp: 180 capsule, Rfl: 3    terbinafine (lamiSIL) 250 MG tablet, Take 1 tablet by mouth Daily., Disp: , Rfl:     finasteride (PROSCAR) 5 MG tablet, Take 1 tablet by mouth Daily for 360 days., Disp: 30 tablet, Rfl: 11    Past Medical  "History:   Diagnosis Date    Arthritis     Coronary artery disease     Hyperlipidemia     Hypertensive heart disease with heart failure 3/29/2021    Hypothyroidism 4/1/2021    Kidney stone     Major depressive disorder, recurrent, moderate 3/29/2021    Morbidly obese 9/18/2020       Past Surgical History:   Procedure Laterality Date    AMPUTATION DIGIT Left 8/1/2023    Procedure: AMPUTATION OF SECOND DIGIT, LEFT FOOT;  Surgeon: Josh Carrion DPM;  Location:  [facility] OR;  Service: Podiatry;  Laterality: Left;    CARDIAC DEFIBRILLATOR PLACEMENT      CARDIAC DEFIBRILLATOR PLACEMENT N/A 2008    CARDIAC SURGERY      ENDOSCOPY N/A 10/30/2020    Procedure: ESOPHAGOGASTRODUODENOSCOPY WITH ANESTHESIA;  Surgeon: Brent Stanley MD;  Location:  [facility] OR;  Service: Gastroenterology;  Laterality: N/A;  pre dysphagia  post post op gastric surgery  dr jose manuel smith    ESOPHAGUS SURGERY      KNEE SURGERY      TOTAL SHOULDER REPLACEMENT         Social History     Socioeconomic History    Marital status:    Tobacco Use    Smoking status: Never    Smokeless tobacco: Never   Vaping Use    Vaping status: Never Used   Substance and Sexual Activity    Alcohol use: No    Drug use: No    Sexual activity: Defer       Family History   Problem Relation Age of Onset    Hypertension Mother     Stroke Mother     Cancer Father     Hypertension Father     Diabetes Sister     Hypertension Sister     Crohn's disease Daughter     No Known Problems Son     No Known Problems Maternal Grandmother     No Known Problems Maternal Grandfather     No Known Problems Paternal Grandmother     No Known Problems Paternal Grandfather     Hypertension Sister     Hypertension Sister     Hypertension Sister     No Known Problems Son        Objective    Temp 97.8 °F (36.6 °C)   Ht 170.2 cm (67.01\")   Wt 99.8 kg (220 lb)   BMI 34.45 kg/m²     Physical Exam  Constitutional:       Appearance: Normal appearance.   Abdominal:      Tenderness: There is no right CVA " tenderness or left CVA tenderness.   Musculoskeletal:      Comments: W/c   Skin:     General: Skin is warm and dry.   Neurological:      Mental Status: He is alert. Mental status is at baseline.   Psychiatric:         Mood and Affect: Mood normal.         Behavior: Behavior normal.             Results for orders placed or performed in visit on 02/12/24   TSH    Specimen: Blood   Result Value Ref Range    TSH 2.830 0.450 - 4.500 uIU/mL   T4, free    Specimen: Blood   Result Value Ref Range    Free T4 1.54 0.82 - 1.77 ng/dL   Comprehensive Metabolic Panel    Specimen: Blood   Result Value Ref Range    Glucose 191 (H) 70 - 99 mg/dL    BUN 20 8 - 27 mg/dL    Creatinine 1.78 (H) 0.76 - 1.27 mg/dL    EGFR Result 39 (L) >59 mL/min/1.73    BUN/Creatinine Ratio 11 10 - 24    Sodium 137 134 - 144 mmol/L    Potassium 4.4 3.5 - 5.2 mmol/L    Chloride 99 96 - 106 mmol/L    Total CO2 25 20 - 29 mmol/L    Calcium 11.2 (H) 8.6 - 10.2 mg/dL    Total Protein 7.2 6.0 - 8.5 g/dL    Albumin 3.5 (L) 3.8 - 4.8 g/dL    Globulin 3.7 1.5 - 4.5 g/dL    A/G Ratio 0.9 (L) 1.2 - 2.2    Total Bilirubin 0.4 0.0 - 1.2 mg/dL    Alkaline Phosphatase 178 (H) 44 - 121 IU/L    AST (SGOT) 25 0 - 40 IU/L    ALT (SGPT) 28 0 - 44 IU/L   Lipid Panel    Specimen: Blood   Result Value Ref Range    Total Cholesterol 144 100 - 199 mg/dL    Triglycerides 260 (H) 0 - 149 mg/dL    HDL Cholesterol 37 (L) >39 mg/dL    VLDL Cholesterol Akil 42 (H) 5 - 40 mg/dL    LDL Chol Calc (NIH) 65 0 - 99 mg/dL   Uric Acid    Specimen: Blood   Result Value Ref Range    Uric Acid 7.0 3.8 - 8.4 mg/dL   Vitamin D,25-Hydroxy    Specimen: Blood   Result Value Ref Range    25 Hydroxy, Vitamin D 33.1 30.0 - 100.0 ng/mL   Vitamin B12    Specimen: Blood   Result Value Ref Range    Vitamin B-12 599 232 - 1,245 pg/mL   Folate    Specimen: Blood   Result Value Ref Range    Folate 9.6 >3.0 ng/mL   BNP    Specimen: Blood   Result Value Ref Range    BNP 12.8 0.0 - 100.0 pg/mL   PSA Screen     Specimen: Blood   Result Value Ref Range    PSA 0.9 0.0 - 4.0 ng/mL   Specimen Status Report   Result Value Ref Range    Specimen Status CANCELED    Estimation of residual urine via abdominal ultrasound  Residual Urine: 338 ml  Indication: retention  Position: Supine  Examination: Incremental scanning of the suprapubic area using 3 MHz transducer using copious amounts of acoustic gel.   Findings: An anechoic area was demonstrated which represented the bladder, with measurement of residual urine as noted. I inspected this myself. In that the residual urine was stable or insignificant, no treatment will be necessary at this time.      Assessment and Plan    Diagnoses and all orders for this visit:    1. Urinary retention (Primary)  -     Cancel: POC Urinalysis Dipstick, Multipro  -     finasteride (PROSCAR) 5 MG tablet; Take 1 tablet by mouth Daily for 360 days.  Dispense: 30 tablet; Refill: 11        PVR today 338 mL-patient again appears to be retaining large volume urine.  At this time recommend starting on finasteride 5 mg daily in addition to the 0.8 mg tamsulosin as patient poor surgical candidate    Follow-up 3 months to recheck PVR

## 2024-04-19 ENCOUNTER — OFFICE VISIT (OUTPATIENT)
Dept: UROLOGY | Facility: CLINIC | Age: 79
End: 2024-04-19
Payer: MEDICARE

## 2024-04-19 VITALS — BODY MASS INDEX: 34.53 KG/M2 | TEMPERATURE: 97.8 F | HEIGHT: 67 IN | WEIGHT: 220 LBS

## 2024-04-19 DIAGNOSIS — R33.9 URINARY RETENTION: Primary | ICD-10-CM

## 2024-04-19 RX ORDER — TERBINAFINE HYDROCHLORIDE 250 MG/1
250 TABLET ORAL DAILY
COMMUNITY
Start: 2024-04-18

## 2024-04-19 RX ORDER — FINASTERIDE 5 MG/1
5 TABLET, FILM COATED ORAL DAILY
Qty: 30 TABLET | Refills: 11 | Status: SHIPPED | OUTPATIENT
Start: 2024-04-19 | End: 2025-04-14

## 2024-04-29 NOTE — TELEPHONE ENCOUNTER
Noted.   Detail Level: Detailed Quality 130: Documentation Of Current Medications In The Medical Record: Current Medications Documented Quality 431: Preventive Care And Screening: Unhealthy Alcohol Use - Screening: Patient not identified as an unhealthy alcohol user when screened for unhealthy alcohol use using a systematic screening method Quality 226: Preventive Care And Screening: Tobacco Use: Screening And Cessation Intervention: Patient screened for tobacco use and is an ex/non-smoker .

## 2024-05-14 DIAGNOSIS — R33.9 URINARY RETENTION: ICD-10-CM

## 2024-05-14 RX ORDER — TAMSULOSIN HYDROCHLORIDE 0.4 MG/1
1 CAPSULE ORAL 2 TIMES DAILY
Qty: 180 CAPSULE | Refills: 3 | Status: SHIPPED | OUTPATIENT
Start: 2024-05-14

## 2024-05-31 DIAGNOSIS — K21.9 GASTROESOPHAGEAL REFLUX DISEASE, UNSPECIFIED WHETHER ESOPHAGITIS PRESENT: Primary | ICD-10-CM

## 2024-05-31 RX ORDER — FAMOTIDINE 20 MG/1
20 TABLET, FILM COATED ORAL 2 TIMES DAILY
Qty: 180 TABLET | Refills: 2 | Status: SHIPPED | OUTPATIENT
Start: 2024-05-31

## 2024-05-31 NOTE — TELEPHONE ENCOUNTER
Rx Refill Note  Requested Prescriptions     Pending Prescriptions Disp Refills    famotidine (PEPCID) 20 MG tablet [Pharmacy Med Name: FAMOTIDINE 20 MG TABLET] 180 tablet 3     Sig: TAKE 1 TABLET BY MOUTH TWICE A DAY      Last office visit with prescribing clinician: 2/12/2024   Last telemedicine visit with prescribing clinician: Visit date not found   Next office visit with prescribing clinician: 2/13/2025   CPE done 2/12/2024                      Would you like a call back once the refill request has been completed: [] Yes [] No    If the office needs to give you a call back, can they leave a voicemail: [] Yes [] No    Yue Schmidt MA  05/31/24, 11:17 CDT

## 2024-06-11 ENCOUNTER — OUTSIDE FACILITY SERVICE (OUTPATIENT)
Dept: FAMILY MEDICINE CLINIC | Facility: CLINIC | Age: 79
End: 2024-06-11
Payer: MEDICARE

## 2024-06-26 NOTE — PROGRESS NOTES
Subjective   Samy Velazquez is a 79 y.o. male.     History of Present Illness  79-year-old male-dementia-esophageal stricture and decubitus right buttocks      The following portions of the patient's history were reviewed and updated as appropriate: allergies, current medications, past family history, past medical history, past social history, past surgical history, and problem list.    Review of Systems   Respiratory:  Negative for shortness of breath.    Cardiovascular:  Negative for chest pain and leg swelling.   Gastrointestinal:         Difficulty swallowing-history of esophageal stricture   Skin:  Positive for wound.        Decubitus right buttocks-moderate to severe       Objective   Physical Exam  Vitals and nursing note reviewed.   Constitutional:       Appearance: He is obese.   HENT:      Mouth/Throat:      Mouth: Mucous membranes are moist.   Eyes:      Pupils: Pupils are equal, round, and reactive to light.   Cardiovascular:      Rate and Rhythm: Normal rate and regular rhythm.   Pulmonary:      Effort: Pulmonary effort is normal.      Breath sounds: Normal breath sounds.   Musculoskeletal:      Right lower leg: No edema.      Left lower leg: No edema.   Neurological:      General: No focal deficit present.      Mental Status: He is alert.   Psychiatric:         Behavior: Behavior normal.         Assessment & Plan   Diagnoses and all orders for this visit:    1. Pressure injury of right buttock, stage 2 (Primary)  -     Ambulatory Referral to Wound Clinic    2. Esophageal stricture  -     Ambulatory Referral to General Surgery         Plan above

## 2024-07-01 NOTE — TELEPHONE ENCOUNTER
Rx Refill Note  Requested Prescriptions     Pending Prescriptions Disp Refills    atorvastatin (LIPITOR) 40 MG tablet [Pharmacy Med Name: ATORVASTATIN 40 MG TABLET] 90 tablet 3     Sig: TAKE 1 TABLET BY MOUTH EVERY DAY EVERY NIGHT      Last office visit with prescribing clinician: 6/26/2024   Last telemedicine visit with prescribing clinician: Visit date not found   Next office visit with prescribing clinician: 2/13/2025       {TIP  Please add Last Relevant Lab 2/12/24    Yue Dougherty MA  07/01/24, 07:38 CDT

## (undated) DEVICE — BNDG ELAS ECON W/CLIP 4IN 5YD LF STRL

## (undated) DEVICE — SENSR O2 OXIMAX FNGR A/ 18IN NONSTR

## (undated) DEVICE — TBG SMPL FLTR LINE NASL 02/C02 A/ BX/100

## (undated) DEVICE — PRECISION THIN (9.0 X 0.38 X 25.0MM)

## (undated) DEVICE — THE CHANNEL CLEANING BRUSH IS A NYLON FLEXI BRUSH ATTACHED TO A FLEXIBLE PLASTIC SHEATH DESIGNED TO SAFELY REMOVE DEBRIS FROM FLEXIBLE ENDOSCOPES.

## (undated) DEVICE — BAPTIST TURNOVER KIT: Brand: MEDLINE INDUSTRIES, INC.

## (undated) DEVICE — FRAZIER SUCTION INSTRUMENT 10 FR W/CONTROL VENT & OBTURATOR: Brand: FRAZIER

## (undated) DEVICE — 4.0MM EGG BUR

## (undated) DEVICE — SPNG GZ WOVN 4X4IN 12PLY 10/BX STRL

## (undated) DEVICE — APPL DURAPREP IODOPHOR APL 26ML

## (undated) DEVICE — SUT ETHLN 3/0 FS1 30IN 669H

## (undated) DEVICE — GLV SURG SENSICARE PI ORTHO SZ8 LF STRL

## (undated) DEVICE — CVR BRD ARM 13X30

## (undated) DEVICE — Device

## (undated) DEVICE — 4-PORT MANIFOLD: Brand: NEPTUNE 2

## (undated) DEVICE — BANDAGE,GAUZE,BULKEE II,4.5"X4.1YD,STRL: Brand: MEDLINE

## (undated) DEVICE — TRAP FLD MINIVAC MEGADYNE 100ML

## (undated) DEVICE — Device: Brand: DEFENDO AIR/WATER/SUCTION AND BIOPSY VALVE

## (undated) DEVICE — SKIN PREP TRAY W/CHG: Brand: MEDLINE INDUSTRIES, INC.

## (undated) DEVICE — CUFF,BP,DISP,1 TUBE,ADULT,HP: Brand: MEDLINE

## (undated) DEVICE — ANTIBACTERIAL UNDYED BRAIDED (POLYGLACTIN 910), SYNTHETIC ABSORBABLE SUTURE: Brand: COATED VICRYL

## (undated) DEVICE — INTENDED FOR TISSUE SEPARATION, AND OTHER PROCEDURES THAT REQUIRE A SHARP SURGICAL BLADE TO PUNCTURE OR CUT.: Brand: BARD-PARKER ® STAINLESS STEEL BLADES

## (undated) DEVICE — PK EXTRM 30

## (undated) DEVICE — DISPOSABLE TOURNIQUET CUFF SINGLE BLADDER, SINGLE PORT AND QUICK CONNECT CONNECTOR: Brand: COLOR CUFF

## (undated) DEVICE — YANKAUER,BULB TIP WITH VENT: Brand: ARGYLE

## (undated) DEVICE — CONMED SCOPE SAVER BITE BLOCK, 20X27 MM: Brand: SCOPE SAVER

## (undated) DEVICE — DRSNG GZ CURAD XEROFORM NONADHS 5X9IN STRL